# Patient Record
Sex: FEMALE | Race: WHITE | NOT HISPANIC OR LATINO | Employment: OTHER | URBAN - METROPOLITAN AREA
[De-identification: names, ages, dates, MRNs, and addresses within clinical notes are randomized per-mention and may not be internally consistent; named-entity substitution may affect disease eponyms.]

---

## 2017-04-12 ENCOUNTER — ALLSCRIPTS OFFICE VISIT (OUTPATIENT)
Dept: OTHER | Facility: OTHER | Age: 82
End: 2017-04-12

## 2017-04-12 ENCOUNTER — HOSPITAL ENCOUNTER (OUTPATIENT)
Dept: RADIOLOGY | Facility: MEDICAL CENTER | Age: 82
Discharge: HOME | End: 2017-04-12
Payer: MEDICARE

## 2017-04-12 ENCOUNTER — TRANSCRIBE ORDERS (OUTPATIENT)
Dept: ADMINISTRATIVE | Facility: HOSPITAL | Age: 82
End: 2017-04-12

## 2017-04-12 DIAGNOSIS — R91.1 SOLITARY PULMONARY NODULE: ICD-10-CM

## 2017-04-12 DIAGNOSIS — E04.1 NONTOXIC SINGLE THYROID NODULE: ICD-10-CM

## 2017-04-12 DIAGNOSIS — R05.9 COUGH: ICD-10-CM

## 2017-04-12 DIAGNOSIS — N28.9 DISORDER OF KIDNEY AND URETER: ICD-10-CM

## 2017-04-12 PROCEDURE — 71020 HB CHEST X-RAY 2VW FRONTAL&LATL: CPT

## 2017-04-13 ENCOUNTER — GENERIC CONVERSION - ENCOUNTER (OUTPATIENT)
Dept: OTHER | Facility: OTHER | Age: 82
End: 2017-04-13

## 2017-04-14 ENCOUNTER — TRANSCRIBE ORDERS (OUTPATIENT)
Dept: ADMINISTRATIVE | Facility: HOSPITAL | Age: 82
End: 2017-04-14

## 2017-04-14 DIAGNOSIS — R91.1 LUNG NODULE: Primary | ICD-10-CM

## 2017-04-17 ENCOUNTER — LAB CONVERSION - ENCOUNTER (OUTPATIENT)
Dept: OTHER | Facility: OTHER | Age: 82
End: 2017-04-17

## 2017-04-17 LAB
BUN SERPL-MCNC: 26 MG/DL (ref 7–25)
CREAT SERPL-MCNC: 1.11 MG/DL (ref 0.6–0.88)
EGFR AFRICAN AMERICAN (HISTORICAL): 52 ML/MIN/1.73M2
EGFR-AMERICAN CALC (HISTORICAL): 45 ML/MIN/1.73M2

## 2017-04-24 ENCOUNTER — ALLSCRIPTS OFFICE VISIT (OUTPATIENT)
Dept: OTHER | Facility: OTHER | Age: 82
End: 2017-04-24

## 2017-04-24 ENCOUNTER — HOSPITAL ENCOUNTER (OUTPATIENT)
Dept: RADIOLOGY | Facility: MEDICAL CENTER | Age: 82
Discharge: HOME | End: 2017-04-24
Payer: MEDICARE

## 2017-04-24 DIAGNOSIS — R91.1 SOLITARY PULMONARY NODULE: ICD-10-CM

## 2017-04-24 DIAGNOSIS — R91.1 LUNG NODULE: ICD-10-CM

## 2017-04-24 PROCEDURE — 71260 CT THORAX DX C+: CPT

## 2017-04-24 RX ADMIN — IODIXANOL 85 ML: 320 INJECTION, SOLUTION INTRAVASCULAR at 15:42

## 2017-05-12 ENCOUNTER — ALLSCRIPTS OFFICE VISIT (OUTPATIENT)
Dept: OTHER | Facility: OTHER | Age: 82
End: 2017-05-12

## 2017-05-15 ENCOUNTER — HOSPITAL ENCOUNTER (OUTPATIENT)
Dept: RADIOLOGY | Facility: MEDICAL CENTER | Age: 82
Discharge: HOME | End: 2017-05-15
Payer: MEDICARE

## 2017-05-15 DIAGNOSIS — E04.1 NONTOXIC SINGLE THYROID NODULE: ICD-10-CM

## 2017-05-15 PROCEDURE — 76536 US EXAM OF HEAD AND NECK: CPT

## 2017-06-02 ENCOUNTER — GENERIC CONVERSION - ENCOUNTER (OUTPATIENT)
Dept: OTHER | Facility: OTHER | Age: 82
End: 2017-06-02

## 2017-07-18 ENCOUNTER — ALLSCRIPTS OFFICE VISIT (OUTPATIENT)
Dept: OTHER | Facility: OTHER | Age: 82
End: 2017-07-18

## 2017-08-23 ENCOUNTER — ALLSCRIPTS OFFICE VISIT (OUTPATIENT)
Dept: OTHER | Facility: OTHER | Age: 82
End: 2017-08-23

## 2017-09-26 ENCOUNTER — ALLSCRIPTS OFFICE VISIT (OUTPATIENT)
Dept: OTHER | Facility: OTHER | Age: 82
End: 2017-09-26

## 2017-10-26 ENCOUNTER — GENERIC CONVERSION - ENCOUNTER (OUTPATIENT)
Dept: OTHER | Facility: OTHER | Age: 82
End: 2017-10-26

## 2017-12-06 ENCOUNTER — GENERIC CONVERSION - ENCOUNTER (OUTPATIENT)
Dept: OTHER | Facility: OTHER | Age: 82
End: 2017-12-06

## 2017-12-06 ENCOUNTER — ALLSCRIPTS OFFICE VISIT (OUTPATIENT)
Dept: OTHER | Facility: OTHER | Age: 82
End: 2017-12-06

## 2017-12-07 NOTE — PROGRESS NOTES
Assessment    1  Acute URI (465 9) (J06 9)    Plan  Acute URI    · Azithromycin 250 MG Oral Tablet; TAKE 2 TABLETS ON DAY 1 THEN TAKE 1TABLET A DAY FOR 4 DAYS    Discussion/Summary    Pt has uri start azithromycin  hold pravastatin for 5 days  Chief Complaint  non productive cough, sore throat, ear ache      History of Present Illness  HPI: pt presents with daughter for sore throat and cough which strted on monday  pt lives in assisted living  pt's  is ill as well  Review of Systems   Constitutional: no fever-- and-- no chills  ENT: earache,-- sore throat-- and-- nasal discharge  Respiratory: cough  Gastrointestinal: no vomiting-- and-- no diarrhea  Integumentary: no rashes  Neurological: no headache  Active Problems  1  Acute URI (465 9) (J06 9)   2  Anxiety (300 00) (F41 9)   3  Arthritis (716 90) (M19 90)   4  Depression (311) (F32 9)   5  Dry eyes, bilateral (375 15) (H04 123)   6  Dyslipidemia (272 4) (E78 5)   7  Gastroesophageal reflux disease (530 81) (K21 9)   8  Glaucoma (365 9) (H40 9)   9  Glenohumeral arthritis, right (715 91) (M19 011)   10  Hypertension (401 9) (I10)   11  Hypothyroidism (244 9) (E03 9)   12  Mild intermittent asthma without complication (923 93) (H33 09)   13  Need for pneumococcal vaccination (V03 82) (Z23)   14  Need for vaccination against Streptococcus pneumoniae (V03 82) (Z23)   15  Osteopenia (733 90) (M85 80)   16  Rheumatoid arthritis (714 0) (M06 9)   17  Urinary incontinence (788 30) (R32)   18  Vitamin D deficiency (268 9) (E55 9)    Past Medical History    1  History of Allergic rhinitis, unspecified allergic rhinitis trigger, unspecified rhinitis seasonality   2  History of Basal cell carcinoma of nose (173 31) (C44 311)   3  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)   4  Depression (311) (F32 9)   5  History of acute bronchitis (V12 69) (Z87 09)   6  History of aspiration pneumonia (V12 61) (Z87 01)   7   History of bronchiectasis (V12 69) (Z87 09)   8  History of fibromyalgia (V13 59) (Z87 39)   9  History of headache (V13 89) (Z87 898)   10  History of osteopenia (V13 59) (Z87 39)   11  History of peripheral neuropathy (V12 49) (Z86 69)   12  History of pneumonia (V12 61) (Z87 01)   13  History of polymyalgia rheumatica (V13 59) (Z87 39)   14  History of renal insufficiency syndrome (V13 09) (Z87 448)   15  History of rheumatoid arthritis (V13 4) (Z87 39)   16  History of rheumatoid arthritis (V13 4) (Z87 39)   17  History of stroke (V12 54) (Z86 73)   18  History of stroke (V12 54) (Z86 73)   19  Denied: History of substance abuse   20  History of systemic lupus erythematosus (SLE) (V12 29) (Z87 39)   21  History of thyroid nodule (V12 29) (Z86 39)   22  History of Pulmonary nodule (793 11) (R91 1)   23  History of Reflux esophagitis (530 11) (K21 0)   24  History of Right shoulder pain (719 41) (M25 511)   25  History of Sleep difficulties (780 50) (G47 9)    Family History  Mother    1  Family history of diabetes mellitus (V18 0) (Z83 3)  Father    2  Family history of cardiac disorder (V17 49) (Z82 49)  Sister    3  Family history of diabetes mellitus (V18 0) (Z83 3)   4  Family history of malignant neoplasm of uterus (V16 49) (Z80 49)  Family History    5  Denied: Family history of depression   6  Denied: Family history of substance abuse    Social History   · Denied: History of Drug use   · Non drinker / no alcohol use   · Non-smoker (V49 89) (Z78 9)  The social history was reviewed and updated today  The social history was reviewed and is unchanged  Surgical History    1  History of Eye Surgery   2  History of Hip Replacement Left   3  History of Reported Hx Of Shoulder Joint Replacement    Current Meds   1  Aspirin 325 MG Oral Tablet; Therapy: (Recorded:27Bzl8166) to Recorded   2  Calcium 600+D 600-200 MG-UNIT Oral Tablet; Therapy: (Recorded:13Xnw3459) to Recorded   3   Combivent Respimat  MCG/ACT Inhalation Aerosol Solution; Therapy: (Recorded:04Dvh6968) to Recorded   4  CVS Milk of Magnesia 400 MG/5ML Oral Suspension; Therapy: (Recorded:30Kqy7133) to Recorded   5  DULoxetine HCl - 60 MG Oral Capsule Delayed Release Particles; take 1 capsule by mouth daily; Therapy: 40BQN4964 to (Last Rx:12Apr2017)  Requested for: 92Vyx1883 Ordered   6  Fioricet -40 MG Oral Capsule; TAKE 1 CAPSULE Every 6 hours PRN; Therapy: 12Apr2017 to (Evaluate:27Apr2017) Recorded   7  ICaps Lutein & Omega-3 Oral Capsule; TAKE 1 CAPSULE DAILY; Therapy: 71Rnh8730 to Recorded   8  Levothyroxine Sodium 100 MCG Oral Tablet; take 1 tablet by mouth every day; Therapy: 74WQM4358 to (Luna Best)  Requested for: 23IUT8014; Last Rx:04Mar2017 Ordered   9  Loratadine 10 MG Oral Tablet; Therapy: (Recorded:74Ukx4606) to Recorded   10  Losartan Potassium 50 MG Oral Tablet; TAKE 1 TABLET DAILY; Therapy: 15JRS6287 to (Evaluate:37Yhh7011) Recorded   11  MethylPREDNISolone Acetate 40 MG/ML Injection Suspension; USE AS DIRECTED; To  Be Done: 24AMP1343; Status: HOLD FOR - Administration Ordered   12  Metoprolol Tartrate 50 MG Oral Tablet; TAKE 2 TABLETS DAILY; Therapy: (Recorded:67Doy2621) to Recorded   13  Mucinex 600 MG Oral Tablet Extended Release 12 Hour; Therapy: (Recorded:39Nfm3118) to Recorded   14  N-Acetyl-L-Cysteine 600 MG Oral Capsule; one cap bid on day bfore ct scan  then one  cap bid on day of ct; Therapy: 07Bgr2590 to (Last Rx:13Apr2017)  Requested for: 13Apr2017 Ordered   15  NIFEdipine ER 60 MG Oral Tablet Extended Release 24 Hour; TAKE 1 TABLET DAILY; Therapy: 26RFM2203 to (Evaluate:16Oct2016) Recorded   16  Pantoprazole Sodium 40 MG Oral Tablet Delayed Release; TAKE 1 TABLET TWICE  DAILY 30 MINUTES BEFORE BREAKFAST AND DINNER; Therapy: 11DUX8610 to (Evaluate:68Lea7190) Recorded   17  Pravastatin Sodium 40 MG Oral Tablet; Therapy: (Recorded:01Gcj2580) to Recorded   18  PreserVision AREDS Oral Capsule;   Therapy: (Recorded:64Fqg4233) to Recorded 19  Refresh Liquigel 1 % Ophthalmic Solution; 1-2 gtts ou tid; Therapy: 79Ock8330 to (Last Gin Mendes)  Requested for: 24Aug2017 Ordered   20  TraMADol HCl - 50 MG Oral Tablet; one tab q 4hrs prn pain; Therapy: 78ITT4934 to (Last Rx:24Nov2017) Ordered    The medication list was reviewed and updated today  Allergies  1  Arava   2  DHEA CAPS   3  lidocaine   4  nortriptyline   5  Simponi SOLN   6  sulfasalazine   7  Acetaminophen CAPS    Vitals   Recorded: 09AAB2936 03:21PM   Temperature 97 2 F, Tympanic   Heart Rate 77, L Brachial Artery   Pulse Quality Normal, L Brachial Artery   Respiration Quality Normal   Respiration 20   Systolic 818, LUE, Sitting   Diastolic 84, LUE, Sitting   Height 5 ft 0 5 in   Weight 145 lb 9 6 oz   BMI Calculated 27 97   BSA Calculated 1 64   O2 Saturation 99       Physical Exam   Constitutional  General appearance: No acute distress, well appearing and well nourished  Head and Face  Head and face: Normal    Palpation of the face and sinuses: No sinus tenderness  Eyes  Conjunctiva and lids: Abnormal  -- blind right eye  Ears, Nose, Mouth, and Throat  External inspection of ears and nose: Normal    Nasal mucosa, septum, and turbinates: Abnormal  -- mucosa red and scabbed right nares  Oropharynx: Normal with no erythema, edema, exudate or lesions  Pulmonary  Respiratory effort: No increased work of breathing or signs of respiratory distress  Auscultation of lungs: Clear to auscultation  Cardiovascular  Auscultation of heart: Normal rate and rhythm, normal S1 and S2, no murmurs  Examination of extremities for edema and/or varicosities: Normal    Lymphatic  Palpation of lymph nodes in neck: No lymphadenopathy  Future Appointments    Date/Time Provider Specialty Site   03/07/2018 02:10 PM GEORGES Ruffin   Orthopedic [address] [place]   Electronically signed by : Lauri Valdez [facility]; Dec  6 2017  3:36PM EST (Author)    Electronically signed by : GEORGES Dubois ; Dec  6 2017  6:18PM EST                       (Author)

## 2018-01-09 NOTE — MISCELLANEOUS
Message  left message need to adjust her thyroid   please call back today or tomorroow am      Signatures   Electronically signed by : Seema Gallegos DO; Jul 22 2016  2:47PM EST                       (Author)

## 2018-01-12 NOTE — MISCELLANEOUS
Message  ordered mucomyst and ct chest for incidental nodule      Plan  Pulmonary nodule    · CT CHEST W WO CONTRAST; Status:Hold For - Scheduling; Requested for:13Apr2017;   Renal insufficiency    · N-Acetyl-L-Cysteine 600 MG Oral Capsule; one cap bid on day bfore ct scan  then  one cap bid on day of ct    Signatures   Electronically signed by : Sharon De Guzman DO;  Apr 13 2017  3:42PM EST                       (Author)

## 2018-01-13 VITALS
HEART RATE: 65 BPM | SYSTOLIC BLOOD PRESSURE: 124 MMHG | DIASTOLIC BLOOD PRESSURE: 56 MMHG | OXYGEN SATURATION: 97 % | HEIGHT: 61 IN | RESPIRATION RATE: 16 BRPM | BODY MASS INDEX: 27.56 KG/M2 | TEMPERATURE: 98.2 F | WEIGHT: 146 LBS

## 2018-01-13 VITALS
WEIGHT: 141.13 LBS | BODY MASS INDEX: 26.67 KG/M2 | HEART RATE: 71 BPM | SYSTOLIC BLOOD PRESSURE: 155 MMHG | RESPIRATION RATE: 18 BRPM | DIASTOLIC BLOOD PRESSURE: 70 MMHG

## 2018-01-13 VITALS
SYSTOLIC BLOOD PRESSURE: 140 MMHG | TEMPERATURE: 97.9 F | DIASTOLIC BLOOD PRESSURE: 72 MMHG | HEART RATE: 69 BPM | OXYGEN SATURATION: 93 % | RESPIRATION RATE: 16 BRPM | WEIGHT: 145 LBS | BODY MASS INDEX: 28.47 KG/M2 | HEIGHT: 60 IN

## 2018-01-13 NOTE — PROGRESS NOTES
Assessment    1  Dyslipidemia (272 4) (E78 5)   2  GERD with esophagitis (530 11) (K21 0)   3  Hypothyroidism (244 9) (E03 9)   4  Anxiety (300 00) (F41 9)   5  History of rheumatoid arthritis (V13 4) (Z87 39)   6  Osteopenia (733 90) (M85 80)   7  Gait disturbance (781 2) (R26 9)   8  Vertigo (780 4) (R42)   9  Vitamin D deficiency (268 9) (E55 9)   10  Fatigue (780 79) (R53 83)   11  Well adult on routine health check (V70 0) (Z00 00)    Plan   Fatigue    · (1) COMPREHENSIVE METABOLIC PANEL; Status:Active; Requested for:76Wqf8418;    · (1) LIPID PANEL, FASTING; Status:Active; Requested for:62Wqw2446;    · (1) T4, FREE; Status:Active; Requested for:74Qkt2569;    · (1) TSH; Status:Active; Requested for:92Lqr3393;   Fatigue, Vitamin D deficiency    · (1) CBC/PLT/DIFF; Status:Active; Requested for:90Noo3200;   Need for pneumococcal vaccination    · Prevnar 13 Intramuscular Suspension  Vertigo    · Meclizine HCl - 12 5 MG Oral Tablet; Take 1 tablet 3 times daily as needed  Vitamin D deficiency    · (1) VITAMIN D 25-HYDROXY; Status:Active; Requested for:25Uzq9776;     *1 - [facility] Physical Therapy  Consult  Status: Hold For - Scheduling  Requested for: 57XAU9812  Ordered; For: Gait disturbance, Vertigo;  Ordered By: Pauline Keen  Performed:   Due: 79UNN7152  [facility] ([address] [place] [address] ) Physician Referral  Consult  Status: Hold For - Scheduling  Requested for: 12XCL5509  Ordered; For: Well adult on routine health check;  Ordered By: Pauline Keen  Performed:   Due: 09SKB2332     Discussion/Summary  health maintenance visit Currently, she eats a healthy diet  the risks and benefits of cervical cancer screening were discussed Breast cancer screening: the risks and benefits of breast cancer screening were discussed  Colorectal cancer screening: the risks and benefits of colorectal cancer screening were discussed  Osteoporosis screening: the risks and benefits of osteoporosis screening were discussed  The risks and benefits of immunizations were discussed  She was advised to be evaluated by an ophthalmologist       Chief Complaint  new patient physical      History of Present Illness  HM, Adult Female: The patient is being seen for a health maintenance evaluation  Social History: Household members include spouse and 1 daughter(s)  She is   Work status: occupation:   The patient has never smoked cigarettes  She reports rare alcohol use  She has never used illicit drugs  General Health: The patient's health since the last visit is described as fair  She has regular dental visits  She denies vision problems  She denies hearing loss  Lifestyle:  She consumes a diverse and healthy diet  She does not exercise regularly  She does not use tobacco  She denies alcohol use  She denies drug use  Reproductive health: the patient is postmenopausal    Screening: Cervical cancer screening includes uncertain timing of her last pap smear  Breast cancer screening includes uncertain timing of her last mammogram  Colorectal cancer screening includes a colonoscopy performed within the past ten years  Metabolic screening includes uncertain timing of her last lipid profile, uncertain timing of her last glucose screening, uncertain timing of her last thyroid function test and uncertain timing of her last DEXA  Cardiovascular risk factors: no hypertension, no diabetes, no tobacco use and no illicit drug use  General health risks: no previous breast cancer, no asbestos exposure and no radiation exposure  Risk findings: no guns at home  Active Problems    1  Arthritis (716 90) (M19 90)   2  Depression (311) (F32 9)   3  Fibromyalgia (729 1) (M79 7)   4  Glaucoma (365 9) (H40 9)   5   Hypertension (401 9) (I10)    Past Medical History    · History of acute bronchitis (V12 69) (Z87 09)   · History of headache (V13 89) (C23 601)   · History of osteopenia (V13 59) (Z87 39)   · History of polymyalgia rheumatica (V13 59) (Z87 39)   · History of rheumatoid arthritis (V13 4) (Z87 39)   · History of rheumatoid arthritis (V13 4) (Z87 39)   · History of systemic lupus erythematosus (SLE) (V12 29) (Z87 39)   · History of Sleep difficulties (780 50) (G47 9)    Surgical History    · History of Hip Replacement Left   · History of Reported Hx Of Shoulder Joint Replacement    Family History  Mother    · Family history of diabetes mellitus (V18 0) (Z83 3)  Father    · Family history of cardiac disorder (V17 49) (Z80 55)  Sister    · Family history of diabetes mellitus (V18 0) (Z83 3)   · Family history of malignant neoplasm of uterus (V16 49) (Z80 49)    Social History    · Non drinker / no alcohol use   · Non-smoker (V49 89) (Z78 9)    Current Meds   1  Calcium 600-200 MG-UNIT Oral Tablet; daily; Therapy: 33RFS8725 to Recorded   2  CVS Vitamin B-12 1000 MCG Oral Tablet; TAKE 1 TABLET ORALLY DAILY   (SUPPLEMENT); Therapy: 46ZHC4710 to Recorded   3  Cymbalta 30 MG Oral Capsule Delayed Release Particles; TAKE 1 CAPSULE DAILY; Therapy: 94KNH6834 to (Evaluate:66Aom6664) Recorded   4  Fiorinal -40 MG Oral Capsule; One daily as needed; Therapy: 02TDY9270 to Recorded   5  Gammagard 10 GM/100ML Injection Solution; Therapy: 82MCW1641 to Recorded   6  ICaps Oral Capsule; Therapy: 76UQW8380 to Recorded   7  LORazepam 1 MG Oral Tablet; 1/2 tab tid; Therapy: 96CGC4303 to Recorded   8  Losartan Potassium 50 MG Oral Tablet; TAKE 1 TABLET DAILY; Therapy: 26EYW8523 to (Evaluate:79Hdw3784) Recorded   9  Metoprolol Tartrate 25 MG Oral Tablet; TAKE 1 TABLET TWICE DAILY; Therapy: 80ERS5561 to (Evaluate:37Bev8495) Recorded   10  NIFEdipine ER 60 MG Oral Tablet Extended Release 24 Hour; TAKE 1 TABLET DAILY; Therapy: 60OXD3079 to (Evaluate:10Roz5664) Recorded   11  OxyCODONE HCl - 15 MG Oral Tablet; one tab q 12 hrs as needed; Therapy: 90DZE8557 to Recorded   12   Pantoprazole Sodium 40 MG Oral Tablet Delayed Release; TAKE 1 TABLET TWICE    DAILY 30 MINUTES BEFORE BREAKFAST AND DINNER; Therapy: 27SXD4963 to (Evaluate:36Ncv9964) Recorded   13  Pravastatin Sodium 40 MG Oral Tablet; TAKE 1 TABLET AT BEDTIME; Therapy: 43FRJ7077 to (Evaluate:07Lpo0769) Recorded   14  Synthroid 75 MCG Oral Tablet; Therapy: 27OPI2158 to Recorded   15  Triamcinolone Acetonide 0 1 % External Cream; Apply TID; Therapy: 94OVE9816 to Recorded   16  Vitamin D (Ergocalciferol) 62512 UNIT Oral Capsule; Therapy: 08KFY5672 to Recorded    Allergies    1  No Known Drug Allergies    Vitals   Recorded: 78OBD8823 20:10ZM   Systolic 361, LUE, Sitting   Diastolic 80, LUE, Sitting   Heart Rate 76, L Brachial Artery   Pulse Quality Normal, L Brachial Artery   Respiration Quality Normal   Respiration 16   Temperature 97 6 F, Tympanic   Height 5 ft    Weight 146 lb    BMI Calculated 28 51   BSA Calculated 1 63     Physical Exam    Constitutional   General appearance: No acute distress, well appearing and well nourished  Head and Face   Head and face: Normal     Palpation of the face and sinuses: No sinus tenderness  Eyes   Conjunctiva and lids: No swelling, erythema or discharge  Pupils and irises: Equal, round, reactive to light  Ears, Nose, Mouth, and Throat   External inspection of ears and nose: Normal     Otoscopic examination: Tympanic membranes translucent with normal light reflex  Canals patent without erythema  Hearing: Normal     Nasal mucosa, septum, and turbinates: Normal without edema or erythema  Lips, teeth, and gums: Normal, good dentition  Oropharynx: Normal with no erythema, edema, exudate or lesions  Neck   Neck: Supple, symmetric, trachea midline, no masses  Thyroid: Normal, no thyromegaly  Pulmonary   Respiratory effort: No increased work of breathing or signs of respiratory distress  Percussion of chest: Normal     Palpation of chest: Normal     Auscultation of lungs: Clear to auscultation      Cardiovascular Palpation of heart: Normal PMI, no thrills  Auscultation of heart: Normal rate and rhythm, normal S1 and S2, no murmurs  Carotid pulses: 2+ bilaterally  Abdomen   Abdomen: Non-tender, no masses  Liver and spleen: No hepatomegaly or splenomegaly  Examination for hernias: No hernia appreciated  Anus, perineum, and rectum: Normal sphincter tone, no masses, no prolapse  Genitourinary   External genitalia and vagina: Normal, no lesions appreciated  Results/Data  *VB-Urinary Incontinence Screen (Dx V81 6 Screen for UI) 42XZC1739 01:43PM Pauline Keen     Test Name Result Flag Reference   Urinary Incontinence Assessment negative       PHQ-2 Adult Depression Screening 74Pgc5967 01:41PM User, [employer]     Test Name Result Flag Reference   PHQ-2 Adult Depression Score 0     Over the last two weeks, how often have you been bothered by any of the following problems?   Little interest or pleasure in doing things: Not at all - 0  Feeling down, depressed, or hopeless: Not at all - 0   PHQ-2 Adult Depression Screening Negative       Falls Risk Assessment (Dx V80 09 Screen for Neurologic Disorder) 92BZI5937 01:41PM User, [employer]     Test Name Result Flag Reference   Falls Risk      2 or more falls past year       Signatures   Electronically signed by : Seema Gallegos DO; Jul 18 2016  5:37PM EST                       (Author)

## 2018-01-14 VITALS
HEIGHT: 60 IN | HEART RATE: 73 BPM | TEMPERATURE: 98.4 F | DIASTOLIC BLOOD PRESSURE: 62 MMHG | WEIGHT: 145.5 LBS | OXYGEN SATURATION: 95 % | RESPIRATION RATE: 16 BRPM | BODY MASS INDEX: 28.57 KG/M2 | SYSTOLIC BLOOD PRESSURE: 164 MMHG

## 2018-01-14 VITALS
OXYGEN SATURATION: 97 % | SYSTOLIC BLOOD PRESSURE: 152 MMHG | DIASTOLIC BLOOD PRESSURE: 70 MMHG | HEART RATE: 57 BPM | RESPIRATION RATE: 16 BRPM | TEMPERATURE: 98.8 F | BODY MASS INDEX: 26.1 KG/M2 | WEIGHT: 138.25 LBS | HEIGHT: 61 IN

## 2018-01-14 VITALS
BODY MASS INDEX: 23.41 KG/M2 | OXYGEN SATURATION: 97 % | HEIGHT: 61 IN | TEMPERATURE: 98.5 F | RESPIRATION RATE: 16 BRPM | WEIGHT: 124 LBS | SYSTOLIC BLOOD PRESSURE: 136 MMHG | HEART RATE: 56 BPM | DIASTOLIC BLOOD PRESSURE: 64 MMHG

## 2018-01-14 NOTE — PROCEDURES
Procedures by Anisa Golden RN  at 10/4/2016  3:27 PM      Author:  Anisa Golden RN Service:  Interventional Radiology  Author Type:  Registered Nurse    Filed:  10/4/2016  3:29 PM Date of Service:  10/4/2016  3:27 PM Status:  Attested    :  Anisa Golden RN (Registered Nurse)  Cosigner:  Katharine Cha MD at 10/5/2016  8:07 AM      Procedure Orders:       1  Insert PICC line [id number] ordered by Anisa Golden RN at 10/04/16 1527               Attestation signed by Katharine Cha MD at 10/5/2016  8:07 AM           picc line is ready for use  PICC Line Insertion  Date/Time: 10/4/2016 3:27 PM  Performed by: Abdias Maravilla by: Gena Doctor     Patient location:  Other (comment) (IR)  Consent:     Consent obtained:  Written    Risks discussed:  Bleeding, arterial puncture and incorrect placement    Alternatives discussed:  No treatment and delayed treatment  Universal protocol:     Relevant documents present and verified: yes      Immediately prior to procedure, a time out was called: yes      Patient identity confirmed:  Verbally with patient and arm band  Pre-procedure details:     Hand hygiene: Hand hygiene performed prior to insertion      Sterile barrier technique: All elements of maximal sterile technique followed      Skin preparation:  ChloraPrep    Skin preparation agent: Skin preparation agent completely dried prior to procedure    Indications:     PICC line indications: vascular access    Anesthesia (see MAR for exact dosages):      Anesthesia method:  Local infiltration    Local anesthetic:  Lidocaine 1% w/o epi  Procedure details:     Location:  Basilic    Vessel type: vein      Laterality:  Right    Approach: percutaneous technique used      Catheter size:  5 Fr    Landmarks identified: yes      Ultrasound guidance: yes      Sterile ultrasound techniques: Sterile gel and sterile probe covers were used      Number of attempts: 1    Successful placement: yes      Vessel of catheter tip end:  Svc    Total catheter length (cm):  35    Catheter out on skin (cm):  0    Max flow rate:  999ml/hr    Arm circumference:  29 5  Post-procedure details:     Post-procedure:  Dressing applied    Post-procedure complications: none      Patient tolerance of procedure: Tolerated well, no immediate complications    Observer:  Yes                       Received for:Provider  EPIC   Oct  5 2016  8:07AM [facility] Standard Time

## 2018-01-15 NOTE — MISCELLANEOUS
Message  left message that rec thyroid bx  daughter is aware and called   her mom got out of the hospital and now in rehab  will call when mom out      Signatures   Electronically signed by : Brenda Calderon DO; Jun 2 2017 11:32AM EST                       (Author)

## 2018-01-23 VITALS
TEMPERATURE: 97.2 F | DIASTOLIC BLOOD PRESSURE: 84 MMHG | BODY MASS INDEX: 27.49 KG/M2 | OXYGEN SATURATION: 99 % | HEART RATE: 77 BPM | HEIGHT: 61 IN | RESPIRATION RATE: 20 BRPM | WEIGHT: 145.6 LBS | SYSTOLIC BLOOD PRESSURE: 124 MMHG

## 2018-01-24 VITALS — HEART RATE: 71 BPM | SYSTOLIC BLOOD PRESSURE: 152 MMHG | RESPIRATION RATE: 20 BRPM | DIASTOLIC BLOOD PRESSURE: 77 MMHG

## 2018-01-24 NOTE — PROGRESS NOTES
Assessment    1  Shoulder pain (829 72) (M25 519)    Plan  Shoulder pain    · MethylPREDNISolone Acetate 40 MG/ML Injection Suspension   · * XR SHOULDER 2+ VIEW RIGHT; Status:Active - Retrospective By Protocol  Authorization; Requested for:10Aug2016;    · Follow-up visit in 3 months Evaluation and Treatment  Follow-up  Status: Hold For -  Scheduling  Requested for: 10Aug2016    Discussion/Summary    80-year-old female, right shoulder glenohumeral osteoarthritis  Rotator cuff pain  Right shoulder steroidal injection given with significant pain relief  Thereafter  Patient is now wish to perform any formal physical therapy for her right shoulder  Weightbearing as tolerated  Upper extremity  Should the patient wished to pursue surgical intervention  We'll have the patient follow up with Dr Leanne Noble    Patient advised should they develop any increasing pain, redness, numbness, tingling or swelling surrounding the injection sight, they are to contact our office or present to the emergency department  Chief Complaint  Shoulder pain      History of Present Illness  80-year-old female presents today regarding right shoulder pain  Patient, states she's had increasing right shoulder pain  The past few months time  She states the pain is worse at night, worse with motion, localized right shoulder aching in nature and sharp with motion  She denies any ability right shoulder  Denies numbness, tingling  She does have significant weakness of her right shoulder pain is localized in the lateral aspect of her right shoulder  She denies any numbness or tingling  Right upper extremity  Patient admits with the assistance of a cane      Review of Systems    Constitutional: No fever, no chills, feels well, no tiredness, no recent weight gain or loss  Eyes: No complaints of eyesight problems, no red eyes  ENT: no loss of hearing, no nosebleeds, no sore throat     Cardiovascular: No complaints of chest pain, no palpitations, no leg claudication or lower extremity edema  Respiratory: no compliants of shortness of breath, no wheezing, no cough  Gastrointestinal: no complaints of abdominal pain, no constipation, no nausea or diarrhea, no vomiting, no bloody stools  Genitourinary: no complaints of dysuria, no incontinence  Musculoskeletal: as noted in HPI  Integumentary: no complaints of skin rash or lesion, no itching or dry skin, no skin wounds  Neurological: no complaints of headache, no confusion, no numbness or tingling, no dizziness  Endocrine: No complaints of muscle weakness, no feelings of weakness, no frequent urination, no excessive thirst    Psychiatric: No suicidal thoughts, no anxiety, no feelings of depression  Active Problems    1  Anxiety (300 00) (F41 9)   2  Arthritis (716 90) (M19 90)   3  Biceps tendinitis of right shoulder (726 12) (M75 21)   4  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)   5  Depression (311) (F32 9)   6  Dyslipidemia (272 4) (E78 5)   7  Fatigue (780 79) (R53 83)   8  Fibromyalgia (729 1) (M79 7)   9  Gait disturbance (781 2) (R26 9)   10  GERD with esophagitis (530 11) (K21 0)   11  Glaucoma (365 9) (H40 9)   12  Hypertension (401 9) (I10)   13  Hypothyroidism (244 9) (E03 9)   14  Need for pneumococcal vaccination (V03 82) (Z23)   15  Osteopenia (733 90) (M85 80)   16  Rheumatoid arthritis (714 0) (M06 9)   17  Shoulder pain (719 41) (M25 519)   18  Vertigo (780 4) (R42)   19  Vitamin D deficiency (268 9) (E55 9)   20   Well adult on routine health check (V70 0) (Z00 00)    Past Medical History    · History of acute bronchitis (V12 69) (Z87 09)   · History of headache (V13 89) (Q25 790)   · History of osteopenia (V13 59) (Z87 39)   · History of polymyalgia rheumatica (V13 59) (Z87 39)   · History of rheumatoid arthritis (V13 4) (Z87 39)   · History of rheumatoid arthritis (V13 4) (Z87 39)   · History of systemic lupus erythematosus (SLE) (V12 29) (Z87 39)   · History of Sleep difficulties (780 50) (G47 9)    The active problems and past medical history were reviewed and updated today  Surgical History    · History of Hip Replacement Left   · History of Reported Hx Of Shoulder Joint Replacement    The surgical history was reviewed and updated today  Family History  Mother    · Family history of diabetes mellitus (V18 0) (Z83 3)  Father    · Family history of cardiac disorder (V17 49) (Z80 55)  Sister    · Family history of diabetes mellitus (V18 0) (Z83 3)   · Family history of malignant neoplasm of uterus (V16 49) (Z80 49)    The family history was reviewed and updated today  Social History    · Non drinker / no alcohol use   · Non-smoker (V49 89) (Z78 9)  The social history was reviewed and updated today  Current Meds   1  Calcium 600-200 MG-UNIT Oral Tablet; daily; Therapy: 95PUW7185 to Recorded   2  [employer] Vitamin B-12 1000 MCG Oral Tablet; TAKE 1 TABLET ORALLY DAILY   (SUPPLEMENT); Therapy: 89TNK5318 to Recorded   3  Cymbalta 30 MG Oral Capsule Delayed Release Particles; TAKE 1 CAPSULE DAILY; Therapy: 60AVB0128 to (Evaluate:32Tzv8884) Recorded   4  Fiorinal -40 MG Oral Capsule; One daily as needed; Therapy: 94VKC0132 to Recorded   5  Gammagard 10 GM/100ML Injection Solution; Therapy: 54FMQ1907 to Recorded   6  ICaps Lutein & Omega-3 Oral Capsule; TAKE 1 CAPSULE DAILY; Therapy: 05Ycb8797 to Recorded   7  Levothyroxine Sodium 100 MCG Oral Tablet; take 1 tablet by mouth every day; Therapy: 16PZW2106 to (Last Rx:66Hzl0277)  Requested for: 90Egu5821 Ordered   8  Lidocaine 5 % External Patch; APPLY 1 PATCH TO AFFECTED AREA, ON 12 HOURS   OFF 12 HOURS; Therapy: 34BHG1890 to (Last Rx:68Kpm3243)  Requested for: 41Gct5457 Ordered   9  LORazepam 1 MG Oral Tablet; 1/2 tab tid; Therapy: 83QZG8455 to Recorded   10  Losartan Potassium 50 MG Oral Tablet; TAKE 1 TABLET DAILY; Therapy: 07AQA6130 to (Evaluate:94Pkx3604) Recorded   11   Meclizine HCl - 12 5 MG Oral Tablet; Take 1 tablet 3 times daily as needed; Therapy: 35JSJ2908 to (Last Rx:20Zwh4575)  Requested for: 86NUO8168 Ordered   12  Metoprolol Tartrate 25 MG Oral Tablet; TAKE 1 TABLET TWICE DAILY; Therapy: 95OQU3348 to (Evaluate:93Rjn3905) Recorded   13  Nabumetone 750 MG Oral Tablet; TAKE 1 TABLET TWICE DAILY AFTER MEALS; Therapy: 35MPN2710 to (Evaluate:90Vii0283)  Requested for: 58Rkv7141; Last    Rx:33Aht0401 Ordered   14  NIFEdipine ER 60 MG Oral Tablet Extended Release 24 Hour; TAKE 1 TABLET DAILY; Therapy: 85CHM8151 to (Evaluate:16Oct2016) Recorded   15  OxyCODONE HCl - 15 MG Oral Tablet; one tab q 12 hrs as needed; Therapy: 19LFQ1235 to Recorded   16  Pantoprazole Sodium 40 MG Oral Tablet Delayed Release; TAKE 1 TABLET TWICE    DAILY 30 MINUTES BEFORE BREAKFAST AND DINNER; Therapy: 09EDI9436 to (Evaluate:54Amg5808) Recorded   17  Pravastatin Sodium 40 MG Oral Tablet; TAKE 1 TABLET AT BEDTIME; Therapy: 52QKG3325 to (Evaluate:20Oct2016); Last Rx:42Mlr1204 Ordered   18  Triamcinolone Acetonide 0 1 % External Cream; Apply TID; Therapy: 66KIQ0912 to Recorded   19  Vitamin D (Ergocalciferol) 16016 UNIT Oral Capsule; Therapy: 50WVT2718 to Recorded    The medication list was reviewed and updated today  Allergies    1  No Known Drug Allergies    Physical Exam    Right Shoulder: Appearance: Normal  Tenderness: deltoid  Motor: 4/5 forward flexion and 4/5 external rotation  Forward flexion: painful restricted AROM 90 degrees and restricted PROM 90 degrees which was painful  Abduction: painful restricted AROM 90 degrees and restricted PROM 90 degrees which was painful  Internal rotation: restricted AROM and restricted PROM  External rotation: painful normal AROM and normal PROM which was painful   Special Tests: negative Belly Press test    Constitutional - General appearance: Normal    Musculoskeletal - Gait and station: Abnormal  Digits and nails: Normal  Inspection/palpation of joints, bones, and muscles: Abnormal  Muscle strength/tone: Normal    Cardiovascular - Pulses: Normal  Examination of extremities for edema and/or varicosities: Normal    Skin - Skin and subcutaneous tissue: Normal    Neurologic - Sensation: Normal    Psychiatric - Orientation to person, place, and time: Normal  Mood and affect: Normal    Eyes   Conjunctiva and lids: Normal     Pupils and irises: Normal        Results/Data  I personally reviewed the films/images/results in the office today  My interpretation follows  X-ray Review X-ray of the right shoulder  Reviewed x-ray does reveal evidence of glenohumeral osteoarthritis, joint space narrowing, osteophyte formation  Procedure    Procedure: Injection of the right glenohumeral joint  Indication:  osteoarthritis  Risk, benefits and alternatives were discussed with the patient  Verbal consent was obtained prior to the procedure  Alcohol was used to prep the area  ethyl chloride spray was used as a topical anesthetic  Using sterile technique, the aspiration/injection needle was then directed from a posterior aspect  A 22-gauge was used to inject 1 5 mL of 1% Lidocaine, 1 5 mL 0 25% Bupivacaine and 2 mL 40mg/mL methylprednisolone  A bandage was applied  the patient tolerated the procedure well  Complications: none  Future Appointments    Date/Time Provider Specialty Site   11/09/2016 03:40 PM GEORGES Mckinley  Orthopedic [facility]      Signatures   Electronically signed by : ANNA Willis;  Aug 10 2016  4:39PM EST                       (Author)    Electronically signed by : GEORGES Julien ; Aug 10 2016  4:44PM EST                       (Author)

## 2018-02-01 ENCOUNTER — OFFICE VISIT (OUTPATIENT)
Dept: FAMILY MEDICINE CLINIC | Facility: CLINIC | Age: 83
End: 2018-02-01
Payer: MEDICARE

## 2018-02-01 VITALS
BODY MASS INDEX: 27 KG/M2 | HEART RATE: 78 BPM | DIASTOLIC BLOOD PRESSURE: 70 MMHG | HEIGHT: 61 IN | SYSTOLIC BLOOD PRESSURE: 148 MMHG | OXYGEN SATURATION: 90 % | RESPIRATION RATE: 15 BRPM | TEMPERATURE: 100.6 F | WEIGHT: 143 LBS

## 2018-02-01 DIAGNOSIS — J45.20 MILD INTERMITTENT ASTHMA, UNSPECIFIED WHETHER COMPLICATED: ICD-10-CM

## 2018-02-01 DIAGNOSIS — F32.A DEPRESSION, UNSPECIFIED DEPRESSION TYPE: ICD-10-CM

## 2018-02-01 DIAGNOSIS — J40 BRONCHITIS: Primary | ICD-10-CM

## 2018-02-01 DIAGNOSIS — J01.90 ACUTE SINUSITIS, RECURRENCE NOT SPECIFIED, UNSPECIFIED LOCATION: ICD-10-CM

## 2018-02-01 DIAGNOSIS — R04.0 NASAL BLEEDING: ICD-10-CM

## 2018-02-01 DIAGNOSIS — F41.9 ANXIETY: ICD-10-CM

## 2018-02-01 DIAGNOSIS — E78.5 HYPERLIPIDEMIA, UNSPECIFIED HYPERLIPIDEMIA TYPE: ICD-10-CM

## 2018-02-01 DIAGNOSIS — E03.9 HYPOTHYROIDISM, UNSPECIFIED TYPE: ICD-10-CM

## 2018-02-01 DIAGNOSIS — I10 HYPERTENSION, UNSPECIFIED TYPE: ICD-10-CM

## 2018-02-01 PROCEDURE — 99214 OFFICE O/P EST MOD 30 MIN: CPT | Performed by: INTERNAL MEDICINE

## 2018-02-01 RX ORDER — ACETAMINOPHEN 325 MG/1
TABLET ORAL
COMMUNITY
End: 2018-02-01

## 2018-02-01 RX ORDER — VIT A/VIT C/VIT E/ZINC/COPPER 4296-226
CAPSULE ORAL
COMMUNITY
End: 2018-04-13

## 2018-02-01 RX ORDER — ASPIRIN 325 MG
325 TABLET ORAL DAILY
COMMUNITY
End: 2019-03-21

## 2018-02-01 RX ORDER — TRAMADOL HYDROCHLORIDE 50 MG/1
1 TABLET ORAL EVERY 4 HOURS PRN
COMMUNITY
Start: 2017-11-24 | End: 2018-04-13

## 2018-02-01 RX ORDER — LORAZEPAM 0.5 MG/1
0.5 TABLET ORAL EVERY 8 HOURS PRN
Qty: 30 TABLET | Refills: 2 | Status: ON HOLD | OUTPATIENT
Start: 2018-02-01 | End: 2018-04-17

## 2018-02-01 RX ORDER — GUAIFENESIN 600 MG
600 TABLET, EXTENDED RELEASE 12 HR ORAL 2 TIMES DAILY PRN
COMMUNITY
End: 2018-05-14 | Stop reason: SDUPTHER

## 2018-02-01 RX ORDER — METOPROLOL TARTRATE 50 MG/1
50 TABLET, FILM COATED ORAL 2 TIMES DAILY
COMMUNITY
Start: 2017-12-22 | End: 2018-08-16 | Stop reason: SDUPTHER

## 2018-02-01 RX ORDER — LEVOFLOXACIN 500 MG/1
500 TABLET, FILM COATED ORAL EVERY 24 HOURS
Qty: 10 TABLET | Refills: 0 | Status: SHIPPED | OUTPATIENT
Start: 2018-02-01 | End: 2018-02-11

## 2018-02-01 RX ORDER — LORATADINE 10 MG/1
TABLET ORAL
COMMUNITY
End: 2019-01-28 | Stop reason: HOSPADM

## 2018-02-01 RX ORDER — PRAVASTATIN SODIUM 40 MG
40 TABLET ORAL DAILY
Status: ON HOLD | COMMUNITY
End: 2019-01-28

## 2018-02-01 RX ORDER — DULOXETIN HYDROCHLORIDE 60 MG/1
60 CAPSULE, DELAYED RELEASE ORAL DAILY
Status: ON HOLD | COMMUNITY
Start: 2017-12-22 | End: 2018-04-17

## 2018-02-01 RX ORDER — DULOXETIN HYDROCHLORIDE 30 MG/1
30 CAPSULE, DELAYED RELEASE ORAL DAILY
Qty: 30 CAPSULE | Refills: 5 | Status: SHIPPED | OUTPATIENT
Start: 2018-02-01 | End: 2018-04-17 | Stop reason: HOSPADM

## 2018-02-01 RX ORDER — LEVOTHYROXINE SODIUM 0.1 MG/1
100 TABLET ORAL DAILY
COMMUNITY
Start: 2017-12-22

## 2018-02-01 NOTE — PROGRESS NOTES
Assessment/Plan:         Diagnoses and all orders for this visit:    Acute sinusitis, recurrence not specified, unspecified location  Comments:  tx with antibiotic    Mild intermittent asthma, unspecified whether complicated  Comments:  controlled but add combivent with illness    Need for influenza vaccination    Bronchitis  Comments:  readd combivent    Hypertension, unspecified type    Hyperlipidemia, unspecified hyperlipidemia type  Comments:  due for lab      Hypothyroidism, unspecified type  Comments:  due for lab    Other orders  -     Cancel: POCT spirometry  -     Cancel: Flu vaccine greater than or equal to 2yo preservative free IM  -     aspirin 325 mg tablet; Take by mouth  -     magnesium hydroxide (MILK OF MAGNESIA) 400 mg/5 mL oral suspension; Take by mouth  -     DULoxetine (CYMBALTA) 60 mg delayed release capsule;   -     levothyroxine 100 mcg tablet;   -     Multiple Vitamins-Minerals (ICAPS LUTEIN & OMEGA-3 PO); Take 1 capsule by mouth daily  -     loratadine (CLARITIN) 10 mg tablet; Take by mouth  -     metoprolol tartrate (LOPRESSOR) 50 mg tablet;   -     guaiFENesin (MUCINEX) 600 mg 12 hr tablet; Take by mouth  -     pravastatin (PRAVACHOL) 40 mg tablet; Take by mouth  -     carboxymethylcellulose (REFRESH LIQUIGEL) 1 % ophthalmic solution; Apply to eye  -     Multiple Vitamins-Minerals (PRESERVISION AREDS) CAPS; Take by mouth  -     traMADol (ULTRAM) 50 mg tablet; Take 1 tablet by mouth every 4 (four) hours as needed  -     Discontinue: acetaminophen (TYLENOL) 325 mg tablet; Take by mouth  -     fluorescein sodium sterile ophthalmic strip; 1 strip once          Subjective:      Patient ID: Mary Iglesias is a 80 y o  female  +fever +sore throat +swollen glands  She thinks she has been ill x 2 weeks  She is not aware she has a fever            The following portions of the patient's history were reviewed and updated as appropriate: She  has a past medical history of Blind; Depression; Disease of thyroid gland; Fibromyalgia; Gait disturbance; GERD (gastroesophageal reflux disease); Glaucoma; Hyperlipidemia; Hypertension; Osteopenia; Psychiatric disorder; Rheumatoid arthritis ([address] ); Vertigo; and Vitamin D deficiency  She  does not have any pertinent problems on file  She  has a past surgical history that includes Joint replacement  Her family history is not on file  She  reports that she has never smoked  She has never used smokeless tobacco  She reports that she does not drink alcohol or use drugs    Current Outpatient Prescriptions   Medication Sig Dispense Refill    aspirin 325 mg tablet Take by mouth      butalbital-aspirin-caffeine (FIORINAL) -40 mg per capsule Take 1 capsule by mouth every 4 (four) hours as needed for headaches      calcium carbonate (OS-TEDDY) 600 MG tablet Take 600 mg by mouth daily      carboxymethylcellulose (REFRESH LIQUIGEL) 1 % ophthalmic solution Apply to eye      DULoxetine (CYMBALTA) 60 mg delayed release capsule       fluorescein sodium sterile ophthalmic strip 1 strip once      guaiFENesin (MUCINEX) 600 mg 12 hr tablet Take by mouth      levothyroxine 100 mcg tablet       loratadine (CLARITIN) 10 mg tablet Take by mouth      losartan (COZAAR) 50 mg tablet Take 50 mg by mouth daily      magnesium hydroxide (MILK OF MAGNESIA) 400 mg/5 mL oral suspension Take by mouth      metoprolol tartrate (LOPRESSOR) 50 mg tablet       Multiple Vitamins-Minerals (ICAPS LUTEIN & OMEGA-3 PO) Take 1 capsule by mouth daily      Multiple Vitamins-Minerals (PRESERVISION AREDS) CAPS Take by mouth      NIFEdipine ER (ADALAT CC) 60 MG 24 hr tablet Take 60 mg by mouth daily      pantoprazole (PROTONIX) 40 mg tablet Take 40 mg by mouth daily      pravastatin (PRAVACHOL) 40 mg tablet Take by mouth      traMADol (ULTRAM) 50 mg tablet Take 1 tablet by mouth every 4 (four) hours as needed      DULoxetine (CYMBALTA) 30 mg delayed release capsule Take 30 mg by mouth daily      ipratropium-albuterol (COMBIVENT)  mcg/act inhaler Inhale 2 puffs every 4 (four) hours as needed for wheezing      LORazepam (ATIVAN) 0 5 mg tablet Take 1 tablet by mouth every 6 (six) hours for 5 days 20 tablet 0    meclizine (ANTIVERT) 12 5 MG tablet Take 12 5 mg by mouth 3 (three) times a day as needed for dizziness      OMEGA 3-LUTEIN-ZEAXANTHIN PO Take 1 tablet by mouth daily       No current facility-administered medications for this visit        Current Outpatient Prescriptions on File Prior to Visit   Medication Sig    butalbital-aspirin-caffeine [facility]) -40 mg per capsule Take 1 capsule by mouth every 4 (four) hours as needed for headaches    calcium carbonate (OS-TEDDY) 600 MG tablet Take 600 mg by mouth daily    losartan (COZAAR) 50 mg tablet Take 50 mg by mouth daily    NIFEdipine ER (ADALAT CC) 60 MG 24 hr tablet Take 60 mg by mouth daily    pantoprazole (PROTONIX) 40 mg tablet Take 40 mg by mouth daily    DULoxetine (CYMBALTA) 30 mg delayed release capsule Take 30 mg by mouth daily    ipratropium-albuterol (COMBIVENT)  mcg/act inhaler Inhale 2 puffs every 4 (four) hours as needed for wheezing    LORazepam (ATIVAN) 0 5 mg tablet Take 1 tablet by mouth every 6 (six) hours for 5 days    meclizine (ANTIVERT) 12 5 MG tablet Take 12 5 mg by mouth 3 (three) times a day as needed for dizziness    OMEGA 3-LUTEIN-ZEAXANTHIN PO Take 1 tablet by mouth daily    [DISCONTINUED] aspirin 81 mg chewable tablet Chew 1 tablet daily for 30 days    [DISCONTINUED] atorvastatin (LIPITOR) 40 mg tablet Take 1 tablet by mouth daily with dinner for 30 days    [DISCONTINUED] ergocalciferol (ERGOCALCIFEROL) 03189 UNITS capsule Take 50,000 Units by mouth once a week    [DISCONTINUED] levothyroxine 75 mcg tablet Take 1 tablet by mouth daily in the early morning for 30 days    [DISCONTINUED] metoprolol tartrate (LOPRESSOR) 25 mg tablet Take 25 mg by mouth 2 (two) times a day    [DISCONTINUED] nabumetone (RELAFEN) 750 mg tablet Take 750 mg by mouth 2 (two) times a day    [DISCONTINUED] oxyCODONE (OxyCONTIN) 15 mg 12 hr tablet Take 15 mg by mouth every 12 (twelve) hours as needed for moderate pain     No current facility-administered medications on file prior to visit  She is allergic to acetaminophen; golimumab; lidocaine; nortriptyline; prasterone; leflunomide; and sulfasalazine       Review of Systems   Constitutional: Positive for fever  Negative for appetite change  HENT: Positive for congestion, postnasal drip and sore throat  Negative for ear pain  Neurological: Positive for headaches  Objective:     Physical Exam   Constitutional: She appears well-developed  HENT:   Head: Normocephalic and atraumatic  Right Ear: External ear normal    Left Ear: External ear normal    Mouth/Throat: Oropharynx is clear and moist  No oropharyngeal exudate  Neck: Normal range of motion  Neck supple  Cardiovascular: Normal rate and regular rhythm  Pulmonary/Chest: Effort normal and breath sounds normal    Abdominal: Soft   Bowel sounds are normal

## 2018-02-12 ENCOUNTER — TELEPHONE (OUTPATIENT)
Dept: FAMILY MEDICINE CLINIC | Facility: CLINIC | Age: 83
End: 2018-02-12

## 2018-02-12 DIAGNOSIS — G43.909 MIGRAINE WITHOUT STATUS MIGRAINOSUS, NOT INTRACTABLE, UNSPECIFIED MIGRAINE TYPE: Primary | ICD-10-CM

## 2018-02-12 RX ORDER — TRAMADOL HYDROCHLORIDE 50 MG/1
50 TABLET ORAL EVERY 8 HOURS PRN
Qty: 50 TABLET | Refills: 1 | Status: SHIPPED | OUTPATIENT
Start: 2018-02-12 | End: 2018-03-08 | Stop reason: SDUPTHER

## 2018-02-13 ENCOUNTER — TELEPHONE (OUTPATIENT)
Dept: FAMILY MEDICINE CLINIC | Facility: CLINIC | Age: 83
End: 2018-02-13

## 2018-03-07 NOTE — PROGRESS NOTES
Dear Jesus Waller,  My name is Audra Sheridan and I am a Registered Nurse Care Coordinator for [employer] [facility]  I am contacting you  because you were recently in the hospital   If you are interested, I am here to assist you as part of a Medicare program called [address]  I have enclosed information about this program for your review and my contact information  This is free of charge to you, as part of the program   I would call you periodically throughout the next 90 days to assist with any questions you might have, discuss any symptoms you may be having,  or get you in to see your doctor if needed  We  will discuss and review your medications and goals you may have for yourself  Please let me know if you agree to this by contacting me at [phone]      Sincerely,      Kamila Vicente RN  [address] [facility]            Electronically signed by:Becka Parsons RN  Oct 26 2016  4:04PM EST

## 2018-03-08 ENCOUNTER — DOCUMENTATION (OUTPATIENT)
Dept: OTHER | Facility: HOSPITAL | Age: 83
End: 2018-03-08

## 2018-03-08 DIAGNOSIS — G43.909 MIGRAINE WITHOUT STATUS MIGRAINOSUS, NOT INTRACTABLE, UNSPECIFIED MIGRAINE TYPE: ICD-10-CM

## 2018-03-08 RX ORDER — TRAMADOL HYDROCHLORIDE 50 MG/1
50 TABLET ORAL EVERY 8 HOURS PRN
Qty: 50 TABLET | Refills: 1 | Status: SHIPPED | OUTPATIENT
Start: 2018-03-08 | End: 2018-03-08 | Stop reason: SDUPTHER

## 2018-03-08 RX ORDER — TRAMADOL HYDROCHLORIDE 50 MG/1
50 TABLET ORAL EVERY 8 HOURS PRN
Qty: 50 TABLET | Refills: 1 | Status: SHIPPED | OUTPATIENT
Start: 2018-03-08 | End: 2018-03-09 | Stop reason: SDUPTHER

## 2018-03-08 RX ORDER — TRAMADOL HYDROCHLORIDE 50 MG/1
50 TABLET ORAL EVERY 8 HOURS PRN
Qty: 50 TABLET | Refills: 0 | Status: SHIPPED | OUTPATIENT
Start: 2018-03-08 | End: 2018-03-08 | Stop reason: SDUPTHER

## 2018-03-08 RX ORDER — IPRATROPIUM/ALBUTEROL SULFATE 20-100 MCG
MIST INHALER (GRAM) INHALATION
COMMUNITY
Start: 2018-02-02 | End: 2018-04-05

## 2018-03-09 DIAGNOSIS — G43.909 MIGRAINE WITHOUT STATUS MIGRAINOSUS, NOT INTRACTABLE, UNSPECIFIED MIGRAINE TYPE: ICD-10-CM

## 2018-03-09 RX ORDER — TRAMADOL HYDROCHLORIDE 50 MG/1
50 TABLET ORAL EVERY 8 HOURS PRN
Qty: 50 TABLET | Refills: 0 | Status: ON HOLD | OUTPATIENT
Start: 2018-03-09 | End: 2018-04-17

## 2018-03-23 ENCOUNTER — TELEPHONE (OUTPATIENT)
Dept: FAMILY MEDICINE CLINIC | Facility: CLINIC | Age: 83
End: 2018-03-23

## 2018-03-23 DIAGNOSIS — R05.8 PRODUCTIVE COUGH: Primary | ICD-10-CM

## 2018-03-23 NOTE — TELEPHONE ENCOUNTER
Patient has a cough and is not feeling well   [facility] wanted to know if you could order a chest xray to rule out pneumonia

## 2018-03-28 ENCOUNTER — TELEPHONE (OUTPATIENT)
Dept: FAMILY MEDICINE CLINIC | Facility: CLINIC | Age: 83
End: 2018-03-28

## 2018-03-28 DIAGNOSIS — R05.9 COUGH: Primary | ICD-10-CM

## 2018-03-28 RX ORDER — CEFPROZIL 500 MG/1
500 TABLET, FILM COATED ORAL 2 TIMES DAILY
Qty: 20 TABLET | Refills: 0 | Status: SHIPPED | OUTPATIENT
Start: 2018-03-28 | End: 2018-04-07

## 2018-03-28 NOTE — TELEPHONE ENCOUNTER
mucinex is not working for her cough, keyur aldrich wanted to know if she could get a prescription for cough syrup?

## 2018-04-05 ENCOUNTER — APPOINTMENT (EMERGENCY)
Dept: RADIOLOGY | Facility: HOSPITAL | Age: 83
End: 2018-04-05
Payer: MEDICARE

## 2018-04-05 ENCOUNTER — HOSPITAL ENCOUNTER (EMERGENCY)
Facility: HOSPITAL | Age: 83
Discharge: NURSING, REHAB OR LONG-TERM CARE | End: 2018-04-05
Admitting: EMERGENCY MEDICINE
Payer: MEDICARE

## 2018-04-05 VITALS
TEMPERATURE: 98.9 F | OXYGEN SATURATION: 94 % | DIASTOLIC BLOOD PRESSURE: 74 MMHG | RESPIRATION RATE: 20 BRPM | SYSTOLIC BLOOD PRESSURE: 173 MMHG | HEART RATE: 80 BPM

## 2018-04-05 DIAGNOSIS — M54.16 RIGHT LUMBAR RADICULITIS: Primary | ICD-10-CM

## 2018-04-05 PROCEDURE — 99284 EMERGENCY DEPT VISIT MOD MDM: CPT

## 2018-04-05 PROCEDURE — 71046 X-RAY EXAM CHEST 2 VIEWS: CPT

## 2018-04-05 PROCEDURE — 72100 X-RAY EXAM L-S SPINE 2/3 VWS: CPT

## 2018-04-05 PROCEDURE — 73502 X-RAY EXAM HIP UNI 2-3 VIEWS: CPT

## 2018-04-05 RX ORDER — LUTEIN 10 MG
1 TABLET ORAL DAILY
COMMUNITY

## 2018-04-05 RX ORDER — TRAMADOL HYDROCHLORIDE 50 MG/1
50 TABLET ORAL ONCE
Status: COMPLETED | OUTPATIENT
Start: 2018-04-05 | End: 2018-04-05

## 2018-04-05 RX ADMIN — TRAMADOL HYDROCHLORIDE 50 MG: 50 TABLET, FILM COATED ORAL at 15:35

## 2018-04-05 NOTE — DISCHARGE INSTRUCTIONS
Lumbar Radiculopathy   WHAT YOU NEED TO KNOW:   Lumbar radiculopathy is a painful condition that happens when a nerve in your lumbar spine (lower back) is pinched or irritated  Nerves control feeling and movement in your body  You may have numbness or pain that shoots down from your lower back towards your foot  DISCHARGE INSTRUCTIONS:   Medicines:   · Medicines:     ¨ NSAIDs , such as ibuprofen, help decrease swelling, pain, and fever  This medicine is available with or without a doctor's order  NSAIDs can cause stomach bleeding or kidney problems in certain people  If you take blood thinner medicine, always ask your healthcare provider if NSAIDs are safe for you  Always read the medicine label and follow directions  ¨ Muscle relaxers  help decrease pain and muscle spasms  ¨ Opioids: This is a strong medicine given to reduce severe pain  It is also called narcotic pain medicine  Take this medicine exactly as directed by your healthcare provider  ¨ Oral steroids: Steroids may also be given to reduce pain and swelling  ¨ Take your medicine as directed  Contact your healthcare provider if you think your medicine is not helping or if you have side effects  Tell him of her if you are allergic to any medicine  Keep a list of the medicines, vitamins, and herbs you take  Include the amounts, and when and why you take them  Bring the list or the pill bottles to follow-up visits  Carry your medicine list with you in case of an emergency  Follow up with your healthcare provider or spine specialist within 1 to 3 weeks:  After your first follow-up appointment, return to your healthcare provider or spine specialist every 2 weeks until you have healed  Ask for information about physical therapy for your condition  Write down your questions so you remember to ask them during your visits  Physical therapy:  You may need physical therapy to improve your condition   Your physical therapist may teach you certain exercises to improve posture (the way you stand and sit), flexibility, and strength in your lower back  Self care:   · Stay active: It is best to be active when you have lumbar radiculopathy  Your physical therapist or healthcare provider may tell you to take walks to ease yourself back into your daily routine  Avoid long periods of bed rest  Bed rest could worsen your symptoms  Do not move in ways that increase your pain  Ask for more information about the best ways to stay active  · Use ice or heat packs:  Use ice or heat packs as directed on the sore area of your body to decrease the pain and swelling  Put ice in a plastic bag covered with a towel on your low back  Cover heated items with a towel to avoid burns  Use ice and heat as directed  · Avoid heavy lifting: Your condition may worsen if you lift heavy things  Avoid lifting if possible  · Maintain a healthy weight:  Excess body weight may strain your back  Talk with your healthcare provider about ways to lose excess weight if you are overweight  Contact your healthcare provider or spine specialist if:   · Your pain does not improve within 1 to 3 weeks after treatment  · Your pain and weakness keep you from your normal activities at work, home, or school  · You lose more than 10 pounds in 6 months without trying  · You become depressed or sad because of the pain  · You have questions or concerns about your condition or care  Return to the emergency department if:   · You have a fever greater than 100 4°F for longer than 2 days  · You have new, severe back or leg pain, or your pain spreads to both legs  · You have any new signs of numbness or weakness, especially in your lower back, legs, arms, or genital area  · You have new trouble controlling your urine and bowel movements  · You do not feel like your bladder empties when you urinate    © 2017 [address] [employer] Information is for End User's use only and may not be sold, redistributed or otherwise used for commercial purposes  All illustrations and images included in CareNotes® are the copyrighted property of A [employer]  or Dejon Snyder  The above information is an  only  It is not intended as medical advice for individual conditions or treatments  Talk to your doctor, nurse or pharmacist before following any medical regimen to see if it is safe and effective for you

## 2018-04-05 NOTE — ED PROVIDER NOTES
History  Chief Complaint   Patient presents with    Hip Pain     c/o right hip pain with recent worsening, denies injury or fall  early onset dementia, poor historian  Per [facility] "c/o severe hip pain, prn tramadol admin at 10:45, will not weight bare at this time "     This 59-year-old white female presents emergency room by squad  For the past 2 days she has been complaining of intractable right hip pain  She denies any injury or fall  She does have history of dementia and is essentially bed-bound because she refuses to get up and ambulate  She does complain of low back pain  She denies any numbness, tingling, weakness  She is a poor historian  She denies any urinary incontinence or fecal incontinence  She has recently been ill recently seen for an upper respiratory in infection  She was evaluated for the flu as well as pneumonia  Her daughter questions whether not the cough has caused her to pain in her lower back  Past medical history is positive for right eye, fibromyalgia, depression, hypothyroidism, gait disturbance, GERD, glaucoma, hyperlipidemia, hypertension, osteoporosis, osteopenia, rheumatoid arthritis, vertigo, vitamin-D deficiency, dementia  History provided by:  Patient  History limited by:  Dementia  Back Pain   Location:  Lumbar spine  Quality:  Aching  Radiates to:  R thigh  Pain severity:  Moderate  Pain is: Worse during the day  Onset quality:  Gradual  Duration:  2 days  Context: not recent injury    Relieved by:  Nothing  Worsened by: Movement  Ineffective treatments: Tramadol    Associated symptoms: leg pain and perianal numbness    Associated symptoms: no abdominal pain, no abdominal swelling, no bladder incontinence, no bowel incontinence, no chest pain, no dysuria, no fever, no headaches, no numbness, no paresthesias, no pelvic pain, no tingling, no weakness and no weight loss    Risk factors: lack of exercise, menopause and obesity    Risk factors: no hx of cancer, no hx of osteoporosis, no recent surgery, no steroid use and no vascular disease        Prior to Admission Medications   Prescriptions Last Dose Informant Patient Reported? Taking?    DULoxetine (CYMBALTA) 30 mg delayed release capsule   No Yes   Sig: Take 1 capsule (30 mg total) by mouth daily Take 30mg in addition to the 60mg tab   DULoxetine (CYMBALTA) 60 mg delayed release capsule   Yes Yes   Dentifrices (BIOTENE DRY MOUTH CARE DT)   Yes Yes   Sig: Apply to teeth   LORazepam (ATIVAN) 0 5 mg tablet   No Yes   Sig: Take 1 tablet (0 5 mg total) by mouth every 8 (eight) hours as needed for anxiety for up to 30 days   Lutein 10 MG TABS   Yes Yes   Sig: Take by mouth   Multiple Vitamins-Minerals (ICAPS LUTEIN & OMEGA-3 PO)   Yes Yes   Sig: Take 1 capsule by mouth daily   Multiple Vitamins-Minerals (PRESERVISION AREDS) CAPS   Yes Yes   Sig: Take by mouth   NIFEdipine ER (ADALAT CC) 60 MG 24 hr tablet   Yes Yes   Sig: Take 60 mg by mouth daily   OMEGA 3-LUTEIN-ZEAXANTHIN PO   Yes Yes   Sig: Take 1 tablet by mouth daily   aspirin 325 mg tablet   Yes Yes   Sig: Take by mouth   butalbital-aspirin-caffeine (FIORINAL) -40 mg per capsule   Yes Yes   Sig: Take 1 capsule by mouth every 4 (four) hours as needed for headaches   calcium carbonate (OS-TEDDY) 600 MG tablet   Yes Yes   Sig: Take 600 mg by mouth daily   carboxymethylcellulose (REFRESH LIQUIGEL) 1 % ophthalmic solution   Yes Yes   Sig: Apply to eye   cefprosil (CEFZIL) 500 MG tablet   No Yes   Sig: Take 1 tablet (500 mg total) by mouth 2 (two) times a day for 10 days   guaiFENesin (MUCINEX) 600 mg 12 hr tablet   Yes Yes   Sig: Take by mouth   ipratropium-albuterol (COMBIVENT)  mcg/act inhaler   No Yes   Sig: Inhale 2 puffs every 4 (four) hours as needed for wheezing (bronchospasm)   levothyroxine 100 mcg tablet   Yes Yes   loratadine (CLARITIN) 10 mg tablet   Yes Yes   Sig: Take by mouth   losartan (COZAAR) 50 mg tablet   Yes Yes   Sig: Take 50 mg by mouth daily   magnesium hydroxide (MILK OF MAGNESIA) 400 mg/5 mL oral suspension   Yes Yes   Sig: Take by mouth   meclizine (ANTIVERT) 12 5 MG tablet   Yes Yes   Sig: Take 12 5 mg by mouth 3 (three) times a day as needed for dizziness   metoprolol tartrate (LOPRESSOR) 50 mg tablet   Yes Yes   mupirocin (BACTROBAN) 2 % ointment   Yes Yes   pantoprazole (PROTONIX) 40 mg tablet   Yes Yes   Sig: Take 40 mg by mouth daily   pravastatin (PRAVACHOL) 40 mg tablet   Yes Yes   Sig: Take by mouth   traMADol (ULTRAM) 50 mg tablet   Yes Yes   Sig: Take 1 tablet by mouth every 4 (four) hours as needed   traMADol (ULTRAM) 50 mg tablet   No Yes   Sig: Take 1 tablet (50 mg total) by mouth every 8 (eight) hours as needed for moderate pain for up to 90 days      Facility-Administered Medications: None       Past Medical History:   Diagnosis Date    Blind     blind right eye    Depression     Disease of thyroid gland     Fibromyalgia     Gait disturbance     GERD (gastroesophageal reflux disease)     Glaucoma     Hyperlipidemia     Hypertension     Osteopenia     Psychiatric disorder     depression    Rheumatoid arthritis ([facility] [address] )     Vertigo     Vitamin D deficiency        Past Surgical History:   Procedure Laterality Date    JOINT REPLACEMENT      left hip replacement, left shoulder replacement       History reviewed  No pertinent family history  I have reviewed and agree with the history as documented  Social History   Substance Use Topics    Smoking status: Never Smoker    Smokeless tobacco: Never Used    Alcohol use No        Review of Systems   Reason unable to perform ROS: Dementia  Constitutional: Negative for activity change, appetite change, chills, diaphoresis, fatigue, fever and weight loss  HENT: Negative for congestion, dental problem, ear discharge, ear pain, hearing loss, mouth sores, postnasal drip, rhinorrhea, sinus pain, sinus pressure and sore throat      Eyes: Negative for pain, discharge, redness and itching  Respiratory: Positive for cough  Cardiovascular: Negative for chest pain  Gastrointestinal: Negative for abdominal pain, bowel incontinence, diarrhea and vomiting  Genitourinary: Negative for bladder incontinence, difficulty urinating, dysuria, frequency, hematuria and pelvic pain  Musculoskeletal: Positive for back pain  Skin: Negative for color change, pallor, rash and wound  Neurological: Negative for tingling, weakness, numbness, headaches and paresthesias  Hematological: Negative for adenopathy  Does not bruise/bleed easily  Psychiatric/Behavioral: Negative for confusion  All other systems reviewed and are negative  Physical Exam  ED Triage Vitals   Temperature Pulse Respirations Blood Pressure SpO2   04/05/18 1317 04/05/18 1317 04/05/18 1317 04/05/18 1317 04/05/18 1317   98 9 °F (37 2 °C) 80 20 (!) 173/74 94 %      Temp Source Heart Rate Source Patient Position - Orthostatic VS BP Location FiO2 (%)   04/05/18 1317 04/05/18 1317 04/05/18 1317 04/05/18 1317 --   Oral Monitor Lying Left arm       Pain Score       04/05/18 1535       8           Orthostatic Vital Signs  Vitals:    04/05/18 1317   BP: (!) 173/74   Pulse: 80   Patient Position - Orthostatic VS: Lying       Physical Exam   Constitutional: She is oriented to person, place, and time  She appears well-developed and well-nourished  No distress  HENT:   Head: Normocephalic  Right Ear: External ear normal    Left Ear: External ear normal    Nose: Nose normal    Mouth/Throat: Oropharynx is clear and moist    Eyes: Conjunctivae are normal  Right eye exhibits no discharge  Left eye exhibits no discharge  Hazy right cornea, legally blind   Neck: Neck supple  No JVD present  No tracheal deviation present  No thyromegaly present  Cardiovascular: Normal rate, regular rhythm and normal heart sounds  Exam reveals no friction rub  No murmur heard    Pulmonary/Chest: Effort normal and breath sounds normal  No stridor  No respiratory distress  She has no wheezes  She has no rales  Abdominal: Soft  She exhibits no distension and no mass  There is no tenderness  There is no rebound and no guarding  Musculoskeletal: She exhibits no edema  Examination of the lumbar spine-it is atraumatic upon inspection  There is tenderness palpated over the mid lumbar area  There is no paravertebral spasm  Reflexes are +2 and symmetrical  There is no weakness of the extensor hallucis longus bilaterally  Examination of the right hip the legs are aligned  There is no tenderness with pin rolling or range of motion  There is good range of motion in all directions  There is no pain with range of motion  Lymphadenopathy:     She has no cervical adenopathy  Neurological: She is alert and oriented to person, place, and time  Skin: Skin is warm  Capillary refill takes less than 2 seconds  She is not diaphoretic  No erythema  Psychiatric: She has a normal mood and affect  Her behavior is normal  Judgment and thought content normal    Nursing note and vitals reviewed  ED Medications  Medications   traMADol (ULTRAM) tablet 50 mg (50 mg Oral Given 4/5/18 1535)       Diagnostic Studies  Results Reviewed     None                 XR spine lumbar 2 or 3 views injury   ED Interpretation by Kristan Beaulieu PA-C (04/05 7794)   Degenerative changes, No acute abnomality      Final Result by Ghada Tabares MD (04/05 8954)   Multilevel degenerative changes with grade 1 L4 on L5 anterolisthesis secondary to facet arthropathy  No acute findings  Workstation performed: [id number]         XR hip/pelv 2-3 vws right if performed   ED Interpretation by Kristan Beaulieu PA-C (04/05 1475)   No acute abnormality      Final Result by Ghada Tabares MD (04/05 5470)   Degenerative osteoarthritis without evidence of acute osseous abnormality        Workstation performed: [id number]         XR chest 2 views   ED Interpretation by Rex Mccann Gutzweiler, PA-C (04/05 7324)   No acute abnormality      Final Result by Isabella Verdin MD (04/05 5961)      No acute cardiopulmonary disease  Workstation performed: [id number]                    Procedures  Procedures       Phone Contacts  ED Phone Contact    ED Course  ED Course                                MDM  Number of Diagnoses or Management Options  Right lumbar radiculitis: new and requires workup     Amount and/or Complexity of Data Reviewed  Tests in the radiology section of CPT®: ordered and reviewed  Tests in the medicine section of CPT®: ordered and reviewed    Risk of Complications, Morbidity, and/or Mortality  Presenting problems: low  Diagnostic procedures: low  Management options: low  General comments: Patient presents emergency room with complaints of low back pain with radicular pain to the left hip  She was seen and evaluated  Her exam was normal   She did have some tenderness over the left paravertebral area  X-rays were taken that demonstrated degenerative arthritis but no acute compression fracture  X-rays x-rays of the left hip did not demonstrate a fracture either  The patient was medicated for pain and discharged back to the nursing home  Patient Progress  Patient progress: stable    CritCare Time    Disposition  Final diagnoses:   Right lumbar radiculitis     Time reflects when diagnosis was documented in both MDM as applicable and the Disposition within this note     Time User Action Codes Description Comment    4/5/2018  3:14 PM Olvin West Add [M54 16] Right lumbar radiculitis       ED Disposition     ED Disposition Condition Comment    Discharge  [address] discharge to home/self care      Condition at discharge: Good        Follow-up Information     Follow up With Specialties Details Why [address], DO Orthopedic Surgery   [address]  [phone]          Discharge Medication List as of 4/5/2018 3:15 PM      CONTINUE these medications which have NOT CHANGED    Details   aspirin 325 mg tablet Take by mouth, Historical Med      butalbital-aspirin-caffeine (FIORINAL) -40 mg per capsule Take 1 capsule by mouth every 4 (four) hours as needed for headaches, Until Discontinued, Historical Med      calcium carbonate (OS-TEDDY) 600 MG tablet Take 600 mg by mouth daily, Until Discontinued, Historical Med      carboxymethylcellulose (REFRESH LIQUIGEL) 1 % ophthalmic solution Apply to eye, Starting Thu 8/24/2017, Historical Med      cefprosil (CEFZIL) 500 MG tablet Take 1 tablet (500 mg total) by mouth 2 (two) times a day for 10 days, Starting Wed 3/28/2018, Until Sat 4/7/2018, Normal      Dentifrices (BIOTENE DRY MOUTH CARE DT) Apply to teeth, Historical Med      !! DULoxetine (CYMBALTA) 30 mg delayed release capsule Take 1 capsule (30 mg total) by mouth daily Take 30mg in addition to the 60mg tab, Starting Thu 2/1/2018, Print      !!  DULoxetine (CYMBALTA) 60 mg delayed release capsule Starting Fri 12/22/2017, Historical Med      guaiFENesin (MUCINEX) 600 mg 12 hr tablet Take by mouth, Historical Med      ipratropium-albuterol (COMBIVENT)  mcg/act inhaler Inhale 2 puffs every 4 (four) hours as needed for wheezing (bronchospasm), Starting Thu 2/1/2018, Print      levothyroxine 100 mcg tablet Starting Fri 12/22/2017, Historical Med      loratadine (CLARITIN) 10 mg tablet Take by mouth, Historical Med      LORazepam (ATIVAN) 0 5 mg tablet Take 1 tablet (0 5 mg total) by mouth every 8 (eight) hours as needed for anxiety for up to 30 days, Starting Thu 2/1/2018, Until Thu 4/5/2018, Print      losartan (COZAAR) 50 mg tablet Take 50 mg by mouth daily, Until Discontinued, Historical Med      Lutein 10 MG TABS Take by mouth, Historical Med      magnesium hydroxide (MILK OF MAGNESIA) 400 mg/5 mL oral suspension Take by mouth, Historical Med      meclizine (ANTIVERT) 12 5 MG tablet Take 12 5 mg by mouth 3 (three) times a day as needed for dizziness, Until Discontinued, Historical Med      metoprolol tartrate (LOPRESSOR) 50 mg tablet Starting Fri 12/22/2017, Historical Med      !! Multiple Vitamins-Minerals (ICAPS LUTEIN & OMEGA-3 PO) Take 1 capsule by mouth daily, Starting Wed 7/20/2016, Historical Med      !! Multiple Vitamins-Minerals (PRESERVISION AREDS) CAPS Take by mouth, Historical Med      mupirocin (BACTROBAN) 2 % ointment Starting Fri 2/2/2018, Historical Med      NIFEdipine ER (ADALAT CC) 60 MG 24 hr tablet Take 60 mg by mouth daily, Until Discontinued, Historical Med      OMEGA 3-LUTEIN-ZEAXANTHIN PO Take 1 tablet by mouth daily, Until Discontinued, Historical Med      pantoprazole (PROTONIX) 40 mg tablet Take 40 mg by mouth daily, Until Discontinued, Historical Med      pravastatin (PRAVACHOL) 40 mg tablet Take by mouth, Historical Med      !! traMADol (ULTRAM) 50 mg tablet Take 1 tablet by mouth every 4 (four) hours as needed, Starting Fri 11/24/2017, Historical Med      !! traMADol (ULTRAM) 50 mg tablet Take 1 tablet (50 mg total) by mouth every 8 (eight) hours as needed for moderate pain for up to 90 days, Starting Fri 3/9/2018, Until Thu 6/7/2018, Print       !! - Potential duplicate medications found  Please discuss with provider  No discharge procedures on file      ED Provider  Electronically Signed by           Jessica Rosenthal PA-C  04/05/18 5977

## 2018-04-05 NOTE — ED NOTES
[facility] [place] [facility]  Pt still working on her dinner       April Yohan Salazar RN  04/05/18 9973

## 2018-04-05 NOTE — ED NOTES
[facility] to transport patient back to [facility] at 19:30     Erlin Zabala  04/05/18 1460

## 2018-04-13 ENCOUNTER — APPOINTMENT (EMERGENCY)
Dept: RADIOLOGY | Facility: HOSPITAL | Age: 83
DRG: 871 | End: 2018-04-13
Payer: MEDICARE

## 2018-04-13 ENCOUNTER — HOSPITAL ENCOUNTER (INPATIENT)
Facility: HOSPITAL | Age: 83
LOS: 4 days | Discharge: NURSING, REHAB OR LONG-TERM CARE | DRG: 871 | End: 2018-04-17
Attending: EMERGENCY MEDICINE | Admitting: FAMILY MEDICINE
Payer: MEDICARE

## 2018-04-13 DIAGNOSIS — F41.9 ANXIETY: ICD-10-CM

## 2018-04-13 DIAGNOSIS — J18.9 PNEUMONIA OF RIGHT LOWER LOBE DUE TO INFECTIOUS ORGANISM: Primary | ICD-10-CM

## 2018-04-13 DIAGNOSIS — N39.0 UTI (URINARY TRACT INFECTION): ICD-10-CM

## 2018-04-13 DIAGNOSIS — G43.909 MIGRAINE WITHOUT STATUS MIGRAINOSUS, NOT INTRACTABLE, UNSPECIFIED MIGRAINE TYPE: ICD-10-CM

## 2018-04-13 DIAGNOSIS — A41.9 SEPSIS, DUE TO UNSPECIFIED ORGANISM: ICD-10-CM

## 2018-04-13 PROBLEM — F03.90 DEMENTIA WITHOUT BEHAVIORAL DISTURBANCE (HCC): Status: ACTIVE | Noted: 2018-04-13

## 2018-04-13 LAB
ALBUMIN SERPL BCP-MCNC: 3.1 G/DL (ref 3.5–5)
ALP SERPL-CCNC: 199 U/L (ref 46–116)
ALT SERPL W P-5'-P-CCNC: 49 U/L (ref 12–78)
ANION GAP SERPL CALCULATED.3IONS-SCNC: 13 MMOL/L (ref 4–13)
APTT PPP: 35 SECONDS (ref 23–35)
AST SERPL W P-5'-P-CCNC: 39 U/L (ref 5–45)
BACTERIA UR QL AUTO: ABNORMAL /HPF
BASOPHILS # BLD MANUAL: 0 THOUSAND/UL (ref 0–0.1)
BASOPHILS NFR MAR MANUAL: 0 % (ref 0–1)
BILIRUB SERPL-MCNC: 0.8 MG/DL (ref 0.2–1)
BILIRUB UR QL STRIP: NEGATIVE
BUN SERPL-MCNC: 15 MG/DL (ref 5–25)
CALCIUM SERPL-MCNC: 9.2 MG/DL
CHLORIDE SERPL-SCNC: 96 MMOL/L (ref 100–108)
CLARITY UR: ABNORMAL
CO2 SERPL-SCNC: 22 MMOL/L (ref 21–32)
COLOR UR: YELLOW
CREAT SERPL-MCNC: 1.02 MG/DL (ref 0.6–1.3)
EOSINOPHIL # BLD MANUAL: 0 THOUSAND/UL (ref 0–0.4)
EOSINOPHIL NFR BLD MANUAL: 0 % (ref 0–6)
ERYTHROCYTE [DISTWIDTH] IN BLOOD BY AUTOMATED COUNT: 13.6 % (ref 11.6–15.1)
GFR SERPL CREATININE-BSD FRML MDRD: 50 ML/MIN/1.73SQ M
GLUCOSE SERPL-MCNC: 157 MG/DL (ref 65–140)
GLUCOSE UR STRIP-MCNC: NEGATIVE MG/DL
HCT VFR BLD AUTO: 30.1 % (ref 34.8–46.1)
HGB BLD-MCNC: 10.3 G/DL (ref 11.5–15.4)
HGB UR QL STRIP.AUTO: ABNORMAL
INR PPP: 1.07 (ref 0.86–1.16)
KETONES UR STRIP-MCNC: NEGATIVE MG/DL
L PNEUMO1 AG UR QL IA.RAPID: NEGATIVE
LACTATE SERPL-SCNC: 1.2 MMOL/L (ref 0.5–2)
LEUKOCYTE ESTERASE UR QL STRIP: ABNORMAL
LYMPHOCYTES # BLD AUTO: 0.99 THOUSAND/UL (ref 0.6–4.47)
LYMPHOCYTES # BLD AUTO: 9 % (ref 14–44)
MCH RBC QN AUTO: 30.5 PG (ref 26.8–34.3)
MCHC RBC AUTO-ENTMCNC: 34.2 G/DL (ref 31.4–37.4)
MCV RBC AUTO: 89 FL (ref 82–98)
MONOCYTES # BLD AUTO: 0.99 THOUSAND/UL (ref 0–1.22)
MONOCYTES NFR BLD: 9 % (ref 4–12)
NEUTROPHILS # BLD MANUAL: 8.78 THOUSAND/UL (ref 1.85–7.62)
NEUTS SEG NFR BLD AUTO: 80 % (ref 43–75)
NITRITE UR QL STRIP: NEGATIVE
NON-SQ EPI CELLS URNS QL MICRO: ABNORMAL /HPF
PH UR STRIP.AUTO: 6 [PH] (ref 4.5–8)
PLATELET # BLD AUTO: 282 THOUSANDS/UL (ref 149–390)
PLATELET BLD QL SMEAR: ADEQUATE
PMV BLD AUTO: 8.9 FL (ref 8.9–12.7)
POLYCHROMASIA BLD QL SMEAR: PRESENT
POTASSIUM SERPL-SCNC: 3.8 MMOL/L (ref 3.5–5.3)
PROCALCITONIN SERPL-MCNC: 0.08 NG/ML
PROT SERPL-MCNC: 7.8 G/DL (ref 6.4–8.2)
PROT UR STRIP-MCNC: ABNORMAL MG/DL
PROTHROMBIN TIME: 14.2 SECONDS (ref 12.1–14.4)
RBC # BLD AUTO: 3.38 MILLION/UL (ref 3.81–5.12)
RBC #/AREA URNS AUTO: ABNORMAL /HPF
S PNEUM AG UR QL: NEGATIVE
SODIUM SERPL-SCNC: 131 MMOL/L (ref 136–145)
SP GR UR STRIP.AUTO: 1.02 (ref 1–1.03)
TOTAL CELLS COUNTED SPEC: 100
UROBILINOGEN UR QL STRIP.AUTO: 1 E.U./DL
VARIANT LYMPHS # BLD AUTO: 2 %
WBC # BLD AUTO: 10.97 THOUSAND/UL (ref 4.31–10.16)
WBC #/AREA URNS AUTO: ABNORMAL /HPF

## 2018-04-13 PROCEDURE — 92610 EVALUATE SWALLOWING FUNCTION: CPT

## 2018-04-13 PROCEDURE — 83605 ASSAY OF LACTIC ACID: CPT | Performed by: EMERGENCY MEDICINE

## 2018-04-13 PROCEDURE — 96374 THER/PROPH/DIAG INJ IV PUSH: CPT

## 2018-04-13 PROCEDURE — 93005 ELECTROCARDIOGRAM TRACING: CPT

## 2018-04-13 PROCEDURE — 99285 EMERGENCY DEPT VISIT HI MDM: CPT

## 2018-04-13 PROCEDURE — 87449 NOS EACH ORGANISM AG IA: CPT | Performed by: INTERNAL MEDICINE

## 2018-04-13 PROCEDURE — 87186 SC STD MICRODIL/AGAR DIL: CPT | Performed by: EMERGENCY MEDICINE

## 2018-04-13 PROCEDURE — 85610 PROTHROMBIN TIME: CPT | Performed by: EMERGENCY MEDICINE

## 2018-04-13 PROCEDURE — 85730 THROMBOPLASTIN TIME PARTIAL: CPT | Performed by: EMERGENCY MEDICINE

## 2018-04-13 PROCEDURE — 84145 PROCALCITONIN (PCT): CPT | Performed by: INTERNAL MEDICINE

## 2018-04-13 PROCEDURE — 87040 BLOOD CULTURE FOR BACTERIA: CPT | Performed by: EMERGENCY MEDICINE

## 2018-04-13 PROCEDURE — 85027 COMPLETE CBC AUTOMATED: CPT | Performed by: EMERGENCY MEDICINE

## 2018-04-13 PROCEDURE — 87631 RESP VIRUS 3-5 TARGETS: CPT | Performed by: INTERNAL MEDICINE

## 2018-04-13 PROCEDURE — 87086 URINE CULTURE/COLONY COUNT: CPT | Performed by: EMERGENCY MEDICINE

## 2018-04-13 PROCEDURE — 71046 X-RAY EXAM CHEST 2 VIEWS: CPT

## 2018-04-13 PROCEDURE — 36415 COLL VENOUS BLD VENIPUNCTURE: CPT | Performed by: EMERGENCY MEDICINE

## 2018-04-13 PROCEDURE — 81001 URINALYSIS AUTO W/SCOPE: CPT | Performed by: EMERGENCY MEDICINE

## 2018-04-13 PROCEDURE — 80053 COMPREHEN METABOLIC PANEL: CPT | Performed by: EMERGENCY MEDICINE

## 2018-04-13 PROCEDURE — 99223 1ST HOSP IP/OBS HIGH 75: CPT | Performed by: INTERNAL MEDICINE

## 2018-04-13 PROCEDURE — 85007 BL SMEAR W/DIFF WBC COUNT: CPT | Performed by: EMERGENCY MEDICINE

## 2018-04-13 PROCEDURE — 87077 CULTURE AEROBIC IDENTIFY: CPT | Performed by: EMERGENCY MEDICINE

## 2018-04-13 PROCEDURE — 96361 HYDRATE IV INFUSION ADD-ON: CPT

## 2018-04-13 RX ORDER — PANTOPRAZOLE SODIUM 40 MG/1
40 TABLET, DELAYED RELEASE ORAL 2 TIMES DAILY
Status: DISCONTINUED | OUTPATIENT
Start: 2018-04-13 | End: 2018-04-17 | Stop reason: HOSPADM

## 2018-04-13 RX ORDER — OSELTAMIVIR PHOSPHATE 30 MG/1
30 CAPSULE ORAL EVERY 12 HOURS SCHEDULED
Status: DISCONTINUED | OUTPATIENT
Start: 2018-04-13 | End: 2018-04-14

## 2018-04-13 RX ORDER — IBUPROFEN 400 MG/1
200 TABLET ORAL EVERY 6 HOURS PRN
Status: DISCONTINUED | OUTPATIENT
Start: 2018-04-13 | End: 2018-04-17 | Stop reason: HOSPADM

## 2018-04-13 RX ORDER — ASPIRIN 325 MG
325 TABLET ORAL DAILY
Status: DISCONTINUED | OUTPATIENT
Start: 2018-04-13 | End: 2018-04-17 | Stop reason: HOSPADM

## 2018-04-13 RX ORDER — LUTEIN 10 MG
1 TABLET ORAL DAILY
Status: DISCONTINUED | OUTPATIENT
Start: 2018-04-13 | End: 2018-04-13

## 2018-04-13 RX ORDER — LOSARTAN POTASSIUM 50 MG/1
50 TABLET ORAL DAILY
Status: DISCONTINUED | OUTPATIENT
Start: 2018-04-13 | End: 2018-04-17 | Stop reason: HOSPADM

## 2018-04-13 RX ORDER — METOPROLOL TARTRATE 50 MG/1
50 TABLET, FILM COATED ORAL 2 TIMES DAILY
Status: DISCONTINUED | OUTPATIENT
Start: 2018-04-13 | End: 2018-04-17 | Stop reason: HOSPADM

## 2018-04-13 RX ORDER — NIFEDIPINE 30 MG/1
60 TABLET, EXTENDED RELEASE ORAL DAILY
Status: DISCONTINUED | OUTPATIENT
Start: 2018-04-13 | End: 2018-04-17 | Stop reason: HOSPADM

## 2018-04-13 RX ORDER — DULOXETIN HYDROCHLORIDE 60 MG/1
60 CAPSULE, DELAYED RELEASE ORAL DAILY
Status: DISCONTINUED | OUTPATIENT
Start: 2018-04-13 | End: 2018-04-17 | Stop reason: HOSPADM

## 2018-04-13 RX ORDER — POLYVINYL ALCOHOL 14 MG/ML
1 SOLUTION/ DROPS OPHTHALMIC 3 TIMES DAILY
Status: DISCONTINUED | OUTPATIENT
Start: 2018-04-13 | End: 2018-04-17 | Stop reason: HOSPADM

## 2018-04-13 RX ORDER — GUAIFENESIN 600 MG
600 TABLET, EXTENDED RELEASE 12 HR ORAL 2 TIMES DAILY PRN
Status: DISCONTINUED | OUTPATIENT
Start: 2018-04-13 | End: 2018-04-15

## 2018-04-13 RX ORDER — TRAMADOL HYDROCHLORIDE 50 MG/1
50 TABLET ORAL EVERY 8 HOURS PRN
Status: DISCONTINUED | OUTPATIENT
Start: 2018-04-13 | End: 2018-04-17 | Stop reason: HOSPADM

## 2018-04-13 RX ORDER — CALCIUM CARBONATE 500(1250)
500 TABLET ORAL DAILY
Status: DISCONTINUED | OUTPATIENT
Start: 2018-04-13 | End: 2018-04-17 | Stop reason: HOSPADM

## 2018-04-13 RX ORDER — PRAVASTATIN SODIUM 40 MG
40 TABLET ORAL DAILY
Status: DISCONTINUED | OUTPATIENT
Start: 2018-04-13 | End: 2018-04-17 | Stop reason: HOSPADM

## 2018-04-13 RX ORDER — SODIUM CHLORIDE 9 MG/ML
100 INJECTION, SOLUTION INTRAVENOUS CONTINUOUS
Status: DISCONTINUED | OUTPATIENT
Start: 2018-04-13 | End: 2018-04-14

## 2018-04-13 RX ORDER — LORAZEPAM 0.5 MG/1
0.5 TABLET ORAL EVERY 8 HOURS PRN
Status: DISCONTINUED | OUTPATIENT
Start: 2018-04-13 | End: 2018-04-17 | Stop reason: HOSPADM

## 2018-04-13 RX ORDER — LEVOTHYROXINE SODIUM 0.1 MG/1
100 TABLET ORAL
Status: DISCONTINUED | OUTPATIENT
Start: 2018-04-13 | End: 2018-04-17 | Stop reason: HOSPADM

## 2018-04-13 RX ADMIN — SODIUM CHLORIDE 100 ML/HR: 0.9 INJECTION, SOLUTION INTRAVENOUS at 22:34

## 2018-04-13 RX ADMIN — METRONIDAZOLE 500 MG: 500 INJECTION, SOLUTION INTRAVENOUS at 10:43

## 2018-04-13 RX ADMIN — CEFEPIME HYDROCHLORIDE 2000 MG: 2 INJECTION, SOLUTION INTRAVENOUS at 08:32

## 2018-04-13 RX ADMIN — POLYVINYL ALCOHOL 1 DROP: 14 SOLUTION/ DROPS OPHTHALMIC at 18:15

## 2018-04-13 RX ADMIN — OSELTAMIVIR PHOSPHATE 30 MG: 30 CAPSULE ORAL at 20:02

## 2018-04-13 RX ADMIN — AZITHROMYCIN MONOHYDRATE 500 MG: 500 INJECTION, POWDER, LYOPHILIZED, FOR SOLUTION INTRAVENOUS at 12:46

## 2018-04-13 RX ADMIN — METRONIDAZOLE 500 MG: 500 INJECTION, SOLUTION INTRAVENOUS at 18:15

## 2018-04-13 RX ADMIN — OSELTAMIVIR PHOSPHATE 30 MG: 30 CAPSULE ORAL at 13:16

## 2018-04-13 RX ADMIN — IBUPROFEN 200 MG: 400 TABLET, FILM COATED ORAL at 12:32

## 2018-04-13 RX ADMIN — POLYVINYL ALCOHOL 1 DROP: 14 SOLUTION/ DROPS OPHTHALMIC at 20:03

## 2018-04-13 RX ADMIN — METOPROLOL TARTRATE 50 MG: 50 TABLET ORAL at 18:15

## 2018-04-13 RX ADMIN — SODIUM CHLORIDE 1000 ML: 0.9 INJECTION, SOLUTION INTRAVENOUS at 07:56

## 2018-04-13 RX ADMIN — PANTOPRAZOLE SODIUM 40 MG: 40 TABLET, DELAYED RELEASE ORAL at 18:15

## 2018-04-13 RX ADMIN — ENOXAPARIN SODIUM 40 MG: 40 INJECTION SUBCUTANEOUS at 10:43

## 2018-04-13 RX ADMIN — SODIUM CHLORIDE 100 ML/HR: 0.9 INJECTION, SOLUTION INTRAVENOUS at 10:44

## 2018-04-13 RX ADMIN — SODIUM CHLORIDE 1000 ML: 0.9 INJECTION, SOLUTION INTRAVENOUS at 09:26

## 2018-04-13 NOTE — Clinical Note
Case was discussed with LAYNE and the patient's admission status was agreed to be Admission Status: inpatient status to the service of

## 2018-04-13 NOTE — ED PROVIDER NOTES
History  Chief Complaint   Patient presents with    Fever - 75 years or older     Pt presents to ER via EMS from [facility] with c/o's fever, productive cough, & weakness  Generally healthy appearing confused elderly female presents in no distress  This 41-year-old female presents from a local assisted living with a fever  Patient apparently had decreased appetite yesterday, this morning awoke with a 102 fever  Patient is complaining of pain everywhere  About a week ago patient was on Augmentin for what was believed to be at upper respiratory infection  Patient was also seen here last week for some back pain that has continued  Patient does have a history of fibromyalgia and somatic pain complaints are home in  Patient is unable to answer questions or provide historical information due to her dementia  Daughter states the patient has been having a nasty cough for some time however  History provided by:  Relative  History limited by:  Dementia  Fever - 75 years or older   Max temp prior to arrival:  102  Temp source:  Oral  Severity:  Moderate  Onset quality:  Sudden  Duration:  1 day  Timing:  Unable to specify  Progression:  Unable to specify  Chronicity:  New  Relieved by:  None tried  Worsened by:  Nothing  Ineffective treatments:  None tried  Associated symptoms: cough        Prior to Admission Medications   Prescriptions Last Dose Informant Patient Reported? Taking?    DULoxetine (CYMBALTA) 30 mg delayed release capsule   No No   Sig: Take 1 capsule (30 mg total) by mouth daily Take 30mg in addition to the 60mg tab   DULoxetine (CYMBALTA) 60 mg delayed release capsule   Yes No   Dentifrices (BIOTENE DRY MOUTH CARE DT)   Yes No   Sig: Apply to teeth   LORazepam (ATIVAN) 0 5 mg tablet   No No   Sig: Take 1 tablet (0 5 mg total) by mouth every 8 (eight) hours as needed for anxiety for up to 30 days   Lutein 10 MG TABS   Yes No   Sig: Take by mouth   Multiple Vitamins-Minerals (ICAPS LUTEIN & OMEGA-3 PO)   Yes No   Sig: Take 1 capsule by mouth daily   Multiple Vitamins-Minerals (PRESERVISION AREDS) CAPS   Yes No   Sig: Take by mouth   NIFEdipine ER (ADALAT CC) 60 MG 24 hr tablet   Yes No   Sig: Take 60 mg by mouth daily   OMEGA 3-LUTEIN-ZEAXANTHIN PO   Yes No   Sig: Take 1 tablet by mouth daily   aspirin 325 mg tablet   Yes No   Sig: Take by mouth   calcium carbonate (OS-TEDDY) 600 MG tablet   Yes No   Sig: Take 600 mg by mouth daily   carboxymethylcellulose (REFRESH LIQUIGEL) 1 % ophthalmic solution   Yes No   Sig: Apply to eye   guaiFENesin (MUCINEX) 600 mg 12 hr tablet   Yes No   Sig: Take by mouth   ipratropium-albuterol (COMBIVENT)  mcg/act inhaler   No No   Sig: Inhale 2 puffs every 4 (four) hours as needed for wheezing (bronchospasm)   levothyroxine 100 mcg tablet   Yes No   loratadine (CLARITIN) 10 mg tablet   Yes No   Sig: Take by mouth   losartan (COZAAR) 50 mg tablet   Yes No   Sig: Take 50 mg by mouth daily   magnesium hydroxide (MILK OF MAGNESIA) 400 mg/5 mL oral suspension   Yes No   Sig: Take by mouth   meclizine (ANTIVERT) 12 5 MG tablet   Yes No   Sig: Take 12 5 mg by mouth 3 (three) times a day as needed for dizziness   metoprolol tartrate (LOPRESSOR) 50 mg tablet   Yes No   mupirocin (BACTROBAN) 2 % ointment   Yes No   pantoprazole (PROTONIX) 40 mg tablet   Yes No   Sig: Take 40 mg by mouth daily   pravastatin (PRAVACHOL) 40 mg tablet   Yes No   Sig: Take by mouth   traMADol (ULTRAM) 50 mg tablet   Yes No   Sig: Take 1 tablet by mouth every 4 (four) hours as needed   traMADol (ULTRAM) 50 mg tablet   No No   Sig: Take 1 tablet (50 mg total) by mouth every 8 (eight) hours as needed for moderate pain for up to 90 days      Facility-Administered Medications: None       Past Medical History:   Diagnosis Date    Blind     blind right eye    Depression     Disease of thyroid gland     Fibromyalgia     Gait disturbance     GERD (gastroesophageal reflux disease)     Glaucoma     Hyperlipidemia     Hypertension     Osteopenia     Psychiatric disorder     depression    Rheumatoid arthritis ([address] )     Vertigo     Vitamin D deficiency        Past Surgical History:   Procedure Laterality Date    JOINT REPLACEMENT      left hip replacement, left shoulder replacement       History reviewed  No pertinent family history  I have reviewed and agree with the history as documented  Social History   Substance Use Topics    Smoking status: Never Smoker    Smokeless tobacco: Never Used    Alcohol use No        Review of Systems   Unable to perform ROS: Dementia   Respiratory: Positive for cough  Physical Exam  ED Triage Vitals   Temperature Pulse Respirations Blood Pressure SpO2   04/13/18 0733 04/13/18 0733 04/13/18 0733 04/13/18 0733 04/13/18 0733   (!) 101 7 °F (38 7 °C) 100 16 161/74 93 %      Temp Source Heart Rate Source Patient Position - Orthostatic VS BP Location FiO2 (%)   04/13/18 0733 04/13/18 0733 04/13/18 0733 04/13/18 0733 --   Oral Monitor Lying Right arm       Pain Score       04/13/18 0832       No Pain           Orthostatic Vital Signs  Vitals:    04/13/18 0733 04/13/18 0832   BP: 161/74 144/96   Pulse: 100 96   Patient Position - Orthostatic VS: Lying Lying       Physical Exam   Constitutional: She appears well-developed and well-nourished  She is cooperative  No distress  HENT:   Head: Normocephalic and atraumatic  Mouth/Throat: Oropharynx is clear and moist  Mucous membranes are dry  Eyes: EOM and lids are normal  Pupils are equal, round, and reactive to light  Right eye exhibits no discharge  Left eye exhibits no discharge  Right conjunctiva is not injected  Left conjunctiva is not injected  Neck: Trachea normal, normal range of motion, full passive range of motion without pain and phonation normal  Neck supple  Cardiovascular: Normal rate, regular rhythm, normal heart sounds and normal pulses  No murmur heard    Pulses:       Dorsalis pedis pulses are 2+ on the right side, and 2+ on the left side  Pulmonary/Chest: Effort normal  No respiratory distress  She has no wheezes  She has rhonchi  Abdominal: Soft  She exhibits no distension  There is no tenderness  Musculoskeletal: Normal range of motion  She exhibits no edema  Neurological: She is alert  She has normal strength  She is disoriented  No cranial nerve deficit  GCS eye subscore is 4  GCS verbal subscore is 5  GCS motor subscore is 6  Skin: Skin is warm, dry and intact  Capillary refill takes less than 2 seconds  No rash noted  Psychiatric: She has a normal mood and affect  Her speech is normal and behavior is normal    Vitals reviewed  ED Medications  Medications   sodium chloride 0 9 % bolus 1,000 mL (1,000 mL Intravenous New Bag 4/13/18 0756)     Followed by   sodium chloride 0 9 % bolus 1,000 mL (not administered)   cefepime (MAXIPIME) 2 g/50 mL dextrose IVPB (2,000 mg Intravenous New Bag 4/13/18 0832)       Diagnostic Studies  Results Reviewed     Procedure Component Value Units Date/Time    Urine Microscopic [[id number]]  (Abnormal) Collected:  04/13/18 0823    Lab Status:  Final result Specimen:  Urine from Urine, Straight Cath Updated:  04/13/18 0841     RBC, UA 0-1 (A) /hpf      WBC, UA Innumerable (A) /hpf      Epithelial Cells Occasional /hpf      Bacteria, UA Innumerable (A) /hpf     Urine culture [[id number]] Collected:  04/13/18 0823    Lab Status:   In process Specimen:  Urine from Urine, Straight Cath Updated:  04/13/18 0841    UA w Reflex to Microscopic w Reflex to Culture [[id number]]  (Abnormal) Collected:  04/13/18 0823    Lab Status:  Final result Specimen:  Urine from Urine, Straight Cath Updated:  04/13/18 0834     Color, UA Yellow     Clarity, UA Slightly Cloudy     Specific Gravity, UA 1 025     pH, UA 6 0     Leukocytes, UA Small (A)     Nitrite, UA Negative     Protein,  (2+) (A) mg/dl      Glucose, UA Negative mg/dl      Ketones, UA Negative mg/dl Urobilinogen, UA 1 0 E U /dl      Bilirubin, UA Negative     Blood, UA Trace-Intact (A)    Lactic acid x2 [[id number]]  (Normal) Collected:  04/13/18 0750    Lab Status:  Final result Specimen:  Blood from Arm, Right Updated:  04/13/18 0819     LACTIC ACID 1 2 mmol/L     Narrative:         Result may be elevated if tourniquet was used during collection  Comprehensive metabolic panel [[id number]]  (Abnormal) Collected:  04/13/18 0750    Lab Status:  Final result Specimen:  Blood from Arm, Right Updated:  04/13/18 0815     Sodium 131 (L) mmol/L      Potassium 3 8 mmol/L      Chloride 96 (L) mmol/L      CO2 22 mmol/L      Anion Gap 13 mmol/L      BUN 15 mg/dL      Creatinine 1 02 mg/dL      Glucose 157 (H) mg/dL      Calcium 9 2 mg/dL      AST 39 U/L      ALT 49 U/L      Alkaline Phosphatase 199 (H) U/L      Total Protein 7 8 g/dL      Albumin 3 1 (L) g/dL      Total Bilirubin 0 80 mg/dL      eGFR 50 ml/min/1 73sq m     Narrative:         National Kidney Disease Education Program recommendations are as follows:  GFR calculation is accurate only with a steady state creatinine  Chronic Kidney disease less than 60 ml/min/1 73 sq  meters  Kidney failure less than 15 ml/min/1 73 sq  meters      Protime-INR [[id number]]  (Normal) Collected:  04/13/18 0750    Lab Status:  Final result Specimen:  Blood from Arm, Right Updated:  04/13/18 0809     Protime 14 2 seconds      INR 1 07    APTT [[id number]]  (Normal) Collected:  04/13/18 0750    Lab Status:  Final result Specimen:  Blood from Arm, Right Updated:  04/13/18 0809     PTT 35 seconds     CBC and differential [[id number]]  (Abnormal) Collected:  04/13/18 0749    Lab Status:  Preliminary result Specimen:  Blood from Arm, Right Updated:  04/13/18 0759     WBC 10 97 (H) Thousand/uL      RBC 3 38 (L) Million/uL      Hemoglobin 10 3 (L) g/dL      Hematocrit 30 1 (L) %      MCV 89 fL      MCH 30 5 pg      MCHC 34 2 g/dL      RDW 13 6 %      MPV 8 9 fL      Platelets 106 Thousands/uL Blood culture #1 [[id number]] Collected:  04/13/18 0749    Lab Status: In process Specimen:  Blood from Hand, Left Updated:  04/13/18 0756    Blood culture #2 [[id number]] Collected:  04/13/18 0748    Lab Status: In process Specimen:  Blood from Arm, Right Updated:  04/13/18 0756    Lactic acid x2 [[id number]] Collected:  04/13/18 0749    Lab Status:  No result Specimen:  Blood from Arm, Right                  XR chest pa & lateral   Final Result by Neetu Clayton MD (04/13 5711)      New consolidation in the superior segment of the right lower lobe  concerning for pneumonia  Note: The study was marked in EPIC for significant notification  Workstation performed: [id number]                    Procedures  ECG 12 Lead Documentation  Date/Time: 4/13/2018 7:46 AM  Performed by: Joey Drummond  Authorized by: Joey Drummond     Indications / Diagnosis:  Sepsis  ECG reviewed by me, the ED Provider: yes    Patient location:  ED  Previous ECG:     Previous ECG:  Compared to current    Comparison ECG info:  10/3/2016    Similarity:  Changes noted (Tachycardia today)  Interpretation:     Interpretation: abnormal    Rate:     ECG rate:  100    ECG rate assessment: tachycardic    Rhythm:     Rhythm: sinus tachycardia    Ectopy:     Ectopy: none    QRS:     QRS axis:  Normal    QRS intervals: Wide  Conduction:     Conduction: abnormal      Abnormal conduction: complete RBBB    ST segments:     ST segments:  Abnormal    Depression:  II, aVF, V3, V4 and V5 (Present on prior EKG)  T waves:     T waves: inverted      Inverted:  III, aVF, V3, V4 and V5 (Present on prior EKG)           Phone Contacts  ED Phone Contact    ED Course  ED Course                          Initial Sepsis Screening     Row Name 04/13/18 0748                Is the patient's history suggestive of a new or worsening infection?  (!)  Yes (Proceed)  -AR        Suspected source of infection pneumonia  -AR        Are two or more of the following signs & symptoms of infection both present and new to the patient? (!)  Yes (Proceed)  -AR        Indicate SIRS criteria Hyperthemia > 38 3C (100 9F); Tachycardia > 90 bpm  -AR        If the answer is yes to both questions, suspicion of sepsis is present  --        If severe sepsis is present AND tissue hypoperfusion perists in the hour after fluid resuscitation or lactate > 4, the patient meets criteria for SEPTIC SHOCK  --        Are any of the following organ dysfunction criteria present within 6 hours of suspected infection and SIRS criteria that are NOT considered to be chronic conditions? --        Organ dysfunction  --        Date of presentation of severe sepsis  --        Time of presentation of severe sepsis  --        Tissue hypoperfusion persists in the hour after crystalloid fluid administration, evidenced, by either:  --        Was hypotension present within one hour of the conclusion of crystalloid fluid administration?  --        Date of presentation of septic shock  --        Time of presentation of septic shock  --          User Key  (r) = Recorded By, (t) = Taken By, (c) = Cosigned By    [address] Name Provider Ginger Rincon MD Physician                  MDM  Number of Diagnoses or Management Options  Pneumonia of right lower lobe due to infectious organism [facility]):   Diagnosis management comments: Patient's chest x-ray is positive for pneumonia  Patient will be admitted  Patient's vital signs have remained stable in the ER         Amount and/or Complexity of Data Reviewed  Clinical lab tests: ordered and reviewed  Tests in the radiology section of CPT®: ordered and reviewed  Tests in the medicine section of CPT®: ordered and reviewed  Discuss the patient with other providers: yes  Independent visualization of images, tracings, or specimens: yes    Risk of Complications, Morbidity, and/or Mortality  Presenting problems: high  Diagnostic procedures: high  Management options: high    Patient Progress  Patient progress: stable    CritCare Time    Disposition  Final diagnoses:   Pneumonia of right lower lobe due to infectious organism [facility])     Time reflects when diagnosis was documented in both MDM as applicable and the Disposition within this note     Time User Action Codes Description Comment    4/13/2018  8:38 AM Linn Merida Add [J18 1] Pneumonia of right lower lobe due to infectious organism [facility])       ED Disposition     ED Disposition Condition Comment    Admit  Case was discussed with LAYNE and the patient's admission status was agreed to be Admission Status: inpatient status to the service of Dr Rosi Wick   Follow-up Information    None       Patient's Medications   Discharge Prescriptions    No medications on file     No discharge procedures on file      ED Provider  Electronically Signed by           Oliverio Tanner MD  04/13/18 8758

## 2018-04-13 NOTE — ASSESSMENT & PLAN NOTE
· Presents with fever and 2weeks history of cough  · CXR shows "New consolidation in the superior segment of the right lower lobe  concerning for pneumonia"  · High suspicion for aspiration in view of history of coughing with meals and prior swallowing dysfunction  Had VBS last year at [facility]  Results unclear but daughter says was abnormal  · IV antibiotic with Rocephin, Azithromycin   Add Flagyl for suspected aspiration  · Follow up on culture results

## 2018-04-13 NOTE — SPEECH THERAPY NOTE
Speech-Language Pathology Bedside Swallow Evaluation        Patient Name: Marck Green    JRHHA'L Date: 4/13/2018     Problem List  Patient Active Problem List   Diagnosis    Hyperlipidemia    GERD (gastroesophageal reflux disease)    Fibromyalgia    [facility] )    CVA (cerebral vascular accident) ([facility] )    Essential hypertension    Acute metabolic encephalopathy    Pneumonia due to infectious organism    Sepsis ([facility] )    Dementia without behavioral disturbance       Past Medical History  Past Medical History:   Diagnosis Date    Blind     blind right eye    Depression     Disease of thyroid gland     Fibromyalgia     Gait disturbance     GERD (gastroesophageal reflux disease)     Glaucoma     Hyperlipidemia     Hypertension     Osteopenia     Psychiatric disorder     depression    Rheumatoid arthritis ([facility] )     Vertigo     Vitamin D deficiency        Past Surgical History  Past Surgical History:   Procedure Laterality Date    JOINT REPLACEMENT      left hip replacement, left shoulder replacement         Current Medical Status  Pt is a 80 y o  female who presented to [facility] 4/13/18 with pneumonia due to infectious organism  presents with 2 weeks history of productive cough and 1 day history of fever  History is obtained from patient's daughter at the bedside as patient is unable to provide a history secondary to underlying dementia  She was recently treated with a course of Augmentin outpatient  She subsequently developed fever with generalized weakness  Per daughter, she had temperature of 102° this morning at home  At baseline, she has a mostly sedentary lifestyle however in the last week has been unable to ambulate secondary to the pain  She has complained of nausea which daughter states is chronic but no vomiting  Appetite has been poor lately as well  Daughter also reports patient has been coughing with meals    She had a video barium swallow at [facility] approximately 1 year ago which daughter states was normal     Past medical history:   Please see H&P for details      Special Studies:  CXR: 4/13 New consolidation in the superior segment of the right lower lobe  concerning for pneumonia  Social/Education/Vocational Hx:  Pt lives  [facility] [employer] custodial w/       Swallow Information   Current Risks for Dysphagia & Aspiration: known history of dysphagia and increased lethargy, poor appetite     Current Symptoms/Concerns: change in respiratory status    Current Diet: NPO      Baseline Diet: regular diet and thin liquids      Baseline Assessment   Behavior/Cognition: alert    Speech/Language Status: able to participate in basic conversation and able to follow commands    Patient Positioning: upright in bed       Swallow Mechanism Exam   Facial: symmetrical  Labial: WFL  Lingual: WFL  Velum: unable to visualize  Mandible: adequate ROM  Dentition: adequate  Vocal quality:clear/adequate   Volitional Cough: strong/productive   Respiratory: NC      Consistencies Assessed and Performance   Consistencies Administered: thin liquids, nectar thick, puree and hard solids- limited amts taken, pt kept saying she's dying  Oral Stage: pt able to bite cookie, drink from cup and straw w/ good oral control  Slow but functional mastication & manipulation  No oral residue  Pharyngeal Stage: Swallows were timely and complete  No overt s/s aspiration noted  Esophageal Concerns: none reported        Summary   Swallow Summary: Pt presents with limited assessment, but pt's swallowing skills appear at baseline w/o overt s/s aspiration  Recommendations: regular diet and thin liquids     Recommended Form of Meds: whole with liquid and whole with puree     Aspiration precautions     Results Reviewed with: patient, RN and family     Treatment Recommendations: no follow up tx needed

## 2018-04-13 NOTE — H&P
H&P- Estephanie Craig 4/27/1931, 80 y o  female MRN: [id number]    Unit/Bed#: -01 Encounter: [id number]    Primary Care Provider: Mara Rowland DO   Date and time admitted to hospital: 4/13/2018  7:29 AM    * Pneumonia due to infectious organism   Assessment & Plan    · Presents with fever and 2weeks history of cough  · CXR shows "New consolidation in the superior segment of the right lower lobe  concerning for pneumonia"  · High suspicion for aspiration in view of history of coughing with meals and prior swallowing dysfunction  Had VBS last year at [facility]  Results unclear but daughter says was abnormal  · IV antibiotic with Rocephin, Azithromycin  Add Flagyl for suspected aspiration  · Follow up on culture results      Sepsis [facility])   Assessment & Plan    · Secondary to above  Continue IV antibiotics  · Lactic Acid wnl      Essential hypertension   Assessment & Plan    · Continue home medications      Dementia without behavioral disturbance   Assessment & Plan    · At baseline  · Continue supportive care        VTE Prophylaxis: Enoxaparin (Lovenox)  / sequential compression device     Code Status: Full Code    POLST: POLST form is not discussed and not completed at this time  Anticipated Length of Stay:  Patient will be admitted on an Inpatient basis with an anticipated length of stay of  > 2 midnights  Justification for Hospital Stay: PNA    Chief Complaint:     Fever, cough    History of Present Illness:  Estephanie Craig is a 80 y o  female who presents with 2 weeks history of productive cough and 1 day history of fever  History is obtained from patient's daughter at the bedside as patient is unable to provide a history secondary to underlying dementia  She was recently treated with a course of Augmentin outpatient  She subsequently developed fever with generalized weakness  Dr  also notes patient has had more difficulty ambulating in the past week secondary to hip pain   She is scheduled to see the orthopedic surgeon next week  She was seen in the ER on 4/5/18 and imaging studies which included lumbar spine and pelvis/right hip x-ray had been negative for any acute findings  There was however degenerative osteoarthritis noted  Per daughter, she had temperature of 102° this morning at home  At baseline, she has a mostly sedentary lifestyle however in the last week has been unable to ambulate secondary to the pain  She has complained of nausea which daughter states is chronic but no vomiting  Appetite has been poor lately as well  Daughter also reports patient has been coughing with meals  She had a video barium swallow at [facility] approximately 1 year ago which daughter states was normal     Review of Systems   Unable to perform ROS: Dementia       Past Medical History:   Diagnosis Date    Blind     blind right eye    Depression     Disease of thyroid gland     Fibromyalgia     Gait disturbance     GERD (gastroesophageal reflux disease)     Glaucoma     Hyperlipidemia     Hypertension     Osteopenia     Psychiatric disorder     depression    Rheumatoid arthritis ([address] )     Vertigo     Vitamin D deficiency        Past Surgical History:   Procedure Laterality Date    JOINT REPLACEMENT      left hip replacement, left shoulder replacement       Family History:  non-contributory    Home Meds:  all medications and allergies reviewed    Allergies:    Allergies   Allergen Reactions    Acetaminophen     Golimumab      Other reaction(s): dedrick colored stool    Lidocaine Other (See Comments)    Neurontin [Gabapentin]     Nortriptyline Tremor    Prasterone      Other reaction(s): pruitis    Leflunomide Rash    Sulfasalazine Rash         Marital Status: /Civil Union     History   Alcohol Use No     History   Smoking Status    Never Smoker   Smokeless Tobacco    Never Used     History   Drug Use No           Physical Exam:   Vitals:   Blood Pressure: 140/50 (04/13/18 1002)  Pulse: (!) 108 (04/13/18 1002)  Temperature: (!) 101 8 °F (38 8 °C) (04/13/18 1002)  Temp Source: Axillary (04/13/18 1002)  Respirations: 20 (04/13/18 1002)  Weight - Scale: 66 2 kg (146 lb) (04/13/18 0733)  SpO2: 93 % (04/13/18 0926)    Physical Exam   Eyes: Right eye exhibits no discharge  Left eye exhibits no discharge  No scleral icterus  Right eye opacity/blind   Neck: Normal range of motion  Neck supple  No JVD present  Cardiovascular: Normal rate  No murmur heard  Pulmonary/Chest: Effort normal  No respiratory distress  She has no wheezes  She has rhonchi in the right middle field and the right lower field  She has no rales  She exhibits no tenderness  Abdominal: Soft  Bowel sounds are normal  She exhibits no distension and no mass  There is no tenderness  Musculoskeletal: Normal range of motion  She exhibits no edema or tenderness  Neurological: She is alert  Pleasantly confused   Skin: Skin is warm and dry  No erythema  Lab Results: I have personally reviewed pertinent reports  Results from last 7 days  Lab Units 04/13/18  0749   WBC Thousand/uL 10 97*   HEMOGLOBIN g/dL 10 3*   HEMATOCRIT % 30 1*   PLATELETS Thousands/uL 282   LYMPHO PCT % 9*   MONO PCT MAN % 9   EOSINO PCT MANUAL % 0       Results from last 7 days  Lab Units 04/13/18  0750   SODIUM mmol/L 131*   POTASSIUM mmol/L 3 8   CHLORIDE mmol/L 96*   CO2 mmol/L 22   BUN mg/dL 15   CREATININE mg/dL 1 02   CALCIUM mg/dL 9 2   TOTAL PROTEIN g/dL 7 8   BILIRUBIN TOTAL mg/dL 0 80   ALK PHOS U/L 199*   ALT U/L 49   AST U/L 39   GLUCOSE RANDOM mg/dL 157*       Results from last 7 days  Lab Units 04/13/18  0750   INR  1 07       Imaging: I have personally reviewed pertinent reports  Xr Chest Pa & Lateral    Result Date: 4/13/2018  Narrative: CHEST INDICATION:   Fever  COMPARISON:  4/5/2018 EXAM PERFORMED/VIEWS:  XR CHEST PA & LATERAL FINDINGS: Lungs are hyperinflated    Increasing consolidation in the superior segment of the right lower lobe  No left-sided consolidation  No pneumothorax or effusion  Heart appears mildly enlarged for degree of lung inflation  Finding is stable  Aortic atherosclerosis  No pulmonary vascular cephalization  Prior left reverse humeral arthroplasty  No acute osseous findings  Impression: New consolidation in the superior segment of the right lower lobe  concerning for pneumonia  Note: The study was marked in EPIC for significant notification  Workstation performed: [id number]     Xr Chest 2 Views    Result Date: 4/5/2018  Narrative: CHEST INDICATION:   Right hip pain  COMPARISON:  04/12/2017 EXAM PERFORMED/VIEWS:  XR CHEST PA & LATERAL Images: 2 FINDINGS: Cardiomediastinal silhouette appears unremarkable  The lungs are clear  No pneumothorax or pleural effusion  No evidence of heart failure  Reverse left shoulder prostheses  Impression: No acute cardiopulmonary disease  Workstation performed: [id number]     Xr Spine Lumbar 2 Or 3 Views Injury    Result Date: 4/5/2018  Narrative: LUMBAR SPINE INDICATION:   pain , no trauma  COMPARISON:  None VIEWS:  XR SPINE LUMBAR 2 OR 3 VIEWS INJURY FINDINGS: L4 on L5 grade 1 anterolisthesis related to facet arthropathy  Mild levoscoliosis  Alignment is unremarkable  No significant lumbar degenerative change noted  The pedicles appear intact  Aortic atherosclerotic disease  Impression: Multilevel degenerative changes with grade 1 L4 on L5 anterolisthesis secondary to facet arthropathy  No acute findings  Workstation performed: [id number]     Xr Hip/pelv 2-3 Vws Right If Performed    Result Date: 4/5/2018  Narrative: RIGHT HIP INDICATION:   pain, no trauma  COMPARISON:  None VIEWS:  XR HIP/PELV 2-3 VWS RIGHT W PELVIS IF PERFORMED FINDINGS: There is no acute fracture or dislocation  Moderate right hip degenerative changes noted  Lower lumbar spine degenerative changes noted  Status post left hip arthroplasty   No lytic or blastic osseous lesions  Soft tissues are unremarkable  The visualized lumbar spine is unremarkable  Impression: Degenerative osteoarthritis without evidence of acute osseous abnormality  Workstation performed: [id number]       ** Please Note: Dragon 360 Dictation voice to text software may have been used in the creation of this document   **

## 2018-04-13 NOTE — PLAN OF CARE
DISCHARGE PLANNING     Discharge to home or other facility with appropriate resources Progressing        INFECTION - ADULT     Absence or prevention of progression during hospitalization Progressing        Nutrition/Hydration-ADULT     Nutrient/Hydration intake appropriate for improving, restoring or maintaining nutritional needs Progressing        PAIN - ADULT     Verbalizes/displays adequate comfort level or baseline comfort level Progressing        Prexisting or High Potential for Compromised Skin Integrity     Skin integrity is maintained or improved Progressing        RESPIRATORY - ADULT     Achieves optimal ventilation and oxygenation Progressing        SAFETY ADULT     Patient will remain free of falls Progressing

## 2018-04-14 PROBLEM — M25.551 PAIN OF RIGHT HIP JOINT: Status: ACTIVE | Noted: 2018-04-14

## 2018-04-14 PROBLEM — D64.9 ANEMIA: Status: ACTIVE | Noted: 2018-04-14

## 2018-04-14 PROBLEM — E03.9 HYPOTHYROIDISM: Status: ACTIVE | Noted: 2018-04-14

## 2018-04-14 PROBLEM — R82.90 ABNORMAL URINALYSIS: Status: ACTIVE | Noted: 2018-04-14

## 2018-04-14 LAB
ANION GAP SERPL CALCULATED.3IONS-SCNC: 12 MMOL/L (ref 4–13)
ATRIAL RATE: 100 BPM
BASOPHILS # BLD AUTO: 0.01 THOUSANDS/ΜL (ref 0–0.1)
BASOPHILS NFR BLD AUTO: 0 % (ref 0–1)
BUN SERPL-MCNC: 9 MG/DL (ref 5–25)
CALCIUM SERPL-MCNC: 8.6 MG/DL
CHLORIDE SERPL-SCNC: 106 MMOL/L (ref 100–108)
CO2 SERPL-SCNC: 21 MMOL/L (ref 21–32)
CREAT SERPL-MCNC: 0.77 MG/DL (ref 0.6–1.3)
EOSINOPHIL # BLD AUTO: 0.1 THOUSAND/ΜL (ref 0–0.61)
EOSINOPHIL NFR BLD AUTO: 1 % (ref 0–6)
ERYTHROCYTE [DISTWIDTH] IN BLOOD BY AUTOMATED COUNT: 13.5 % (ref 11.6–15.1)
FLUAV AG SPEC QL: NORMAL
FLUBV AG SPEC QL: NORMAL
GFR SERPL CREATININE-BSD FRML MDRD: 70 ML/MIN/1.73SQ M
GLUCOSE SERPL-MCNC: 106 MG/DL (ref 65–140)
HCT VFR BLD AUTO: 28.2 % (ref 34.8–46.1)
HGB BLD-MCNC: 9.3 G/DL (ref 11.5–15.4)
LYMPHOCYTES # BLD AUTO: 1.19 THOUSANDS/ΜL (ref 0.6–4.47)
LYMPHOCYTES NFR BLD AUTO: 15 % (ref 14–44)
MCH RBC QN AUTO: 29.9 PG (ref 26.8–34.3)
MCHC RBC AUTO-ENTMCNC: 33 G/DL (ref 31.4–37.4)
MCV RBC AUTO: 91 FL (ref 82–98)
MONOCYTES # BLD AUTO: 0.87 THOUSAND/ΜL (ref 0.17–1.22)
MONOCYTES NFR BLD AUTO: 11 % (ref 4–12)
NEUTROPHILS # BLD AUTO: 5.72 THOUSANDS/ΜL (ref 1.85–7.62)
NEUTS SEG NFR BLD AUTO: 73 % (ref 43–75)
P AXIS: 45 DEGREES
PLATELET # BLD AUTO: 229 THOUSANDS/UL (ref 149–390)
PLATELET # BLD AUTO: 239 THOUSANDS/UL (ref 149–390)
PMV BLD AUTO: 9.1 FL (ref 8.9–12.7)
PMV BLD AUTO: 9.3 FL (ref 8.9–12.7)
POTASSIUM SERPL-SCNC: 3.3 MMOL/L (ref 3.5–5.3)
PR INTERVAL: 134 MS
PROCALCITONIN SERPL-MCNC: 0.17 NG/ML
QRS AXIS: 78 DEGREES
QRSD INTERVAL: 116 MS
QT INTERVAL: 354 MS
QTC INTERVAL: 456 MS
RBC # BLD AUTO: 3.11 MILLION/UL (ref 3.81–5.12)
RSV B RNA SPEC QL NAA+PROBE: NORMAL
SODIUM SERPL-SCNC: 139 MMOL/L (ref 136–145)
T WAVE AXIS: -4 DEGREES
VENTRICULAR RATE: 100 BPM
WBC # BLD AUTO: 7.89 THOUSAND/UL (ref 4.31–10.16)

## 2018-04-14 PROCEDURE — 85025 COMPLETE CBC W/AUTO DIFF WBC: CPT | Performed by: INTERNAL MEDICINE

## 2018-04-14 PROCEDURE — 99232 SBSQ HOSP IP/OBS MODERATE 35: CPT | Performed by: PHYSICIAN ASSISTANT

## 2018-04-14 PROCEDURE — 93010 ELECTROCARDIOGRAM REPORT: CPT | Performed by: INTERNAL MEDICINE

## 2018-04-14 PROCEDURE — 80048 BASIC METABOLIC PNL TOTAL CA: CPT | Performed by: INTERNAL MEDICINE

## 2018-04-14 PROCEDURE — 85049 AUTOMATED PLATELET COUNT: CPT | Performed by: INTERNAL MEDICINE

## 2018-04-14 PROCEDURE — 84145 PROCALCITONIN (PCT): CPT | Performed by: PHYSICIAN ASSISTANT

## 2018-04-14 RX ORDER — POTASSIUM CHLORIDE 20 MEQ/1
20 TABLET, EXTENDED RELEASE ORAL ONCE
Status: COMPLETED | OUTPATIENT
Start: 2018-04-14 | End: 2018-04-14

## 2018-04-14 RX ORDER — AZITHROMYCIN 250 MG/1
250 TABLET, FILM COATED ORAL EVERY 24 HOURS
Status: COMPLETED | OUTPATIENT
Start: 2018-04-14 | End: 2018-04-17

## 2018-04-14 RX ADMIN — PRAVASTATIN SODIUM 40 MG: 40 TABLET ORAL at 08:49

## 2018-04-14 RX ADMIN — AZITHROMYCIN 250 MG: 250 TABLET, FILM COATED ORAL at 13:18

## 2018-04-14 RX ADMIN — Medication 1 SPRAY: at 08:58

## 2018-04-14 RX ADMIN — Medication 1 SPRAY: at 15:43

## 2018-04-14 RX ADMIN — PANTOPRAZOLE SODIUM 40 MG: 40 TABLET, DELAYED RELEASE ORAL at 08:49

## 2018-04-14 RX ADMIN — Medication 500 MG: at 08:49

## 2018-04-14 RX ADMIN — METOPROLOL TARTRATE 50 MG: 50 TABLET ORAL at 08:49

## 2018-04-14 RX ADMIN — LOSARTAN POTASSIUM 50 MG: 50 TABLET, FILM COATED ORAL at 08:49

## 2018-04-14 RX ADMIN — PANTOPRAZOLE SODIUM 40 MG: 40 TABLET, DELAYED RELEASE ORAL at 17:16

## 2018-04-14 RX ADMIN — ASPIRIN 325 MG: 325 TABLET ORAL at 08:48

## 2018-04-14 RX ADMIN — METOPROLOL TARTRATE 50 MG: 50 TABLET ORAL at 17:16

## 2018-04-14 RX ADMIN — ENOXAPARIN SODIUM 40 MG: 40 INJECTION SUBCUTANEOUS at 08:50

## 2018-04-14 RX ADMIN — POTASSIUM CHLORIDE 20 MEQ: 1500 TABLET, EXTENDED RELEASE ORAL at 08:49

## 2018-04-14 RX ADMIN — NIFEDIPINE 60 MG: 30 TABLET, FILM COATED, EXTENDED RELEASE ORAL at 08:48

## 2018-04-14 RX ADMIN — Medication 1 SPRAY: at 19:58

## 2018-04-14 RX ADMIN — POLYVINYL ALCOHOL 1 DROP: 14 SOLUTION/ DROPS OPHTHALMIC at 15:48

## 2018-04-14 RX ADMIN — LEVOTHYROXINE SODIUM 100 MCG: 100 TABLET ORAL at 06:33

## 2018-04-14 RX ADMIN — DULOXETINE 60 MG: 60 CAPSULE, DELAYED RELEASE ORAL at 08:49

## 2018-04-14 RX ADMIN — Medication 1 SPRAY: at 00:38

## 2018-04-14 RX ADMIN — POLYVINYL ALCOHOL 1 DROP: 14 SOLUTION/ DROPS OPHTHALMIC at 08:58

## 2018-04-14 RX ADMIN — METRONIDAZOLE 500 MG: 500 INJECTION, SOLUTION INTRAVENOUS at 02:12

## 2018-04-14 RX ADMIN — IBUPROFEN 200 MG: 400 TABLET, FILM COATED ORAL at 00:33

## 2018-04-14 RX ADMIN — CEFTRIAXONE 1000 MG: 1 INJECTION, SOLUTION INTRAVENOUS at 08:58

## 2018-04-14 RX ADMIN — POLYVINYL ALCOHOL 1 DROP: 14 SOLUTION/ DROPS OPHTHALMIC at 20:00

## 2018-04-14 RX ADMIN — IBUPROFEN 200 MG: 400 TABLET, FILM COATED ORAL at 23:57

## 2018-04-14 RX ADMIN — Medication 1 SPRAY: at 17:45

## 2018-04-14 NOTE — PROGRESS NOTES
[facility]  Progress Note - Carlos Duran 4/27/1931, 80 y o  female MRN: [id number]  Unit/Bed#: -01 Encounter: [id number]  Primary Care Provider: Kaur Cantrell DO   Date and time admitted to hospital: 4/13/2018  7:29 AM    Sepsis [facility])   Assessment & Plan    · Patient met septic criteria on admission secondary to tachycardia and fever  · Follow up on blood cultures, sputum culture  · Negative for influenza  Discontinue Tamiflu  · Likely secondary to right lower lobe pneumonia  Started on flagyl and ceftriaxone/azithromycin given concern for aspiration  No evidence of aspiration on speech evaluation  We will discontinue Flagyl at this time and continue to monitor on ceftriaxone and azithromycin  · Procalcitonin 0 08- would repeat to assess for any change prior to withholding of antibiotics  · Overnight, temp 100 1  · Trend WBC and temp closely  · No evidence to suggest hip/back pain source of sepsis given pain improved, no focal evidence of infection on exam          * Pneumonia of right lower lobe due to infectious organism [facility])   Assessment & Plan    · Fever, cough, RLL PNA on imaging  · CXR: New consolidation in the superior segment of the right lower lobe  concerning for pneumonia  · No evidence of aspiration on speech evaluation  Will monitor off flagyl and continue azithro/ceftriaxone  · Follow blood/sputum cultures  · Wean oxygen to off          Essential hypertension   Assessment & Plan    · Continue home medications (nifedipine, losartan, metoprolol)  · SBP 140s        Dementia without behavioral disturbance   Assessment & Plan    · At baseline  · Continue supportive care        Hyperlipidemia   Assessment & Plan    · Continue statin        Hypothyroidism   Assessment & Plan    · Continue synthroid        Pain of right hip joint   Assessment & Plan    · No erythema, warmth  · No tenderness to palpation  · Imaging reviewed- hip: Degenerative osteoarthritis without evidence of acute osseous abnormality  · Recommended continued outpatient follow up, scheduled  with ortho  · OT and PT evals        Abnormal urinalysis   Assessment & Plan    · Denies dysuria, suspect asymptomatic bacteruia        Anemia   Assessment & Plan    · Drop from 10-9 since admission, likely from IVF bolus  · Repeat in AM          VTE Pharmacologic Prophylaxis:   Pharmacologic: Enoxaparin (Lovenox)  Mechanical VTE Prophylaxis in Place: Yes    Patient Centered Rounds: I have performed bedside rounds with nursing staff today  Pipo Manning    Discussions with Specialists or Other Care Team Provider: nursing    Education and Discussions with Family / Patient: left message for daughter    Time Spent for Care: 30 minutes  More than 50% of total time spent on counseling and coordination of care as described above  Current Length of Stay: 1 day(s)    Current Patient Status: Inpatient   Certification Statement: The patient will continue to require additional inpatient hospital stay due to IV abx, wean oxygen  PT and OT evals    Discharge Plan: PT and OT evals  Wean oxygen, IV abx  Likely discharge next 24-48 hrs  Monitor temps  Code Status: Level 1 - Full Code      Subjective:   Feeling better today  Still having a cough and sore throat  No nausea or vomiting  Patient reports back and hip pain improved  Objective:     Vitals:   Temp (24hrs), Av 6 °F (37 6 °C), Min:97 9 °F (36 6 °C), Max:101 8 °F (38 8 °C)    HR:  [] 85  Resp:  [18-20] 18  BP: (140-158)/(50-96) 144/65  SpO2:  [93 %-96 %] 96 %  Body mass index is 27 59 kg/m²  Input and Output Summary (last 24 hours): Intake/Output Summary (Last 24 hours) at 18 0827  Last data filed at 18 2234   Gross per 24 hour   Intake           33 ml   Output                1 ml   Net           33 ml       Physical Exam:     Physical Exam   Constitutional: No distress  HENT:   Head: Normocephalic and atraumatic     Eyes: Conjunctivae are normal    Right eye blindness/opacity   Neck: No JVD present  Cardiovascular: Normal rate, regular rhythm and intact distal pulses  No murmur heard  Pulmonary/Chest: No respiratory distress  She has wheezes (right base)  She has no rales  Right base rhonchi   Abdominal: Soft  Bowel sounds are normal  She exhibits no distension  There is no tenderness  There is no rebound and no guarding  Musculoskeletal: She exhibits no edema or tenderness ( right hip)  No erythema, edema, tenderness right hip or flank   Neurological: She is alert  Awake, alert, oriented to person and place however states that it is 2006  Able to identify her daughter's name  Unable to identify President  Was all 4 extremities equally  Muscle strength 4/5 throughout  Skin: Skin is warm and dry  No rash noted  She is not diaphoretic  No erythema  Psychiatric: She has a normal mood and affect  Nursing note and vitals reviewed  Additional Data:     Labs:      Results from last 7 days  Lab Units 04/14/18  0641   WBC Thousand/uL 7 89   HEMOGLOBIN g/dL 9 3*   HEMATOCRIT % 28 2*   PLATELETS Thousands/uL 229  239   NEUTROS PCT % 73   LYMPHS PCT % 15   MONOS PCT % 11   EOS PCT % 1       Results from last 7 days  Lab Units 04/14/18  0641 04/13/18  0750   SODIUM mmol/L 139 131*   POTASSIUM mmol/L 3 3* 3 8   CHLORIDE mmol/L 106 96*   CO2 mmol/L 21 22   BUN mg/dL 9 15   CREATININE mg/dL 0 77 1 02   CALCIUM mg/dL 8 6 9 2   TOTAL PROTEIN g/dL  --  7 8   BILIRUBIN TOTAL mg/dL  --  0 80   ALK PHOS U/L  --  199*   ALT U/L  --  49   AST U/L  --  39   GLUCOSE RANDOM mg/dL 106 157*       Results from last 7 days  Lab Units 04/13/18  0750   INR  1 07       * I Have Reviewed All Lab Data Listed Above  * Additional Pertinent Lab Tests Reviewed:  All Labs Within Last 24 Hours Reviewed    Imaging:    Imaging Reports Reviewed Today Include: CXR, hip xray  Imaging Personally Reviewed by Myself Includes:  none    Recent Cultures (last 7 days):       Results from last 7 days  Lab Units 04/13/18  1247 04/13/18  0823   INFLUENZA A PCR  None Detected  --    INFLUENZA B PCR  None Detected  --    RSV PCR  None Detected  --    LEGIONELLA URINARY ANTIGEN   --  Negative       Last 24 Hours Medication List:     Current Facility-Administered Medications:  aspirin 325 mg Oral Daily Marybeth Ovalle MD    cefTRIAXone 1,000 mg Intravenous Q24H Marybeth Ovalle MD    And        azithromycin 500 mg Intravenous Q24H Marybeth Ovalle MD Last Rate: 500 mg (04/13/18 1246)   calcium carbonate 500 mg Oral Daily Marybeth Ovalle MD    DULoxetine 60 mg Oral Daily Marybeth Ovalle MD    enoxaparin 40 mg Subcutaneous Daily Marybeth Ovalle MD    guaiFENesin 600 mg Oral BID PRN Marybeth Ovalle MD    ibuprofen 200 mg Oral Q6H PRN Marybeth Ovalle MD    levothyroxine 100 mcg Oral Early Morning Marybeth Ovalle MD    LORazepam 0 5 mg Oral Q8H PRN Marybeth Ovalle MD    losartan 50 mg Oral Daily Marybeth Ovalle MD    magnesium hydroxide 30 mL Oral Daily PRN Marybeth Ovalle MD    metoprolol tartrate 50 mg Oral BID Marybeth Ovalle MD    metroNIDAZOLE 500 mg Intravenous Q8H Marybeth Ovalle MD Last Rate: 500 mg (04/14/18 0212)   NIFEdipine ER 60 mg Oral Daily Marybeth Ovalle MD    pantoprazole 40 mg Oral BID Marybeth Ovalle MD    phenol 1 spray Mouth/Throat Q2H PRN Keon Calvo PA-C    polyvinyl alcohol 1 drop Ophthalmic TID Marybeth Ovalle MD    pravastatin 40 mg Oral Daily Marybeth Ovalle MD    sodium chloride 100 mL/hr Intravenous Continuous Marybeth Ovalle MD Last Rate: 100 mL/hr (04/13/18 2234)   traMADol 50 mg Oral Q8H PRN Marybeth Ovalle MD         Today, Patient Was Seen By: Sheba Arciniega PA-C    ** Please Note: Dictation voice to text software may have been used in the creation of this document   **

## 2018-04-14 NOTE — CASE MANAGEMENT
Initial Clinical Review    Admission: Date/Time/Statement: 4/13/18 @ 0844     Orders Placed This Encounter   Procedures    Inpatient Admission (expected length of stay for this patient is greater than two midnights)     Standing Status:   Standing     Number of Occurrences:   1     Order Specific Question:   Admitting Physician     Answer:   Sydney Tillman [[id number]]     Order Specific Question:   Level of Care     Answer:   Med Surg [[id number]]     Order Specific Question:   Estimated length of stay     Answer:   More than 2 Midnights     Order Specific Question:   Certification     Answer:   I certify that inpatient services are medically necessary for this patient for a duration of greater than two midnights  See H&P and MD Progress Notes for additional information about the patient's course of treatment  ED: Date/Time/Mode of Arrival:   ED Arrival Information     Expected Arrival Acuity Means of Arrival Escorted By Service Admission Type    - 4/13/2018 07:29 Urgent Ambulance [facility] General Medicine Urgent    Arrival Complaint    weakness          Chief Complaint:   Chief Complaint   Patient presents with    Fever - 75 years or older     Pt presents to ER via EMS from [facility] with c/o's fever, productive cough, & weakness  Generally healthy appearing confused elderly female presents in no distress  History of Illness: Susy Aguirre is a 80 y o  female who presents with 2 weeks history of productive cough and 1 day history of fever  History is obtained from patient's daughter at the bedside as patient is unable to provide a history secondary to underlying dementia  She was recently treated with a course of Augmentin outpatient  She subsequently developed fever with generalized weakness  Dr  also notes patient has had more difficulty ambulating in the past week secondary to hip pain  She is scheduled to see the orthopedic surgeon next week    She was seen in the ER on 4/5/18 and imaging studies which included lumbar spine and pelvis/right hip x-ray had been negative for any acute findings  There was however degenerative osteoarthritis noted  Per daughter, she had temperature of 102° this morning at home  At baseline, she has a mostly sedentary lifestyle however in the last week has been unable to ambulate secondary to the pain  She has complained of nausea which daughter states is chronic but no vomiting  Appetite has been poor lately as well  Daughter also reports patient has been coughing with meals  She had a video barium swallow at [facility] approximately 1 year ago which daughter states was normal     ED Vital Signs:   ED Triage Vitals   Temperature Pulse Respirations Blood Pressure SpO2   04/13/18 0733 04/13/18 0733 04/13/18 0733 04/13/18 0733 04/13/18 0733   (!) 101 7 °F (38 7 °C) 100 16 161/74 93 %      Temp Source Heart Rate Source Patient Position - Orthostatic VS BP Location FiO2 (%)   04/13/18 0733 04/13/18 0733 04/13/18 0733 04/13/18 0733 --   Oral Monitor Lying Right arm       Pain Score       04/13/18 0832       No Pain        Wt Readings from Last 1 Encounters:   04/13/18 66 2 kg (146 lb)       Vital Signs (abnormal):  04/13/18 0926 100 3 °F (37 9 °C) 99 20 158/72 93 % -- BF   04/13/18 0832 -- 96 20 144/96            Abnormal Labs/Diagnostic Test Results: wbc   10 97, H&H   10 3  30 1, Na  131, cl  96  G;eric 157, alk phos 199, alb  3 1  EKG- NSR RBBB     CXR -   New consolidation in the superior segment of the right lower lobe  concerning for pneumonia    ED Treatment:   Medication Administration from 04/13/2018 0729 to 04/13/2018 0945       Date/Time Order Dose Route Action Action by Comments     04/13/2018 0926 sodium chloride 0 9 % bolus 1,000 mL 0 mL Intravenous Stopped Arthur Astudillo RN      04/13/2018 0756 sodium chloride 0 9 % bolus 1,000 mL 1,000 mL Intravenous New Bag Arthur Astudillo RN      04/13/2018 0926 sodium chloride 0 9 % bolus 1,000 mL 1,000 mL Intravenous New Bag Kaden Fountain RN      04/13/2018 7127 cefepime (MAXIPIME) 2 g/50 mL dextrose IVPB 0 mg Intravenous Stopped Kaden Fountain RN      04/13/2018 0832 cefepime (MAXIPIME) 2 g/50 mL dextrose IVPB 2,000 mg Intravenous New Bag Kaden Fountain RN           Past Medical/Surgical History: Active Ambulatory Problems     Diagnosis Date Noted    Hyperlipidemia     GERD (gastroesophageal reflux disease)     Fibromyalgia     Rheumatoid arthritis (HCC)     CVA [facility]cerebral vascular accident) ([facility] [address] ) 10/03/2016    Essential hypertension 10/03/2016    Acute metabolic encephalopathy 87/84/6443     Resolved Ambulatory Problems     Diagnosis Date Noted    Fever 10/03/2016    Abdominal pain 10/03/2016    Hyponatremia 10/03/2016     Past Medical History:   Diagnosis Date    Blind     Depression     Disease of thyroid gland     Fibromyalgia     Gait disturbance     GERD (gastroesophageal reflux disease)     Glaucoma     Hyperlipidemia     Hypertension     Osteopenia     Psychiatric disorder     Rheumatoid arthritis ([facility] [address] )     Vertigo     Vitamin D deficiency        Admitting Diagnosis: UTI (urinary tract infection) [N39 0]  Weakness [R53 1]  Sepsis, due to unspecified organism ([facility] [address] ) [A41 9]  Pneumonia of right lower lobe due to infectious organism ([facility] ) [J18 1]    Age/Sex: 80 y o  female    Assessment/Plan:  * Pneumonia due to infectious organism   Assessment & Plan     · Presents with fever and 2weeks history of cough  · CXR shows "New consolidation in the superior segment of the right lower lobe  concerning for pneumonia"  · High suspicion for aspiration in view of history of coughing with meals and prior swallowing dysfunction  Had VBS last year at [facility]  Results unclear but daughter says was abnormal  · IV antibiotic with Rocephin, Azithromycin  Add Flagyl for suspected aspiration  · Follow up on culture results       Sepsis [facility])   Assessment & Plan     · Secondary to above   Continue IV antibiotics  · Lactic Acid wnl       Essential hypertension   Assessment & Plan     · Continue home medications       Dementia without behavioral disturbance   Assessment & Plan     · At baseline  · Continue supportive care          VTE Prophylaxis: Enoxaparin (Lovenox)  / sequential compression device      Code Status: Full Code     POLST: POLST form is not discussed and not completed at this time      Anticipated Length of Stay:  Patient will be admitted on an Inpatient basis with an anticipated length of stay of  > 2 midnights     Justification for Hospital Stay: PNA         Admission Orders:  Scheduled Meds:   Current Facility-Administered Medications:  aspirin 325 mg Oral Daily Zaina Resendez MD    azithromycin 250 mg Oral Q24H Shell Petersen PA-C    calcium carbonate 500 mg Oral Daily Zaina Resendez MD    cefTRIAXone 1,000 mg Intravenous Q24H Zaina Resendez MD Last Rate: 1,000 mg (04/14/18 0858)   DULoxetine 60 mg Oral Daily Zaina Resendez MD    enoxaparin 40 mg Subcutaneous Daily Zaina Resendez MD    guaiFENesin 600 mg Oral BID PRN Zaina Resendez MD    ibuprofen 200 mg Oral Q6H PRN Zaina Resendez MD    levothyroxine 100 mcg Oral Early Morning Zaina Resendez MD    LORazepam 0 5 mg Oral Q8H PRN Zaina Resendez MD    losartan 50 mg Oral Daily Zaina Resendez MD    magnesium hydroxide 30 mL Oral Daily PRN Zaina Resendez MD    metoprolol tartrate 50 mg Oral BID aZina Resendez MD    NIFEdipine ER 60 mg Oral Daily Zaina Resendez MD    pantoprazole 40 mg Oral BID Zaina Resendez, MD    phenol 1 spray Mouth/Throat Q2H PRN Keon Calvo PANehemiahC    polyvinyl alcohol 1 drop Ophthalmic TID Zaina Resendez MD    pravastatin 40 mg Oral Daily Zaina Resendez MD    traMADol 50 mg Oral Q8H PRN Zaina Resendez MD      Continuous Infusions:    PRN Meds: guaiFENesin    ibuprofen    LORazepam    magnesium hydroxide    phenol    traMADol     Reg diet   Aspiration precautions  Up w/ assistance  Inc spirom   Elevate HOB   Oral care   SCD  OT PT eval   4/15 cbc,bmp  Prolactin level   IM note  4/14  Sepsis [facility])   Assessment & Plan     · Patient met septic criteria on admission secondary to tachycardia and fever  · Follow up on blood cultures, sputum culture  · Negative for influenza  Discontinue Tamiflu  · Likely secondary to right lower lobe pneumonia  Started on flagyl and ceftriaxone/azithromycin given concern for aspiration  No evidence of aspiration on speech evaluation  We will discontinue Flagyl at this time and continue to monitor on ceftriaxone and azithromycin  · Procalcitonin 0 08- would repeat to assess for any change prior to withholding of antibiotics  · Overnight, temp 100 1  · Trend WBC and temp closely  · No evidence to suggest hip/back pain source of sepsis given pain improved, no focal evidence of infection on exam            * Pneumonia of right lower lobe due to infectious organism [facility])   Assessment & Plan     · Fever, cough, RLL PNA on imaging  · CXR: New consolidation in the superior segment of the right lower lobe  concerning for pneumonia  · No evidence of aspiration on speech evaluation  Will monitor off flagyl and continue azithro/ceftriaxone  · Follow blood/sputum cultures  · Wean oxygen to off           Essential hypertension   Assessment & Plan     · Continue home medications (nifedipine, losartan, metoprolol)  · SBP 140s          Dementia without behavioral disturbance   Assessment & Plan     · At baseline  · Continue supportive care          Hyperlipidemia   Assessment & Plan     · Continue statin          Hypothyroidism   Assessment & Plan     · Continue synthroid          Pain of right hip joint   Assessment & Plan     · No erythema, warmth  · No tenderness to palpation  · Imaging reviewed- hip: Degenerative osteoarthritis without evidence of acute osseous abnormality    · Recommended continued outpatient follow up, scheduled 4/26 with ortho  · OT and PT evals          Abnormal urinalysis   Assessment & Plan     · Denies dysuria, suspect asymptomatic bacteruia          Anemia   Assessment & Plan     · Drop from 10-9 since admission, likely from IVF bolus  · Repeat in AM             VTE Pharmacologic Prophylaxis:   Pharmacologic: Enoxaparin (Lovenox)  Mechanical VTE Prophylaxis in Place: Yes     Patient Centered Rounds: I have performed bedside rounds with nursing staff today  Jose L Rasmussen     Discussions with Specialists or Other Care Team Provider: nursing     Education and Discussions with Family / Patient: left message for daughter     Time Spent for Care: 30 minutes  More than 50% of total time spent on counseling and coordination of care as described above      Current Length of Stay: 1 day(s)     Current Patient Status: Inpatient   Certification Statement: The patient will continue to require additional inpatient hospital stay due to IV abx, wean oxygen  PT and OT evals     Discharge Plan: PT and OT evals  Wean oxygen, IV abx  Likely discharge next 24-48 hrs   Monitor gilda

## 2018-04-14 NOTE — ASSESSMENT & PLAN NOTE
· Fever, cough, RLL PNA on imaging  · CXR: New consolidation in the superior segment of the right lower lobe  concerning for pneumonia  · No evidence of aspiration on speech evaluation  Will monitor off flagyl and continue azithro/ceftriaxone  · Follow blood/sputum cultures  · Wean oxygen to off

## 2018-04-14 NOTE — ASSESSMENT & PLAN NOTE
· Patient met septic criteria on admission secondary to tachycardia and fever  · Follow up on blood cultures, sputum culture  · Negative for influenza  Discontinue Tamiflu  · Likely secondary to right lower lobe pneumonia  Started on flagyl and ceftriaxone/azithromycin given concern for aspiration  No evidence of aspiration on speech evaluation  We will discontinue Flagyl at this time and continue to monitor on ceftriaxone and azithromycin  · Procalcitonin 0 08- would repeat to assess for any change prior to withholding of antibiotics  · Overnight, temp 100 1  · Trend WBC and temp closely     · No evidence to suggest hip/back pain source of sepsis given pain improved, no focal evidence of infection on exam

## 2018-04-14 NOTE — ASSESSMENT & PLAN NOTE
· No erythema, warmth  · No tenderness to palpation  · Imaging reviewed- hip: Degenerative osteoarthritis without evidence of acute osseous abnormality    · Recommended continued outpatient follow up, scheduled 4/26 with ortho  · OT and PT evals

## 2018-04-15 LAB
ANION GAP SERPL CALCULATED.3IONS-SCNC: 12 MMOL/L (ref 4–13)
BACTERIA UR CULT: ABNORMAL
BUN SERPL-MCNC: 10 MG/DL (ref 5–25)
CALCIUM SERPL-MCNC: 9.3 MG/DL
CHLORIDE SERPL-SCNC: 99 MMOL/L (ref 100–108)
CO2 SERPL-SCNC: 22 MMOL/L (ref 21–32)
CREAT SERPL-MCNC: 0.8 MG/DL (ref 0.6–1.3)
ERYTHROCYTE [DISTWIDTH] IN BLOOD BY AUTOMATED COUNT: 13.8 % (ref 11.6–15.1)
GFR SERPL CREATININE-BSD FRML MDRD: 67 ML/MIN/1.73SQ M
GLUCOSE SERPL-MCNC: 131 MG/DL (ref 65–140)
HCT VFR BLD AUTO: 30.1 % (ref 34.8–46.1)
HGB BLD-MCNC: 9.9 G/DL (ref 11.5–15.4)
MCH RBC QN AUTO: 30.1 PG (ref 26.8–34.3)
MCHC RBC AUTO-ENTMCNC: 32.9 G/DL (ref 31.4–37.4)
MCV RBC AUTO: 92 FL (ref 82–98)
PLATELET # BLD AUTO: 297 THOUSANDS/UL (ref 149–390)
PMV BLD AUTO: 9.1 FL (ref 8.9–12.7)
POTASSIUM SERPL-SCNC: 3.6 MMOL/L (ref 3.5–5.3)
PROCALCITONIN SERPL-MCNC: 0.46 NG/ML
RBC # BLD AUTO: 3.29 MILLION/UL (ref 3.81–5.12)
SODIUM SERPL-SCNC: 133 MMOL/L (ref 136–145)
WBC # BLD AUTO: 10.47 THOUSAND/UL (ref 4.31–10.16)

## 2018-04-15 PROCEDURE — 99232 SBSQ HOSP IP/OBS MODERATE 35: CPT | Performed by: PHYSICIAN ASSISTANT

## 2018-04-15 PROCEDURE — 84145 PROCALCITONIN (PCT): CPT | Performed by: PHYSICIAN ASSISTANT

## 2018-04-15 PROCEDURE — 80048 BASIC METABOLIC PNL TOTAL CA: CPT | Performed by: PHYSICIAN ASSISTANT

## 2018-04-15 PROCEDURE — 85027 COMPLETE CBC AUTOMATED: CPT | Performed by: PHYSICIAN ASSISTANT

## 2018-04-15 RX ORDER — BENZONATATE 100 MG/1
100 CAPSULE ORAL 3 TIMES DAILY PRN
Status: DISCONTINUED | OUTPATIENT
Start: 2018-04-15 | End: 2018-04-17

## 2018-04-15 RX ORDER — GUAIFENESIN 600 MG
600 TABLET, EXTENDED RELEASE 12 HR ORAL EVERY 12 HOURS SCHEDULED
Status: DISCONTINUED | OUTPATIENT
Start: 2018-04-15 | End: 2018-04-17 | Stop reason: HOSPADM

## 2018-04-15 RX ADMIN — METOPROLOL TARTRATE 50 MG: 50 TABLET ORAL at 09:08

## 2018-04-15 RX ADMIN — IBUPROFEN 200 MG: 400 TABLET, FILM COATED ORAL at 23:17

## 2018-04-15 RX ADMIN — TRAMADOL HYDROCHLORIDE 50 MG: 50 TABLET, FILM COATED ORAL at 19:21

## 2018-04-15 RX ADMIN — LOSARTAN POTASSIUM 50 MG: 50 TABLET, FILM COATED ORAL at 09:08

## 2018-04-15 RX ADMIN — PANTOPRAZOLE SODIUM 40 MG: 40 TABLET, DELAYED RELEASE ORAL at 17:08

## 2018-04-15 RX ADMIN — CEFTRIAXONE 1000 MG: 1 INJECTION, SOLUTION INTRAVENOUS at 09:12

## 2018-04-15 RX ADMIN — NIFEDIPINE 60 MG: 30 TABLET, FILM COATED, EXTENDED RELEASE ORAL at 09:08

## 2018-04-15 RX ADMIN — METOPROLOL TARTRATE 50 MG: 50 TABLET ORAL at 17:08

## 2018-04-15 RX ADMIN — Medication 1 SPRAY: at 23:19

## 2018-04-15 RX ADMIN — POLYVINYL ALCOHOL 1 DROP: 14 SOLUTION/ DROPS OPHTHALMIC at 21:11

## 2018-04-15 RX ADMIN — BENZONATATE 100 MG: 100 CAPSULE ORAL at 19:21

## 2018-04-15 RX ADMIN — POLYVINYL ALCOHOL 1 DROP: 14 SOLUTION/ DROPS OPHTHALMIC at 17:08

## 2018-04-15 RX ADMIN — Medication 1 SPRAY: at 17:09

## 2018-04-15 RX ADMIN — Medication 500 MG: at 09:08

## 2018-04-15 RX ADMIN — PANTOPRAZOLE SODIUM 40 MG: 40 TABLET, DELAYED RELEASE ORAL at 09:08

## 2018-04-15 RX ADMIN — GUAIFENESIN 600 MG: 600 TABLET, EXTENDED RELEASE ORAL at 12:21

## 2018-04-15 RX ADMIN — Medication 1 SPRAY: at 00:25

## 2018-04-15 RX ADMIN — GUAIFENESIN 600 MG: 600 TABLET, EXTENDED RELEASE ORAL at 20:35

## 2018-04-15 RX ADMIN — AZITHROMYCIN 250 MG: 250 TABLET, FILM COATED ORAL at 12:21

## 2018-04-15 RX ADMIN — PRAVASTATIN SODIUM 40 MG: 40 TABLET ORAL at 09:08

## 2018-04-15 RX ADMIN — DULOXETINE 60 MG: 60 CAPSULE, DELAYED RELEASE ORAL at 09:08

## 2018-04-15 RX ADMIN — Medication 1 SPRAY: at 15:09

## 2018-04-15 RX ADMIN — LEVOTHYROXINE SODIUM 100 MCG: 100 TABLET ORAL at 06:31

## 2018-04-15 RX ADMIN — ENOXAPARIN SODIUM 40 MG: 40 INJECTION SUBCUTANEOUS at 09:08

## 2018-04-15 RX ADMIN — Medication 1 SPRAY: at 09:06

## 2018-04-15 RX ADMIN — ASPIRIN 325 MG: 325 TABLET ORAL at 09:08

## 2018-04-15 RX ADMIN — Medication 1 SPRAY: at 19:21

## 2018-04-15 NOTE — ASSESSMENT & PLAN NOTE
· No erythema, warmth  · No tenderness to palpation  · Imaging reviewed- hip: Degenerative osteoarthritis without evidence of acute osseous abnormality    · Recommended continued outpatient follow up, scheduled 4/26 with ortho  · OT and PT evals- await their recommendation

## 2018-04-15 NOTE — PLAN OF CARE
Problem: Nutrition/Hydration-ADULT  Goal: Nutrient/Hydration intake appropriate for improving, restoring or maintaining nutritional needs  Monitor and assess patient's nutrition/hydration status for malnutrition (ex- brittle hair, bruises, dry skin, pale skin and conjunctiva, muscle wasting, smooth red tongue, and disorientation)  Collaborate with interdisciplinary team and initiate plan and interventions as ordered  Monitor patient's weight and dietary intake as ordered or per policy  Utilize nutrition screening tool and intervene per policy  Determine patient's food preferences and provide high-protein, high-caloric foods as appropriate       INTERVENTIONS:  - Monitor oral intake, urinary output, labs, and treatment plans  - Assess nutrition and hydration status and recommend course of action  - Evaluate amount of meals eaten  - Assist patient with eating if necessary   - Allow adequate time for meals  - Recommend/ encourage appropriate diets, oral nutritional supplements, and vitamin/mineral supplements  - Order, calculate, and assess calorie counts as needed  - Recommend, monitor, and adjust tube feedings and TPN/PPN based on assessed needs  - Assess need for intravenous fluids  - Provide specific nutrition/hydration education as appropriate  - Include patient/family/caregiver in decisions related to nutrition   Outcome: Progressing      Problem: Prexisting or High Potential for Compromised Skin Integrity  Goal: Skin integrity is maintained or improved  INTERVENTIONS:  - Identify patients at risk for skin breakdown  - Assess and monitor skin integrity  - Assess and monitor nutrition and hydration status  - Monitor labs (i e  albumin)  - Assess for incontinence   - Turn and reposition patient  - Assist with mobility/ambulation  - Relieve pressure over bony prominences  - Avoid friction and shearing  - Provide appropriate hygiene as needed including keeping skin clean and dry  - Evaluate need for skin moisturizer/barrier cream  - Collaborate with interdisciplinary team (i e  Nutrition, Rehabilitation, etc )   - Patient/family teaching   Outcome: Progressing      Problem: PAIN - ADULT  Goal: Verbalizes/displays adequate comfort level or baseline comfort level  Interventions:  - Encourage patient to monitor pain and request assistance  - Assess pain using appropriate pain scale  - Administer analgesics based on type and severity of pain and evaluate response  - Implement non-pharmacological measures as appropriate and evaluate response  - Consider cultural and social influences on pain and pain management  - Notify physician/advanced practitioner if interventions unsuccessful or patient reports new pain   Outcome: Progressing      Problem: INFECTION - ADULT  Goal: Absence or prevention of progression during hospitalization  INTERVENTIONS:  - Assess and monitor for signs and symptoms of infection  - Monitor lab/diagnostic results  - Monitor all insertion sites, i e  indwelling lines, tubes, and drains  - Monitor endotracheal (as able) and nasal secretions for changes in amount and color  - [place] appropriate cooling/warming therapies per order  - Administer medications as ordered  - Instruct and encourage patient and family to use good hand hygiene technique  - Identify and instruct in appropriate isolation precautions for identified infection/condition   Outcome: Progressing      Problem: SAFETY ADULT  Goal: Patient will remain free of falls  INTERVENTIONS:  - Assess patient frequently for physical needs  -  Identify cognitive and physical deficits and behaviors that affect risk of falls    -  [place] fall precautions as indicated by assessment   - Educate patient/family on patient safety including physical limitations  - Instruct patient to call for assistance with activity based on assessment  - Modify environment to reduce risk of injury  - Consider OT/PT consult to assist with strengthening/mobility Outcome: Progressing      Problem: DISCHARGE PLANNING  Goal: Discharge to home or other facility with appropriate resources  INTERVENTIONS:  - Identify barriers to discharge w/patient and caregiver  - Arrange for needed discharge resources and transportation as appropriate  - Identify discharge learning needs (meds, wound care, etc )  - Arrange for interpretive services to assist at discharge as needed  - Refer to Case Management Department for coordinating discharge planning if the patient needs post-hospital services based on physician/advanced practitioner order or complex needs related to functional status, cognitive ability, or social support system   Outcome: Progressing      Problem: RESPIRATORY - ADULT  Goal: Achieves optimal ventilation and oxygenation  INTERVENTIONS:  - Assess for changes in respiratory status  - Assess for changes in mentation and behavior  - Position to facilitate oxygenation and minimize respiratory effort  - Oxygen administration by appropriate delivery method based on oxygen saturation (per order) or ABGs  - Initiate smoking cessation education as indicated  - Encourage broncho-pulmonary hygiene including cough, deep breathe, Incentive Spirometry  - Assess the need for suctioning and aspirate as needed  - Assess and instruct to report SOB or any respiratory difficulty  - Respiratory Therapy support as indicated   Outcome: Progressing      Problem: Potential for Falls  Goal: Patient will remain free of falls  INTERVENTIONS:  - Assess patient frequently for physical needs  -  Identify cognitive and physical deficits and behaviors that affect risk of falls    -  [place] fall precautions as indicated by assessment   - Educate patient/family on patient safety including physical limitations  - Instruct patient to call for assistance with activity based on assessment  - Modify environment to reduce risk of injury  - Consider OT/PT consult to assist with strengthening/mobility    Outcome: Progressing

## 2018-04-15 NOTE — PROGRESS NOTES
[address] [facility]  Progress Note - Som Olivo 4/27/1931, 80 y o  female MRN: [id number]  Unit/Bed#: -01 Encounter: [id number]  Primary Care Provider: Thu Sutton DO   Date and time admitted to hospital: 4/13/2018  7:29 AM    Sepsis [facility])   Assessment & Plan    · Patient met septic criteria on admission secondary to tachycardia and fever  · Follow up on blood cultures, currently negative at 24 hours  Unable to expectorate sputum  Influenza negative  · Likely secondary to right lower lobe pneumonia  Started on flagyl and ceftriaxone/azithromycin given concern for aspiration  No evidence of aspiration on speech evaluation  Continue on ceftriaxone and azithromycin  Flagyl was discontinued yesterday given no evidence of aspiration on speech evaluation  · Procalcitonin on admission 0 08 and then increased to 0 17  Continuing antibiotics at this time and repeat ordered to monitor trend  · Overnight temperature 100 7°   Trend WBC and temp closely  · No evidence to suggest hip/back pain source of sepsis given pain improved, no focal evidence of infection on exam   Patient denies any dysuria however does have positive urine culture which I suspect represents asymptomatic bacteriuria        * Pneumonia of right lower lobe due to infectious organism [facility])   Assessment & Plan    · Fever, cough, RLL PNA on imaging  · CXR: New consolidation in the superior segment of the right lower lobe  concerning for pneumonia  · No evidence of aspiration on speech evaluation   Will monitor off flagyl and continue azithro/ceftriaxone day 3   · Follow blood/sputum cultures- blood cultures currently negative at 24 hours and sputum unable to be expectorated  · Wean oxygen to off, currently at 1 5L and changed to off and will monitor response        Essential hypertension   Assessment & Plan    · Continue home medications (nifedipine, losartan, metoprolol)  · SBP 160s        Dementia without behavioral disturbance   Assessment & Plan    · At baseline  · Continue supportive care        Hyperlipidemia   Assessment & Plan    · Continue statin        Hypothyroidism   Assessment & Plan    · Continue synthroid        Pain of right hip joint   Assessment & Plan    · No erythema, warmth  · No tenderness to palpation  · Imaging reviewed- hip: Degenerative osteoarthritis without evidence of acute osseous abnormality  · Recommended continued outpatient follow up, scheduled 4/26 with ortho  · OT and PT evals- await their recommendation        Abnormal urinalysis   Assessment & Plan    · Denies dysuria, suspect asymptomatic bacteruia        Anemia   Assessment & Plan    · Drop from 10-9 since admission, likely from IVF bolus  · Repeat labs this morning currently pending          VTE Pharmacologic Prophylaxis:   Pharmacologic: Enoxaparin (Lovenox)  Mechanical VTE Prophylaxis in Place: Yes    Patient Centered Rounds: I have performed bedside rounds with nursing staff today  Trudi Kebede    Discussions with Specialists or Other Care Team Provider: nursing    Education and Discussions with Family / Patient: left message for daughter    Time Spent for Care: 30 minutes  More than 50% of total time spent on counseling and coordination of care as described above  Current Length of Stay: 2 day(s)    Current Patient Status: Inpatient   Certification Statement: The patient will continue to require additional inpatient hospital stay due to Continue IV antibiotics, monitor temperatures, await PT and OT evaluations    Discharge Plan:  Pending PT and OT evaluation will determine discharge plan  Needs to remain afebrile  Likely discharge in 24 hours    Code Status: Level 1 - Full Code      Subjective:   Feeling better today  Still with sore throat which is worse when swallowing  She also complains of right-sided neck pain  She reports I think it's swollen glands she has no nausea or vomiting    She is eating eggs this morning without difficulty  She is not coughing while eating  She denies any productive sputum  She denies any fevers or chills  She feels tired  Objective:     Vitals:   Temp (24hrs), Av 8 °F (37 1 °C), Min:97 8 °F (36 6 °C), Max:100 7 °F (38 2 °C)    HR:  [72-96] 88  Resp:  [18] 18  BP: (153-168)/(68-81) 168/77  SpO2:  [93 %-98 %] 95 %  Body mass index is 27 59 kg/m²  Input and Output Summary (last 24 hours): Intake/Output Summary (Last 24 hours) at 04/15/18 0954  Last data filed at 18 2019   Gross per 24 hour   Intake              720 ml   Output                0 ml   Net              720 ml       Physical Exam:     Physical Exam   Constitutional: No distress  Wearing 1 5 L NC   HENT:   Head: Normocephalic and atraumatic  Mouth/Throat:       Eyes: Conjunctivae are normal    Right eye opacity, blind   Neck: No JVD present  No cervical palpable lymphadenopathy   Cardiovascular: Normal rate, regular rhythm, normal heart sounds and intact distal pulses  No murmur heard  Pulmonary/Chest: No respiratory distress  She has no wheezes  She has rales (right base)  Abdominal: Soft  Bowel sounds are normal  She exhibits no distension  There is no tenderness  There is no rebound and no guarding  Musculoskeletal: She exhibits no edema  Neurological: She is alert  Skin: Skin is warm and dry  No rash noted  She is not diaphoretic  No erythema  Psychiatric: She has a normal mood and affect  Nursing note and vitals reviewed          Additional Data:     Labs:      Results from last 7 days  Lab Units 18  0641   WBC Thousand/uL 7 89   HEMOGLOBIN g/dL 9 3*   HEMATOCRIT % 28 2*   PLATELETS Thousands/uL 229  239   NEUTROS PCT % 73   LYMPHS PCT % 15   MONOS PCT % 11   EOS PCT % 1       Results from last 7 days  Lab Units 18  0641 18  0750   SODIUM mmol/L 139 131*   POTASSIUM mmol/L 3 3* 3 8   CHLORIDE mmol/L 106 96*   CO2 mmol/L 21 22   BUN mg/dL 9 15   CREATININE mg/dL 0 77 1 02 CALCIUM mg/dL 8 6 9 2   TOTAL PROTEIN g/dL  --  7 8   BILIRUBIN TOTAL mg/dL  --  0 80   ALK PHOS U/L  --  199*   ALT U/L  --  49   AST U/L  --  39   GLUCOSE RANDOM mg/dL 106 157*       Results from last 7 days  Lab Units 04/13/18  0750   INR  1 07       * I Have Reviewed All Lab Data Listed Above  * Additional Pertinent Lab Tests Reviewed: All Labs Within Last 24 Hours Reviewed    Imaging:    Imaging Reports Reviewed Today Include: CXR  Imaging Personally Reviewed by Myself Includes: none    Recent Cultures (last 7 days):       Results from last 7 days  Lab Units 04/13/18  1247 04/13/18  0823 04/13/18  0749 04/13/18  0748   BLOOD CULTURE   --   --  No Growth at 24 hrs  No Growth at 24 hrs     URINE CULTURE   --  >100,000 cfu/ml Non lactose fermenting gram negative sathya*  --   --    INFLUENZA A PCR  None Detected  --   --   --    INFLUENZA B PCR  None Detected  --   --   --    RSV PCR  None Detected  --   --   --    LEGIONELLA URINARY ANTIGEN   --  Negative  --   --        Last 24 Hours Medication List:     Current Facility-Administered Medications:  aspirin 325 mg Oral Daily Peg Dumont MD    azithromycin 250 mg Oral Q24H Shell Petersen PA-C    calcium carbonate 500 mg Oral Daily Peg Dumont MD    cefTRIAXone 1,000 mg Intravenous Q24H Peg Dumont MD Last Rate: 1,000 mg (04/15/18 0912)   DULoxetine 60 mg Oral Daily Peg Dumont MD    enoxaparin 40 mg Subcutaneous Daily Peg Dumont MD    guaiFENesin 600 mg Oral BID PRN Peg Dumont MD    ibuprofen 200 mg Oral Q6H PRN Peg Dumont MD    levothyroxine 100 mcg Oral Early Morning Peg Dumont MD    LORazepam 0 5 mg Oral Q8H PRN Peg Dumont MD    losartan 50 mg Oral Daily Peg Dumont MD    magnesium hydroxide 30 mL Oral Daily PRN Peg Dumont MD    metoprolol tartrate 50 mg Oral BID Peg Dumont MD    NIFEdipine ER 60 mg Oral Daily Peg Dumont MD    pantoprazole 40 mg Oral BID Peg Dumont MD    phenol 1 spray Mouth/Throat Q2H PRN Keon Moran PA-C polyvinyl alcohol 1 drop Ophthalmic TID Dheeraj Walsh MD    pravastatin 40 mg Oral Daily Dheeraj Walsh MD    traMADol 50 mg Oral Q8H PRN Dheeraj Walsh MD         Today, Patient Was Seen By: Timur Mederos PA-C    ** Please Note: Dictation voice to text software may have been used in the creation of this document   **

## 2018-04-15 NOTE — ASSESSMENT & PLAN NOTE
· Patient met septic criteria on admission secondary to tachycardia and fever  · Follow up on blood cultures, currently negative at 24 hours  Unable to expectorate sputum  Influenza negative  · Likely secondary to right lower lobe pneumonia  Started on flagyl and ceftriaxone/azithromycin given concern for aspiration  No evidence of aspiration on speech evaluation  Continue on ceftriaxone and azithromycin  Flagyl was discontinued yesterday given no evidence of aspiration on speech evaluation  · Procalcitonin on admission 0 08 and then increased to 0 17  Continuing antibiotics at this time and repeat ordered to monitor trend  · Overnight temperature 100 7°  Trend WBC and temp closely     · No evidence to suggest hip/back pain source of sepsis given pain improved, no focal evidence of infection on exam   Patient denies any dysuria however does have positive urine culture which I suspect represents asymptomatic bacteriuria

## 2018-04-15 NOTE — ASSESSMENT & PLAN NOTE
· Drop from 10-9 since admission, likely from IVF bolus  · Repeat labs this morning currently pending

## 2018-04-15 NOTE — ASSESSMENT & PLAN NOTE
· Fever, cough, RLL PNA on imaging  · CXR: New consolidation in the superior segment of the right lower lobe  concerning for pneumonia  · No evidence of aspiration on speech evaluation   Will monitor off flagyl and continue azithro/ceftriaxone day 3   · Follow blood/sputum cultures- blood cultures currently negative at 24 hours and sputum unable to be expectorated  · Wean oxygen to off, currently at 1 5L and changed to off and will monitor response

## 2018-04-16 LAB — PROCALCITONIN SERPL-MCNC: 0.33 NG/ML

## 2018-04-16 PROCEDURE — 84145 PROCALCITONIN (PCT): CPT | Performed by: PHYSICIAN ASSISTANT

## 2018-04-16 PROCEDURE — G8978 MOBILITY CURRENT STATUS: HCPCS

## 2018-04-16 PROCEDURE — 97116 GAIT TRAINING THERAPY: CPT

## 2018-04-16 PROCEDURE — 97163 PT EVAL HIGH COMPLEX 45 MIN: CPT

## 2018-04-16 PROCEDURE — 99232 SBSQ HOSP IP/OBS MODERATE 35: CPT | Performed by: PHYSICIAN ASSISTANT

## 2018-04-16 PROCEDURE — G8979 MOBILITY GOAL STATUS: HCPCS

## 2018-04-16 RX ORDER — METRONIDAZOLE 500 MG/1
500 TABLET ORAL EVERY 8 HOURS SCHEDULED
Status: DISCONTINUED | OUTPATIENT
Start: 2018-04-16 | End: 2018-04-17 | Stop reason: HOSPADM

## 2018-04-16 RX ADMIN — METRONIDAZOLE 500 MG: 500 TABLET ORAL at 21:58

## 2018-04-16 RX ADMIN — Medication 500 MG: at 09:58

## 2018-04-16 RX ADMIN — PANTOPRAZOLE SODIUM 40 MG: 40 TABLET, DELAYED RELEASE ORAL at 18:18

## 2018-04-16 RX ADMIN — BENZONATATE 100 MG: 100 CAPSULE ORAL at 09:58

## 2018-04-16 RX ADMIN — METOPROLOL TARTRATE 50 MG: 50 TABLET ORAL at 09:58

## 2018-04-16 RX ADMIN — GUAIFENESIN 600 MG: 600 TABLET, EXTENDED RELEASE ORAL at 21:57

## 2018-04-16 RX ADMIN — METOPROLOL TARTRATE 50 MG: 50 TABLET ORAL at 18:18

## 2018-04-16 RX ADMIN — ASPIRIN 325 MG: 325 TABLET ORAL at 09:58

## 2018-04-16 RX ADMIN — PRAVASTATIN SODIUM 40 MG: 40 TABLET ORAL at 09:58

## 2018-04-16 RX ADMIN — Medication 1 SPRAY: at 13:06

## 2018-04-16 RX ADMIN — IBUPROFEN 200 MG: 400 TABLET, FILM COATED ORAL at 09:58

## 2018-04-16 RX ADMIN — NIFEDIPINE 60 MG: 30 TABLET, FILM COATED, EXTENDED RELEASE ORAL at 09:58

## 2018-04-16 RX ADMIN — METRONIDAZOLE 500 MG: 500 TABLET ORAL at 09:58

## 2018-04-16 RX ADMIN — ENOXAPARIN SODIUM 40 MG: 40 INJECTION SUBCUTANEOUS at 10:02

## 2018-04-16 RX ADMIN — GUAIFENESIN 600 MG: 600 TABLET, EXTENDED RELEASE ORAL at 09:58

## 2018-04-16 RX ADMIN — AZITHROMYCIN 250 MG: 250 TABLET, FILM COATED ORAL at 13:04

## 2018-04-16 RX ADMIN — LOSARTAN POTASSIUM 50 MG: 50 TABLET, FILM COATED ORAL at 09:58

## 2018-04-16 RX ADMIN — Medication 1 SPRAY: at 07:57

## 2018-04-16 RX ADMIN — POLYVINYL ALCOHOL 1 DROP: 14 SOLUTION/ DROPS OPHTHALMIC at 10:02

## 2018-04-16 RX ADMIN — DULOXETINE 60 MG: 60 CAPSULE, DELAYED RELEASE ORAL at 09:58

## 2018-04-16 RX ADMIN — POLYVINYL ALCOHOL 1 DROP: 14 SOLUTION/ DROPS OPHTHALMIC at 21:58

## 2018-04-16 RX ADMIN — LEVOTHYROXINE SODIUM 100 MCG: 100 TABLET ORAL at 05:06

## 2018-04-16 RX ADMIN — CEFTRIAXONE 1000 MG: 1 INJECTION, SOLUTION INTRAVENOUS at 10:04

## 2018-04-16 RX ADMIN — METRONIDAZOLE 500 MG: 500 TABLET ORAL at 13:04

## 2018-04-16 RX ADMIN — POLYVINYL ALCOHOL 1 DROP: 14 SOLUTION/ DROPS OPHTHALMIC at 18:19

## 2018-04-16 RX ADMIN — Medication 1 SPRAY: at 05:06

## 2018-04-16 RX ADMIN — PANTOPRAZOLE SODIUM 40 MG: 40 TABLET, DELAYED RELEASE ORAL at 09:58

## 2018-04-16 RX ADMIN — Medication 1 SPRAY: at 23:21

## 2018-04-16 NOTE — SOCIAL WORK
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

## 2018-04-16 NOTE — PLAN OF CARE
Problem: PHYSICAL THERAPY ADULT  Goal: Performs mobility at highest level of function for planned discharge setting  See evaluation for individualized goals  Treatment/Interventions: Functional transfer training, LE strengthening/ROM, Therapeutic exercise, Endurance training, Cognitive reorientation, Patient/family training, Equipment eval/education, Bed mobility, Gait training          See flowsheet documentation for full assessment, interventions and recommendations  Outcome: Progressing  Prognosis: Fair  Problem List: Decreased strength, Decreased range of motion, Decreased endurance, Impaired balance, Decreased mobility, Decreased coordination, Decreased cognition, Decreased safety awareness, Impaired vision, Pain  Assessment: Therapist introduced roller walker use w/ ambulation to address mobility and physical impairments noted during evaluation  Pt was noted to have improvement in mobility status w/ use of RW w/ decreased level of assist needed to maintain safety and increased ambulation distance  Pt was also noted to have improvement in quality of gait  continued inpatient PT tx is indicated to reduce fall risk factors  Recommendation: Post acute IP rehab          See flowsheet documentation for full assessment

## 2018-04-16 NOTE — PHYSICAL THERAPY NOTE
PHYSICAL THERAPY EVALUATION NOTE    Patient Name: Alexey Waddell  PPXNJ'E Date: 4/16/2018  AGE:   80 y o  Mrn:   [id number]  ADMIT DX:  UTI (urinary tract infection) [N39 0]  Weakness [R53 1]  Sepsis, due to unspecified organism ([facility] [place] [facility] ) [A41 9]  Pneumonia of right lower lobe due to infectious organism ([facility] [place] [facility] ) [J18 1]    Past Medical History:   Diagnosis Date    Blind     blind right eye    Depression     Disease of thyroid gland     Fibromyalgia     Gait disturbance     GERD (gastroesophageal reflux disease)     Glaucoma     Hyperlipidemia     Hypertension     Osteopenia     Psychiatric disorder     depression    Rheumatoid arthritis ([facility] [place] [facility] )     Vertigo     Vitamin D deficiency      Length Of Stay: 3  PHYSICAL THERAPY EVALUATION :    04/16/18 1400   Pain Assessment   Pain Assessment FLACC  (pt c/o throat and back pain  unable to provide numeric ratin)   Pain Rating: FLACC (Rest) - Face 0   Pain Rating: FLACC (Rest) - Legs 0   Pain Rating: FLACC (Rest) - Activity 0   Pain Rating: FLACC (Rest) - Cry 1   Pain Rating: FLACC (Rest) - Consolability 1   Score: FLACC (Rest) 2   Pain Rating: FLACC (Activity) - Face 0   Pain Rating: FLACC (Activity) - Legs 0   Pain Rating: FLACC (Activity) - Activity 0   Pain Rating: FLACC (Activity) - Cry 1   Pain Rating: FLACC (Activity) - Consolability 1   Score: FLACC (Activity) 2   Home Living   Type of Home Assisted living  [facility] (pt was unable to recall))   Additional Comments ambulates w/o device  independent w/ ADLs  no falls in last 6 months  uncertain of accuracy of history due to pt's cognition  Prior Function   Comments pt seen supine in bed  agreed to PT eval  pt c/o throat and back pain  pt needs frequent cuing for task focus  Restrictions/Precautions   Other Precautions Contact/isolation; Impulsive;Cognitive; Chair Alarm; Bed Alarm; Fall Risk;Pain;Visual impairment   General Additional Pertinent History 4/15/18 at 7:00, blood pressure was 168/77  Family/Caregiver Present No   Cognition   Overall Cognitive Status Impaired   Arousal/Participation Cooperative   Orientation Level Oriented to person;Oriented to place; Disoriented to time;Disoriented to situation  (pt was identified w/ full name and birth date)   Following Commands Follows one step commands with increased time or repetition   Comments room air resting pulse ox 92% and 81 BPM, active 89% and 96 BPM     RUE Assessment   RUE Assessment X  (3/5, shoulder 2+/5)   LUE Assessment   LUE Assessment X  (3/5, shoulder 3-/5)   RLE Assessment   RLE Assessment WFL  (3/5)   LLE Assessment   LLE Assessment WFL  (3/5)   Coordination   Movements are Fluid and Coordinated 0   Coordination and Movement Description impaired coordination all extremities   Sensation X  (right eye blind)   Light Touch   RLE Light Touch Grossly intact   LLE Light Touch Grossly intact   Bed Mobility   Supine to Sit 4  Minimal assistance   Additional items Assist x 1;HOB elevated; Increased time required; Impulsive;Verbal cues;LE management   Transfers   Sit to Stand 4  Minimal assistance   Additional items Assist x 1; Increased time required;Verbal cues  (for LE positioning)   Stand to Sit 4  Minimal assistance   Additional items Assist x 1; Impulsive;Verbal cues  (for body positioning, controlled descent)   Ambulation/Elevation   Gait pattern Forward Flexion;Decreased foot clearance;Shuffling; Inconsistent iwona; Short stride; Excessively slow   Gait Assistance 3  Moderate assist   Additional items Assist x 1;Verbal cues  (for step length, breathing technique)   Assistive Device None   Distance 20 feet  (additional not possible due to fatigue)   Balance   Static Sitting Good   Dynamic Sitting Fair -   Static Standing Poor +   Dynamic Standing Poor   Ambulatory Poor   Activity Tolerance   Activity Tolerance Patient limited by fatigue   Nurse Made Aware spoke to [facility] Anh AMAYA CM   Assessment   Prognosis Fair   Problem List Decreased strength;Decreased range of motion;Decreased endurance; Impaired balance;Decreased mobility; Decreased coordination;Decreased cognition;Decreased safety awareness; Impaired vision;Pain   Assessment Pt presents with 2 weeks history of productive cough and 1 day history of fever  order placed for PT eval and tx, w/ activity order of up w/ A  pt presents w/ comorbidities of left THR, left shoulder replacement, fibromyalgia, glaucoma, hyperlipidemia, HTN, osteopenia, RA, and vertigo and personal factors of decreased cognition, visual impairments, depression and advanced age  pt presents w/ pain, weakness, decreased ROM, decreased endurance, impaired cognition, impaired balance, gait deviations, visual impairment, impaired coordination, decreased safety awareness and fall risk  these impairments are evident in findings from physical examination (weakness, decreased ROM and impaired coordination), mobility assessment (need for min to mod assist w/ all phases of mobility when usually mobilizing independently, tolerance to only 15 feet of ambulation, need for cuing for mobility and breathing technique), and Barthel Index: 30/100  pt needed input for task focus and mobility technique/safety  pt is at risk for falls due to physical and safety awareness deficits  pt's clinical presentation is unstable/unpredictable (evident in poor blood pressure control, need for assist w/ all phases of mobility when usually mobilizing independently, tolerance to only 15 feet of ambulation, need for input for task focus and mobility technique/safety w/ limited carryover of education received, declining oxygen saturation w/ low level of activity)  pt needs inpatient PT tx to improve mobility deficits  discharge recommendation is for IP rehab to reduce fall risk and maximize level of functional independence      Goals   Patient Goals pt did not state goals when asked   STG Expiration Date 04/26/18   Short Term Goal #1 pt will: Increase bilateral LE strength 1/2 grade to facilitate independent mobility, Perform all bed mobility tasks w/ supervision to decrease fall risk factors, Perform all transfers w/ supervision to improve independence, Ambulate 150 ft  with least restrictive assistive device w/ supervision w/o LOB, Increase ambulatory balance 1 grade to decrease risk for falls, Complete exercise program independently, Tolerate 3 hr OOB to faciliate upright tolerance and Improve Barthel Index score to 55 or greater to facilitate independence   Plan   Treatment/Interventions Functional transfer training;LE strengthening/ROM; Therapeutic exercise; Endurance training;Cognitive reorientation;Patient/family training;Equipment eval/education; Bed mobility;Gait training   PT Frequency 5x/wk   Recommendation   Recommendation Post acute IP rehab   Barthel Index   Feeding 10   Bathing 0   Grooming Score 0   Dressing Score 0   Bladder Score 5   Bowels Score 0   Toilet Use Score 5   Transfers (Bed/Chair) Score 10   Mobility (Level Surface) Score 0   Stairs Score 0   Barthel Index Score 30     Skilled PT recommended while in hospital and upon DC to progress pt toward treatment goals       Viky Salinas, PT

## 2018-04-16 NOTE — PLAN OF CARE
Problem: PHYSICAL THERAPY ADULT  Goal: Performs mobility at highest level of function for planned discharge setting  See evaluation for individualized goals  Treatment/Interventions: Functional transfer training, LE strengthening/ROM, Therapeutic exercise, Endurance training, Cognitive reorientation, Patient/family training, Equipment eval/education, Bed mobility, Gait training          See flowsheet documentation for full assessment, interventions and recommendations  Prognosis: Fair  Problem List: Decreased strength, Decreased range of motion, Decreased endurance, Impaired balance, Decreased mobility, Decreased coordination, Decreased cognition, Decreased safety awareness, Impaired vision, Pain  Assessment: Pt presents with 2 weeks history of productive cough and 1 day history of fever  order placed for PT eval and tx, w/ activity order of up w/ A  pt presents w/ comorbidities of left THR, left shoulder replacement, fibromyalgia, glaucoma, hyperlipidemia, HTN, osteopenia, RA, and vertigo and personal factors of decreased cognition, visual impairments, depression and advanced age  pt presents w/ pain, weakness, decreased ROM, decreased endurance, impaired cognition, impaired balance, gait deviations, visual impairment, impaired coordination, decreased safety awareness and fall risk  these impairments are evident in findings from physical examination (weakness, decreased ROM and impaired coordination), mobility assessment (need for min to mod assist w/ all phases of mobility when usually mobilizing independently, tolerance to only 15 feet of ambulation, need for cuing for mobility and breathing technique), and Barthel Index: 30/100  pt needed input for task focus and mobility technique/safety  pt is at risk for falls due to physical and safety awareness deficits   pt's clinical presentation is unstable/unpredictable (evident in poor blood pressure control, need for assist w/ all phases of mobility when usually mobilizing independently, tolerance to only 15 feet of ambulation, need for input for task focus and mobility technique/safety w/ limited carryover of education received, declining oxygen saturation w/ low level of activity)  pt needs inpatient PT tx to improve mobility deficits  discharge recommendation is for IP rehab to reduce fall risk and maximize level of functional independence  Recommendation: Post acute IP rehab          See flowsheet documentation for full assessment

## 2018-04-16 NOTE — PLAN OF CARE
Problem: DISCHARGE PLANNING  Goal: Discharge to home or other facility with appropriate resources  INTERVENTIONS:  - Identify barriers to discharge w/patient and caregiver  - Arrange for needed discharge resources and transportation as appropriate  - Identify discharge learning needs (meds, wound care, etc )  - Arrange for interpretive services to assist at discharge as needed  - Refer to Case Management Department for coordinating discharge planning if the patient needs post-hospital services based on physician/advanced practitioner order or complex needs related to functional status, cognitive ability, or social support system   Outcome: Progressing  LOS- 3 DAYS  PATIENT IS A BUNDLE: [facility]  PATIENT IS NOT A READMIT  CM tried to meet with patient at bedside patient was confused  Patient called patient's daughter Janina Be ([phone])  Per daughter patient lives at the [facility]  Patient's daughter reports she uses a walker, and can complete her ADLs by herself but lately she's been needing some assistant  Patient had VNA years ago when she lived in [place]  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

## 2018-04-16 NOTE — ASSESSMENT & PLAN NOTE
· Patient met septic criteria on admission secondary to tachycardia and fever  · Blood cultures negative at 48  Unable to expectorate sputum  Influenza negative  · Likely secondary to right lower lobe pneumonia  Started on flagyl and ceftriaxone/azithromycin given concern for aspiration  No evidence of aspiration on speech evaluation  Continue on ceftriaxone and azithromycin  Flagyl was discontinued given no evidence of aspiration on speech evaluation; given rise in procalcitonin we will resume Flagyl dosing  · Procalcitonin on admission 0 08 and then increased to 0 17, then to 0 46  Recheck again--consider ID eval  · Overnight temperature 99 6°  Trend WBC and temp closely    · No evidence to suggest hip/back pain source of sepsis given pain improved, no focal evidence of infection on exam     · Patient denies any dysuria however does have positive urine culture (E  Coli) which I suspect represents asymptomatic bacteriuria-but either way would be appropriately treated with ceftriaxone anyway  · Patient complaining of sore throat, even if this were bacterial in nature like strep throat again this should have been covered with the current antibiotics she is receiving

## 2018-04-16 NOTE — PROGRESS NOTES
Progress Note - Smitha Grjenn 4/27/1931, 80 y o  female MRN: [id number]    Unit/Bed#: -01 Encounter: [id number]    Primary Care Provider: Ya Miranda DO   Date and time admitted to hospital: 4/13/2018  7:29 AM  * Pneumonia of right lower lobe due to infectious organism [facility])   Assessment & Plan    · Fever, cough, RLL PNA on imaging  · CXR: New consolidation in the superior segment of the right lower lobe concerning for pneumonia  · No evidence of aspiration on speech evaluation  Will monitor off flagyl and continue azithro/ceftriaxone day 4  · Follow blood/sputum cultures- blood cultures currently negative at 48 hours and sputum unable to be expectorated  · Wean oxygen to off as able  · awaiting PT/OT evals, check ambulatory sat  · Patient complaining of severe sore throat  Exam appears unremarkable  Continue supportive care  · Worsening procalcitonin        Sepsis ([facility] [address] )   Assessment & Plan    · Patient met septic criteria on admission secondary to tachycardia and fever  · Blood cultures negative at 48  Unable to expectorate sputum  Influenza negative  · Likely secondary to right lower lobe pneumonia  Started on flagyl and ceftriaxone/azithromycin given concern for aspiration  No evidence of aspiration on speech evaluation  Continue on ceftriaxone and azithromycin  Flagyl was discontinued given no evidence of aspiration on speech evaluation; given rise in procalcitonin we will resume Flagyl dosing  · Procalcitonin on admission 0 08 and then increased to 0 17, then to 0 46  Recheck again--consider ID eval  · Overnight temperature 99 6°  Trend WBC and temp closely    · No evidence to suggest hip/back pain source of sepsis given pain improved, no focal evidence of infection on exam     · Patient denies any dysuria however does have positive urine culture (E  Coli) which I suspect represents asymptomatic bacteriuria-but either way would be appropriately treated with ceftriaxone anyway  · Patient complaining of sore throat, even if this were bacterial in nature like strep throat again this should have been covered with the current antibiotics she is receiving        Dementia without behavioral disturbance   Assessment & Plan    · At baseline  · Continue supportive care        Essential hypertension   Assessment & Plan    · Continue home medications (nifedipine, losartan, metoprolol)          hyponatremia-recheck in am      VTE Pharmacologic Prophylaxis:   Pharmacologic: Enoxaparin (Lovenox)  Mechanical VTE Prophylaxis in Place: Yes    Patient Centered Rounds: I have performed bedside rounds with nursing staff today  Discussions with Specialists or Other Care Team Provider: my attending    Education and Discussions with Family / Patient: 11:45 am called daughter left message; she called back    Time Spent for Care: 25 minutes  More than 50% of total time spent on counseling and coordination of care as described above  Current Length of Stay: 3 day(s)    Current Patient Status: Inpatient   Certification Statement: The patient will continue to require additional inpatient hospital stay due to worsening procalcitonin    Discharge Plan: home in 24 hours if improving and seen by PT and OT    Code Status: Level 1 - Full Code    Subjective:   Patient complaining of very severe sore throat and still some cough  She has not had any fever or chills overnight  She has been getting cough lozenges and throat spray be very frequently  She denies any shortness of breath  Objective:     Vitals:   Temp (24hrs), Av 8 °F (37 1 °C), Min:98 1 °F (36 7 °C), Max:99 6 °F (37 6 °C)    HR:  [74-94] 88  Resp:  [18-19] 19  BP: (114-140)/(58-67) 135/67  SpO2:  [90 %-97 %] 92 %  Body mass index is 27 59 kg/m²  Input and Output Summary (last 24 hours):        Intake/Output Summary (Last 24 hours) at 18 0906  Last data filed at 04/15/18 1746   Gross per 24 hour   Intake              360 ml   Output                0 ml   Net              360 ml       Physical Exam:     Physical Exam   Constitutional: No distress  HENT:   Head: Normocephalic and atraumatic  No significant oral pharyngeal erythema noted and no exudates   Eyes: Conjunctivae are normal  Right eye exhibits no discharge  Left eye exhibits no discharge  No scleral icterus  Neck: Neck supple  Cardiovascular: Normal rate, regular rhythm and normal heart sounds  No murmur heard  Pulmonary/Chest: No respiratory distress  She has no wheezes  She has no rales  Dry cough noted fairly frequently  Not noted to be in any respiratory distress  Not on any supplemental oxygen  Abdominal: Soft  Bowel sounds are normal  She exhibits no distension  There is no tenderness  Musculoskeletal: She exhibits no edema or deformity  Neurological: She is alert  Awake alert and interactive sitting up in a chair at bedside, appears clinically nontoxic   Skin: Skin is warm and dry  No rash noted  She is not diaphoretic  No erythema  No pallor  Psychiatric: She has a normal mood and affect  Her behavior is normal  Thought content normal        Additional Data:     Labs:      Results from last 7 days  Lab Units 04/15/18  1420 04/14/18  0641   WBC Thousand/uL 10 47* 7 89   HEMOGLOBIN g/dL 9 9* 9 3*   HEMATOCRIT % 30 1* 28 2*   PLATELETS Thousands/uL 297 229  239   NEUTROS PCT %  --  73   LYMPHS PCT %  --  15   MONOS PCT %  --  11   EOS PCT %  --  1       Results from last 7 days  Lab Units 04/15/18  1420  04/13/18  0750   SODIUM mmol/L 133*  < > 131*   POTASSIUM mmol/L 3 6  < > 3 8   CHLORIDE mmol/L 99*  < > 96*   CO2 mmol/L 22  < > 22   BUN mg/dL 10  < > 15   CREATININE mg/dL 0 80  < > 1 02   CALCIUM mg/dL 9 3  < > 9 2   TOTAL PROTEIN g/dL  --   --  7 8   BILIRUBIN TOTAL mg/dL  --   --  0 80   ALK PHOS U/L  --   --  199*   ALT U/L  --   --  49   AST U/L  --   --  39   GLUCOSE RANDOM mg/dL 131  < > 157*   < > = values in this interval not displayed      Results from last 7 days  Lab Units 04/13/18  0750   INR  1 07       * I Have Reviewed All Lab Data Listed Above  * Additional Pertinent Lab Tests Reviewed: [facility] Admission Reviewed    Imaging:    Imaging Reports Reviewed Today Include:   Imaging Personally Reviewed by Myself Includes:      Recent Cultures (last 7 days):       Results from last 7 days  Lab Units 04/13/18  1247 04/13/18  0823 04/13/18  0749 04/13/18  0748   BLOOD CULTURE   --   --  No Growth at 48 hrs  No Growth at 48 hrs     URINE CULTURE   --  >100,000 cfu/ml Escherichia coli*  --   --    INFLUENZA A PCR  None Detected  --   --   --    INFLUENZA B PCR  None Detected  --   --   --    RSV PCR  None Detected  --   --   --    LEGIONELLA URINARY ANTIGEN   --  Negative  --   --        Last 24 Hours Medication List:     Current Facility-Administered Medications:  aspirin 325 mg Oral Daily Chin Patel MD    azithromycin 250 mg Oral Q24H Shell Petersen PA-C    benzonatate 100 mg Oral TID PRN Shell Petersen PA-C    calcium carbonate 500 mg Oral Daily Chin Patel MD    cefTRIAXone 1,000 mg Intravenous Q24H Chin Patel MD Last Rate: 1,000 mg (04/15/18 0912)   DULoxetine 60 mg Oral Daily Chin Patel MD    enoxaparin 40 mg Subcutaneous Daily Chin Patel MD    guaiFENesin 600 mg Oral Q12H [facility] Shell Petersen PA-C    ibuprofen 200 mg Oral Q6H PRN Chin Patel MD    levothyroxine 100 mcg Oral Early Morning Chin Patel MD    LORazepam 0 5 mg Oral Q8H PRN Chin Patel MD    losartan 50 mg Oral Daily Chin Patel MD    magnesium hydroxide 30 mL Oral Daily PRN Chin Patel MD    metoprolol tartrate 50 mg Oral BID Chin Patel MD    metroNIDAZOLE 500 mg Oral Q8H [facility] Ruth Hargrove PA-C    NIFEdipine ER 60 mg Oral Daily Chin Patel MD    pantoprazole 40 mg Oral BID Chin Patel MD    phenol 1 spray Mouth/Throat Q2H PRN Keon Calvo PA-C    polyvinyl alcohol 1 drop Ophthalmic TID Chin Patel MD    pravastatin 40 mg Oral Daily Chin Patel MD traMADol 50 mg Oral Q8H PRN Hemant Diehl MD         Today, Patient Was Seen By: Adryan Cottrell PA-C    ** Please Note: Dictation voice to text software may have been used in the creation of this document   **

## 2018-04-16 NOTE — SOCIAL WORK
CM received a call back from patient's daughter  Per daughter she would like patient to go to [facility] as a first choice and [facility] would be second choice  CM placed referrals through [facility]  CM will continue to follow until d/c

## 2018-04-16 NOTE — PLAN OF CARE
Problem: DISCHARGE PLANNING  Goal: Discharge to home or other facility with appropriate resources  INTERVENTIONS:  - Identify barriers to discharge w/patient and caregiver  - Arrange for needed discharge resources and transportation as appropriate  - Identify discharge learning needs (meds, wound care, etc )  - Arrange for interpretive services to assist at discharge as needed  - Refer to Case Management Department for coordinating discharge planning if the patient needs post-hospital services based on physician/advanced practitioner order or complex needs related to functional status, cognitive ability, or social support system   Outcome: Progressing  CM called patient daughter to talk about rehab facility preferences  CM left a message and will wait for a call back

## 2018-04-16 NOTE — PLAN OF CARE
Problem: DISCHARGE PLANNING  Goal: Discharge to home or other facility with appropriate resources  INTERVENTIONS:  - Identify barriers to discharge w/patient and caregiver  - Arrange for needed discharge resources and transportation as appropriate  - Identify discharge learning needs (meds, wound care, etc )  - Arrange for interpretive services to assist at discharge as needed  - Refer to Case Management Department for coordinating discharge planning if the patient needs post-hospital services based on physician/advanced practitioner order or complex needs related to functional status, cognitive ability, or social support system   Outcome: Progressing  CM received a call back from patient's daughter  Per daughter she would like patient to go to [place] as a first choice and [facility] would be second choice  CM placed referrals through [place]  CM will continue to follow until d/c

## 2018-04-16 NOTE — SOCIAL WORK
CM called patient daughter to talk about rehab facility preferences  CM left a message and will wait for a call back

## 2018-04-16 NOTE — ASSESSMENT & PLAN NOTE
· Fever, cough, RLL PNA on imaging  · CXR: New consolidation in the superior segment of the right lower lobe concerning for pneumonia  · No evidence of aspiration on speech evaluation  Will monitor off flagyl and continue azithro/ceftriaxone day 4  · Follow blood/sputum cultures- blood cultures currently negative at 48 hours and sputum unable to be expectorated  · Wean oxygen to off as able  · awaiting PT/OT evals, check ambulatory sat  · Patient complaining of severe sore throat  Exam appears unremarkable    Continue supportive care  · Worsening procalcitonin

## 2018-04-16 NOTE — PHYSICAL THERAPY NOTE
PHYSICAL THERAPY TREATMENT NOTE    Patient Name: Berkeley Saint  IEPLC'I Date: 4/16/2018 04/16/18 6518   Pain Assessment   Pain Assessment FLACC  (pt c/o throat and back pain  unable to provide numeric ratin)   Pain Rating: FLACC (Rest) - Face 0   Pain Rating: FLACC (Rest) - Legs 0   Pain Rating: FLACC (Rest) - Activity 0   Pain Rating: FLACC (Rest) - Cry 1   Pain Rating: FLACC (Rest) - Consolability 1   Score: FLACC (Rest) 2   Pain Rating: FLACC (Activity) - Face 0   Pain Rating: FLACC (Activity) - Legs 0   Pain Rating: FLACC (Activity) - Activity 0   Pain Rating: FLACC (Activity) - Cry 1   Pain Rating: FLACC (Activity) - Consolability 1   Score: FLACC (Activity) 2   Restrictions/Precautions   Other Precautions Contact/isolation; Impulsive;Cognitive; Chair Alarm; Bed Alarm; Fall Risk;Pain;Visual impairment   General   Chart Reviewed Yes   Family/Caregiver Present No   Cognition   Overall Cognitive Status Impaired   Arousal/Participation Cooperative   Attention Attends with cues to redirect   Orientation Level Oriented to person;Oriented to place; Disoriented to time;Disoriented to situation  (pt was identified w/ full name and birth date)   Following Commands Follows one step commands with increased time or repetition   Subjective   Subjective pt agreed to participate in PT intervention  Transfers   Sit to Stand 4  Minimal assistance   Additional items Assist x 1; Increased time required;Verbal cues  (for handp lacement, LE positioning)   Stand to Sit 5  Supervision   Additional items Impulsive;Verbal cues  (for body positioning, hand placement, controlled descent)   Ambulation/Elevation   Gait pattern Decreased foot clearance;Shuffling; Short stride   Gait Assistance 4  Minimal assist   Additional items Assist x 1;Verbal cues; Tactile cues  (for walker placement, breathing technique)   Assistive Device Rolling walker   Distance 35 feet  (additional not possible due to fatigue)   Balance   Static Sitting Good   Dynamic Sitting Fair -   Static Standing Fair -   Dynamic Standing Poor +   Ambulatory Poor +  (w/ roller walker)   Activity Tolerance   Activity Tolerance Patient limited by fatigue   Nurse Made Aware spoke to [address]    Assessment   Prognosis Fair   Problem List Decreased strength;Decreased range of motion;Decreased endurance; Impaired balance;Decreased mobility; Decreased coordination;Decreased cognition;Decreased safety awareness; Impaired vision;Pain   Assessment Therapist introduced roller walker use w/ ambulation to address mobility and physical impairments noted during evaluation  Pt was noted to have improvement in mobility status w/ use of RW w/ decreased level of assist needed to maintain safety and increased ambulation distance  Pt was also noted to have improvement in quality of gait  continued inpatient PT tx is indicated to reduce fall risk factors  Goals   Patient Goals pt did not state goals when asked   STG Expiration Date 04/26/18   Treatment Day 1   Plan   Treatment/Interventions Functional transfer training;LE strengthening/ROM; Therapeutic exercise; Endurance training;Cognitive reorientation;Patient/family training;Equipment eval/education; Bed mobility;Gait training   Progress Progressing toward goals   PT Frequency 5x/wk   Recommendation   Recommendation Post acute IP rehab   Equipment Recommended Other (Comment)  (roller walker)     Skilled inpatient PT recommended while in hospital to progress pt toward treatment goals      Bryan Sprain, PT

## 2018-04-17 VITALS
RESPIRATION RATE: 19 BRPM | OXYGEN SATURATION: 93 % | TEMPERATURE: 98.9 F | BODY MASS INDEX: 27.59 KG/M2 | HEART RATE: 97 BPM | DIASTOLIC BLOOD PRESSURE: 77 MMHG | WEIGHT: 146 LBS | SYSTOLIC BLOOD PRESSURE: 165 MMHG

## 2018-04-17 PROBLEM — A41.9 SEPSIS (HCC): Status: RESOLVED | Noted: 2018-04-13 | Resolved: 2018-04-17

## 2018-04-17 LAB
BASOPHILS # BLD AUTO: 0.01 THOUSANDS/ΜL (ref 0–0.1)
BASOPHILS NFR BLD AUTO: 0 % (ref 0–1)
EOSINOPHIL # BLD AUTO: 0.31 THOUSAND/ΜL (ref 0–0.61)
EOSINOPHIL NFR BLD AUTO: 4 % (ref 0–6)
ERYTHROCYTE [DISTWIDTH] IN BLOOD BY AUTOMATED COUNT: 13.5 % (ref 11.6–15.1)
HCT VFR BLD AUTO: 30.7 % (ref 34.8–46.1)
HGB BLD-MCNC: 10.7 G/DL (ref 11.5–15.4)
LYMPHOCYTES # BLD AUTO: 1.32 THOUSANDS/ΜL (ref 0.6–4.47)
LYMPHOCYTES NFR BLD AUTO: 16 % (ref 14–44)
MCH RBC QN AUTO: 30.8 PG (ref 26.8–34.3)
MCHC RBC AUTO-ENTMCNC: 34.9 G/DL (ref 31.4–37.4)
MCV RBC AUTO: 89 FL (ref 82–98)
MONOCYTES # BLD AUTO: 0.67 THOUSAND/ΜL (ref 0.17–1.22)
MONOCYTES NFR BLD AUTO: 8 % (ref 4–12)
NEUTROPHILS # BLD AUTO: 5.97 THOUSANDS/ΜL (ref 1.85–7.62)
NEUTS SEG NFR BLD AUTO: 72 % (ref 43–75)
PLATELET # BLD AUTO: 414 THOUSANDS/UL (ref 149–390)
PMV BLD AUTO: 9.6 FL (ref 8.9–12.7)
RBC # BLD AUTO: 3.47 MILLION/UL (ref 3.81–5.12)
WBC # BLD AUTO: 8.28 THOUSAND/UL (ref 4.31–10.16)

## 2018-04-17 PROCEDURE — 85025 COMPLETE CBC W/AUTO DIFF WBC: CPT | Performed by: PHYSICIAN ASSISTANT

## 2018-04-17 PROCEDURE — 99239 HOSP IP/OBS DSCHRG MGMT >30: CPT | Performed by: PHYSICIAN ASSISTANT

## 2018-04-17 RX ORDER — CEFDINIR 300 MG/1
300 CAPSULE ORAL EVERY 12 HOURS SCHEDULED
Refills: 0
Start: 2018-04-18 | End: 2018-04-20

## 2018-04-17 RX ORDER — HYDROCODONE POLISTIREX AND CHLORPHENIRAMINE POLISTIREX 10; 8 MG/5ML; MG/5ML
5 SUSPENSION, EXTENDED RELEASE ORAL 2 TIMES DAILY
Qty: 100 ML | Refills: 0 | Status: SHIPPED | OUTPATIENT
Start: 2018-04-17 | End: 2018-04-27

## 2018-04-17 RX ORDER — DULOXETIN HYDROCHLORIDE 60 MG/1
60 CAPSULE, DELAYED RELEASE ORAL DAILY
Refills: 0
Start: 2018-04-17

## 2018-04-17 RX ORDER — METRONIDAZOLE 500 MG/1
500 TABLET ORAL EVERY 8 HOURS SCHEDULED
Refills: 0
Start: 2018-04-17 | End: 2018-04-22

## 2018-04-17 RX ORDER — BENZONATATE 100 MG/1
100 CAPSULE ORAL 3 TIMES DAILY
Qty: 20 CAPSULE | Refills: 0 | Status: CANCELLED
Start: 2018-04-17

## 2018-04-17 RX ORDER — HYDROCODONE POLISTIREX AND CHLORPHENIRAMINE POLISTIREX 10; 8 MG/5ML; MG/5ML
5 SUSPENSION, EXTENDED RELEASE ORAL 2 TIMES DAILY
Qty: 100 ML | Refills: 0 | Status: CANCELLED | OUTPATIENT
Start: 2018-04-17 | End: 2018-04-27

## 2018-04-17 RX ORDER — BENZONATATE 100 MG/1
100 CAPSULE ORAL 3 TIMES DAILY
Qty: 20 CAPSULE | Refills: 0
Start: 2018-04-17 | End: 2018-06-22

## 2018-04-17 RX ORDER — HYDROCODONE POLISTIREX AND CHLORPHENIRAMINE POLISTIREX 10; 8 MG/5ML; MG/5ML
5 SUSPENSION, EXTENDED RELEASE ORAL 2 TIMES DAILY
Status: DISCONTINUED | OUTPATIENT
Start: 2018-04-17 | End: 2018-04-17 | Stop reason: HOSPADM

## 2018-04-17 RX ORDER — BENZONATATE 100 MG/1
100 CAPSULE ORAL 3 TIMES DAILY
Status: DISCONTINUED | OUTPATIENT
Start: 2018-04-17 | End: 2018-04-17 | Stop reason: HOSPADM

## 2018-04-17 RX ORDER — AZITHROMYCIN 250 MG/1
250 TABLET, FILM COATED ORAL EVERY 24 HOURS
Qty: 1 TABLET | Refills: 0 | Status: SHIPPED | OUTPATIENT
Start: 2018-04-18 | End: 2018-04-19

## 2018-04-17 RX ORDER — LORAZEPAM 0.5 MG/1
0.5 TABLET ORAL EVERY 8 HOURS PRN
Qty: 30 TABLET | Refills: 0 | Status: SHIPPED | OUTPATIENT
Start: 2018-04-17 | End: 2018-10-03 | Stop reason: SDUPTHER

## 2018-04-17 RX ORDER — TRAMADOL HYDROCHLORIDE 50 MG/1
50 TABLET ORAL EVERY 8 HOURS PRN
Qty: 50 TABLET | Refills: 0 | Status: SHIPPED | OUTPATIENT
Start: 2018-04-17 | End: 2018-04-27

## 2018-04-17 RX ADMIN — CEFTRIAXONE 1000 MG: 1 INJECTION, SOLUTION INTRAVENOUS at 09:57

## 2018-04-17 RX ADMIN — LEVOTHYROXINE SODIUM 100 MCG: 100 TABLET ORAL at 05:36

## 2018-04-17 RX ADMIN — GUAIFENESIN 600 MG: 600 TABLET, EXTENDED RELEASE ORAL at 09:13

## 2018-04-17 RX ADMIN — AZITHROMYCIN 250 MG: 250 TABLET, FILM COATED ORAL at 13:53

## 2018-04-17 RX ADMIN — Medication 1 SPRAY: at 05:36

## 2018-04-17 RX ADMIN — ASPIRIN 325 MG: 325 TABLET ORAL at 09:12

## 2018-04-17 RX ADMIN — METRONIDAZOLE 500 MG: 500 TABLET ORAL at 05:36

## 2018-04-17 RX ADMIN — PANTOPRAZOLE SODIUM 40 MG: 40 TABLET, DELAYED RELEASE ORAL at 09:13

## 2018-04-17 RX ADMIN — NIFEDIPINE 60 MG: 30 TABLET, FILM COATED, EXTENDED RELEASE ORAL at 09:13

## 2018-04-17 RX ADMIN — METOPROLOL TARTRATE 50 MG: 50 TABLET ORAL at 09:13

## 2018-04-17 RX ADMIN — PRAVASTATIN SODIUM 40 MG: 40 TABLET ORAL at 09:12

## 2018-04-17 RX ADMIN — BENZONATATE 100 MG: 100 CAPSULE ORAL at 09:57

## 2018-04-17 RX ADMIN — METRONIDAZOLE 500 MG: 500 TABLET ORAL at 13:53

## 2018-04-17 RX ADMIN — ENOXAPARIN SODIUM 40 MG: 40 INJECTION SUBCUTANEOUS at 09:13

## 2018-04-17 RX ADMIN — TRAMADOL HYDROCHLORIDE 50 MG: 50 TABLET, FILM COATED ORAL at 05:35

## 2018-04-17 RX ADMIN — HYDROCODONE POLISTIREX AND CHLORPHENIRAMINE POLISTIREX 5 ML: 10; 8 SUSPENSION, EXTENDED RELEASE ORAL at 09:57

## 2018-04-17 RX ADMIN — LOSARTAN POTASSIUM 50 MG: 50 TABLET, FILM COATED ORAL at 09:13

## 2018-04-17 RX ADMIN — Medication 500 MG: at 09:13

## 2018-04-17 RX ADMIN — DULOXETINE 60 MG: 60 CAPSULE, DELAYED RELEASE ORAL at 09:12

## 2018-04-17 RX ADMIN — POLYVINYL ALCOHOL 1 DROP: 14 SOLUTION/ DROPS OPHTHALMIC at 09:58

## 2018-04-17 NOTE — ASSESSMENT & PLAN NOTE
· Fever, cough, RLL PNA on imaging  Clinically improved though cough and sore throat have persisted  · CXR: New consolidation in the superior segment of the right lower lobe concerning for pneumonia  · No evidence of aspiration on speech evaluation   Continue regular diet  · Flagyl x 5 more days to complete 7 day course  · Cefdinir x 2 more days to complete 7 day course; zithromax x 1 more day for 5 day course  · blood cultures negative x 72 hours  · Weaned off oxygen  · Continue tessalon, add tussionex; continue phenol, duo-neb

## 2018-04-17 NOTE — ASSESSMENT & PLAN NOTE
· Patient met septic criteria on admission secondary to tachycardia and fever  · Blood cultures negative at 72 hours  Unable to expectorate sputum  Influenza negative  · Likely secondary to right lower lobe pneumonia  Started on flagyl and ceftriaxone/azithromycin given concern for aspiration    Flagyl was discontinued given no evidence of aspiration on speech evaluation; given rise in procalcitonin we resumed Flagyl dosing  · Procalcitonin on admission 0 08 and then increased to 0 17, then to 0 46, now down to 0 33  · Temps stable  · Patient denies any dysuria however does have positive urine culture (E  Coli) which I suspect represents asymptomatic bacteriuria---but either way would be appropriately treated with ceftriaxone anyway  · Patient complaining of sore throat, even if this were bacterial in nature like strep throat again this should have been covered with the current antibiotics she is receiving

## 2018-04-17 NOTE — SOCIAL WORK
Patient is medically stable for discharge to [facility] today  [facility]  CM spoke with patient's daughter Janina Be about DCP and is agreeable to a [facility] transport  Janina Be understands this is an out of pocket cost and is okay with it  [facility] transport was set up for 12:30 PM with [employer]  IMM was reviewed with patient's daughter over the phone  CM reviewed with patient's daughter that she will be followed by an outpatient care coordinator to assist with her care coordination after she leaves the hospital   CM reviewed the availability of treatment team to discuss questions or concerns patient and/or family may have regarding  understanding medications and recognizing signs and symptoms once discharged  CM also encouraged patient to follow up with all recommended appointments after discharge  Patient advised of importance for patient and family to participate in managing patients medical well being

## 2018-04-17 NOTE — PLAN OF CARE
Problem: DISCHARGE PLANNING - CARE MANAGEMENT  Goal: Discharge to post-acute care or home with appropriate resources  INTERVENTIONS:  - Conduct assessment to determine patient/family and health care team treatment goals, and need for post-acute services based on payer coverage, community resources, and patient preferences, and barriers to discharge  - Address psychosocial, clinical, and financial barriers to discharge as identified in assessment in conjunction with the patient/family and health care team  - Arrange appropriate level of post-acute services according to patients   needs and preference and payer coverage in collaboration with the physician and health care team  - Communicate with and update the patient/family, physician, and health care team regarding progress on the discharge plan  - Arrange appropriate transportation to post-acute venues  Outcome: Completed Date Met: 04/17/18  Patient is medically stable for discharge to [facility] today  Patient has been accepted by [facility] her first choice  CM spoke with patient's daughter Bebeto Porter about DCP and is agreeable to a [facility] transport  Bebeto Porter understands this is an out of pocket cost and is okay with it  [facility] transport was set up for 12:30 PM with [employer]  IMM was reviewed with patient's daughter over the phone  CM reviewed with patient's daughter that she will be followed by an outpatient care coordinator to assist with her care coordination after she leaves the hospital   CM reviewed the availability of treatment team to discuss questions or concerns patient and/or family may have regarding  understanding medications and recognizing signs and symptoms once discharged  CM also encouraged patient to follow up with all recommended appointments after discharge  Patient advised of importance for patient and family to participate in managing patients medical well being

## 2018-04-17 NOTE — SOCIAL WORK
CM received a call from Shadia Ledezma the liaison at [facility] who reported patient is unable to come to them until 3:00 PM due to room not being available  TONY contacted [facility] and pushed back transport time until 3PM   CM notified patient's daughter Eufemia Shelton and nursing or discharge time change

## 2018-04-17 NOTE — DISCHARGE SUMMARY
Discharge- Vickey Loja 4/27/1931, 80 y o  female MRN: [id number]    Unit/Bed#: -01 Encounter: [id number]    Primary Care Provider: Lianna Florian,    Date and time admitted to hospital: 4/13/2018  7:29 AM  * Pneumonia of right lower lobe due to infectious organism [facility])   Assessment & Plan    · Fever, cough, RLL PNA on imaging  Clinically improved though cough and sore throat have persisted  · CXR: New consolidation in the superior segment of the right lower lobe concerning for pneumonia  · No evidence of aspiration on speech evaluation  Continue regular diet  · Flagyl x 5 more days to complete 7 day course  · Cefdinir x 2 more days to complete 7 day course; zithromax x 1 more day for 5 day course  · blood cultures negative x 72 hours  · Weaned off oxygen  · Continue tessalon, add tussionex; continue phenol, duo-neb          Sepsis ([facility] [address] )   Assessment & Plan    · Patient met septic criteria on admission secondary to tachycardia and fever  · Blood cultures negative at 72 hours  Unable to expectorate sputum  Influenza negative  · Likely secondary to right lower lobe pneumonia  Started on flagyl and ceftriaxone/azithromycin given concern for aspiration    Flagyl was discontinued given no evidence of aspiration on speech evaluation; given rise in procalcitonin we resumed Flagyl dosing  · Procalcitonin on admission 0 08 and then increased to 0 17, then to 0 46, now down to 0 33  · Temps stable  · Patient denies any dysuria however does have positive urine culture (E  Coli) which I suspect represents asymptomatic bacteriuria---but either way would be appropriately treated with ceftriaxone anyway  · Patient complaining of sore throat, even if this were bacterial in nature like strep throat again this should have been covered with the current antibiotics she is receiving        Dementia without behavioral disturbance   Assessment & Plan    · At baseline mental status  · Continue supportive care Hyponatremia   Assessment & Plan    · Mild, can repeat as outpatient        Essential hypertension   Assessment & Plan    · Continue home medications (nifedipine, losartan, metoprolol)            Discharging Physician / Practitioner: Adryan Cottrell PA-C  PCP: Velasquez Jacobo DO  Admission Date:   Admission Orders     Ordered        04/13/18 0844  Inpatient Admission (expected length of stay for this patient is greater than two midnights)  Once             Discharge Date: 04/17/18    Disposition:      Short Term Rehab or SNF at [address]    Reason for Admission: cough and fever    Discharge Diagnoses:     Please see assessment and plan section above for further details regarding discharge diagnoses  Resolved Problems  Date Reviewed: 4/17/2018    None          Consultations During Hospital Stay:  · none    Procedures Performed:     · See below    Medication Adjustments and Discharge Medications:  · Summary of Medication Adjustments made as a result of this hospitalization:  Antibiotic regimen including Flagyl, cefdinir and Zithromax  · Medication Dosing Tapers - Please refer to Discharge Medication List for details on any medication dosing tapers (if applicable to patient)  · Medications being temporarily held (include recommended restart time): none  · Discharge Medication List: See after visit summary for reconciled discharge medications  Wound Care Recommendations:  When applicable, please see wound care section of After Visit Summary  Diet Recommendations at Discharge:  Diet -        Diet Orders            Start     Ordered    04/13/18 1520  Diet Regular; Regular House  Diet effective now     Comments:  Per SLP   Question Answer Comment   Diet Type Regular    Regular Regular House    RD to adjust diet per protocol?  Yes        04/13/18 1522    04/13/18 1010  Room Service  Once     Question:  Type of Service  Answer:  Room Service - Appropriate with Assistance    04/13/18 1009          Instructions for any Catheters / Lines Present at Discharge (including removal date, if applicable): none    Significant Findings / Test Results:     XR chest pa & lateral   Final Result by Neetu Clayton MD (04/13 2305)      New consolidation in the superior segment of the right lower lobe  concerning for pneumonia  Note: The study was marked in EPIC for significant notification  Workstation performed: [id number]             Incidental Findings:   · E  Coli in urine culture    Test Results Pending at Discharge (will require follow up):   · none     Outpatient Tests Requested:  · Routine bmp/cbc 1-2 weeks    Complications:  Rise in procalcitonin    Hospital Course:     Smitha Perkins is a 80 y o  female patient who originally presented to the hospital on 4/13/2018 due to cough and fever  She tested negative for influenza but was found on chest x-ray to have right lower lobe pneumonia  In the emergency room she was given cefepime but was then changed to Rocephin and Zithromax  Flagyl was given for concern of possible aspiration but was discontinued when she passed her speech and swallowing evaluation  Her temperature had improved but her procalcitonin level continued to rise so the Flagyl was reinstituted  She had further improvement in her procalcitonin overnight  In addition she had a positive urine culture although she was asymptomatic as far as true UTI  Nonetheless the Rocephin was adequate to cover this as well  She continued to complain of significant sore throat and did not have a throat culture obtained but again we would anticipate that if she had a bacterial throat infection it would of been covered with the antibiotics provided to her  Her blood cultures were negative  She had some persistent cough but was successfully weaned off of oxygen and not reporting any shortness of breath    She was seen by physical and occupational therapy and was recommended to have short-term rehab and is being discharged today to [facility]  Condition at Discharge: stable     Discharge Day Visit / Exam:     Subjective:  Spoke patient still with cough and sore throat but offers no other complaints or concerns from overnight  No shortness of breath  Vitals: Blood Pressure: 165/77 (04/17/18 0712)  Pulse: 97 (04/17/18 0712)  Temperature: 98 9 °F (37 2 °C) (04/17/18 0712)  Temp Source: Oral (04/17/18 9637)  Respirations: 19 (04/17/18 0712)  Weight - Scale: 66 2 kg (146 lb) (04/13/18 0733)  SpO2: 93 % (04/17/18 0712)  Exam:   Physical Exam   Constitutional: She appears well-developed and well-nourished  No distress  HENT:   Mouth/Throat: No oropharyngeal exudate  Oropharynx clear with no exudate and no significant erythema   Eyes: Right eye exhibits discharge  Left eye exhibits no discharge  Right eye glaucoma   Neck: Neck supple  Cardiovascular: Normal rate and regular rhythm  No murmur heard  Pulmonary/Chest: No respiratory distress  She has no wheezes  She has no rales  Dry cough noted  No dyspnea during conversation  No distress  No supplemental oxygen in place  Abdominal: Soft  She exhibits no distension  Musculoskeletal: She exhibits no edema  Neurological: She is alert  "Where am I today?"  Patient is very pleasant and cooperative awake alert and interactive  Only mildly confused  Able to easily be reoriented  Skin: Skin is warm and dry  No rash noted  She is not diaphoretic  No erythema  No pallor  Psychiatric: She has a normal mood and affect  Nursing note and vitals reviewed  Discussion with Family: left message for daughter    Goals of Care Discussions:  · Code Status at Discharge: Level 1 - Full Code  · Were there any Goals of Care Discussions during Hospitalization?: No  · Results of any General Goals of Care Discussions: none   · POLST Completed: No   · If POLST Completed, Summary of POLST Agreement Provided Here:    · OK to Rehospitalize if Needed?  Yes    Discharge instructions/Information to patient and family:   See after visit summary section titled Discharge Instructions for information provided to patient and family  Planned Readmission: none      Discharge Statement:  I spent 45 minutes discharging the patient  This time was spent on the day of discharge  I had direct contact with the patient on the day of discharge  Greater than 50% of the total time was spent examining patient, answering all patient questions, arranging and discussing plan of care with patient as well as directly providing post-discharge instructions  Additional time then spent on discharge activities      ** Please Note: This note has been constructed using a voice recognition system **

## 2018-04-18 LAB
BACTERIA BLD CULT: NORMAL
BACTERIA BLD CULT: NORMAL

## 2018-05-05 ENCOUNTER — HOSPITAL ENCOUNTER (INPATIENT)
Facility: HOSPITAL | Age: 83
LOS: 2 days | Discharge: HOME | DRG: 682 | End: 2018-05-07
Attending: INTERNAL MEDICINE | Admitting: INTERNAL MEDICINE
Payer: MEDICARE

## 2018-05-05 ENCOUNTER — APPOINTMENT (EMERGENCY)
Dept: CT IMAGING | Facility: HOSPITAL | Age: 83
DRG: 682 | End: 2018-05-05
Payer: MEDICARE

## 2018-05-05 ENCOUNTER — APPOINTMENT (EMERGENCY)
Dept: RADIOLOGY | Facility: HOSPITAL | Age: 83
DRG: 682 | End: 2018-05-05
Payer: MEDICARE

## 2018-05-05 DIAGNOSIS — E86.0 DEHYDRATION: ICD-10-CM

## 2018-05-05 DIAGNOSIS — R65.10 SIRS (SYSTEMIC INFLAMMATORY RESPONSE SYNDROME) (HCC): Primary | ICD-10-CM

## 2018-05-05 DIAGNOSIS — N39.0 URINARY TRACT INFECTION: ICD-10-CM

## 2018-05-05 PROBLEM — N17.9 AKI (ACUTE KIDNEY INJURY) (HCC): Status: ACTIVE | Noted: 2018-05-05

## 2018-05-05 PROBLEM — R10.31 RIGHT LOWER QUADRANT ABDOMINAL PAIN: Status: ACTIVE | Noted: 2018-05-05

## 2018-05-05 PROBLEM — N30.00 ACUTE CYSTITIS: Status: ACTIVE | Noted: 2018-05-05

## 2018-05-05 LAB
ALBUMIN SERPL BCP-MCNC: 3.2 G/DL (ref 3.5–5)
ALP SERPL-CCNC: 171 U/L (ref 46–116)
ALT SERPL W P-5'-P-CCNC: 28 U/L (ref 12–78)
ANION GAP SERPL CALCULATED.3IONS-SCNC: 10 MMOL/L (ref 4–13)
APTT PPP: 26 SECONDS (ref 23–35)
AST SERPL W P-5'-P-CCNC: 37 U/L (ref 5–45)
ATRIAL RATE: 75 BPM
BACTERIA UR QL AUTO: ABNORMAL /HPF
BASOPHILS # BLD AUTO: 0.01 THOUSANDS/ΜL (ref 0–0.1)
BASOPHILS NFR BLD AUTO: 0 % (ref 0–1)
BILIRUB SERPL-MCNC: 0.4 MG/DL (ref 0.2–1)
BILIRUB UR QL STRIP: NEGATIVE
BUN SERPL-MCNC: 22 MG/DL (ref 5–25)
CALCIUM SERPL-MCNC: 9.5 MG/DL (ref 8.3–10.1)
CHLORIDE SERPL-SCNC: 96 MMOL/L (ref 100–108)
CLARITY UR: ABNORMAL
CO2 SERPL-SCNC: 26 MMOL/L (ref 21–32)
COLOR UR: ABNORMAL
CREAT SERPL-MCNC: 1.5 MG/DL (ref 0.6–1.3)
EOSINOPHIL # BLD AUTO: 0.46 THOUSAND/ΜL (ref 0–0.61)
EOSINOPHIL NFR BLD AUTO: 6 % (ref 0–6)
ERYTHROCYTE [DISTWIDTH] IN BLOOD BY AUTOMATED COUNT: 14.9 % (ref 11.6–15.1)
GFR SERPL CREATININE-BSD FRML MDRD: 31 ML/MIN/1.73SQ M
GLUCOSE SERPL-MCNC: 108 MG/DL (ref 65–140)
GLUCOSE UR STRIP-MCNC: NEGATIVE MG/DL
HCT VFR BLD AUTO: 33.1 % (ref 34.8–46.1)
HGB BLD-MCNC: 11.1 G/DL (ref 11.5–15.4)
HGB UR QL STRIP.AUTO: ABNORMAL
INR PPP: 0.96 (ref 0.86–1.16)
KETONES UR STRIP-MCNC: NEGATIVE MG/DL
LACTATE SERPL-SCNC: 1.9 MMOL/L (ref 0.5–2)
LACTATE SERPL-SCNC: 2 MMOL/L (ref 0.5–2)
LEUKOCYTE ESTERASE UR QL STRIP: ABNORMAL
LIPASE SERPL-CCNC: 168 U/L (ref 73–393)
LYMPHOCYTES # BLD AUTO: 1.12 THOUSANDS/ΜL (ref 0.6–4.47)
LYMPHOCYTES NFR BLD AUTO: 15 % (ref 14–44)
MCH RBC QN AUTO: 31.1 PG (ref 26.8–34.3)
MCHC RBC AUTO-ENTMCNC: 33.5 G/DL (ref 31.4–37.4)
MCV RBC AUTO: 93 FL (ref 82–98)
MONOCYTES # BLD AUTO: 0.73 THOUSAND/ΜL (ref 0.17–1.22)
MONOCYTES NFR BLD AUTO: 10 % (ref 4–12)
NEUTROPHILS # BLD AUTO: 5.31 THOUSANDS/ΜL (ref 1.85–7.62)
NEUTS SEG NFR BLD AUTO: 69 % (ref 43–75)
NITRITE UR QL STRIP: POSITIVE
NON-SQ EPI CELLS URNS QL MICRO: ABNORMAL /HPF
P AXIS: 77 DEGREES
PH UR STRIP.AUTO: 5.5 [PH] (ref 4.5–8)
PLATELET # BLD AUTO: 272 THOUSANDS/UL (ref 149–390)
PMV BLD AUTO: 9.1 FL (ref 8.9–12.7)
POTASSIUM SERPL-SCNC: 4.4 MMOL/L (ref 3.5–5.3)
PR INTERVAL: 144 MS
PROT SERPL-MCNC: 8.1 G/DL (ref 6.4–8.2)
PROT UR STRIP-MCNC: ABNORMAL MG/DL
PROTHROMBIN TIME: 13.1 SECONDS (ref 12.1–14.4)
QRS AXIS: 63 DEGREES
QRSD INTERVAL: 118 MS
QT INTERVAL: 398 MS
QTC INTERVAL: 436 MS
RBC # BLD AUTO: 3.57 MILLION/UL (ref 3.81–5.12)
RBC #/AREA URNS AUTO: ABNORMAL /HPF
SODIUM SERPL-SCNC: 132 MMOL/L (ref 136–145)
SP GR UR STRIP.AUTO: 1.02 (ref 1–1.03)
T WAVE AXIS: 41 DEGREES
TSH SERPL DL<=0.05 MIU/L-ACNC: 1.09 UIU/ML (ref 0.36–3.74)
UROBILINOGEN UR QL STRIP.AUTO: 1 E.U./DL
VENTRICULAR RATE: 72 BPM
WBC # BLD AUTO: 7.63 THOUSAND/UL (ref 4.31–10.16)
WBC #/AREA URNS AUTO: ABNORMAL /HPF

## 2018-05-05 PROCEDURE — 93005 ELECTROCARDIOGRAM TRACING: CPT

## 2018-05-05 PROCEDURE — 84443 ASSAY THYROID STIM HORMONE: CPT | Performed by: PHYSICIAN ASSISTANT

## 2018-05-05 PROCEDURE — 87077 CULTURE AEROBIC IDENTIFY: CPT

## 2018-05-05 PROCEDURE — 83690 ASSAY OF LIPASE: CPT | Performed by: PHYSICIAN ASSISTANT

## 2018-05-05 PROCEDURE — 80053 COMPREHEN METABOLIC PANEL: CPT | Performed by: PHYSICIAN ASSISTANT

## 2018-05-05 PROCEDURE — 87086 URINE CULTURE/COLONY COUNT: CPT

## 2018-05-05 PROCEDURE — 83605 ASSAY OF LACTIC ACID: CPT | Performed by: PHYSICIAN ASSISTANT

## 2018-05-05 PROCEDURE — 96360 HYDRATION IV INFUSION INIT: CPT

## 2018-05-05 PROCEDURE — 99285 EMERGENCY DEPT VISIT HI MDM: CPT

## 2018-05-05 PROCEDURE — 99223 1ST HOSP IP/OBS HIGH 75: CPT | Performed by: INTERNAL MEDICINE

## 2018-05-05 PROCEDURE — 74176 CT ABD & PELVIS W/O CONTRAST: CPT

## 2018-05-05 PROCEDURE — 93010 ELECTROCARDIOGRAM REPORT: CPT | Performed by: INTERNAL MEDICINE

## 2018-05-05 PROCEDURE — 36415 COLL VENOUS BLD VENIPUNCTURE: CPT | Performed by: PHYSICIAN ASSISTANT

## 2018-05-05 PROCEDURE — 87186 SC STD MICRODIL/AGAR DIL: CPT

## 2018-05-05 PROCEDURE — 71046 X-RAY EXAM CHEST 2 VIEWS: CPT

## 2018-05-05 PROCEDURE — 85025 COMPLETE CBC W/AUTO DIFF WBC: CPT | Performed by: PHYSICIAN ASSISTANT

## 2018-05-05 PROCEDURE — 85610 PROTHROMBIN TIME: CPT | Performed by: PHYSICIAN ASSISTANT

## 2018-05-05 PROCEDURE — 85730 THROMBOPLASTIN TIME PARTIAL: CPT | Performed by: PHYSICIAN ASSISTANT

## 2018-05-05 PROCEDURE — 87040 BLOOD CULTURE FOR BACTERIA: CPT | Performed by: PHYSICIAN ASSISTANT

## 2018-05-05 PROCEDURE — 81001 URINALYSIS AUTO W/SCOPE: CPT

## 2018-05-05 RX ORDER — HEPARIN SODIUM 5000 [USP'U]/ML
5000 INJECTION, SOLUTION INTRAVENOUS; SUBCUTANEOUS EVERY 8 HOURS SCHEDULED
Status: DISCONTINUED | OUTPATIENT
Start: 2018-05-05 | End: 2018-05-07 | Stop reason: HOSPADM

## 2018-05-05 RX ORDER — MORPHINE SULFATE 4 MG/ML
4 INJECTION, SOLUTION INTRAMUSCULAR; INTRAVENOUS ONCE
Status: DISCONTINUED | OUTPATIENT
Start: 2018-05-05 | End: 2018-05-07 | Stop reason: HOSPADM

## 2018-05-05 RX ORDER — SENNOSIDES 8.6 MG
1 TABLET ORAL DAILY
Status: DISCONTINUED | OUTPATIENT
Start: 2018-05-06 | End: 2018-05-07 | Stop reason: HOSPADM

## 2018-05-05 RX ORDER — LEVOTHYROXINE SODIUM 0.1 MG/1
100 TABLET ORAL
Status: DISCONTINUED | OUTPATIENT
Start: 2018-05-06 | End: 2018-05-07 | Stop reason: HOSPADM

## 2018-05-05 RX ORDER — PANTOPRAZOLE SODIUM 40 MG/1
40 TABLET, DELAYED RELEASE ORAL 2 TIMES DAILY
Status: DISCONTINUED | OUTPATIENT
Start: 2018-05-05 | End: 2018-05-07 | Stop reason: HOSPADM

## 2018-05-05 RX ORDER — POLYETHYLENE GLYCOL 3350 17 G/17G
17 POWDER, FOR SOLUTION ORAL DAILY PRN
Status: DISCONTINUED | OUTPATIENT
Start: 2018-05-05 | End: 2018-05-07 | Stop reason: HOSPADM

## 2018-05-05 RX ORDER — DULOXETIN HYDROCHLORIDE 60 MG/1
60 CAPSULE, DELAYED RELEASE ORAL DAILY
Status: DISCONTINUED | OUTPATIENT
Start: 2018-05-06 | End: 2018-05-07 | Stop reason: HOSPADM

## 2018-05-05 RX ORDER — NIFEDIPINE 30 MG/1
60 TABLET, EXTENDED RELEASE ORAL DAILY
Status: DISCONTINUED | OUTPATIENT
Start: 2018-05-06 | End: 2018-05-07 | Stop reason: HOSPADM

## 2018-05-05 RX ORDER — DOCUSATE SODIUM 100 MG/1
100 CAPSULE, LIQUID FILLED ORAL 2 TIMES DAILY
Status: DISCONTINUED | OUTPATIENT
Start: 2018-05-05 | End: 2018-05-07 | Stop reason: HOSPADM

## 2018-05-05 RX ORDER — ONDANSETRON 2 MG/ML
4 INJECTION INTRAMUSCULAR; INTRAVENOUS ONCE
Status: DISCONTINUED | OUTPATIENT
Start: 2018-05-05 | End: 2018-05-07 | Stop reason: HOSPADM

## 2018-05-05 RX ORDER — METOPROLOL TARTRATE 50 MG/1
50 TABLET, FILM COATED ORAL 2 TIMES DAILY
Status: DISCONTINUED | OUTPATIENT
Start: 2018-05-05 | End: 2018-05-07 | Stop reason: HOSPADM

## 2018-05-05 RX ORDER — SODIUM CHLORIDE 9 MG/ML
75 INJECTION, SOLUTION INTRAVENOUS CONTINUOUS
Status: DISCONTINUED | OUTPATIENT
Start: 2018-05-05 | End: 2018-05-06

## 2018-05-05 RX ORDER — LORATADINE 10 MG/1
10 TABLET ORAL DAILY
Status: DISCONTINUED | OUTPATIENT
Start: 2018-05-06 | End: 2018-05-07 | Stop reason: HOSPADM

## 2018-05-05 RX ORDER — ASPIRIN 325 MG
325 TABLET ORAL DAILY
Status: DISCONTINUED | OUTPATIENT
Start: 2018-05-06 | End: 2018-05-07 | Stop reason: HOSPADM

## 2018-05-05 RX ORDER — LORAZEPAM 0.5 MG/1
0.5 TABLET ORAL EVERY 8 HOURS PRN
Status: DISCONTINUED | OUTPATIENT
Start: 2018-05-05 | End: 2018-05-07 | Stop reason: HOSPADM

## 2018-05-05 RX ORDER — PRAVASTATIN SODIUM 40 MG
40 TABLET ORAL
Status: DISCONTINUED | OUTPATIENT
Start: 2018-05-06 | End: 2018-05-07 | Stop reason: HOSPADM

## 2018-05-05 RX ADMIN — PANTOPRAZOLE SODIUM 40 MG: 40 TABLET, DELAYED RELEASE ORAL at 21:27

## 2018-05-05 RX ADMIN — CEFTRIAXONE 1000 MG: 1 INJECTION, SOLUTION INTRAVENOUS at 19:06

## 2018-05-05 RX ADMIN — CEFAZOLIN SODIUM 1000 MG: 1 INJECTION, POWDER, FOR SOLUTION INTRAMUSCULAR; INTRAVENOUS at 20:30

## 2018-05-05 RX ADMIN — SODIUM CHLORIDE 1000 ML: 0.9 INJECTION, SOLUTION INTRAVENOUS at 19:06

## 2018-05-05 RX ADMIN — DOCUSATE SODIUM 100 MG: 100 CAPSULE, LIQUID FILLED ORAL at 21:27

## 2018-05-05 RX ADMIN — SODIUM CHLORIDE 1000 ML: 0.9 INJECTION, SOLUTION INTRAVENOUS at 16:46

## 2018-05-05 RX ADMIN — SODIUM CHLORIDE 75 ML/HR: 0.9 INJECTION, SOLUTION INTRAVENOUS at 20:30

## 2018-05-05 RX ADMIN — HEPARIN SODIUM 5000 UNITS: 5000 INJECTION, SOLUTION INTRAVENOUS; SUBCUTANEOUS at 21:27

## 2018-05-05 RX ADMIN — METOPROLOL TARTRATE 50 MG: 50 TABLET ORAL at 21:27

## 2018-05-05 NOTE — SEPSIS NOTE
Sepsis Note   Thena Lundborg 80 y o  female MRN: [id number]  Unit/Bed#: ED 07 Encounter: [id number]            Initial Sepsis Screening     Row Name 05/05/18 5425                Is the patient's history suggestive of a new or worsening infection? (!)  Yes (Proceed)  -JG        Suspected source of infection acute abdominal infection  -JG        Are two or more of the following signs & symptoms of infection both present and new to the patient? No  -JG        Indicate SIRS criteria          If the answer is yes to both questions, suspicion of sepsis is present          If severe sepsis is present AND tissue hypoperfusion perists in the hour after fluid resuscitation or lactate > 4, the patient meets criteria for SEPTIC SHOCK          Are any of the following organ dysfunction criteria present within 6 hours of suspected infection and SIRS criteria that are NOT considered to be chronic conditions? No  -JG        Organ dysfunction          Date of presentation of severe sepsis          Time of presentation of severe sepsis          Tissue hypoperfusion persists in the hour after crystalloid fluid administration, evidenced, by either:          Was hypotension present within one hour of the conclusion of crystalloid fluid administration?           Date of presentation of septic shock          Time of presentation of septic shock            User Key  (r) = Recorded By, (t) = Taken By, (c) = Cosigned By    [address] Name Provider Type    [address] Duncan PA-C Physician Assistant

## 2018-05-05 NOTE — SEPSIS NOTE
Sepsis Note   Christina Chatman 80 y o  female MRN: [id number]  Unit/Bed#: ED 07 Encounter: [id number]            Initial Sepsis Screening     Row Name 05/05/18 Sonia William 05/05/18 9390             Is the patient's history suggestive of a new or worsening infection? (!)  Yes (Proceed)  -JG (!)  Yes (Proceed)  -JG       Suspected source of infection acute abdominal infection;urinary tract infection  -JG acute abdominal infection  -JG       Are two or more of the following signs & symptoms of infection both present and new to the patient? No  -JG No  -JG       Indicate SIRS criteria           If the answer is yes to both questions, suspicion of sepsis is present  [de-identified]         If severe sepsis is present AND tissue hypoperfusion perists in the hour after fluid resuscitation or lactate > 4, the patient meets criteria for SEPTIC SHOCK           Are any of the following organ dysfunction criteria present within 6 hours of suspected infection and SIRS criteria that are NOT considered to be chronic conditions? (!)  Yes  -JG No  -JG       Organ dysfunction Lactate > 2 0 mmol/L  -JG         Date of presentation of severe sepsis           Time of presentation of severe sepsis           Tissue hypoperfusion persists in the hour after crystalloid fluid administration, evidenced, by either:           Was hypotension present within one hour of the conclusion of crystalloid fluid administration?   Lan Hodgson       Date of presentation of septic shock           Time of presentation of septic shock             User Key  (r) = Recorded By, (t) = Taken By, (c) = Cosigned By    [address] Name Provider Type    [address] Duncan PA-C Physician Assistant

## 2018-05-05 NOTE — ED PROVIDER NOTES
History  Chief Complaint   Patient presents with    Abdominal Pain     Pt  arrives EMS from [facility] assisted living, complaints of RLQ abdominal pain/suprapubic pain for one week  Also complains of nausea, denies V/D  Patient presents to the emergency room with a 4-5 day history of abdominal tenderness  She complains of abdominal pain and cramping with a sharp component on the right lower quadrant  She was recently released from rehab where she went after she was diagnosed with pneumonia and was treated  She complains of chronic constipation  She does not recall when she had her last bowel movement and does have a history of dementia  She denies passing flatus or belching  She complains of nausea but no active vomiting  She has had no appetite for the past 3-4 days  She denies any urinary frequency, burning, hematuria  She denies any fever chills but states she does feel weak  States she has a past surgical history of a previous total abdominal hysterectomy  Past medical history is positive for being legally blind, depression, hypothyroidism, fibromyalgia, gait disturbance, GERD, glaucoma, hyperlipidemia, hypertension, osteopenia, dementia, rheumatoid arthritis, vertigo, vitamin-D deficiency  History provided by:  Patient  Abdominal Pain   Pain location:  RLQ  Pain quality: cramping and sharp    Pain radiates to:  Does not radiate  Pain severity:  Moderate  Onset quality:  Gradual  Duration:  4 days  Timing:  Constant  Progression:  Waxing and waning  Chronicity:  New  Context: awakening from sleep    Context: not alcohol use, not diet changes, not eating, not laxative use, not medication withdrawal, not previous surgeries, not recent illness, not recent sexual activity, not recent travel, not retching, not sick contacts, not suspicious food intake and not trauma    Relieved by:  None tried  Worsened by:   Movement and palpation  Ineffective treatments:  None tried  Associated symptoms: anorexia, constipation and fatigue    Associated symptoms: no belching, no chest pain, no chills, no cough, no diarrhea, no dysuria, no fever, no flatus, no hematemesis, no hematochezia, no hematuria, no melena, no nausea, no shortness of breath, no sore throat and no vomiting    Risk factors: being elderly    Risk factors: no alcohol abuse, no aspirin use, has not had multiple surgeries, no NSAID use, not obese, not pregnant and no recent hospitalization        Prior to Admission Medications   Prescriptions Last Dose Informant Patient Reported? Taking?    DULoxetine (CYMBALTA) 60 mg delayed release capsule   No Yes   Sig: Take 1 capsule (60 mg total) by mouth daily   Dentifrices ([address] [employer] [employer] [place] [facility])  Outside Facility (Specify) Yes Yes   Sig: Apply to teeth   LORazepam (ATIVAN) 0 5 mg tablet   No Yes   Sig: Take 1 tablet (0 5 mg total) by mouth every 8 (eight) hours as needed for anxiety for up to 10 days   Lutein 10 MG TABS  Outside Facility (Specify) Yes Yes   Sig: Take 1 tablet by mouth daily     NIFEdipine ER (ADALAT CC) 60 MG 24 hr tablet  Outside Facility (Specify) Yes Yes   Sig: Take 60 mg by mouth daily   aspirin 325 mg tablet  Outside Facility (Specify) Yes Yes   Sig: Take 325 mg by mouth daily     benzonatate (TESSALON PERLES) 100 mg capsule   No Yes   Sig: Take 1 capsule (100 mg total) by mouth 3 (three) times a day   calcium carbonate (OS-TEDDY) 600 MG tablet  Outside Facility (Specify) Yes Yes   Sig: Take 600 mg by mouth daily   carboxymethylcellulose (REFRESH LIQUIGEL) 1 % ophthalmic solution  Outside Facility (Specify) Yes Yes   Sig: Apply 1 drop to eye 3 (three) times a day     guaiFENesin (MUCINEX) 600 mg 12 hr tablet  Outside Facility (Specify) Yes Yes   Sig: Take 600 mg by mouth 2 (two) times a day as needed for congestion     ipratropium-albuterol (COMBIVENT)  mcg/act inhaler  Outside Facility (Specify) No Yes   Sig: Inhale 2 puffs every 4 (four) hours as needed for wheezing (bronchospasm)   levothyroxine 100 mcg tablet  Outside Facility (Specify) Yes Yes   loratadine (CLARITIN) 10 mg tablet  Outside Facility ([address]) Yes Yes   Sig: Take by mouth   losartan (COZAAR) 50 mg tablet  Outside Facility ([address] [place] [address]) Yes Yes   Sig: Take 50 mg by mouth daily   magnesium hydroxide (MILK OF MAGNESIA) 400 mg/5 mL oral suspension  Outside Facility (Specify) Yes Yes   Sig: Take 30 mL by mouth daily as needed for constipation     metoprolol tartrate (LOPRESSOR) 50 mg tablet  Outside Facility (Specify) Yes Yes   Si mg 2 (two) times a day     pantoprazole (PROTONIX) 40 mg tablet  Outside Facility (Specify) Yes Yes   Sig: Take 40 mg by mouth 2 (two) times a day     phenol (CHLORASEPTIC) 1 4 % mucosal liquid   No Yes   Sig: Apply 1 spray to the mouth or throat every 2 (two) hours as needed (sore throat)   pravastatin (PRAVACHOL) 40 mg tablet  Outside Facility (Specify) Yes Yes   Sig: Take 40 mg by mouth daily        Facility-Administered Medications: None       Past Medical History:   Diagnosis Date    Blind     blind right eye    Depression     Disease of thyroid gland     Fibromyalgia     Gait disturbance     GERD (gastroesophageal reflux disease)     Glaucoma     Hyperlipidemia     Hypertension     Osteopenia     Psychiatric disorder     depression    Rheumatoid arthritis ([facility] [address] )     Vertigo     Vitamin D deficiency        Past Surgical History:   Procedure Laterality Date    JOINT REPLACEMENT      left hip replacement, left shoulder replacement       History reviewed  No pertinent family history  I have reviewed and agree with the history as documented  Social History   Substance Use Topics    Smoking status: Never Smoker    Smokeless tobacco: Never Used    Alcohol use No        Review of Systems   Constitutional: Positive for activity change, appetite change and fatigue  Negative for chills, diaphoresis and fever     HENT: Negative for congestion, dental problem, ear discharge, ear pain, mouth sores, postnasal drip, rhinorrhea, sore throat and trouble swallowing  Eyes: Negative for pain, discharge, redness and itching  Respiratory: Negative for cough, chest tightness, shortness of breath and wheezing  Cardiovascular: Negative for chest pain and palpitations  Gastrointestinal: Positive for abdominal pain, anorexia and constipation  Negative for diarrhea, flatus, hematemesis, hematochezia, melena, nausea and vomiting  Endocrine: Negative for cold intolerance, heat intolerance, polydipsia, polyphagia and polyuria  Genitourinary: Negative for difficulty urinating, dysuria, flank pain, frequency, hematuria and urgency  Musculoskeletal: Negative for neck pain and neck stiffness  Skin: Negative for color change, pallor, rash and wound  Neurological: Positive for weakness  Hematological: Negative for adenopathy  Does not bruise/bleed easily  Psychiatric/Behavioral: Negative for confusion  All other systems reviewed and are negative  Physical Exam  ED Triage Vitals [05/05/18 1522]   Temperature Pulse Respirations Blood Pressure SpO2   98 4 °F (36 9 °C) 69 16 126/61 94 %      Temp Source Heart Rate Source Patient Position - Orthostatic VS BP Location FiO2 (%)   Oral Monitor Sitting Right arm --      Pain Score       2           Orthostatic Vital Signs  Vitals:    05/05/18 1522 05/05/18 1745 05/05/18 1830   BP: 126/61 118/58 120/56   Pulse: 69 74 74   Patient Position - Orthostatic VS: Sitting Lying Lying       Physical Exam   Constitutional: She appears well-developed and well-nourished  No distress  HENT:   Head: Normocephalic  Right Ear: External ear normal    Left Ear: External ear normal    Nose: Nose normal    Dry oral mucosa  Eyes: Conjunctivae are normal    Ptosis of the right eye with a hazy cornea secondary to a failed glaucoma surgery per patient  Neck: Neck supple  No JVD present  No tracheal deviation present  No thyromegaly present  Cardiovascular: Normal rate, regular rhythm, normal heart sounds and intact distal pulses  Exam reveals no gallop and no friction rub  No murmur heard  Pulmonary/Chest: Effort normal and breath sounds normal  No stridor  No respiratory distress  She has no wheezes  She has no rales  Abdominal: She exhibits distension  She exhibits no mass  There is tenderness  There is guarding  There is no rebound  Tenderness and guarding in the right lower quadrant  Musculoskeletal: Normal range of motion  She exhibits no edema or tenderness  Lymphadenopathy:     She has no cervical adenopathy  Neurological: She is alert  Oriented to person and place   Skin: Skin is warm  Capillary refill takes less than 2 seconds  She is not diaphoretic  Psychiatric: She has a normal mood and affect  Her behavior is normal  Judgment and thought content normal    Nursing note and vitals reviewed  ED Medications  Medications   morphine (PF) 4 mg/mL injection 4 mg (4 mg Intravenous Not Given 5/5/18 1647)   ondansetron (ZOFRAN) injection 4 mg (4 mg Intravenous Not Given 5/5/18 1647)   cefTRIAXone (ROCEPHIN) IVPB (premix) 1,000 mg (1,000 mg Intravenous New Bag 5/5/18 1906)   sodium chloride 0 9 % bolus 1,000 mL (1,000 mL Intravenous New Bag 5/5/18 1906)   sodium chloride 0 9 % bolus 1,000 mL (0 mL Intravenous Stopped 5/5/18 1810)       Diagnostic Studies  Results Reviewed     Procedure Component Value Units Date/Time    Lactic acid, plasma [[id number]] Collected:  05/05/18 1905    Lab Status: In process Specimen:  Blood from Hand, Left Updated:  05/05/18 1907    Urine Microscopic [[id number]]  (Abnormal) Collected:  05/05/18 1817    Lab Status:  Final result Specimen:  Urine from Urine, Other Updated:  05/05/18 1844     RBC, UA None Seen /hpf      WBC, UA 30-50 (A) /hpf      Epithelial Cells Occasional /hpf      Bacteria, UA Moderate (A) /hpf     Urine culture [[id number]] Collected:  05/05/18 1817    Lab Status:   In process Specimen:  Urine from Urine, Other Updated:  05/05/18 1844    ED Urine Macroscopic [[id number]]  (Abnormal) Collected:  05/05/18 1817    Lab Status:  Final result Specimen:  Urine Updated:  05/05/18 1815     Color, UA Sherrie     Clarity, UA Slightly Cloudy     pH, UA 5 5     Leukocytes, UA Trace (A)     Nitrite, UA Positive (A)     Protein, UA 30 (1+) (A) mg/dl      Glucose, UA Negative mg/dl      Ketones, UA Negative mg/dl      Urobilinogen, UA 1 0 E U /dl      Bilirubin, UA Negative     Blood, UA Trace (A)     Specific [place], UA 1 025    Narrative:       CLINITEK RESULT    TSH [[id number]]  (Normal) Collected:  05/05/18 1645    Lab Status:  Final result Specimen:  Blood from Arm, Right Updated:  05/05/18 1716     TSH 3RD GENERATON 1 094 uIU/mL     Narrative:         Patients undergoing fluorescein dye angiography may retain small amounts of fluorescein in the body for 48-72 hours post procedure  Samples containing fluorescein can produce falsely depressed TSH values  If the patient had this procedure,a specimen should be resubmitted post fluorescein clearance  The recommended reference ranges for TSH during pregnancy are as follows:  First trimester 0 1 to 2 5 uIU/mL  Second trimester  0 2 to 3 0 uIU/mL  Third trimester 0 3 to 3 0 uIU/m      Lactic acid, plasma [[id number]]  (Normal) Collected:  05/05/18 1625    Lab Status:  Final result Specimen:  Blood from Arm, Right Updated:  05/05/18 1655     LACTIC ACID 2 0 mmol/L     Narrative:         Result may be elevated if tourniquet was used during collection      Comprehensive metabolic panel [[id number]]  (Abnormal) Collected:  05/05/18 1625    Lab Status:  Final result Specimen:  Blood from Arm, Right Updated:  05/05/18 1652     Sodium 132 (L) mmol/L      Potassium 4 4 mmol/L      Chloride 96 (L) mmol/L      CO2 26 mmol/L      Anion Gap 10 mmol/L      BUN 22 mg/dL      Creatinine 1 50 (H) mg/dL      Glucose 108 mg/dL      Calcium 9 5 mg/dL      AST 37 U/L      ALT 28 U/L      Alkaline Phosphatase 171 (H) U/L      Total Protein 8 1 g/dL      Albumin 3 2 (L) g/dL      Total Bilirubin 0 40 mg/dL      eGFR 31 ml/min/1 73sq m     Narrative:         National Kidney Disease Education Program recommendations are as follows:  GFR calculation is accurate only with a steady state creatinine  Chronic Kidney disease less than 60 ml/min/1 73 sq  meters  Kidney failure less than 15 ml/min/1 73 sq  meters  Lipase [[id number]]  (Normal) Collected:  05/05/18 1625    Lab Status:  Final result Specimen:  Blood from Arm, Right Updated:  05/05/18 1652     Lipase 168 u/L     Protime-INR [[id number]]  (Normal) Collected:  05/05/18 1625    Lab Status:  Final result Specimen:  Blood from Arm, Right Updated:  05/05/18 1645     Protime 13 1 seconds      INR 0 96    APTT [[id number]]  (Normal) Collected:  05/05/18 1625    Lab Status:  Final result Specimen:  Blood from Arm, Right Updated:  05/05/18 1645     PTT 26 seconds     CBC and differential [[id number]]  (Abnormal) Collected:  05/05/18 1625    Lab Status:  Final result Specimen:  Blood from Arm, Right Updated:  05/05/18 1633     WBC 7 63 Thousand/uL      RBC 3 57 (L) Million/uL      Hemoglobin 11 1 (L) g/dL      Hematocrit 33 1 (L) %      MCV 93 fL      MCH 31 1 pg      MCHC 33 5 g/dL      RDW 14 9 %      MPV 9 1 fL      Platelets 477 Thousands/uL      Neutrophils Relative 69 %      Lymphocytes Relative 15 %      Monocytes Relative 10 %      Eosinophils Relative 6 %      Basophils Relative 0 %      Neutrophils Absolute 5 31 Thousands/µL      Lymphocytes Absolute 1 12 Thousands/µL      Monocytes Absolute 0 73 Thousand/µL      Eosinophils Absolute 0 46 Thousand/µL      Basophils Absolute 0 01 Thousands/µL     Blood culture #2 [[id number]] Collected:  05/05/18 1625    Lab Status: In process Specimen:  Blood from Arm, Right Updated:  05/05/18 1629    Blood culture #1 [[id number]] Collected:  05/05/18 1614    Lab Status:   In process Specimen:  Blood from Hand, Left Updated:  05/05/18 1617                 CT abdomen pelvis wo contrast   ED Interpretation by Kirsten Mendez PA-C (05/05 3959)      No acute findings  Somewhat limited evaluation of the pelvic structures secondary to beam hardening artifact from total left hip arthroplasty hardware  Workstation performed: [id number]         Final Result by Vinh Ford MD (05/05 1726)      No acute findings  Somewhat limited evaluation of the pelvic structures secondary to beam hardening artifact from total left hip arthroplasty hardware  Workstation performed: [id number]         XR chest 2 views   ED Interpretation by Kirsten Mendez PA-C (05/05 2516)   No acute disease                 Procedures  ECG 12 Lead Documentation  Date/Time: 5/5/2018 4:50 PM  Performed by: Gabo Bradley  Authorized by: Gabo Bradley     Indications / Diagnosis:  Abdominal pain  ECG reviewed by me, the ED Provider: yes    Patient location:  ED  Previous ECG:     Previous ECG:  Compared to current    Comparison ECG info:  72    Similarity:  No change    Comparison to cardiac monitor: Yes    Rate:     ECG rate:  72    ECG rate assessment: normal    Rhythm:     Rhythm: sinus rhythm    Ectopy:     Ectopy: none    QRS:     QRS axis:  Normal  Conduction:     Conduction: abnormal      Abnormal conduction: complete RBBB    ST segments:     ST segments:  Abnormal    Elevation:  V1, V2 and V3  T waves:     T waves: inverted      Inverted:  V1, V2 and V3           Phone Contacts  ED Phone Contact    ED Course                         Initial Sepsis Screening     Row Name 05/05/18 1838 05/05/18 4650             Is the patient's history suggestive of a new or worsening infection?  (!)  Yes (Proceed)  -JG (!)  Yes (Proceed)  -JG       Suspected source of infection acute abdominal infection;urinary tract infection  -JG acute abdominal infection  -JG       Are two or more of the following signs & symptoms of infection both present and new to the patient? No  -JG No  -JG       Indicate SIRS criteria           If the answer is yes to both questions, suspicion of sepsis is present  [de-identified]         If severe sepsis is present AND tissue hypoperfusion perists in the hour after fluid resuscitation or lactate > 4, the patient meets criteria for SEPTIC SHOCK           Are any of the following organ dysfunction criteria present within 6 hours of suspected infection and SIRS criteria that are NOT considered to be chronic conditions? (!)  Yes  -JG No  -JG       Organ dysfunction Lactate > 2 0 mmol/L  -JG         Date of presentation of severe sepsis           Time of presentation of severe sepsis           Tissue hypoperfusion persists in the hour after crystalloid fluid administration, evidenced, by either:           Was hypotension present within one hour of the conclusion of crystalloid fluid administration?   Tami Conde       Date of presentation of septic shock           Time of presentation of septic shock             User Key  (r) = Recorded By, (t) = Taken By, (c) = Cosigned By    [address] Name Provider Type    [address] PA-C Physician Assistant                  MDM  Number of Diagnoses or Management Options  Dehydration: new and requires workup  SIRS (systemic inflammatory response syndrome) ([facility] [address] ): new and requires workup  Urinary tract infection: new and requires workup     Amount and/or Complexity of Data Reviewed  Clinical lab tests: ordered and reviewed  Tests in the radiology section of CPT®: ordered and reviewed  Tests in the medicine section of CPT®: ordered and reviewed    Risk of Complications, Morbidity, and/or Mortality  Presenting problems: high  Diagnostic procedures: high  Management options: high    Patient Progress  Patient progress: stable    CritCare Time    Disposition  Final diagnoses:   SIRS (systemic inflammatory response syndrome) ([facility] [address] )   Urinary tract infection   Dehydration Time reflects when diagnosis was documented in both MDM as applicable and the Disposition within this note     Time User Action Codes Description Comment    5/5/2018  6:51 PM Evon Murders Add [R65 10] SIRS (systemic inflammatory response syndrome) ([facility] [address] )     5/5/2018  6:51 PM Evon Murders Add [N39 0] Urinary tract infection     5/5/2018  7:08 PM Evon Murders Add [E86 0] Dehydration       ED Disposition     ED Disposition Condition Comment    Admit  Case was discussed with Francis and the patient's admission status was agreed to be Admission Status: inpatient status to the service of Dr Juan Ace   Follow-up Information    None       Patient's Medications   Discharge Prescriptions    No medications on file     No discharge procedures on file      ED Provider  Electronically Signed by           Taun Bonner PA-C  05/05/18 1389

## 2018-05-05 NOTE — ASSESSMENT & PLAN NOTE
· Patient with lower abdominal pain R>L, denies urinary sx but she is poor historian   · IV cefazolin - last culture with e coli   · Follow up culture

## 2018-05-05 NOTE — H&P
H&P- Johnathan Simon 4/27/1931, 80 y o  female MRN: [id number]  Unit/Bed#: GARY Encounter: [id number] DOS: 5/5/18  Primary Care Provider: Barbie Gusman DO   Date and time admitted to hospital: 5/5/2018  3:21 PM    * Acute cystitis   Assessment & Plan    · Patient with lower abdominal pain R>L, denies urinary sx but she is poor historian   · IV cefazolin - last culture with e coli   · Follow up culture        Right lower quadrant abdominal pain   Assessment & Plan    · Unknown etiology, possibly 2/2 acute cystitis vs constipation ? · No etiology on CT a/p        Acute metabolic encephalopathy   Assessment & Plan    · Patient has underlying dementia, possible UTI and DEVONTE   · IVF hydration and IV abx         DEVONTE (acute kidney injury) ([facility] [address] )   Assessment & Plan    · IVF hydration - received 2L bolus in ER   · Repeat BMP in AM   · No hydro or obstruction on ct scan         Essential hypertension   Assessment & Plan    · Continue lopressor, nifedipine, hold cozaar given DEVONTE         Dementia without behavioral disturbance   Assessment & Plan    · Supportive care           VTE Prophylaxis: Heparin  / sequential compression device   Code Status: full code   POLST: POLST form is not discussed and not completed at this time  Discussion with family: called daughter Janina Be     Anticipated Length of Stay:  Patient will be admitted on an Inpatient basis with an anticipated length of stay of  Greater than 2 midnights  Justification for Hospital Stay: UTI     Total Time for Visit, including Counseling / Coordination of Care: 30 minutes  Greater than 50% of this total time spent on direct patient counseling and coordination of care      Chief Complaint:   Abdominal pain x 1 week    History of Present Illness:    Johnathan Simon is a 80 y o  female with past medical history significant for dementia, hypertension, hyperlipidemia, hypothyroidism, history of stroke who presents with right lower quadrant abdominal pain for the last week  Patient presents from the [facility] assisted living facility where she resides with her   She was recently admitted for sepsis secondary to pneumonia and was subsequently discharged to [facility] rehab facility  Patient states that she felt better following rehabilitation  She now admits to weakness, abdominal pain  She is a poor historian  She goes between topics during exam and forgets what she was going to say mid sentence  Patient is only oriented to herself  She denies any nausea or vomiting  She does not know when her last bowel movement was  She states the pain feels like gas pains and is sharp and intermittent  She denies dysuria, urgency, frequency  Last admission she was found to have E coli bacteriuria but was not treated given that she was asymptomatic  She denies fevers or chills  Review of Systems:    Review of Systems   Constitutional: Positive for fatigue  Negative for chills and fever  HENT: Negative for congestion  Respiratory: Negative for shortness of breath and wheezing  Cardiovascular: Negative for chest pain and leg swelling  Gastrointestinal: Positive for abdominal pain and constipation  Negative for diarrhea, nausea and vomiting  Genitourinary: Negative for dysuria  Musculoskeletal: Negative for back pain  Neurological: Positive for weakness  Psychiatric/Behavioral: Positive for confusion       Past Medical and Surgical History:     Past Medical History:   Diagnosis Date    Blind     blind right eye    Depression     Disease of thyroid gland     Fibromyalgia     Gait disturbance     GERD (gastroesophageal reflux disease)     Glaucoma     Hyperlipidemia     Hypertension     Osteopenia     Psychiatric disorder     depression    Rheumatoid arthritis (HCC)     Vertigo     Vitamin D deficiency        Past Surgical History:   Procedure Laterality Date    JOINT REPLACEMENT      left hip replacement, left shoulder replacement Meds/Allergies:    Prior to Admission medications    Medication Sig Start Date End Date Taking?  Authorizing Provider   aspirin 325 mg tablet Take 325 mg by mouth daily     Yes Historical Provider, MD   benzonatate (TESSALON PERLES) 100 mg capsule Take 1 capsule (100 mg total) by mouth 3 (three) times a day 4/17/18  Yes Ruth Hargrove PA-C   calcium carbonate (OS-TEDDY) 600 MG tablet Take 600 mg by mouth daily   Yes Historical Provider, MD   carboxymethylcellulose (REFRESH LIQUIGEL) 1 % ophthalmic solution Apply 1 drop to eye 3 (three) times a day   8/24/17  Yes Historical Provider, MD   Dentifrices ([facility]) Apply to teeth   Yes Historical Provider, MD   DULoxetine (CYMBALTA) 60 mg delayed release capsule Take 1 capsule (60 mg total) by mouth daily 4/17/18  Yes Ruth Hargrove PA-C   guaiFENesin (MUCINEX) 600 mg 12 hr tablet Take 600 mg by mouth 2 (two) times a day as needed for congestion     Yes Historical Provider, MD   ipratropium-albuterol (COMBIVENT)  mcg/act inhaler Inhale 2 puffs every 4 (four) hours as needed for wheezing (bronchospasm) 2/1/18  Yes Cinderella Fass, DO   levothyroxine 100 mcg tablet  12/22/17  Yes Historical Provider, MD   loratadine (CLARITIN) 10 mg tablet Take by mouth   Yes Historical Provider, MD   LORazepam (ATIVAN) 0 5 mg tablet Take 1 tablet (0 5 mg total) by mouth every 8 (eight) hours as needed for anxiety for up to 10 days 4/17/18 5/5/18 Yes Ruth Hargrove PA-C   losartan (COZAAR) 50 mg tablet Take 50 mg by mouth daily   Yes Historical Provider, MD   Lutein 10 MG TABS Take 1 tablet by mouth daily     Yes Historical Provider, MD   magnesium hydroxide (MILK OF MAGNESIA) 400 mg/5 mL oral suspension Take 30 mL by mouth daily as needed for constipation     Yes Historical Provider, MD   metoprolol tartrate (LOPRESSOR) 50 mg tablet 50 mg 2 (two) times a day   12/22/17  Yes Historical Provider, MD   NIFEdipine ER (ADALAT CC) 60 MG 24 hr tablet Take 60 mg by mouth daily   Yes Historical Provider, MD   pantoprazole (PROTONIX) 40 mg tablet Take 40 mg by mouth 2 (two) times a day     Yes Historical Provider, MD   phenol (CHLORASEPTIC) 1 4 % mucosal liquid Apply 1 spray to the mouth or throat every 2 (two) hours as needed (sore throat) 4/17/18  Yes Ruth Hargrove PA-C   pravastatin (PRAVACHOL) 40 mg tablet Take 40 mg by mouth daily     Yes Historical Provider, MD     I have reveiwed home medications using records provided by [facility]  Allergies: Allergies   Allergen Reactions    Acetaminophen     Golimumab      Other reaction(s): dedrick colored stool    Lidocaine Other (See Comments)    Neurontin [Gabapentin]     Nortriptyline Tremor    Prasterone      Other reaction(s): pruitis    Leflunomide Rash    Sulfasalazine Rash     Social History:     Marital Status: /Civil Union   Occupation: unknown  Patient Pre-hospital Living Situation: [facility]   Patient Pre-hospital Level of Mobility: walker   Patient Pre-hospital Diet Restrictions: none   Substance Use History:   History   Alcohol Use No     History   Smoking Status    Never Smoker   Smokeless Tobacco    Never Used     History   Drug Use No       Family History:    History reviewed  No pertinent family history  Physical Exam:     Vitals:   Blood Pressure: 123/58 (05/05/18 1915)  Pulse: 78 (05/05/18 1915)  Temperature: 98 4 °F (36 9 °C) (05/05/18 1522)  Temp Source: Oral (05/05/18 1522)  Respirations: 16 (05/05/18 1915)  Weight - Scale: 63 7 kg (140 lb 6 9 oz) (05/05/18 1521)  SpO2: 95 % (05/05/18 1915)    Physical Exam   Constitutional: She appears well-developed and well-nourished  HENT:   Head: Normocephalic and atraumatic  R eye closed, glaucoma, blind   Eyes: EOM are normal  No scleral icterus  R eye closed, glaucoma, blind   Neck: Normal range of motion  Neck supple  Pulmonary/Chest: Effort normal and breath sounds normal    Abdominal: Soft   Bowel sounds are normal    Musculoskeletal: Normal range of motion  She exhibits no edema  Neurological: She is alert  Oriented to self    Skin: Skin is warm and dry  Additional Data:     Lab Results: I have personally reviewed pertinent reports  Results from last 7 days  Lab Units 05/05/18  1625   WBC Thousand/uL 7 63   HEMOGLOBIN g/dL 11 1*   HEMATOCRIT % 33 1*   PLATELETS Thousands/uL 272   NEUTROS PCT % 69   LYMPHS PCT % 15   MONOS PCT % 10   EOS PCT % 6       Results from last 7 days  Lab Units 05/05/18  1625   SODIUM mmol/L 132*   POTASSIUM mmol/L 4 4   CHLORIDE mmol/L 96*   CO2 mmol/L 26   BUN mg/dL 22   CREATININE mg/dL 1 50*   CALCIUM mg/dL 9 5   TOTAL PROTEIN g/dL 8 1   BILIRUBIN TOTAL mg/dL 0 40   ALK PHOS U/L 171*   ALT U/L 28   AST U/L 37   GLUCOSE RANDOM mg/dL 108       Results from last 7 days  Lab Units 05/05/18  1625   INR  0 96               Imaging: I have personally reviewed pertinent reports  CT abdomen pelvis wo contrast   ED Interpretation by Thao Hernandez PA-C (05/05 1833)      No acute findings  Somewhat limited evaluation of the pelvic structures secondary to beam hardening artifact from total left hip arthroplasty hardware  Workstation performed: [id number]         Final Result by Ashu Cruz MD (05/05 1726)      No acute findings  Somewhat limited evaluation of the pelvic structures secondary to beam hardening artifact from total left hip arthroplasty hardware  Workstation performed: [id number]         XR chest 2 views   ED Interpretation by Thao Hernandez PA-C (05/05 4514)   No acute disease          EKG, Pathology, and Other Studies Reviewed on Admission:   · EKG:     Allscripts / Epic Records Reviewed: Yes     ** Please Note: This note has been constructed using a voice recognition system   **

## 2018-05-05 NOTE — ASSESSMENT & PLAN NOTE
· IVF hydration - received 2L bolus in ER   · Repeat BMP in AM   · No hydro or obstruction on ct scan

## 2018-05-06 LAB
ANION GAP SERPL CALCULATED.3IONS-SCNC: 11 MMOL/L (ref 4–13)
BASOPHILS # BLD AUTO: 0.01 THOUSANDS/ΜL (ref 0–0.1)
BASOPHILS NFR BLD AUTO: 0 % (ref 0–1)
BUN SERPL-MCNC: 17 MG/DL (ref 5–25)
CALCIUM SERPL-MCNC: 8.5 MG/DL (ref 8.3–10.1)
CHLORIDE SERPL-SCNC: 100 MMOL/L (ref 100–108)
CO2 SERPL-SCNC: 22 MMOL/L (ref 21–32)
CREAT SERPL-MCNC: 1.04 MG/DL (ref 0.6–1.3)
EOSINOPHIL # BLD AUTO: 0.08 THOUSAND/ΜL (ref 0–0.61)
EOSINOPHIL NFR BLD AUTO: 1 % (ref 0–6)
ERYTHROCYTE [DISTWIDTH] IN BLOOD BY AUTOMATED COUNT: 14.7 % (ref 11.6–15.1)
GFR SERPL CREATININE-BSD FRML MDRD: 48 ML/MIN/1.73SQ M
GLUCOSE SERPL-MCNC: 124 MG/DL (ref 65–140)
HCT VFR BLD AUTO: 29.3 % (ref 34.8–46.1)
HGB BLD-MCNC: 9.6 G/DL (ref 11.5–15.4)
LYMPHOCYTES # BLD AUTO: 1.06 THOUSANDS/ΜL (ref 0.6–4.47)
LYMPHOCYTES NFR BLD AUTO: 14 % (ref 14–44)
MCH RBC QN AUTO: 30.5 PG (ref 26.8–34.3)
MCHC RBC AUTO-ENTMCNC: 32.8 G/DL (ref 31.4–37.4)
MCV RBC AUTO: 93 FL (ref 82–98)
MONOCYTES # BLD AUTO: 0.56 THOUSAND/ΜL (ref 0.17–1.22)
MONOCYTES NFR BLD AUTO: 7 % (ref 4–12)
NEUTROPHILS # BLD AUTO: 5.86 THOUSANDS/ΜL (ref 1.85–7.62)
NEUTS SEG NFR BLD AUTO: 78 % (ref 43–75)
PLATELET # BLD AUTO: 244 THOUSANDS/UL (ref 149–390)
PMV BLD AUTO: 9.4 FL (ref 8.9–12.7)
POTASSIUM SERPL-SCNC: 3.9 MMOL/L (ref 3.5–5.3)
RBC # BLD AUTO: 3.15 MILLION/UL (ref 3.81–5.12)
SODIUM SERPL-SCNC: 133 MMOL/L (ref 136–145)
WBC # BLD AUTO: 7.57 THOUSAND/UL (ref 4.31–10.16)

## 2018-05-06 PROCEDURE — 85025 COMPLETE CBC W/AUTO DIFF WBC: CPT | Performed by: PHYSICIAN ASSISTANT

## 2018-05-06 PROCEDURE — 80048 BASIC METABOLIC PNL TOTAL CA: CPT | Performed by: PHYSICIAN ASSISTANT

## 2018-05-06 PROCEDURE — 99233 SBSQ HOSP IP/OBS HIGH 50: CPT | Performed by: PHYSICIAN ASSISTANT

## 2018-05-06 RX ORDER — LOSARTAN POTASSIUM 50 MG/1
50 TABLET ORAL DAILY
Status: DISCONTINUED | OUTPATIENT
Start: 2018-05-07 | End: 2018-05-07 | Stop reason: HOSPADM

## 2018-05-06 RX ADMIN — LORATADINE 10 MG: 10 TABLET ORAL at 09:45

## 2018-05-06 RX ADMIN — LEVOTHYROXINE SODIUM 100 MCG: 100 TABLET ORAL at 07:26

## 2018-05-06 RX ADMIN — CEFAZOLIN SODIUM 1000 MG: 1 INJECTION, POWDER, FOR SOLUTION INTRAMUSCULAR; INTRAVENOUS at 09:30

## 2018-05-06 RX ADMIN — PRAVASTATIN SODIUM 40 MG: 40 TABLET ORAL at 17:57

## 2018-05-06 RX ADMIN — HEPARIN SODIUM 5000 UNITS: 5000 INJECTION, SOLUTION INTRAVENOUS; SUBCUTANEOUS at 07:26

## 2018-05-06 RX ADMIN — SENNOSIDES 8.6 MG: 8.6 TABLET, FILM COATED ORAL at 09:45

## 2018-05-06 RX ADMIN — HEPARIN SODIUM 5000 UNITS: 5000 INJECTION, SOLUTION INTRAVENOUS; SUBCUTANEOUS at 22:49

## 2018-05-06 RX ADMIN — CEFAZOLIN SODIUM 1000 MG: 1 INJECTION, POWDER, FOR SOLUTION INTRAMUSCULAR; INTRAVENOUS at 22:49

## 2018-05-06 RX ADMIN — METOPROLOL TARTRATE 50 MG: 50 TABLET ORAL at 09:45

## 2018-05-06 RX ADMIN — PANTOPRAZOLE SODIUM 40 MG: 40 TABLET, DELAYED RELEASE ORAL at 17:57

## 2018-05-06 RX ADMIN — DOCUSATE SODIUM 100 MG: 100 CAPSULE, LIQUID FILLED ORAL at 17:57

## 2018-05-06 RX ADMIN — ASPIRIN 325 MG: 325 TABLET ORAL at 09:44

## 2018-05-06 RX ADMIN — METOPROLOL TARTRATE 50 MG: 50 TABLET ORAL at 17:57

## 2018-05-06 RX ADMIN — DOCUSATE SODIUM 100 MG: 100 CAPSULE, LIQUID FILLED ORAL at 09:44

## 2018-05-06 RX ADMIN — HEPARIN SODIUM 5000 UNITS: 5000 INJECTION, SOLUTION INTRAVENOUS; SUBCUTANEOUS at 14:27

## 2018-05-06 RX ADMIN — PANTOPRAZOLE SODIUM 40 MG: 40 TABLET, DELAYED RELEASE ORAL at 09:45

## 2018-05-06 RX ADMIN — DULOXETINE 60 MG: 60 CAPSULE, DELAYED RELEASE ORAL at 09:45

## 2018-05-06 NOTE — PLAN OF CARE
Problem: Potential for Falls  Goal: Patient will remain free of falls  INTERVENTIONS:  - Assess patient frequently for physical needs  -  Identify cognitive and physical deficits and behaviors that affect risk of falls    -  [place] fall precautions as indicated by assessment   - Educate patient/family on patient safety including physical limitations  - Instruct patient to call for assistance with activity based on assessment  - Modify environment to reduce risk of injury  - Consider OT/PT consult to assist with strengthening/mobility   Outcome: Progressing

## 2018-05-06 NOTE — PLAN OF CARE
Problem: Potential for Falls  Goal: Patient will remain free of falls  INTERVENTIONS:  - Assess patient frequently for physical needs  -  Identify cognitive and physical deficits and behaviors that affect risk of falls    -  [place] fall precautions as indicated by assessment   - Educate patient/family on patient safety including physical limitations  - Instruct patient to call for assistance with activity based on assessment  - Modify environment to reduce risk of injury  - Consider OT/PT consult to assist with strengthening/mobility   Outcome: Progressing      Problem: Prexisting or High Potential for Compromised Skin Integrity  Goal: Skin integrity is maintained or improved  INTERVENTIONS:  - Identify patients at risk for skin breakdown  - Assess and monitor skin integrity  - Assess and monitor nutrition and hydration status  - Monitor labs (i e  albumin)  - Assess for incontinence   - Turn and reposition patient  - Assist with mobility/ambulation  - Relieve pressure over bony prominences  - Avoid friction and shearing  - Provide appropriate hygiene as needed including keeping skin clean and dry  - Evaluate need for skin moisturizer/barrier cream  - Collaborate with interdisciplinary team (i e  Nutrition, Rehabilitation, etc )   - Patient/family teaching   Outcome: Progressing      Problem: PAIN - ADULT  Goal: Verbalizes/displays adequate comfort level or baseline comfort level  Interventions:  - Encourage patient to monitor pain and request assistance  - Assess pain using appropriate pain scale  - Administer analgesics based on type and severity of pain and evaluate response  - Implement non-pharmacological measures as appropriate and evaluate response  - Consider cultural and social influences on pain and pain management  - Notify physician/advanced practitioner if interventions unsuccessful or patient reports new pain  Outcome: Progressing      Problem: INFECTION - ADULT  Goal: Absence or prevention of progression during hospitalization  INTERVENTIONS:  - Assess and monitor for signs and symptoms of infection  - Monitor lab/diagnostic results  - Monitor all insertion sites, i e  indwelling lines, tubes, and drains  - Monitor endotracheal (as able) and nasal secretions for changes in amount and color  - Laura appropriate cooling/warming therapies per order  - Administer medications as ordered  - Instruct and encourage patient and family to use good hand hygiene technique  - Identify and instruct in appropriate isolation precautions for identified infection/condition  Outcome: Progressing    Goal: Absence of fever/infection during neutropenic period  INTERVENTIONS:  - Monitor WBC  - Implement neutropenic guidelines  Outcome: Progressing

## 2018-05-06 NOTE — ASSESSMENT & PLAN NOTE
· Patient with lower abdominal pain R>L, denies urinary sx but she is poor historian   · Continue IV cefazolin - last culture with e coli  · Follow up C&S and possible transition to PO abx tomorrow

## 2018-05-06 NOTE — ASSESSMENT & PLAN NOTE
· IVF hydration - received 2L bolus in ER   · creatinine 1 today from 1 5 on admission s/p IVF  · No hydro or obstruction on ct scan

## 2018-05-06 NOTE — PROGRESS NOTES
Progress Note - Sanchez Espinoza 4/27/1931, 80 y o  female MRN: [id number]  Unit/Bed#: -01 Encounter: [id number] DOS: 5/6/18  Primary Care Provider: Nicole Locke DO   Date and time admitted to hospital: 5/5/2018  3:21 PM    * Acute cystitis   Assessment & Plan    · Patient with lower abdominal pain R>L, denies urinary sx but she is poor historian   · Continue IV cefazolin - last culture with e coli  · Follow up C&S and possible transition to PO abx tomorrow         Right lower quadrant abdominal pain   Assessment & Plan    · Unknown etiology, possibly 2/2 acute cystitis vs constipation ? · No etiology on CT a/p  · Bowel regimen  · Improving         Acute metabolic encephalopathy   Assessment & Plan    · Patient has underlying dementia, possible UTI and dehydration   · s/p IVF hydration and IV abx   · Improved today, suspect her MS is at baseline        DEVONTE (acute kidney injury) ([facility] [address] )   Assessment & Plan    · IVF hydration - received 2L bolus in ER   · creatinine 1 today from 1 5 on admission s/p IVF  · No hydro or obstruction on ct scan         Essential hypertension   Assessment & Plan    · Continue lopressor, nifedipine  · Resume cozaar tomorrow         Dementia without behavioral disturbance   Assessment & Plan    · Supportive care         Hyponatremia   Assessment & Plan    · 133 this am, appears chronically low         Hypothyroidism   Assessment & Plan    · Continue synthroid         Anemia   Assessment & Plan    · hgb 9 6 (11 1)   · Suspect dilutional component   · Trend           VTE Pharmacologic Prophylaxis:   Pharmacologic: Heparin  Mechanical VTE Prophylaxis in Place: Yes    Patient Centered Rounds: I have performed bedside rounds with nursing staff today  Discussions with Specialists or Other Care Team Provider: nursing     Education and Discussions with Family / Patient: patient, called daughter     Time Spent for Care: 30 minutes    More than 50% of total time spent on counseling and coordination of care as described above  Current Length of Stay: 1 day(s)    Current Patient Status: Inpatient   Certification Statement: The patient will continue to require additional inpatient hospital stay due to IV antibiotics, pending cultures, safe d/c planning, pt/ot consults    Discharge Plan / Estimated Discharge Date: pending, possible d/c tomorrow     Code Status: Level 1 - Full Code    Subjective:   Pt seen and examined at bedside  No new complaints  Oriented to self only  States abdominal pain improving and she is eating lunch  Objective:     Vitals:   Temp (24hrs), Av 8 °F (37 1 °C), Min:98 4 °F (36 9 °C), Max:99 4 °F (37 4 °C)    HR:  [64-87] 87  Resp:  [16-18] 18  BP: (110-126)/(54-61) 112/56  SpO2:  [90 %-97 %] 90 %  Body mass index is 26 24 kg/m²  Input and Output Summary (last 24 hours): Intake/Output Summary (Last 24 hours) at 18 1410  Last data filed at 18 0900   Gross per 24 hour   Intake          1908 75 ml   Output                0 ml   Net          1908 75 ml       Physical Exam:     Physical Exam   Constitutional: She appears well-developed and well-nourished  HENT:   Head: Normocephalic and atraumatic  Mouth/Throat: Oropharynx is clear and moist    Eyes:   Blind R eye    Neck: Normal range of motion  Neck supple  Cardiovascular: Normal rate, regular rhythm and normal heart sounds  No murmur heard  Pulmonary/Chest: Effort normal and breath sounds normal    Abdominal: Soft  Bowel sounds are normal  There is tenderness (mild RLQ tenderness )  Musculoskeletal: Normal range of motion  She exhibits no edema  Neurological: She is alert  Awake and alert, oriented to self  Skin: Skin is warm and dry  Psychiatric: She has a normal mood and affect     Forgetful at times      Additional Data:     Labs:      Results from last 7 days  Lab Units 18  0459   WBC Thousand/uL 7 57   HEMOGLOBIN g/dL 9 6*   HEMATOCRIT % 29 3*   PLATELETS Thousands/uL 244   NEUTROS PCT % 78*   LYMPHS PCT % 14   MONOS PCT % 7   EOS PCT % 1       Results from last 7 days  Lab Units 05/06/18  0459 05/05/18  1625   SODIUM mmol/L 133* 132*   POTASSIUM mmol/L 3 9 4 4   CHLORIDE mmol/L 100 96*   CO2 mmol/L 22 26   BUN mg/dL 17 22   CREATININE mg/dL 1 04 1 50*   CALCIUM mg/dL 8 5 9 5   TOTAL PROTEIN g/dL  --  8 1   BILIRUBIN TOTAL mg/dL  --  0 40   ALK PHOS U/L  --  171*   ALT U/L  --  28   AST U/L  --  37   GLUCOSE RANDOM mg/dL 124 108       Results from last 7 days  Lab Units 05/05/18  1625   INR  0 96       * I Have Reviewed All Lab Data Listed Above  * Additional Pertinent Lab Tests Reviewed:  [address] Admission Reviewed    Imaging:    Imaging Reports Reviewed Today Include: none  Imaging Personally Reviewed by Myself Includes:  none    Recent Cultures (last 7 days):           Last 24 Hours Medication List:     Current Facility-Administered Medications:  aspirin 325 mg Oral Daily Dustin Barros PA-C    cefazolin 1,000 mg Intravenous Q12H Dustin Barros PA-C Last Rate: 1,000 mg (05/06/18 0930)   docusate sodium 100 mg Oral BID Dustin Barros PA-C    DULoxetine 60 mg Oral Daily Dustin Barros PA-C    heparin (porcine) 5,000 Units Subcutaneous Q8H [address] Dustin Barros PA-C    levothyroxine 100 mcg Oral Early Morning Dustin Barros PA-C    loratadine 10 mg Oral Daily Dustin Barros PA-C    LORazepam 0 5 mg Oral Q8H PRN Vevelyn Grandchild ZULEIMA Barros    [START ON 5/7/2018] losartan 50 mg Oral Daily Dustin Barros PA-C    metoprolol tartrate 50 mg Oral BID Dustin Barros PA-C    morphine injection 4 mg Intravenous Once Carlo FrameZULEIMA    NIFEdipine ER 60 mg Oral Daily Dustin Barros PA-C    ondansetron 4 mg Intravenous Once [place] Frame, ZULEIMA    pantoprazole 40 mg Oral BID Dustin Barros PA-C    polyethylene glycol 17 g Oral Daily PRN Dustin Barros PA-C    pravastatin 40 mg Oral Daily With [facility] [employer] Papo PA-C    senna 1 tablet Oral Daily Dustin CHEN ZULEIMA Barros         Today, Patient Was Seen By: Octavio Carlisle PA-C    ** Please Note: This note has been constructed using a voice recognition system   **

## 2018-05-06 NOTE — PLAN OF CARE
Problem: Potential for Falls  Goal: Patient will remain free of falls  INTERVENTIONS:  - Assess patient frequently for physical needs  -  Identify cognitive and physical deficits and behaviors that affect risk of falls    -  [place] fall precautions as indicated by assessment   - Educate patient/family on patient safety including physical limitations  - Instruct patient to call for assistance with activity based on assessment  - Modify environment to reduce risk of injury  - Consider OT/PT consult to assist with strengthening/mobility   Outcome: Progressing

## 2018-05-06 NOTE — ASSESSMENT & PLAN NOTE
· Patient has underlying dementia, possible UTI and dehydration   · s/p IVF hydration and IV abx   · Improved today, suspect her MS is at baseline

## 2018-05-06 NOTE — ASSESSMENT & PLAN NOTE
· Unknown etiology, possibly 2/2 acute cystitis vs constipation ?   · No etiology on CT a/p  · Bowel regimen  · Improving

## 2018-05-07 VITALS
OXYGEN SATURATION: 92 % | HEIGHT: 61 IN | RESPIRATION RATE: 18 BRPM | BODY MASS INDEX: 26.22 KG/M2 | DIASTOLIC BLOOD PRESSURE: 65 MMHG | TEMPERATURE: 98.5 F | HEART RATE: 86 BPM | SYSTOLIC BLOOD PRESSURE: 130 MMHG | WEIGHT: 138.89 LBS

## 2018-05-07 LAB
ANION GAP SERPL CALCULATED.3IONS-SCNC: 12 MMOL/L (ref 4–13)
BUN SERPL-MCNC: 11 MG/DL (ref 5–25)
CALCIUM SERPL-MCNC: 8.7 MG/DL (ref 8.3–10.1)
CHLORIDE SERPL-SCNC: 100 MMOL/L (ref 100–108)
CO2 SERPL-SCNC: 22 MMOL/L (ref 21–32)
CREAT SERPL-MCNC: 0.77 MG/DL (ref 0.6–1.3)
ERYTHROCYTE [DISTWIDTH] IN BLOOD BY AUTOMATED COUNT: 14.5 % (ref 11.6–15.1)
GFR SERPL CREATININE-BSD FRML MDRD: 70 ML/MIN/1.73SQ M
GLUCOSE SERPL-MCNC: 94 MG/DL (ref 65–140)
HCT VFR BLD AUTO: 27.7 % (ref 34.8–46.1)
HGB BLD-MCNC: 9.2 G/DL (ref 11.5–15.4)
MCH RBC QN AUTO: 30.6 PG (ref 26.8–34.3)
MCHC RBC AUTO-ENTMCNC: 33.2 G/DL (ref 31.4–37.4)
MCV RBC AUTO: 92 FL (ref 82–98)
PLATELET # BLD AUTO: 212 THOUSANDS/UL (ref 149–390)
PMV BLD AUTO: 9.3 FL (ref 8.9–12.7)
POTASSIUM SERPL-SCNC: 3.6 MMOL/L (ref 3.5–5.3)
RBC # BLD AUTO: 3.01 MILLION/UL (ref 3.81–5.12)
SODIUM SERPL-SCNC: 134 MMOL/L (ref 136–145)
WBC # BLD AUTO: 5.07 THOUSAND/UL (ref 4.31–10.16)

## 2018-05-07 PROCEDURE — 80048 BASIC METABOLIC PNL TOTAL CA: CPT | Performed by: PHYSICIAN ASSISTANT

## 2018-05-07 PROCEDURE — 85027 COMPLETE CBC AUTOMATED: CPT | Performed by: PHYSICIAN ASSISTANT

## 2018-05-07 PROCEDURE — 99239 HOSP IP/OBS DSCHRG MGMT >30: CPT | Performed by: INTERNAL MEDICINE

## 2018-05-07 RX ORDER — CEPHALEXIN 250 MG/1
250 CAPSULE ORAL EVERY 12 HOURS SCHEDULED
Qty: 10 CAPSULE | Refills: 0 | Status: SHIPPED | OUTPATIENT
Start: 2018-05-07 | End: 2018-05-12

## 2018-05-07 RX ADMIN — LORATADINE 10 MG: 10 TABLET ORAL at 08:22

## 2018-05-07 RX ADMIN — NIFEDIPINE 60 MG: 30 TABLET, FILM COATED, EXTENDED RELEASE ORAL at 08:22

## 2018-05-07 RX ADMIN — Medication 1 SPRAY: at 05:49

## 2018-05-07 RX ADMIN — PANTOPRAZOLE SODIUM 40 MG: 40 TABLET, DELAYED RELEASE ORAL at 08:22

## 2018-05-07 RX ADMIN — HEPARIN SODIUM 5000 UNITS: 5000 INJECTION, SOLUTION INTRAVENOUS; SUBCUTANEOUS at 13:38

## 2018-05-07 RX ADMIN — ASPIRIN 325 MG: 325 TABLET ORAL at 08:22

## 2018-05-07 RX ADMIN — SENNOSIDES 8.6 MG: 8.6 TABLET, FILM COATED ORAL at 08:22

## 2018-05-07 RX ADMIN — DULOXETINE 60 MG: 60 CAPSULE, DELAYED RELEASE ORAL at 08:22

## 2018-05-07 RX ADMIN — LEVOTHYROXINE SODIUM 100 MCG: 100 TABLET ORAL at 05:49

## 2018-05-07 RX ADMIN — HEPARIN SODIUM 5000 UNITS: 5000 INJECTION, SOLUTION INTRAVENOUS; SUBCUTANEOUS at 05:50

## 2018-05-07 RX ADMIN — CEFAZOLIN SODIUM 1000 MG: 1 INJECTION, POWDER, FOR SOLUTION INTRAMUSCULAR; INTRAVENOUS at 08:22

## 2018-05-07 RX ADMIN — DOCUSATE SODIUM 100 MG: 100 CAPSULE, LIQUID FILLED ORAL at 08:22

## 2018-05-07 RX ADMIN — METOPROLOL TARTRATE 50 MG: 50 TABLET ORAL at 08:22

## 2018-05-07 RX ADMIN — POLYETHYLENE GLYCOL 3350 17 G: 17 POWDER, FOR SOLUTION ORAL at 05:56

## 2018-05-07 RX ADMIN — LOSARTAN POTASSIUM 50 MG: 50 TABLET, FILM COATED ORAL at 08:22

## 2018-05-07 RX ADMIN — LORAZEPAM 0.5 MG: 0.5 TABLET ORAL at 05:55

## 2018-05-07 RX ADMIN — HYDROMORPHONE HYDROCHLORIDE 0.1 MG: 1 INJECTION, SOLUTION INTRAMUSCULAR; INTRAVENOUS; SUBCUTANEOUS at 02:15

## 2018-05-07 RX ADMIN — Medication 1 SPRAY: at 02:50

## 2018-05-07 NOTE — PLAN OF CARE
Problem: DISCHARGE PLANNING - CARE MANAGEMENT  Goal: Discharge to post-acute care or home with appropriate resources  INTERVENTIONS:  - Conduct assessment to determine patient/family and health care team treatment goals, and need for post-acute services based on payer coverage, community resources, and patient preferences, and barriers to discharge  - Address psychosocial, clinical, and financial barriers to discharge as identified in assessment in conjunction with the patient/family and health care team  - Arrange appropriate level of post-acute services according to patients   needs and preference and payer coverage in collaboration with the physician and health care team  - Communicate with and update the patient/family, physician, and health care team regarding progress on the discharge plan  - Arrange appropriate transportation to post-acute venues  Outcome: Completed Date Met: 05/07/18  Patient ready for discharge back to [facility] today  Spoke with patient's daughter, Yaritza Moreno, via telephone to make her aware, she is agreeable to transferring patient back to [facility] at 3pm today  Also discussed bundle program with patients daughter  Spoke with Boris Del Rosario at [facility] to make her aware of patients return  Nurse Meaghan morales   reviewed the bundle program with patient and the following: "As part of your Medicare benefit, you are automatically enrolled in the bundle payment program  (Here is a notice from Medicare that you may keep and read)   The program is designed to assist in coordinating your care after you leave the hospital  A nurse from [facility] will be following you for 90 days  Please anticipate a phone call from the nurse within 48hrs of your discharge    As your Care Manager, I will be providing a handoff to your next level of care to ensure a safe transition "

## 2018-05-07 NOTE — SOCIAL WORK
Patient ready for discharge back to [facility] today  Spoke with patient's daughter, Isela December, via telephone to make her aware, she is agreeable to transferring patient back to [facility] at 3pm today  Also discussed bundle program with patients daughter  Spoke with Zari Aguirre at [facility] to make her aware of patients return  Nurse Patricia morales  CM reviewed the bundle program with patient and the following: "As part of your Medicare benefit, you are automatically enrolled in the bundle payment program  (Here is a notice from Medicare that you may keep and read)   The program is designed to assist in coordinating your care after you leave the hospital  A nurse from [facility] will be following you for 90 days  Please anticipate a phone call from the nurse within 48hrs of your discharge    As your Care Manager, I will be providing a handoff to your next level of care to ensure a safe transition "

## 2018-05-07 NOTE — CASE MANAGEMENT
Initial Clinical Review    Admission: Date/Time/Statement: 5/5/18 @ 1853   Admit 4/13/2018- 4/17/2018  Pneumonia of right lower lobe due to infectious organism     Orders Placed This Encounter   Procedures    Inpatient Admission (expected length of stay for this patient is greater than two midnights)     Standing Status:   Standing     Number of Occurrences:   1     Order Specific Question:   Admitting Physician     Answer:   Elyssa Garcia [[id number]]     Order Specific Question:   Level of Care     Answer:   Med Surg [[id number]]     Order Specific Question:   Estimated length of stay     Answer:   More than 2 Midnights     Order Specific Question:   Certification     Answer:   I certify that inpatient services are medically necessary for this patient for a duration of greater than two midnights  See H&P and MD Progress Notes for additional information about the patient's course of treatment  ED: Date/Time/Mode of Arrival:   ED Arrival Information     Expected Arrival Acuity Means of Arrival Escorted By Service Admission Type    - 5/5/2018 15:21 Urgent Ambulance [facility] Urgent    Arrival Complaint    abdominal pain          Chief Complaint:   Chief Complaint   Patient presents with    Abdominal Pain     Pt  arrives EMS from [facility] assisted living, complaints of RLQ abdominal pain/suprapubic pain for one week  Also complains of nausea, denies V/D  History of Illness: 80 y o  female with past medical history significant for dementia, hypertension, hyperlipidemia, hypothyroidism, history of stroke who presents with right lower quadrant abdominal pain for the last week  Patient presents from the [facility] assisted living facility where she resides with her   She was recently admitted for sepsis secondary to pneumonia and was subsequently discharged to [facility] rehab facility  Patient states that she felt better following rehabilitation    She now admits to weakness, abdominal pain  She is a poor historian  She goes between topics during exam and forgets what she was going to say mid sentence  Patient is only oriented to herself  She denies any nausea or vomiting  She does not know when her last bowel movement was  She states the pain feels like gas pains and is sharp and intermittent  She denies dysuria, urgency, frequency  Last admission she was found to have E coli bacteriuria but was not treated given that she was asymptomatic  ED Vital Signs:   ED Triage Vitals [05/05/18 1522]   Temperature Pulse Respirations Blood Pressure SpO2   98 4 °F (36 9 °C) 69 16 126/61 94 %      Temp Source Heart Rate Source Patient Position - Orthostatic VS BP Location FiO2 (%)   Oral Monitor Sitting Right arm --      Pain Score       2        Wt Readings from Last 1 Encounters:   05/05/18 63 kg (138 lb 14 2 oz)       Vital Signs (abnormal): none    Abnormal Labs/Diagnostic Test Results:   Na 132  Cl 96  Bun 22  Creatinine 1 50  Alkaline phosphatase 171  Albumin 3  2    hgb 11 1, hct 33 1     Ct abdomen - No acute findings  Somewhat limited evaluation of the pelvic structures secondary to beam hardening artifact from total left hip arthroplasty hardware  CxR- Significant interval improvement in the previously noted infiltrate within the right midlung zone      ED Treatment: blood cultures     Medication Administration from 05/05/2018 1521 to 05/05/2018 1957       Date/Time Order Dose Route Action Action by Comments     05/05/2018 1810 sodium chloride 0 9 % bolus 1,000 mL 0 mL Intravenous Stopped Kriss Gill RN      05/05/2018 1646 sodium chloride 0 9 % bolus 1,000 mL 1,000 mL Intravenous New Bag Kriss Gill RN      05/05/2018 1647 morphine (PF) 4 mg/mL injection 4 mg 4 mg Intravenous Not Given Sameer Cevallos RN      05/05/2018 1647 ondansetron (ZOFRAN) injection 4 mg 4 mg Intravenous Not Given Sameer Cevallos RN      05/05/2018 1906 cefTRIAXone (ROCEPHIN) IVPB (premix) 1,000 mg 1,000 mg Intravenous New Bag Brayden Zazueta RN      05/05/2018 1906 sodium chloride 0 9 % bolus 1,000 mL 1,000 mL Intravenous New Bag Brayden Zazueta RN           Past Medical/Surgical History:    Active Ambulatory Problems     Diagnosis Date Noted    Hyperlipidemia     GERD (gastroesophageal reflux disease)     Fibromyalgia     Rheumatoid arthritis ([place] [address] )     Hyponatremia 10/03/2016    CVA (cerebral vascular accident) ([place] [address] ) 10/03/2016    Essential hypertension 10/03/2016    Acute metabolic encephalopathy 64/17/9780    Dementia without behavioral disturbance 04/13/2018    Hypothyroidism 04/14/2018    Anemia 04/14/2018     Resolved Ambulatory Problems     Diagnosis Date Noted    Fever 10/03/2016    Abdominal pain 10/03/2016    Sepsis ([place] [address] ) 04/13/2018     Past Medical History:   Diagnosis Date    Blind     Depression     Disease of thyroid gland     Fibromyalgia     Gait disturbance     GERD (gastroesophageal reflux disease)     Glaucoma     Hyperlipidemia     Hypertension     Osteopenia     Psychiatric disorder     Rheumatoid arthritis ([place] [address] )     Vertigo     Vitamin D deficiency        Admitting Diagnosis: Dehydration [E86 0]  Urinary tract infection [N39 0]  Abdominal pain [R10 9]  SIRS (systemic inflammatory response syndrome) (HCC) [R65 10]    Age/Sex: 80 y o  female    Assessment/Plan: Acute encephalopathy due to DEVONTE and UTI  Unspecified abdominal pain     Continue antibiotics-IV cefazolin(previous urine cultures growing E coli)  Continue IV hydration  Follow labs    Admission Orders: 5/5/2018  7213 INPATIENT   Scheduled Meds:   Current Facility-Administered Medications:  aspirin 325 mg Oral Daily Dustin Barros PA-C    cefazolin 1,000 mg Intravenous Q12H Dustin Barros PA-C Last Rate: Stopped (05/07/18 5421)   docusate sodium 100 mg Oral BID Dustin Barros PA-C    DULoxetine 60 mg Oral Daily Dustin Barros PA-C    heparin (porcine) 5,000 Units Subcutaneous Q8H [facility] Dustin Barros, ZULEIMA    levothyroxine 100 mcg Oral Early Morning Dustin Barros, ZULEIMA    loratadine 10 mg Oral Daily Dustin Barros, ZULEIMA    LORazepam 0 5 mg Oral Q8H PRN Dustin Barros, ZULEIMA    losartan 50 mg Oral Daily Dustin Barros, ZULEIMA    metoprolol tartrate 50 mg Oral BID Dustin Barros, ZULEIMA    morphine injection 4 mg Intravenous Once Pamelia Bland, ZULEIMA    NIFEdipine ER 60 mg Oral Daily Dustin Barros, ZULEIMA    ondansetron 4 mg Intravenous Once Pamelia Bland, ZULEIMA    pantoprazole 40 mg Oral BID Dustin Barros, ZULEIMA    phenol 1 spray Mouth/Throat Q2H PRN Shadia Ashton PA-C    polyethylene glycol 17 g Oral Daily PRN Dustin Barros PA-C    pravastatin 40 mg Oral Daily With Carline Barros, ZULEIMA    senna 1 tablet Oral Daily Dustin Barros PA-C      Continuous Infusions:    PRN Meds: LORazepam - used x 1    Phenol -  Used x 2      polyethylene glycol - used x 1      scds

## 2018-05-07 NOTE — DISCHARGE INSTRUCTIONS
Urinary Traction Infection in Older Adults   WHAT YOU NEED TO KNOW:   A urinary tract infection (UTI) is caused by bacteria that get inside your urinary tract  Your urinary tract includes your kidneys, ureters, bladder, and urethra  Urine is made in your kidneys, and it flows from the ureters to the bladder  Urine leaves the bladder through the urethra  A UTI is more common in your lower urinary tract, which includes your bladder and urethra  DISCHARGE INSTRUCTIONS:   Seek care immediately if:   · You are urinating very little or not at all  · You are vomiting  · You have a high fever with shaking chills  · You have side or back pain that gets worse  Contact your healthcare provider if:   · You have a fever  · You are a woman and you have increased white or yellow discharge from your vagina  · You do not feel better after 2 days of taking antibiotics  · You have questions or concerns about your condition or care  Medicines:   · Medicines  help treat the bacterial infection or decrease pain and burning when you urinate  You may also need medicines to decrease the urge to urinate often  Your healthcare provider may recommend cranberry juice or cranberry supplements to help decrease your symptoms  · Take your medicine as directed  Contact your healthcare provider if you think your medicine is not helping or if you have side effects  Tell him or her if you are allergic to any medicine  Keep a list of the medicines, vitamins, and herbs you take  Include the amounts, and when and why you take them  Bring the list or the pill bottles to follow-up visits  Carry your medicine list with you in case of an emergency  Self-care:   · Urinate when you feel the urge  Do not hold your urine because bacteria can grow in the bladder if urine stays in the bladder too long  It may be helpful to urinate at least every 3 to 4 hours  · Drink liquids as directed    Liquids can help flush bacteria from your urinary tract  Ask how much liquid to drink each day and which liquids are best for you  You may need to drink more liquids than usual to help flush out the bacteria  Do not drink alcohol, caffeine, and citrus juices  These can irritate your bladder and increase your symptoms  · Apply heat  on your abdomen for 20 to 30 minutes every 2 hours for as many days as directed  Heat helps decrease discomfort and pressure in your bladder  Prevent a UTI:   · Women should wipe front to back  after urinating or having a bowel movement  This may prevent germs from getting into the urinary tract  · Urinate after you have sex  to flush away bacteria that can enter your urinary tract during sex  · Wear cotton underwear and clothes that fit loose  Tight pants and nylon underwear can trap moisture and cause bacteria to grow  Follow up with your healthcare provider as directed:  Write down your questions so you remember to ask them during your visits  © 2017 [address] [employer] Information is for End User's use only and may not be sold, redistributed or otherwise used for commercial purposes  All illustrations and images included in CareNotes® are the copyrighted property of [employer]  or [address]  The above information is an  only  It is not intended as medical advice for individual conditions or treatments  Talk to your doctor, nurse or pharmacist before following any medical regimen to see if it is safe and effective for you

## 2018-05-07 NOTE — ASSESSMENT & PLAN NOTE
· received 2L bolus in ER   · creatinine 0 77 today from 1 5 on admission s/p IVF  · No hydro or obstruction on ct scan

## 2018-05-07 NOTE — DISCHARGE SUMMARY
Discharge- Mercedes April 4/27/1931, 80 y o  female MRN: [id number]    Unit/Bed#: -01 Encounter: [id number]    Primary Care Provider: Jessika Conn DO   Date and time admitted to hospital: 5/5/2018  3:21 PM     * Acute cystitis   Assessment & Plan    · Patient with lower abdominal pain R>L, denies urinary sx but she is poor historian 2/2 dementia  · Urine cx at this time showing e coli, previous urine culture 3 weeks ago shows similar  · Does not have sensitivity report back but tolerating ancef well and last cx susceptible to cephalosporins thus will discharge with 5 more days of PO keflex to complete 7 day course        Right lower quadrant abdominal pain   Assessment & Plan    · Unknown etiology, possibly 2/2 acute cystitis vs constipation ? · No etiology on CT a/p  · Bowel regimen  · Improving         Acute metabolic encephalopathy   Assessment & Plan    · Patient has underlying dementia, possible UTI and dehydration   · s/p IVF hydration and IV abx   · Improved today, suspect her MS is at baseline        DEVONTE (acute kidney injury) ([address] )   Assessment & Plan    · received 2L bolus in ER   · creatinine 0 77 today from 1 5 on admission s/p IVF  · No hydro or obstruction on ct scan         Anemia   Assessment & Plan    · hgb 9 2 (11 1)   · Suspect dilutional component  · No evidence of losses        Hypothyroidism   Assessment & Plan    · Continue synthroid         Dementia without behavioral disturbance   Assessment & Plan    · Supportive care   · Today, pt alert to self and year  · She did make a comment that she wanted to die earlier today to RN, however when asked about it patient tells me she does not feel this way and in fact only says things like this occasionally to get attention   She states she is very happy and wants to spend time with her         Essential hypertension   Assessment & Plan    · Continue lopressor, nifedipine and cozaar        Hyponatremia   Assessment & Plan    · 134 this am, appears chronically low         Hyperlipidemia   Assessment & Plan    · Continue statin             Discharging Physician / Practitioner: Ivory Garcia PA-C  PCP: Melissa Kate DO  Admission Date:   Admission Orders     Ordered        05/05/18 1853  Inpatient Admission (expected length of stay for this patient is greater than two midnights)  Once             Discharge Date: 05/07/18    Disposition:      Back to [facility]    Reason for Admission: abdominal pain x 1 week    Discharge Diagnoses:     Please see assessment and plan section above for further details regarding discharge diagnoses  Resolved Problems  Date Reviewed: 5/7/2018    None          Consultations During Hospital Stay:  · None    Procedures Performed:     · Blood cx: neg x 24 hours  · Urine cx: >100,000 e coli; sensitivities pending  · CT A/P: no acute findings  Somewhat limited evaluation of the pelvic structures 2/2 to beam hardening artifact from the total L hip arthroplasty hardware  · CXR: Significant interval improvement in the previously noted infiltrate within the right midlung zone  Continued follow-up is recommended for complete resolution  No new infiltrate  Medication Adjustments and Discharge Medications:  · Summary of Medication Adjustments made as a result of this hospitalization:   · DC with 5 more days of keflex to complete course  · Medication Dosing Tapers - Please refer to Discharge Medication List for details on any medication dosing tapers (if applicable to patient)  · Medications being temporarily held (include recommended restart time): NA  · Discharge Medication List: See after visit summary for reconciled discharge medications  Wound Care Recommendations:  When applicable, please see wound care section of After Visit Summary      Diet Recommendations at Discharge:  Diet -        Diet Orders            Start     Ordered    05/05/18 2018  Diet Regular; Regular House  Diet effective now     Question Answer Comment   Diet Type Regular    Regular Regular House    RD to adjust diet per protocol? Yes        05/05/18 2017 05/05/18 2006  Room Service  Once     Question:  Type of Service  Answer:  Room Service - Appropriate with Assistance    05/05/18 2006          Instructions for any Catheters / Lines Present at Discharge (including removal date, if applicable): NA    Significant Findings / Test Results:     XR chest 2 views   ED Interpretation by Jessica Rosenthal PA-C (05/05 6216)   No acute disease      Final Result by Lilian Esparza MD (05/06 0730)      Significant interval improvement in the previously noted infiltrate within the right midlung zone  Continued follow-up is recommended for complete resolution  No new infiltrate  Workstation performed: [id number]         CT abdomen pelvis wo contrast   ED Interpretation by Jessica Rosenthal PA-C (05/05 4367)      No acute findings  Somewhat limited evaluation of the pelvic structures secondary to beam hardening artifact from total left hip arthroplasty hardware  Workstation performed: [id number]         Final Result by Tatiana Agarwal MD (05/05 2156)      No acute findings  Somewhat limited evaluation of the pelvic structures secondary to beam hardening artifact from total left hip arthroplasty hardware  Workstation performed: [id number]             Incidental Findings:   · See above     Test Results Pending at Discharge (will require follow up): · Official urine sensitivity report     Outpatient Tests Requested:  · f/u with PCP    Complications:  none    Hospital Course:     Daily Naik is a 80 y o  female patient with PMHx of dementia, essential hypertension, anemia, hypothyroidism, and mild hyponatremia who originally presented to the hospital on 5/5/2018 due to right lower quadrant abdominal pain x1 week    Patient presented from the [facility] where she resides with her   Patient was recently admitted and discharged with sepsis secondary to pneumonia  Patient was discharged to rehab at that time and following rehab she did feel better but over the past week had developed the abdominal pain  She unfortunately is a poor historian secondary to her dementia  Patient denied any dysuria, was unsure when her last bowel movement was  Last admission she was found to have E coli bacteriuria but was not treated given that she was asymptomatic  Patient denied any fevers or chills  She was admitted and CT scan was done which did not show any obvious source  Urine cultures were checked again grew out E coli  Based on the sensitivities from last admission she will be discharged to complete course of Keflex as she had done well on IV ancef while admitted  She had no fevers and was hemodynamically stable throughout admission  DC Back to [facility]  Condition at Discharge: stable     Discharge Day Visit / Exam:     Subjective:  Pt reports feeling okay  She had been telling   Vitals: Blood Pressure: 130/65 (05/07/18 0702)  Pulse: 86 (05/07/18 0702)  Temperature: 98 5 °F (36 9 °C) (05/07/18 0702)  Temp Source: Oral (05/07/18 1333)  Respirations: 18 (05/07/18 0702)  Height: 5' 1" (154 9 cm) (05/05/18 1958)  Weight - Scale: 63 kg (138 lb 14 2 oz) (05/05/18 1958)  SpO2: 92 % (05/07/18 0702)  Exam:   Physical Exam   Constitutional: No distress  Cardiovascular: Normal rate and regular rhythm  Pulmonary/Chest: Effort normal and breath sounds normal  No respiratory distress  She has no wheezes  Abdominal: Soft  Bowel sounds are normal  She exhibits no distension  There is no tenderness  Musculoskeletal: She exhibits no edema  Neurological: She is alert  Oriented to self and place   Skin: Skin is warm and dry  Nursing note and vitals reviewed        Discussion with Family: daughter updated by CM    Goals of Care Discussions:  · Code Status at Discharge: Level 1 - Full Code  · Were there any Goals of Care Discussions during Hospitalization?: No  · Results of any General Goals of Care Discussions: NA   · POLST Completed: No   · If POLST Completed, Summary of POLST Agreement Provided Here: NA   · OK to Rehospitalize if Needed? Yes    Discharge instructions/Information to patient and family:   See after visit summary section titled Discharge Instructions for information provided to patient and family  Planned Readmission: no      Discharge Statement:  I spent 45 minutes discharging the patient  This time was spent on the day of discharge  I had direct contact with the patient on the day of discharge  Greater than 50% of the total time was spent examining patient, answering all patient questions, arranging and discussing plan of care with patient as well as directly providing post-discharge instructions  Additional time then spent on discharge activities      ** Please Note: This note has been constructed using a voice recognition system **

## 2018-05-07 NOTE — ASSESSMENT & PLAN NOTE
· Supportive care   · Today, pt alert to self and year  · She did make a comment that she wanted to die earlier today to RN, however when asked about it patient tells me she does not feel this way and in fact only says things like this occasionally to get attention   She states she is very happy and wants to spend time with her

## 2018-05-07 NOTE — ASSESSMENT & PLAN NOTE
· Patient with lower abdominal pain R>L, denies urinary sx but she is poor historian 2/2 dementia  · Urine cx at this time showing e coli, previous urine culture 3 weeks ago shows similar  · Does not have sensitivity report back but tolerating ancef well and last cx susceptible to cephalosporins thus will discharge with 5 more days of PO keflex to complete 7 day course

## 2018-05-07 NOTE — PLAN OF CARE
Problem: Potential for Falls  Goal: Patient will remain free of falls  INTERVENTIONS:  - Assess patient frequently for physical needs  -  Identify cognitive and physical deficits and behaviors that affect risk of falls    -  Hector fall precautions as indicated by assessment   - Educate patient/family on patient safety including physical limitations  - Instruct patient to call for assistance with activity based on assessment  - Modify environment to reduce risk of injury  - Consider OT/PT consult to assist with strengthening/mobility   Outcome: Progressing      Problem: Prexisting or High Potential for Compromised Skin Integrity  Goal: Skin integrity is maintained or improved  INTERVENTIONS:  - Identify patients at risk for skin breakdown  - Assess and monitor skin integrity  - Assess and monitor nutrition and hydration status  - Monitor labs (i e  albumin)  - Assess for incontinence   - Turn and reposition patient  - Assist with mobility/ambulation  - Relieve pressure over bony prominences  - Avoid friction and shearing  - Provide appropriate hygiene as needed including keeping skin clean and dry  - Evaluate need for skin moisturizer/barrier cream  - Collaborate with interdisciplinary team (i e  Nutrition, Rehabilitation, etc )   - Patient/family teaching   Outcome: Progressing      Problem: PAIN - ADULT  Goal: Verbalizes/displays adequate comfort level or baseline comfort level  Interventions:  - Encourage patient to monitor pain and request assistance  - Assess pain using appropriate pain scale  - Administer analgesics based on type and severity of pain and evaluate response  - Implement non-pharmacological measures as appropriate and evaluate response  - Consider cultural and social influences on pain and pain management  - Notify physician/advanced practitioner if interventions unsuccessful or patient reports new pain   Outcome: Progressing      Problem: INFECTION - ADULT  Goal: Absence or prevention of progression during hospitalization  INTERVENTIONS:  - Assess and monitor for signs and symptoms of infection  - Monitor lab/diagnostic results  - Monitor all insertion sites, i e  indwelling lines, tubes, and drains  - Monitor endotracheal (as able) and nasal secretions for changes in amount and color  - Freedom appropriate cooling/warming therapies per order  - Administer medications as ordered  - Instruct and encourage patient and family to use good hand hygiene technique  - Identify and instruct in appropriate isolation precautions for identified infection/condition   Outcome: Progressing    Goal: Absence of fever/infection during neutropenic period  INTERVENTIONS:  - Monitor WBC  - Implement neutropenic guidelines   Outcome: Progressing      Problem: SAFETY ADULT  Goal: Maintain or return to baseline ADL function  INTERVENTIONS:  -  Assess patient's ability to carry out ADLs; assess patient's baseline for ADL function and identify physical deficits which impact ability to perform ADLs (bathing, care of mouth/teeth, toileting, grooming, dressing, etc )  - Assess/evaluate cause of self-care deficits   - Assess range of motion  - Assess patient's mobility; develop plan if impaired  - Assess patient's need for assistive devices and provide as appropriate  - Encourage maximum independence but intervene and supervise when necessary  ¯ Involve family in performance of ADLs  ¯ Assess for home care needs following discharge   ¯ Request OT consult to assist with ADL evaluation and planning for discharge  ¯ Provide patient education as appropriate   Outcome: Progressing    Goal: Maintain or return mobility status to optimal level  INTERVENTIONS:  - Assess patient's baseline mobility status (ambulation, transfers, stairs, etc )    - Identify cognitive and physical deficits and behaviors that affect mobility  - Identify mobility aids required to assist with transfers and/or ambulation (gait belt, sit-to-stand, lift, walker, cane, etc )  - Hernando fall precautions as indicated by assessment  - Record patient progress and toleration of activity level on Mobility SBAR; progress patient to next Phase/Stage  - Instruct patient to call for assistance with activity based on assessment  - Request Rehabilitation consult to assist with strengthening/weightbearing, etc    Outcome: Progressing

## 2018-05-07 NOTE — PHYSICIAN ADVISOR
Current patient class: Inpatient  The patient is currently on Hospital Day: 3      The patient was admitted to the hospital  on 5/5/18 at [address] [place] [address] for the following diagnosis:  Dehydration [E86 0]  Urinary tract infection [N39 0]  Abdominal pain [R10 9]  SIRS (systemic inflammatory response syndrome) ([facility] [address] ) [R65 10]       There is documentation in the medical record of an expected length of stay of at least 2 midnights  The patient is therefore expected to satisfy the 2 midnight benchmark and given the 2 midnight presumption is appropriate for INPATIENT ADMISSION  Given this expectation of a satisfying stay, CMS instructs us that the patient is most often appropriate for inpatient admission under part A provided medical necessity is documented in the chart  After review of the relevant documentation, labs, vital signs and test results, the patient is appropriate for INPATIENT ADMISSION  Admission to the hospital as an inpatient is a complex decision making process which requires the practitioner to consider the patients presenting complaint, history and physical examination and all relevant testing  With this in mind, in this case, the patient was deemed appropriate for INPATIENT ADMISSION  After review of the documentation and testing available at the time of the admission I concur with this clinical determination of medical necessity  The patient does have an inpatient admission within the previous 30 days  The patient was admitted on 4/13/18 and discharged on 4/17/18 as an inpatient  The patient therefore required readmission review  Rationale is as follows: The patient is a 80 yrs   Female who presented to the ED at 5/5/2018  3:21 PM with a chief complaint of Abdominal Pain (Pt  arrives EMS from [place] [facility], complaints of RLQ abdominal pain/suprapubic pain for one week  Also complains of nausea, denies V/D )     Patient admitted with a complaint of lower abdominal pain    She was recently hospitalized for a pneumonia  Abnormal labs on this admission include a urine culture positive for >100,000 E  Coli and IV antibiotics were started  Of note, a urine culture from the previous admission also demonstrated >100,000 E  Coli present  On that basis, it would appropriate to consider this admission as RELATED to the previous admission      The patients vitals on arrival were ED Triage Vitals [05/05/18 1522]   Temperature Pulse Respirations Blood Pressure SpO2   98 4 °F (36 9 °C) 69 16 126/61 94 %      Temp Source Heart Rate Source Patient Position - Orthostatic VS BP Location FiO2 (%)   Oral Monitor Sitting Right arm --      Pain Score       2           Past Medical History:   Diagnosis Date    Blind     blind right eye    Depression     Disease of thyroid gland     Fibromyalgia     Gait disturbance     GERD (gastroesophageal reflux disease)     Glaucoma     Hyperlipidemia     Hypertension     Osteopenia     Psychiatric disorder     depression    Rheumatoid arthritis (HCC)     Vertigo     Vitamin D deficiency      Past Surgical History:   Procedure Laterality Date    JOINT REPLACEMENT      left hip replacement, left shoulder replacement           Consults have been placed to:   None    Vitals:    05/06/18 1500 05/06/18 1755 05/06/18 2222 05/07/18 0702   BP: 109/56 134/66 132/62 130/65   BP Location: Left arm  Left arm Right arm   Pulse: 80 86 80 86   Resp: 18 18 18   Temp: 98 9 °F (37 2 °C)  98 8 °F (37 1 °C) 98 5 °F (36 9 °C)   TempSrc: Oral  Oral Oral   SpO2: 92%  93% 92%   Weight:       Height:           Most recent labs:    Recent Labs      05/05/18   1625   05/07/18   0546   WBC  7 63   < >  5 07   HGB  11 1*   < >  9 2*   HCT  33 1*   < >  27 7*   PLT  272   < >  212   K  4 4   < >  3 6   NA  132*   < >  134*   CALCIUM  9 5   < >  8 7   BUN  22   < >  11   CREATININE  1 50*   < >  0 77   LIPASE  168   --    --    INR  0 96   --    --    AST  37   --    --    ALT  28   -- --    ALKPHOS  171*   --    --    BILITOT  0 40   --    --     < > = values in this interval not displayed  Scheduled Meds:  Current Facility-Administered Medications:  aspirin 325 mg Oral Daily Dustin Barros PA-C    cefazolin 1,000 mg Intravenous Q12H Dustin Barros PA-C Last Rate: Stopped (05/07/18 9839)   docusate sodium 100 mg Oral BID Dustin Barros PA-C    DULoxetine 60 mg Oral Daily Dustin Barros PA-C    heparin (porcine) 5,000 Units Subcutaneous Q8H [address] Dustin Barros PA-C    levothyroxine 100 mcg Oral Early Morning Dustin Barros PA-C    loratadine 10 mg Oral Daily Dustin Barros PA-C    LORazepam 0 5 mg Oral Q8H PRN Dustin Barros PA-C    losartan 50 mg Oral Daily Dustin Barros PA-C    metoprolol tartrate 50 mg Oral BID Dustin Barros PA-C    morphine injection 4 mg Intravenous Once Tuan Bonner PA-C    NIFEdipine ER 60 mg Oral Daily Dustin Barros PA-C    ondansetron 4 mg Intravenous Once Tuan Bonner PA-C    pantoprazole 40 mg Oral BID Dustin Barros PA-C    phenol 1 spray Mouth/Throat Q2H PRN Shadia Ashton PA-C    polyethylene glycol 17 g Oral Daily PRN Dustin Barros PA-C    pravastatin 40 mg Oral Daily With Carline Barros PA-C    senna 1 tablet Oral Daily Dustin Barros PA-C      Continuous Infusions:   PRN Meds:  LORazepam    phenol    polyethylene glycol    Surgical procedures (if appropriate):

## 2018-05-08 LAB
BACTERIA UR CULT: ABNORMAL
BACTERIA UR CULT: ABNORMAL

## 2018-05-09 ENCOUNTER — TRANSITIONAL CARE MANAGEMENT (OUTPATIENT)
Dept: FAMILY MEDICINE CLINIC | Facility: CLINIC | Age: 83
End: 2018-05-09

## 2018-05-11 ENCOUNTER — PATIENT OUTREACH (OUTPATIENT)
Dept: FAMILY MEDICINE CLINIC | Facility: CLINIC | Age: 83
End: 2018-05-11

## 2018-05-11 LAB
BACTERIA BLD CULT: NORMAL
BACTERIA BLD CULT: NORMAL

## 2018-05-11 NOTE — PROGRESS NOTES
Spoke with Davey Ash, staff at [facility]  Patient currently receiving PT/OT/speech services  Continues with low grade temperatures and occasional wheezing  Currently taking Keflex as ordered  States needs refills of Combivent and Mucinex  Will notify PCP

## 2018-05-14 DIAGNOSIS — J40 BRONCHITIS: ICD-10-CM

## 2018-05-14 RX ORDER — GUAIFENESIN 600 MG
600 TABLET, EXTENDED RELEASE 12 HR ORAL 2 TIMES DAILY PRN
Qty: 60 TABLET | Refills: 5 | Status: SHIPPED | OUTPATIENT
Start: 2018-05-14 | End: 2018-05-14 | Stop reason: SDUPTHER

## 2018-05-14 RX ORDER — GUAIFENESIN 600 MG
600 TABLET, EXTENDED RELEASE 12 HR ORAL 2 TIMES DAILY PRN
Qty: 60 TABLET | Refills: 0 | Status: SHIPPED | OUTPATIENT
Start: 2018-05-14 | End: 2018-05-18 | Stop reason: SDUPTHER

## 2018-05-17 RX ORDER — CLINDAMYCIN HYDROCHLORIDE 300 MG/1
CAPSULE ORAL
COMMUNITY
End: 2018-05-18

## 2018-05-17 RX ORDER — TRAMADOL HYDROCHLORIDE 50 MG/1
TABLET ORAL
COMMUNITY
End: 2018-06-14 | Stop reason: SDUPTHER

## 2018-05-17 RX ORDER — TRIAMCINOLONE ACETONIDE 1 MG/G
CREAM TOPICAL
COMMUNITY
End: 2019-09-16

## 2018-05-17 RX ORDER — BUTALBITAL, ACETAMINOPHEN AND CAFFEINE 300; 40; 50 MG/1; MG/1; MG/1
CAPSULE ORAL
COMMUNITY
End: 2018-05-18

## 2018-05-17 RX ORDER — METHYLPREDNISOLONE 4 MG/1
TABLET ORAL
COMMUNITY
End: 2018-05-18

## 2018-05-18 ENCOUNTER — OFFICE VISIT (OUTPATIENT)
Dept: FAMILY MEDICINE CLINIC | Facility: CLINIC | Age: 83
End: 2018-05-18
Payer: MEDICARE

## 2018-05-18 ENCOUNTER — TELEPHONE (OUTPATIENT)
Dept: FAMILY MEDICINE CLINIC | Facility: CLINIC | Age: 83
End: 2018-05-18

## 2018-05-18 VITALS
BODY MASS INDEX: 25.37 KG/M2 | WEIGHT: 134.4 LBS | HEIGHT: 61 IN | TEMPERATURE: 97.3 F | DIASTOLIC BLOOD PRESSURE: 72 MMHG | SYSTOLIC BLOOD PRESSURE: 130 MMHG | OXYGEN SATURATION: 95 % | HEART RATE: 75 BPM

## 2018-05-18 DIAGNOSIS — F03.90 DEMENTIA WITHOUT BEHAVIORAL DISTURBANCE, UNSPECIFIED DEMENTIA TYPE (HCC): ICD-10-CM

## 2018-05-18 DIAGNOSIS — J18.9 PNEUMONIA OF RIGHT LOWER LOBE DUE TO INFECTIOUS ORGANISM: ICD-10-CM

## 2018-05-18 DIAGNOSIS — J40 BRONCHITIS: ICD-10-CM

## 2018-05-18 DIAGNOSIS — IMO0001 TRANSITION OF CARE PERFORMED WITH SHARING OF CLINICAL SUMMARY: Primary | ICD-10-CM

## 2018-05-18 PROCEDURE — 99496 TRANSJ CARE MGMT HIGH F2F 7D: CPT | Performed by: INTERNAL MEDICINE

## 2018-05-18 RX ORDER — GUAIFENESIN 600 MG
600 TABLET, EXTENDED RELEASE 12 HR ORAL 2 TIMES DAILY PRN
Qty: 60 TABLET | Refills: 1 | Status: SHIPPED | OUTPATIENT
Start: 2018-05-18 | End: 2019-09-16

## 2018-05-18 RX ORDER — DONEPEZIL HYDROCHLORIDE 5 MG/1
5 TABLET, FILM COATED ORAL
Qty: 37 TABLET | Refills: 0 | Status: SHIPPED | OUTPATIENT
Start: 2018-05-18 | End: 2018-06-22

## 2018-05-18 NOTE — PROGRESS NOTES
Assessment/Plan:  Date and time hospital follow up call was made:  5/9/2018  1:51 PM  Patient was hopsitalized at:  [facility]  Date of admission:  5/5/18  Date of discharge:  5/7/18  Diagnosis:  acute cystitis  Current symptoms:  Fatigue  Post hospital issues:  None  Should patient be enrolled in anticoag monitoring?:  No  Scheduled for follow up?:  Yes  Did you obtain your prescribed medications:  Yes  Do you need help managing your perscriptions or medications:  Yes  Is transportation to your appointments needed:  Yes  Are you recieving outpatient services:  No  Are you recieving home care services: Yes  Are you using any community resources:  No  Current waiver service:  No  Have you fallen in the last 12 months:  No  Interperter language line required?:  No  Counseling:  Family            Diagnoses and all orders for this visit:    Transition of care performed with sharing of clinical summary    Bronchitis  Comments:  readd combivent  Orders:  -     guaiFENesin (MUCINEX) 600 mg 12 hr tablet; Take 1 tablet (600 mg total) by mouth 2 (two) times a day as needed for congestion  -     ipratropium-albuterol (COMBIVENT RESPIMAT) inhaler; Inhale 2 puffs every 4 (four) hours as needed (dyspnea)    Pneumonia of right lower lobe due to infectious organism [facility])  Comments:  repeat cxr in 1 month  Orders:  -     phenol (CHLORASEPTIC) 1 4 % mucosal liquid;  Apply 1 spray to the mouth or throat every 2 (two) hours as needed (sore throat)    Dementia without behavioral disturbance, unspecified dementia type    Other orders  -     Discontinue: Butalbital-APAP-Caffeine -40 MG CAPS; butalbital-acetaminophen-caffeine 50 mg-300 mg-40 mg capsule  -     Discontinue: clindamycin (CLEOCIN) 300 MG capsule; clindamycin HCl 300 mg capsule  -     Discontinue: methylprednisolone (MEDROL) 4 mg tablet; methylprednisolone 4 mg tablets in a dose pack  -     traMADol (ULTRAM) 50 mg tablet; tramadol 50 mg tablet  - triamcinolone (KENALOG) 0 1 % cream; triamcinolone acetonide 0 1 % topical cream  -     donepezil (ARICEPT) 5 mg tablet; Take 1 tablet (5 mg total) by mouth daily at bedtime          Subjective:      Patient ID: Natalia Courser is a 80 y o  female  Her daughter states her dementia is worsening  She scorred 14 on minimental   Will start aricept and rto in 5 weeks  Plan to push to 10mg  Reviewed her hosp  She had pneumonia and e  Coli in urine  The following portions of the patient's history were reviewed and updated as appropriate:   She  has a past medical history of Blind; Depression; Disease of thyroid gland; Fibromyalgia; Gait disturbance; GERD (gastroesophageal reflux disease); Glaucoma; Hyperlipidemia; Hypertension; Osteopenia; Psychiatric disorder; Rheumatoid arthritis ([facility] [address] ); Vertigo; and Vitamin D deficiency  She   Patient Active Problem List    Diagnosis Date Noted    Acute cystitis 05/05/2018    Right lower quadrant abdominal pain 05/05/2018    DEVONTE (acute kidney injury) ([facility] [address] ) 05/05/2018    Hypothyroidism 04/14/2018    Anemia 04/14/2018    Dementia without behavioral disturbance 04/13/2018    Hyponatremia 10/03/2016    CVA (cerebral vascular accident) ([facility] [address] ) 10/03/2016    Essential hypertension 10/03/2016    Acute metabolic encephalopathy 88/21/0433    Hyperlipidemia     GERD (gastroesophageal reflux disease)     Fibromyalgia     [facility]      She  has a past surgical history that includes Joint replacement  Her family history includes Coronary artery disease in her father; Dementia in her father; Heart attack in her mother  She  reports that she has never smoked  She has never used smokeless tobacco  She reports that she does not drink alcohol or use drugs    Current Outpatient Prescriptions   Medication Sig Dispense Refill    aspirin 325 mg tablet Take 325 mg by mouth daily        benzonatate (TESSALON PERLES) 100 mg capsule Take 1 capsule (100 mg total) by mouth 3 (three) times a day 20 capsule 0    calcium carbonate (OS-TEDDY) 600 MG tablet Take 600 mg by mouth daily      carboxymethylcellulose (REFRESH LIQUIGEL) 1 % ophthalmic solution Apply 1 drop to eye 3 (three) times a day        Dentifrices (BIOTENE DRY MOUTH CARE DT) Apply to teeth      DULoxetine (CYMBALTA) 60 mg delayed release capsule Take 1 capsule (60 mg total) by mouth daily  0    guaiFENesin (MUCINEX) 600 mg 12 hr tablet Take 1 tablet (600 mg total) by mouth 2 (two) times a day as needed for congestion 60 tablet 0    ipratropium-albuterol (COMBIVENT RESPIMAT) inhaler Inhale 2 puffs every 4 (four) hours as needed (dyspnea) 1 Inhaler 2    levothyroxine 100 mcg tablet       loratadine (CLARITIN) 10 mg tablet Take by mouth      LORazepam (ATIVAN) 0 5 mg tablet Take 1 tablet (0 5 mg total) by mouth every 8 (eight) hours as needed for anxiety for up to 10 days 30 tablet 0    losartan (COZAAR) 50 mg tablet Take 50 mg by mouth daily      Lutein 10 MG TABS Take 1 tablet by mouth daily        magnesium hydroxide (MILK OF MAGNESIA) 400 mg/5 mL oral suspension Take 30 mL by mouth daily as needed for constipation        metoprolol tartrate (LOPRESSOR) 50 mg tablet 50 mg 2 (two) times a day        NIFEdipine ER (ADALAT CC) 60 MG 24 hr tablet Take 60 mg by mouth daily      pantoprazole (PROTONIX) 40 mg tablet Take 40 mg by mouth 2 (two) times a day        phenol (CHLORASEPTIC) 1 4 % mucosal liquid Apply 1 spray to the mouth or throat every 2 (two) hours as needed (sore throat)  0    pravastatin (PRAVACHOL) 40 mg tablet Take 40 mg by mouth daily        traMADol (ULTRAM) 50 mg tablet tramadol 50 mg tablet      triamcinolone (KENALOG) 0 1 % cream triamcinolone acetonide 0 1 % topical cream       No current facility-administered medications for this visit        Current Outpatient Prescriptions on File Prior to Visit   Medication Sig    aspirin 325 mg tablet Take 325 mg by mouth daily      benzonatate (TESSALON PERLES) 100 mg capsule Take 1 capsule (100 mg total) by mouth 3 (three) times a day    calcium carbonate (OS-TEDDY) 600 MG tablet Take 600 mg by mouth daily    carboxymethylcellulose (REFRESH LIQUIGEL) 1 % ophthalmic solution Apply 1 drop to eye 3 (three) times a day      Dentifrices (BIOTENE DRY MOUTH CARE DT) Apply to teeth    DULoxetine (CYMBALTA) 60 mg delayed release capsule Take 1 capsule (60 mg total) by mouth daily    guaiFENesin (MUCINEX) 600 mg 12 hr tablet Take 1 tablet (600 mg total) by mouth 2 (two) times a day as needed for congestion    ipratropium-albuterol (COMBIVENT RESPIMAT) inhaler Inhale 2 puffs every 4 (four) hours as needed (dyspnea)    levothyroxine 100 mcg tablet     loratadine (CLARITIN) 10 mg tablet Take by mouth    LORazepam (ATIVAN) 0 5 mg tablet Take 1 tablet (0 5 mg total) by mouth every 8 (eight) hours as needed for anxiety for up to 10 days    losartan (COZAAR) 50 mg tablet Take 50 mg by mouth daily    Lutein 10 MG TABS Take 1 tablet by mouth daily      magnesium hydroxide (MILK OF MAGNESIA) 400 mg/5 mL oral suspension Take 30 mL by mouth daily as needed for constipation      metoprolol tartrate (LOPRESSOR) 50 mg tablet 50 mg 2 (two) times a day      NIFEdipine ER (ADALAT CC) 60 MG 24 hr tablet Take 60 mg by mouth daily    pantoprazole (PROTONIX) 40 mg tablet Take 40 mg by mouth 2 (two) times a day      phenol (CHLORASEPTIC) 1 4 % mucosal liquid Apply 1 spray to the mouth or throat every 2 (two) hours as needed (sore throat)    pravastatin (PRAVACHOL) 40 mg tablet Take 40 mg by mouth daily       No current facility-administered medications on file prior to visit  She is allergic to acetaminophen; golimumab; lidocaine; neurontin [gabapentin]; nortriptyline; prasterone; leflunomide; and sulfasalazine       Review of Systems   Constitutional: Negative  HENT: Negative  Respiratory: Negative  Cardiovascular: Negative      Gastrointestinal: Negative  Genitourinary: Negative  Objective:      /72 (BP Location: Left arm, Patient Position: Sitting, Cuff Size: Standard)   Pulse 75   Temp (!) 97 3 °F (36 3 °C)   Ht 5' 1" (1 549 m)   Wt 61 kg (134 lb 6 4 oz)   SpO2 95%   BMI 25 39 kg/m²          Physical Exam   Constitutional: She appears well-developed and well-nourished  HENT:   Head: Normocephalic and atraumatic     Right Ear: External ear normal    Left Ear: External ear normal    Nose: Nose normal    Mouth/Throat: Oropharynx is clear and moist

## 2018-06-13 DIAGNOSIS — R52 PAIN: Primary | ICD-10-CM

## 2018-06-14 DIAGNOSIS — R52 PAIN: Primary | ICD-10-CM

## 2018-06-14 DIAGNOSIS — R52 PAIN: ICD-10-CM

## 2018-06-14 RX ORDER — TRAMADOL HYDROCHLORIDE 50 MG/1
50 TABLET ORAL EVERY 8 HOURS PRN
Qty: 30 TABLET | Refills: 0 | Status: SHIPPED | OUTPATIENT
Start: 2018-06-14 | End: 2018-06-14 | Stop reason: SDUPTHER

## 2018-06-14 RX ORDER — TRAMADOL HYDROCHLORIDE 50 MG/1
50 TABLET ORAL EVERY 6 HOURS PRN
Qty: 30 TABLET | Refills: 0 | Status: SHIPPED | OUTPATIENT
Start: 2018-06-14 | End: 2018-07-06 | Stop reason: SDUPTHER

## 2018-06-14 RX ORDER — TRAMADOL HYDROCHLORIDE 50 MG/1
50 TABLET ORAL EVERY 8 HOURS PRN
Qty: 30 TABLET | Refills: 0 | Status: SHIPPED | OUTPATIENT
Start: 2018-06-14 | End: 2018-10-03 | Stop reason: SDUPTHER

## 2018-06-20 RX ORDER — NIFEDIPINE 60 MG/1
TABLET, EXTENDED RELEASE ORAL
COMMUNITY
Start: 2018-04-13 | End: 2018-06-22

## 2018-06-20 RX ORDER — DULOXETIN HYDROCHLORIDE 30 MG/1
CAPSULE, DELAYED RELEASE ORAL
COMMUNITY
Start: 2018-04-28 | End: 2018-06-22

## 2018-06-22 ENCOUNTER — OFFICE VISIT (OUTPATIENT)
Dept: FAMILY MEDICINE CLINIC | Facility: CLINIC | Age: 83
End: 2018-06-22
Payer: MEDICARE

## 2018-06-22 VITALS
SYSTOLIC BLOOD PRESSURE: 142 MMHG | HEART RATE: 90 BPM | OXYGEN SATURATION: 97 % | TEMPERATURE: 97.3 F | RESPIRATION RATE: 16 BRPM | WEIGHT: 136 LBS | BODY MASS INDEX: 25.68 KG/M2 | HEIGHT: 61 IN | DIASTOLIC BLOOD PRESSURE: 60 MMHG

## 2018-06-22 DIAGNOSIS — D64.9 ANEMIA, UNSPECIFIED TYPE: ICD-10-CM

## 2018-06-22 DIAGNOSIS — R68.2 DRY MOUTH: ICD-10-CM

## 2018-06-22 DIAGNOSIS — J18.9 PNEUMONIA DUE TO INFECTIOUS ORGANISM, UNSPECIFIED LATERALITY, UNSPECIFIED PART OF LUNG: Primary | ICD-10-CM

## 2018-06-22 DIAGNOSIS — F03.90 DEMENTIA WITHOUT BEHAVIORAL DISTURBANCE, UNSPECIFIED DEMENTIA TYPE (HCC): ICD-10-CM

## 2018-06-22 DIAGNOSIS — K21.9 GASTROESOPHAGEAL REFLUX DISEASE WITHOUT ESOPHAGITIS: ICD-10-CM

## 2018-06-22 DIAGNOSIS — F41.9 ANXIETY: ICD-10-CM

## 2018-06-22 PROCEDURE — 99214 OFFICE O/P EST MOD 30 MIN: CPT | Performed by: INTERNAL MEDICINE

## 2018-06-22 RX ORDER — DONEPEZIL HYDROCHLORIDE 10 MG/1
10 TABLET, FILM COATED ORAL
Qty: 30 TABLET | Refills: 5 | Status: SHIPPED | OUTPATIENT
Start: 2018-06-22 | End: 2018-08-16 | Stop reason: SDUPTHER

## 2018-06-22 RX ORDER — FLUORIDE TOOTHPASTE
TOOTHPASTE DENTAL
Qty: 1 BOTTLE | Refills: 5 | Status: SHIPPED | OUTPATIENT
Start: 2018-06-22 | End: 2018-12-27 | Stop reason: SDUPTHER

## 2018-06-22 NOTE — PROGRESS NOTES
Assessment/Plan:         Diagnoses and all orders for this visit:    Pneumonia due to infectious organism, unspecified laterality, unspecified part of lung  Comments:  not completely resolved  to pulm    Gastroesophageal reflux disease without esophagitis    Anemia, unspecified type  Comments:  improving ? chr dx  Other orders  -     Discontinue: DULoxetine (CYMBALTA) 30 mg delayed release capsule;   -     Discontinue: NIFEdipine (PROCARDIA XL) 60 mg 24 hr tablet;           Subjective:      Patient ID: Smitha Perkins is a 80 y o  female  Pt doing well discussed better with aricept  Will increase to 10mg  Her cxr better but not resolved   hgb increased by 2 points  ? anemia with chronic dx  Discussed with daughter  Recheck 1 month with further eval and ? Chr dx  Discussed ? Egd/colo  Will ponder  The following portions of the patient's history were reviewed and updated as appropriate:   She  has a past medical history of Blind; Depression; Disease of thyroid gland; Fibromyalgia; Gait disturbance; GERD (gastroesophageal reflux disease); Glaucoma; Hyperlipidemia; Hypertension; Osteopenia; Psychiatric disorder; Rheumatoid arthritis ([facility] [place] [address] ); Vertigo; and Vitamin D deficiency  She   Patient Active Problem List    Diagnosis Date Noted    Acute cystitis 05/05/2018    Right lower quadrant abdominal pain 05/05/2018    DEVONTE (acute kidney injury) ([facility] [place] [address] ) 05/05/2018    Hypothyroidism 04/14/2018    Anemia 04/14/2018    Dementia without behavioral disturbance 04/13/2018    Hyponatremia 10/03/2016    CVA (cerebral vascular accident) ([facility] [place] [address] ) 10/03/2016    Essential hypertension 10/03/2016    Acute metabolic encephalopathy 37/15/4573    Hyperlipidemia     GERD (gastroesophageal reflux disease)     Fibromyalgia     Rheumatoid arthritis ([facility] [place] [address] )      She  has a past surgical history that includes Joint replacement    Her family history includes Coronary artery disease in her father; Dementia in her father; Heart attack in her mother  She  reports that she has never smoked  She has never used smokeless tobacco  She reports that she does not drink alcohol or use drugs    Current Outpatient Prescriptions   Medication Sig Dispense Refill    aspirin 325 mg tablet Take 325 mg by mouth daily        calcium carbonate (OS-TEDDY) 600 MG tablet Take 600 mg by mouth daily      carboxymethylcellulose (REFRESH LIQUIGEL) 1 % ophthalmic solution Apply 1 drop to eye 3 (three) times a day        Dentifrices (BIOTENE DRY MOUTH CARE DT) Apply to teeth      DULoxetine (CYMBALTA) 60 mg delayed release capsule Take 1 capsule (60 mg total) by mouth daily  0    guaiFENesin (MUCINEX) 600 mg 12 hr tablet Take 1 tablet (600 mg total) by mouth 2 (two) times a day as needed for congestion 60 tablet 1    ipratropium-albuterol (COMBIVENT RESPIMAT) inhaler Inhale 2 puffs every 4 (four) hours as needed (dyspnea) 1 Inhaler 5    levothyroxine 100 mcg tablet       loratadine (CLARITIN) 10 mg tablet Take by mouth      LORazepam (ATIVAN) 0 5 mg tablet Take 1 tablet (0 5 mg total) by mouth every 8 (eight) hours as needed for anxiety for up to 10 days 30 tablet 0    losartan (COZAAR) 50 mg tablet Take 50 mg by mouth daily      Lutein 10 MG TABS Take 1 tablet by mouth daily        magnesium hydroxide (MILK OF MAGNESIA) 400 mg/5 mL oral suspension Take 30 mL by mouth daily as needed for constipation        metoprolol tartrate (LOPRESSOR) 50 mg tablet 50 mg 2 (two) times a day        NIFEdipine ER (ADALAT CC) 60 MG 24 hr tablet Take 60 mg by mouth daily      pantoprazole (PROTONIX) 40 mg tablet Take 40 mg by mouth 2 (two) times a day        phenol (CHLORASEPTIC) 1 4 % mucosal liquid Apply 1 spray to the mouth or throat every 2 (two) hours as needed (sore throat) 1 Bottle 0    pravastatin (PRAVACHOL) 40 mg tablet Take 40 mg by mouth daily        traMADol (ULTRAM) 50 mg tablet Take 1 tablet (50 mg total) by mouth every 8 (eight) hours as needed for moderate pain 30 tablet 0    traMADol (ULTRAM) 50 mg tablet Take 1 tablet (50 mg total) by mouth every 6 (six) hours as needed for moderate pain 30 tablet 0    triamcinolone (KENALOG) 0 1 % cream triamcinolone acetonide 0 1 % topical cream       No current facility-administered medications for this visit        Current Outpatient Prescriptions on File Prior to Visit   Medication Sig    aspirin 325 mg tablet Take 325 mg by mouth daily      calcium carbonate (OS-TEDDY) 600 MG tablet Take 600 mg by mouth daily    carboxymethylcellulose (REFRESH LIQUIGEL) 1 % ophthalmic solution Apply 1 drop to eye 3 (three) times a day      Dentifrices (BIOTENE DRY MOUTH CARE DT) Apply to teeth    DULoxetine (CYMBALTA) 60 mg delayed release capsule Take 1 capsule (60 mg total) by mouth daily    guaiFENesin (MUCINEX) 600 mg 12 hr tablet Take 1 tablet (600 mg total) by mouth 2 (two) times a day as needed for congestion    ipratropium-albuterol (COMBIVENT RESPIMAT) inhaler Inhale 2 puffs every 4 (four) hours as needed (dyspnea)    levothyroxine 100 mcg tablet     loratadine (CLARITIN) 10 mg tablet Take by mouth    LORazepam (ATIVAN) 0 5 mg tablet Take 1 tablet (0 5 mg total) by mouth every 8 (eight) hours as needed for anxiety for up to 10 days    losartan (COZAAR) 50 mg tablet Take 50 mg by mouth daily    Lutein 10 MG TABS Take 1 tablet by mouth daily      magnesium hydroxide (MILK OF MAGNESIA) 400 mg/5 mL oral suspension Take 30 mL by mouth daily as needed for constipation      metoprolol tartrate (LOPRESSOR) 50 mg tablet 50 mg 2 (two) times a day      NIFEdipine ER (ADALAT CC) 60 MG 24 hr tablet Take 60 mg by mouth daily    pantoprazole (PROTONIX) 40 mg tablet Take 40 mg by mouth 2 (two) times a day      phenol (CHLORASEPTIC) 1 4 % mucosal liquid Apply 1 spray to the mouth or throat every 2 (two) hours as needed (sore throat)    pravastatin (PRAVACHOL) 40 mg tablet Take 40 mg by mouth daily      traMADol (ULTRAM) 50 mg tablet Take 1 tablet (50 mg total) by mouth every 8 (eight) hours as needed for moderate pain    [DISCONTINUED] benzonatate (TESSALON PERLES) 100 mg capsule Take 1 capsule (100 mg total) by mouth 3 (three) times a day    [DISCONTINUED] donepezil (ARICEPT) 5 mg tablet Take 1 tablet (5 mg total) by mouth daily at bedtime    traMADol (ULTRAM) 50 mg tablet Take 1 tablet (50 mg total) by mouth every 6 (six) hours as needed for moderate pain    triamcinolone (KENALOG) 0 1 % cream triamcinolone acetonide 0 1 % topical cream     No current facility-administered medications on file prior to visit  She is allergic to acetaminophen; golimumab; lidocaine; neurontin [gabapentin]; nortriptyline; prasterone; leflunomide; and sulfasalazine       Review of Systems   Constitutional: Negative  HENT: Negative  Respiratory: Negative  Cardiovascular: Negative  Gastrointestinal: Negative  Musculoskeletal: Positive for myalgias           Objective:      /60 (BP Location: Left arm, Patient Position: Sitting, Cuff Size: Standard)   Pulse 90   Temp (!) 97 3 °F (36 3 °C) (Tympanic)   Resp 16   Ht 5' 1" (1 549 m)   Wt 61 7 kg (136 lb)   SpO2 97%   BMI 25 70 kg/m²          Physical Exam

## 2018-06-27 ENCOUNTER — HOSPITAL ENCOUNTER (EMERGENCY)
Facility: HOSPITAL | Age: 83
Discharge: HOME | End: 2018-06-27
Attending: EMERGENCY MEDICINE | Admitting: EMERGENCY MEDICINE
Payer: MEDICARE

## 2018-06-27 ENCOUNTER — APPOINTMENT (EMERGENCY)
Dept: RADIOLOGY | Facility: HOSPITAL | Age: 83
End: 2018-06-27
Payer: MEDICARE

## 2018-06-27 VITALS
DIASTOLIC BLOOD PRESSURE: 76 MMHG | TEMPERATURE: 97.4 F | HEART RATE: 62 BPM | RESPIRATION RATE: 16 BRPM | BODY MASS INDEX: 25.58 KG/M2 | WEIGHT: 135.36 LBS | OXYGEN SATURATION: 94 % | SYSTOLIC BLOOD PRESSURE: 158 MMHG

## 2018-06-27 DIAGNOSIS — IMO0002 MASS: Primary | ICD-10-CM

## 2018-06-27 PROCEDURE — 99284 EMERGENCY DEPT VISIT MOD MDM: CPT

## 2018-06-27 PROCEDURE — 96375 TX/PRO/DX INJ NEW DRUG ADDON: CPT

## 2018-06-27 PROCEDURE — 73030 X-RAY EXAM OF SHOULDER: CPT

## 2018-06-27 PROCEDURE — 96374 THER/PROPH/DIAG INJ IV PUSH: CPT

## 2018-06-27 RX ORDER — ONDANSETRON 2 MG/ML
4 INJECTION INTRAMUSCULAR; INTRAVENOUS ONCE
Status: COMPLETED | OUTPATIENT
Start: 2018-06-27 | End: 2018-06-27

## 2018-06-27 RX ORDER — FENTANYL CITRATE 50 UG/ML
25 INJECTION, SOLUTION INTRAMUSCULAR; INTRAVENOUS ONCE
Status: COMPLETED | OUTPATIENT
Start: 2018-06-27 | End: 2018-06-27

## 2018-06-27 RX ADMIN — FENTANYL CITRATE 25 MCG: 50 INJECTION INTRAMUSCULAR; INTRAVENOUS at 12:32

## 2018-06-27 RX ADMIN — ONDANSETRON 4 MG: 2 INJECTION INTRAMUSCULAR; INTRAVENOUS at 12:33

## 2018-06-27 NOTE — DISCHARGE INSTRUCTIONS
Shoulder Pain   WHAT YOU NEED TO KNOW:   Shoulder pain is a common problem and can affect your daily activities  Pain can be caused by a problem within your shoulder  Shoulder pain may also be caused by pain that spreads to your shoulder from another part of your body  DISCHARGE INSTRUCTIONS:   Return to the emergency department if:   · You have severe pain  · You cannot move your arm or shoulder  · You have numbness or tingling in your shoulder or arm  Contact your healthcare provider if:   · Your pain gets worse or does not go away with treatment  · You have trouble moving your arm or shoulder  · You have questions or concerns about your condition or care  Medicines: You may need any of the following:  · Acetaminophen  decreases pain and fever  It is available without a doctor's order  Ask how much to take and how often to take it  Follow directions  Acetaminophen can cause liver damage if not taken correctly  · NSAIDs , such as ibuprofen, help decrease swelling, pain, and fever  This medicine is available with or without a doctor's order  NSAIDs can cause stomach bleeding or kidney problems in certain people  If you take blood thinner medicine, always ask your healthcare provider if NSAIDs are safe for you  Always read the medicine label and follow directions  · Take your medicine as directed  Contact your healthcare provider if you think your medicine is not helping or if you have side effects  Tell him of her if you are allergic to any medicine  Keep a list of the medicines, vitamins, and herbs you take  Include the amounts, and when and why you take them  Bring the list or the pill bottles to follow-up visits  Carry your medicine list with you in case of an emergency  Follow up with your healthcare provider or orthopedist as directed:  Write down your questions so you remember to ask them during your visits     Manage your symptoms:   · Apply ice  on your shoulder for 20 to 30 minutes every 2 hours or as directed  Use an ice pack, or put crushed ice in a plastic bag  Cover it with a towel  Ice helps prevent tissue damage and decreases swelling and pain  · Apply heat if ice does not help your symptoms  Apply heat on your shoulder for 20 to 30 minutes every 2 hours for as many days as directed  Heat helps decrease pain and muscle spasms  · Go to physical or occupational therapy as directed  A physical therapist teaches you exercises to help improve movement and strength, and to decrease pain  An occupational therapist teaches you skills to help with your daily activities  Prevent shoulder pain:   · Stretch and strengthen your shoulder  Use proper technique during exercises and sports  · Limit activities as directed  Try to avoid repeated overhead movements  © 2017 [facility] Information is for End User's use only and may not be sold, redistributed or otherwise used for commercial purposes  All illustrations and images included in CareNotes® are the copyrighted property of A D A M [employer] Inc  or Dejon Snyder  The above information is an  only  It is not intended as medical advice for individual conditions or treatments  Talk to your doctor, nurse or pharmacist before following any medical regimen to see if it is safe and effective for you

## 2018-06-27 NOTE — ED PROVIDER NOTES
History  Chief Complaint   Patient presents with    Shoulder Pain     Pt presents via EMS d/t left shoulder pain and "possible dislocation" per EMS, pt denies injury or fall        Patient presents emergency room with the complaints of pain over the posterior aspect of her left shoulder  She is having difficulty moving or arm secondary to pain and guarding  She has a history of dementia and lives in an assisted living situation with her   There was no documented trauma  She presented with the paramedics who did medicate her for pain prior to arrival   She has a history of having a shoulder replacement performed  History provided by:  Patient  Shoulder Pain   Location:  Shoulder  Shoulder location:  L shoulder  Injury: no    Pain details:     Quality:  Aching    Radiates to:  Does not radiate    Severity:  Moderate    Onset quality:  Sudden    Timing:  Constant  Dislocation: no    Foreign body present:  No foreign bodies  Prior injury to area:  No  Relieved by: pain medications given by medics  Worsened by: Movement  Associated symptoms: decreased range of motion    Associated symptoms: no back pain, no fatigue, no fever, no muscle weakness, no neck pain, no numbness, no stiffness and no tingling    Risk factors: no concern for non-accidental trauma, no known bone disorder, no frequent fractures and no recent illness        Prior to Admission Medications   Prescriptions Last Dose Informant Patient Reported? Taking?    DULoxetine (CYMBALTA) 60 mg delayed release capsule  Self No No   Sig: Take 1 capsule (60 mg total) by mouth daily   Dentifrices ([facility] DT)  Self Yes No   Sig: Apply to teeth   LORazepam (ATIVAN) 0 5 mg tablet  Self No No   Sig: Take 1 tablet (0 5 mg total) by mouth every 8 (eight) hours as needed for anxiety for up to 10 days   Lutein 10 MG TABS  Self Yes No   Sig: Take 1 tablet by mouth daily     Mouthwashes (BIOTENE DRY MOUTH) LIQD   No No   Sig: Rinse and spit bid   NIFEdipine ER (ADALAT CC) 60 MG 24 hr tablet  Self Yes No   Sig: Take 60 mg by mouth daily   aspirin 325 mg tablet  Self Yes No   Sig: Take 325 mg by mouth daily     calcium carbonate (OS-TEDDY) 600 MG tablet  Self Yes No   Sig: Take 600 mg by mouth daily   carboxymethylcellulose (REFRESH LIQUIGEL) 1 % ophthalmic solution  Self Yes No   Sig: Apply 1 drop to eye 3 (three) times a day     donepezil (ARICEPT) 10 mg tablet   No No   Sig: Take 1 tablet (10 mg total) by mouth daily at bedtime   guaiFENesin (MUCINEX) 600 mg 12 hr tablet  Self No No   Sig: Take 1 tablet (600 mg total) by mouth 2 (two) times a day as needed for congestion   ipratropium-albuterol (COMBIVENT RESPIMAT) inhaler  Self No No   Sig: Inhale 2 puffs every 4 (four) hours as needed (dyspnea)   levothyroxine 100 mcg tablet  Self Yes No   loratadine (CLARITIN) 10 mg tablet  Self Yes No   Sig: Take by mouth   losartan (COZAAR) 50 mg tablet  Self Yes No   Sig: Take 50 mg by mouth daily   magnesium hydroxide (MILK OF MAGNESIA) 400 mg/5 mL oral suspension  Self Yes No   Sig: Take 30 mL by mouth daily as needed for constipation     metoprolol tartrate (LOPRESSOR) 50 mg tablet  Self Yes No   Si mg 2 (two) times a day     pantoprazole (PROTONIX) 40 mg tablet  Self Yes No   Sig: Take 40 mg by mouth 2 (two) times a day     phenol (CHLORASEPTIC) 1 4 % mucosal liquid  Self No No   Sig: Apply 1 spray to the mouth or throat every 2 (two) hours as needed (sore throat)   pravastatin (PRAVACHOL) 40 mg tablet  Self Yes No   Sig: Take 40 mg by mouth daily     traMADol (ULTRAM) 50 mg tablet  Self No No   Sig: Take 1 tablet (50 mg total) by mouth every 6 (six) hours as needed for moderate pain   traMADol (ULTRAM) 50 mg tablet  Self No No   Sig: Take 1 tablet (50 mg total) by mouth every 8 (eight) hours as needed for moderate pain   triamcinolone (KENALOG) 0 1 % cream  Self Yes No   Sig: triamcinolone acetonide 0 1 % topical cream      Facility-Administered Medications: None       Past Medical History:   Diagnosis Date    Blind     blind right eye    Dementia     Depression     Disease of thyroid gland     Fibromyalgia     Gait disturbance     GERD (gastroesophageal reflux disease)     Glaucoma     Hyperlipidemia     Hypertension     Osteopenia     Psychiatric disorder     depression    Rheumatoid arthritis (HCC)     Vertigo     Vitamin D deficiency        Past Surgical History:   Procedure Laterality Date    JOINT REPLACEMENT      left hip replacement, left shoulder replacement       Family History   Problem Relation Age of Onset    Heart attack Mother     Dementia Father     Coronary artery disease Father      I have reviewed and agree with the history as documented  Social History   Substance Use Topics    Smoking status: Never Smoker    Smokeless tobacco: Never Used    Alcohol use No        Review of Systems   Constitutional: Positive for activity change  Negative for appetite change, chills, diaphoresis, fatigue and fever  HENT: Negative for congestion, dental problem, ear discharge, mouth sores, postnasal drip, rhinorrhea, sore throat and trouble swallowing  Eyes: Negative for pain, discharge, redness and itching  Respiratory: Negative for cough, chest tightness, shortness of breath and wheezing  Cardiovascular: Negative for chest pain and palpitations  Musculoskeletal: Negative for back pain, neck pain, neck stiffness and stiffness  Skin: Negative for color change, pallor, rash and wound  Psychiatric/Behavioral: Positive for confusion  All other systems reviewed and are negative        Physical Exam  Physical Exam    Vital Signs  ED Triage Vitals   Temperature Pulse Respirations Blood Pressure SpO2   06/27/18 1154 06/27/18 1154 06/27/18 1154 06/27/18 1154 06/27/18 1154   (!) 97 4 °F (36 3 °C) 66 16 (!) 175/77 96 %      Temp Source Heart Rate Source Patient Position - Orthostatic VS BP Location FiO2 (%)   06/27/18 1154 06/27/18 1154 06/27/18 1330 06/27/18 1330 --   Oral Monitor Lying Right arm       Pain Score       06/27/18 1154       Worst Possible Pain           Vitals:    06/27/18 1154 06/27/18 1330 06/27/18 1400   BP: (!) 175/77 (!) 176/81 158/76   Pulse: 66 63 62   Patient Position - Orthostatic VS:  Lying        Visual Acuity      ED Medications  Medications    EMS REPLENISHMENT MED ( Does not apply Given to EMS 6/27/18 1201)   fentanyl citrate (PF) 100 MCG/2ML 25 mcg (25 mcg Intravenous Given 6/27/18 1232)   ondansetron (ZOFRAN) injection 4 mg (4 mg Intravenous Given 6/27/18 1233)       Diagnostic Studies  Results Reviewed     None                 XR shoulder 2+ views LEFT   ED Interpretation by Peace Phillips PA-C (06/27 1357)      No acute osseous abnormality  Workstation performed: [id number]         Final Result by Eric Ramirez MD (06/27 1325)      No acute osseous abnormality  Workstation performed: [id number]                    Procedures  Procedures       Phone Contacts  ED Phone Contact    ED Course                               MDM  Number of Diagnoses or Management Options  Mass: new and requires workup     Amount and/or Complexity of Data Reviewed  Tests in the radiology section of CPT®: ordered and reviewed  Tests in the medicine section of CPT®: ordered and reviewed    Risk of Complications, Morbidity, and/or Mortality  Presenting problems: moderate  Diagnostic procedures: moderate  Management options: moderate  General comments:   Patient  Presents emergency room from the [facility] with a history of dementia  She is complaining of pain in her left shoulder  She has had a previous so shoulder replacement  She complains of a soft tissue mass over the posterior aspect of her shoulder that is hurting her  She was medicated with 4 pain prior to arrival   She was seen and examined  X-rays were obtained which were within normal limits    They did demonstrate the shoulder replacement to be in good position  The patient was given a referral to follow up with a shoulder specialist as the orthopedist who did her shoulder replacement is in [place]  She was discharged back to the nursing home  She was placed in a sling for comfort  Patient Progress  Patient progress: stable    CritCare Time    Disposition  Final diagnoses: Mass - soft tissue mass left shoulder     Time reflects when diagnosis was documented in both MDM as applicable and the Disposition within this note     Time User Action Codes Description Comment    6/27/2018  1:57 PM Brian Flor [R22 9] Mass     6/27/2018  1:57 PM Elisabeth Prajapati Modify [R22 9] Mass soft tissue mass left shoulder      ED Disposition     ED Disposition Condition Comment    Discharge  [address] discharge to home/self care  Condition at discharge: Good        Follow-up Information     Follow up With Specialties Details Why [facility] In 1 week  [address]  [phone]            Patient's Medications   Discharge Prescriptions    No medications on file     No discharge procedures on file      ED Provider  Electronically Signed by           Mary Rich PA-C  06/27/18 9109

## 2018-06-28 ENCOUNTER — OFFICE VISIT (OUTPATIENT)
Dept: OBGYN CLINIC | Facility: OTHER | Age: 83
End: 2018-06-28
Payer: MEDICARE

## 2018-06-28 VITALS
DIASTOLIC BLOOD PRESSURE: 73 MMHG | SYSTOLIC BLOOD PRESSURE: 135 MMHG | BODY MASS INDEX: 26 KG/M2 | HEART RATE: 92 BPM | WEIGHT: 137.6 LBS

## 2018-06-28 DIAGNOSIS — Z96.619 PAIN DUE TO SHOULDER JOINT PROSTHESIS, INITIAL ENCOUNTER (HCC): Primary | ICD-10-CM

## 2018-06-28 DIAGNOSIS — T84.84XA PAIN DUE TO SHOULDER JOINT PROSTHESIS, INITIAL ENCOUNTER (HCC): Primary | ICD-10-CM

## 2018-06-28 PROCEDURE — 99213 OFFICE O/P EST LOW 20 MIN: CPT | Performed by: ORTHOPAEDIC SURGERY

## 2018-06-28 RX ORDER — HYDROCODONE POLISTIREX AND CHLORPHENIRAMINE POLISTIREX 10; 8 MG/5ML; MG/5ML
SUSPENSION, EXTENDED RELEASE ORAL
COMMUNITY
Start: 2018-05-03 | End: 2018-08-16

## 2018-06-28 RX ORDER — AMOXICILLIN 500 MG/1
CAPSULE ORAL
COMMUNITY
Start: 2018-06-13 | End: 2018-08-16

## 2018-06-28 RX ORDER — NIFEDIPINE 60 MG/1
60 TABLET, EXTENDED RELEASE ORAL DAILY
COMMUNITY
Start: 2018-06-25

## 2018-06-28 NOTE — PROGRESS NOTES
Assessment  Diagnoses and all orders for this visit:    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, initial encounter [facility])    Discussion and Plan:    Discussed physical exam with patient and her accompanying daughter  It is suspected that patient experienced an instability event of the prosthesis that spontaneously reduced itself  She is expected to make for recovery with rest of the affected extremity  - Continue with use of shoulder sling for protection  - After 2-3 days she can take the arm out of the sling to prevent developing shoulder stiffness  - Return in about 3 weeks (around 7/19/2018) for Re-evaluation  Subjective:   Patient ID: Carlos Duran is a 80 y o  female      Patient presents for initial evaluation of atraumatic acute onset left shoulder pain x1 day  Patient is accompanied by her daughter, who is assisting with providing the history of present issue  There is no known specific incident of injury  S[place]ia was seen yesterday night in the ED for this issue  X-rays were taken and read as negative  She reports that her pain is posterior, sensitive to light touch, and significantly restricts her range of motion  Her daughter reports that there was firm, localized swelling in the posterior aspect of the left shoulder that she described as being "baseball sized," but has decreased in size since last night  Jayme Fernandez denies any numbness or tingling  She has a previous history of left reverse total shoulder arthroplasty        The following portions of the patient's history were reviewed and updated as appropriate: allergies, current medications, past family history, past medical history, past social history, past surgical history and problem list     Review of Systems    Objective:    Vitals:    06/28/18 1319   BP: 135/73   BP Location: Left arm   Patient Position: Sitting   Cuff Size: Adult   Pulse: 92   Weight: 62 4 kg (137 lb 9 6 oz)       Left Shoulder Exam     Range of Motion   Active Abduction:                       40  Passive Abduction:                    80  Forward Flexion:                        80  External Rotation:                      30    Muscle Strength   Abduction:            3/5  External Rotation: 3/5  Supraspinatus:     N/t  Subscapularis:     3/5    Tests   Cross Arm:      Positive  Drop Arm:        Positive    Comments:  Deltoid atrophy noted  Palpable swelling of the posterior aspect of the shoulder      + resisted ext rot  + belly press          Physical Exam   Constitutional: She is oriented to person, place, and time  She appears well-developed and well-nourished  HENT:   Right Ear: External ear normal    Left Ear: External ear normal    Nose: Nose normal    Eyes: Conjunctivae and EOM are normal  Pupils are equal, round, and reactive to light  Neck: Normal range of motion  Cardiovascular: Intact distal pulses  Pulmonary/Chest: Effort normal    Musculoskeletal: Normal range of motion  Neurological: She is alert and oriented to person, place, and time  Skin: Skin is warm and dry  Psychiatric: She has a normal mood and affect  Her behavior is normal  Judgment and thought content normal      I have personally reviewed pertinent films in PACS and interpretation is as follows  Radiographic series taken 6/27/2018 shows normal alignment of prosthesis  Scribe Attestation    I,:   Charo Caballero am acting as a scribe while in the presence of the attending physician :        I,:   Juancarlos Diaz MD personally performed the services described in this documentation    as scribed in my presence :            Patient appears to have sustained an instability event with her left reverse total shoulder arthroplasty which is already improving symptom wise, somewhat difficult to ascertain history from the patient as she has dementia but her daughter is present providing most of the history    There is no acute complication at this time requiring treatment so I recommend sling protection for the next 2 to 3 weeks with repeat evaluation, if she continues to have symptoms would have to consider a full workup including a CT scan with metal artifact reduction software as well as an aspiration to evaluate for possible periprosthetic joint infection

## 2018-07-03 ENCOUNTER — TELEPHONE (OUTPATIENT)
Dept: FAMILY MEDICINE CLINIC | Facility: CLINIC | Age: 83
End: 2018-07-03

## 2018-07-03 NOTE — TELEPHONE ENCOUNTER
Wants to know if a script for her tramadol can be faxed to the [facility] where she is  Told them she just had it filled on 6/14    Fax to [phone]

## 2018-07-06 DIAGNOSIS — R52 PAIN: ICD-10-CM

## 2018-07-06 RX ORDER — TRAMADOL HYDROCHLORIDE 50 MG/1
50 TABLET ORAL EVERY 8 HOURS PRN
Qty: 60 TABLET | Refills: 0 | Status: SHIPPED | OUTPATIENT
Start: 2018-07-06 | End: 2018-08-15 | Stop reason: SDUPTHER

## 2018-07-16 DIAGNOSIS — K12.1 ULCER (TRAUMATIC) OF ORAL MUCOSA: Primary | ICD-10-CM

## 2018-07-28 LAB
BASOPHILS # BLD AUTO: 31 CELLS/UL (ref 0–200)
BASOPHILS NFR BLD AUTO: 0.5 %
EOSINOPHIL # BLD AUTO: 214 CELLS/UL (ref 15–500)
EOSINOPHIL NFR BLD AUTO: 3.5 %
ERYTHROCYTE [DISTWIDTH] IN BLOOD BY AUTOMATED COUNT: 13.2 % (ref 11–15)
FERRITIN SERPL-MCNC: 92 NG/ML (ref 20–288)
FOLATE SERPL-MCNC: 10.6 NG/ML
HCT VFR BLD AUTO: 31.9 % (ref 35–45)
HGB BLD-MCNC: 10.6 G/DL (ref 11.7–15.5)
IRON SERPL-MCNC: 64 MCG/DL (ref 45–160)
LYMPHOCYTES # BLD AUTO: 1366 CELLS/UL (ref 850–3900)
LYMPHOCYTES NFR BLD AUTO: 22.4 %
MCH RBC QN AUTO: 30.8 PG (ref 27–33)
MCHC RBC AUTO-ENTMCNC: 33.2 G/DL (ref 32–36)
MCV RBC AUTO: 92.7 FL (ref 80–100)
MONOCYTES # BLD AUTO: 763 CELLS/UL (ref 200–950)
MONOCYTES NFR BLD AUTO: 12.5 %
NEUTROPHILS # BLD AUTO: 3727 CELLS/UL (ref 1500–7800)
NEUTROPHILS NFR BLD AUTO: 61.1 %
PLATELET # BLD AUTO: 220 THOUSAND/UL (ref 140–400)
PMV BLD REES-ECKER: 9.4 FL (ref 7.5–12.5)
RBC # BLD AUTO: 3.44 MILLION/UL (ref 3.8–5.1)
RETICS #: NORMAL CELLS/UL (ref 20000–80000)
RETICS/RBC NFR AUTO: 1.3 %
TRANSFERRIN SERPL-MCNC: 276 MG/DL (ref 188–341)
VIT B12 SERPL-MCNC: 288 PG/ML (ref 200–1100)
WBC # BLD AUTO: 6.1 THOUSAND/UL (ref 3.8–10.8)

## 2018-08-15 DIAGNOSIS — R52 PAIN: ICD-10-CM

## 2018-08-15 RX ORDER — TRAMADOL HYDROCHLORIDE 50 MG/1
50 TABLET ORAL EVERY 8 HOURS PRN
Qty: 60 TABLET | Refills: 0 | Status: SHIPPED | OUTPATIENT
Start: 2018-08-15 | End: 2018-08-22 | Stop reason: SDUPTHER

## 2018-08-16 ENCOUNTER — OFFICE VISIT (OUTPATIENT)
Dept: FAMILY MEDICINE CLINIC | Facility: CLINIC | Age: 83
End: 2018-08-16
Payer: MEDICARE

## 2018-08-16 ENCOUNTER — APPOINTMENT (OUTPATIENT)
Dept: RADIOLOGY | Facility: MEDICAL CENTER | Age: 83
End: 2018-08-16
Payer: MEDICARE

## 2018-08-16 VITALS
BODY MASS INDEX: 25.49 KG/M2 | WEIGHT: 135 LBS | HEIGHT: 61 IN | RESPIRATION RATE: 16 BRPM | SYSTOLIC BLOOD PRESSURE: 124 MMHG | HEART RATE: 54 BPM | TEMPERATURE: 97.3 F | DIASTOLIC BLOOD PRESSURE: 60 MMHG | OXYGEN SATURATION: 96 %

## 2018-08-16 DIAGNOSIS — R00.1 BRADYCARDIA: ICD-10-CM

## 2018-08-16 DIAGNOSIS — M25.471 EDEMA OF RIGHT ANKLE: ICD-10-CM

## 2018-08-16 DIAGNOSIS — D63.8 ANEMIA OF CHRONIC DISEASE: ICD-10-CM

## 2018-08-16 DIAGNOSIS — N39.0 URINARY TRACT INFECTION WITHOUT HEMATURIA, SITE UNSPECIFIED: ICD-10-CM

## 2018-08-16 DIAGNOSIS — Z00.00 MEDICARE ANNUAL WELLNESS VISIT, SUBSEQUENT: Primary | ICD-10-CM

## 2018-08-16 DIAGNOSIS — F03.91 DEMENTIA WITH BEHAVIORAL DISTURBANCE, UNSPECIFIED DEMENTIA TYPE (HCC): ICD-10-CM

## 2018-08-16 DIAGNOSIS — I10 ESSENTIAL HYPERTENSION: ICD-10-CM

## 2018-08-16 PROCEDURE — 71046 X-RAY EXAM CHEST 2 VIEWS: CPT

## 2018-08-16 PROCEDURE — 99214 OFFICE O/P EST MOD 30 MIN: CPT | Performed by: INTERNAL MEDICINE

## 2018-08-16 PROCEDURE — G0439 PPPS, SUBSEQ VISIT: HCPCS | Performed by: INTERNAL MEDICINE

## 2018-08-16 RX ORDER — LEVOFLOXACIN 500 MG/1
500 TABLET, FILM COATED ORAL EVERY 24 HOURS
Qty: 7 TABLET | Refills: 0 | Status: SHIPPED | OUTPATIENT
Start: 2018-08-16 | End: 2018-08-23

## 2018-08-16 RX ORDER — FUROSEMIDE 20 MG/1
TABLET ORAL
Qty: 30 TABLET | Refills: 0 | Status: SHIPPED | OUTPATIENT
Start: 2018-08-16 | End: 2018-11-12 | Stop reason: HOSPADM

## 2018-08-16 RX ORDER — DONEPEZIL HYDROCHLORIDE 10 MG/1
5 TABLET, FILM COATED ORAL
Qty: 45 TABLET | Refills: 1 | Status: SHIPPED | OUTPATIENT
Start: 2018-08-16

## 2018-08-16 RX ORDER — POTASSIUM CHLORIDE 750 MG/1
TABLET, EXTENDED RELEASE ORAL
Qty: 30 TABLET | Refills: 0 | Status: SHIPPED | OUTPATIENT
Start: 2018-08-16 | End: 2019-03-12

## 2018-08-16 RX ORDER — LOSARTAN POTASSIUM 100 MG/1
100 TABLET ORAL DAILY
Qty: 90 TABLET | Refills: 1 | Status: SHIPPED | OUTPATIENT
Start: 2018-08-16

## 2018-08-16 RX ORDER — METOPROLOL TARTRATE 50 MG/1
25 TABLET, FILM COATED ORAL 2 TIMES DAILY
Qty: 45 TABLET | Refills: 1 | Status: SHIPPED | OUTPATIENT
Start: 2018-08-16

## 2018-08-16 NOTE — PROGRESS NOTES
Assessment/Plan:         Diagnoses and all orders for this visit:    Edema of right ankle  Comments:  doubt chf   will repeat her cxr and tx low dose lasix  discussed to check echo  daughter now agrees to see how she does with tx  Dementia with behavioral disturbance, unspecified dementia type  Comments:  pt has become worse on 10mg aricept  she is saying i wish i were dead  will decreas dose to 5mg since it helped her    Anemia of chronic disease    Urinary tract infection without hematuria, site unspecified  Comments:  unable to give urine  will empirically tx urine and her "sore throat" complaint          Subjective:      Patient ID: Kb Ricardo is a 80 y o  female  Pt has become agitated /confused with higher dose of aricept  Also she is having incontinence  Now she has more ankl edema x <1 week  She grabs her chest now and then  She has pollack  She complains of arms both hurting        The following portions of the patient's history were reviewed and updated as appropriate:   She  has a past medical history of Aspiration pneumonia ([facility] ); Basal cell carcinoma of nose; Blind; Dementia; Depression; Disease of thyroid gland; Fibromyalgia; Gait disturbance; GERD (gastroesophageal reflux disease); Glaucoma; Hyperlipidemia; Hypertension; Osteopenia; Peripheral neuropathy; Polymyalgia rheumatica ([facility] ); Psychiatric disorder; Renal insufficiency syndrome; Rheumatoid arthritis ([facility] ); Stroke [facility]); Systemic lupus erythematosus ([facility] ); Vertigo; and Vitamin D deficiency    She   Patient Active Problem List    Diagnosis Date Noted    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 ([facility] ) 06/28/2018    Acute cystitis 05/05/2018    Right lower quadrant abdominal pain 05/05/2018    DEVONTE (acute kidney injury) ([facility] ) 05/05/2018    Hypothyroidism 04/14/2018    Anemia 04/14/2018    Dementia without behavioral disturbance 04/13/2018    Hyponatremia 10/03/2016    CVA (cerebral vascular accident) ([facility] ) 10/03/2016    Essential hypertension 10/03/2016    Acute metabolic encephalopathy 16/85/8956    Hyperlipidemia     GERD (gastroesophageal reflux disease)     Fibromyalgia     Rheumatoid arthritis ([facility] )      She  has a past surgical history that includes Joint replacement; Shoulder surgery; and Eye surgery  Her family history includes Coronary artery disease in her father; Dementia in her father; Diabetes in her mother and sister; Heart attack in her mother; Uterine cancer in her sister  She  reports that she has never smoked  She has never used smokeless tobacco  She reports that she does not drink alcohol or use drugs    Current Outpatient Prescriptions   Medication Sig Dispense Refill    aspirin 325 mg tablet Take 325 mg by mouth daily        benzocaine (ORAJEL) 10 % mucosal gel Apply 1 application to the mouth or throat 3 (three) times a day as needed for mucositis 5 3 g 0    calcium carbonate (OS-TEDDY) 600 MG tablet Take 600 mg by mouth daily      carboxymethylcellulose (REFRESH LIQUIGEL) 1 % ophthalmic solution Apply 1 drop to eye 3 (three) times a day        Dentifrices (BIOTENE DRY MOUTH CARE DT) Apply to teeth      donepezil (ARICEPT) 10 mg tablet Take 1 tablet (10 mg total) by mouth daily at bedtime 30 tablet 5    DULoxetine (CYMBALTA) 60 mg delayed release capsule Take 1 capsule (60 mg total) by mouth daily  0    guaiFENesin (MUCINEX) 600 mg 12 hr tablet Take 1 tablet (600 mg total) by mouth 2 (two) times a day as needed for congestion 60 tablet 1    ipratropium-albuterol (COMBIVENT RESPIMAT) inhaler Inhale 2 puffs every 4 (four) hours as needed (dyspnea) 1 Inhaler 5    levothyroxine 100 mcg tablet       loratadine (CLARITIN) 10 mg tablet Take by mouth      LORazepam (ATIVAN) 0 5 mg tablet Take 1 tablet (0 5 mg total) by mouth every 8 (eight) hours as needed for anxiety for up to 10 days 30 tablet 0    losartan (COZAAR) 50 mg tablet Take 50 mg by mouth daily      Lutein 10 MG TABS Take 1 tablet by mouth daily        magnesium hydroxide (MILK OF MAGNESIA) 400 mg/5 mL oral suspension Take 30 mL by mouth daily as needed for constipation        metoprolol tartrate (LOPRESSOR) 50 mg tablet 50 mg 2 (two) times a day        Mouthwashes (BIOTENE DRY MOUTH) LIQD Rinse and spit bid 1 Bottle 5    NIFEdipine ER (ADALAT CC) 60 MG 24 hr tablet Take 60 mg by mouth daily      pantoprazole (PROTONIX) 40 mg tablet Take 40 mg by mouth 2 (two) times a day        pravastatin (PRAVACHOL) 40 mg tablet Take 40 mg by mouth daily        traMADol (ULTRAM) 50 mg tablet Take 1 tablet (50 mg total) by mouth every 8 (eight) hours as needed for moderate pain 60 tablet 0    triamcinolone (KENALOG) 0 1 % cream triamcinolone acetonide 0 1 % topical cream      NIFEdipine (PROCARDIA XL) 60 mg 24 hr tablet       traMADol (ULTRAM) 50 mg tablet Take 1 tablet (50 mg total) by mouth every 8 (eight) hours as needed for moderate pain (Patient not taking: Reported on 8/16/2018 ) 30 tablet 0     No current facility-administered medications for this visit        Current Outpatient Prescriptions on File Prior to Visit   Medication Sig    aspirin 325 mg tablet Take 325 mg by mouth daily      benzocaine (ORAJEL) 10 % mucosal gel Apply 1 application to the mouth or throat 3 (three) times a day as needed for mucositis    calcium carbonate (OS-TEDDY) 600 MG tablet Take 600 mg by mouth daily    carboxymethylcellulose (REFRESH LIQUIGEL) 1 % ophthalmic solution Apply 1 drop to eye 3 (three) times a day      Dentifrices (BIOTENE DRY MOUTH CARE DT) Apply to teeth    donepezil (ARICEPT) 10 mg tablet Take 1 tablet (10 mg total) by mouth daily at bedtime    DULoxetine (CYMBALTA) 60 mg delayed release capsule Take 1 capsule (60 mg total) by mouth daily    guaiFENesin (MUCINEX) 600 mg 12 hr tablet Take 1 tablet (600 mg total) by mouth 2 (two) times a day as needed for congestion    ipratropium-albuterol (COMBIVENT RESPIMAT) inhaler Inhale 2 puffs every 4 (four) hours as needed (dyspnea)    levothyroxine 100 mcg tablet     loratadine (CLARITIN) 10 mg tablet Take by mouth    LORazepam (ATIVAN) 0 5 mg tablet Take 1 tablet (0 5 mg total) by mouth every 8 (eight) hours as needed for anxiety for up to 10 days    losartan (COZAAR) 50 mg tablet Take 50 mg by mouth daily    Lutein 10 MG TABS Take 1 tablet by mouth daily      magnesium hydroxide (MILK OF MAGNESIA) 400 mg/5 mL oral suspension Take 30 mL by mouth daily as needed for constipation      metoprolol tartrate (LOPRESSOR) 50 mg tablet 50 mg 2 (two) times a day      Mouthwashes (BIOTENE DRY MOUTH) LIQD Rinse and spit bid    NIFEdipine ER (ADALAT CC) 60 MG 24 hr tablet Take 60 mg by mouth daily    pantoprazole (PROTONIX) 40 mg tablet Take 40 mg by mouth 2 (two) times a day      pravastatin (PRAVACHOL) 40 mg tablet Take 40 mg by mouth daily      traMADol (ULTRAM) 50 mg tablet Take 1 tablet (50 mg total) by mouth every 8 (eight) hours as needed for moderate pain    triamcinolone (KENALOG) 0 1 % cream triamcinolone acetonide 0 1 % topical cream    [DISCONTINUED] hydrocodone-chlorpheniramine polistirex (TUSSIONEX) 10-8 mg/5 mL ER suspension     [DISCONTINUED] phenol (CHLORASEPTIC) 1 4 % mucosal liquid Apply 1 spray to the mouth or throat every 2 (two) hours as needed (sore throat)    NIFEdipine (PROCARDIA XL) 60 mg 24 hr tablet     traMADol (ULTRAM) 50 mg tablet Take 1 tablet (50 mg total) by mouth every 8 (eight) hours as needed for moderate pain (Patient not taking: Reported on 8/16/2018 )    [DISCONTINUED] amoxicillin (AMOXIL) 500 mg capsule      No current facility-administered medications on file prior to visit  She is allergic to acetaminophen; golimumab; lidocaine; neurontin [gabapentin]; nortriptyline; prasterone; tricyclic antidepressants; leflunomide; and sulfasalazine       Review of Systems   Constitutional: Negative  HENT: Positive for sore throat      Eyes: Positive for visual disturbance  Respiratory: Positive for chest tightness and shortness of breath  Cardiovascular: Positive for chest pain and leg swelling  Objective:      /60 (BP Location: Left arm, Patient Position: Sitting, Cuff Size: Standard)   Pulse (!) 54   Temp (!) 97 3 °F (36 3 °C) (Tympanic)   Resp 16   Ht 5' 1" (1 549 m)   Wt 61 2 kg (135 lb)   SpO2 96%   BMI 25 51 kg/m²          Physical Exam   Constitutional: She appears well-developed and well-nourished  HENT:   Right Ear: External ear normal    Left Ear: External ear normal    Mouth/Throat: Oropharynx is clear and moist    Cardiovascular: Regular rhythm and normal heart sounds  Bradycardia     Pulmonary/Chest: Effort normal and breath sounds normal    Musculoskeletal: She exhibits edema

## 2018-08-16 NOTE — PATIENT INSTRUCTIONS
Asked the daughter to call for cxr results tomorrow  If her pneumonia cleared told her okay to d/c pulm visit  Also rto 2 weeks ? Needs echo  tx chest/urine with antibiotic empirically  Decrease aricept from her yelling out about killing herself  decreas metoprolol with hr 54 and increase cozaar    Check lab 1 week after change

## 2018-08-16 NOTE — PROGRESS NOTES
Assessment and Plan:    Problem List Items Addressed This Visit     None        Health Maintenance Due   Topic Date Due    Medicare Annual Wellness Visit (AWV)  04/27/1931    DTaP,Tdap,and Td Vaccines (1 - Tdap) 04/27/1952    Pneumococcal PPSV23/PCV13 65+ Years / High and Highest Risk (1 of 2 - PCV13) 04/27/1996    GLAUCOMA SCREENING 72 + YR  04/27/1998         HPI:  Daily Naik is a 80 y o  female here for her Subsequent Wellness Visit      Patient Active Problem List   Diagnosis    Hyperlipidemia    GERD (gastroesophageal reflux disease)    Fibromyalgia    Rheumat[facility] arthritis ([facility] )    Hyponatremia    CVA (cerebral vascular accident) ([facility] )    Essential hypertension    Acute metabolic encephalopathy    Dementia without behavioral disturbance    Hypothyroidism    Anemia    Acute cystitis    Right lower quadrant abdominal pain    DEVONTE (acute kidney injury) ([facility] )    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 ([facility] )     Past Medical History:   Diagnosis Date    Aspiration pneumonia ([facility] )     Last Assessed:  7/18/17    Basal cell carcinoma of nose     Last Assessed:  4/12/17    Blind     blind right eye, 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, Last Assessed:  7/18/17    Dementia     Depression     Disease of thyroid gland     Fibromyalgia     Last Assessed:  7/18/17    Gait disturbance     GERD (gastroesophageal reflux disease)     Glaucoma     Hyperlipidemia     Hypertension     Osteopenia     Peripheral neuropathy     Last Assessed:  4/12/17    Polymyalgia rheumatica ([facility] )     Psychiatric disorder     depression    Renal insufficiency syndrome     Last Assessed:  4/13/17    Rheumatoid arthritis ([facility] )     Stroke ([facility] )     x2, Last Assessed:  7/18/17    Systemic lupus erythematosus ([facility] )     Vertigo     Vitamin D deficiency      Past Surgical History:   Procedure Laterality Date    EYE SURGERY      JOINT REPLACEMENT      left hip replacement, left shoulder replacement  SHOULDER SURGERY      Replacement     Family History   Problem Relation Age of Onset    Heart attack Mother     Diabetes Mother     Dementia Father     Coronary artery disease Father         cardiac disorder    Diabetes Sister     Uterine cancer Sister      History   Smoking Status    Never Smoker   Smokeless Tobacco    Never Used     History   Alcohol Use No      History   Drug Use No       Current Outpatient Prescriptions   Medication Sig Dispense Refill    aspirin 325 mg tablet Take 325 mg by mouth daily        benzocaine (ORAJEL) 10 % mucosal gel Apply 1 application to the mouth or throat 3 (three) times a day as needed for mucositis 5 3 g 0    calcium carbonate (OS-TEDDY) 600 MG tablet Take 600 mg by mouth daily      carboxymethylcellulose (REFRESH LIQUIGEL) 1 % ophthalmic solution Apply 1 drop to eye 3 (three) times a day        Dentifrices (BIOTENE DRY MOUTH CARE DT) Apply to teeth      donepezil (ARICEPT) 10 mg tablet Take 1 tablet (10 mg total) by mouth daily at bedtime 30 tablet 5    DULoxetine (CYMBALTA) 60 mg delayed release capsule Take 1 capsule (60 mg total) by mouth daily  0    guaiFENesin (MUCINEX) 600 mg 12 hr tablet Take 1 tablet (600 mg total) by mouth 2 (two) times a day as needed for congestion 60 tablet 1    hydrocodone-chlorpheniramine polistirex (TUSSIONEX) 10-8 mg/5 mL ER suspension       ipratropium-albuterol (COMBIVENT RESPIMAT) inhaler Inhale 2 puffs every 4 (four) hours as needed (dyspnea) 1 Inhaler 5    levothyroxine 100 mcg tablet       loratadine (CLARITIN) 10 mg tablet Take by mouth      LORazepam (ATIVAN) 0 5 mg tablet Take 1 tablet (0 5 mg total) by mouth every 8 (eight) hours as needed for anxiety for up to 10 days 30 tablet 0    losartan (COZAAR) 50 mg tablet Take 50 mg by mouth daily      Lutein 10 MG TABS Take 1 tablet by mouth daily        magnesium hydroxide (MILK OF MAGNESIA) 400 mg/5 mL oral suspension Take 30 mL by mouth daily as needed for constipation        metoprolol tartrate (LOPRESSOR) 50 mg tablet 50 mg 2 (two) times a day        Mouthwashes (BIOTENE DRY MOUTH) LIQD Rinse and spit bid 1 Bottle 5    NIFEdipine ER (ADALAT CC) 60 MG 24 hr tablet Take 60 mg by mouth daily      pantoprazole (PROTONIX) 40 mg tablet Take 40 mg by mouth 2 (two) times a day        phenol (CHLORASEPTIC) 1 4 % mucosal liquid Apply 1 spray to the mouth or throat every 2 (two) hours as needed (sore throat) 1 Bottle 0    pravastatin (PRAVACHOL) 40 mg tablet Take 40 mg by mouth daily        traMADol (ULTRAM) 50 mg tablet Take 1 tablet (50 mg total) by mouth every 8 (eight) hours as needed for moderate pain 60 tablet 0    triamcinolone (KENALOG) 0 1 % cream triamcinolone acetonide 0 1 % topical cream      amoxicillin (AMOXIL) 500 mg capsule       NIFEdipine (PROCARDIA XL) 60 mg 24 hr tablet       traMADol (ULTRAM) 50 mg tablet Take 1 tablet (50 mg total) by mouth every 8 (eight) hours as needed for moderate pain (Patient not taking: Reported on 8/16/2018 ) 30 tablet 0     No current facility-administered medications for this visit  Allergies   Allergen Reactions    Acetaminophen     Golimumab      Other reaction(s): dedrick colored stool    Lidocaine Other (See Comments)    Neurontin [Gabapentin]     Nortriptyline Tremor    Prasterone      Other reaction(s): pruitis    Tricyclic Antidepressants     Leflunomide Rash    Sulfasalazine Rash     Immunization History   Administered Date(s) Administered    Pneumococcal Conjugate 13-Valent 07/18/2016    Pneumococcal Polysaccharide PPV23 09/26/2017       Patient Care Team:  Ya Miranda DO as PCP - General  Hung Carpenter MD    Medicare Screening Tests and Risk Assessments:  Rosy Jeffrey is here for her Subsequent Wellness visit  Health Risk Assessment:  Patient rates overall health as fair  Patient feels that their physical health rating is Slightly worse  Eyesight was rated as Same   Hearing was rated as Same  Patient feels that their emotional and mental health rating is Much worse  Pain experienced by patient in the last 7 days has been None  Patient states that she has experienced no weight loss or gain in last 6 months  Emotional/Mental Health:  Patient has been feeling nervous/anxious  Broken Bones/Falls: Fall Risk Assessment:    In the past year, patient has experienced: No history of falling in past year          Bladder/Bowel:  Patient has leaked urine accidently in the last six months  Patient reports loss of bowel control  Immunizations:  Patient has had a flu vaccination within the last year  Home Safety:  Patient has trouble with stairs inside or outside of their home  Patient currently reports that there are no safety hazards present in home, working smoke alarms,     Preventative Screenings:   No breast cancer screening performed, no colon cancer screen completed, glaucoma eye exam completed, Lifestyle Choices:  Patient reports no tobacco use  Patient has not smoked or used tobacco in the past   Patient reports no alcohol use  Patient does not drive a vehicle  Patient wears seat belt  Activities of Daily Living:  able to dress self, unable to make own meals, unable to do own shopping, unable to bathe self,     Previous Hospitalizations:  Hospitalization or ED visit in past 12 months        Advanced Directives:  Patient has decided on a power of   Patient has completed advanced directive          Preventative Screening/Counseling:      Cardiovascular:      General: Risks and Benefits Discussed and Screening Current          Diabetes:      General: Risks and Benefits Discussed and Screening Current          Colorectal Cancer:      General: Risks and Benefits Discussed          Breast Cancer:      General: Risks and Benefits Discussed and Patient Declines          Cervical Cancer:      General: Risks and Benefits Discussed and Patient Declines Osteoporosis:      General: Risks and Benefits Discussed and Patient Declines          Glaucoma:      General: Risks and Benefits Discussed and Patient Declines          Hepatitis C:      General: Risks and Benefits Discussed and Patient Declines        Advanced Directives:   Patient has living will for healthcare, does not have durable POA for healthcare, patient does not have an advanced directive  Immunizations:      Influenza: Risks & Benefits Discussed      Pneumococcal: Risks & Benefits Discussed and Lifetime Vaccine Completed      Other Preventative Counseling (Non-Medicare):   Fall Prevention      Referrals:  Referral(s) to: Cardiologist

## 2018-08-21 ENCOUNTER — TELEPHONE (OUTPATIENT)
Dept: FAMILY MEDICINE CLINIC | Facility: CLINIC | Age: 83
End: 2018-08-21

## 2018-08-21 NOTE — TELEPHONE ENCOUNTER
Jason Mariscal from [facility] said they never recived prescirption for tramadol for patient  I spoke with the medicine shoppe and called the script in  Spoke with Haydee Mariscal is aware

## 2018-08-22 ENCOUNTER — OFFICE VISIT (OUTPATIENT)
Dept: PULMONOLOGY | Facility: CLINIC | Age: 83
End: 2018-08-22
Payer: MEDICARE

## 2018-08-22 VITALS
TEMPERATURE: 97.4 F | BODY MASS INDEX: 24.29 KG/M2 | WEIGHT: 132 LBS | DIASTOLIC BLOOD PRESSURE: 72 MMHG | SYSTOLIC BLOOD PRESSURE: 138 MMHG | HEART RATE: 87 BPM | RESPIRATION RATE: 91 BRPM | HEIGHT: 62 IN

## 2018-08-22 DIAGNOSIS — K21.9 GASTROESOPHAGEAL REFLUX DISEASE WITHOUT ESOPHAGITIS: ICD-10-CM

## 2018-08-22 DIAGNOSIS — J02.9 SORE THROAT: ICD-10-CM

## 2018-08-22 DIAGNOSIS — J30.9 ALLERGIC RHINITIS, UNSPECIFIED SEASONALITY, UNSPECIFIED TRIGGER: Primary | ICD-10-CM

## 2018-08-22 DIAGNOSIS — J18.9 PNEUMONIA OF RIGHT UPPER LOBE DUE TO INFECTIOUS ORGANISM: ICD-10-CM

## 2018-08-22 PROCEDURE — 99214 OFFICE O/P EST MOD 30 MIN: CPT | Performed by: PHYSICIAN ASSISTANT

## 2018-08-22 RX ORDER — CETIRIZINE HYDROCHLORIDE 10 MG/1
10 TABLET, CHEWABLE ORAL DAILY
Qty: 30 TABLET | Refills: 0 | Status: SHIPPED | OUTPATIENT
Start: 2018-08-22 | End: 2019-09-16 | Stop reason: HOSPADM

## 2018-08-22 RX ORDER — FLUTICASONE PROPIONATE 50 MCG
1 SPRAY, SUSPENSION (ML) NASAL DAILY
Qty: 16 G | Refills: 0 | Status: SHIPPED | OUTPATIENT
Start: 2018-08-22

## 2018-08-22 NOTE — PROGRESS NOTES
Pulmonary Follow Up Note   Padmini Munson 80 y o  female MRN: [id number]  8/22/2018      Assessment:  Given patients improvement and resolution of PNA on Imaging no further testing or work up is needed  She also does not need any further ABX  In regards to her cough I encouraged supportive care including Zyrtec and Flonase to see if this helps  Along with good GERD Control and compliance with her medications  Physical exam is benign and no exudate noted in the posterior oropharynx  She should continue to monitor and should her symptoms worsen or change she will need follow up  The daughter knows to call with questions and follow up PRN  All of their questions were answered today  They understood the above plan     GERD (gastroesophageal reflux disease)  1  Continue Omeprazole     Pneumonia of right upper lobe due to infectious organism ([facility] [address] )  2  Repeat CXR shows NAD and resolved Pneumonia     Sore throat  3  No exudate, edema, or erythema on physical Exam  PND noted  Will start Flonase   4  Start Zyrtec  5  Continue supportive care       Plan:    Diagnoses and all orders for this visit:    Allergic rhinitis, unspecified seasonality, unspecified trigger  -     fluticasone (FLONASE) 50 mcg/act nasal spray; 1 spray into each nostril daily  -     cetirizine (ZyrTEC) 10 MG chewable tablet; Chew 1 tablet (10 mg total) daily    Gastroesophageal reflux disease without esophagitis    Pneumonia of right upper lobe due to infectious organism (HCC)    Sore throat        No Follow-up on file  History of Present Illness   HPI:  Padmini Munson is a 80 y o  female who presents to the office today for follow up after recent pneumonia and hospital stay  Patient has dementia and is a poor historian  Her Daughter is with her for the appointment today  She was in the hospital in April and found to have a Pneumonia  On repeat Imaging there was still pneumonia present   She had a CXR on 8/16/18 and is here today for follow up  Repeat Imaging shows resolution of pneumonia in RUL  She is not having any cough, sputum production, fevers, chills, CP, wheezing, SOB, or diaphoresis  She is complaining of a sore throat today and no other symptoms  She previously followed with Dr Donna Bullock for mild intermittent asthma for which she was maintained on Combivent BID PRN  Review of Systems   Constitutional: Negative for activity change, appetite change, chills, diaphoresis, fatigue, fever and unexpected weight change  HENT: Positive for postnasal drip and sore throat  Negative for rhinorrhea, sinus pain, sinus pressure, sneezing, trouble swallowing and voice change  Respiratory: Negative for apnea, cough, choking, chest tightness, shortness of breath, wheezing and stridor  Cardiovascular: Negative for chest pain, palpitations and leg swelling  Gastrointestinal: Negative for abdominal distention, abdominal pain, constipation, diarrhea and vomiting  Musculoskeletal: Negative for arthralgias  Skin: Negative for rash  Neurological: Negative for dizziness  Hematological: Negative for adenopathy  Psychiatric/Behavioral: Negative for agitation  All other systems reviewed and are negative        Historical Information   Past Medical History:   Diagnosis Date    [facility] )     Last Assessed:  7/18/17    Basal cell carcinoma of nose     Last Assessed:  4/12/17    Blind     blind right eye, 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, Last Assessed:  7/18/17    Dementia     Depression     Disease of thyroid gland     Fibromyalgia     Last Assessed:  7/18/17    Gait disturbance     GERD (gastroesophageal reflux disease)     Glaucoma     Hyperlipidemia     Hypertension     Osteopenia     Peripheral neuropathy     Last Assessed:  4/12/17    Polymyalgia rheumatica ([facility] [address] )     Psychiatric disorder     depression    Renal insufficiency syndrome     Last Assessed:  4/13/17    Rheumatoid arthritis ([facility] )     Stroke [facility])     x2, Last Assessed:  7/18/17    Systemic lupus erythematosus ([facility] [address] [facility]     Vertigo     Vitamin D deficiency      Past Surgical History:   Procedure Laterality Date    EYE SURGERY      JOINT REPLACEMENT      left hip replacement, left shoulder replacement    SHOULDER SURGERY      Replacement     Family History   Problem Relation Age of Onset    Heart attack Mother     Diabetes Mother     Dementia Father     Coronary artery disease Father         cardiac disorder    Diabetes Sister     Uterine cancer Sister          Meds/Allergies     Current Outpatient Prescriptions:     aspirin 325 mg tablet, Take 325 mg by mouth daily  , Disp: , Rfl:     benzocaine (ORAJEL) 10 % mucosal gel, Apply 1 application to the mouth or throat 3 (three) times a day as needed for mucositis, Disp: 5 3 g, Rfl: 0    calcium carbonate (OS-TEDDY) 600 MG tablet, Take 600 mg by mouth daily, Disp: , Rfl:     carboxymethylcellulose (REFRESH LIQUIGEL) 1 % ophthalmic solution, Apply 1 drop to eye 3 (three) times a day  , Disp: , Rfl:     Dentifrices ([employer]ENE DRY MOUTH CARE DT), Apply to teeth, Disp: , Rfl:     donepezil (ARICEPT) 10 mg tablet, Take 0 5 tablets (5 mg total) by mouth daily at bedtime Increased confusion on 10mg, Disp: 45 tablet, Rfl: 1    DULoxetine (CYMBALTA) 60 mg delayed release capsule, Take 1 capsule (60 mg total) by mouth daily, Disp: , Rfl: 0    furosemide (LASIX) 20 mg tablet, One tab daily x 1 week, Disp: 30 tablet, Rfl: 0    guaiFENesin (MUCINEX) 600 mg 12 hr tablet, Take 1 tablet (600 mg total) by mouth 2 (two) times a day as needed for congestion, Disp: 60 tablet, Rfl: 1    ipratropium-albuterol (COMBIVENT RESPIMAT) inhaler, Inhale 2 puffs every 4 (four) hours as needed (dyspnea), Disp: 1 Inhaler, Rfl: 5    levofloxacin (LEVAQUIN) 500 mg tablet, Take 1 tablet (500 mg total) by mouth every 24 hours for 7 days, Disp: 7 tablet, Rfl: 0    levothyroxine 100 mcg tablet, , Disp: , Rfl:    loratadine (CLARITIN) 10 mg tablet, Take by mouth, Disp: , Rfl:     losartan (COZAAR) 100 MG tablet, Take 1 tablet (100 mg total) by mouth daily, Disp: 90 tablet, Rfl: 1    Lutein 10 MG TABS, Take 1 tablet by mouth daily  , Disp: , Rfl:     magnesium hydroxide (MILK OF MAGNESIA) 400 mg/5 mL oral suspension, Take 30 mL by mouth daily as needed for constipation  , Disp: , Rfl:     metoprolol tartrate (LOPRESSOR) 50 mg tablet, Take 0 5 tablets (25 mg total) by mouth 2 (two) times a day, Disp: 45 tablet, Rfl: 1    Mouthwashes (BIOTENE DRY MOUTH) LIQD, Rinse and spit bid, Disp: 1 Bottle, Rfl: 5    NIFEdipine (PROCARDIA XL) 60 mg 24 hr tablet, , Disp: , Rfl:     NIFEdipine ER (ADALAT CC) 60 MG 24 hr tablet, Take 60 mg by mouth daily, Disp: , Rfl:     pantoprazole (PROTONIX) 40 mg tablet, Take 40 mg by mouth 2 (two) times a day  , Disp: , Rfl:     potassium chloride (K-DUR,KLOR-CON) 10 mEq tablet, One tab daily on the days you take lasix, Disp: 30 tablet, Rfl: 0    pravastatin (PRAVACHOL) 40 mg tablet, Take 40 mg by mouth daily  , Disp: , Rfl:     traMADol (ULTRAM) 50 mg tablet, Take 1 tablet (50 mg total) by mouth every 8 (eight) hours as needed for moderate pain, Disp: 30 tablet, Rfl: 0    triamcinolone (KENALOG) 0 1 % cream, triamcinolone acetonide 0 1 % topical cream, Disp: , Rfl:     cetirizine (ZyrTEC) 10 MG chewable tablet, Chew 1 tablet (10 mg total) daily, Disp: 30 tablet, Rfl: 0    fluticasone (FLONASE) 50 mcg/act nasal spray, 1 spray into each nostril daily, Disp: 16 g, Rfl: 0    LORazepam (ATIVAN) 0 5 mg tablet, Take 1 tablet (0 5 mg total) by mouth every 8 (eight) hours as needed for anxiety for up to 10 days, Disp: 30 tablet, Rfl: 0  Allergies   Allergen Reactions    Acetaminophen     Golimumab      Other reaction(s): dedrick colored stool    Lidocaine Other (See Comments)    Neurontin [Gabapentin]     Nortriptyline Tremor    Prasterone      Other reaction(s): pruitis    Tricyclic Antidepressants     Leflunomide Rash    Sulfasalazine Rash       Vitals: Blood pressure 138/72, pulse 87, temperature (!) 97 4 °F (36 3 °C), temperature source Tympanic, resp  rate (!) 91, height 5' 2" (1 575 m), weight 59 9 kg (132 lb)  Body mass index is 24 14 kg/m²  Physical Exam  Physical Exam   Constitutional: She appears well-developed and well-nourished  No distress  HENT:   Head: Normocephalic and atraumatic  Mouth/Throat: Oropharynx is clear and moist  No oropharyngeal exudate  Eyes: No scleral icterus  Neck: Normal range of motion  Neck supple  No JVD present  Cardiovascular: Normal rate, regular rhythm, normal heart sounds and intact distal pulses  Exam reveals no gallop and no friction rub  No murmur heard  Pulmonary/Chest: Effort normal and breath sounds normal  No stridor  No respiratory distress  She has no wheezes  She has no rales  She exhibits no tenderness  Abdominal: Soft  Bowel sounds are normal  She exhibits no distension  There is no tenderness  Musculoskeletal: She exhibits no edema or tenderness  Lymphadenopathy:     She has no cervical adenopathy  Neurological: She is alert  Skin: No rash noted  She is not diaphoretic  Psychiatric: She has a normal mood and affect  Nursing note and vitals reviewed  Labs: I have personally reviewed pertinent lab results    Lab Results   Component Value Date    WBC 6 1 07/25/2018    HGB 10 6 (L) 07/25/2018    HCT 31 9 (L) 07/25/2018    MCV 92 7 07/25/2018     07/25/2018     Lab Results   Component Value Date    GLUCOSE 94 05/07/2018    CALCIUM 8 7 05/07/2018     (L) 05/07/2018    K 3 6 05/07/2018    CO2 22 05/07/2018     05/07/2018    BUN 11 05/07/2018    CREATININE 0 77 05/07/2018     No results found for: IGE  Lab Results   Component Value Date    ALT 28 05/05/2018    AST 37 05/05/2018    ALKPHOS 171 (H) 05/05/2018    BILITOT 0 40 05/05/2018     Imaging and other studies: I have personally reviewed pertinent reports  CXR 8/16/18:  FINDINGS:     Cardiomediastinal silhouette appears unremarkable      The lungs are clear  No pneumothorax or pleural effusion      Osseous structures appear within normal limits for patient age  There is a right shoulder arthroplasty      IMPRESSION:     No acute cardiopulmonary disease  EKG, Pathology, and Other Studies: I have personally reviewed pertinent reports          Elizabeth Enamorado PA-C

## 2018-08-22 NOTE — ASSESSMENT & PLAN NOTE
1  No exudate, edema, or erythema on physical Exam  PND noted  Will start Flonase   2  Start Zyrtec     3  Continue supportive care

## 2018-08-22 NOTE — PATIENT INSTRUCTIONS
GERD (gastroesophageal reflux disease)  1  Continue Omeprazole     Pneumonia of right upper lobe due to infectious organism ([facility] [address] )  2  Repeat CXR shows NAD and resolved Pneumonia     Sore throat  3  No exudate, edema, or erythema on physical Exam  PND noted  Will start Flonase   4  Start Zyrtec     5  Continue supportive care

## 2018-09-06 ENCOUNTER — OFFICE VISIT (OUTPATIENT)
Dept: FAMILY MEDICINE CLINIC | Facility: CLINIC | Age: 83
End: 2018-09-06
Payer: MEDICARE

## 2018-09-06 VITALS
RESPIRATION RATE: 16 BRPM | BODY MASS INDEX: 25.03 KG/M2 | HEIGHT: 62 IN | OXYGEN SATURATION: 93 % | HEART RATE: 75 BPM | DIASTOLIC BLOOD PRESSURE: 54 MMHG | TEMPERATURE: 98.8 F | SYSTOLIC BLOOD PRESSURE: 114 MMHG | WEIGHT: 136 LBS

## 2018-09-06 DIAGNOSIS — T14.8XXA ABRASION: ICD-10-CM

## 2018-09-06 DIAGNOSIS — I10 ESSENTIAL HYPERTENSION: ICD-10-CM

## 2018-09-06 DIAGNOSIS — Z23 NEED FOR TDAP VACCINATION: ICD-10-CM

## 2018-09-06 DIAGNOSIS — R60.0 LOCALIZED EDEMA: Primary | ICD-10-CM

## 2018-09-06 DIAGNOSIS — F01.50 VASCULAR DEMENTIA WITHOUT BEHAVIORAL DISTURBANCE (HCC): ICD-10-CM

## 2018-09-06 PROCEDURE — 90471 IMMUNIZATION ADMIN: CPT

## 2018-09-06 PROCEDURE — 90715 TDAP VACCINE 7 YRS/> IM: CPT

## 2018-09-06 PROCEDURE — 99214 OFFICE O/P EST MOD 30 MIN: CPT | Performed by: INTERNAL MEDICINE

## 2018-09-06 RX ORDER — POTASSIUM CHLORIDE 750 MG/1
TABLET, EXTENDED RELEASE ORAL
Qty: 36 TABLET | Refills: 1 | Status: SHIPPED | OUTPATIENT
Start: 2018-09-06 | End: 2018-11-12 | Stop reason: HOSPADM

## 2018-09-06 RX ORDER — FUROSEMIDE 20 MG/1
TABLET ORAL
Qty: 36 TABLET | Refills: 1 | Status: SHIPPED | OUTPATIENT
Start: 2018-09-06 | End: 2019-09-02

## 2018-09-06 NOTE — PROGRESS NOTES
Assessment/Plan:         Diagnoses and all orders for this visit:    Localized edema  -     furosemide (LASIX) 20 mg tablet; Take one tab on Monday- Wednesday and friday  -     potassium chloride (K-DUR,KLOR-CON) 10 mEq tablet; Take one tab daily while on lasix    Essential hypertension    Vascular dementia without behavioral disturbance    Abrasion          Subjective:      Patient ID: Johnathan Simon is a 80 y o  female  Pt still saying I think I am dying  Will d/c aricept  Discussed with daughter  Also her edema is returning  Will restart lasix low dose q m-w-f   +complains of left shoulder pain        The following portions of the patient's history were reviewed and updated as appropriate:   She  has a past medical history of Aspiration pneumonia ([facility] ); Basal cell carcinoma of nose; Blind; Dementia; Depression; Disease of thyroid gland; Fibromyalgia; Gait disturbance; GERD (gastroesophageal reflux disease); Glaucoma; Hyperlipidemia; Hypertension; Osteopenia; Peripheral neuropathy; [facility] ); Psychiatric disorder; Renal insufficiency syndrome; Rheumatoid arthritis ([facility] ); Stroke [facility]); Systemic lupus erythematosus ([facility] ); Vertigo; and Vitamin D deficiency    She   Patient Active Problem List    Diagnosis Date Noted    Pneumonia of right upper lobe due to infectious organism ([facility] [address] ) 08/22/2018    Sore throat 08/22/2018    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 ([facility] [address] ) 06/28/2018    Acute cystitis 05/05/2018    Right lower quadrant abdominal pain 05/05/2018    DEVONTE (acute kidney injury) ([facility] ) 05/05/2018    Hypothyroidism 04/14/2018    Anemia 04/14/2018    Dementia without behavioral disturbance 04/13/2018    Hyponatremia 10/03/2016    CVA (cerebral vascular accident) ([facility] [address] ) 10/03/2016    Essential hypertension 10/03/2016    Acute metabolic encephalopathy 56/98/6455    Hyperlipidemia     GERD (gastroesophageal reflux disease)     Fibromyalgia     Rheumatoid arthritis ([facility] [address] ) She  has a past surgical history that includes Joint replacement; Shoulder surgery; and Eye surgery  Her family history includes Coronary artery disease in her father; Dementia in her father; Diabetes in her mother and sister; Heart attack in her mother; Uterine cancer in her sister  She  reports that she has never smoked  She has never used smokeless tobacco  She reports that she does not drink alcohol or use drugs    Current Outpatient Prescriptions   Medication Sig Dispense Refill    aspirin 325 mg tablet Take 325 mg by mouth daily        benzocaine (ORAJEL) 10 % mucosal gel Apply 1 application to the mouth or throat 3 (three) times a day as needed for mucositis 5 3 g 0    calcium carbonate (OS-TEDDY) 600 MG tablet Take 600 mg by mouth daily      carboxymethylcellulose (REFRESH LIQUIGEL) 1 % ophthalmic solution Apply 1 drop to eye 3 (three) times a day        cetirizine (ZyrTEC) 10 MG chewable tablet Chew 1 tablet (10 mg total) daily 30 tablet 0    Dentifrices (BIOTENE DRY MOUTH CARE DT) Apply to teeth      donepezil (ARICEPT) 10 mg tablet Take 0 5 tablets (5 mg total) by mouth daily at bedtime Increased confusion on 10mg 45 tablet 1    DULoxetine (CYMBALTA) 60 mg delayed release capsule Take 1 capsule (60 mg total) by mouth daily  0    fluticasone (FLONASE) 50 mcg/act nasal spray 1 spray into each nostril daily 16 g 0    guaiFENesin (MUCINEX) 600 mg 12 hr tablet Take 1 tablet (600 mg total) by mouth 2 (two) times a day as needed for congestion 60 tablet 1    ipratropium-albuterol (COMBIVENT RESPIMAT) inhaler Inhale 2 puffs every 4 (four) hours as needed (dyspnea) 1 Inhaler 5    levothyroxine 100 mcg tablet       loratadine (CLARITIN) 10 mg tablet Take by mouth      LORazepam (ATIVAN) 0 5 mg tablet Take 1 tablet (0 5 mg total) by mouth every 8 (eight) hours as needed for anxiety for up to 10 days 30 tablet 0    losartan (COZAAR) 100 MG tablet Take 1 tablet (100 mg total) by mouth daily 90 tablet 1    Lutein 10 MG TABS Take 1 tablet by mouth daily        magnesium hydroxide (MILK OF MAGNESIA) 400 mg/5 mL oral suspension Take 30 mL by mouth daily as needed for constipation        metoprolol tartrate (LOPRESSOR) 50 mg tablet Take 0 5 tablets (25 mg total) by mouth 2 (two) times a day 45 tablet 1    Mouthwashes (BIOTENE DRY MOUTH) LIQD Rinse and spit bid 1 Bottle 5    NIFEdipine ER (ADALAT CC) 60 MG 24 hr tablet Take 60 mg by mouth daily      pantoprazole (PROTONIX) 40 mg tablet Take 40 mg by mouth 2 (two) times a day        pravastatin (PRAVACHOL) 40 mg tablet Take 40 mg by mouth daily        traMADol (ULTRAM) 50 mg tablet Take 1 tablet (50 mg total) by mouth every 8 (eight) hours as needed for moderate pain 30 tablet 0    furosemide (LASIX) 20 mg tablet One tab daily x 1 week (Patient not taking: Reported on 9/6/2018 ) 30 tablet 0    furosemide (LASIX) 20 mg tablet Take one tab on Monday- Wednesday and friday 36 tablet 1    NIFEdipine (PROCARDIA XL) 60 mg 24 hr tablet       potassium chloride (K-DUR,KLOR-CON) 10 mEq tablet One tab daily on the days you take lasix (Patient not taking: Reported on 9/6/2018 ) 30 tablet 0    potassium chloride (K-DUR,KLOR-CON) 10 mEq tablet Take one tab daily while on lasix 36 tablet 1    triamcinolone (KENALOG) 0 1 % cream triamcinolone acetonide 0 1 % topical cream       No current facility-administered medications for this visit        Current Outpatient Prescriptions on File Prior to Visit   Medication Sig    aspirin 325 mg tablet Take 325 mg by mouth daily      benzocaine (ORAJEL) 10 % mucosal gel Apply 1 application to the mouth or throat 3 (three) times a day as needed for mucositis    calcium carbonate (OS-TEDDY) 600 MG tablet Take 600 mg by mouth daily    carboxymethylcellulose (REFRESH LIQUIGEL) 1 % ophthalmic solution Apply 1 drop to eye 3 (three) times a day      cetirizine (ZyrTEC) 10 MG chewable tablet Chew 1 tablet (10 mg total) daily    Dentifrices (BIOTENE DRY MOUTH CARE DT) Apply to teeth    donepezil (ARICEPT) 10 mg tablet Take 0 5 tablets (5 mg total) by mouth daily at bedtime Increased confusion on 10mg    DULoxetine (CYMBALTA) 60 mg delayed release capsule Take 1 capsule (60 mg total) by mouth daily    fluticasone (FLONASE) 50 mcg/act nasal spray 1 spray into each nostril daily    guaiFENesin (MUCINEX) 600 mg 12 hr tablet Take 1 tablet (600 mg total) by mouth 2 (two) times a day as needed for congestion    ipratropium-albuterol (COMBIVENT RESPIMAT) inhaler Inhale 2 puffs every 4 (four) hours as needed (dyspnea)    levothyroxine 100 mcg tablet     loratadine (CLARITIN) 10 mg tablet Take by mouth    LORazepam (ATIVAN) 0 5 mg tablet Take 1 tablet (0 5 mg total) by mouth every 8 (eight) hours as needed for anxiety for up to 10 days    losartan (COZAAR) 100 MG tablet Take 1 tablet (100 mg total) by mouth daily    Lutein 10 MG TABS Take 1 tablet by mouth daily      magnesium hydroxide (MILK OF MAGNESIA) 400 mg/5 mL oral suspension Take 30 mL by mouth daily as needed for constipation      metoprolol tartrate (LOPRESSOR) 50 mg tablet Take 0 5 tablets (25 mg total) by mouth 2 (two) times a day    Mouthwashes (BIOTENE DRY MOUTH) LIQD Rinse and spit bid    NIFEdipine ER (ADALAT CC) 60 MG 24 hr tablet Take 60 mg by mouth daily    pantoprazole (PROTONIX) 40 mg tablet Take 40 mg by mouth 2 (two) times a day      pravastatin (PRAVACHOL) 40 mg tablet Take 40 mg by mouth daily      traMADol (ULTRAM) 50 mg tablet Take 1 tablet (50 mg total) by mouth every 8 (eight) hours as needed for moderate pain    furosemide (LASIX) 20 mg tablet One tab daily x 1 week (Patient not taking: Reported on 9/6/2018 )    NIFEdipine (PROCARDIA XL) 60 mg 24 hr tablet     potassium chloride (K-DUR,KLOR-CON) 10 mEq tablet One tab daily on the days you take lasix (Patient not taking: Reported on 9/6/2018 )    triamcinolone (KENALOG) 0 1 % cream triamcinolone acetonide 0 1 % topical cream     No current facility-administered medications on file prior to visit  She is allergic to acetaminophen; golimumab; lidocaine; neurontin [gabapentin]; nortriptyline; prasterone; tricyclic antidepressants; leflunomide; and sulfasalazine       Review of Systems   Constitutional: Negative  HENT: Negative  Respiratory: Negative  Cardiovascular: Negative  Objective:      /54 (BP Location: Left arm, Patient Position: Sitting, Cuff Size: Standard)   Pulse 75   Temp 98 8 °F (37 1 °C) (Tympanic)   Resp 16   Ht 5' 2" (1 575 m)   Wt 61 7 kg (136 lb)   SpO2 93%   BMI 24 87 kg/m²          Physical Exam   Constitutional: She appears well-developed  HENT:   Head: Normocephalic and atraumatic  Right Ear: External ear normal    Nose: Nose normal    Mouth/Throat: Oropharynx is clear and moist    Neck: Normal range of motion  Neck supple  Cardiovascular: Normal rate, regular rhythm and normal heart sounds      Pulmonary/Chest: Effort normal and breath sounds normal

## 2018-09-18 ENCOUNTER — OFFICE VISIT (OUTPATIENT)
Dept: OBGYN CLINIC | Facility: OTHER | Age: 83
End: 2018-09-18
Payer: MEDICARE

## 2018-09-18 VITALS
HEART RATE: 69 BPM | SYSTOLIC BLOOD PRESSURE: 119 MMHG | BODY MASS INDEX: 24.69 KG/M2 | DIASTOLIC BLOOD PRESSURE: 70 MMHG | WEIGHT: 135 LBS

## 2018-09-18 DIAGNOSIS — M12.811 ROTATOR CUFF TEAR ARTHROPATHY OF RIGHT SHOULDER: Primary | ICD-10-CM

## 2018-09-18 DIAGNOSIS — M75.101 ROTATOR CUFF TEAR ARTHROPATHY OF RIGHT SHOULDER: Primary | ICD-10-CM

## 2018-09-18 PROCEDURE — 99214 OFFICE O/P EST MOD 30 MIN: CPT | Performed by: ORTHOPAEDIC SURGERY

## 2018-09-18 PROCEDURE — 20610 DRAIN/INJ JOINT/BURSA W/O US: CPT | Performed by: ORTHOPAEDIC SURGERY

## 2018-09-18 RX ORDER — BETAMETHASONE SODIUM PHOSPHATE AND BETAMETHASONE ACETATE 3; 3 MG/ML; MG/ML
6 INJECTION, SUSPENSION INTRA-ARTICULAR; INTRALESIONAL; INTRAMUSCULAR; SOFT TISSUE
Status: COMPLETED | OUTPATIENT
Start: 2018-09-18 | End: 2018-09-18

## 2018-09-18 RX ORDER — BUPIVACAINE HYDROCHLORIDE 2.5 MG/ML
2 INJECTION, SOLUTION INFILTRATION; PERINEURAL
Status: COMPLETED | OUTPATIENT
Start: 2018-09-18 | End: 2018-09-18

## 2018-09-18 RX ADMIN — BETAMETHASONE SODIUM PHOSPHATE AND BETAMETHASONE ACETATE 6 MG: 3; 3 INJECTION, SUSPENSION INTRA-ARTICULAR; INTRALESIONAL; INTRAMUSCULAR; SOFT TISSUE at 15:15

## 2018-09-18 RX ADMIN — BUPIVACAINE HYDROCHLORIDE 2 ML: 2.5 INJECTION, SOLUTION INFILTRATION; PERINEURAL at 15:15

## 2018-09-18 NOTE — PROGRESS NOTES
Assessment  Diagnoses and all orders for this visit:    Rotator cuff tear arthropathy of right shoulder          Discussion and Plan:    The patient has rotator cuff tear arthropathy of her right shoulder and was provided with an injection for symptomatic relief as detailed in the note  We can always repeat that injection in 4 to 6 months if necessary  Her left shoulder still has pain but short of a major revision surgery for her reverse total shoulder of the left no easy intervention is available to her and they are happy with how she is doing with the left shoulder at this time  If they wish to consider revision surgery of her left shoulder I would be happy to discuss this further but I feel given her medical comorbidities this would be contraindicated  Subjective:   Patient ID: Radha Magaña is a 80 y o  female      Patient presents with her daughter with a chief complaint of right shoulder pain  She was seen by myself for an unstable left reverse total shoulder back in June but that has stabilized, all of this give her pain in the right shoulder is worse  She states the pain is sharp is localized the right proximal humerus, worse with sleeping on that side improved by not sleeping on that side  No numbness or tingling or fevers and chills associated with this complaint            The following portions of the patient's history were reviewed and updated as appropriate: allergies, current medications, past family history, past medical history, past social history, past surgical history and problem list     Review of Systems   Constitutional: Negative for chills and fever  HENT: Negative for hearing loss  Eyes: Negative for visual disturbance  Respiratory: Negative for shortness of breath  Cardiovascular: Negative for chest pain  Gastrointestinal: Negative for abdominal pain  Musculoskeletal:        As reviewed in the HPI   Skin: Negative for rash     Neurological:        As reviewed in the HPI   Psychiatric/Behavioral: Negative for agitation  Objective:  Right Shoulder Exam     Tenderness   None    Range of Motion   Passive Abduction:                    80  Forward Flexion:                        90  External Rotation:                      30    Muscle Strength   Abduction:            4/5  Internal Rotation:  4/5  External Rotation: 4/5          Physical Exam   Constitutional: She is oriented to person, place, and time  She appears well-developed and well-nourished  HENT:   Head: Normocephalic and atraumatic  Neck: Normal range of motion  Neck supple  Cardiovascular: Normal rate and regular rhythm  Pulmonary/Chest: Effort normal  No respiratory distress  Abdominal: Soft  She exhibits no distension  Neurological: She is alert and oriented to person, place, and time  Skin: Skin is warm and dry  Psychiatric: She has a normal mood and affect  Her behavior is normal    Nursing note and vitals reviewed  I have personally reviewed pertinent films in PACS and my interpretation is as follows      Three views right shoulder from 2016 show rotator cuff tear arthropathy    Large joint arthrocentesis  Date/Time: 9/18/2018 3:15 PM  Consent given by: patient  Timeout: Immediately prior to procedure a time out was called to verify the correct patient, procedure, equipment, support staff and site/side marked as required   Supporting Documentation  Indications: pain   Procedure Details  Location: shoulder - R glenohumeral  Preparation: Patient was prepped and draped in the usual sterile fashion  Needle size: 22 G  Ultrasound guidance: no  Approach: posterior  Medications administered: 2 mL bupivacaine 0 25 %; 6 mg betamethasone acetate-betamethasone sodium phosphate 6 (3-3) mg/mL    Patient tolerance: patient tolerated the procedure well with no immediate complications  Dressing:  Sterile dressing applied

## 2018-09-18 NOTE — PATIENT INSTRUCTIONS

## 2018-10-03 DIAGNOSIS — F41.9 ANXIETY: ICD-10-CM

## 2018-10-03 DIAGNOSIS — R52 PAIN: ICD-10-CM

## 2018-10-04 RX ORDER — TRAMADOL HYDROCHLORIDE 50 MG/1
50 TABLET ORAL EVERY 8 HOURS PRN
Qty: 60 TABLET | Refills: 0 | Status: SHIPPED | OUTPATIENT
Start: 2018-10-04 | End: 2018-10-05 | Stop reason: SDUPTHER

## 2018-10-04 RX ORDER — LORAZEPAM 0.5 MG/1
0.5 TABLET ORAL EVERY 8 HOURS PRN
Qty: 30 TABLET | Refills: 0 | Status: SHIPPED | OUTPATIENT
Start: 2018-10-04 | End: 2018-10-05 | Stop reason: SDUPTHER

## 2018-10-05 DIAGNOSIS — R52 PAIN: ICD-10-CM

## 2018-10-05 DIAGNOSIS — F41.9 ANXIETY: ICD-10-CM

## 2018-10-05 RX ORDER — TRAMADOL HYDROCHLORIDE 50 MG/1
50 TABLET ORAL EVERY 8 HOURS PRN
Qty: 60 TABLET | Refills: 0 | Status: ON HOLD | OUTPATIENT
Start: 2018-10-05 | End: 2018-11-12

## 2018-10-05 RX ORDER — LORAZEPAM 0.5 MG/1
0.5 TABLET ORAL EVERY 8 HOURS PRN
Qty: 30 TABLET | Refills: 0 | Status: ON HOLD | OUTPATIENT
Start: 2018-10-05 | End: 2018-11-12

## 2018-11-07 ENCOUNTER — TELEPHONE (OUTPATIENT)
Dept: FAMILY MEDICINE CLINIC | Facility: CLINIC | Age: 83
End: 2018-11-07

## 2018-11-07 NOTE — TELEPHONE ENCOUNTER
Lungs are clear, but has a cough and some congestion  Daughter mentioned that you can put her on a med so doesn't turn to pneumonia  Call [facility]  to let them know

## 2018-11-08 ENCOUNTER — TELEPHONE (OUTPATIENT)
Dept: FAMILY MEDICINE CLINIC | Facility: CLINIC | Age: 83
End: 2018-11-08

## 2018-11-09 ENCOUNTER — TELEPHONE (OUTPATIENT)
Dept: FAMILY MEDICINE CLINIC | Facility: CLINIC | Age: 83
End: 2018-11-09

## 2018-11-09 ENCOUNTER — HOSPITAL ENCOUNTER (INPATIENT)
Facility: HOSPITAL | Age: 83
LOS: 3 days | Discharge: HOME | DRG: 871 | End: 2018-11-12
Attending: EMERGENCY MEDICINE | Admitting: HOSPITALIST
Payer: MEDICARE

## 2018-11-09 ENCOUNTER — APPOINTMENT (EMERGENCY)
Dept: RADIOLOGY | Facility: HOSPITAL | Age: 83
DRG: 871 | End: 2018-11-09
Payer: MEDICARE

## 2018-11-09 DIAGNOSIS — F41.9 ANXIETY: ICD-10-CM

## 2018-11-09 DIAGNOSIS — A41.9 SEPSIS (HCC): Primary | ICD-10-CM

## 2018-11-09 DIAGNOSIS — N39.0 UTI (URINARY TRACT INFECTION): ICD-10-CM

## 2018-11-09 DIAGNOSIS — R05.9 COUGH: ICD-10-CM

## 2018-11-09 DIAGNOSIS — R94.31 ABNORMAL EKG: ICD-10-CM

## 2018-11-09 DIAGNOSIS — R52 PAIN: ICD-10-CM

## 2018-11-09 PROBLEM — R77.8 ELEVATED TROPONIN: Status: ACTIVE | Noted: 2018-11-09

## 2018-11-09 LAB
ALBUMIN SERPL BCP-MCNC: 3.1 G/DL (ref 3.5–5)
ALP SERPL-CCNC: 135 U/L (ref 46–116)
ALT SERPL W P-5'-P-CCNC: 34 U/L (ref 12–78)
ANION GAP SERPL CALCULATED.3IONS-SCNC: 9 MMOL/L (ref 4–13)
APTT PPP: 29 SECONDS (ref 24–36)
AST SERPL W P-5'-P-CCNC: 34 U/L (ref 5–45)
ATRIAL RATE: 87 BPM
BACTERIA UR QL AUTO: ABNORMAL /HPF
BASOPHILS # BLD AUTO: 0.01 THOUSANDS/ΜL (ref 0–0.1)
BASOPHILS NFR BLD AUTO: 0 % (ref 0–1)
BILIRUB DIRECT SERPL-MCNC: 0.13 MG/DL (ref 0–0.2)
BILIRUB SERPL-MCNC: 0.6 MG/DL (ref 0.2–1)
BILIRUB UR QL STRIP: NEGATIVE
BUN SERPL-MCNC: 15 MG/DL (ref 5–25)
CALCIUM SERPL-MCNC: 9.4 MG/DL (ref 8.3–10.1)
CHLORIDE SERPL-SCNC: 98 MMOL/L (ref 100–108)
CLARITY UR: ABNORMAL
CO2 SERPL-SCNC: 24 MMOL/L (ref 21–32)
COLOR UR: ABNORMAL
CREAT SERPL-MCNC: 1.06 MG/DL (ref 0.6–1.3)
EOSINOPHIL # BLD AUTO: 0.09 THOUSAND/ΜL (ref 0–0.61)
EOSINOPHIL NFR BLD AUTO: 1 % (ref 0–6)
ERYTHROCYTE [DISTWIDTH] IN BLOOD BY AUTOMATED COUNT: 13.6 % (ref 11.6–15.1)
GFR SERPL CREATININE-BSD FRML MDRD: 47 ML/MIN/1.73SQ M
GLUCOSE SERPL-MCNC: 116 MG/DL (ref 65–140)
GLUCOSE UR STRIP-MCNC: NEGATIVE MG/DL
HCT VFR BLD AUTO: 24.1 % (ref 34.8–46.1)
HGB BLD-MCNC: 8 G/DL (ref 11.5–15.4)
HGB UR QL STRIP.AUTO: ABNORMAL
IMM GRANULOCYTES # BLD AUTO: 0.07 THOUSAND/UL (ref 0–0.2)
IMM GRANULOCYTES NFR BLD AUTO: 1 % (ref 0–2)
INR PPP: 1.06 (ref 0.86–1.17)
KETONES UR STRIP-MCNC: NEGATIVE MG/DL
LACTATE SERPL-SCNC: 1.7 MMOL/L (ref 0.5–2)
LEUKOCYTE ESTERASE UR QL STRIP: ABNORMAL
LYMPHOCYTES # BLD AUTO: 2.12 THOUSANDS/ΜL (ref 0.6–4.47)
LYMPHOCYTES NFR BLD AUTO: 16 % (ref 14–44)
MCH RBC QN AUTO: 30.2 PG (ref 26.8–34.3)
MCHC RBC AUTO-ENTMCNC: 33.2 G/DL (ref 31.4–37.4)
MCV RBC AUTO: 91 FL (ref 82–98)
MONOCYTES # BLD AUTO: 1.19 THOUSAND/ΜL (ref 0.17–1.22)
MONOCYTES NFR BLD AUTO: 9 % (ref 4–12)
NEUTROPHILS # BLD AUTO: 9.51 THOUSANDS/ΜL (ref 1.85–7.62)
NEUTS SEG NFR BLD AUTO: 73 % (ref 43–75)
NITRITE UR QL STRIP: POSITIVE
NON-SQ EPI CELLS URNS QL MICRO: ABNORMAL /HPF
NRBC BLD AUTO-RTO: 0 /100 WBCS
NT-PROBNP SERPL-MCNC: 4352 PG/ML
P AXIS: 74 DEGREES
PH UR STRIP.AUTO: 7 [PH] (ref 4.5–8)
PLATELET # BLD AUTO: 188 THOUSANDS/UL (ref 149–390)
PLATELET # BLD AUTO: 247 THOUSANDS/UL (ref 149–390)
PMV BLD AUTO: 9.1 FL (ref 8.9–12.7)
PMV BLD AUTO: 9.4 FL (ref 8.9–12.7)
POTASSIUM SERPL-SCNC: 4.3 MMOL/L (ref 3.5–5.3)
PR INTERVAL: 142 MS
PROCALCITONIN SERPL-MCNC: 0.07 NG/ML
PROT SERPL-MCNC: 7.9 G/DL (ref 6.4–8.2)
PROT UR STRIP-MCNC: NEGATIVE MG/DL
PROTHROMBIN TIME: 13.5 SECONDS (ref 11.8–14.2)
QRS AXIS: 74 DEGREES
QRSD INTERVAL: 108 MS
QT INTERVAL: 338 MS
QTC INTERVAL: 406 MS
RBC # BLD AUTO: 2.65 MILLION/UL (ref 3.81–5.12)
RBC #/AREA URNS AUTO: ABNORMAL /HPF
SODIUM SERPL-SCNC: 131 MMOL/L (ref 136–145)
SP GR UR STRIP.AUTO: 1.01 (ref 1–1.03)
T WAVE AXIS: -63 DEGREES
TROPONIN I SERPL-MCNC: 0.04 NG/ML
TROPONIN I SERPL-MCNC: 0.06 NG/ML
TROPONIN I SERPL-MCNC: 0.09 NG/ML
UROBILINOGEN UR QL STRIP.AUTO: 0.2 E.U./DL
VENTRICULAR RATE: 87 BPM
WBC # BLD AUTO: 12.99 THOUSAND/UL (ref 4.31–10.16)
WBC #/AREA URNS AUTO: ABNORMAL /HPF

## 2018-11-09 PROCEDURE — 99223 1ST HOSP IP/OBS HIGH 75: CPT | Performed by: HOSPITALIST

## 2018-11-09 PROCEDURE — 94664 DEMO&/EVAL PT USE INHALER: CPT

## 2018-11-09 PROCEDURE — 96360 HYDRATION IV INFUSION INIT: CPT

## 2018-11-09 PROCEDURE — 83880 ASSAY OF NATRIURETIC PEPTIDE: CPT | Performed by: EMERGENCY MEDICINE

## 2018-11-09 PROCEDURE — 84145 PROCALCITONIN (PCT): CPT | Performed by: HOSPITALIST

## 2018-11-09 PROCEDURE — 85730 THROMBOPLASTIN TIME PARTIAL: CPT | Performed by: EMERGENCY MEDICINE

## 2018-11-09 PROCEDURE — 87077 CULTURE AEROBIC IDENTIFY: CPT | Performed by: EMERGENCY MEDICINE

## 2018-11-09 PROCEDURE — 87186 SC STD MICRODIL/AGAR DIL: CPT | Performed by: EMERGENCY MEDICINE

## 2018-11-09 PROCEDURE — 93005 ELECTROCARDIOGRAM TRACING: CPT

## 2018-11-09 PROCEDURE — 80048 BASIC METABOLIC PNL TOTAL CA: CPT | Performed by: EMERGENCY MEDICINE

## 2018-11-09 PROCEDURE — 93010 ELECTROCARDIOGRAM REPORT: CPT | Performed by: INTERNAL MEDICINE

## 2018-11-09 PROCEDURE — 36415 COLL VENOUS BLD VENIPUNCTURE: CPT | Performed by: EMERGENCY MEDICINE

## 2018-11-09 PROCEDURE — 87040 BLOOD CULTURE FOR BACTERIA: CPT | Performed by: EMERGENCY MEDICINE

## 2018-11-09 PROCEDURE — 71046 X-RAY EXAM CHEST 2 VIEWS: CPT

## 2018-11-09 PROCEDURE — 83605 ASSAY OF LACTIC ACID: CPT | Performed by: EMERGENCY MEDICINE

## 2018-11-09 PROCEDURE — 85025 COMPLETE CBC W/AUTO DIFF WBC: CPT | Performed by: EMERGENCY MEDICINE

## 2018-11-09 PROCEDURE — 87631 RESP VIRUS 3-5 TARGETS: CPT | Performed by: HOSPITALIST

## 2018-11-09 PROCEDURE — 85049 AUTOMATED PLATELET COUNT: CPT | Performed by: HOSPITALIST

## 2018-11-09 PROCEDURE — 81001 URINALYSIS AUTO W/SCOPE: CPT | Performed by: EMERGENCY MEDICINE

## 2018-11-09 PROCEDURE — 99285 EMERGENCY DEPT VISIT HI MDM: CPT

## 2018-11-09 PROCEDURE — 85610 PROTHROMBIN TIME: CPT | Performed by: EMERGENCY MEDICINE

## 2018-11-09 PROCEDURE — 84484 ASSAY OF TROPONIN QUANT: CPT | Performed by: EMERGENCY MEDICINE

## 2018-11-09 PROCEDURE — 84484 ASSAY OF TROPONIN QUANT: CPT | Performed by: HOSPITALIST

## 2018-11-09 PROCEDURE — 87086 URINE CULTURE/COLONY COUNT: CPT | Performed by: EMERGENCY MEDICINE

## 2018-11-09 PROCEDURE — 80076 HEPATIC FUNCTION PANEL: CPT | Performed by: EMERGENCY MEDICINE

## 2018-11-09 RX ORDER — PRAVASTATIN SODIUM 40 MG
40 TABLET ORAL
Status: DISCONTINUED | OUTPATIENT
Start: 2018-11-09 | End: 2018-11-12 | Stop reason: HOSPADM

## 2018-11-09 RX ORDER — GUAIFENESIN 600 MG
600 TABLET, EXTENDED RELEASE 12 HR ORAL 2 TIMES DAILY PRN
Status: DISCONTINUED | OUTPATIENT
Start: 2018-11-09 | End: 2018-11-12 | Stop reason: HOSPADM

## 2018-11-09 RX ORDER — LORAZEPAM 0.5 MG/1
0.5 TABLET ORAL EVERY 8 HOURS PRN
Status: DISCONTINUED | OUTPATIENT
Start: 2018-11-09 | End: 2018-11-12 | Stop reason: HOSPADM

## 2018-11-09 RX ORDER — BENZONATATE 100 MG/1
100 CAPSULE ORAL 3 TIMES DAILY PRN
Status: DISCONTINUED | OUTPATIENT
Start: 2018-11-09 | End: 2018-11-12 | Stop reason: HOSPADM

## 2018-11-09 RX ORDER — ONDANSETRON 2 MG/ML
4 INJECTION INTRAMUSCULAR; INTRAVENOUS EVERY 6 HOURS PRN
Status: DISCONTINUED | OUTPATIENT
Start: 2018-11-09 | End: 2018-11-12 | Stop reason: HOSPADM

## 2018-11-09 RX ORDER — LOSARTAN POTASSIUM 50 MG/1
100 TABLET ORAL DAILY
Status: DISCONTINUED | OUTPATIENT
Start: 2018-11-09 | End: 2018-11-12 | Stop reason: HOSPADM

## 2018-11-09 RX ORDER — FUROSEMIDE 20 MG/1
20 TABLET ORAL
Status: DISCONTINUED | OUTPATIENT
Start: 2018-11-12 | End: 2018-11-12 | Stop reason: HOSPADM

## 2018-11-09 RX ORDER — ALBUTEROL SULFATE 2.5 MG/3ML
2.5 SOLUTION RESPIRATORY (INHALATION) EVERY 6 HOURS PRN
Status: DISCONTINUED | OUTPATIENT
Start: 2018-11-09 | End: 2018-11-12 | Stop reason: HOSPADM

## 2018-11-09 RX ORDER — FLUTICASONE PROPIONATE 50 MCG
1 SPRAY, SUSPENSION (ML) NASAL DAILY
Status: DISCONTINUED | OUTPATIENT
Start: 2018-11-09 | End: 2018-11-12 | Stop reason: HOSPADM

## 2018-11-09 RX ORDER — LORATADINE 10 MG/1
10 TABLET ORAL DAILY
Status: DISCONTINUED | OUTPATIENT
Start: 2018-11-09 | End: 2018-11-12 | Stop reason: HOSPADM

## 2018-11-09 RX ORDER — DULOXETIN HYDROCHLORIDE 60 MG/1
60 CAPSULE, DELAYED RELEASE ORAL DAILY
Status: DISCONTINUED | OUTPATIENT
Start: 2018-11-09 | End: 2018-11-12 | Stop reason: HOSPADM

## 2018-11-09 RX ORDER — TRAMADOL HYDROCHLORIDE 50 MG/1
50 TABLET ORAL EVERY 8 HOURS PRN
Status: DISCONTINUED | OUTPATIENT
Start: 2018-11-09 | End: 2018-11-12 | Stop reason: HOSPADM

## 2018-11-09 RX ORDER — PANTOPRAZOLE SODIUM 40 MG/1
40 TABLET, DELAYED RELEASE ORAL 2 TIMES DAILY
Status: DISCONTINUED | OUTPATIENT
Start: 2018-11-09 | End: 2018-11-12 | Stop reason: HOSPADM

## 2018-11-09 RX ORDER — NIFEDIPINE 30 MG/1
60 TABLET, EXTENDED RELEASE ORAL DAILY
Status: DISCONTINUED | OUTPATIENT
Start: 2018-11-10 | End: 2018-11-12 | Stop reason: HOSPADM

## 2018-11-09 RX ORDER — ASPIRIN 325 MG
325 TABLET ORAL DAILY
Status: DISCONTINUED | OUTPATIENT
Start: 2018-11-09 | End: 2018-11-12 | Stop reason: HOSPADM

## 2018-11-09 RX ORDER — AZITHROMYCIN 250 MG/1
250 TABLET, FILM COATED ORAL EVERY 24 HOURS
Status: DISCONTINUED | OUTPATIENT
Start: 2018-11-09 | End: 2018-11-10

## 2018-11-09 RX ORDER — SODIUM CHLORIDE 9 MG/ML
125 INJECTION, SOLUTION INTRAVENOUS CONTINUOUS
Status: DISCONTINUED | OUTPATIENT
Start: 2018-11-09 | End: 2018-11-12 | Stop reason: HOSPADM

## 2018-11-09 RX ORDER — LEVOTHYROXINE SODIUM 0.1 MG/1
100 TABLET ORAL
Status: DISCONTINUED | OUTPATIENT
Start: 2018-11-10 | End: 2018-11-12 | Stop reason: HOSPADM

## 2018-11-09 RX ORDER — IBUPROFEN 400 MG/1
400 TABLET ORAL ONCE
Status: COMPLETED | OUTPATIENT
Start: 2018-11-09 | End: 2018-11-09

## 2018-11-09 RX ORDER — DONEPEZIL HYDROCHLORIDE 5 MG/1
5 TABLET, FILM COATED ORAL
Status: DISCONTINUED | OUTPATIENT
Start: 2018-11-09 | End: 2018-11-12 | Stop reason: HOSPADM

## 2018-11-09 RX ORDER — ALBUTEROL SULFATE 2.5 MG/3ML
2.5 SOLUTION RESPIRATORY (INHALATION) ONCE
Status: DISCONTINUED | OUTPATIENT
Start: 2018-11-09 | End: 2018-11-09

## 2018-11-09 RX ADMIN — PANTOPRAZOLE SODIUM 40 MG: 40 TABLET, DELAYED RELEASE ORAL at 18:21

## 2018-11-09 RX ADMIN — AZITHROMYCIN 250 MG: 250 TABLET, FILM COATED ORAL at 18:18

## 2018-11-09 RX ADMIN — IBUPROFEN 400 MG: 400 TABLET, FILM COATED ORAL at 22:36

## 2018-11-09 RX ADMIN — FLUTICASONE PROPIONATE 1 SPRAY: 50 SPRAY, METERED NASAL at 18:20

## 2018-11-09 RX ADMIN — LORATADINE 10 MG: 10 TABLET ORAL at 18:19

## 2018-11-09 RX ADMIN — ENOXAPARIN SODIUM 40 MG: 40 INJECTION SUBCUTANEOUS at 18:19

## 2018-11-09 RX ADMIN — DULOXETINE HYDROCHLORIDE 60 MG: 60 CAPSULE, DELAYED RELEASE ORAL at 18:18

## 2018-11-09 RX ADMIN — ASPIRIN 325 MG: 325 TABLET ORAL at 18:19

## 2018-11-09 RX ADMIN — CEFTRIAXONE SODIUM 1000 MG: 10 INJECTION, POWDER, FOR SOLUTION INTRAVENOUS at 14:25

## 2018-11-09 RX ADMIN — SODIUM CHLORIDE 75 ML/HR: 0.9 INJECTION, SOLUTION INTRAVENOUS at 18:19

## 2018-11-09 RX ADMIN — LOSARTAN POTASSIUM 100 MG: 50 TABLET ORAL at 18:18

## 2018-11-09 RX ADMIN — SODIUM CHLORIDE 1000 ML: 0.9 INJECTION, SOLUTION INTRAVENOUS at 12:45

## 2018-11-09 RX ADMIN — PRAVASTATIN SODIUM 40 MG: 40 TABLET ORAL at 18:19

## 2018-11-09 RX ADMIN — DONEPEZIL HYDROCHLORIDE 5 MG: 5 TABLET, FILM COATED ORAL at 21:26

## 2018-11-09 RX ADMIN — METOPROLOL TARTRATE 25 MG: 25 TABLET ORAL at 18:21

## 2018-11-09 NOTE — ASSESSMENT & PLAN NOTE
Trop mildly elevated; EKG with nonspecific ST-TWI in lateral leads  No chest pain; suspect type II MI 2/2 primary problem   Will maintain on telemetry x24 hours  Trend trops; EKG with second set and prn

## 2018-11-09 NOTE — ASSESSMENT & PLAN NOTE
With tachycardia, leukocytosis, fever at nursing home of 101; source unclear  UA: nitrites, leuks, bacteria; pt is denying any urinary symptoms  CXR: no clear infiltrate; pt complains of cough and sinus congestion   Bcx drawn x2   Lactic wnl  IVF

## 2018-11-09 NOTE — RESPIRATORY THERAPY NOTE
RT Protocol Note  Honey Franz 80 y o  female MRN: [id number]  Unit/Bed#: -01 Encounter: [id number]    Assessment    Principal Problem:    [facility]  Active Problems:    Hyponatremia    Anemia    Cough    Elevated troponin      Home Pulmonary Medications:  none       Past Medical History:   Diagnosis Date    Aspiration pneumonia ([facility] )     Last Assessed:  7/18/17    Basal cell carcinoma of nose     Last Assessed:  4/12/17    Blind     blind right eye, 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, Last Assessed:  7/18/17    Dementia     Depression     Disease of thyroid gland     Fibromyalgia     Last Assessed:  7/18/17    Gait disturbance     GERD (gastroesophageal reflux disease)     Glaucoma     Hyperlipidemia     Hypertension     Osteopenia     Peripheral neuropathy     Last Assessed:  4/12/17    Polymyalgia rheumatica ([place] [address] )     Psychiatric disorder     depression    Renal insufficiency syndrome     Last Assessed:  4/13/17    Rheumatoid arthritis ([place] [address] )     Stroke ([place] [address] )     x2, Last Assessed:  7/18/17    Systemic lupus erythematosus ([place] [address] )     Vertigo     Vitamin D deficiency      Social History     Social History    Marital status: /Civil Union     Spouse name: N/A    Number of children: N/A    Years of education: N/A     Social History Main Topics    Smoking status: Never Smoker    Smokeless tobacco: Never Used    Alcohol use No    Drug use: No    Sexual activity: Not Asked     Other Topics Concern    None     Social History Narrative    None       Subjective         Objective    Physical Exam:   Assessment Type: Assess only  General Appearance: Alert, Awake  Respiratory Pattern: Normal  Chest Assessment: Chest expansion symmetrical  Bilateral Breath Sounds: Coarse    Vitals:  Blood pressure (!) 172/70, pulse 90, temperature 99 8 °F (37 7 °C), temperature source Oral, resp  rate 18, SpO2 96 %            Imaging and other studies: reviewed Plan    Respiratory Plan: PT is admitted with a UTI  She has no pulmonary Hx   Her BS are currently clear and were documented as clear in the ED, however, one practitioner documented wheezes, so I ordered albuterol nebs q4 prn

## 2018-11-09 NOTE — ED PROVIDER NOTES
History  Chief Complaint   Patient presents with    Fever - 75 years or older     Per EMS, pt has been having low grade fever at nursing facility  Pt was prescribed abx yesterday but none were administered, to treat suspected pna  Per daughter, pt has been complaining of sore throat and coughing for a few days  Pt is afebrile during triage  History provided by:  Patient  Fever - 75 years or older   Max temp prior to arrival:  101 this am at [facility] home  Temp source:  Oral  Severity:  Moderate  Onset quality:  Sudden  Duration:  1 day  Timing:  Constant  Progression:  Unchanged  Chronicity:  New  Relieved by:  Acetaminophen  Worsened by:  Nothing  Ineffective treatments:  None tried  Associated symptoms: chest pain, cough and nausea    Associated symptoms: no chills, no confusion, no congestion, no diarrhea, no dysuria, no headaches, no rash, no rhinorrhea, no sore throat and no vomiting        Prior to Admission Medications   Prescriptions Last Dose Informant Patient Reported? Taking?    DULoxetine (CYMBALTA) 60 mg delayed release capsule  Outside Facility (Specify) No No   Sig: Take 1 capsule (60 mg total) by mouth daily   Dentifrices ([address])  Outside Facility (Specify) Yes No   Sig: Apply to teeth   LORazepam (ATIVAN) 0 5 mg tablet   No No   Sig: Take 1 tablet (0 5 mg total) by mouth every 8 (eight) hours as needed for anxiety for up to 10 days   Lutein 10 MG TABS  Outside Facility (Specify) Yes No   Sig: Take 1 tablet by mouth daily     Mouthwashes (BIOTENE DRY MOUTH) LIQD  Outside Facility (Specify) No No   Sig: Rinse and spit bid   NIFEdipine (PROCARDIA XL) 60 mg 24 hr tablet  Outside Facility (Specify) Yes No   NIFEdipine ER (ADALAT CC) 60 MG 24 hr tablet  Outside Facility (Specify) Yes No   Sig: Take 60 mg by mouth daily   aspirin 325 mg tablet  Outside Facility (Specify) Yes No   Sig: Take 325 mg by mouth daily     benzocaine (ORAJEL) 10 % mucosal gel  Outside Facility (Specify) No No   Sig: Apply 1 application to the mouth or throat 3 (three) times a day as needed for mucositis   calcium carbonate (OS-TEDDY) 600 MG tablet  Outside Facility (Specify) Yes No   Sig: Take 600 mg by mouth daily   carboxymethylcellulose (REFRESH LIQUIGEL) 1 % ophthalmic solution  Outside Facility (Specify) Yes No   Sig: Apply 1 drop to eye 3 (three) times a day     cetirizine (ZyrTEC) 10 MG chewable tablet  Outside Facility (Specify) No No   Sig: Chew 1 tablet (10 mg total) daily   donepezil (ARICEPT) 10 mg tablet  Outside Facility (Specify) No No   Sig: Take 0 5 tablets (5 mg total) by mouth daily at bedtime Increased confusion on 10mg   fluticasone (FLONASE) 50 mcg/act nasal spray  Outside Facility (Specify) No No   Si spray into each nostril daily   furosemide (LASIX) 20 mg tablet  Outside Facility (Specify) No No   Sig: One tab daily x 1 week   furosemide (LASIX) 20 mg tablet   No No   Sig: Take one tab on Monday- Wednesday and friday   guaiFENesin (MUCINEX) 600 mg 12 hr tablet  Outside Facility (Specify) No No   Sig: Take 1 tablet (600 mg total) by mouth 2 (two) times a day as needed for congestion   ipratropium-albuterol (COMBIVENT RESPIMAT) inhaler  Outside Facility (Specify) No No   Sig: Inhale 2 puffs every 4 (four) hours as needed (dyspnea)   levothyroxine 100 mcg tablet  Outside Facility (Specify) Yes No   loratadine (CLARITIN) 10 mg tablet  Outside Facility (Specify) Yes No   Sig: Take by mouth   losartan (COZAAR) 100 MG tablet  Outside Facility (Specify) No No   Sig: Take 1 tablet (100 mg total) by mouth daily   magnesium hydroxide (MILK OF MAGNESIA) 400 mg/5 mL oral suspension  Outside Facility (Specify) Yes No   Sig: Take 30 mL by mouth daily as needed for constipation     metoprolol tartrate (LOPRESSOR) 50 mg tablet  Outside Facility (Specify) No No   Sig: Take 0 5 tablets (25 mg total) by mouth 2 (two) times a day   pantoprazole (PROTONIX) 40 mg tablet  Outside Facility (Specify) Yes No   Sig: Take 40 mg by mouth 2 (two) times a day     potassium chloride (K-DUR,KLOR-CON) 10 mEq tablet  Outside Facility (Specify) No No   Sig: One tab daily on the days you take lasix   potassium chloride (K-DUR,KLOR-CON) 10 mEq tablet   No No   Sig: Take one tab daily while on lasix   pravastatin (PRAVACHOL) 40 mg tablet  Outside Facility (Specify) Yes No   Sig: Take 40 mg by mouth daily     traMADol (ULTRAM) 50 mg tablet   No No   Sig: Take 1 tablet (50 mg total) by mouth every 8 (eight) hours as needed for moderate pain   triamcinolone (KENALOG) 0 1 % cream  Outside Facility (Specify) Yes No   Sig: triamcinolone acetonide 0 1 % topical cream      Facility-Administered Medications: None       Past Medical History:   Diagnosis Date    Aspiration pneumonia ([facility])     Last Assessed:  7/18/17    Basal cell carcinoma of nose     Last Assessed:  4/12/17    Blind     blind right eye, 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, Last Assessed:  7/18/17    Dementia     Depression     Disease of thyroid gland     Fibromyalgia     Last Assessed:  7/18/17    Gait disturbance     GERD (gastroesophageal reflux disease)     Glaucoma     Hyperlipidemia     Hypertension     Osteopenia     Peripheral neuropathy     Last Assessed:  4/12/17    Polymyalgia rheumatica ([address] )     Psychiatric disorder     depression    Renal insufficiency syndrome     Last Assessed:  4/13/17    Rheumatoid arthritis ([address] )     Stroke ([address] )     x2, Last Assessed:  7/18/17    Systemic lupus erythematosus ([address] )     Vertigo     Vitamin D deficiency        Past Surgical History:   Procedure Laterality Date    EYE SURGERY      JOINT REPLACEMENT      left hip replacement, left shoulder replacement    SHOULDER SURGERY      Replacement       Family History   Problem Relation Age of Onset    Heart attack Mother     Diabetes Mother     Dementia Father     Coronary artery disease Father         cardiac disorder    Diabetes Sister     Uterine cancer Sister      I have reviewed and agree with the history as documented  Social History   Substance Use Topics    Smoking status: Never Smoker    Smokeless tobacco: Never Used    Alcohol use No        Review of Systems   Constitutional: Negative for activity change, chills, diaphoresis and fever  HENT: Negative for congestion, rhinorrhea, sinus pressure and sore throat  Eyes: Negative for pain and visual disturbance  Respiratory: Positive for cough  Negative for chest tightness, shortness of breath, wheezing and stridor  Cardiovascular: Positive for chest pain  Negative for palpitations  Gastrointestinal: Positive for nausea  Negative for abdominal distention, abdominal pain, constipation, diarrhea and vomiting  Genitourinary: Negative for dysuria and frequency  Musculoskeletal: Negative for neck pain and neck stiffness  Skin: Negative for rash  Neurological: Negative for dizziness, speech difficulty, light-headedness, numbness and headaches  Psychiatric/Behavioral: Negative for confusion  Physical Exam  Physical Exam   Constitutional: She appears well-developed  No distress  HENT:   Head: Normocephalic and atraumatic  Eyes: Pupils are equal, round, and reactive to light  Neck: Normal range of motion  Neck supple  No tracheal deviation present  Cardiovascular: Normal rate, regular rhythm, normal heart sounds and intact distal pulses  No murmur heard  Pulmonary/Chest: Effort normal and breath sounds normal  No stridor  No respiratory distress  Intermittent cough throughout the examination   Abdominal: Soft  She exhibits no distension  There is no tenderness  There is no rebound and no guarding  Musculoskeletal: Normal range of motion  Neurological: She is alert  Skin: Skin is warm and dry  She is not diaphoretic  No erythema  No pallor  Psychiatric: She has a normal mood and affect  Vitals reviewed        Vital Signs  ED Triage Vitals [11/09/18 1159] Temperature Pulse Respirations Blood Pressure SpO2   99 6 °F (37 6 °C) 89 18 138/71 97 %      Temp Source Heart Rate Source Patient Position - Orthostatic VS BP Location FiO2 (%)   Oral Monitor Sitting Right arm --      Pain Score       No Pain           Vitals:    11/09/18 1159 11/09/18 1401   BP: 138/71 162/70   Pulse: 89 92   Patient Position - Orthostatic VS: Sitting Lying       Visual Acuity      ED Medications  Medications   ceftriaxone (ROCEPHIN) 1 g/50 mL in dextrose IVPB (not administered)   sodium chloride 0 9 % bolus 1,000 mL (0 mL Intravenous Stopped 11/9/18 1350)       Diagnostic Studies  Results Reviewed     Procedure Component Value Units Date/Time    UA w Reflex to Microscopic w Reflex to Culture [[id number]]  (Abnormal) Collected:  11/09/18 1350    Lab Status:  Final result Specimen:  Urine from Urine, Straight Cath Updated:  11/09/18 1407     Color, UA Light Yellow     Clarity, UA Slightly Cloudy     Specific [place], UA 1 010     pH, UA 7 0     Leukocytes, UA Moderate (A)     Nitrite, UA Positive (A)     Protein, UA Negative mg/dl      Glucose, UA Negative mg/dl      Ketones, UA Negative mg/dl      Urobilinogen, UA 0 2 E U /dl      Bilirubin, UA Negative     Blood, UA Trace-Intact (A)    Urine Microscopic [[id number]] Collected:  11/09/18 1350    Lab Status:   In process Specimen:  Urine from Urine, Straight Cath Updated:  11/09/18 1407    Hepatic function panel [[id number]]  (Abnormal) Collected:  11/09/18 1222    Lab Status:  Final result Specimen:  Blood from Arm, Right Updated:  11/09/18 1304     Total Bilirubin 0 60 mg/dL      Bilirubin, Direct 0 13 mg/dL      Alkaline Phosphatase 135 (H) U/L      AST 34 U/L      ALT 34 U/L      Total Protein 7 9 g/dL      Albumin 3 1 (L) g/dL     B-type natriuretic peptide [[id number]]  (Abnormal) Collected:  11/09/18 1222    Lab Status:  Final result Specimen:  Blood from Arm, Right Updated:  11/09/18 1302     NT-proBNP 4,352 (H) pg/mL     Basic metabolic panel [[id number]]  (Abnormal) Collected:  11/09/18 1222    Lab Status:  Final result Specimen:  Blood from Arm, Right Updated:  11/09/18 1259     Sodium 131 (L) mmol/L      Potassium 4 3 mmol/L      Chloride 98 (L) mmol/L      CO2 24 mmol/L      ANION GAP 9 mmol/L      BUN 15 mg/dL      Creatinine 1 06 mg/dL      Glucose 116 mg/dL      Calcium 9 4 mg/dL      eGFR 47 ml/min/1 73sq m     Narrative:         National Kidney Disease Education Program recommendations are as follows:  GFR calculation is accurate only with a steady state creatinine  Chronic Kidney disease less than 60 ml/min/1 73 sq  meters  Kidney failure less than 15 ml/min/1 73 sq  meters  Lactic acid, plasma [[id number]]  (Normal) Collected:  11/09/18 1222    Lab Status:  Final result Specimen:  Blood from Arm, Right Updated:  11/09/18 1251     LACTIC ACID 1 7 mmol/L     Narrative:         Result may be elevated if tourniquet was used during collection  Troponin I [[id number]]  (Normal) Collected:  11/09/18 1222    Lab Status:  Final result Specimen:  Blood from Arm, Right Updated:  11/09/18 1251     Troponin I 0 04 ng/mL     Blood culture #1 [[id number]] Collected:  11/09/18 1243    Lab Status:   In process Specimen:  Blood from Arm, Right Updated:  11/09/18 1250    Protime-INR [[id number]]  (Normal) Collected:  11/09/18 1222    Lab Status:  Final result Specimen:  Blood from Arm, Right Updated:  11/09/18 1242     Protime 13 5 seconds      INR 1 06    APTT [[id number]]  (Normal) Collected:  11/09/18 1222    Lab Status:  Final result Specimen:  Blood from Arm, Right Updated:  11/09/18 1242     PTT 29 seconds     CBC and differential [[id number]]  (Abnormal) Collected:  11/09/18 1222    Lab Status:  Final result Specimen:  Blood from Arm, Right Updated:  11/09/18 1232     WBC 12 99 (H) Thousand/uL      RBC 2 65 (L) Million/uL      Hemoglobin 8 0 (L) g/dL      Hematocrit 24 1 (L) %      MCV 91 fL      MCH 30 2 pg      MCHC 33 2 g/dL      RDW 13 6 % MPV 9 4 fL      Platelets 982 Thousands/uL      nRBC 0 /100 WBCs      Neutrophils Relative 73 %      Immat GRANS % 1 %      Lymphocytes Relative 16 %      Monocytes Relative 9 %      Eosinophils Relative 1 %      Basophils Relative 0 %      Neutrophils Absolute 9 51 (H) Thousands/µL      Immature Grans Absolute 0 07 Thousand/uL      Lymphocytes Absolute 2 12 Thousands/µL      Monocytes Absolute 1 19 Thousand/µL      Eosinophils Absolute 0 09 Thousand/µL      Basophils Absolute 0 01 Thousands/µL     Blood culture #2 [[id number]] Collected:  11/09/18 1222    Lab Status: In process Specimen:  Blood from Arm, Right Updated:  11/09/18 1228                 XR chest 2 views   ED Interpretation by Sydney Jerry DO (11/09 1323)   No acute pathology                 Procedures  ECG 12 Lead Documentation  Date/Time: 11/9/2018 1:24 PM  Performed by: Alvino Rodgers by: Frankford Organ     ECG reviewed by me, the ED Provider: yes    Patient location:  ED  Previous ECG:     Previous ECG:  Compared to current    Comparison ECG info:  5 5 18    Similarity:  Changes noted  Interpretation:     Interpretation: abnormal    Rate:     ECG rate:  87  Rhythm:     Rhythm: sinus rhythm    Ectopy:     Ectopy: none    QRS:     QRS axis:  Normal    QRS intervals:  Normal  Conduction:     Conduction: normal    ST segments:     ST segments:  Abnormal  T waves:     T waves: inverted    Comments:      ST depression with T-wave inversion in leads II III AVF V3 through V 5           Phone Contacts  ED Phone Contact    ED Course                         Initial Sepsis Screening     [address] Name 11/09/18 1416                Is the patient's history suggestive of a new or worsening infection? (!)  Yes (Proceed)  -DA        Suspected source of infection urinary tract infection  -DA        Are two or more of the following signs & symptoms of infection both present and new to the patient?  (!)  Yes (Proceed)  -DA        Indicate SIRS criteria Hyperthemia > 38 3C (100 9F); Tachycardia > 90 bpm  -DA        If the answer is yes to both questions, suspicion of sepsis is present          If severe sepsis is present AND tissue hypoperfusion perists in the hour after fluid resuscitation or lactate > 4, the patient meets criteria for SEPTIC SHOCK          Are any of the following organ dysfunction criteria present within 6 hours of suspected infection and SIRS criteria that are NOT considered to be chronic conditions? No  -DA        Organ dysfunction          Date of presentation of severe sepsis          Time of presentation of severe sepsis          Tissue hypoperfusion persists in the hour after crystalloid fluid administration, evidenced, by either:          Was hypotension present within one hour of the conclusion of crystalloid fluid administration?         Date of presentation of septic shock          Time of presentation of septic shock            User Key  (r) = Recorded By, (t) = Taken By, (c) = Cosigned By    [address] Name Provider Type    DOREEN Osman DO Physician                  MDM  Number of Diagnoses or Management Options  Abnormal EKG: new and requires workup  Sepsis [facility]): new and requires workup  UTI (urinary tract infection): new and requires workup  Diagnosis management comments:       Initial ED assessment:  80year old female presents with fever  , intermittent cough      Initial DDx includes but is not limited to:   Pneumonia, sepsis less likely urinary tract infection less likely intra-abdominal pathology due to non tender abdomen    Initial ED plan:   Blood work, chest x-ray, disposition pending ED workup likely admission,      Final ED summary/disposition:   After evaluation and workup in the emergency department, found have urosepsis abnormal EKG will place on tele, inpatient admission        Amount and/or Complexity of Data Reviewed  Clinical lab tests: reviewed and ordered  Tests in the radiology section of CPT®: ordered and reviewed  Decide to obtain previous medical records or to obtain history from someone other than the patient: yes  Obtain history from someone other than the patient: yes  Review and summarize past medical records: yes  Discuss the patient with other providers: yes  Independent visualization of images, tracings, or specimens: yes      CritCare Time    Disposition  Final diagnoses:   [facility]   UTI (urinary tract infection)   Abnormal EKG     Time reflects when diagnosis was documented in both MDM as applicable and the Disposition within this note     Time User Action Codes Description Comment    11/9/2018  2:16 PM Debbie HAMMER Add [A41 9] Sepsis ([facility] )     11/9/2018  2:16  [facility], [address] [place] [address] [N39 0] UTI (urinary tract infection)     11/9/2018  2:20 PM Yue Ng Add [R94 31] Abnormal EKG       ED Disposition     ED Disposition Condition Comment    Admit  Case was discussed with Dr Jacob Novak and the patient's admission status was agreed to be Admission Status: inpatient status to the service of Dr Jacob Noavk   Follow-up Information    None         Patient's Medications   Discharge Prescriptions    No medications on file     No discharge procedures on file      ED Provider  Electronically Signed by           Edgar Cantrell DO  11/09/18 0234

## 2018-11-09 NOTE — ASSESSMENT & PLAN NOTE
With dry hacking cough, fever and general malaise; also with sinus pressure   Check PCT  Check influenza  Add azithromycin in the meantime  Albuterol prn  Cough suppressants, mucines  Decongestant

## 2018-11-09 NOTE — PLAN OF CARE
CARDIOVASCULAR - ADULT     Maintains optimal cardiac output and hemodynamic stability Progressing     Absence of cardiac dysrhythmias or at baseline rhythm Progressing        DISCHARGE PLANNING     Discharge to home or other facility with appropriate resources Progressing        GASTROINTESTINAL - ADULT     Minimal or absence of nausea and/or vomiting Progressing        INFECTION - ADULT     Absence or prevention of progression during hospitalization Progressing     Absence of fever/infection during neutropenic period Progressing        METABOLIC, FLUID AND ELECTROLYTES - ADULT     Fluid balance maintained Progressing        NEUROSENSORY - ADULT     Achieves maximal functionality and self care Progressing        Potential for Falls     Patient will remain free of falls Progressing        Prexisting or High Potential for Compromised Skin Integrity     Skin integrity is maintained or improved Progressing        RESPIRATORY - ADULT     Achieves optimal ventilation and oxygenation Progressing        SAFETY ADULT     Maintain or return to baseline ADL function Progressing        SKIN/TISSUE INTEGRITY - ADULT     Skin integrity remains intact Progressing

## 2018-11-09 NOTE — H&P
H&P- Toby Colon 4/27/1931, 80 y o  female MRN: [id number]    Unit/Bed#: -01 Encounter: [id number]    Primary Care Provider: Alivia Eaton DO   Date and time admitted to hospital: 11/9/2018 11:54 AM    * Sepsis ([address] )   Assessment & Plan    With tachycardia, leukocytosis, fever at nursing home of 101; source unclear  UA: nitrites, leuks, bacteria; pt is denying any urinary symptoms  CXR: no clear infiltrate; pt complains of cough and sinus congestion   Bcx drawn x2   Lactic wnl  IVF     Anemia   Assessment & Plan    hgb 8 0 on admission; bsHgb: 9-10   CBC daily  Check FOBT      Hyponatremia   Assessment & Plan    Check Denise  IVF   BMP in the am      Cough   Assessment & Plan    With dry hacking cough, fever and general malaise; also with sinus pressure   Check PCT  Check influenza  Add azithromycin in the meantime  Albuterol prn  Cough suppressants, mucines  Decongestant      Elevated troponin   Assessment & Plan    Trop mildly elevated; EKG with nonspecific ST-TWI in lateral leads  No chest pain; suspect type II MI 2/2 primary problem   Will maintain on telemetry x24 hours  Trend trops; EKG with second set and prn        VTE Prophylaxis: Enoxaparin (Lovenox)  / sequential compression device   Code Status: DNR/DNI spoke with pt's dtr   POLST: POLST form is not discussed and not completed at this time  Anticipated Length of Stay:  Patient will be admitted on an Inpatient basis with an anticipated length of stay of  > 2 midnights  Justification for Hospital Stay: sepsis     Total Time for Visit, including Counseling / Coordination of Care: 1 hour  Greater than 50% of this total time spent on direct patient counseling and coordination of care  Chief Complaint:   Cough and malaise     History of Present Illness:    Toby Colon is a 80 y o  female history of fibromyalgia, htn, dementia with behavioral disturbances who presents with fevers and cough at her nursing home   Poor historian due to dementia; spoke to dtr via phone  Dry hacking cough and general malaise  Reportedly had fevers at her nursing home (101)  Was supposed to be started on abx for presumed pna, but abx were not available until tomorrow and pt continued to have fevers  Pt c/o sore throat and sinus congestion  No nausea or vomiting  No abd pain  Denies dysuria or hematuria  No increased frequency  +muscle aches but pt relates this to her "fibro "   Pt is denying chest pain currently  Per dtr, pt has 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 in [place]  Review of Systems:    Review of Systems   Constitutional: Negative for chills and fever  HENT: Positive for congestion, sinus pressure and sore throat  Respiratory: Positive for cough  Negative for choking, chest tightness, shortness of breath and wheezing  Cardiovascular: Negative for chest pain, palpitations and leg swelling  Genitourinary: Negative for dysuria, flank pain, frequency and urgency  Musculoskeletal: Positive for myalgias  Neurological: Negative for dizziness and light-headedness  All other systems reviewed and are negative        Past Medical and Surgical History:     Past Medical History:   Diagnosis Date    Aspiration pneumonia ([address] )     Last Assessed:  7/18/17    Basal cell carcinoma of nose     Last Assessed:  4/12/17    Blind     blind right eye, 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, Last Assessed:  7/18/17    Dementia     Depression     Disease of thyroid gland     Fibromyalgia     Last Assessed:  7/18/17    Gait disturbance     GERD (gastroesophageal reflux disease)     Glaucoma     Hyperlipidemia     Hypertension     Osteopenia     Peripheral neuropathy     Last Assessed:  4/12/17    Polymyalgia rheumatica ([address] )     Psychiatric disorder     depression    Renal insufficiency syndrome     Last Assessed:  4/13/17    Rheumatoid arthritis ([address] )     Stroke ([address] )     x2, Last Assessed:  7/18/17    [facility]     Vertigo     Vitamin D deficiency        Past Surgical History:   Procedure Laterality Date    EYE SURGERY      JOINT REPLACEMENT      left hip replacement, left shoulder replacement    SHOULDER SURGERY      Replacement       Meds/Allergies:    Prior to Admission medications    Medication Sig Start Date End Date Taking?  Authorizing Provider   aspirin 325 mg tablet Take 325 mg by mouth daily      Historical Provider, MD   benzocaine (ORAJEL) 10 % mucosal gel Apply 1 application to the mouth or throat 3 (three) times a day as needed for mucositis 7/16/18   Traci Sousa DO   calcium carbonate (OS-TEDDY) 600 MG tablet Take 600 mg by mouth daily    Historical Provider, MD   carboxymethylcellulose (REFRESH LIQUIGEL) 1 % ophthalmic solution Apply 1 drop to eye 3 (three) times a day   8/24/17   Historical Provider, MD   cetirizine (ZyrTEC) 10 MG chewable tablet Chew 1 tablet (10 mg total) daily 8/22/18   Roque Kevin PA-C   Dentifrices (BIOTENE DRY MOUTH CARE DT) Apply to teeth    Historical Provider, MD   donepezil (ARICEPT) 10 mg tablet Take 0 5 tablets (5 mg total) by mouth daily at bedtime Increased confusion on 10mg 8/16/18   Traci Sousa DO   DULoxetine (CYMBALTA) 60 mg delayed release capsule Take 1 capsule (60 mg total) by mouth daily 4/17/18   Ruth Hargrove PA-C   fluticasone (FLONASE) 50 mcg/act nasal spray 1 spray into each nostril daily 8/22/18   Roque Kevin PA-C   furosemide (LASIX) 20 mg tablet One tab daily x 1 week 8/16/18   Traci Sousa DO   furosemide (LASIX) 20 mg tablet Take one tab on Monday- Wednesday and friday 9/6/18   Traci Sousa DO   guaiFENesin (MUCINEX) 600 mg 12 hr tablet Take 1 tablet (600 mg total) by mouth 2 (two) times a day as needed for congestion 5/18/18   Traci Sousa DO   ipratropium-albuterol (COMBIVENT RESPIMAT) inhaler Inhale 2 puffs every 4 (four) hours as needed (dyspnea) 5/18/18   Traci Sousa DO   levothyroxine 100 mcg tablet  12/22/17 Historical Provider, MD   loratadine (CLARITIN) 10 mg tablet Take by mouth    Historical Provider, MD   LORazepam (ATIVAN) 0 5 mg tablet Take 1 tablet (0 5 mg total) by mouth every 8 (eight) hours as needed for anxiety for up to 10 days 10/5/18 10/15/18  Olivia Elliott, DO   losartan (COZAAR) 100 MG tablet Take 1 tablet (100 mg total) by mouth daily 8/16/18   Olivia Elliott, DO   Lutein 10 MG TABS Take 1 tablet by mouth daily      Historical Provider, MD   magnesium hydroxide (MILK OF MAGNESIA) 400 mg/5 mL oral suspension Take 30 mL by mouth daily as needed for constipation      Historical Provider, MD   metoprolol tartrate (LOPRESSOR) 50 mg tablet Take 0 5 tablets (25 mg total) by mouth 2 (two) times a day 8/16/18   Olivia Elliott, DO   Mouthwashes (BIOTENE DRY MOUTH) LIQD Rinse and spit bid 6/22/18   Olivia Elliott, DO   NIFEdipine (PROCARDIA XL) 60 mg 24 hr tablet  6/25/18   Historical Provider, MD   NIFEdipine ER (ADALAT CC) 60 MG 24 hr tablet Take 60 mg by mouth daily    Historical Provider, MD   pantoprazole (PROTONIX) 40 mg tablet Take 40 mg by mouth 2 (two) times a day      Historical Provider, MD   potassium chloride (K-DUR,KLOR-CON) 10 mEq tablet One tab daily on the days you take lasix 8/16/18   Olivia Elliott, DO   potassium chloride (K-DUR,KLOR-CON) 10 mEq tablet Take one tab daily while on lasix 9/6/18   Olivia Elliott, DO   pravastatin (PRAVACHOL) 40 mg tablet Take 40 mg by mouth daily      Historical Provider, MD   traMADol (ULTRAM) 50 mg tablet Take 1 tablet (50 mg total) by mouth every 8 (eight) hours as needed for moderate pain 10/5/18   Olivia Elliott, DO   triamcinolone (KENALOG) 0 1 % cream triamcinolone acetonide 0 1 % topical cream    Historical Provider, MD     I have reviewed home medications using allscripts  Allergies:    Allergies   Allergen Reactions    Acetaminophen     Golimumab      Other reaction(s): dedrick colored stool    Lidocaine Other (See Comments)    Neurontin [Gabapentin]     Nortriptyline Tremor    Prasterone      Other reaction(s): pruitis    Tricyclic Antidepressants     Leflunomide Rash    Sulfasalazine Rash     Social History:  Marital Status: /Civil Union   Occupation:   Patient Pre-hospital [facility] Situation: [facility]  Patient Pre-hospital Level of Mobility: unknown   Patient Pre-hospital Diet Restrictions: none   Substance Use History:   History   Alcohol Use No     History   Smoking Status    Never Smoker   Smokeless Tobacco    Never Used     History   Drug Use No       Family History:    Family History   Problem Relation Age of Onset    Heart attack Mother     Diabetes Mother     Dementia Father     Coronary artery disease Father         cardiac disorder    Diabetes Sister     Uterine cancer Sister        Physical Exam:     Vitals:   Blood Pressure: (!) 172/70 (11/09/18 1500)  Pulse: 98 (11/09/18 1500)  Temperature: 99 8 °F (37 7 °C) (11/09/18 1500)  Temp Source: Oral (11/09/18 1500)  Respirations: 16 (11/09/18 1500)  SpO2: 96 % (11/09/18 1500)    Physical Exam   Constitutional: No distress  HENT:   Head: Normocephalic and atraumatic  Eyes: Right eye exhibits no discharge  Left eye exhibits no discharge  R eye ptosis  Cardiovascular: Normal rate and regular rhythm  Pulmonary/Chest: Effort normal and breath sounds normal  No respiratory distress  She has no wheezes  She has no rales  Abdominal: Soft  Bowel sounds are normal  She exhibits no distension  There is no tenderness  There is no rebound  Genitourinary:   Genitourinary Comments: No suprapubic tenderness    Musculoskeletal: Normal range of motion  She exhibits no edema  Neurological: She is alert  No cranial nerve deficit  Coordination normal    Skin: Skin is warm and dry  No rash noted  She is not diaphoretic  No erythema  Psychiatric: She has a normal mood and affect  Her behavior is normal    Nursing note and vitals reviewed        Additional Data:     Lab Results: I have personally reviewed pertinent reports  Results from last 7 days  Lab Units 11/09/18  1222   WBC Thousand/uL 12 99*   HEMOGLOBIN g/dL 8 0*   HEMATOCRIT % 24 1*   PLATELETS Thousands/uL 247   NEUTROS PCT % 73   LYMPHS PCT % 16   MONOS PCT % 9   EOS PCT % 1       Results from last 7 days  Lab Units 11/09/18  1222   POTASSIUM mmol/L 4 3   CHLORIDE mmol/L 98*   CO2 mmol/L 24   BUN mg/dL 15   CREATININE mg/dL 1 06   CALCIUM mg/dL 9 4   ALK PHOS U/L 135*   ALT U/L 34   AST U/L 34       Results from last 7 days  Lab Units 11/09/18  1222   INR  1 06       Imaging: I have personally reviewed pertinent reports  Xr Chest 2 Views    Result Date: 11/9/2018  Narrative: CHEST INDICATION:   Fever, cough  COMPARISON:  8/16/2018, report CT chest 4/24/2017 EXAM PERFORMED/VIEWS:  XR CHEST AP & LATERAL FINDINGS:  The patient is rotated to the right  Cardiomediastinal silhouette appears stable  Coarse mitral valve annulus ossification noted  The lungs are clear  No pneumothorax or pleural effusion  Left shoulder prosthesis  Degenerative changes of the spine  Impression: No acute cardiopulmonary disease  Workstation performed: [id number]       EKG, Pathology, and Other Studies Reviewed on Admission:   · EKG: NSR 87  STD in TWI in inferior and lateral leads  Allscripts / Epic Records Reviewed: Yes     ** Please Note: This note has been constructed using a voice recognition system   **

## 2018-11-09 NOTE — TELEPHONE ENCOUNTER
H[place] with the [facility] called regarding patient  The antibiotics that were ordered yesterday will not come in till tomorrow  Patient has a fever they cannot break 101 2  Patient is allergic to tylenol  Nurses are pushing fluids as much as patient will tolerate and using cold compresses  Is there anything else they can do for the patient  Please advise

## 2018-11-10 LAB
ANION GAP SERPL CALCULATED.3IONS-SCNC: 11 MMOL/L (ref 4–13)
ATRIAL RATE: 100 BPM
BASOPHILS # BLD AUTO: 0.01 THOUSANDS/ΜL (ref 0–0.1)
BASOPHILS NFR BLD AUTO: 0 % (ref 0–1)
BUN SERPL-MCNC: 12 MG/DL (ref 5–25)
CALCIUM SERPL-MCNC: 9 MG/DL (ref 8.3–10.1)
CHLORIDE SERPL-SCNC: 103 MMOL/L (ref 100–108)
CO2 SERPL-SCNC: 21 MMOL/L (ref 21–32)
CREAT SERPL-MCNC: 0.89 MG/DL (ref 0.6–1.3)
EOSINOPHIL # BLD AUTO: 0.08 THOUSAND/ΜL (ref 0–0.61)
EOSINOPHIL NFR BLD AUTO: 1 % (ref 0–6)
ERYTHROCYTE [DISTWIDTH] IN BLOOD BY AUTOMATED COUNT: 13.8 % (ref 11.6–15.1)
FLUAV AG SPEC QL: NORMAL
FLUBV AG SPEC QL: NORMAL
GFR SERPL CREATININE-BSD FRML MDRD: 58 ML/MIN/1.73SQ M
GLUCOSE SERPL-MCNC: 104 MG/DL (ref 65–140)
HCT VFR BLD AUTO: 28.8 % (ref 34.8–46.1)
HGB BLD-MCNC: 9.3 G/DL (ref 11.5–15.4)
IMM GRANULOCYTES # BLD AUTO: 0.03 THOUSAND/UL (ref 0–0.2)
IMM GRANULOCYTES NFR BLD AUTO: 0 % (ref 0–2)
LYMPHOCYTES # BLD AUTO: 0.84 THOUSANDS/ΜL (ref 0.6–4.47)
LYMPHOCYTES NFR BLD AUTO: 12 % (ref 14–44)
MCH RBC QN AUTO: 29.9 PG (ref 26.8–34.3)
MCHC RBC AUTO-ENTMCNC: 32.3 G/DL (ref 31.4–37.4)
MCV RBC AUTO: 93 FL (ref 82–98)
MONOCYTES # BLD AUTO: 0.58 THOUSAND/ΜL (ref 0.17–1.22)
MONOCYTES NFR BLD AUTO: 8 % (ref 4–12)
NEUTROPHILS # BLD AUTO: 5.66 THOUSANDS/ΜL (ref 1.85–7.62)
NEUTS SEG NFR BLD AUTO: 79 % (ref 43–75)
NRBC BLD AUTO-RTO: 0 /100 WBCS
P AXIS: 73 DEGREES
PLATELET # BLD AUTO: 162 THOUSANDS/UL (ref 149–390)
PMV BLD AUTO: 9.3 FL (ref 8.9–12.7)
POTASSIUM SERPL-SCNC: 4.1 MMOL/L (ref 3.5–5.3)
PR INTERVAL: 140 MS
QRS AXIS: 88 DEGREES
QRSD INTERVAL: 106 MS
QT INTERVAL: 368 MS
QTC INTERVAL: 474 MS
RBC # BLD AUTO: 3.11 MILLION/UL (ref 3.81–5.12)
RSV B RNA SPEC QL NAA+PROBE: NORMAL
SODIUM SERPL-SCNC: 135 MMOL/L (ref 136–145)
T WAVE AXIS: -43 DEGREES
VENTRICULAR RATE: 100 BPM
WBC # BLD AUTO: 7.2 THOUSAND/UL (ref 4.31–10.16)

## 2018-11-10 PROCEDURE — 87633 RESP VIRUS 12-25 TARGETS: CPT | Performed by: HOSPITALIST

## 2018-11-10 PROCEDURE — G8979 MOBILITY GOAL STATUS: HCPCS | Performed by: PHYSICAL THERAPIST

## 2018-11-10 PROCEDURE — 97163 PT EVAL HIGH COMPLEX 45 MIN: CPT | Performed by: PHYSICAL THERAPIST

## 2018-11-10 PROCEDURE — 93010 ELECTROCARDIOGRAM REPORT: CPT | Performed by: INTERNAL MEDICINE

## 2018-11-10 PROCEDURE — G8978 MOBILITY CURRENT STATUS: HCPCS | Performed by: PHYSICAL THERAPIST

## 2018-11-10 PROCEDURE — 85025 COMPLETE CBC W/AUTO DIFF WBC: CPT | Performed by: HOSPITALIST

## 2018-11-10 PROCEDURE — 99232 SBSQ HOSP IP/OBS MODERATE 35: CPT | Performed by: HOSPITALIST

## 2018-11-10 PROCEDURE — 80048 BASIC METABOLIC PNL TOTAL CA: CPT | Performed by: HOSPITALIST

## 2018-11-10 RX ORDER — ACETAMINOPHEN 325 MG/1
325 TABLET ORAL EVERY 6 HOURS PRN
Status: DISCONTINUED | OUTPATIENT
Start: 2018-11-10 | End: 2018-11-12 | Stop reason: HOSPADM

## 2018-11-10 RX ADMIN — DONEPEZIL HYDROCHLORIDE 5 MG: 5 TABLET, FILM COATED ORAL at 23:31

## 2018-11-10 RX ADMIN — FLUTICASONE PROPIONATE 1 SPRAY: 50 SPRAY, METERED NASAL at 08:32

## 2018-11-10 RX ADMIN — PANTOPRAZOLE SODIUM 40 MG: 40 TABLET, DELAYED RELEASE ORAL at 08:31

## 2018-11-10 RX ADMIN — TRAMADOL HYDROCHLORIDE 50 MG: 50 TABLET, COATED ORAL at 23:44

## 2018-11-10 RX ADMIN — GUAIFENESIN 600 MG: 600 TABLET, EXTENDED RELEASE ORAL at 23:44

## 2018-11-10 RX ADMIN — PRAVASTATIN SODIUM 40 MG: 40 TABLET ORAL at 16:00

## 2018-11-10 RX ADMIN — PANTOPRAZOLE SODIUM 40 MG: 40 TABLET, DELAYED RELEASE ORAL at 18:06

## 2018-11-10 RX ADMIN — METOPROLOL TARTRATE 25 MG: 25 TABLET ORAL at 18:06

## 2018-11-10 RX ADMIN — ACETAMINOPHEN 325 MG: 325 TABLET, FILM COATED ORAL at 16:00

## 2018-11-10 RX ADMIN — ENOXAPARIN SODIUM 40 MG: 40 INJECTION SUBCUTANEOUS at 08:31

## 2018-11-10 RX ADMIN — NIFEDIPINE 60 MG: 30 TABLET, FILM COATED, EXTENDED RELEASE ORAL at 08:31

## 2018-11-10 RX ADMIN — LEVOTHYROXINE SODIUM 100 MCG: 100 TABLET ORAL at 06:34

## 2018-11-10 RX ADMIN — LOSARTAN POTASSIUM 100 MG: 50 TABLET ORAL at 08:31

## 2018-11-10 RX ADMIN — ACETAMINOPHEN 325 MG: 325 TABLET, FILM COATED ORAL at 23:31

## 2018-11-10 RX ADMIN — BENZONATATE 100 MG: 100 CAPSULE ORAL at 23:44

## 2018-11-10 RX ADMIN — LORATADINE 10 MG: 10 TABLET ORAL at 08:31

## 2018-11-10 RX ADMIN — ASPIRIN 325 MG: 325 TABLET ORAL at 08:32

## 2018-11-10 RX ADMIN — METOPROLOL TARTRATE 25 MG: 25 TABLET ORAL at 08:32

## 2018-11-10 RX ADMIN — DULOXETINE HYDROCHLORIDE 60 MG: 60 CAPSULE, DELAYED RELEASE ORAL at 08:31

## 2018-11-10 RX ADMIN — SODIUM CHLORIDE 75 ML/HR: 0.9 INJECTION, SOLUTION INTRAVENOUS at 20:00

## 2018-11-10 NOTE — PLAN OF CARE
Problem: PHYSICAL THERAPY ADULT  Goal: Performs mobility at highest level of function for planned discharge setting  See evaluation for individualized goals  Treatment/Interventions: Functional transfer training, LE strengthening/ROM, Therapeutic exercise, Endurance training, Bed mobility, Gait training, Spoke to nursing, Spoke to case management  Equipment Recommended: Morena Wolf, Wheelchair       See flowsheet documentation for full assessment, interventions and recommendations  Prognosis: Fair  Problem List: Decreased strength, Decreased range of motion, Decreased endurance, Impaired balance, Decreased mobility, Decreased coordination, Decreased cognition, Impaired judgement, Decreased safety awareness, Pain  Assessment: Butch Sherwood is a 80 y o  female who was admitted to [facility] on 11/9/18, 2/2 fever and cough  PMH includes but is not limited to dementia, blind in right eye, fibromyalgia, CVA  Prior to admission, [facility] lived at [facility], [facility]  Virgil Solis reported to me that the patient was generally independent with her transfers but staff would be around when she did  At times she would amb without her RW, get up from toilet along and even fall asleep on toilet which had led to falls (exact # not provided)  She did also report that the patient would walk 200 feet with a RW and assist  Upon evaluation, the patient seems to be functioning below baseline, they demonstrate impairments in LE strength, ROM, balance, endurance as well as cognition  She was unable to amb further then 20 feet due to SOB  She also reported fatigue and increased pain in her shoulders, back and hips due to fibromyalgia  They can continue to benefit from IP PT at this time in order to address the impairments discussed  Treatment to focus on improving the patients overall safety awareness, safety with transfers and walking  [facility] with increased supervision at this timeClinical complexity is unpredictable 2/2 PMH, current medical status, poor balance and cognition with increased fall risk and injury potential   Barriers to Discharge: None     Recommendation: Other (Comment) (return to [facility] home)     PT - OK to Discharge: Yes    See flowsheet documentation for full assessment

## 2018-11-10 NOTE — PHYSICAL THERAPY NOTE
PHYSICAL THERAPY EVALUATION NOTE   11/10/18 1145   Note Type   Note type Eval only   Pain Assessment   Pain Assessment FLACC   Pain Rating: FLACC (Rest) - Face 0   Pain Rating: FLACC (Rest) - Legs 0   Pain Rating: FLACC (Rest) - Activity 0   Pain Rating: FLACC (Rest) - Cry 0   Pain Rating: FLACC (Rest) - Consolability 0   Score: FLACC (Rest) 0   Pain Rating: FLACC (Activity) - Face 1   Pain Rating: FLACC (Activity) - Legs 0   Pain Rating: FLACC (Activity) - Activity 0   Pain Rating: FLACC (Activity) - Cry 1   Pain Rating: FLACC (Activity) - Consolability 0   Score: FLACC (Activity) 2   Home Living   [facility] of [facility] SNF  ([facility]   Home Layout One level   Additional Comments Patient a residnet at [facility]   Prior Function   Level of [place] Needs assistance with IADLs; Needs assistance with ADLs and functional mobility   Lives With Facility staff   Receives Help From Personal care attendant   ADL Assistance Needs assistance   IADLs Needs assistance   Falls in the last 6 months 1 to 4  (per Tanner Rivera, patient with miltiple falls at facility)   Restrictions/Precautions   Other Precautions Contact/isolation;Cognitive; Chair Alarm; Bed Alarm;Multiple lines; Fall Risk;Pain  (IV)   General   Additional Pertinent History per H&P: Mary Iglesias is a 80 y o  female history of fibromyalgia, htn, dementia with behavioral disturbances who presents with fevers and cough at her nursing home  Poor historian due to dementia; spoke to dtr via phone  Dry hacking cough and general malaise  Reportedly had fevers at her nursing home (101)  Was supposed to be started on abx for presumed pna, but abx were not available until tomorrow and pt continued to have fevers  Pt c/o sore throat and sinus congestion  No nausea or vomiting  No abd pain  Denies dysuria or hematuria  No increased frequency    +muscle aches but pt relates this to her "fibro "   Pt is denying chest pain currently  Per dtr, pt has 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 in [place]  Family/Caregiver Present No   Cognition   Overall Cognitive Status Impaired   Arousal/Participation Cooperative   Orientation Level Oriented to person;Disoriented to place; Disoriented to time;Disoriented to situation   Memory Decreased long term memory;Decreased short term memory;Decreased recall of recent events   Following Commands Follows one step commands inconsistently   RUE Assessment   RUE Assessment X  (AROM 50% limited, strength 2+/5)   LUE Assessment   LUE Assessment X  (AROM 50% limited, strength 2+/5)   RLE Assessment   RLE Assessment X  (LE strength 3/5)   LLE Assessment   LLE Assessment X  (LE strength 3/5)   Coordination   Sensation WFL   Light Touch   RLE Light Touch Grossly intact   LLE Light Touch Grossly intact   Bed Mobility   Rolling L 4  Minimal assistance   Additional items Assist x 1;HOB elevated; Bedrails; Increased time required;Verbal cues   Supine to Sit 4  Minimal assistance   Additional items Assist x 1;HOB elevated; Bedrails; Increased time required;Verbal cues   Transfers   Sit to Stand 4  Minimal assistance   Additional items Assist x 1;Bedrails; Impulsive   Stand to Sit 4  Minimal assistance   Additional items Armrests; Verbal cues; Impulsive   Ambulation/Elevation   Gait pattern Narrow DONNY; Excessively slow;Decreased foot clearance;Shuffling   Gait Assistance 4  Minimal assist   Additional items Assist x 1;Other (Comment)  (cues both verbally and tactile for RW - poor awareness)   Assistive Device Rolling walker   Distance 20'   Stair Management Assistance Not tested   Balance   Static Sitting Fair +   Static Standing Poor +   Ambulatory Poor +  (with AD)   Endurance Deficit   Endurance Deficit Yes   Endurance Deficit Description limited activity tolerance   Activity Tolerance   Activity Tolerance Patient limited by fatigue;Patient limited by pain   Nurse Made Aware spoke with vannessa AMAYA   Assessment   Prognosis Fair   Problem List Decreased strength;Decreased range of motion;Decreased endurance; Impaired balance;Decreased mobility; Decreased coordination;Decreased cognition; Impaired judgement;Decreased safety awareness;Pain   Assessment Rolly Sunshine is a 80 y o  female who was admitted to [facility] on 11/9/18, 2/2 fever and cough  PMH includes but is not limited to dementia, blind in right eye, fibromyalgia, CVA  [facility] history obtained from [address], [address] at [facility]  Phan Galicia reported to me that the patient was generally independent with her transfers but staff would be around when she did  At times she would amb without her RW, get up from toilet along and even fall asleep on toilet which had led to falls (exact # not provided)  She did also report that the patient would walk 200 feet with a RW and assist  Upon evaluation, the patient seems to be functioning below baseline, they demonstrate impairments in LE strength, ROM, balance, endurance as well as cognition  She was unable to amb further then 20 feet due to SOB  She also reported fatigue and increased pain in her shoulders, back and hips due to fibromyalgia  They can continue to benefit from IP PT at this time in order to address the impairments discussed  Treatment to focus on improving the patients overall safety awareness, safety with transfers and walking  Recommend return to [facility] home with increased supervision at this timeClinical complexity is unpredictable 2/2 PMH, current medical status, poor balance and cognition with increased fall risk and injury potential    Barriers to Discharge None   Goals   Patient Goals none stated   STG Expiration Date 11/17/18   Short Term Goal #1 Patient will perform all bed mobility Mod I, Patient will perform all transfers Mod I without use of bed behind knees for balance  Patient will amb 150' with RW and Min A/CGA   Patient will remain free of falls for time in hospital   Treatment Day 0   Plan   Treatment/Interventions Functional transfer training;LE strengthening/ROM; Therapeutic exercise; Endurance training;Bed mobility;Gait training;Spoke to nursing;Spoke to case management   PT Frequency 2-3x/wk   Recommendation   Recommendation Other (Comment)  (return to [facility] home)   Equipment Recommended Walker; Wheelchair   PT - OK to Discharge Yes   Barthel Index   Feeding 5   Bathing 0   Grooming Score 0   Dressing Score 0   Bladder Score 0   Bowels Score 0   Toilet Use Score 5   Mobility (Level Surface) Score 5   Stairs Score 0     Angelina Talbert, PT            Patient Name: Jabier Amador  IZHXD'N Date: 11/10/2018

## 2018-11-10 NOTE — ASSESSMENT & PLAN NOTE
Trop mildly elevated; EKG with nonspecific ST-TWI in lateral leads  No chest pain; suspect type II MI 2/2 primary problem given h/o nonocclusive CAD   trops mildly elevated 0 06-->0  09  No chest pain   Dc telemetry; no further work-up indicated at this time

## 2018-11-10 NOTE — UTILIZATION REVIEW
Initial Clinical Review    Admission: Date/Time/Statement: 11/9/18 @ 1419     Orders Placed This Encounter   Procedures    Inpatient Admission (expected length of stay for this patient is greater than two midnights)     Standing Status:   Standing     Number of Occurrences:   1     Order Specific Question:   Admitting Physician     Answer:   Darshana Anderson [[id number]]     Order Specific Question:   Level of Care     Answer:   Med Surg [16]     Order Specific Question:   Estimated length of stay     Answer:   More than 2 Midnights     Order Specific Question:   Certification     Answer:   I certify that inpatient services are medically necessary for this patient for a duration of greater than two midnights  See H&P and MD Progress Notes for additional information about the patient's course of treatment  ED: Date/Time/Mode of Arrival:   ED Arrival Information     Expected Arrival Acuity Means of Arrival Escorted By Service Admission Type    - 11/9/2018 11:54 Urgent Ambulance [facility] Emergency [facility] General Medicine Urgent    Arrival Complaint    -          Chief Complaint:   Chief Complaint   Patient presents with    Fever - 76 years or older     Per EMS, pt has been having low grade fever at nursing facility  Pt was prescribed abx yesterday but none were administered, to treat suspected pna  Per daughter, pt has been complaining of sore throat and coughing for a few days  Pt is afebrile during triage  History of Illness:  80 y o  female history of fibromyalgia, htn, dementia with behavioral disturbances who presents with fevers and cough at her nursing home  Poor historian due to dementia; spoke to dtr via phone  Dry hacking cough and general malaise  Reportedly had fevers at her nursing home (101)  Was supposed to be started on abx for presumed pna, but abx were not available until tomorrow and pt continued to have fevers  Pt c/o sore throat and sinus congestion     +muscle aches but pt relates this to her "fibro "   Per dtr, pt has 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 in [place]         ED Vital Signs:   ED Triage Vitals [11/09/18 1159]   Temperature Pulse Respirations Blood Pressure SpO2   99 6 °F (37 6 °C) 89 18 138/71 97 %      Temp Source Heart Rate Source Patient Position - Orthostatic VS BP Location FiO2 (%)   Oral Monitor Sitting Right arm --      Pain Score       No Pain        Wt Readings from Last 1 Encounters:   09/18/18 61 2 kg (135 lb)       Vital Signs (abnormal): maximum temperature 100 3, B P 182/70    Abnormal Labs/Diagnostic Test Results:   UA moderate leukocytes  + nitrite  Trace blood  Alkaline phosphatase 135  Albumin 3 1  NT-proBNP 4352  Na 131  Cl 98  Wbc 12 99, hgb 8, hct 24 1  CxR- no acute cardiopulmonary disease  11/10/2018-  Wbc 7 20, hgb 9 , hct 28 8  Na 135     Urine culture [[id number]] (Abnormal) Collected: 11/09/18 1350   Lab Status: Preliminary result Specimen: Urine from Urine, Straight Cath Updated: 11/10/18 1126    Urine Culture >100,000 cfu/ml Gram Negative Olegario (A)     ED Treatment: blood cultures  Medication Administration from 11/09/2018 1154 to 11/09/2018 1507       Date/Time Order Dose Route Action Comments     11/09/2018 1350 sodium chloride 0 9 % bolus 1,000 mL 0 mL Intravenous Stopped      11/09/2018 1245 sodium chloride 0 9 % bolus 1,000 mL 1,000 mL Intravenous New Bag      11/09/2018 1425 ceftriaxone (ROCEPHIN) 1 g/50 mL in dextrose IVPB 1,000 mg Intravenous New Bag           Past Medical/Surgical History:   Past Medical History:   Diagnosis Date    Aspiration pneumonia ([address] )     Basal cell carcinoma of nose     Blind     Dementia     Depression     Disease of thyroid gland     Fibromyalgia     Gait disturbance     GERD (gastroesophageal reflux disease)     Glaucoma     Hyperlipidemia     Hypertension     Osteopenia     Peripheral neuropathy     Polymyalgia rheumatica (HCC)     Psychiatric disorder     Renal insufficiency syndrome     Rheumatoid arthritis ([place] [address] )     Stroke ([place] [address] )     Systemic lupus erythematosus ([place] [address] )     Vertigo     Vitamin D deficiency        Admitting Diagnosis: UTI (urinary tract infection) [N39 0]  Abnormal EKG [R94 31]  Fever [R50 9]  Sepsis ([place] [address] ) [A41 9]    Age/Sex: 80 y o  female    Assessment/Plan:   Sepsis ([place] [address] )   Assessment & Plan     With tachycardia, leukocytosis, fever at nursing home of 101; source unclear  UA: nitrites, leuks, bacteria; pt is denying any urinary symptoms  CXR: no clear infiltrate; pt complains of cough and sinus congestion   Bcx drawn x2   Lactic wnl  IVF      Anemia   Assessment & Plan     hgb 8 0 on admission; bsHgb: 9-10   CBC daily  Check FOBT       Hyponatremia   Assessment & Plan     Check Denise  IVF   BMP in the am       Cough   Assessment & Plan     With dry hacking cough, fever and general malaise; also with sinus pressure   Check PCT  Check influenza  Add azithromycin in the meantime  Albuterol prn  Cough suppressants, mucines  Decongestant       Elevated troponin   Assessment & Plan     Trop mildly elevated; EKG with nonspecific ST-TWI in lateral leads  No chest pain; suspect type II MI 2/2 primary problem   Will maintain on telemetry x24 hours  Trend trops; EKG with second set and prn          Admission Orders:  11/9/2018  1419 INPATIENT   Scheduled Meds:   Current Facility-Administered Medications:  albuterol 2 5 mg Nebulization Q6H PRN    aspirin 325 mg Oral Daily    benzonatate 100 mg Oral TID PRN    dextran 70-hypromellose 1 drop Both Eyes Q3H PRN    donepezil 5 mg Oral HS    DULoxetine 60 mg Oral Daily    enoxaparin 40 mg Subcutaneous Daily    fluticasone 1 spray Nasal Daily    [START ON 11/12/2018] furosemide 20 mg Oral Once per day on Mon Wed Fri    guaiFENesin 600 mg Oral BID PRN    levothyroxine 100 mcg Oral Early Morning    loratadine 10 mg Oral Daily    LORazepam 0 5 mg Oral Q8H PRN    losartan 100 mg Oral Daily    metoprolol tartrate 25 mg Oral BID    NIFEdipine 60 mg Oral Daily    ondansetron 4 mg Intravenous Q6H PRN    pantoprazole 40 mg Oral BID    pravastatin 40 mg Oral Daily With Dinner    sodium chloride 75 mL/hr Intravenous Continuous Last Rate: 75 mL/hr (11/09/18 1819)   traMADol 50 mg Oral Q8H PRN      Continuous Infusions:   sodium chloride 75 mL/hr Last Rate: 75 mL/hr (11/09/18 1819)     PRN Meds: not used  Telemetry  scds  Respiratory protocol - on room air     Pt/ot

## 2018-11-10 NOTE — ASSESSMENT & PLAN NOTE
With tachycardia, leukocytosis, fever at nursing home of [facility]; source unclear  UA: nitrites, leuks, bacteria; pt is denying any urinary symptoms  CXR: no clear infiltrate; pt complains of cough and sinus congestion   PCT is 0 07   Bcx: pending    Lactic wnl  IVF

## 2018-11-10 NOTE — PROGRESS NOTES
Progress Note - Jabier Amador 4/27/1931, 80 y o  female MRN: [id number]    Unit/Bed#: -01 Encounter: [id number]    Primary Care Provider: Rosmery Delgado DO   Date and time admitted to hospital: 11/9/2018 11:54 AM    * Sepsis ([address] )   Assessment & Plan    With tachycardia, leukocytosis, fever at nursing home of [facility]; source unclear  UA: nitrites, leuks, bacteria; pt is denying any urinary symptoms  CXR: no clear infiltrate; pt complains of cough and sinus congestion   PCT is 0 07   Bcx: pending    Lactic wnl  IVF     Anemia   Assessment & Plan    hgb 8 0 on admission, but improved to bs (bsHgb: 9-10)  CBC daily       Hyponatremia   Assessment & Plan    Improved with IVF   Trend BMP      Cough   Assessment & Plan    With dry hacking cough, fever and general malaise; also with sinus pressure; may be more c/w viral URI than pneumonia as PCT: < 0 25  Check influenza:negative   Can dc azithromycin   Albuterol prn  Cough suppressants, mucines  Decongestant      Elevated troponin   Assessment & Plan    Trop mildly elevated; EKG with nonspecific ST-TWI in lateral leads  No chest pain; suspect type II MI 2/2 primary problem given h/o nonocclusive CAD   trops mildly elevated 0 06-->0  09  No chest pain   Dc telemetry; no further work-up indicated at this time  VTE Pharmacologic Prophylaxis:   Pharmacologic: Enoxaparin (Lovenox)  Mechanical VTE Prophylaxis in Place: Yes    Patient Centered Rounds: I have performed bedside rounds with nursing staff today  Discussions with Specialists or Other Care Team Provider: none    Education and Discussions with Family / Patient: patient and dtr     Time Spent for Care: 30 minutes  More than 50% of total time spent on counseling and coordination of care as described above      Current Length of Stay: 1 day(s)    Current Patient Status: Inpatient   Certification Statement: The patient will continue to require additional inpatient hospital stay due to fevers and possibility of concomitant viral URI     Discharge Plan / Estimated Discharge Date: TBD based on clinical course; anticipated 24-48h stay     Code Status: Level 3 - DNAR and DNI      Subjective:   Pt is still not feeling like herself  Feels weak with general malaise  Cough is unchanged  No suprapubic pain  No dysuria  Objective:     Vitals:   Temp (24hrs), Av 2 °F (37 3 °C), Min:97 1 °F (36 2 °C), Max:100 3 °F (37 9 °C)    Temp:  [97 1 °F (36 2 °C)-100 3 °F (37 9 °C)] 97 1 °F (36 2 °C)  HR:  [81-98] 90  Resp:  [16-18] 18  BP: (121-172)/(59-71) 150/70  SpO2:  [93 %-97 %] 93 %  There is no height or weight on file to calculate BMI  Input and Output Summary (last 24 hours): Intake/Output Summary (Last 24 hours) at 11/10/18 1124  Last data filed at 11/10/18 0700   Gross per 24 hour   Intake             1000 ml   Output             1552 ml   Net             -552 ml       Physical Exam:     Physical Exam   Constitutional: No distress  HENT:   Head: Normocephalic and atraumatic  Eyes: Conjunctivae are normal  No scleral icterus  Cardiovascular: Normal rate and regular rhythm  Exam reveals no friction rub  No murmur heard  Pulmonary/Chest: Effort normal  No respiratory distress  She has wheezes (mild expiratory wheeze )  She has no rales  Abdominal: Soft  Bowel sounds are normal  She exhibits no distension  There is no tenderness  There is no rebound  Musculoskeletal: She exhibits no edema or tenderness  Neurological: She is alert  Skin: Skin is warm and dry  No rash noted  She is not diaphoretic  No erythema  Psychiatric: She has a normal mood and affect  Her behavior is normal    Nursing note and vitals reviewed          Additional Data:     Labs:      Results from last 7 days  Lab Units 11/10/18  0724   WBC Thousand/uL 7 20   HEMOGLOBIN g/dL 9 3*   HEMATOCRIT % 28 8*   PLATELETS Thousands/uL 162   NEUTROS PCT % 79*   LYMPHS PCT % 12*   MONOS PCT % 8   EOS PCT % 1       Results from last 7 days  Lab Units 11/10/18  0724 11/09/18  1222   POTASSIUM mmol/L 4 1 4 3   CHLORIDE mmol/L 103 98*   CO2 mmol/L 21 24   BUN mg/dL 12 15   CREATININE mg/dL 0 89 1 06   CALCIUM mg/dL 9 0 9 4   ALK PHOS U/L  --  135*   ALT U/L  --  34   AST U/L  --  34       Results from last 7 days  Lab Units 11/09/18  1222   INR  1 06       * I Have Reviewed All Lab Data Listed Above  * Additional Pertinent Lab Tests Reviewed:  All Labs Within Last 24 Hours Reviewed    Imaging:    Imaging Reports Reviewed Today Include:   Imaging Personally Reviewed by Myself Includes:      Recent Cultures (last 7 days):       Results from last 7 days  Lab Units 11/09/18  1649   INFLUENZA B PCR  None Detected   RSV PCR  None Detected       Last 24 Hours Medication List:     Current Facility-Administered Medications:  albuterol 2 5 mg Nebulization Q6H PRN Cherelle Garcia MD    aspirin 325 mg Oral Daily Cherelle Garcia MD    benzonatate 100 mg Oral TID PRN Cherelle Garcia MD    dextran 70-hypromellose 1 drop Both Eyes Q3H PRN Cherelle Garcia MD    donepezil 5 mg Oral HS Cherelle Garcia MD    DULoxetine 60 mg Oral Daily Cherelle Garcia MD    enoxaparin 40 mg Subcutaneous Daily Cherelle Garcia MD    fluticasone 1 spray Nasal Daily Cherelle Garcia MD    [START ON 11/12/2018] furosemide 20 mg Oral Once per day on Mon Wed Fri Cherelle Garcia MD    guaiFENesin 600 mg Oral BID PRN Cherelle Garcia MD    levothyroxine 100 mcg Oral Early Morning Cherelle Garcia MD    loratadine 10 mg Oral Daily Cherelle Garcia MD    LORazepam 0 5 mg Oral Q8H PRN Cherelle Garcia MD    losartan 100 mg Oral Daily Cherelle Garcia MD    metoprolol tartrate 25 mg Oral BID Cherelle Garcia MD    NIFEdipine 60 mg Oral Daily Cherelle Garcia MD    ondansetron 4 mg Intravenous Q6H PRN Cherelle Garcia MD    pantoprazole 40 mg Oral BID Cherelle Garcia MD    pravastatin 40 mg Oral Daily With Georgi Cantu MD    sodium chloride 75 mL/hr Intravenous Continuous Cherelle Garcia MD Last Rate: 75 mL/hr (11/09/18 0573) traMADol 50 mg Oral Q8H PRN Santo Bamberger, MD         Today, Patient Was Seen By: Santo Bamberger, MD    ** Please Note: This note has been constructed using a voice recognition system   **

## 2018-11-10 NOTE — ASSESSMENT & PLAN NOTE
With dry hacking cough, fever and general malaise; also with sinus pressure; may be more c/w viral URI than pneumonia as PCT: < 0 25  Check influenza:negative   Can dc azithromycin   Albuterol prn  Cough suppressants, mucines  Decongestant

## 2018-11-10 NOTE — PHYSICIAN ADVISOR
Current patient class: Inpatient  The patient is currently on Hospital Day: 2 at [facility]      The patient was admitted to the hospital at [id number] on 11/9/18 for the following diagnosis:  UTI (urinary tract infection) [N39 0]  Abnormal EKG [R94 31]  Fever [R50 9]  Sepsis ([address] ) [A41 9]       There is documentation in the medical record of an expected length of stay of at least 2 midnights  The patient is therefore expected to satisfy the 2 midnight benchmark and given the 2 midnight presumption is appropriate for INPATIENT ADMISSION  Given this expectation of a satisfying stay, CMS instructs us that the patient is most often appropriate for inpatient admission under part A provided medical necessity is documented in the chart  After review of the relevant documentation, labs, vital signs and test results, the patient is appropriate for INPATIENT ADMISSION  Admission to the hospital as an inpatient is a complex decision making process which requires the practitioner to consider the patients presenting complaint, history and physical examination and all relevant testing  With this in mind, in this case, the patient was deemed appropriate for INPATIENT ADMISSION  After review of the documentation and testing available at the time of the admission I concur with this clinical determination of medical necessity  Rationale is as follows: The patient is a 80 yrs old Female who presented to the ED at 11/9/2018 11:54 AM with a chief complaint of Fever - 75 years or older (Per EMS, pt has been having low grade fever at nursing facility  Pt was prescribed abx yesterday but none were administered, to treat suspected pna  Per daughter, pt has been complaining of sore throat and coughing for a few days   Pt is afebrile during triage  )     Given the need for further hospitalization, and along with the documentation of medical necessity present in the chart, the patient is appropriate for inpatient admission  The patient is expected to satisfy the 2 midnight benchmark, and will require further acute medical care  The patient does have comorbid conditions which increases the risk for significant adverse outcome  Given this the patient is appropriate for inpatient admission        The patients vitals on arrival were ED Triage Vitals [11/09/18 1159]   Temperature Pulse Respirations Blood Pressure SpO2   99 6 °F (37 6 °C) 89 18 138/71 97 %      Temp Source Heart Rate Source Patient Position - Orthostatic VS BP Location FiO2 (%)   Oral Monitor Sitting Right arm --      Pain Score       No Pain           Past Medical History:   Diagnosis Date    Aspiration pneumonia (HCC)     Last Assessed:  7/18/17    Basal cell carcinoma of nose     Last Assessed:  4/12/17    Blind     blind right eye, 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, Last Assessed:  7/18/17    Dementia     Depression     Disease of thyroid gland     Fibromyalgia     Last Assessed:  7/18/17    Gait disturbance     GERD (gastroesophageal reflux disease)     Glaucoma     Hyperlipidemia     Hypertension     Osteopenia     Peripheral neuropathy     Last Assessed:  4/12/17    Polymyalgia rheumatica ([address] )     Psychiatric disorder     depression    Renal insufficiency syndrome     Last Assessed:  4/13/17    Rheumatoid arthritis ([address] )     Stroke ([address] )     x2, Last Assessed:  7/18/17    Systemic lupus erythematosus ([address] )     Vertigo     Vitamin D deficiency      Past Surgical History:   Procedure Laterality Date    EYE SURGERY      JOINT REPLACEMENT      left hip replacement, left shoulder replacement    SHOULDER SURGERY      Replacement           Consults have been placed to:   None    Vitals:    11/09/18 2214 11/09/18 2333 11/10/18 0700 11/10/18 1534   BP: 121/59  150/70 128/54   BP Location: Left arm  Right arm Left arm   Pulse: 81  90 101   Resp: 18 18 22   Temp: 100 3 °F (37 9 °C) 99 °F (37 2 °C) (!) 97 1 °F (36 2 °C) (!) 101 5 °F (38 6 °C)   TempSrc: Oral Oral Oral Oral   SpO2: 93%   94%       Most recent labs:    Recent Labs      11/09/18   1222   11/09/18   2013  11/10/18   0724   WBC  12 99*   --    --   7 20   HGB  8 0*   --    --   9 3*   HCT  24 1*   --    --   28 8*   PLT  247   < >   --   162   K  4 3   --    --   4 1   CALCIUM  9 4   --    --   9 0   BUN  15   --    --   12   CREATININE  1 06   --    --   0 89   INR  1 06   --    --    --    TROPONINI  0 04   < >  0 09*   --    AST  34   --    --    --    ALT  34   --    --    --    ALKPHOS  135*   --    --    --     < > = values in this interval not displayed         Scheduled Meds:  Current Facility-Administered Medications:  acetaminophen 325 mg Oral Q6H PRN Iman Sheth MD    albuterol 2 5 mg Nebulization Q6H PRN Iman Sheth MD    aspirin 325 mg Oral Daily Iman Sheth MD    benzonatate 100 mg Oral TID PRN Iman Sheth MD    dextran 70-hypromellose 1 drop Both Eyes Q3H PRN Iman Sheth MD    donepezil 5 mg Oral HS Iman Sheth MD    DULoxetine 60 mg Oral Daily Iman Sheth MD    enoxaparin 40 mg Subcutaneous Daily Iman Sheth MD    fluticasone 1 spray Nasal Daily Iman Sheth MD    [START ON 11/12/2018] furosemide 20 mg Oral Once per day on Mon Wed Fri Iman Sheth MD    guaiFENesin 600 mg Oral BID PRN Iman Sheth MD    levothyroxine 100 mcg Oral Early Morning Iman Sheth MD    loratadine 10 mg Oral Daily Iman Sheth MD    LORazepam 0 5 mg Oral Q8H PRN Iman Sheth MD    losartan 100 mg Oral Daily Iman Sheth MD    metoprolol tartrate 25 mg Oral BID Iman Sheth MD    NIFEdipine 60 mg Oral Daily Iman Sheth MD    ondansetron 4 mg Intravenous Q6H PRN Iman Sheth MD    pantoprazole 40 mg Oral BID Iman Sheth MD    pravastatin 40 mg Oral Daily With Dagmar Godfrey MD    sodium chloride 75 mL/hr Intravenous Continuous Iman Sheth MD Last Rate: 75 mL/hr (11/09/18 1819)   traMADol 50 mg Oral Q8H PRN Iman Sheth MD      Continuous Infusions:  sodium chloride 75 mL/hr Last Rate: 75 mL/hr (11/09/18 1819)     PRN Meds:   acetaminophen    albuterol    benzonatate    dextran 70-hypromellose    guaiFENesin    LORazepam    ondansetron    traMADol    Surgical procedures (if appropriate):

## 2018-11-11 LAB
ADENOVIRUS: NOT DETECTED
ANION GAP SERPL CALCULATED.3IONS-SCNC: 11 MMOL/L (ref 4–13)
ANION GAP SERPL CALCULATED.3IONS-SCNC: 15 MMOL/L (ref 4–13)
BACTERIA UR CULT: ABNORMAL
BASOPHILS # BLD AUTO: 0.01 THOUSANDS/ΜL (ref 0–0.1)
BASOPHILS NFR BLD AUTO: 0 % (ref 0–1)
BUN SERPL-MCNC: 11 MG/DL (ref 5–25)
BUN SERPL-MCNC: 9 MG/DL (ref 5–25)
C PNEUM DNA SPEC QL NAA+PROBE: NOT DETECTED
CALCIUM SERPL-MCNC: 8.5 MG/DL (ref 8.3–10.1)
CALCIUM SERPL-MCNC: 9 MG/DL (ref 8.3–10.1)
CHLORIDE SERPL-SCNC: 103 MMOL/L (ref 100–108)
CHLORIDE SERPL-SCNC: 99 MMOL/L (ref 100–108)
CO2 SERPL-SCNC: 19 MMOL/L (ref 21–32)
CO2 SERPL-SCNC: 21 MMOL/L (ref 21–32)
CREAT SERPL-MCNC: 0.92 MG/DL (ref 0.6–1.3)
CREAT SERPL-MCNC: 0.95 MG/DL (ref 0.6–1.3)
EOSINOPHIL # BLD AUTO: 0.11 THOUSAND/ΜL (ref 0–0.61)
EOSINOPHIL NFR BLD AUTO: 2 % (ref 0–6)
ERYTHROCYTE [DISTWIDTH] IN BLOOD BY AUTOMATED COUNT: 13.7 % (ref 11.6–15.1)
FLUAV H1 RNA SPEC QL NAA+PROBE: NOT DETECTED
FLUAV H3 RNA SPEC QL NAA+PROBE: NOT DETECTED
FLUAV RNA SPEC QL NAA+PROBE: NOT DETECTED
FLUBV RNA SPEC QL NAA+PROBE: NOT DETECTED
GFR SERPL CREATININE-BSD FRML MDRD: 54 ML/MIN/1.73SQ M
GFR SERPL CREATININE-BSD FRML MDRD: 56 ML/MIN/1.73SQ M
GLUCOSE SERPL-MCNC: 100 MG/DL (ref 65–140)
GLUCOSE SERPL-MCNC: 120 MG/DL (ref 65–140)
HBOV DNA SPEC QL NAA+PROBE: NOT DETECTED
HCOV 229E RNA SPEC QL NAA+PROBE: NOT DETECTED
HCOV HKU1 RNA SPEC QL NAA+PROBE: NOT DETECTED
HCOV NL63 RNA SPEC QL NAA+PROBE: NOT DETECTED
HCOV OC43 RNA SPEC QL NAA+PROBE: NOT DETECTED
HCT VFR BLD AUTO: 28.1 % (ref 34.8–46.1)
HGB BLD-MCNC: 9.3 G/DL (ref 11.5–15.4)
HPIV1 RNA SPEC QL NAA+PROBE: NOT DETECTED
HPIV2 RNA SPEC QL NAA+PROBE: NOT DETECTED
HPIV3 RNA SPEC QL NAA+PROBE: NOT DETECTED
HPIV4 RNA SPEC QL NAA+PROBE: NOT DETECTED
IMM GRANULOCYTES # BLD AUTO: 0.02 THOUSAND/UL (ref 0–0.2)
IMM GRANULOCYTES NFR BLD AUTO: 0 % (ref 0–2)
LYMPHOCYTES # BLD AUTO: 1.12 THOUSANDS/ΜL (ref 0.6–4.47)
LYMPHOCYTES NFR BLD AUTO: 15 % (ref 14–44)
M PNEUMO DNA SPEC QL NAA+PROBE: NOT DETECTED
MCH RBC QN AUTO: 29.8 PG (ref 26.8–34.3)
MCHC RBC AUTO-ENTMCNC: 33.1 G/DL (ref 31.4–37.4)
MCV RBC AUTO: 90 FL (ref 82–98)
METAPNEUMOVIRUS: NOT DETECTED
MONOCYTES # BLD AUTO: 0.65 THOUSAND/ΜL (ref 0.17–1.22)
MONOCYTES NFR BLD AUTO: 9 % (ref 4–12)
NEUTROPHILS # BLD AUTO: 5.58 THOUSANDS/ΜL (ref 1.85–7.62)
NEUTS SEG NFR BLD AUTO: 74 % (ref 43–75)
NRBC BLD AUTO-RTO: 0 /100 WBCS
PLATELET # BLD AUTO: 176 THOUSANDS/UL (ref 149–390)
PMV BLD AUTO: 9.1 FL (ref 8.9–12.7)
POTASSIUM SERPL-SCNC: 3.6 MMOL/L (ref 3.5–5.3)
POTASSIUM SERPL-SCNC: 3.7 MMOL/L (ref 3.5–5.3)
RBC # BLD AUTO: 3.12 MILLION/UL (ref 3.81–5.12)
RHINOVIRUS RNA SPEC QL NAA+PROBE: NOT DETECTED
RSV A RNA SPEC QL NAA+PROBE: NOT DETECTED
RSV B RNA SPEC QL NAA+PROBE: NOT DETECTED
SODIUM SERPL-SCNC: 133 MMOL/L (ref 136–145)
SODIUM SERPL-SCNC: 135 MMOL/L (ref 136–145)
TROPONIN I SERPL-MCNC: 0.02 NG/ML
WBC # BLD AUTO: 7.49 THOUSAND/UL (ref 4.31–10.16)

## 2018-11-11 PROCEDURE — 85025 COMPLETE CBC W/AUTO DIFF WBC: CPT | Performed by: HOSPITALIST

## 2018-11-11 PROCEDURE — 80048 BASIC METABOLIC PNL TOTAL CA: CPT | Performed by: HOSPITALIST

## 2018-11-11 PROCEDURE — 84484 ASSAY OF TROPONIN QUANT: CPT | Performed by: HOSPITALIST

## 2018-11-11 PROCEDURE — 99232 SBSQ HOSP IP/OBS MODERATE 35: CPT | Performed by: HOSPITALIST

## 2018-11-11 PROCEDURE — 94640 AIRWAY INHALATION TREATMENT: CPT

## 2018-11-11 PROCEDURE — G0008 ADMIN INFLUENZA VIRUS VAC: HCPCS | Performed by: HOSPITALIST

## 2018-11-11 PROCEDURE — 90662 IIV NO PRSV INCREASED AG IM: CPT | Performed by: HOSPITALIST

## 2018-11-11 PROCEDURE — 94760 N-INVAS EAR/PLS OXIMETRY 1: CPT

## 2018-11-11 RX ADMIN — ENOXAPARIN SODIUM 40 MG: 40 INJECTION SUBCUTANEOUS at 07:55

## 2018-11-11 RX ADMIN — PANTOPRAZOLE SODIUM 40 MG: 40 TABLET, DELAYED RELEASE ORAL at 07:57

## 2018-11-11 RX ADMIN — LORAZEPAM 0.5 MG: 0.5 TABLET ORAL at 21:00

## 2018-11-11 RX ADMIN — LOSARTAN POTASSIUM 100 MG: 50 TABLET ORAL at 07:56

## 2018-11-11 RX ADMIN — LEVOTHYROXINE SODIUM 100 MCG: 100 TABLET ORAL at 06:24

## 2018-11-11 RX ADMIN — METOPROLOL TARTRATE 25 MG: 25 TABLET ORAL at 17:22

## 2018-11-11 RX ADMIN — BENZONATATE 100 MG: 100 CAPSULE ORAL at 23:36

## 2018-11-11 RX ADMIN — GUAIFENESIN 600 MG: 600 TABLET, EXTENDED RELEASE ORAL at 06:24

## 2018-11-11 RX ADMIN — PRAVASTATIN SODIUM 40 MG: 40 TABLET ORAL at 17:21

## 2018-11-11 RX ADMIN — ASPIRIN 325 MG: 325 TABLET ORAL at 07:57

## 2018-11-11 RX ADMIN — ACETAMINOPHEN 325 MG: 325 TABLET, FILM COATED ORAL at 06:23

## 2018-11-11 RX ADMIN — SODIUM CHLORIDE 75 ML/HR: 0.9 INJECTION, SOLUTION INTRAVENOUS at 08:03

## 2018-11-11 RX ADMIN — NIFEDIPINE 60 MG: 30 TABLET, FILM COATED, EXTENDED RELEASE ORAL at 07:56

## 2018-11-11 RX ADMIN — DULOXETINE HYDROCHLORIDE 60 MG: 60 CAPSULE, DELAYED RELEASE ORAL at 07:57

## 2018-11-11 RX ADMIN — PANTOPRAZOLE SODIUM 40 MG: 40 TABLET, DELAYED RELEASE ORAL at 17:22

## 2018-11-11 RX ADMIN — FLUTICASONE PROPIONATE 1 SPRAY: 50 SPRAY, METERED NASAL at 07:59

## 2018-11-11 RX ADMIN — ALBUTEROL SULFATE 2.5 MG: 2.5 SOLUTION RESPIRATORY (INHALATION) at 00:22

## 2018-11-11 RX ADMIN — TRAMADOL HYDROCHLORIDE 50 MG: 50 TABLET, COATED ORAL at 23:36

## 2018-11-11 RX ADMIN — BENZONATATE 100 MG: 100 CAPSULE ORAL at 06:24

## 2018-11-11 RX ADMIN — METOPROLOL TARTRATE 25 MG: 25 TABLET ORAL at 07:57

## 2018-11-11 RX ADMIN — DONEPEZIL HYDROCHLORIDE 5 MG: 5 TABLET, FILM COATED ORAL at 21:00

## 2018-11-11 RX ADMIN — LORATADINE 10 MG: 10 TABLET ORAL at 07:57

## 2018-11-11 RX ADMIN — INFLUENZA A VIRUS A/MICHIGAN/45/2015 X-275 (H1N1) ANTIGEN (FORMALDEHYDE INACTIVATED), INFLUENZA A VIRUS A/SINGAPORE/INFIMH-16-0019/2016 IVR-186 (H3N2) ANTIGEN (FORMALDEHYDE INACTIVATED), AND INFLUENZA B VIRUS B/MARYLAND/15/2016 BX-69A (A B/COLORADO/6/2017-LIKE VIRUS) ANTIGEN (FORMALDEHYDE INACTIVATED) 0.5 ML: 60; 60; 60 INJECTION, SUSPENSION INTRAMUSCULAR at 21:00

## 2018-11-11 RX ADMIN — CEFTRIAXONE 1000 MG: 1 INJECTION, POWDER, FOR SOLUTION INTRAMUSCULAR; INTRAVENOUS at 12:17

## 2018-11-11 RX ADMIN — SODIUM CHLORIDE 125 ML/HR: 0.9 INJECTION, SOLUTION INTRAVENOUS at 17:22

## 2018-11-11 NOTE — PROGRESS NOTES
Progress Note - Cleve [place] 4/27/1931, 80 y o  female MRN: [id number]    Unit/Bed#: -01 Encounter: [id number]    Primary Care Provider: Darlene Wayne DO   Date and time admitted to hospital: 11/9/2018 11:54 AM    * Sepsis ([address] )   Assessment & Plan    With tachycardia, leukocytosis, fever at nursing home of 101; source unclear  UA: nitrites, leuks, bacteria; pt is denying any urinary symptoms  CXR: no clear infiltrate; pt complains of cough and sinus congestion   PCT is 0 07   Bcx: pending    Lactic wnl  IVF     Anemia   Assessment & Plan    hgb 8 0 on admission, but improved to bs (bsHgb: 9-10)  CBC daily       Hyponatremia   Assessment & Plan    Improved with IVF (stable 131-135)  Trend BMP      Elevated troponin   Assessment & Plan    Trop mildly elevated; EKG with nonspecific ST-TWI in lateral leads  No chest pain; suspect type II MI 2/2 primary problem given h/o nonocclusive CAD   trops mildly elevated 0 06-->0 09-->0 02   No chest pain   Dc telemetry; no further work-up indicated at this time  VTE Pharmacologic Prophylaxis:   Pharmacologic: Enoxaparin (Lovenox)  Mechanical VTE Prophylaxis in Place: Yes    Patient Centered Rounds: I have performed bedside rounds with nursing staff today  Discussions with Specialists or Other Care Team Provider: none    Education and Discussions with Family / Patient: patient and dtr     Time Spent for Care: 30 minutes  More than 50% of total time spent on counseling and coordination of care as described above  Current Length of Stay: 2 day(s)    Current Patient Status: Inpatient   Certification Statement: The patient will continue to require additional inpatient hospital stay due to persistent fevers, low bicarbonate requiring IVF and antibiotics     Discharge Plan / Estimated Discharge Date: TBD based on clinical course    Code Status: Level 3 - DNAR and DNI    Subjective:   Pt still has a cough, but non productive  No chest pain  Denies f/c  Objective:     Vitals:   Temp (24hrs), Av °F (37 8 °C), Min:98 8 °F (37 1 °C), Max:101 5 °F (38 6 °C)    Temp:  [98 8 °F (37 1 °C)-101 5 °F (38 6 °C)] 99 1 °F (37 3 °C)  HR:  [] 83  Resp:  [20-22] 20  BP: (128-170)/(54-78) 132/64  SpO2:  [92 %-96 %] 96 %  There is no height or weight on file to calculate BMI  Input and Output Summary (last 24 hours): Intake/Output Summary (Last 24 hours) at 18 1201  Last data filed at 18 0803   Gross per 24 hour   Intake             2948 ml   Output              700 ml   Net             2248 ml       Physical Exam:     Physical Exam   Constitutional: No distress  HENT:   Head: Normocephalic and atraumatic  Mouth/Throat: Oropharynx is clear and moist  No oropharyngeal exudate  Cardiovascular: Normal rate, regular rhythm and normal heart sounds  No murmur heard  Pulmonary/Chest: Effort normal  She has no wheezes  She has no rales  Abdominal: Soft  Bowel sounds are normal  She exhibits no distension  There is no tenderness  There is no rebound  Genitourinary:   Genitourinary Comments: No suprapubic tenderness    Neurological: She is alert  Skin: Skin is warm and dry  She is not diaphoretic  Nursing note and vitals reviewed  Additional Data:     Labs:      Results from last 7 days  Lab Units 18  0829   WBC Thousand/uL 7 49   HEMOGLOBIN g/dL 9 3*   HEMATOCRIT % 28 1*   PLATELETS Thousands/uL 176   NEUTROS PCT % 74   LYMPHS PCT % 15   MONOS PCT % 9   EOS PCT % 2       Results from last 7 days  Lab Units 18  0632  18  1222   POTASSIUM mmol/L 3 6  < > 4 3   CHLORIDE mmol/L 99*  < > 98*   CO2 mmol/L 19*  < > 24   BUN mg/dL 9  < > 15   CREATININE mg/dL 0 92  < > 1 06   CALCIUM mg/dL 9 0  < > 9 4   ALK PHOS U/L  --   --  135*   ALT U/L  --   --  34   AST U/L  --   --  34   < > = values in this interval not displayed      Results from last 7 days  Lab Units 18  1222   INR  1 06       * I Have Reviewed All Lab Data Listed Above  * Additional Pertinent Lab Tests Reviewed: All Labs Within Last 24 Hours Reviewed    Imaging:    Imaging Reports Reviewed Today Include:   Imaging Personally Reviewed by Myself Includes:      Recent Cultures (last 7 days):       Results from last 7 days  Lab Units 11/09/18  1649 11/09/18  1350 11/09/18  1243 11/09/18  1222   BLOOD CULTURE   --   --  No Growth at 24 hrs  No Growth at 24 hrs     URINE CULTURE   --  >100,000 cfu/ml Escherichia coli*  --   --    INFLUENZA B PCR  None Detected  --   --   --    RSV PCR  None Detected  --   --   --        Last 24 Hours Medication List:     Current Facility-Administered Medications:  acetaminophen 325 mg Oral Q6H PRN Mary Grant MD    albuterol 2 5 mg Nebulization Q6H PRN Mary Grant MD    aspirin 325 mg Oral Daily Mary Grant MD    benzonatate 100 mg Oral TID PRN Mary Grant MD    cefTRIAXone 1,000 mg Intravenous Q24H Mary Grant MD    dextran 70-hypromellose 1 drop Both Eyes Q3H PRN Mary Gratn MD    donepezil 5 mg Oral HS Mary Grant MD    DULoxetine 60 mg Oral Daily Mary Grant MD    enoxaparin 40 mg Subcutaneous Daily Mary Grant MD    fluticasone 1 spray Nasal Daily Mary Grant MD    [START ON 11/12/2018] furosemide 20 mg Oral Once per day on Mon Wed Fri Mary Grant MD    guaiFENesin 600 mg Oral BID PRN Mary Grant MD    levothyroxine 100 mcg Oral Early Morning Mary Grant MD    loratadine 10 mg Oral Daily Mary Grant MD    LORazepam 0 5 mg Oral Q8H PRN Mary Grant MD    losartan 100 mg Oral Daily Mary Grant MD    metoprolol tartrate 25 mg Oral BID Mary Grant MD    NIFEdipine 60 mg Oral Daily Mary Grant MD    ondansetron 4 mg Intravenous Q6H PRN Mary Grant MD    pantoprazole 40 mg Oral BID Mary Grant MD    pravastatin 40 mg Oral Daily With Bertha Yap MD    sodium chloride 125 mL/hr Intravenous Continuous Mary Grant MD Last Rate: 125 mL/hr (11/11/18 1134)   traMADol 50 mg Oral Q8H PRN Mary Grant MD         Today, Patient Was Seen By: Davidson Delaney MD    ** Please Note: This note has been constructed using a voice recognition system   **

## 2018-11-11 NOTE — ASSESSMENT & PLAN NOTE
Trop mildly elevated; EKG with nonspecific ST-TWI in lateral leads  No chest pain; suspect type II MI 2/2 primary problem given h/o nonocclusive CAD   trops mildly elevated 0 06-->0 09-->0 02   No chest pain   Dc telemetry; no further work-up indicated at this time

## 2018-11-12 VITALS
TEMPERATURE: 98.2 F | OXYGEN SATURATION: 96 % | RESPIRATION RATE: 16 BRPM | HEART RATE: 73 BPM | DIASTOLIC BLOOD PRESSURE: 60 MMHG | SYSTOLIC BLOOD PRESSURE: 134 MMHG

## 2018-11-12 PROBLEM — A41.9 SEPSIS (HCC): Status: RESOLVED | Noted: 2018-04-13 | Resolved: 2018-11-12

## 2018-11-12 PROBLEM — R77.8 ELEVATED TROPONIN: Status: RESOLVED | Noted: 2018-11-09 | Resolved: 2018-11-12

## 2018-11-12 LAB
ANION GAP SERPL CALCULATED.3IONS-SCNC: 11 MMOL/L (ref 4–13)
BASOPHILS # BLD AUTO: 0.01 THOUSANDS/ΜL (ref 0–0.1)
BASOPHILS NFR BLD AUTO: 0 % (ref 0–1)
BUN SERPL-MCNC: 8 MG/DL (ref 5–25)
CALCIUM SERPL-MCNC: 8.4 MG/DL (ref 8.3–10.1)
CHLORIDE SERPL-SCNC: 102 MMOL/L (ref 100–108)
CO2 SERPL-SCNC: 22 MMOL/L (ref 21–32)
CREAT SERPL-MCNC: 0.73 MG/DL (ref 0.6–1.3)
EOSINOPHIL # BLD AUTO: 0.3 THOUSAND/ΜL (ref 0–0.61)
EOSINOPHIL NFR BLD AUTO: 5 % (ref 0–6)
ERYTHROCYTE [DISTWIDTH] IN BLOOD BY AUTOMATED COUNT: 13.8 % (ref 11.6–15.1)
GFR SERPL CREATININE-BSD FRML MDRD: 74 ML/MIN/1.73SQ M
GLUCOSE SERPL-MCNC: 83 MG/DL (ref 65–140)
HCT VFR BLD AUTO: 26.7 % (ref 34.8–46.1)
HGB BLD-MCNC: 8.9 G/DL (ref 11.5–15.4)
IMM GRANULOCYTES # BLD AUTO: 0.02 THOUSAND/UL (ref 0–0.2)
IMM GRANULOCYTES NFR BLD AUTO: 0 % (ref 0–2)
LYMPHOCYTES # BLD AUTO: 1.42 THOUSANDS/ΜL (ref 0.6–4.47)
LYMPHOCYTES NFR BLD AUTO: 22 % (ref 14–44)
MCH RBC QN AUTO: 30 PG (ref 26.8–34.3)
MCHC RBC AUTO-ENTMCNC: 33.3 G/DL (ref 31.4–37.4)
MCV RBC AUTO: 90 FL (ref 82–98)
MONOCYTES # BLD AUTO: 0.62 THOUSAND/ΜL (ref 0.17–1.22)
MONOCYTES NFR BLD AUTO: 10 % (ref 4–12)
NEUTROPHILS # BLD AUTO: 4 THOUSANDS/ΜL (ref 1.85–7.62)
NEUTS SEG NFR BLD AUTO: 63 % (ref 43–75)
NRBC BLD AUTO-RTO: 0 /100 WBCS
PLATELET # BLD AUTO: 202 THOUSANDS/UL (ref 149–390)
PMV BLD AUTO: 10.1 FL (ref 8.9–12.7)
POTASSIUM SERPL-SCNC: 3.7 MMOL/L (ref 3.5–5.3)
RBC # BLD AUTO: 2.97 MILLION/UL (ref 3.81–5.12)
SODIUM SERPL-SCNC: 135 MMOL/L (ref 136–145)
WBC # BLD AUTO: 6.37 THOUSAND/UL (ref 4.31–10.16)

## 2018-11-12 PROCEDURE — 85025 COMPLETE CBC W/AUTO DIFF WBC: CPT | Performed by: HOSPITALIST

## 2018-11-12 PROCEDURE — 80048 BASIC METABOLIC PNL TOTAL CA: CPT | Performed by: HOSPITALIST

## 2018-11-12 PROCEDURE — 87147 CULTURE TYPE IMMUNOLOGIC: CPT | Performed by: HOSPITALIST

## 2018-11-12 PROCEDURE — 99239 HOSP IP/OBS DSCHRG MGMT >30: CPT | Performed by: HOSPITALIST

## 2018-11-12 PROCEDURE — 87081 CULTURE SCREEN ONLY: CPT | Performed by: HOSPITALIST

## 2018-11-12 RX ORDER — LORAZEPAM 0.5 MG/1
0.5 TABLET ORAL EVERY 8 HOURS PRN
Qty: 30 TABLET | Refills: 0 | Status: SHIPPED | OUTPATIENT
Start: 2018-11-12 | End: 2018-11-21 | Stop reason: SDUPTHER

## 2018-11-12 RX ORDER — TRAMADOL HYDROCHLORIDE 50 MG/1
50 TABLET ORAL EVERY 8 HOURS PRN
Qty: 60 TABLET | Refills: 0 | Status: SHIPPED | OUTPATIENT
Start: 2018-11-12 | End: 2018-11-22

## 2018-11-12 RX ORDER — CEPHALEXIN 500 MG/1
500 CAPSULE ORAL EVERY 12 HOURS SCHEDULED
Qty: 8 CAPSULE | Refills: 0 | Status: SHIPPED | OUTPATIENT
Start: 2018-11-12 | End: 2018-11-16

## 2018-11-12 RX ORDER — BENZONATATE 100 MG/1
100 CAPSULE ORAL 3 TIMES DAILY PRN
Qty: 20 CAPSULE | Refills: 0 | Status: SHIPPED | OUTPATIENT
Start: 2018-11-12 | End: 2019-09-16

## 2018-11-12 RX ADMIN — ACETAMINOPHEN 325 MG: 325 TABLET, FILM COATED ORAL at 05:34

## 2018-11-12 RX ADMIN — LORATADINE 10 MG: 10 TABLET ORAL at 09:03

## 2018-11-12 RX ADMIN — ENOXAPARIN SODIUM 40 MG: 40 INJECTION SUBCUTANEOUS at 09:03

## 2018-11-12 RX ADMIN — LOSARTAN POTASSIUM 100 MG: 50 TABLET ORAL at 09:02

## 2018-11-12 RX ADMIN — CEFTRIAXONE 1000 MG: 1 INJECTION, POWDER, FOR SOLUTION INTRAMUSCULAR; INTRAVENOUS at 11:16

## 2018-11-12 RX ADMIN — ACETAMINOPHEN 325 MG: 325 TABLET, FILM COATED ORAL at 12:18

## 2018-11-12 RX ADMIN — PANTOPRAZOLE SODIUM 40 MG: 40 TABLET, DELAYED RELEASE ORAL at 09:03

## 2018-11-12 RX ADMIN — ASPIRIN 325 MG: 325 TABLET ORAL at 09:13

## 2018-11-12 RX ADMIN — SODIUM CHLORIDE 125 ML/HR: 0.9 INJECTION, SOLUTION INTRAVENOUS at 02:30

## 2018-11-12 RX ADMIN — FUROSEMIDE 20 MG: 20 TABLET ORAL at 09:02

## 2018-11-12 RX ADMIN — FLUTICASONE PROPIONATE 1 SPRAY: 50 SPRAY, METERED NASAL at 09:11

## 2018-11-12 RX ADMIN — NIFEDIPINE 60 MG: 30 TABLET, FILM COATED, EXTENDED RELEASE ORAL at 09:02

## 2018-11-12 RX ADMIN — METOPROLOL TARTRATE 25 MG: 25 TABLET ORAL at 09:03

## 2018-11-12 RX ADMIN — LEVOTHYROXINE SODIUM 100 MCG: 100 TABLET ORAL at 05:29

## 2018-11-12 RX ADMIN — DULOXETINE HYDROCHLORIDE 60 MG: 60 CAPSULE, DELAYED RELEASE ORAL at 09:02

## 2018-11-12 RX ADMIN — BENZONATATE 100 MG: 100 CAPSULE ORAL at 13:37

## 2018-11-12 NOTE — SOCIAL WORK
LOS 3   NOT A BUNDLE;  NOT A READMISSION  Cm contacted [facility] where pt lives with her  in an apartment  Cm reviewed clinical information with DON at the facility including PT notes  DON feels as though pt is at baseline and is able to return  Cm will fax AVS to facility once DC order is written  Cm then contacted pt's daughter to discuss DCP  Daughter stated she is [place] [facility] and is not able to transport her mother  She stated she usually has the hospital set up transport via [address] [facility]  Daughter is aware of the cost   IMM reviewed with the daughter  [facility] has been contacted in order to arrange transport  Cm will follow up with time and ambulance co that will be taking pt home

## 2018-11-12 NOTE — SOCIAL WORK
Transportation has been arranged with [address] to go [address] [facility]  They will  pt at 1700 via [facility]  Nurse, SLIM and facility aware of DC arrangements

## 2018-11-12 NOTE — SOCIAL WORK
Patient identified as high risk for readmission (HRR)  PCP follow-up appointment information provided and transportation arrangements verified with patient and/or caregiver  AVS reviewed for appointment documentation prior to discharge  OP Care Coordination Team notified to support transitions of care  [facility] prefers to make PCP appointment for pt

## 2018-11-12 NOTE — OCCUPATIONAL THERAPY NOTE
Occupational Therapy Screen        Patient Name: David Hills  [id number] Date: 11/12/2018    OT orders received and chart review completed  Spoke w/ [place] JIMENEZ who reports that pt is completing ADL at baseline level of I  Pt resides at [facility] in apt w/ her  and needs assistance w/ ADL at baseline  Pt primarily uses RW for functional mobility   From an OT perspective, pt can return to [facility] w/ staff assist  D/C OT    Esha Crisostomo OTR/L

## 2018-11-12 NOTE — DISCHARGE SUMMARY
Discharge Summary - [facility]    Patient Information: María Salinas 80 y o  female MRN: [id number]  Unit/Bed#: -01 Encounter: [id number]    Discharging Physician / Practitioner: Calixto Yañez MD  PCP: Robert Acosta DO  Admission Date: 11/9/2018  Discharge Date: 11/12/18    Disposition:     Other: [facility]     Reason for Admission: sepsis     Discharge Diagnoses:     Principal Problem (Resolved):    Sepsis ([address] )  Active Problems:    Hyponatremia    Anemia    Cough  Resolved Problems:    Elevated troponin      Consultations During Hospital Stay:  · none    Procedures Performed:     · None     Significant Findings / Test Results:     CXR  IMPRESSION:  No acute cardiopulmonary disease  Ucx:   Urine Culture >100,000 cfu/ml Escherichia coli           Susceptibility      Escherichia coli     ANGELIKA     Ampicillin ($$) >16 00 ug/ml Resistant     Ampicillin + Sulbactam ($) >16/8 ug/ml Resistant     Aztreonam ($$$)  <4 ug/ml"><4 ug/ml Susceptible     Cefazolin ($) 8 00 ug/ml Susceptible     Ciprofloxacin ($)  >2 00 ug/ml Resistant     Ertapenem ($$$) <0 5 ug/ml"><0 5 ug/ml Susceptible     Gentamicin ($$) <1 ug/ml"><1 ug/ml Susceptible     Levofloxacin ($) >4 00 ug/ml Resistant     Nitrofurantoin <32 ug/ml"><32 ug/ml Susceptible     Tetracycline >8 ug/ml Resistant     Tobramycin ($) <1 ug/ml"><1 ug/ml Susceptible     Trimethoprim + Sulfamethoxazole ($$$) >2/38 ug/ml Resistant                Incidental Findings:   · none     Test Results Pending at Discharge (will require follow up): · None      Outpatient Tests Requested:  · None     Complications:  None     Hospital Course:     María Salinas is a 80 y o  female patient who originally presented to the hospital on 11/9/2018 due to sepsis thought to be due to UTI  Patient presented with tachycardia leukocytosis and a fever at her nursing home  She did have fevers while observed in the hospital as well    Urine culture was drawn and patient was started on empiric antibiotics with ceftriaxone  She denies specific urinary symptoms such as dysuria or hematuria, however she has had UTIs in the past   Her main complaint was a cough and wheeze  Procalcitonin was checked and was negative arguing against a upper respiratory infection  She also had influenza and RSV testing which was negative  A respiratory viral panel was sent and that was negative as well  She received supportive care for her cough and wheezing and this was improved by time of discharge  Once urine culture returned with sensitivities to E coli patient was converted to an oral cephalosporin  Plan to complete a total of 7 days of therapy (4 additional days)  Of note, pt did have mildly elevated troponin in the absence of chest pain  Per dtr, pt has > 50% occlusion of unspecified coronary vessel  Suspect pt had type II MI due to sepsis  Condition at Discharge: fair     Discharge Day Visit / Exam:     Subjective:  Pt feels well  Cough is improving  No nausea or abdominal pain  Vitals: Blood Pressure: 134/60 (11/12/18 1531)  Pulse: 73 (11/12/18 1531)  Temperature: 98 2 °F (36 8 °C) (11/12/18 1531)  Temp Source: Oral (11/12/18 1531)  Respirations: 16 (11/12/18 1531)  SpO2: 96 % (11/12/18 1531)  Exam:   Physical Exam   Constitutional: No distress  HENT:   Head: Normocephalic and atraumatic  Cardiovascular: Normal rate and regular rhythm  No murmur heard  Pulmonary/Chest: Effort normal and breath sounds normal  No respiratory distress  She has no wheezes  Abdominal: Soft  Bowel sounds are normal  She exhibits no distension  There is no tenderness  There is no rebound  Musculoskeletal: She exhibits no edema  Neurological: She is alert  Skin: Skin is warm and dry  No rash noted  She is not diaphoretic  No erythema  Psychiatric: She has a normal mood and affect  Her behavior is normal    Nursing note and vitals reviewed        Discussion with Family: updated Dtr, Petros Rosenthal by phone     Discharge instructions/Information to patient and family:   See after visit summary for information provided to patient and family  Provisions for Follow-Up Care:  See after visit summary for information related to follow-up care and any pertinent home health orders  Planned Readmission: none     Discharge Statement:  I spent 40 minutes discharging the patient  This time was spent on the day of discharge  I had direct contact with the patient on the day of discharge  Greater than 50% of the total time was spent examining patient, answering all patient questions, arranging and discussing plan of care with patient as well as directly providing post-discharge instructions  Additional time then spent on discharge activities  Discharge Medications:  See after visit summary for reconciled discharge medications provided to patient and family        ** Please Note: This note has been constructed using a voice recognition system **

## 2018-11-12 NOTE — PLAN OF CARE
Problem: DISCHARGE PLANNING - CARE MANAGEMENT  Goal: Discharge to post-acute care or home with appropriate resources  INTERVENTIONS:  - Conduct assessment to determine patient/family and health care team treatment goals, and need for post-acute services based on payer coverage, community resources, and patient preferences, and barriers to discharge  - Address psychosocial, clinical, and financial barriers to discharge as identified in assessment in conjunction with the patient/family and health care team  - Arrange appropriate level of post-acute services according to patients   needs and preference and payer coverage in collaboration with the physician and health care team  - Communicate with and update the patient/family, physician, and health care team regarding progress on the discharge plan  - Arrange appropriate transportation to post-acute venues   Outcome: Completed Date Met: 11/12/18  Transportation has been arranged with [facility] to go [address] [facility]  They will  pt at 1700 via [facility]  Nurse, SLIM and facility aware of DC arrangements

## 2018-11-12 NOTE — PLAN OF CARE
Problem: DISCHARGE PLANNING - CARE MANAGEMENT  Goal: Discharge to post-acute care or home with appropriate resources  INTERVENTIONS:  - Conduct assessment to determine patient/family and health care team treatment goals, and need for post-acute services based on payer coverage, community resources, and patient preferences, and barriers to discharge  - Address psychosocial, clinical, and financial barriers to discharge as identified in assessment in conjunction with the patient/family and health care team  - Arrange appropriate level of post-acute services according to patients   needs and preference and payer coverage in collaboration with the physician and health care team  - Communicate with and update the patient/family, physician, and health care team regarding progress on the discharge plan  - Arrange appropriate transportation to post-acute venues  Outcome: Progressing  LOS 3  NOT A BUNDLE;  NOT A READMISSION  Cm contacted [facility] where pt lives with her  in an apartment  Cm reviewed clinical information with DON at the facility including PT notes  DON feels as though pt is at baseline and is able to return  Cm will fax AVS to facility once DC order is written  Cm then contacted pt's daughter to discuss DCP  Daughter stated she is in [place] and is not able to transport her mother  She stated she usually has the hospital set up transport via [address]  Daughter is aware of the cost       [facility] has been contacted in order to arrange transport  Cm will follow up with time and ambulance co that will be taking pt home

## 2018-11-13 ENCOUNTER — TRANSITIONAL CARE MANAGEMENT (OUTPATIENT)
Dept: FAMILY MEDICINE CLINIC | Facility: CLINIC | Age: 83
End: 2018-11-13

## 2018-11-13 LAB
MRSA NOSE QL CULT: ABNORMAL
MRSA NOSE QL CULT: ABNORMAL

## 2018-11-14 LAB
BACTERIA BLD CULT: NORMAL
BACTERIA BLD CULT: NORMAL

## 2018-11-15 ENCOUNTER — TELEPHONE (OUTPATIENT)
Dept: FAMILY MEDICINE CLINIC | Facility: CLINIC | Age: 83
End: 2018-11-15

## 2018-11-15 NOTE — TELEPHONE ENCOUNTER
[address] [facility] FOR A SCRIPT FOR MARLA TO BE ON A REGULAR DIET WITH FUNCTIONAL LIMITS  [phone]

## 2018-11-16 DIAGNOSIS — F03.90 DEMENTIA WITHOUT BEHAVIORAL DISTURBANCE, UNSPECIFIED DEMENTIA TYPE (HCC): Primary | ICD-10-CM

## 2018-11-16 DIAGNOSIS — G30.9 ALZHEIMER'S DEMENTIA WITHOUT BEHAVIORAL DISTURBANCE, UNSPECIFIED TIMING OF DEMENTIA ONSET (HCC): Primary | ICD-10-CM

## 2018-11-16 DIAGNOSIS — F02.80 ALZHEIMER'S DEMENTIA WITHOUT BEHAVIORAL DISTURBANCE, UNSPECIFIED TIMING OF DEMENTIA ONSET (HCC): Primary | ICD-10-CM

## 2018-11-20 ENCOUNTER — OFFICE VISIT (OUTPATIENT)
Dept: FAMILY MEDICINE CLINIC | Facility: CLINIC | Age: 83
End: 2018-11-20
Payer: MEDICARE

## 2018-11-20 VITALS
BODY MASS INDEX: 25.03 KG/M2 | HEART RATE: 70 BPM | TEMPERATURE: 97.1 F | OXYGEN SATURATION: 97 % | SYSTOLIC BLOOD PRESSURE: 106 MMHG | WEIGHT: 136 LBS | DIASTOLIC BLOOD PRESSURE: 60 MMHG | HEIGHT: 62 IN | RESPIRATION RATE: 16 BRPM

## 2018-11-20 DIAGNOSIS — A41.51 SEPSIS DUE TO ESCHERICHIA COLI (HCC): ICD-10-CM

## 2018-11-20 DIAGNOSIS — F41.9 ANXIETY: ICD-10-CM

## 2018-11-20 DIAGNOSIS — IMO0001 TRANSITION OF CARE PERFORMED WITH SHARING OF CLINICAL SUMMARY: Primary | ICD-10-CM

## 2018-11-20 DIAGNOSIS — F01.50 VASCULAR DEMENTIA WITHOUT BEHAVIORAL DISTURBANCE (HCC): ICD-10-CM

## 2018-11-20 PROCEDURE — 99496 TRANSJ CARE MGMT HIGH F2F 7D: CPT | Performed by: INTERNAL MEDICINE

## 2018-11-20 NOTE — PROGRESS NOTES
Assessment/Plan:         Diagnoses and all orders for this visit:    Transition of care performed with sharing of clinical summary    Sepsis due to Escherichia coli [facility])  Comments:  tx in hosp   will recheck u/a and cult    Vascular dementia without behavioral disturbance    Anxiety  -     LORazepam (ATIVAN) 0 5 mg tablet; Take 1 tablet (0 5 mg total) by mouth every 8 (eight) hours as needed for anxiety for up to 10 days          Subjective:      Patient ID: Jacy Richardson is a 80 y o  female  Reviewed hosp - admitted with e coli urosepsis        The following portions of the patient's history were reviewed and updated as appropriate:   She  has a past medical history of Aspiration pneumonia ([facility] [address] ); Basal cell carcinoma of nose; Blind; Dementia; Depression; Disease of thyroid gland; Fibromyalgia; Gait disturbance; GERD (gastroesophageal reflux disease); Glaucoma; Hyperlipidemia; Hypertension; Osteopenia; Peripheral neuropathy; Polymyalgia rheumatica ([facility] [address] ); Psychiatric disorder; Renal insufficiency syndrome; Rheumatoid arthritis ([facility] ); Stroke [facility]); Systemic lupus erythematosus ([facility] ); Vertigo; and Vitamin D deficiency    She   Patient Active Problem List    Diagnosis Date Noted    Cough 11/09/2018    Rotator cuff tear arthropathy of right shoulder 09/18/2018    Pneumonia of right upper lobe due to infectious organism ([facility] [address] ) 08/22/2018    Sore throat 08/22/2018    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 ([facility] [address] ) 06/28/2018    Acute cystitis 05/05/2018    Right lower quadrant abdominal pain 05/05/2018    DEVONTE (acute kidney injury) ([facility] [address] ) 05/05/2018    Hypothyroidism 04/14/2018    Anemia 04/14/2018    Dementia without behavioral disturbance 04/13/2018    Hyponatremia 10/03/2016    CVA (cerebral vascular accident) ([facility] [address] ) 10/03/2016    Essential hypertension 10/03/2016    Acute metabolic encephalopathy 91/01/2670    Hyperlipidemia     GERD (gastroesophageal reflux disease)     Fibromyalgia     Rheumatoid arthritis [facility])      She  has a past surgical history that includes Joint replacement; Shoulder surgery; and Eye surgery  Her family history includes Coronary artery disease in her father; Dementia in her father; Diabetes in her mother and sister; Heart attack in her mother; Uterine cancer in her sister  She  reports that she has never smoked  She has never used smokeless tobacco  She reports that she does not drink alcohol or use drugs    Current Outpatient Prescriptions   Medication Sig Dispense Refill    aspirin 325 mg tablet Take 325 mg by mouth daily        benzocaine (ORAJEL) 10 % mucosal gel Apply 1 application to the mouth or throat 3 (three) times a day as needed for mucositis 5 3 g 0    benzonatate (TESSALON PERLES) 100 mg capsule Take 1 capsule (100 mg total) by mouth 3 (three) times a day as needed for cough 20 capsule 0    calcium carbonate (OS-TEDDY) 600 MG tablet Take 600 mg by mouth daily      carboxymethylcellulose (REFRESH LIQUIGEL) 1 % ophthalmic solution Apply 1 drop to eye 3 (three) times a day        cetirizine (ZyrTEC) 10 MG chewable tablet Chew 1 tablet (10 mg total) daily 30 tablet 0    Dentifrices (BIOTENE DRY MOUTH CARE DT) Apply to teeth      DULoxetine (CYMBALTA) 60 mg delayed release capsule Take 1 capsule (60 mg total) by mouth daily  0    fluticasone (FLONASE) 50 mcg/act nasal spray 1 spray into each nostril daily 16 g 0    furosemide (LASIX) 20 mg tablet Take one tab on Monday- Wednesday and friday 36 tablet 1    guaiFENesin (MUCINEX) 600 mg 12 hr tablet Take 1 tablet (600 mg total) by mouth 2 (two) times a day as needed for congestion 60 tablet 1    ipratropium-albuterol (COMBIVENT RESPIMAT) inhaler Inhale 2 puffs every 4 (four) hours as needed (dyspnea) 1 Inhaler 5    levothyroxine 100 mcg tablet       loratadine (CLARITIN) 10 mg tablet Take by mouth      LORazepam (ATIVAN) 0 5 mg tablet Take 1 tablet (0 5 mg total) by mouth every 8 (eight) hours as needed for anxiety for up to 10 days 30 tablet 2    losartan (COZAAR) 100 MG tablet Take 1 tablet (100 mg total) by mouth daily 90 tablet 1    Lutein 10 MG TABS Take 1 tablet by mouth daily        magnesium hydroxide (MILK OF MAGNESIA) 400 mg/5 mL oral suspension Take 30 mL by mouth daily as needed for constipation        metoprolol tartrate (LOPRESSOR) 50 mg tablet Take 0 5 tablets (25 mg total) by mouth 2 (two) times a day 45 tablet 1    Mouthwashes (BIOTENE DRY MOUTH) LIQD Rinse and spit bid 1 Bottle 5    NIFEdipine (PROCARDIA XL) 60 mg 24 hr tablet       pantoprazole (PROTONIX) 40 mg tablet Take 40 mg by mouth 2 (two) times a day        potassium chloride (K-DUR,KLOR-CON) 10 mEq tablet One tab daily on the days you take lasix 30 tablet 0    pravastatin (PRAVACHOL) 40 mg tablet Take 40 mg by mouth daily        traMADol (ULTRAM) 50 mg tablet Take 1 tablet (50 mg total) by mouth every 8 (eight) hours as needed for moderate pain for up to 10 days 60 tablet 0    donepezil (ARICEPT) 10 mg tablet Take 0 5 tablets (5 mg total) by mouth daily at bedtime Increased confusion on 10mg 45 tablet 1    triamcinolone (KENALOG) 0 1 % cream triamcinolone acetonide 0 1 % topical cream       No current facility-administered medications for this visit        Current Outpatient Prescriptions on File Prior to Visit   Medication Sig    aspirin 325 mg tablet Take 325 mg by mouth daily      benzocaine (ORAJEL) 10 % mucosal gel Apply 1 application to the mouth or throat 3 (three) times a day as needed for mucositis    benzonatate (TESSALON PERLES) 100 mg capsule Take 1 capsule (100 mg total) by mouth 3 (three) times a day as needed for cough    calcium carbonate (OS-TEDDY) 600 MG tablet Take 600 mg by mouth daily    carboxymethylcellulose (REFRESH LIQUIGEL) 1 % ophthalmic solution Apply 1 drop to eye 3 (three) times a day      cetirizine (ZyrTEC) 10 MG chewable tablet Chew 1 tablet (10 mg total) daily    Dentifrices ([facility] DT) Apply to teeth    DULoxetine (CYMBALTA) 60 mg delayed release capsule Take 1 capsule (60 mg total) by mouth daily    fluticasone (FLONASE) 50 mcg/act nasal spray 1 spray into each nostril daily    furosemide (LASIX) 20 mg tablet Take one tab on Monday- Wednesday and friday    guaiFENesin (MUCINEX) 600 mg 12 hr tablet Take 1 tablet (600 mg total) by mouth 2 (two) times a day as needed for congestion    ipratropium-albuterol (COMBIVENT RESPIMAT) inhaler Inhale 2 puffs every 4 (four) hours as needed (dyspnea)    levothyroxine 100 mcg tablet     loratadine (CLARITIN) 10 mg tablet Take by mouth    losartan (COZAAR) 100 MG tablet Take 1 tablet (100 mg total) by mouth daily    Lutein 10 MG TABS Take 1 tablet by mouth daily      magnesium hydroxide (MILK OF MAGNESIA) 400 mg/5 mL oral suspension Take 30 mL by mouth daily as needed for constipation      metoprolol tartrate (LOPRESSOR) 50 mg tablet Take 0 5 tablets (25 mg total) by mouth 2 (two) times a day    Mouthwashes (BIOTENE DRY MOUTH) LIQD Rinse and spit bid    NIFEdipine (PROCARDIA XL) 60 mg 24 hr tablet     pantoprazole (PROTONIX) 40 mg tablet Take 40 mg by mouth 2 (two) times a day      potassium chloride (K-DUR,KLOR-CON) 10 mEq tablet One tab daily on the days you take lasix    pravastatin (PRAVACHOL) 40 mg tablet Take 40 mg by mouth daily      traMADol (ULTRAM) 50 mg tablet Take 1 tablet (50 mg total) by mouth every 8 (eight) hours as needed for moderate pain for up to 10 days    [DISCONTINUED] LORazepam (ATIVAN) 0 5 mg tablet Take 1 tablet (0 5 mg total) by mouth every 8 (eight) hours as needed for anxiety for up to 10 days    donepezil (ARICEPT) 10 mg tablet Take 0 5 tablets (5 mg total) by mouth daily at bedtime Increased confusion on 10mg    triamcinolone (KENALOG) 0 1 % cream triamcinolone acetonide 0 1 % topical cream     No current facility-administered medications on file prior to visit        She is allergic to acetaminophen; golimumab; lidocaine; neurontin [gabapentin]; nortriptyline; prasterone; tricyclic antidepressants; leflunomide; and sulfasalazine       Review of Systems   Constitutional: Negative  HENT: Negative  Respiratory: Negative  Cardiovascular: Negative  Genitourinary: Negative  Objective:      /60 (BP Location: Right arm, Patient Position: Sitting, Cuff Size: Large)   Pulse 70   Temp (!) 97 1 °F (36 2 °C) (Tympanic)   Resp 16   Ht 5' 2" (1 575 m)   Wt 61 7 kg (136 lb)   SpO2 97%   BMI 24 87 kg/m²          Physical Exam   Constitutional: She appears well-developed and well-nourished  HENT:   Head: Normocephalic and atraumatic  Right Ear: External ear normal    Left Ear: External ear normal    Nose: Nose normal    Mouth/Throat: Oropharynx is clear and moist    Neck: Normal range of motion  Neck supple  Cardiovascular: Normal rate, regular rhythm and normal heart sounds  Pulmonary/Chest: Effort normal and breath sounds normal    Abdominal: Soft  Bowel sounds are normal    Genitourinary: Vagina normal    Musculoskeletal: Normal range of motion     Skin:   +ecchymosis on lower abdomin

## 2018-11-21 RX ORDER — LORAZEPAM 0.5 MG/1
0.5 TABLET ORAL EVERY 8 HOURS PRN
Qty: 30 TABLET | Refills: 2 | Status: ON HOLD | OUTPATIENT
Start: 2018-11-21 | End: 2019-01-28

## 2018-11-26 DIAGNOSIS — F41.9 ANXIETY: Primary | ICD-10-CM

## 2018-11-26 RX ORDER — LORAZEPAM 0.5 MG/1
TABLET ORAL
Qty: 30 TABLET | Refills: 2 | Status: SHIPPED | OUTPATIENT
Start: 2018-11-26 | End: 2018-11-28 | Stop reason: SDUPTHER

## 2018-11-27 ENCOUNTER — TELEPHONE (OUTPATIENT)
Dept: FAMILY MEDICINE CLINIC | Facility: CLINIC | Age: 83
End: 2018-11-27

## 2018-11-27 NOTE — TELEPHONE ENCOUNTER
[facility] residence needs the Lorazepam script to be PRINTED to say:    Quantity:  #7     "One tablet every evening for 7 days, re-evaluate at that time"    No refills    Fax to [facility]

## 2018-11-28 ENCOUNTER — TELEPHONE (OUTPATIENT)
Dept: FAMILY MEDICINE CLINIC | Facility: CLINIC | Age: 83
End: 2018-11-28

## 2018-11-28 DIAGNOSIS — F41.9 ANXIETY: ICD-10-CM

## 2018-11-28 RX ORDER — LORAZEPAM 0.5 MG/1
TABLET ORAL
Qty: 30 TABLET | Refills: 0 | Status: SHIPPED | OUTPATIENT
Start: 2018-11-28 | End: 2018-12-27 | Stop reason: SDUPTHER

## 2018-12-06 DIAGNOSIS — N39.0 URINARY TRACT INFECTION WITHOUT HEMATURIA, SITE UNSPECIFIED: Primary | ICD-10-CM

## 2018-12-27 ENCOUNTER — APPOINTMENT (EMERGENCY)
Dept: RADIOLOGY | Facility: HOSPITAL | Age: 83
End: 2018-12-27
Payer: MEDICARE

## 2018-12-27 ENCOUNTER — APPOINTMENT (EMERGENCY)
Dept: CT IMAGING | Facility: HOSPITAL | Age: 83
End: 2018-12-27
Payer: MEDICARE

## 2018-12-27 ENCOUNTER — TELEPHONE (OUTPATIENT)
Dept: FAMILY MEDICINE CLINIC | Facility: CLINIC | Age: 83
End: 2018-12-27

## 2018-12-27 ENCOUNTER — HOSPITAL ENCOUNTER (EMERGENCY)
Facility: HOSPITAL | Age: 83
Discharge: HOME | End: 2018-12-27
Attending: EMERGENCY MEDICINE | Admitting: EMERGENCY MEDICINE
Payer: MEDICARE

## 2018-12-27 VITALS
WEIGHT: 141.98 LBS | HEART RATE: 68 BPM | DIASTOLIC BLOOD PRESSURE: 68 MMHG | RESPIRATION RATE: 20 BRPM | TEMPERATURE: 97.7 F | BODY MASS INDEX: 25.97 KG/M2 | SYSTOLIC BLOOD PRESSURE: 162 MMHG | OXYGEN SATURATION: 96 %

## 2018-12-27 DIAGNOSIS — S40.011A CONTUSION OF MULTIPLE SITES OF RIGHT SHOULDER, INITIAL ENCOUNTER: ICD-10-CM

## 2018-12-27 DIAGNOSIS — S09.90XA CLOSED HEAD INJURY, INITIAL ENCOUNTER: Primary | ICD-10-CM
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
## 2018-12-27 DIAGNOSIS — S16.1XXA CERVICAL STRAIN, ACUTE, INITIAL ENCOUNTER: ICD-10-CM

## 2018-12-27 PROCEDURE — 72125 CT NECK SPINE W/O DYE: CPT

## 2018-12-27 PROCEDURE — 70450 CT HEAD/BRAIN W/O DYE: CPT

## 2018-12-27 PROCEDURE — 99285 EMERGENCY DEPT VISIT HI MDM: CPT

## 2018-12-27 PROCEDURE — 73030 X-RAY EXAM OF SHOULDER: CPT

## 2018-12-27 RX ORDER — ECHINACEA PURPUREA EXTRACT 125 MG
2 TABLET ORAL 2 TIMES DAILY
COMMUNITY

## 2018-12-27 RX ORDER — TRAMADOL HYDROCHLORIDE 50 MG/1
50 TABLET ORAL EVERY 6 HOURS PRN
Status: ON HOLD | COMMUNITY
End: 2019-01-28

## 2018-12-27 NOTE — ED PROVIDER NOTES
History  Chief Complaint   Patient presents with    Fall     Pt fell from a sitting position  Striking right shoulder and right face on the floor  Denies LOC  History provided by:  Patient and relative (Patient's daughter)  Fall   Mechanism of injury: fall    Injury location:  Shoulder/arm and head/neck  Head/neck injury location:  Head, R neck and L neck  Shoulder/arm injury location:  R shoulder  Incident location:  Home  Arrived directly from scene: yes    Fall:     Fall occurred:  Walking and tripped    Impact surface:  Hard floor    Point of impact: Right shoulder  Protective equipment: none    Suspicion of alcohol use: no    Suspicion of drug use: no    Tetanus status:  Up to date  Prior to arrival data:     Bystander interventions:  None    Blood loss:  None    Responsiveness at scene:  Alert    Orientation at scene:  Place, person, time and situation    Loss of consciousness: no      Amnesic to event: no      Airway interventions:  None    Immobilization:  C-collar  Current pain details:     Pain quality:  Dull    Pain Severity:  Moderate    Pain timing:  Constant  Associated symptoms: headaches and neck pain    Associated symptoms: no abdominal pain, no chest pain, no nausea and no vomiting        Prior to Admission Medications   Prescriptions Last Dose Informant Patient Reported? Taking?    DULoxetine (CYMBALTA) 60 mg delayed release capsule  Outside Facility (Specify) No Yes   Sig: Take 1 capsule (60 mg total) by mouth daily   Dentifrices ([address] [place] [facility])  Outside Facility (Specify) Yes Yes   Sig: Apply to teeth   LORazepam (ATIVAN) 0 5 mg tablet   No Yes   Sig: Take 1 tablet (0 5 mg total) by mouth every 8 (eight) hours as needed for anxiety for up to 10 days   Lutein 10 MG TABS  Outside Facility (Specify) Yes Yes   Sig: Take 1 tablet by mouth daily     NIFEdipine (PROCARDIA XL) 60 mg 24 hr tablet  Outside Facility (Specify) Yes Yes   aspirin 325 mg tablet  Outside Facility (Specify) Yes Yes   Sig: Take 325 mg by mouth daily     benzocaine (ORAJEL) 10 % mucosal gel  Outside Facility (Specify) No Yes   Sig: Apply 1 application to the mouth or throat 3 (three) times a day as needed for mucositis   benzonatate (TESSALON PERLES) 100 mg capsule  Outside Facility (Specify) No Yes   Sig: Take 1 capsule (100 mg total) by mouth 3 (three) times a day as needed for cough   calcium carbonate (OS-TEDDY) 600 MG tablet  Outside Facility (Specify) Yes Yes   Sig: Take 600 mg by mouth daily   carboxymethylcellulose (REFRESH LIQUIGEL) 1 % ophthalmic solution  Outside Facility (Specify) Yes Yes   Sig: Apply 1 drop to eye 3 (three) times a day     cetirizine (ZyrTEC) 10 MG chewable tablet Not Taking at Unknown time Outside Facility (Specify) No No   Sig: Chew 1 tablet (10 mg total) daily   Patient not taking: Reported on 2018    donepezil (ARICEPT) 10 mg tablet  Outside Facility (Specify) No Yes   Sig: Take 0 5 tablets (5 mg total) by mouth daily at bedtime Increased confusion on 10mg   fluticasone (FLONASE) 50 mcg/act nasal spray Not Taking at Unknown time Outside Facility (Specify) No No   Si spray into each nostril daily   Patient not taking: Reported on 2018    furosemide (LASIX) 20 mg tablet  Outside Facility (Specify) No Yes   Sig: Take one tab on Monday- Wednesday and friday   guaiFENesin (MUCINEX) 600 mg 12 hr tablet  Outside Facility (Specify) No Yes   Sig: Take 1 tablet (600 mg total) by mouth 2 (two) times a day as needed for congestion   ipratropium-albuterol (COMBIVENT RESPIMAT) inhaler  Outside Facility (Specify) No Yes   Sig: Inhale 2 puffs every 4 (four) hours as needed (dyspnea)   levothyroxine 100 mcg tablet  Outside Facility (Specify) Yes Yes   loratadine (CLARITIN) 10 mg tablet Not Taking at Unknown time Outside Facility (Specify) Yes No   Sig: Take by mouth   losartan (COZAAR) 100 MG tablet  Outside Facility (Specify) No Yes   Sig: Take 1 tablet (100 mg total) by mouth daily magnesium hydroxide (MILK OF MAGNESIA) 400 mg/5 mL oral suspension  Outside Facility (Specify) Yes Yes   Sig: Take 30 mL by mouth daily as needed for constipation     metoprolol tartrate (LOPRESSOR) 50 mg tablet  Outside Facility (Specify) No Yes   Sig: Take 0 5 tablets (25 mg total) by mouth 2 (two) times a day   pantoprazole (PROTONIX) 40 mg tablet  Outside Facility (Specify) Yes Yes   Sig: Take 40 mg by mouth 2 (two) times a day     potassium chloride (K-DUR,KLOR-CON) 10 mEq tablet  Outside Facility (Specify) No Yes   Sig: One tab daily on the days you take lasix   pravastatin (PRAVACHOL) 40 mg tablet  Outside Facility (Specify) Yes Yes   Sig: Take 40 mg by mouth daily     sodium chloride (OCEAN) 0 65 % nasal spray   Yes Yes   Si sprays into each nostril 2 (two) times a day   traMADol (ULTRAM) 50 mg tablet   Yes Yes   Sig: Take 50 mg by mouth every 6 (six) hours as needed for moderate pain   triamcinolone (KENALOG) 0 1 % cream Not Taking at Unknown time Outside Facility (Specify) Yes No   Sig: triamcinolone acetonide 0 1 % topical cream      Facility-Administered Medications: None       Past Medical History:   Diagnosis Date    Aspiration pneumonia (HCC)     Last Assessed:  17    Basal cell carcinoma of nose     Last Assessed:  17    Blind     blind right eye, 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, Last Assessed:  17    Dementia     Depression     Disease of thyroid gland     Fibromyalgia     Last Assessed:  17    Gait disturbance     GERD (gastroesophageal reflux disease)     Glaucoma     Hyperlipidemia     Hypertension     Osteopenia     Peripheral neuropathy     Last Assessed:  17    [facility]ymyalgia rhe[facility]a ([facility] [address] )     Psychiatric disorder     depression    Renal insufficiency syndrome     Last Assessed:  17    Rheumatoid arthritis ([address] )     Stroke ([facility] [address] )     x2, Last Assessed:  17    Systemic lupus erythematosus (HCC)     Vertigo     Vitamin D deficiency        Past Surgical History:   Procedure Laterality Date    EYE SURGERY      JOINT REPLACEMENT      left hip replacement, left shoulder replacement    SHOULDER SURGERY      Replacement       Family History   Problem Relation Age of Onset    Heart attack Mother     Diabetes Mother     Dementia Father     Coronary artery disease Father         cardiac disorder    Diabetes Sister     Uterine cancer Sister      I have reviewed and agree with the history as documented  Social History   Substance Use Topics    Smoking status: Never Smoker    Smokeless tobacco: Never Used    Alcohol use No        Review of Systems   Constitutional: Negative for activity change, chills, diaphoresis and fever  HENT: Negative for congestion, sinus pressure and sore throat  Eyes: Negative for pain and visual disturbance  Respiratory: Negative for cough, chest tightness, shortness of breath, wheezing and stridor  Cardiovascular: Negative for chest pain and palpitations  Gastrointestinal: Negative for abdominal distention, abdominal pain, constipation, diarrhea, nausea and vomiting  Genitourinary: Negative for dysuria and frequency  Musculoskeletal: Positive for neck pain  Negative for neck stiffness  Skin: Negative for rash  Neurological: Positive for headaches  Negative for dizziness, speech difficulty, light-headedness and numbness  Physical Exam  Physical Exam   Constitutional: She is oriented to person, place, and time  She appears well-developed  No distress  HENT:   Head: Normocephalic and atraumatic  Eyes: Pupils are equal, round, and reactive to light  Neck: Normal range of motion  Neck supple  No tracheal deviation present  Tender palpation over cervical spinous processes, C3 through C6   Cardiovascular: Normal rate, regular rhythm, normal heart sounds and intact distal pulses  No murmur heard  Pulmonary/Chest: Effort normal and breath sounds normal  No stridor   No respiratory distress  Abdominal: Soft  She exhibits no distension  There is no tenderness  There is no rebound and no guarding  Musculoskeletal: Normal range of motion  She exhibits tenderness (Tender palpation over right humeral head)  Neurological: She is alert and oriented to person, place, and time  Skin: Skin is warm and dry  She is not diaphoretic  No erythema  No pallor  Psychiatric: She has a normal mood and affect  Vitals reviewed  Vital Signs  ED Triage Vitals [12/27/18 1748]   Temperature Pulse Respirations Blood Pressure SpO2   97 7 °F (36 5 °C) 68 20 162/68 96 %      Temp src Heart Rate Source Patient Position - Orthostatic VS BP Location FiO2 (%)   -- Monitor Lying Right arm --      Pain Score       8           Vitals:    12/27/18 1748   BP: 162/68   Pulse: 68   Patient Position - Orthostatic VS: Lying       Visual Acuity      ED Medications  Medications - No data to display    Diagnostic Studies  Results Reviewed     None                 XR shoulder 2+ views RIGHT   ED Interpretation by Edwin Silva DO (12/27 1819)   No acute fracture      Final Result by Edu Kennedy MD (12/28 0025)      No acute right shoulder fracture or dislocation  Workstation performed: [id number]         CT cervical spine without contrast   Final Result by Alycia Lawton DO (12/27 1900)      No fracture or traumatic malalignment      Severe multilevel cervical spondylosis  Stable 2 cm right thyroid nodule from May 2017  Workstation performed: [id number]         CT head without contrast   Final Result by Alycia Lawton DO (12/27 1849)      No acute intracranial abnormality  Age-related atrophy, old infarcts, and chronic microvascular ischemic changes as above                    Workstation performed: [id number]                    Procedures  Procedures       Phone Contacts  ED Phone Contact    ED Course                               MDM  Number of Diagnoses or Management Options  Cervical strain, acute, initial encounter: new and requires workup  Closed head injury, initial encounter: new and requires workup  Contusion of multiple sites of right shoulder, initial encounter: new and requires workup  Fall from standing, initial encounter: new and requires workup  Diagnosis management comments:       Initial ED assessment:  59-year-old female presents after mechanical fall from standing landing onto her right shoulder and hitting the right side of her head and complaining of headache and neck pain and right shoulder pain    Initial DDx includes but is not limited to: Intracranial hemorrhage verses closed head injury, cervical strain versus fracture, shoulder contusion versus fracture    Initial ED plan:   X-ray, CT imaging, offered pain controlling medications for which he respectfully declined, disposition pending ED workup        Final ED summary/disposition:   After evaluation and workup in the emergency department, no fracture identified no intracranial hemorrhage identified    Patient discharged        Amount and/or Complexity of Data Reviewed  Tests in the radiology section of CPT®: ordered and reviewed  Decide to obtain previous medical records or to obtain history from someone other than the patient: yes  Obtain history from someone other than the patient: yes  Review and summarize past medical records: yes  Independent visualization of images, tracings, or specimens: yes      CritCare Time    Disposition  Final diagnoses:   Closed head injury, initial encounter   Cervical strain, acute, initial encounter   Contusion of multiple sites of right shoulder, initial encounter   Fall from standing, initial encounter     Time reflects when diagnosis was documented in both MDM as applicable and the Disposition within this note     Time User Action Codes Description Comment    12/27/2018  6:41 PM Alejandra Jackson Add [S09 90XA] Closed head injury, initial encounter     12/27/2018  6:41 PM Edelmirachayo Maicollandy Add Scirocco Pean  1XXA] Cervical strain, acute, initial encounter     12/27/2018  6:41 PM Dannielle Abdullahion Add [S40 011A] Contusion of multiple sites of right shoulder, initial encounter     12/27/2018  6:41 PM Dannielle Parikh Add [M54  XXXA] Fall from standing, initial encounter       ED Disposition     ED Disposition Condition Comment    Discharge  [address] discharge to home/self care      Condition at discharge: Good        Follow-up Information     Follow up With Specialties Details Why Contact Info Additional [address] [facility] Emergency Department Emergency Medicine Go to If symptoms worsen [address] [facility]  AN ED, [address], [place], [place], [address]          Discharge Medication List as of 12/27/2018  6:48 PM      CONTINUE these medications which have NOT CHANGED    Details   aspirin 325 mg tablet Take 325 mg by mouth daily  , Historical Med      benzocaine (ORAJEL) 10 % mucosal gel Apply 1 application to the mouth or throat 3 (three) times a day as needed for mucositis, Starting Mon 7/16/2018, Print      benzonatate (TESSALON PERLES) 100 mg capsule Take 1 capsule (100 mg total) by mouth 3 (three) times a day as needed for cough, Starting Mon 11/12/2018, Print      calcium carbonate (OS-TEDDY) 600 MG tablet Take 600 mg by mouth daily, Historical Med      carboxymethylcellulose (REFRESH LIQUIGEL) 1 % ophthalmic solution Apply 1 drop to eye 3 (three) times a day  , Starting Thu 8/24/2017, Historical Med      cetirizine (ZyrTEC) 10 MG chewable tablet Chew 1 tablet (10 mg total) daily, Starting Wed 8/22/2018, Print      Dentifrices ([facility]) Apply to teeth, Historical Med      donepezil (ARICEPT) 10 mg tablet Take 0 5 tablets (5 mg total) by mouth daily at bedtime Increased confusion on 10mg, Starting Thu 8/16/2018, Print      DULoxetine (CYMBALTA) 60 mg delayed release capsule Take 1 capsule (60 mg total) by mouth daily, Starting Tue 4/17/2018, No Print      fluticasone (FLONASE) 50 mcg/act nasal spray 1 spray into each nostril daily, Starting Wed 8/22/2018, Print      furosemide (LASIX) 20 mg tablet Take one tab on Monday- Wednesday and friday, Print      guaiFENesin (MUCINEX) 600 mg 12 hr tablet Take 1 tablet (600 mg total) by mouth 2 (two) times a day as needed for congestion, Starting Fri 5/18/2018, Print      ipratropium-albuterol (COMBIVENT RESPIMAT) inhaler Inhale 2 puffs every 4 (four) hours as needed (dyspnea), Starting Fri 5/18/2018, Print      levothyroxine 100 mcg tablet Starting Fri 12/22/2017, Historical Med      loratadine (CLARITIN) 10 mg tablet Take by mouth, Historical Med      LORazepam (ATIVAN) 0 5 mg tablet Take 1 tablet (0 5 mg total) by mouth every 8 (eight) hours as needed for anxiety for up to 10 days, Starting Wed 11/21/2018, Until Thu 12/27/2018, Print      losartan (COZAAR) 100 MG tablet Take 1 tablet (100 mg total) by mouth daily, Starting Thu 8/16/2018, Print      Lutein 10 MG TABS Take 1 tablet by mouth daily  , Historical Med      magnesium hydroxide (MILK OF MAGNESIA) 400 mg/5 mL oral suspension Take 30 mL by mouth daily as needed for constipation  , Historical Med      metoprolol tartrate (LOPRESSOR) 50 mg tablet Take 0 5 tablets (25 mg total) by mouth 2 (two) times a day, Starting Thu 8/16/2018, Print      NIFEdipine (PROCARDIA XL) 60 mg 24 hr tablet Starting Mon 6/25/2018, Historical Med      pantoprazole (PROTONIX) 40 mg tablet Take 40 mg by mouth 2 (two) times a day  , Historical Med      potassium chloride (K-DUR,KLOR-CON) 10 mEq tablet One tab daily on the days you take lasix, Print      pravastatin (PRAVACHOL) 40 mg tablet Take 40 mg by mouth daily  , Historical Med      sodium chloride (OCEAN) 0 65 % nasal spray 2 sprays into each nostril 2 (two) times a day, Historical Med      traMADol (ULTRAM) 50 mg tablet Take 50 mg by mouth every 6 (six) hours as needed for moderate pain, Historical Med      triamcinolone (KENALOG) 0 1 % cream triamcinolone acetonide 0 1 % topical cream, Historical Med           No discharge procedures on file      ED Provider  Electronically Signed by           Guillermina Lewis DO  12/29/18 7958

## 2018-12-27 NOTE — DISCHARGE INSTRUCTIONS
Cervical Strain   WHAT YOU NEED TO KNOW:   A cervical strain is a stretched or torn muscle or tendon in your neck  Tendons are strong tissues that connect muscles to bones  Common causes of cervical strains include a car accident, a fall, or a sports injury  DISCHARGE INSTRUCTIONS:   Return to the emergency department if:   · You have pain or numbness from your shoulder down to your hand  · You have problems with your vision, hearing, or balance  · You feel confused or cannot concentrate  · You have problems with movement and strength  Contact your healthcare provider if:   · You have increased swelling or pain in your neck  · You have questions or concerns about your condition or care  Medicines: You may need any of the following:  · Acetaminophen  decreases pain and fever  It is available without a doctor's order  Ask how much to take and how often to take it  Follow directions  Read the labels of all other medicines you are using to see if they also contain acetaminophen, or ask your doctor or pharmacist  Acetaminophen can cause liver damage if not taken correctly  Do not use more than 4 grams (4,000 milligrams) total of acetaminophen in one day  · NSAIDs , such as ibuprofen, help decrease swelling, pain, and fever  This medicine is available with or without a doctor's order  NSAIDs can cause stomach bleeding or kidney problems in certain people  If you take blood thinner medicine, always ask your healthcare provider if NSAIDs are safe for you  Always read the medicine label and follow directions  · Muscle relaxers  help decrease pain and muscle spasms  · Prescription pain medicine  may be given  Ask your healthcare provider how to take this medicine safely  Some prescription pain medicines contain acetaminophen  Do not take other medicines that contain acetaminophen without talking to your healthcare provider  Too much acetaminophen may cause liver damage   Prescription pain medicine may cause constipation  Ask your healthcare provider how to prevent or treat constipation  · Take your medicine as directed  Contact your healthcare provider if you think your medicine is not helping or if you have side effects  Tell him or her if you are allergic to any medicine  Keep a list of the medicines, vitamins, and herbs you take  Include the amounts, and when and why you take them  Bring the list or the pill bottles to follow-up visits  Carry your medicine list with you in case of an emergency  Manage your symptoms:   · Apply heat  on your neck for 15 to 20 minutes, 4 to 6 times a day or as directed  Heat helps decrease pain, stiffness, and muscle spasms  · Begin gentle neck exercises  as soon as you can move your neck without pain  Exercises will help decrease stiffness and improve the strength and movement of your neck  Ask your healthcare provider what kind of exercises you should do  · Gradually return to your usual activities as directed  Stop if you have pain  Avoid activities that can cause more damage to your neck, such as heavy lifting or strenuous exercise  · Sleep without a pillow  to help decrease pain  Instead, roll a small towel tightly and place it under your neck  · Go to physical therapy as directed  A physical therapist teaches you exercises to help improve movement and strength, and to decrease pain  Prevent neck injury:   · Drive safely  Make sure everyone in your car wears a seatbelt  A seatbelt can save your life if you are in an accident  Do not use your cell phone when you are driving  This could distract you and cause an accident  Pull over if you need to make a call or send a text message  · Wear helmets, lifejackets, and protective gear  Always wear a helmet when you ride a bike or motorcycle, go skiing, or play sports that could cause a head injury  Wear protective equipment when you play sports   Wear a lifejacket when you are on a boat or doing water sports  Follow up with your healthcare provider as directed: You may be referred to an orthopedist or physical therapies  Write down your questions so you remember to ask them during your visits  © 2017 [employer]  Information is for End User's use only and may not be sold, redistributed or otherwise used for commercial purposes  All illustrations and images included in CareNotes® are the copyrighted property of [employer]  or [employer]  The above information is an  only  It is not intended as medical advice for individual conditions or treatments  Talk to your doctor, nurse or pharmacist before following any medical regimen to see if it is safe and effective for you  Head Injury   AMBULATORY CARE:   A head injury  is most often caused by a blow to the head  This may occur from a fall, bicycle injury, sports injury, being struck in the head, or a motor vehicle accident  Signs and symptoms: You may have an open wound, swelling, or bruising on your head  Right after the injury, you may be confused  Symptoms may last anywhere from a few hours to a few weeks  You may have any of the following:  · Mild to moderate headache    · Dizziness or loss of balance    · Nausea or vomiting    · Change in mood, such as feeling restless or irritable    · Trouble thinking, remembering, or concentrating    · Ringing in the ears or neck pain    · Drowsiness or decreased amount of energy    · Trouble sleeping  Call 911 or have someone else call for any of the following:   · You cannot be woken  · You have a seizure  · You stop responding to others or you faint  · You have blurry or double vision  · Your speech becomes slurred or confused  · You have arm or leg weakness, loss of feeling, or new problems with coordination  · Your pupils are larger than usual or one pupil is a different size than the other       · You have blood or clear fluid coming out of your ears or nose  Seek care immediately if:   · You have repeated or forceful vomiting  · You feel confused  · Your headache gets worse or becomes severe  · You or someone caring for you notices that you are harder to wake than usual   Contact your healthcare provider if:   · Your symptoms last longer than 6 weeks after the injury  · You have questions or concerns about your condition or care  Medicines:   · Acetaminophen  decreases pain  Acetaminophen is available without a doctor's order  Ask how much to take and how often to take it  Follow directions  Acetaminophen can cause liver damage if not taken correctly  · Take your medicine as directed  Contact your healthcare provider if you think your medicine is not helping or if you have side effects  Tell him or her if you are allergic to any medicine  Keep a list of the medicines, vitamins, and herbs you take  Include the amounts, and when and why you take them  Bring the list or the pill bottles to follow-up visits  Carry your medicine list with you in case of an emergency  Self-care:   · Rest  or do quiet activities for 24 to 48 hours  Limit your time watching TV, using the computer, or doing tasks that require a lot of thinking  Slowly return to your normal activities as directed  Do not play sports or do activities that may cause you to get hit in the head  Ask your healthcare provider when you can return to sports  · Apply ice  on your head for 15 to 20 minutes every hour or as directed  Use an ice pack, or put crushed ice in a plastic bag  Cover it with a towel before you apply it to your skin  Ice helps prevent tissue damage and decreases swelling and pain  · Have someone stay with you for 24 hours  or as directed  This person can monitor you for complications and call [phone]  When you are awake the person should ask you a few questions to see if you are thinking clearly  An example would be to ask your name or your address    Prevent another head injury:   · Wear a helmet that fits properly  Do this when you play sports, or ride a bike, scooter, or skateboard  Helmets help decrease your risk of a serious head injury  Talk to your healthcare provider about other ways you can protect yourself if you play sports  · Wear your seat belt every time you are in a car  This helps to decrease your risk for a head injury if you are in a car accident  Follow up with your healthcare provider as directed:  Write down your questions so you remember to ask them during your visits  © 2017 [employer] Information is for End User's use only and may not be sold, redistributed or otherwise used for commercial purposes  All illustrations and images included in CareNotes® are the copyrighted property of [employer]  or [employer]jon [employer]  The above information is an  only  It is not intended as medical advice for individual conditions or treatments  Talk to your doctor, nurse or pharmacist before following any medical regimen to see if it is safe and effective for you

## 2018-12-27 NOTE — TELEPHONE ENCOUNTER
Right eye is red, painful, itching, with milky white discharge  Started 12/25/18  Fax Rx for antibiotic drop to [phone]  They will fill at the home for her

## 2018-12-28 ENCOUNTER — TELEPHONE (OUTPATIENT)
Dept: FAMILY MEDICINE CLINIC | Facility: CLINIC | Age: 83
End: 2018-12-28

## 2018-12-28 DIAGNOSIS — G47.00 INSOMNIA, UNSPECIFIED TYPE: Primary | ICD-10-CM

## 2018-12-28 DIAGNOSIS — H10.9 CONJUNCTIVITIS OF RIGHT EYE, UNSPECIFIED CONJUNCTIVITIS TYPE: Primary | ICD-10-CM

## 2018-12-28 DIAGNOSIS — G47.00 INSOMNIA, UNSPECIFIED TYPE: ICD-10-CM

## 2018-12-28 RX ORDER — LORAZEPAM 0.5 MG/1
0.5 TABLET ORAL
Qty: 30 TABLET | Refills: 2 | Status: SHIPPED | OUTPATIENT
Start: 2018-12-28 | End: 2018-12-28 | Stop reason: SDUPTHER

## 2018-12-28 RX ORDER — OFLOXACIN 3 MG/ML
1 SOLUTION/ DROPS OPHTHALMIC 4 TIMES DAILY
Qty: 5 ML | Refills: 0 | Status: SHIPPED | OUTPATIENT
Start: 2018-12-28 | End: 2019-03-27 | Stop reason: ALTCHOICE

## 2018-12-28 RX ORDER — LORAZEPAM 0.5 MG/1
0.5 TABLET ORAL
Qty: 30 TABLET | Refills: 0 | Status: SHIPPED | OUTPATIENT
Start: 2018-12-28 | End: 2019-01-28 | Stop reason: HOSPADM

## 2018-12-28 NOTE — TELEPHONE ENCOUNTER
PLEASE WRITE STANDING ORDERS FOR LORAZAPAM 0 5 1 TAB PO @ BEDTIME FAX [phone] [facility]

## 2018-12-28 NOTE — ED NOTES
[address]  82 s/w [facility] RN  Provided report and ETA of 2045 April Antonio Cortés RN  12/27/18 1928

## 2018-12-28 NOTE — ED NOTES
PT awake and alert, no distress noted  No other questions upon d/c       April Suzi Pederson RN  12/27/18 1942

## 2018-12-28 NOTE — ED NOTES
Called [employer], s/w Myrna SCHULER  scheduled for 2045 April Malachi Begum RN  12/27/18 1926

## 2018-12-28 NOTE — ED NOTES
Provided PT with boxed lunch since daughter advised PT missed her dinner time  Darlene Zaidi RN  12/27/18 1941    Daughter at bedside  PT in no distress  No other complaints         Darlene Zaidi RN  12/27/18 1941

## 2019-01-07 ENCOUNTER — OFFICE VISIT (OUTPATIENT)
Dept: UROLOGY | Facility: CLINIC | Age: 84
End: 2019-01-07
Payer: MEDICARE

## 2019-01-07 VITALS
SYSTOLIC BLOOD PRESSURE: 118 MMHG | HEIGHT: 62 IN | DIASTOLIC BLOOD PRESSURE: 70 MMHG | HEART RATE: 62 BPM | WEIGHT: 139 LBS | BODY MASS INDEX: 25.58 KG/M2

## 2019-01-07 DIAGNOSIS — N39.0 URINARY TRACT INFECTION WITHOUT HEMATURIA, SITE UNSPECIFIED: ICD-10-CM

## 2019-01-07 DIAGNOSIS — N39.0 RECURRENT UTI: Primary | ICD-10-CM

## 2019-01-07 PROCEDURE — 99204 OFFICE O/P NEW MOD 45 MIN: CPT | Performed by: UROLOGY

## 2019-01-07 RX ORDER — ESTRADIOL 0.1 MG/G
1 CREAM VAGINAL DAILY
Qty: 42.5 G | Refills: 3 | Status: SHIPPED | OUTPATIENT
Start: 2019-01-07

## 2019-01-07 NOTE — PROGRESS NOTES
Referring Physician: Sabina Medina DO  A copy of this note was sent to the referring physician  Diagnoses and all orders for this visit:    Recurrent UTI  -     estradiol (ESTRACE) 0 1 mg/g vaginal cream; Insert 1 g into the vagina daily Do no insert - please apply a pea sized amount to the urethra/vagina Monday, Wednesday and Friday before bedtime    Urinary tract infection without hematuria, site unspecified  -     Ambulatory referral to Urology            Assessment and plan:       1  Recurrent E coli UTIs      Today we discussed the nature of recurrent urinary tract infections in postmenopausal women  We discussed that these are subdivided into cystitis, which include only lower urinary tract symptoms , versus pyelonephritis which includes flank pain and fever  We discussed that pyelonephritis poses a significant risk of renal insufficiency if it is recurrent  Cystitis symptoms, while certainly bothersome, do not carry this risk  I discussed my typical algorithm for management and prevention of recurrent UTIs  This includes beginning with topical estrogen cream to stimulate regrowth of the urethral tissue  We also discussed the role of imaging We also discussed the indications and contraindications to using a suppressive antibiotic  Patient has had a CT scan back in May that was negative for any underlying internal anatomic etiologies for recurrent UTIs  I have therefore recommended topical estrogen replacement cream as first-line therapy  Dosing and adverse effects were reviewed as well as logistics of apple lying this in her nursing facility  Patient's daughter is amenable to this plan  She will follow up in 3 months time    If she has breakthrough UTIs, or any trouble with the application of her estrogen cream that we would switch to methenamine hippurate as a suppressive daily option      Shailesh Santoyo MD      Chief Complaint     Recurrent UTIs      History of Present Illness Marizol Escoto is a 80 y o  female referred for evaluation of recurrent urinary tract infections  Patient has had multiple culture positive E coli urinary tract infections over the past year  He has also had recurrent hospitalizations as well as recurrent sepsis potentially from though not definitively from a urinary tract source  Patient's medical comorbidities include octogenarian status, medical frailty, and recurrent falls  She is a resident of a nursing facility  She is accompanied by her daughter today  She had no trouble with urinary tract infections earlier in life  He has had no hematuria    Detailed Urologic History     - please refer to HPI    Review of Systems     Review of Systems   Constitutional: Negative for activity change and fatigue  HENT: Negative for congestion  Eyes: Negative for visual disturbance  Respiratory: Negative for shortness of breath and wheezing  Cardiovascular: Negative for chest pain and leg swelling  Gastrointestinal: Negative for abdominal pain  Endocrine: Positive for polyuria  Genitourinary: Positive for dysuria  Negative for flank pain, hematuria and urgency  Musculoskeletal: Negative for back pain  Allergic/Immunologic: Negative for immunocompromised state  Neurological: Negative for dizziness and numbness  Psychiatric/Behavioral: Negative for dysphoric mood  All other systems reviewed and are negative  Allergies     Allergies   Allergen Reactions    Acetaminophen     Golimumab      Other reaction(s): dedrick colored stool    Lidocaine Other (See Comments)    Neurontin [Gabapentin]     Nortriptyline Tremor    Prasterone      Other reaction(s): pruitis    Tricyclic Antidepressants     Leflunomide Rash    Sulfasalazine Rash       Physical Exam     Physical Exam   Constitutional: She is oriented to person, place, and time  She appears well-developed     Frail, ambulates with great difficulty   HENT:   Head: Normocephalic and atraumatic  Eyes: Pupils are equal, round, and reactive to light  Neck: Normal range of motion  Cardiovascular:   Negative lower extremity edema   Pulmonary/Chest: Effort normal and breath sounds normal    Abdominal: Soft  Genitourinary:   Genitourinary Comments: Negative CVA tenderness, suprapubic tenderness   Musculoskeletal: Normal range of motion  Neurological: She is alert and oriented to person, place, and time  Skin: Skin is warm  Psychiatric: She has a normal mood and affect             Vital Signs  Vitals:    01/07/19 1354   BP: 118/70   BP Location: Right arm   Patient Position: Sitting   Cuff Size: Standard   Pulse: 62   Weight: 63 kg (139 lb)   Height: 5' 2" (1 575 m)         Current Medications       Current Outpatient Prescriptions:     aspirin 325 mg tablet, Take 325 mg by mouth daily  , Disp: , Rfl:     benzocaine (ORAJEL) 10 % mucosal gel, Apply 1 application to the mouth or throat 3 (three) times a day as needed for mucositis, Disp: 5 3 g, Rfl: 0    benzonatate (TESSALON PERLES) 100 mg capsule, Take 1 capsule (100 mg total) by mouth 3 (three) times a day as needed for cough, Disp: 20 capsule, Rfl: 0    calcium carbonate (OS-TEDDY) 600 MG tablet, Take 600 mg by mouth daily, Disp: , Rfl:     carboxymethylcellulose (REFRESH LIQUIGEL) 1 % ophthalmic solution, Apply 1 drop to eye 3 (three) times a day  , Disp: , Rfl:     cetirizine (ZyrTEC) 10 MG chewable tablet, Chew 1 tablet (10 mg total) daily, Disp: 30 tablet, Rfl: 0    Dentifrices (BIOTENE DRY MOUTH CARE DT), Apply to teeth, Disp: , Rfl:     donepezil (ARICEPT) 10 mg tablet, Take 0 5 tablets (5 mg total) by mouth daily at bedtime Increased confusion on 10mg, Disp: 45 tablet, Rfl: 1    DULoxetine (CYMBALTA) 60 mg delayed release capsule, Take 1 capsule (60 mg total) by mouth daily, Disp: , Rfl: 0    fluticasone (FLONASE) 50 mcg/act nasal spray, 1 spray into each nostril daily, Disp: 16 g, Rfl: 0    furosemide (LASIX) 20 mg tablet, Take one tab on Monday- Wednesday and friday, Disp: 36 tablet, Rfl: 1    guaiFENesin (MUCINEX) 600 mg 12 hr tablet, Take 1 tablet (600 mg total) by mouth 2 (two) times a day as needed for congestion, Disp: 60 tablet, Rfl: 1    ipratropium-albuterol (COMBIVENT RESPIMAT) inhaler, Inhale 2 puffs every 4 (four) hours as needed (dyspnea), Disp: 1 Inhaler, Rfl: 5    levothyroxine 100 mcg tablet, , Disp: , Rfl:     loratadine (CLARITIN) 10 mg tablet, Take by mouth, Disp: , Rfl:     LORazepam (ATIVAN) 0 5 mg tablet, Take 1 tablet (0 5 mg total) by mouth daily at bedtime, Disp: 30 tablet, Rfl: 0    losartan (COZAAR) 100 MG tablet, Take 1 tablet (100 mg total) by mouth daily, Disp: 90 tablet, Rfl: 1    Lutein 10 MG TABS, Take 1 tablet by mouth daily  , Disp: , Rfl:     magnesium hydroxide (MILK OF MAGNESIA) 400 mg/5 mL oral suspension, Take 30 mL by mouth daily as needed for constipation  , Disp: , Rfl:     metoprolol tartrate (LOPRESSOR) 50 mg tablet, Take 0 5 tablets (25 mg total) by mouth 2 (two) times a day, Disp: 45 tablet, Rfl: 1    NIFEdipine (PROCARDIA XL) 60 mg 24 hr tablet, , Disp: , Rfl:     ofloxacin (OCUFLOX) 0 3 % ophthalmic solution, Administer 1 drop to the right eye 4 (four) times a day If not improving in 48 hrs need to schedule in with eye dr , Disp: 5 mL, Rfl: 0    pantoprazole (PROTONIX) 40 mg tablet, Take 40 mg by mouth 2 (two) times a day  , Disp: , Rfl:     potassium chloride (K-DUR,KLOR-CON) 10 mEq tablet, One tab daily on the days you take lasix, Disp: 30 tablet, Rfl: 0    pravastatin (PRAVACHOL) 40 mg tablet, Take 40 mg by mouth daily  , Disp: , Rfl:     sodium chloride (OCEAN) 0 65 % nasal spray, 2 sprays into each nostril 2 (two) times a day, Disp: , Rfl:     traMADol (ULTRAM) 50 mg tablet, Take 50 mg by mouth every 6 (six) hours as needed for moderate pain, Disp: , Rfl:     triamcinolone (KENALOG) 0 1 % cream, triamcinolone acetonide 0 1 % topical cream, Disp: , Rfl:     estradiol (ESTRACE) 0 1 mg/g vaginal cream, Insert 1 g into the vagina daily Do no insert - please apply a pea sized amount to the urethra/vagina Monday, Wednesday and Friday before bedtime, Disp: 42 5 g, Rfl: 3    LORazepam (ATIVAN) 0 5 mg tablet, Take 1 tablet (0 5 mg total) by mouth every 8 (eight) hours as needed for anxiety for up to 10 days, Disp: 30 tablet, Rfl: 2      Active Problems     Patient Active Problem List   Diagnosis    Hyperlipidemia    GERD (gastroesophageal reflux disease)    Fibromyalgia    [facility]    Hyponatremia    CVA (cerebral vascular accident) ([facility]    Essential hypertension    Acute metabolic encephalopathy    Dementia without behavioral disturbance    Hypothyroidism    Anemia    Acute cystitis    Right lower quadrant abdominal pain    DEVONTE (acute kidney injury) ([facility]    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 ([facility]    Pneumonia of right upper lobe due to infectious organism ([facility]    Sore throat    Rotator cuff tear arthropathy of right shoulder    Cough    Recurrent UTI         Past Medical History     Past Medical History:   Diagnosis Date    Aspiration pneumonia ([facility]     Last Assessed:  7/18/17    Basal cell carcinoma of nose     Last Assessed:  4/12/17    Blind     blind right eye, 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, Last Assessed:  7/18/17    Dementia     Depression     Disease of thyroid gland     Fibromyalgia     Last Assessed:  7/18/17    Gait disturbance     GERD (gastroesophageal reflux disease)     Glaucoma     Hyperlipidemia     Hypertension     Osteopenia     Peripheral neuropathy     Last Assessed:  4/12/17    Polymyalgia rheumatica ([facility]     Psychiatric disorder     depression    Renal insufficiency syndrome     Last Assessed:  4/13/17    Rheumatoid arthritis ([facility]     Stroke ([facility]     x2, Last Assessed:  7/18/17    Systemic lupus erythematosus (HCC)     Vertigo     Vitamin D deficiency          Surgical History     Past Surgical History:   Procedure Laterality Date    EYE SURGERY      JOINT REPLACEMENT      left hip replacement, left shoulder replacement    SHOULDER SURGERY      Replacement         Family History     Family History   Problem Relation Age of Onset    Heart attack Mother     Diabetes Mother     Dementia Father     Coronary artery disease Father         cardiac disorder    Diabetes Sister     Uterine cancer Sister          Social History     Social History     History   Smoking Status    Never Smoker   Smokeless Tobacco    Never Used         Pertinent Lab Values     Lab Results   Component Value Date    CREATININE 0 73 11/12/2018       No results found for: PSA    @RESULTRCNT(1H])@      Pertinent Imaging     FINDINGS:     ABDOMEN     LOWER CHEST:  No clinically significant abnormality identified in the visualized lower chest      LIVER/BILIARY TREE:  Unremarkable      GALLBLADDER:  No calcified gallstones  No pericholecystic inflammatory change      SPLEEN:  Unremarkable      PANCREAS:  Unremarkable      ADRENAL GLANDS:  Unremarkable      KIDNEYS/URETERS:  Unremarkable  No hydronephrosis      STOMACH AND BOWEL:  Distal colonic diverticulosis  No acute diverticulitis as visualized  Portions of the sigmoid colon are obscured by beam hardening artifact from left hip arthroplasty components      APPENDIX:  Noninflamed      ABDOMINOPELVIC CAVITY:  No ascites or free intraperitoneal air  No lymphadenopathy  Partially calcified darvin hepatic lymph node is a benign finding      VESSELS:  Atherosclerotic changes are present  No evidence of aneurysm      PELVIS     REPRODUCTIVE ORGANS:  Partially obscured      URINARY BLADDER:  Grossly unremarkable    Partially obscured      ABDOMINAL WALL/INGUINAL REGIONS:  Unremarkable      OSSEOUS STRUCTURES:  Total left hip arthroplasty components without complication along the portions     IMPRESSION:     No acute findings      Somewhat limited evaluation of the pelvic structures secondary to beam hardening artifact from total left hip arthroplasty hardware  Portions of the record may have been created with voice recognition software   Occasional wrong word or "sound a like" substitutions may have occurred due to the inherent limitations of voice recognition software   Read the chart carefully and recognize, using context, where substitutions have occurred

## 2019-01-07 NOTE — LETTER
January 7, 2019     Robert Acosat DO  [address] [place] [address] Close    Patient: María Salinas   YOB: 1931   Date of Visit: 1/7/2019       Dear Dr Blanca Acosta:    Thank you for referring María Salinas to me for evaluation  Below are my notes for this consultation  If you have questions, please do not hesitate to call me  I look forward to following your patient along with you  Sincerely,        Pavan Walls MD        CC: No Recipients  Pavan Walls MD  1/7/2019  2:32 PM  Sign at close encounter  Referring Physician: Robert Acosta DO  A copy of this note was sent to the referring physician  Diagnoses and all orders for this visit:    Recurrent UTI  -     estradiol (ESTRACE) 0 1 mg/g vaginal cream; Insert 1 g into the vagina daily Do no insert - please apply a pea sized amount to the urethra/vagina Monday, Wednesday and Friday before bedtime    Urinary tract infection without hematuria, site unspecified  -     Ambulatory referral to Urology            Assessment and plan:       1  Recurrent E coli UTIs      Today we discussed the nature of recurrent urinary tract infections in postmenopausal women  We discussed that these are subdivided into cystitis, which include only lower urinary tract symptoms , versus pyelonephritis which includes flank pain and fever  We discussed that pyelonephritis poses a significant risk of renal insufficiency if it is recurrent  Cystitis symptoms, while certainly bothersome, do not carry this risk  I discussed my typical algorithm for management and prevention of recurrent UTIs  This includes beginning with topical estrogen cream to stimulate regrowth of the urethral tissue  We also discussed the role of imaging We also discussed the indications and contraindications to using a suppressive antibiotic          Patient has had a CT scan back in May that was negative for any underlying internal anatomic etiologies for recurrent UTIs  I have therefore recommended topical estrogen replacement cream as first-line therapy  Dosing and adverse effects were reviewed as well as logistics of apple lying this in her nursing facility  Patient's daughter is amenable to this plan  She will follow up in 3 months time  If she has breakthrough UTIs, or any trouble with the application of her estrogen cream that we would switch to methenamine hippurate as a suppressive daily option      Aliya Tabor MD      Chief Complaint     Recurrent UTIs      History of Present Illness     Janeth Ortiz is a 80 y o  female referred for evaluation of recurrent urinary tract infections  Patient has had multiple culture positive E coli urinary tract infections over the past year  He has also had recurrent hospitalizations as well as recurrent sepsis potentially from though not definitively from a urinary tract source  Patient's medical comorbidities include octogenarian status, medical frailty, and recurrent falls  She is a resident of a nursing facility  She is accompanied by her daughter today  She had no trouble with urinary tract infections earlier in life  He has had no hematuria    Detailed Urologic History     - please refer to HPI    Review of Systems     Review of Systems   Constitutional: Negative for activity change and fatigue  HENT: Negative for congestion  Eyes: Negative for visual disturbance  Respiratory: Negative for shortness of breath and wheezing  Cardiovascular: Negative for chest pain and leg swelling  Gastrointestinal: Negative for abdominal pain  Endocrine: Positive for polyuria  Genitourinary: Positive for dysuria  Negative for flank pain, hematuria and urgency  Musculoskeletal: Negative for back pain  Allergic/Immunologic: Negative for immunocompromised state  Neurological: Negative for dizziness and numbness  Psychiatric/Behavioral: Negative for dysphoric mood  All other systems reviewed and are negative  Allergies     Allergies   Allergen Reactions    Acetaminophen     Golimumab      Other reaction(s): dedrick colored stool    Lidocaine Other (See Comments)    Neurontin [Gabapentin]     Nortriptyline Tremor    Prasterone      Other reaction(s): pruitis    Tricyclic Antidepressants     Leflunomide Rash    Sulfasalazine Rash       Physical Exam     Physical Exam   Constitutional: She is oriented to person, place, and time  She appears well-developed  Frail, ambulates with great difficulty   HENT:   Head: Normocephalic and atraumatic  Eyes: Pupils are equal, round, and reactive to light  Neck: Normal range of motion  Cardiovascular:   Negative lower extremity edema   Pulmonary/Chest: Effort normal and breath sounds normal    Abdominal: Soft  Genitourinary:   Genitourinary Comments: Negative CVA tenderness, suprapubic tenderness   Musculoskeletal: Normal range of motion  Neurological: She is alert and oriented to person, place, and time  Skin: Skin is warm  Psychiatric: She has a normal mood and affect             Vital Signs  Vitals:    01/07/19 1354   BP: 118/70   BP Location: Right arm   Patient Position: Sitting   Cuff Size: Standard   Pulse: 62   Weight: 63 kg (139 lb)   Height: 5' 2" (1 575 m)         Current Medications       Current Outpatient Prescriptions:     aspirin 325 mg tablet, Take 325 mg by mouth daily  , Disp: , Rfl:     benzocaine (ORAJEL) 10 % mucosal gel, Apply 1 application to the mouth or throat 3 (three) times a day as needed for mucositis, Disp: 5 3 g, Rfl: 0    benzonatate (TESSALON PERLES) 100 mg capsule, Take 1 capsule (100 mg total) by mouth 3 (three) times a day as needed for cough, Disp: 20 capsule, Rfl: 0    calcium carbonate (OS-TEDDY) 600 MG tablet, Take 600 mg by mouth daily, Disp: , Rfl:     carboxymethylcellulose (REFRESH LIQUIGEL) 1 % ophthalmic solution, Apply 1 drop to eye 3 (three) times a day  , Disp: , Rfl:     cetirizine (ZyrTEC) 10 MG chewable tablet, Chew 1 tablet (10 mg total) daily, Disp: 30 tablet, Rfl: 0    Dentifrices (BIOTENE DRY MOUTH CARE DT), Apply to teeth, Disp: , Rfl:     donepezil (ARICEPT) 10 mg tablet, Take 0 5 tablets (5 mg total) by mouth daily at bedtime Increased confusion on 10mg, Disp: 45 tablet, Rfl: 1    DULoxetine (CYMBALTA) 60 mg delayed release capsule, Take 1 capsule (60 mg total) by mouth daily, Disp: , Rfl: 0    fluticasone (FLONASE) 50 mcg/act nasal spray, 1 spray into each nostril daily, Disp: 16 g, Rfl: 0    furosemide (LASIX) 20 mg tablet, Take one tab on Monday- Wednesday and friday, Disp: 36 tablet, Rfl: 1    guaiFENesin (MUCINEX) 600 mg 12 hr tablet, Take 1 tablet (600 mg total) by mouth 2 (two) times a day as needed for congestion, Disp: 60 tablet, Rfl: 1    ipratropium-albuterol (COMBIVENT RESPIMAT) inhaler, Inhale 2 puffs every 4 (four) hours as needed (dyspnea), Disp: 1 Inhaler, Rfl: 5    levothyroxine 100 mcg tablet, , Disp: , Rfl:     loratadine (CLARITIN) 10 mg tablet, Take by mouth, Disp: , Rfl:     LORazepam (ATIVAN) 0 5 mg tablet, Take 1 tablet (0 5 mg total) by mouth daily at bedtime, Disp: 30 tablet, Rfl: 0    losartan (COZAAR) 100 MG tablet, Take 1 tablet (100 mg total) by mouth daily, Disp: 90 tablet, Rfl: 1    Lutein 10 MG TABS, Take 1 tablet by mouth daily  , Disp: , Rfl:     magnesium hydroxide (MILK OF MAGNESIA) 400 mg/5 mL oral suspension, Take 30 mL by mouth daily as needed for constipation  , Disp: , Rfl:     metoprolol tartrate (LOPRESSOR) 50 mg tablet, Take 0 5 tablets (25 mg total) by mouth 2 (two) times a day, Disp: 45 tablet, Rfl: 1    NIFEdipine (PROCARDIA XL) 60 mg 24 hr tablet, , Disp: , Rfl:     ofloxacin (OCUFLOX) 0 3 % ophthalmic solution, Administer 1 drop to the right eye 4 (four) times a day If not improving in 48 hrs need to schedule in with eye dr , Disp: 5 mL, Rfl: 0    pantoprazole (PROTONIX) 40 mg tablet, Take 40 mg by mouth 2 (two) times a day  , Disp: , Rfl:     potassium chloride (K-DUR,KLOR-CON) 10 mEq tablet, One tab daily on the days you take lasix, Disp: 30 tablet, Rfl: 0    pravastatin (PRAVACHOL) 40 mg tablet, Take 40 mg by mouth daily  , Disp: , Rfl:     sodium chloride (OCEAN) 0 65 % nasal spray, 2 sprays into each nostril 2 (two) times a day, Disp: , Rfl:     traMADol (ULTRAM) 50 mg tablet, Take 50 mg by mouth every 6 (six) hours as needed for moderate pain, Disp: , Rfl:     triamcinolone (KENALOG) 0 1 % cream, triamcinolone acetonide 0 1 % topical cream, Disp: , Rfl:     estradiol (ESTRACE) 0 1 mg/g vaginal cream, Insert 1 g into the vagina daily Do no insert - please apply a pea sized amount to the urethra/vagina Monday, Wednesday and Friday before bedtime, Disp: 42 5 g, Rfl: 3    LORazepam (ATIVAN) 0 5 mg tablet, Take 1 tablet (0 5 mg total) by mouth every 8 (eight) hours as needed for anxiety for up to 10 days, Disp: 30 tablet, Rfl: 2      Active Problems     Patient Active Problem List   Diagnosis    Hyperlipidemia    GERD (gastroesophageal reflux disease)    Fibromyalgia    Rheumatoid arthritis ([address]    Hyponatremia    CVA (cerebral vascular accident) ([address]    Essential hypertension    Acute metabolic encephalopathy    Dementia without behavioral disturbance    Hypothyroidism    Anemia    Acute cystitis    Right lower quadrant abdominal pain    DEVONTE (acute kidney injury) ([address] )    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 (HCC)    Pneumonia of right upper lobe due to infectious organism ([address] )    Sore throat    Rotator cuff tear arthropathy of right shoulder    Cough    Recurrent UTI         Past Medical History     Past Medical History:   Diagnosis Date    Aspiration pneumonia ([address] )     Last Assessed:  7/18/17    Basal cell carcinoma of nose     Last Assessed:  4/12/17    Blind     blind right eye, 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     Psychiatric disorder     depression    Renal insufficiency syndrome     Last Assessed:  4/13/17    Rheumatoid arthritis ([facility]     Stroke ([facility] )     x2, Last Assessed:  7/18/17    Systemic lupus erythematosus ([facility] )     Vertigo     Vitamin D deficiency          Surgical History     Past Surgical History:   Procedure Laterality Date    EYE SURGERY      JOINT REPLACEMENT      left hip replacement, left shoulder replacement    SHOULDER SURGERY      Replacement         Family History     Family History   Problem Relation Age of Onset    Heart attack Mother     Diabetes Mother     Dementia Father     Coronary artery disease Father         cardiac disorder    Diabetes Sister     Uterine cancer Sister          Social History     Social History     History   Smoking Status    Never Smoker   Smokeless Tobacco    Never Used         Pertinent Lab Values     Lab Results   Component Value Date    CREATININE 0 73 11/12/2018       No results found for: PSA    @RESULTRCNT(1H])@      Pertinent Imaging     FINDINGS:     ABDOMEN     LOWER CHEST:  No clinically significant abnormality identified in the visualized lower chest      LIVER/BILIARY TREE:  Unremarkable      GALLBLADDER:  No calcified gallstones  No pericholecystic inflammatory change      SPLEEN:  Unremarkable      PANCREAS:  Unremarkable      ADRENAL GLANDS:  Unremarkable      KIDNEYS/URETERS:  Unremarkable  No hydronephrosis      STOMACH AND BOWEL:  Distal colonic diverticulosis  No acute diverticulitis as visualized    Portions of the sigmoid colon are obscured by beam hardening artifact from left hip arthroplasty components      APPENDIX: Noninflamed      ABDOMINOPELVIC CAVITY:  No ascites or free intraperitoneal air  No lymphadenopathy  Partially calcified darvin hepatic lymph node is a benign finding      VESSELS:  Atherosclerotic changes are present  No evidence of aneurysm      PELVIS     REPRODUCTIVE ORGANS:  Partially obscured      URINARY BLADDER:  Grossly unremarkable  Partially obscured      ABDOMINAL WALL/INGUINAL REGIONS:  Unremarkable      OSSEOUS STRUCTURES:  Total left hip arthroplasty components without complication along the portions     IMPRESSION:     No acute findings      Somewhat limited evaluation of the pelvic structures secondary to beam hardening artifact from total left hip arthroplasty hardware  Portions of the record may have been created with voice recognition software   Occasional wrong word or "sound a like" substitutions may have occurred due to the inherent limitations of voice recognition software   Read the chart carefully and recognize, using context, where substitutions have occurred

## 2019-01-09 ENCOUNTER — TELEPHONE (OUTPATIENT)
Dept: FAMILY MEDICINE CLINIC | Facility: CLINIC | Age: 84
End: 2019-01-09

## 2019-01-09 NOTE — TELEPHONE ENCOUNTER
Daughter called - it seems that the ocuflox helped but daughter states still has ?  Kayla  Asked her to see opthal   She will take her mom to dr Bruna Camarillo

## 2019-01-09 NOTE — TELEPHONE ENCOUNTER
Still having issues with her eye and went to see eye doctor   They said she needs to opth or md  Please call daughter to discuss next step

## 2019-01-10 ENCOUNTER — OFFICE VISIT (OUTPATIENT)
Dept: OBGYN CLINIC | Facility: OTHER | Age: 84
End: 2019-01-10
Payer: MEDICARE

## 2019-01-10 VITALS
DIASTOLIC BLOOD PRESSURE: 77 MMHG | HEIGHT: 60 IN | WEIGHT: 136 LBS | HEART RATE: 66 BPM | SYSTOLIC BLOOD PRESSURE: 130 MMHG | BODY MASS INDEX: 26.7 KG/M2

## 2019-01-10 DIAGNOSIS — G89.29 CHRONIC RIGHT SHOULDER PAIN: ICD-10-CM

## 2019-01-10 DIAGNOSIS — M25.511 CHRONIC RIGHT SHOULDER PAIN: ICD-10-CM

## 2019-01-10 DIAGNOSIS — M75.101 ROTATOR CUFF TEAR ARTHROPATHY OF RIGHT SHOULDER: Primary | ICD-10-CM

## 2019-01-10 DIAGNOSIS — M12.811 ROTATOR CUFF TEAR ARTHROPATHY OF RIGHT SHOULDER: Primary | ICD-10-CM

## 2019-01-10 PROCEDURE — 99213 OFFICE O/P EST LOW 20 MIN: CPT | Performed by: ORTHOPAEDIC SURGERY

## 2019-01-10 PROCEDURE — 20610 DRAIN/INJ JOINT/BURSA W/O US: CPT | Performed by: ORTHOPAEDIC SURGERY

## 2019-01-10 RX ORDER — BUPIVACAINE HYDROCHLORIDE 2.5 MG/ML
2 INJECTION, SOLUTION INFILTRATION; PERINEURAL
Status: COMPLETED | OUTPATIENT
Start: 2019-01-10 | End: 2019-01-10

## 2019-01-10 RX ORDER — BETAMETHASONE SODIUM PHOSPHATE AND BETAMETHASONE ACETATE 3; 3 MG/ML; MG/ML
6 INJECTION, SUSPENSION INTRA-ARTICULAR; INTRALESIONAL; INTRAMUSCULAR; SOFT TISSUE
Status: COMPLETED | OUTPATIENT
Start: 2019-01-10 | End: 2019-01-10

## 2019-01-10 RX ADMIN — BUPIVACAINE HYDROCHLORIDE 2 ML: 2.5 INJECTION, SOLUTION INFILTRATION; PERINEURAL at 10:52

## 2019-01-10 RX ADMIN — BETAMETHASONE SODIUM PHOSPHATE AND BETAMETHASONE ACETATE 6 MG: 3; 3 INJECTION, SUSPENSION INTRA-ARTICULAR; INTRALESIONAL; INTRAMUSCULAR; SOFT TISSUE at 10:52

## 2019-01-10 NOTE — PROGRESS NOTES
Large joint arthrocentesis  Date/Time: 1/10/2019 10:52 AM  Consent given by: patient  Site marked: site marked  Timeout: Immediately prior to procedure a time out was called to verify the correct patient, procedure, equipment, support staff and site/side marked as required   Supporting Documentation  Indications: pain   Procedure Details  Location: shoulder - R glenohumeral  Preparation: Patient was prepped and draped in the usual sterile fashion  Needle size: 22 G  Ultrasound guidance: no  Approach: posterior  Medications administered: 2 mL bupivacaine 0 25 %; 6 mg betamethasone acetate-betamethasone sodium phosphate 6 (3-3) mg/mL    Patient tolerance: patient tolerated the procedure well with no immediate complications  Dressing:  Sterile dressing applied      Assessment  Right shoulder pain s/p fall onto shoulder 12/27/18 with pre-existing rotator cuff tear arthropathy right shoulder    The patient was treated with a glenohumeral cortisone injection today, ice over the next few days  Script given for physical therapy at [facility] home for RTC arthropathy, ice as needed and she can use tylenol for pain if advisable  Can repeat injection 4-6 months if needed or consider proceeding towards reverse total shoulder arthroplasty if symptoms persist      Discussion and Plan:    Patient with pre existing rotator cuff arthropathy aggravated by fall onto right shoulder 12/27/18  XR showed no fracture or dislocation  Cortisone injection administered today for relief  Can repeat in 4-6 months  Activities to tolerance    Subjective:    Honey Franz is a 80 y o  female in for follow up of her right shoulder  She has known RTC arthropathy of right shoulder  She gets cortisone injections in shoulder for pain, last one 9/18/18  Shed did fall 12/27/18 directly onto right shoulder and was seen in emergency department and XR showed no fractures  She is having diffuse pain around shoulder   She is protecting by keeping at her side  She does have rolling walker to ambulate  Denies fever,chills  She is staying at [facility] currently  Daughter is in room with patient  HPI        The following portions of the patient's history were reviewed and updated as appropriate: allergies, current medications, past family history, past medical history, past social history, past surgical history and problem list     Review of Systems   Constitutional: Negative for chills and fever  HENT: Negative for drooling and sneezing  Eyes: Negative for redness  Respiratory: Negative for cough and wheezing  Gastrointestinal: Negative for nausea and vomiting  Psychiatric/Behavioral: The patient is not nervous/anxious  Objective:  Ortho Exam    Physical Exam   Constitutional: She is oriented to person, place, and time  She appears well-developed and well-nourished  Eyes: Pupils are equal, round, and reactive to light  Pulmonary/Chest: Effort normal and breath sounds normal    Neurological: She is alert and oriented to person, place, and time  She has normal reflexes  Skin: Skin is warm and dry  Psychiatric: She has a normal mood and affect  Her behavior is normal  Judgment and thought content normal          I have personally reviewed pertinent films in PACS and my interpretation is as follows  XR of right shoulder from 12/27/18 which demonstrate no fracture of dislocation     Proximal migration and early degenerative changes seen consistent with rotator cuff tear arthropathy    Scribe Attestation    I,:   Mario Deleon am acting as a scribe while in the presence of the attending physician :        I,:   Rashel Davison MD personally performed the services described in this documentation    as scribed in my presence :

## 2019-01-22 ENCOUNTER — APPOINTMENT (EMERGENCY)
Dept: RADIOLOGY | Facility: HOSPITAL | Age: 84
DRG: 689 | End: 2019-01-22
Payer: MEDICARE

## 2019-01-22 ENCOUNTER — HOSPITAL ENCOUNTER (INPATIENT)
Facility: HOSPITAL | Age: 84
LOS: 6 days | Discharge: NURSING, REHAB OR LONG-TERM CARE | DRG: 689 | End: 2019-01-28
Attending: EMERGENCY MEDICINE | Admitting: INTERNAL MEDICINE
Payer: MEDICARE

## 2019-01-22 DIAGNOSIS — N39.0 RECURRENT UTI: ICD-10-CM

## 2019-01-22 DIAGNOSIS — N39.0 URINARY TRACT INFECTION: Primary | ICD-10-CM

## 2019-01-22 DIAGNOSIS — H10.9 CONJUNCTIVITIS OF RIGHT EYE: ICD-10-CM

## 2019-01-22 DIAGNOSIS — M79.89 SWELLING OF RIGHT HAND: ICD-10-CM

## 2019-01-22 DIAGNOSIS — F41.9 ANXIETY: ICD-10-CM

## 2019-01-22 DIAGNOSIS — M11.232 PSEUDOGOUT OF LEFT WRIST: ICD-10-CM

## 2019-01-22 LAB
ALBUMIN SERPL BCP-MCNC: 2.9 G/DL (ref 3.5–5)
ALP SERPL-CCNC: 127 U/L (ref 46–116)
ALT SERPL W P-5'-P-CCNC: 33 U/L (ref 12–78)
AMORPH URATE CRY URNS QL MICRO: ABNORMAL /HPF
ANION GAP SERPL CALCULATED.3IONS-SCNC: 13 MMOL/L (ref 4–13)
AST SERPL W P-5'-P-CCNC: 37 U/L (ref 5–45)
ATRIAL RATE: 64 BPM
BACTERIA UR QL AUTO: ABNORMAL /HPF
BASOPHILS # BLD AUTO: 0.02 THOUSANDS/ΜL (ref 0–0.1)
BASOPHILS NFR BLD AUTO: 0 % (ref 0–1)
BILIRUB SERPL-MCNC: 0.5 MG/DL (ref 0.2–1)
BILIRUB UR QL STRIP: NEGATIVE
BUN SERPL-MCNC: 20 MG/DL (ref 5–25)
CALCIUM SERPL-MCNC: 8.8 MG/DL (ref 8.3–10.1)
CHLORIDE SERPL-SCNC: 98 MMOL/L (ref 100–108)
CLARITY UR: ABNORMAL
CO2 SERPL-SCNC: 24 MMOL/L (ref 21–32)
COLOR UR: YELLOW
CREAT SERPL-MCNC: 1.24 MG/DL (ref 0.6–1.3)
EOSINOPHIL # BLD AUTO: 0.1 THOUSAND/ΜL (ref 0–0.61)
EOSINOPHIL NFR BLD AUTO: 1 % (ref 0–6)
ERYTHROCYTE [DISTWIDTH] IN BLOOD BY AUTOMATED COUNT: 13.3 % (ref 11.6–15.1)
FLUAV AG SPEC QL IA: NEGATIVE
FLUBV AG SPEC QL IA: NEGATIVE
GFR SERPL CREATININE-BSD FRML MDRD: 39 ML/MIN/1.73SQ M
GLUCOSE SERPL-MCNC: 112 MG/DL (ref 65–140)
GLUCOSE UR STRIP-MCNC: NEGATIVE MG/DL
HCT VFR BLD AUTO: 30.9 % (ref 34.8–46.1)
HGB BLD-MCNC: 9.9 G/DL (ref 11.5–15.4)
HGB UR QL STRIP.AUTO: NEGATIVE
IMM GRANULOCYTES # BLD AUTO: 0.04 THOUSAND/UL (ref 0–0.2)
IMM GRANULOCYTES NFR BLD AUTO: 0 % (ref 0–2)
KETONES UR STRIP-MCNC: NEGATIVE MG/DL
LACTATE SERPL-SCNC: 1.2 MMOL/L (ref 0.5–2)
LEUKOCYTE ESTERASE UR QL STRIP: ABNORMAL
LYMPHOCYTES # BLD AUTO: 1.69 THOUSANDS/ΜL (ref 0.6–4.47)
LYMPHOCYTES NFR BLD AUTO: 19 % (ref 14–44)
MCH RBC QN AUTO: 29.8 PG (ref 26.8–34.3)
MCHC RBC AUTO-ENTMCNC: 32 G/DL (ref 31.4–37.4)
MCV RBC AUTO: 93 FL (ref 82–98)
MONOCYTES # BLD AUTO: 1.14 THOUSAND/ΜL (ref 0.17–1.22)
MONOCYTES NFR BLD AUTO: 13 % (ref 4–12)
NEUTROPHILS # BLD AUTO: 6.14 THOUSANDS/ΜL (ref 1.85–7.62)
NEUTS SEG NFR BLD AUTO: 67 % (ref 43–75)
NITRITE UR QL STRIP: POSITIVE
NON-SQ EPI CELLS URNS QL MICRO: ABNORMAL /HPF
NRBC BLD AUTO-RTO: 0 /100 WBCS
P AXIS: 53 DEGREES
PH UR STRIP.AUTO: 6 [PH] (ref 4.5–8)
PLATELET # BLD AUTO: 187 THOUSANDS/UL (ref 149–390)
PMV BLD AUTO: 9.7 FL (ref 8.9–12.7)
POTASSIUM SERPL-SCNC: 4.3 MMOL/L (ref 3.5–5.3)
PR INTERVAL: 134 MS
PROT SERPL-MCNC: 7.2 G/DL (ref 6.4–8.2)
PROT UR STRIP-MCNC: NEGATIVE MG/DL
QRS AXIS: 67 DEGREES
QRSD INTERVAL: 110 MS
QT INTERVAL: 396 MS
QTC INTERVAL: 408 MS
RBC # BLD AUTO: 3.32 MILLION/UL (ref 3.81–5.12)
RBC #/AREA URNS AUTO: ABNORMAL /HPF
SODIUM SERPL-SCNC: 135 MMOL/L (ref 136–145)
SP GR UR STRIP.AUTO: 1.01 (ref 1–1.03)
T WAVE AXIS: -57 DEGREES
UROBILINOGEN UR QL STRIP.AUTO: 1 E.U./DL
VENTRICULAR RATE: 64 BPM
WBC # BLD AUTO: 9.13 THOUSAND/UL (ref 4.31–10.16)
WBC #/AREA URNS AUTO: ABNORMAL /HPF

## 2019-01-22 PROCEDURE — 93005 ELECTROCARDIOGRAM TRACING: CPT

## 2019-01-22 PROCEDURE — 99285 EMERGENCY DEPT VISIT HI MDM: CPT

## 2019-01-22 PROCEDURE — 87040 BLOOD CULTURE FOR BACTERIA: CPT | Performed by: EMERGENCY MEDICINE

## 2019-01-22 PROCEDURE — 87086 URINE CULTURE/COLONY COUNT: CPT | Performed by: EMERGENCY MEDICINE

## 2019-01-22 PROCEDURE — 87186 SC STD MICRODIL/AGAR DIL: CPT | Performed by: EMERGENCY MEDICINE

## 2019-01-22 PROCEDURE — 85025 COMPLETE CBC W/AUTO DIFF WBC: CPT | Performed by: EMERGENCY MEDICINE

## 2019-01-22 PROCEDURE — 87077 CULTURE AEROBIC IDENTIFY: CPT | Performed by: EMERGENCY MEDICINE

## 2019-01-22 PROCEDURE — 36415 COLL VENOUS BLD VENIPUNCTURE: CPT | Performed by: EMERGENCY MEDICINE

## 2019-01-22 PROCEDURE — 99223 1ST HOSP IP/OBS HIGH 75: CPT | Performed by: INTERNAL MEDICINE

## 2019-01-22 PROCEDURE — 80053 COMPREHEN METABOLIC PANEL: CPT | Performed by: EMERGENCY MEDICINE

## 2019-01-22 PROCEDURE — 96374 THER/PROPH/DIAG INJ IV PUSH: CPT

## 2019-01-22 PROCEDURE — 93010 ELECTROCARDIOGRAM REPORT: CPT | Performed by: INTERNAL MEDICINE

## 2019-01-22 PROCEDURE — 81001 URINALYSIS AUTO W/SCOPE: CPT | Performed by: EMERGENCY MEDICINE

## 2019-01-22 PROCEDURE — 71046 X-RAY EXAM CHEST 2 VIEWS: CPT

## 2019-01-22 PROCEDURE — 87631 RESP VIRUS 3-5 TARGETS: CPT | Performed by: EMERGENCY MEDICINE

## 2019-01-22 PROCEDURE — 83605 ASSAY OF LACTIC ACID: CPT | Performed by: EMERGENCY MEDICINE

## 2019-01-22 PROCEDURE — 96361 HYDRATE IV INFUSION ADD-ON: CPT

## 2019-01-22 RX ORDER — CALCIUM CARBONATE 500(1250)
1 TABLET ORAL
Status: DISCONTINUED | OUTPATIENT
Start: 2019-01-23 | End: 2019-01-28 | Stop reason: HOSPADM

## 2019-01-22 RX ORDER — PRAVASTATIN SODIUM 40 MG
40 TABLET ORAL DAILY
Status: DISCONTINUED | OUTPATIENT
Start: 2019-01-23 | End: 2019-01-27

## 2019-01-22 RX ORDER — SODIUM CHLORIDE 9 MG/ML
50 INJECTION, SOLUTION INTRAVENOUS CONTINUOUS
Status: DISCONTINUED | OUTPATIENT
Start: 2019-01-22 | End: 2019-01-27

## 2019-01-22 RX ORDER — DULOXETIN HYDROCHLORIDE 60 MG/1
60 CAPSULE, DELAYED RELEASE ORAL DAILY
Status: DISCONTINUED | OUTPATIENT
Start: 2019-01-23 | End: 2019-01-28 | Stop reason: HOSPADM

## 2019-01-22 RX ORDER — LOSARTAN POTASSIUM 50 MG/1
100 TABLET ORAL DAILY
Status: DISCONTINUED | OUTPATIENT
Start: 2019-01-23 | End: 2019-01-28 | Stop reason: HOSPADM

## 2019-01-22 RX ORDER — LUTEIN 10 MG
1 TABLET ORAL DAILY
Status: DISCONTINUED | OUTPATIENT
Start: 2019-01-23 | End: 2019-01-22 | Stop reason: RX

## 2019-01-22 RX ORDER — SENNOSIDES 8.6 MG
1 TABLET ORAL
Status: DISCONTINUED | OUTPATIENT
Start: 2019-01-22 | End: 2019-01-28 | Stop reason: HOSPADM

## 2019-01-22 RX ORDER — ECHINACEA PURPUREA EXTRACT 125 MG
2 TABLET ORAL 2 TIMES DAILY
Status: DISCONTINUED | OUTPATIENT
Start: 2019-01-22 | End: 2019-01-28 | Stop reason: HOSPADM

## 2019-01-22 RX ORDER — MINERAL OIL AND PETROLATUM 150; 830 MG/G; MG/G
OINTMENT OPHTHALMIC 3 TIMES DAILY
Status: DISCONTINUED | OUTPATIENT
Start: 2019-01-22 | End: 2019-01-28 | Stop reason: HOSPADM

## 2019-01-22 RX ORDER — TRAMADOL HYDROCHLORIDE 50 MG/1
50 TABLET ORAL EVERY 6 HOURS PRN
Status: DISCONTINUED | OUTPATIENT
Start: 2019-01-22 | End: 2019-01-28 | Stop reason: HOSPADM

## 2019-01-22 RX ORDER — ESTRADIOL 0.1 MG/G
1 CREAM VAGINAL DAILY
Status: DISCONTINUED | OUTPATIENT
Start: 2019-01-23 | End: 2019-01-28 | Stop reason: HOSPADM

## 2019-01-22 RX ORDER — LORATADINE 10 MG/1
10 TABLET ORAL DAILY
Status: DISCONTINUED | OUTPATIENT
Start: 2019-01-23 | End: 2019-01-28 | Stop reason: HOSPADM

## 2019-01-22 RX ORDER — FUROSEMIDE 20 MG/1
20 TABLET ORAL 3 TIMES WEEKLY
Status: DISCONTINUED | OUTPATIENT
Start: 2019-01-23 | End: 2019-01-28 | Stop reason: HOSPADM

## 2019-01-22 RX ORDER — LEVOTHYROXINE SODIUM 0.1 MG/1
100 TABLET ORAL
Status: DISCONTINUED | OUTPATIENT
Start: 2019-01-23 | End: 2019-01-27

## 2019-01-22 RX ORDER — TRIAMCINOLONE ACETONIDE 1 MG/G
CREAM TOPICAL 2 TIMES DAILY
Status: DISCONTINUED | OUTPATIENT
Start: 2019-01-22 | End: 2019-01-28 | Stop reason: HOSPADM

## 2019-01-22 RX ORDER — NIFEDIPINE 30 MG/1
60 TABLET, EXTENDED RELEASE ORAL DAILY
Status: DISCONTINUED | OUTPATIENT
Start: 2019-01-23 | End: 2019-01-28 | Stop reason: HOSPADM

## 2019-01-22 RX ORDER — OFLOXACIN 3 MG/ML
1 SOLUTION/ DROPS OPHTHALMIC 4 TIMES DAILY
Status: DISCONTINUED | OUTPATIENT
Start: 2019-01-22 | End: 2019-01-28 | Stop reason: HOSPADM

## 2019-01-22 RX ORDER — IPRATROPIUM BROMIDE AND ALBUTEROL SULFATE 2.5; .5 MG/3ML; MG/3ML
3 SOLUTION RESPIRATORY (INHALATION) EVERY 4 HOURS PRN
Status: DISCONTINUED | OUTPATIENT
Start: 2019-01-22 | End: 2019-01-26

## 2019-01-22 RX ORDER — FLUTICASONE PROPIONATE 50 MCG
1 SPRAY, SUSPENSION (ML) NASAL DAILY
Status: DISCONTINUED | OUTPATIENT
Start: 2019-01-23 | End: 2019-01-28 | Stop reason: HOSPADM

## 2019-01-22 RX ORDER — BENZONATATE 100 MG/1
100 CAPSULE ORAL 3 TIMES DAILY PRN
Status: DISCONTINUED | OUTPATIENT
Start: 2019-01-22 | End: 2019-01-28 | Stop reason: HOSPADM

## 2019-01-22 RX ORDER — POTASSIUM CHLORIDE 750 MG/1
10 TABLET, EXTENDED RELEASE ORAL 3 TIMES WEEKLY
Status: DISCONTINUED | OUTPATIENT
Start: 2019-01-23 | End: 2019-01-28 | Stop reason: HOSPADM

## 2019-01-22 RX ORDER — LORAZEPAM 0.5 MG/1
0.5 TABLET ORAL
Status: DISCONTINUED | OUTPATIENT
Start: 2019-01-22 | End: 2019-01-28 | Stop reason: HOSPADM

## 2019-01-22 RX ORDER — PANTOPRAZOLE SODIUM 40 MG/1
40 TABLET, DELAYED RELEASE ORAL 2 TIMES DAILY
Status: DISCONTINUED | OUTPATIENT
Start: 2019-01-22 | End: 2019-01-28 | Stop reason: HOSPADM

## 2019-01-22 RX ORDER — ASPIRIN 325 MG
325 TABLET ORAL DAILY
Status: DISCONTINUED | OUTPATIENT
Start: 2019-01-23 | End: 2019-01-28 | Stop reason: HOSPADM

## 2019-01-22 RX ORDER — DONEPEZIL HYDROCHLORIDE 5 MG/1
5 TABLET, FILM COATED ORAL
Status: DISCONTINUED | OUTPATIENT
Start: 2019-01-22 | End: 2019-01-28 | Stop reason: HOSPADM

## 2019-01-22 RX ORDER — LORAZEPAM 0.5 MG/1
0.5 TABLET ORAL EVERY 8 HOURS PRN
Status: DISCONTINUED | OUTPATIENT
Start: 2019-01-22 | End: 2019-01-28 | Stop reason: HOSPADM

## 2019-01-22 RX ORDER — ONDANSETRON 2 MG/ML
4 INJECTION INTRAMUSCULAR; INTRAVENOUS EVERY 6 HOURS PRN
Status: DISCONTINUED | OUTPATIENT
Start: 2019-01-22 | End: 2019-01-28 | Stop reason: HOSPADM

## 2019-01-22 RX ORDER — GUAIFENESIN 600 MG
600 TABLET, EXTENDED RELEASE 12 HR ORAL 2 TIMES DAILY PRN
Status: DISCONTINUED | OUTPATIENT
Start: 2019-01-22 | End: 2019-01-28 | Stop reason: HOSPADM

## 2019-01-22 RX ADMIN — DONEPEZIL HYDROCHLORIDE 5 MG: 5 TABLET, FILM COATED ORAL at 22:44

## 2019-01-22 RX ADMIN — SODIUM CHLORIDE 75 ML/HR: 0.9 INJECTION, SOLUTION INTRAVENOUS at 19:51

## 2019-01-22 RX ADMIN — CEFTRIAXONE SODIUM 1000 MG: 10 INJECTION, POWDER, FOR SOLUTION INTRAVENOUS at 16:59

## 2019-01-22 RX ADMIN — PANTOPRAZOLE SODIUM 40 MG: 40 TABLET, DELAYED RELEASE ORAL at 19:25

## 2019-01-22 RX ADMIN — SALINE NASAL SPRAY 2 SPRAY: 1.5 SOLUTION NASAL at 19:52

## 2019-01-22 RX ADMIN — METOPROLOL TARTRATE 25 MG: 25 TABLET, FILM COATED ORAL at 19:25

## 2019-01-22 RX ADMIN — SODIUM CHLORIDE 1000 ML: 0.9 INJECTION, SOLUTION INTRAVENOUS at 15:02

## 2019-01-22 RX ADMIN — WHITE PETROLATUM 57.7 %-MINERAL OIL 31.9 % EYE OINTMENT: at 22:47

## 2019-01-22 RX ADMIN — OFLOXACIN 1 DROP: 3 SOLUTION OPHTHALMIC at 22:47

## 2019-01-22 RX ADMIN — LORAZEPAM 0.5 MG: 0.5 TABLET ORAL at 22:44

## 2019-01-22 NOTE — ASSESSMENT & PLAN NOTE
· Patient has history of recurrent UTI  · IV Rocephin was started in the emergency room and will continue with this  · Follow-up results of the urine culture  · With complaints of having fever and low blood pressures in the skilled nursing facility, blood cultures as well as influenza RSV and PCR were also sent  Thus follow up the results  · Here, so far, no fever and vital signs were stable  Did not qualify for criteria for sepsis

## 2019-01-22 NOTE — ASSESSMENT & PLAN NOTE
· Recurrent  · According to the patient's daughter, this is because patient has a habit of touching her nose and sometimes her fingers inside her nose and then touching her eye  · According to the patient's daughter, patient recently had right eye redness with discharge, which improved with eyedrops with antibiotic  Thus will represcribe that eyedrop with antibiotic  · According to the patient's daughter, they followed up with the optometrist, however they were told that she needs to see an ophthalmologist   They were about to, however, patient ended up here  · As per daughter, 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  · Ophthalmology consult

## 2019-01-22 NOTE — ED PROVIDER NOTES
History  Chief Complaint   Patient presents with    Fever - 9 weeks to 74 years     Pt reports to ED vis EMS for a fever from [facility]  The fever reported was 102 5 and a BP of 78/50  Pt does not report any pain  Patient referred to the emergency department from a local nursing home with prior to arrival history of fever and hypotension  Patient was not given antipyretics and arrives with normal pressure and normal temperature  Patient denies any specific complaints  She does have a history of dementia but seems to be able to follow simple questions and simple commands and answers appropriately  For example she knows that she is at the hospital             Prior to Admission Medications   Prescriptions Last Dose Informant Patient Reported? Taking?    DULoxetine (CYMBALTA) 60 mg delayed release capsule  Self No Yes   Sig: Take 1 capsule (60 mg total) by mouth daily   Dentifrices ([address])  Self Yes Yes   Sig: Apply to teeth   LORazepam (ATIVAN) 0 5 mg tablet   No Yes   Sig: Take 1 tablet (0 5 mg total) by mouth every 8 (eight) hours as needed for anxiety for up to 10 days   LORazepam (ATIVAN) 0 5 mg tablet  Self No Yes   Sig: Take 1 tablet (0 5 mg total) by mouth daily at bedtime   Lutein 10 MG TABS  Self Yes Yes   Sig: Take 1 tablet by mouth daily     NIFEdipine (PROCARDIA XL) 60 mg 24 hr tablet  Self Yes Yes   aspirin 325 mg tablet  Self Yes Yes   Sig: Take 325 mg by mouth daily     benzocaine (ORAJEL) 10 % mucosal gel  Self No Yes   Sig: Apply 1 application to the mouth or throat 3 (three) times a day as needed for mucositis   benzonatate (TESSALON PERLES) 100 mg capsule  Self No Yes   Sig: Take 1 capsule (100 mg total) by mouth 3 (three) times a day as needed for cough   calcium carbonate (OS-TEDDY) 600 MG tablet  Self Yes Yes   Sig: Take 600 mg by mouth daily   carboxymethylcellulose (REFRESH LIQUIGEL) 1 % ophthalmic solution  Self Yes Yes   Sig: Apply 1 drop to eye 3 (three) times a day     cetirizine (ZyrTEC) 10 MG chewable tablet  Self No Yes   Sig: Chew 1 tablet (10 mg total) daily   donepezil (ARICEPT) 10 mg tablet  Self No Yes   Sig: Take 0 5 tablets (5 mg total) by mouth daily at bedtime Increased confusion on 10mg   estradiol (ESTRACE) 0 1 mg/g vaginal cream   No Yes   Sig: Insert 1 g into the vagina daily Do no insert - please apply a pea sized amount to the urethra/vagina Monday, Wednesday and Friday before bedtime   fluticasone (FLONASE) 50 mcg/act nasal spray  Self No Yes   Si spray into each nostril daily   furosemide (LASIX) 20 mg tablet  Self No Yes   Sig: Take one tab on Monday- Wednesday and friday   guaiFENesin (MUCINEX) 600 mg 12 hr tablet  Self No Yes   Sig: Take 1 tablet (600 mg total) by mouth 2 (two) times a day as needed for congestion   ipratropium-albuterol (COMBIVENT RESPIMAT) inhaler  Self No Yes   Sig: Inhale 2 puffs every 4 (four) hours as needed (dyspnea)   levothyroxine 100 mcg tablet  Self Yes Yes   loratadine (CLARITIN) 10 mg tablet  Self Yes Yes   Sig: Take by mouth   losartan (COZAAR) 100 MG tablet  Self No Yes   Sig: Take 1 tablet (100 mg total) by mouth daily   magnesium hydroxide (MILK OF MAGNESIA) 400 mg/5 mL oral suspension  Self Yes Yes   Sig: Take 30 mL by mouth daily as needed for constipation     metoprolol tartrate (LOPRESSOR) 50 mg tablet  Self No Yes   Sig: Take 0 5 tablets (25 mg total) by mouth 2 (two) times a day   ofloxacin (OCUFLOX) 0 3 % ophthalmic solution  Self No Yes   Sig: Administer 1 drop to the right eye 4 (four) times a day If not improving in 48 hrs need to schedule in with eye dr    pantoprazole (PROTONIX) 40 mg tablet  Self Yes Yes   Sig: Take 40 mg by mouth 2 (two) times a day     potassium chloride (K-DUR,KLOR-CON) 10 mEq tablet  Self No Yes   Sig: One tab daily on the days you take lasix   pravastatin (PRAVACHOL) 40 mg tablet  Self Yes Yes   Sig: Take 40 mg by mouth daily     sodium chloride (OCEAN) 0 65 % nasal spray  Self Yes Yes   Si sprays into each nostril 2 (two) times a day   traMADol (ULTRAM) 50 mg tablet  Self Yes Yes   Sig: Take 50 mg by mouth every 6 (six) hours as needed for moderate pain   triamcinolone (KENALOG) 0 1 % cream  Self Yes Yes   Sig: triamcinolone acetonide 0 1 % topical cream      Facility-Administered Medications: None       Past Medical History:   Diagnosis Date    Aspiration pneumonia (HCC)     Last Assessed:  17    Basal cell carcinoma of nose     Last Assessed:  17    Blind     blind right eye, 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, Last Assessed:  17    Dementia     Depression     Disease of thyroid gland     Fibromyalgia     Last Assessed:  17    Gait disturbance     GERD (gastroesophageal reflux disease)     Glaucoma     Hyperlipidemia     Hypertension     Osteopenia     Peripheral neuropathy     Last Assessed:  17    Polymyalgia rheumatica ([address]     Psychiatric disorder     depression    Renal insufficiency syndrome     Last Assessed:  17    Rheumatoid arthritis ([address] )     Stroke ([address] )     x2, Last Assessed:  17    [facility] lupus erythematosus [facility]     Vertigo     Vitamin D deficiency        Past Surgical History:   Procedure Laterality Date    EYE SURGERY      JOINT REPLACEMENT      left hip replacement, left shoulder replacement    SHOULDER SURGERY      Replacement       Family History   Problem Relation Age of Onset    Heart attack Mother     Diabetes Mother     Dementia Father     Coronary artery disease Father         cardiac disorder    Diabetes Sister     Uterine cancer Sister      I have reviewed and agree with the history as documented  Social History   Substance Use Topics    Smoking status: Never Smoker    Smokeless tobacco: Never Used    Alcohol use No        Review of Systems   Constitutional: Positive for fever  Negative for activity change, appetite change, chills, diaphoresis and fatigue  HENT: Negative  Negative for congestion, drooling, ear discharge, ear pain, facial swelling, nosebleeds, rhinorrhea, sinus pain, sinus pressure, sore throat, trouble swallowing and voice change  Eyes: Negative  Negative for photophobia and visual disturbance  Respiratory: Negative  Negative for chest tightness, shortness of breath, wheezing and stridor  Cardiovascular: Negative  Negative for chest pain, palpitations and leg swelling  Gastrointestinal: Negative  Negative for abdominal pain, blood in stool, constipation, nausea, rectal pain and vomiting  Endocrine: Negative  Genitourinary: Negative  Negative for difficulty urinating, dysuria, flank pain, frequency, hematuria, urgency, vaginal bleeding, vaginal discharge and vaginal pain  Musculoskeletal: Negative  Negative for back pain, neck pain and neck stiffness  Skin: Negative  Negative for rash and wound  Allergic/Immunologic: Negative  Neurological: Negative  Negative for dizziness, tremors, seizures, syncope, facial asymmetry, speech difficulty, weakness, light-headedness, numbness and headaches  Hematological: Negative  Does not bruise/bleed easily  Psychiatric/Behavioral: Negative  Negative for confusion  Physical Exam  Physical Exam   Constitutional: She is oriented to person, place, and time  She appears well-developed and well-nourished  HENT:   Head: Normocephalic and atraumatic  Right Ear: External ear normal    Left Ear: External ear normal    Mouth/Throat: Oropharynx is clear and moist    Eyes: Pupils are equal, round, and reactive to light  Conjunctivae and EOM are normal  Right eye exhibits no discharge  Left eye exhibits no discharge  Neck: Normal range of motion  Neck supple  Cardiovascular: Normal rate, regular rhythm, normal heart sounds and intact distal pulses  Pulmonary/Chest: Effort normal and breath sounds normal  No respiratory distress  She has no wheezes  She has no rales   She exhibits no tenderness  Abdominal: Soft  Bowel sounds are normal  She exhibits no distension and no mass  There is no tenderness  There is no rebound and no guarding  No hernia  Musculoskeletal: Normal range of motion  She exhibits no edema, tenderness or deformity  Neurological: She is alert and oriented to person, place, and time  She has normal reflexes  She displays normal reflexes  No cranial nerve deficit or sensory deficit  She exhibits normal muscle tone  Coordination normal    Skin: Skin is warm and dry  No rash noted  No erythema  No pallor  Psychiatric: She has a normal mood and affect  Her behavior is normal  Judgment and thought content normal    Nursing note and vitals reviewed  Vital Signs  ED Triage Vitals [01/22/19 1419]   Temperature Pulse Respirations Blood Pressure SpO2   98 3 °F (36 8 °C) 65 18 130/61 99 %      Temp Source Heart Rate Source Patient Position - Orthostatic VS BP Location FiO2 (%)   Oral Monitor Lying Right arm --      Pain Score       No Pain           Vitals:    01/22/19 1419   BP: 130/61   Pulse: 65   Patient Position - Orthostatic VS: Lying       Visual Acuity      ED Medications  Medications   ceftriaxone (ROCEPHIN) 1 g/50 mL in dextrose IVPB (1,000 mg Intravenous New Bag 1/22/19 1659)   sodium chloride 0 9 % bolus 1,000 mL (0 mL Intravenous Stopped 1/22/19 1602)       Diagnostic Studies  Results Reviewed     Procedure Component Value Units Date/Time    Urine Microscopic [[id number]]  (Abnormal) Collected:  01/22/19 1615    Lab Status:  Final result Specimen:  Urine from Urine, Straight Cath Updated:  01/22/19 1636     RBC, UA 0-5 /hpf      WBC, UA 10-20 (A) /hpf      Epithelial Cells Occasional /hpf      Bacteria, UA Moderate (A) /hpf      AMORPH URATES Occasional /hpf     Urine culture [[id number]] Collected:  01/22/19 1615    Lab Status:   In process Specimen:  Urine from Urine, Straight Cath Updated:  01/22/19 1636    UA w Reflex to Microscopic w Reflex to Culture [[id number]]  (Abnormal) Collected:  01/22/19 1615    Lab Status:  Final result Specimen:  Urine from Urine, Straight Cath Updated:  01/22/19 1626     Color, UA Yellow     Clarity, UA Slightly Cloudy     Specific Gravity, UA 1 015     pH, UA 6 0     Leukocytes, UA Moderate (A)     Nitrite, UA Positive (A)     Protein, UA Negative mg/dl      Glucose, UA Negative mg/dl      Ketones, UA Negative mg/dl      Urobilinogen, UA 1 0 E U /dl      Bilirubin, UA Negative     Blood, UA Negative    Rapid Influenza Screen with Reflex PCR [[id number]]  (Normal) Collected:  01/22/19 1505    Lab Status:  Final result Specimen:  Nasopharyngeal from Nasopharyngeal Swab Updated:  01/22/19 1537     Rapid Influenza A Ag Negative     Rapid Influenza B Ag Negative    INFLUENZA A/B AND RSV, PCR [[id number]] Collected:  01/22/19 1505    Lab Status: In process Specimen:  Nasopharyngeal from Nasopharyngeal Swab Updated:  01/22/19 1537    Blood culture #1 [[id number]] Collected:  01/22/19 1520    Lab Status: In process Specimen:  Blood from Arm, Left Updated:  01/22/19 1524    Lactic acid, plasma [[id number]]  (Normal) Collected:  01/22/19 1447    Lab Status:  Final result Specimen:  Blood from Arm, Right Updated:  01/22/19 1520     LACTIC ACID 1 2 mmol/L     Narrative:         Result may be elevated if tourniquet was used during collection      Comprehensive metabolic panel [[id number]]  (Abnormal) Collected:  01/22/19 1447    Lab Status:  Final result Specimen:  Blood from Arm, Right Updated:  01/22/19 1518     Sodium 135 (L) mmol/L      Potassium 4 3 mmol/L      Chloride 98 (L) mmol/L      CO2 24 mmol/L      ANION GAP 13 mmol/L      BUN 20 mg/dL      Creatinine 1 24 mg/dL      Glucose 112 mg/dL      Calcium 8 8 mg/dL      AST 37 U/L      ALT 33 U/L      Alkaline Phosphatase 127 (H) U/L      Total Protein 7 2 g/dL      Albumin 2 9 (L) g/dL      Total Bilirubin 0 50 mg/dL      eGFR 39 ml/min/1 73sq m     Narrative:         National Kidney Disease Education Program recommendations are as follows:  GFR calculation is accurate only with a steady state creatinine  Chronic Kidney disease less than 60 ml/min/1 73 sq  meters  Kidney failure less than 15 ml/min/1 73 sq  meters  Blood culture #2 [[id number]] Collected:  01/22/19 1447    Lab Status: In process Specimen:  Blood from Arm, Right Updated:  01/22/19 1514    CBC and differential [[id number]]  (Abnormal) Collected:  01/22/19 1447    Lab Status:  Final result Specimen:  Blood from Arm, Right Updated:  01/22/19 1500     WBC 9 13 Thousand/uL      RBC 3 32 (L) Million/uL      Hemoglobin 9 9 (L) g/dL      Hematocrit 30 9 (L) %      MCV 93 fL      MCH 29 8 pg      MCHC 32 0 g/dL      RDW 13 3 %      MPV 9 7 fL      Platelets 238 Thousands/uL      nRBC 0 /100 WBCs      Neutrophils Relative 67 %      Immat GRANS % 0 %      Lymphocytes Relative 19 %      Monocytes Relative 13 (H) %      Eosinophils Relative 1 %      Basophils Relative 0 %      Neutrophils Absolute 6 14 Thousands/µL      Immature Grans Absolute 0 04 Thousand/uL      Lymphocytes Absolute 1 69 Thousands/µL      Monocytes Absolute 1 14 Thousand/µL      Eosinophils Absolute 0 10 Thousand/µL      Basophils Absolute 0 02 Thousands/µL                  XR chest 2 views   Final Result by Atif Palomino MD (01/22 2705)      No acute cardiopulmonary disease  Pulmonary hyperinflation in keeping with COPD              Workstation performed: [id number]                    Procedures  ECG 12 Lead Documentation  Date/Time: 1/22/2019 3:16 PM  Performed by: Hai Bourne  Authorized by: Hai Bourne     ECG reviewed by me, the ED Provider: yes    Patient location:  ED  Previous ECG:     Previous ECG:  Compared to current    Similarity:  Changes noted  Interpretation:     Interpretation: abnormal    Rate:     ECG rate:  64    ECG rate assessment: normal    Rhythm:     Rhythm: sinus rhythm    Ectopy:     Ectopy: none    QRS:     QRS axis:  Normal QRS intervals:  Normal  Conduction:     Conduction: abnormal      Abnormal conduction: incomplete RBBB    ST segments:     ST segments:  Non-specific  T waves:     T waves: non-specific             Phone Contacts  ED Phone Contact    ED Course  ED Course as of Jan 22 1700   Tue Jan 22, 2019   1658 This caseWas discussed with Dr Robins who has accepted this patient to the hospitalist service  IV antibiotics ordered  Blood in urine cultures pending  MDM  CritCare Time    Disposition  Final diagnoses:   Urinary tract infection     Time reflects when diagnosis was documented in both MDM as applicable and the Disposition within this note     Time User Action Codes Description Comment    1/22/2019  4:50 PM Jean Claude Guy Catching Add [N39 0] Urinary tract infection       ED Disposition     ED Disposition Condition Comment    Admit  Case was discussed with Dr Ericka Fisher and the patient's admission status was agreed to be Admission Status: inpatient status to the service of Dr Azul Estrada    None         Patient's Medications   Discharge Prescriptions    No medications on file     No discharge procedures on file      ED Provider  Electronically Signed by           Tahir Johnson MD  01/22/19 8363

## 2019-01-22 NOTE — H&P
H&P- Nura Erickson 4/27/1931, 80 y o  female MRN: [id number]    Unit/Bed#: -01 Encounter: [id number]    Primary Care Provider: Willy Lyle DO   Date and time admitted to hospital: 1/22/2019  2:13 PM        Recurrent UTI   Assessment & Plan    · Patient has history of recurrent UTI  · IV Rocephin was started in the emergency room and will continue with this  · Follow-up results of the urine culture  · With complaints of having fever and low blood pressures in the skilled nursing facility, blood cultures as well as influenza RSV and PCR were also sent  Thus follow up the results  · Here, so far, no fever and vital signs were stable  Did not qualify for criteria for sepsis  Conjunctivitis of right eye   Assessment & Plan    · Recurrent  · According to the patient's daughter, this is because patient has a habit of touching her nose and sometimes her fingers inside her nose and then touching her eye  · According to the patient's daughter, patient recently had right eye redness with discharge, which improved with eyedrops with antibiotic  Thus will represcribe that eyedrop with antibiotic  · According to the patient's daughter, they followed up with the optometrist, however they were told that she needs to see an ophthalmologist   They were about to, however, patient ended up here  · As per daughter, 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  · Ophthalmology consult  Anemia   Assessment & Plan    · Stable, likely chronic based on my review of patient's previous CBCs  In fact, improving as compared to previous CBC  · For further workup and management if this worsens significantly  Dementia   Assessment & Plan    · Continue Aricept  · Supportive care  · According to the patient's daughter at bedside, at baseline, patient will usually say I am dying             VTE Prophylaxis: Enoxaparin (Lovenox)  / sequential compression device   Code Status:  Full code    POLST: POLST form is not discussed and not completed at this time  Discussion with family:  I spoke to the patient's daughter at bedside  I did discuss our findings and plans  I answered questions and concerns    Anticipated Length of Stay:  Patient will be admitted on an Inpatient basis with an anticipated length of stay of  greater than 2 midnights  Justification for Hospital Stay:  Due to the above findings and plans  Total Time for Visit, including Counseling / Coordination of Care: 1 hour  Greater than 50% of this total time spent on direct patient counseling and coordination of care  Chief Complaint:   Was sent here due to an episode of fever as well as hypotension at the [facility] nursing facility  History of Present Illness:    Neha Hernadez is a 80 y o  female who presents to the emergency room [employer] as patient had an episode of fever and hypotension in the skilled nursing facility  According to the emergency room physician's notes, patient had an episode of fever at 102 5° F and a blood pressure of 78/50 at the [facility] nursing facility, and thus patient was sent here for further evaluation and management  This was also verified from the patient's daughter at bedside and she verified the temperature but unaware of the exact blood pressure  However, when patient came up here in the emergency room, her vital signs were normal   There were no fever and blood pressures were normal   Patient has significant history of dementia and thus, history is unreliable  According to the patient's daughter, patient has urinary continence  However, no noted dysuria or any pain on urination  Patient has history of recurrent UTI  In the emergency room, urinalysis was sent and it was pointing to a UTI, thus IV Rocephin was started  According to the patient's daughter, patient also has history of recurrent pneumonia as well as recurrent bacterial conjunctivitis    In fact, patient just had a course of eyedrops with antibiotic and her conjunctivitis had resolved  However, patient again had been having right eye redness and with discharge  According to the patient's daughter, this may be due to the fact that patient has a habit of touching her nose and sometimes putting her fingers inside her nose and then touching her eye  According to her, they went to an optometrist, however, they were told that what she needs is an ophthalmologist   They were about to see ophthalmologist, however, patient ended up here  According the patient's daughter, 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  Presently, patient does not have any cough or colds  When asked, about any symptoms, patient just keeps repeating herself, that she is cold and that I am dying    According to the patient's daughter, she usually would verbalize I am dying every now and then  No other noted symptoms or complaints other the ones mentioned above      Review of Systems:    Review of Systems     Ten point review systems done and they were negative except for the ones I mentioned in my history of present illness  No chest pains, shortness of breath, abdominal pains or any nausea or vomiting  No headaches or dizziness        Past Medical and Surgical History:     Past Medical History:   Diagnosis Date    [facility]     Last Assessed:  7/18/17    Basal cell carcinoma of nose     Last Assessed:  4/12/17    Blind     blind right eye, 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, Last Assessed:  7/18/17    Dementia     Depression     Disease of thyroid gland     Fibromyalgia     Last Assessed:  7/18/17    Gait disturbance     GERD (gastroesophageal reflux disease)     Glaucoma     Hyperlipidemia     Hypertension     Osteopenia     Peripheral neuropathy     Last Assessed:  4/12/17    [facility]     Psychiatric disorder     depression    Renal insufficiency syndrome     Last Assessed:  4/13/17    Rheumatoid arthritis ([facility] )     Stroke [facility])     x2, Last Assessed:  7/18/17    Systemic lupus erythematosus ([facility] )     Vertigo     Vitamin D deficiency        Past Surgical History:   Procedure Laterality Date    EYE SURGERY      JOINT REPLACEMENT      left hip replacement, left shoulder replacement    SHOULDER SURGERY      Replacement       Meds/Allergies:    Prior to Admission medications    Medication Sig Start Date End Date Taking?  Authorizing Provider   aspirin 325 mg tablet Take 325 mg by mouth daily     Yes Historical Provider, MD   benzocaine (ORAJEL) 10 % mucosal gel Apply 1 application to the mouth or throat 3 (three) times a day as needed for mucositis 7/16/18  Yes Sabina Gum, DO   benzonatate (TESSALON PERLES) 100 mg capsule Take 1 capsule (100 mg total) by mouth 3 (three) times a day as needed for cough 11/12/18  Yes Juan Herrera MD   calcium carbonate (OS-TEDDY) 600 MG tablet Take 600 mg by mouth daily   Yes Historical Provider, MD   carboxymethylcellulose (REFRESH LIQUIGEL) 1 % ophthalmic solution Apply 1 drop to eye 3 (three) times a day   8/24/17  Yes Historical Provider, MD   cetirizine (ZyrTEC) 10 MG chewable tablet Chew 1 tablet (10 mg total) daily 8/22/18  Yes Kitty Main PA-C   Dentifrices ([facility]) Apply to teeth   Yes Historical Provider, MD   donepezil (ARICEPT) 10 mg tablet Take 0 5 tablets (5 mg total) by mouth daily at bedtime Increased confusion on 10mg 8/16/18  Yes Sabina Gum, DO   DULoxetine (CYMBALTA) 60 mg delayed release capsule Take 1 capsule (60 mg total) by mouth daily 4/17/18  Yes Ruth Hargrove PA-C   estradiol (ESTRACE) 0 1 mg/g vaginal cream Insert 1 g into the vagina daily Do no insert - please apply a pea sized amount to the urethra/vagina Monday, Wednesday and Friday before bedtime 1/7/19  Yes Shailesh Santoyo MD   fluticasone [facility]) 50 mcg/act nasal spray 1 spray into each nostril daily 8/22/18  Yes Kitty Main PA-C   furosemide (LASIX) 20 mg tablet Take one tab on Monday- Wednesday and friday 9/6/18  Yes Tash Mehta, DO   guaiFENesin (MUCINEX) 600 mg 12 hr tablet Take 1 tablet (600 mg total) by mouth 2 (two) times a day as needed for congestion 5/18/18  Yes Tash Mehta, DO   ipratropium-albuterol (COMBIVENT RESPIMAT) inhaler Inhale 2 puffs every 4 (four) hours as needed (dyspnea) 5/18/18  Yes Tash Mehta, DO   levothyroxine 100 mcg tablet  12/22/17  Yes Historical Provider, MD   loratadine (CLARITIN) 10 mg tablet Take by mouth   Yes Historical Provider, MD   LORazepam (ATIVAN) 0 5 mg tablet Take 1 tablet (0 5 mg total) by mouth every 8 (eight) hours as needed for anxiety for up to 10 days 11/21/18 1/22/19 Yes Tash Mehta, DO   LORazepam (ATIVAN) 0 5 mg tablet Take 1 tablet (0 5 mg total) by mouth daily at bedtime 12/28/18  Yes Tash Mehta, DO   losartan (COZAAR) 100 MG tablet Take 1 tablet (100 mg total) by mouth daily 8/16/18  Yes Tash Mehta, DO   Lutein 10 MG TABS Take 1 tablet by mouth daily     Yes Historical Provider, MD   magnesium hydroxide (MILK OF MAGNESIA) 400 mg/5 mL oral suspension Take 30 mL by mouth daily as needed for constipation     Yes Historical Provider, MD   metoprolol tartrate (LOPRESSOR) 50 mg tablet Take 0 5 tablets (25 mg total) by mouth 2 (two) times a day 8/16/18  Yes Tash Mehta, DO   NIFEdipine (PROCARDIA XL) 60 mg 24 hr tablet  6/25/18  Yes Historical Provider, MD   ofloxacin (OCUFLOX) 0 3 % ophthalmic solution Administer 1 drop to the right eye 4 (four) times a day If not improving in 48 hrs need to schedule in with eye dr  12/28/18  Yes Tash Mehta, DO   pantoprazole (PROTONIX) 40 mg tablet Take 40 mg by mouth 2 (two) times a day     Yes Historical Provider, MD   potassium chloride (K-DUR,KLOR-CON) 10 mEq tablet One tab daily on the days you take lasix 8/16/18  Yes Tash Mehta, DO   pravastatin (PRAVACHOL) 40 mg tablet Take 40 mg by mouth daily     Yes Historical Provider, MD   sodium chloride (OCEAN) 0 65 % nasal spray 2 sprays into each nostril 2 (two) times a day   Yes Historical Provider, MD   traMADol (ULTRAM) 50 mg tablet Take 50 mg by mouth every 6 (six) hours as needed for moderate pain   Yes Historical Provider, MD   triamcinolone (KENALOG) 0 1 % cream triamcinolone acetonide 0 1 % topical cream   Yes Historical Provider, MD     Medication list was reviewed    Allergies: Allergies   Allergen Reactions    Acetaminophen     Golimumab      Other reaction(s): dedrick colored stool    Lidocaine Other (See Comments)    Neurontin [Gabapentin]     Nortriptyline Tremor    Prasterone      Other reaction(s): pruitis    Tricyclic Antidepressants     Leflunomide Rash    Sulfasalazine Rash       Social History:     Marital Status: /Civil Union     History   Alcohol Use No     History   Smoking Status    Never Smoker   Smokeless Tobacco    Never Used     History   Drug Use No       Family History:    Family History   Problem Relation Age of Onset    Heart attack Mother     Diabetes Mother     Dementia Father     Coronary artery disease Father         cardiac disorder    Diabetes Sister     Uterine cancer Sister        Physical Exam:     Vitals:   Blood Pressure: 122/67 (01/22/19 1758)  Pulse: 79 (01/22/19 1758)  Temperature: 97 9 °F (36 6 °C) (01/22/19 1758)  Temp Source: Oral (01/22/19 1758)  Respirations: 18 (01/22/19 1758)  Weight - Scale: 64 2 kg (141 lb 8 6 oz) (01/22/19 1419)  SpO2: 94 % (01/22/19 1758)    Physical Exam   Constitutional: No distress  HENT:   Head: Normocephalic and atraumatic  Mouth/Throat: No oropharyngeal exudate  Dry oral mucosa, dry tongue  Positive for a dry scab on patient's tip of the nose  No surrounding erythema  Eyes: Right eye exhibits no discharge  Left eye exhibits no discharge  No scleral icterus  Positive for hyperemic palpebral conjunctivae on patient's right eye slight redness on patient's right eyelids  Neck: No JVD present   No tracheal deviation present  Cardiovascular: Normal rate, regular rhythm and normal heart sounds  Exam reveals no gallop and no friction rub  No murmur heard  Pulmonary/Chest: Effort normal and breath sounds normal  No stridor  No respiratory distress  She has no wheezes  She has no rales  Abdominal: Soft  Bowel sounds are normal  She exhibits no distension  There is no tenderness  There is no rebound and no guarding  Neurological:   Patient is asleep, but awakens easily to name calling  Patient at times will mention "I am dying "  According to the patient's daughter, this is baseline for her (history of dementia)  Patient does not answer most of my questions and would always tell me that she is cold and wants the blanket to be placed the hold off her body up to her neck  Patient moves all extremities spontaneously  No facial droop  Skin: Skin is warm  No rash noted  She is not diaphoretic  No erythema  No pallor  Vitals reviewed  Additional Data:     Lab Results: I have personally reviewed pertinent reports  Results from last 7 days  Lab Units 01/22/19  1447   WBC Thousand/uL 9 13   HEMOGLOBIN g/dL 9 9*   HEMATOCRIT % 30 9*   PLATELETS Thousands/uL 187   NEUTROS PCT % 67   LYMPHS PCT % 19   MONOS PCT % 13*   EOS PCT % 1       Results from last 7 days  Lab Units 01/22/19  1447   SODIUM mmol/L 135*   POTASSIUM mmol/L 4 3   CHLORIDE mmol/L 98*   CO2 mmol/L 24   BUN mg/dL 20   CREATININE mg/dL 1 24   ANION GAP mmol/L 13   CALCIUM mg/dL 8 8   ALBUMIN g/dL 2 9*   TOTAL BILIRUBIN mg/dL 0 50   ALK PHOS U/L 127*   ALT U/L 33   AST U/L 37   GLUCOSE RANDOM mg/dL 112                   Results from last 7 days  Lab Units 01/22/19  1447   LACTIC ACID mmol/L 1 2       Imaging: I have personally reviewed pertinent reports  XR chest 2 views   Final Result by Genevieve Shirley MD (01/22 1553)      No acute cardiopulmonary disease  Pulmonary hyperinflation in keeping with COPD  Workstation performed: [id number]             EKG, Pathology, and Other Studies Reviewed on Admission:   · EKG:  Normal sinus rhythm at 64 per minute with incomplete right bundle branch block and nonspecific ST and T-wave abnormality  This was read by our cardiologist     Allscripts / [facility] Records Reviewed: Yes     ** Please Note: This note has been constructed using a voice recognition system   **

## 2019-01-22 NOTE — ASSESSMENT & PLAN NOTE
· Stable, likely chronic based on my review of patient's previous CBCs  In fact, improving as compared to previous CBC  · For further workup and management if this worsens significantly

## 2019-01-22 NOTE — ASSESSMENT & PLAN NOTE
· Continue Aricept  · Supportive care  · According to the patient's daughter at bedside, at baseline, patient will usually say I am dying  

## 2019-01-23 LAB
ALBUMIN SERPL BCP-MCNC: 2.6 G/DL (ref 3.5–5)
ALP SERPL-CCNC: 107 U/L (ref 46–116)
ALT SERPL W P-5'-P-CCNC: 26 U/L (ref 12–78)
ANION GAP SERPL CALCULATED.3IONS-SCNC: 8 MMOL/L (ref 4–13)
AST SERPL W P-5'-P-CCNC: 25 U/L (ref 5–45)
BILIRUB SERPL-MCNC: 0.6 MG/DL (ref 0.2–1)
BUN SERPL-MCNC: 14 MG/DL (ref 5–25)
CALCIUM SERPL-MCNC: 8.5 MG/DL (ref 8.3–10.1)
CHLORIDE SERPL-SCNC: 104 MMOL/L (ref 100–108)
CO2 SERPL-SCNC: 22 MMOL/L (ref 21–32)
CREAT SERPL-MCNC: 0.99 MG/DL (ref 0.6–1.3)
ERYTHROCYTE [DISTWIDTH] IN BLOOD BY AUTOMATED COUNT: 13.5 % (ref 11.6–15.1)
FLUAV AG SPEC QL: NORMAL
FLUBV AG SPEC QL: NORMAL
GFR SERPL CREATININE-BSD FRML MDRD: 51 ML/MIN/1.73SQ M
GLUCOSE SERPL-MCNC: 91 MG/DL (ref 65–140)
HCT VFR BLD AUTO: 27.8 % (ref 34.8–46.1)
HGB BLD-MCNC: 9.2 G/DL (ref 11.5–15.4)
MCH RBC QN AUTO: 30 PG (ref 26.8–34.3)
MCHC RBC AUTO-ENTMCNC: 33.1 G/DL (ref 31.4–37.4)
MCV RBC AUTO: 91 FL (ref 82–98)
PLATELET # BLD AUTO: 168 THOUSANDS/UL (ref 149–390)
PMV BLD AUTO: 9.8 FL (ref 8.9–12.7)
POTASSIUM SERPL-SCNC: 4.2 MMOL/L (ref 3.5–5.3)
PROT SERPL-MCNC: 6.5 G/DL (ref 6.4–8.2)
RBC # BLD AUTO: 3.07 MILLION/UL (ref 3.81–5.12)
RSV B RNA SPEC QL NAA+PROBE: NORMAL
SODIUM SERPL-SCNC: 134 MMOL/L (ref 136–145)
TSH SERPL DL<=0.05 MIU/L-ACNC: 0.1 UIU/ML (ref 0.36–3.74)
WBC # BLD AUTO: 8.45 THOUSAND/UL (ref 4.31–10.16)

## 2019-01-23 PROCEDURE — 99232 SBSQ HOSP IP/OBS MODERATE 35: CPT | Performed by: INTERNAL MEDICINE

## 2019-01-23 PROCEDURE — 85027 COMPLETE CBC AUTOMATED: CPT | Performed by: INTERNAL MEDICINE

## 2019-01-23 PROCEDURE — 84443 ASSAY THYROID STIM HORMONE: CPT | Performed by: INTERNAL MEDICINE

## 2019-01-23 PROCEDURE — 80053 COMPREHEN METABOLIC PANEL: CPT | Performed by: INTERNAL MEDICINE

## 2019-01-23 RX ADMIN — LEVOTHYROXINE SODIUM 100 MCG: 100 TABLET ORAL at 06:15

## 2019-01-23 RX ADMIN — POTASSIUM CHLORIDE 10 MEQ: 750 TABLET, EXTENDED RELEASE ORAL at 08:45

## 2019-01-23 RX ADMIN — SALINE NASAL SPRAY 2 SPRAY: 1.5 SOLUTION NASAL at 17:16

## 2019-01-23 RX ADMIN — FUROSEMIDE 20 MG: 20 TABLET ORAL at 08:46

## 2019-01-23 RX ADMIN — ASPIRIN 325 MG: 325 TABLET ORAL at 08:45

## 2019-01-23 RX ADMIN — OFLOXACIN 1 DROP: 3 SOLUTION OPHTHALMIC at 08:54

## 2019-01-23 RX ADMIN — DULOXETINE HYDROCHLORIDE 60 MG: 60 CAPSULE, DELAYED RELEASE ORAL at 08:45

## 2019-01-23 RX ADMIN — ENOXAPARIN SODIUM 30 MG: 30 INJECTION SUBCUTANEOUS at 08:47

## 2019-01-23 RX ADMIN — WHITE PETROLATUM 57.7 %-MINERAL OIL 31.9 % EYE OINTMENT 1 APPLICATION: at 08:54

## 2019-01-23 RX ADMIN — OFLOXACIN 1 DROP: 3 SOLUTION OPHTHALMIC at 11:56

## 2019-01-23 RX ADMIN — LORATADINE 10 MG: 10 TABLET ORAL at 08:46

## 2019-01-23 RX ADMIN — ESTRADIOL 1 G: 0.1 CREAM VAGINAL at 08:57

## 2019-01-23 RX ADMIN — OFLOXACIN 1 DROP: 3 SOLUTION OPHTHALMIC at 21:18

## 2019-01-23 RX ADMIN — SALINE NASAL SPRAY 2 SPRAY: 1.5 SOLUTION NASAL at 08:55

## 2019-01-23 RX ADMIN — WHITE PETROLATUM 57.7 %-MINERAL OIL 31.9 % EYE OINTMENT: at 17:16

## 2019-01-23 RX ADMIN — NIFEDIPINE 60 MG: 30 TABLET, FILM COATED, EXTENDED RELEASE ORAL at 08:45

## 2019-01-23 RX ADMIN — METOPROLOL TARTRATE 25 MG: 25 TABLET, FILM COATED ORAL at 17:10

## 2019-01-23 RX ADMIN — FLUTICASONE PROPIONATE 1 SPRAY: 50 SPRAY, METERED NASAL at 08:54

## 2019-01-23 RX ADMIN — PANTOPRAZOLE SODIUM 40 MG: 40 TABLET, DELAYED RELEASE ORAL at 17:10

## 2019-01-23 RX ADMIN — LORAZEPAM 0.5 MG: 0.5 TABLET ORAL at 21:17

## 2019-01-23 RX ADMIN — WHITE PETROLATUM 57.7 %-MINERAL OIL 31.9 % EYE OINTMENT: at 21:18

## 2019-01-23 RX ADMIN — SODIUM CHLORIDE 75 ML/HR: 0.9 INJECTION, SOLUTION INTRAVENOUS at 08:59

## 2019-01-23 RX ADMIN — PRAVASTATIN SODIUM 40 MG: 40 TABLET ORAL at 08:45

## 2019-01-23 RX ADMIN — LOSARTAN POTASSIUM 100 MG: 50 TABLET, FILM COATED ORAL at 08:46

## 2019-01-23 RX ADMIN — SODIUM CHLORIDE 75 ML/HR: 0.9 INJECTION, SOLUTION INTRAVENOUS at 20:53

## 2019-01-23 RX ADMIN — DONEPEZIL HYDROCHLORIDE 5 MG: 5 TABLET, FILM COATED ORAL at 21:17

## 2019-01-23 RX ADMIN — PANTOPRAZOLE SODIUM 40 MG: 40 TABLET, DELAYED RELEASE ORAL at 08:46

## 2019-01-23 RX ADMIN — METOPROLOL TARTRATE 25 MG: 25 TABLET, FILM COATED ORAL at 08:45

## 2019-01-23 RX ADMIN — OFLOXACIN 1 DROP: 3 SOLUTION OPHTHALMIC at 17:16

## 2019-01-23 RX ADMIN — Medication 1000 MG: at 17:17

## 2019-01-23 RX ADMIN — CALCIUM 1 TABLET: 500 TABLET ORAL at 08:46

## 2019-01-23 NOTE — PLAN OF CARE
INFECTION - ADULT     Absence or prevention of progression during hospitalization Progressing        Prexisting or High Potential for Compromised Skin Integrity     Skin integrity is maintained or improved Progressing        SAFETY ADULT     Patient will remain free of falls Progressing     Maintain or return to baseline ADL function Progressing     Maintain or return mobility status to optimal level Progressing

## 2019-01-23 NOTE — UTILIZATION REVIEW
Initial Clinical Review    Admission: Date/Time/Statement: 1/22/19 @ 1700     Orders Placed This Encounter   Procedures    Inpatient Admission (expected length of stay for this patient is greater than two midnights)     Standing Status:   Standing     Number of Occurrences:   1     Order Specific Question:   Admitting Physician     Answer:   Max Ash [[id number]]     Order Specific Question:   Level of Care     Answer:   Med Surg [[id number]]     Order Specific Question:   Estimated length of stay     Answer:   More than 2 Midnights     Order Specific Question:   Certification     Answer:   I certify that inpatient services are medically necessary for this patient for a duration of greater than two midnights  See H&P and MD Progress Notes for additional information about the patient's course of treatment  ED: Date/Time/Mode of Arrival:   ED Arrival Information     Expected Arrival Acuity Means of Arrival Escorted By Service Admission Type    - 1/22/2019 14:13 [facility]gent [facility] Hospitalist Urgent    Arrival Complaint    -          Chief Complaint:   Chief Complaint   Patient presents with    Fever - 9 weeks to 74 years     Pt reports to ED vis EMS for a fever from [facility]  The fever reported was 102 5 and a BP of 78/50  Pt does not report any pain  History of Illness: 80 y o  female who presents to the emergency room [address] as patient had an episode of fever and hypotension in the skilled nursing facility  According to the emergency room physician's notes, patient had an episode of fever at 102 5° F and a blood pressure of 78/50 at the skilled nursing facility, and thus patient was sent here for further evaluation and management  However, when patient came up here in the emergency room, her vital signs were normal     Patient has significant history of dementia and thus, history is unreliable    According to the patient's daughter, patient has urinary continence  However, no noted dysuria or any pain on urination  Patient has history of recurrent UTI  In the emergency room, urinalysis was sent and it was pointing to a UTI, thus IV Rocephin was started  According to the patient's daughter, patient also has history of recurrent pneumonia as well as recurrent bacterial conjunctivitis  In fact, patient just had a course of eyedrops with antibiotic and her conjunctivitis had resolved  However, patient again had been having right eye redness and with discharge  According to the patient's daughter, this may be due to the fact that patient has a habit of touching her nose and sometimes putting her fingers inside her nose and then touching her eye  According the patient's daughter, 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  ED Vital Signs:   ED Triage Vitals [01/22/19 1419]   Temperature Pulse Respirations Blood Pressure SpO2   98 3 °F (36 8 °C) 65 18 130/61 99 %      Temp Source Heart Rate Source Patient Position - Orthostatic VS BP Location FiO2 (%)   Oral Monitor Lying Right arm --      Pain Score       No Pain        Wt Readings from Last 1 Encounters:   01/22/19 64 2 kg (141 lb 8 6 oz)       Vital Signs (abnormal):   01/23/19 0741   102 7 °F (39 3 °C)  101  20  144/66  92 %       Abnormal Labs/Diagnostic Test Results:   UA moderate leukocytes  + nitrite  Na 135  Cl 98  Alkaline phosphatase 127  Albumin 2 9  Wbc 9 13, hgb 9 9, hct 30 9    CxR- No acute cardiopulmonary disease   Pulmonary hyperinflation in keeping with COPD    1/23/2019-  TSH 3rd generation 0 104    Albumin 2 6  Wbc 8 45   hgb 9 2, hct 27 8       ED Treatment:   Medication Administration from 01/22/2019 1412 to 01/22/2019 1751       Date/Time Order Dose Route Action Comments     01/22/2019 1602 sodium chloride 0 9 % bolus 1,000 mL 0 mL Intravenous Stopped      01/22/2019 1502 sodium chloride 0 9 % bolus 1,000 mL 1,000 mL Intravenous New Bag      01/22/2019 1729 ceftriaxone (ROCEPHIN) 1 g/50 mL in dextrose IVPB 0 mg Intravenous Stopped      01/22/2019 1579 ceftriaxone (ROCEPHIN) 1 g/50 mL in dextrose IVPB 1,000 mg Intravenous New Bag           Past Medical/Surgical History:   Past Medical History:   Diagnosis Date    Aspiration pneumonia ([address] )     Basal cell carcinoma of nose     Blind     Dementia     Depression     Disease of thyroid gland     Fibromyalgia     Gait disturbance     GERD (gastroesophageal reflux disease)     Glaucoma     Hyperlipidemia     Hypertension     Osteopenia     Peripheral neuropathy     Polymyalgia rheumatica (HCC)     Psychiatric disorder     Renal insufficiency syndrome     Rheumatoid arthritis (HCC)     Stroke (HCC)     Systemic lupus erythematosus (HCC)     Vertigo     Vitamin D deficiency        Admitting Diagnosis: Weakness [R53 1]  Urinary tract infection [N39 0]  Conjunctivitis of right eye [H10 9]    Age/Sex: 80 y o  female    Assessment/Plan:   Recurrent UTI   Assessment & Plan     · Patient has history of recurrent UTI  · IV Rocephin was started in the emergency room and will continue with this  · Follow-up results of the urine culture  · With complaints of having fever and low blood pressures in the skilled nursing facility, blood cultures as well as influenza RSV and PCR were also sent  Thus follow up the results  · Here, so far, no fever and vital signs were stable  Did not qualify for criteria for sepsis       Conjunctivitis of right eye   Assessment & Plan     · Recurrent  · According to the patient's daughter, this is because patient has a habit of touching her nose and sometimes her fingers inside her nose and then touching her eye  · According to the patient's daughter, patient recently had right eye redness with discharge, which improved with eyedrops with antibiotic  Thus will represcribe that eyedrop with antibiotic    · According to the patient's daughter, they followed up with the optometrist, however they were told that she needs to see an ophthalmologist   They were about to, however, patient ended up here  · As per daughter, 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  · Ophthalmology consult       Anemia   Assessment & Plan     · Stable, likely chronic based on my review of patient's previous CBCs  In fact, improving as compared to previous CBC  · For further workup and management if this worsens significantly       Dementia   Assessment & Plan     · Continue Aricept  · Supportive care    · According to the patient's daughter at bedside, at baseline, patient will usually say I am dying         Admission Orders:  1/22/2019  1701 INPATIENT   Scheduled Meds:   Current Facility-Administered Medications:  artificial tear  Both Eyes TID    aspirin 325 mg Oral Daily    benzocaine 1 spray Mucosal TID PRN    benzonatate 100 mg Oral TID PRN    calcium carbonate 1 tablet Oral Daily With Breakfast    cefTRIAXone 1,000 mg Intravenous Q24H    donepezil 5 mg Oral HS    DULoxetine 60 mg Oral Daily    enoxaparin 30 mg Subcutaneous Daily    estradiol 1 g Vaginal Daily    fluticasone 1 spray Nasal Daily    furosemide 20 mg Oral Once per day on Mon Wed Fri    guaiFENesin 600 mg Oral BID PRN    ipratropium-albuterol 3 mL Nebulization Q4H PRN    levothyroxine 100 mcg Oral Early Morning    loratadine 10 mg Oral Daily    LORazepam 0 5 mg Oral Q8H PRN    LORazepam 0 5 mg Oral HS    losartan 100 mg Oral Daily    magnesium hydroxide 30 mL Oral Daily PRN    metoprolol tartrate 25 mg Oral BID    NIFEdipine 60 mg Oral Daily    ofloxacin 1 drop Right Eye 4x Daily    ondansetron 4 mg Intravenous Q6H PRN    pantoprazole 40 mg Oral BID    potassium chloride 10 mEq Oral Once per day on Mon Wed Fri    pravastatin 40 mg Oral Daily    senna 1 tablet Oral HS PRN    sodium chloride 2 spray Each Nare BID    sodium chloride 75 mL/hr Intravenous Continuous Last Rate: 75 mL/hr (01/23/19 0807)   traMADol 50 mg Oral Q6H PRN    triamcinolone  Topical BID Continuous Infusions:   sodium chloride 75 mL/hr Last Rate: 75 mL/hr (01/23/19 0859)     PRN Meds: not used  OTHER ORDERS:  scds  Respiratory protocol  Urine culture     Consult ophthalmology

## 2019-01-23 NOTE — ASSESSMENT & PLAN NOTE
· Patient has history of recurrent UTI  Her previous urine cultures that showed E coli sensitive to cefazolin  · IV Rocephin was started in the emergency room   Was switched to IV cefazolin Follow-up results of the urine culture  · With complaints of having fever and low blood pressures in the skilled nursing facility, blood cultures pending   influenza RSV and PCR were negative  Here, so far, no fever and vital signs were stable  Did not qualify for criteria for sepsis

## 2019-01-23 NOTE — PHYSICIAN ADVISOR
Current patient class: Inpatient  The patient is currently on Hospital Day: 2 at [facility]      The patient was admitted to the hospital at 1700 on 1/22/19 for the following diagnosis:  Weakness [R53 1]  Urinary tract infection [N39 0]  Conjunctivitis of right eye [H10 9]       There is documentation in the medical record of an expected length of stay of at least 2 midnights  The patient is therefore expected to satisfy the 2 midnight benchmark and given the 2 midnight presumption is appropriate for INPATIENT ADMISSION  Given this expectation of a satisfying stay, CMS instructs us that the patient is most often appropriate for inpatient admission under part A provided medical necessity is documented in the chart  After review of the relevant documentation, labs, vital signs and test results, the patient is appropriate for INPATIENT ADMISSION  Admission to the hospital as an inpatient is a complex decision making process which requires the practitioner to consider the patients presenting complaint, history and physical examination and all relevant testing  With this in mind, in this case, the patient was deemed appropriate for INPATIENT ADMISSION  After review of the documentation and testing available at the time of the admission I concur with this clinical determination of medical necessity  51-year-old woman admitted to the hospital recurrent urinary tract infection  She is on IV antibiotics  The urine culture is growing greater than 100,000 E coli  She also has right eye conjunctivitis for which Ophthalmology has been consulted  Since the patient will require more than 2 midnight stay for the evaluation of their condition, they are appropriate for INPATIENT status  Rationale is as follows:     The patient is a 80 yrs old Female who presented to the ED at 1/22/2019  2:13 PM with a chief complaint of Fever - 9 weeks to 74 years (Pt reports to ED vis EMS for a fever from [facility]  The fever reported was 102 5 and a BP of 78/50   Pt does not report any pain  )    The patients vitals on arrival were ED Triage Vitals [01/22/19 1419]   Temperature Pulse Respirations Blood Pressure SpO2   98 3 °F (36 8 °C) 65 18 130/61 99 %      Temp Source Heart Rate Source Patient Position - Orthostatic VS BP Location FiO2 (%)   Oral Monitor Lying Right arm --      Pain Score       No Pain           Past Medical History:   Diagnosis Date    Aspiration pneumonia ([address] )     Last Assessed:  7/18/17    Basal cell carcinoma of nose     Last Assessed:  4/12/17    Blind     blind right eye, 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, Last Assessed:  7/18/17    Dementia     Depression     Disease of thyroid gland     Fibromyalgia     Last Assessed:  7/18/17    Gait disturbance     GERD (gastroesophageal reflux disease)     Glaucoma     Hyperlipidemia     Hypertension     Osteopenia     Peripheral neuropathy     Last Assessed:  4/12/17    Polymyalgia rheumatica ([address] )     Psychiatric disorder     depression    Renal insufficiency syndrome     Last Assessed:  4/13/17    Rheumatoid arthritis ([address] )     Stroke ([facility] [address] )     x2, Last Assessed:  7/18/17    Systemic lupus erythematosus ([address] )     Vertigo     Vitamin D deficiency      Past Surgical History:   Procedure Laterality Date    EYE SURGERY      JOINT REPLACEMENT      left hip replacement, left shoulder replacement    SHOULDER SURGERY      Replacement           Consults have been placed to:   IP CONSULT TO OPHTHALMOLOGY    Vitals:    01/23/19 0741 01/23/19 0814 01/23/19 0955 01/23/19 1500   BP: 144/66   145/65   BP Location: Left arm   Left arm   Pulse: 101   80   Resp: 20   18   Temp: (!) 102 7 °F (39 3 °C) (!) 101 2 °F (38 4 °C) 98 8 °F (37 1 °C) 98 7 °F (37 1 °C)   TempSrc: Oral Oral Oral Oral   SpO2: 92%   93%   Weight:           Most recent labs:    Recent Labs      01/23/19   0452   WBC  8 45   HGB  9 2*   HCT  27 8*   PLT 168   K  4 2   CALCIUM  8 5   BUN  14   CREATININE  0 99   AST  25   ALT  26   ALKPHOS  107       Scheduled Meds:  Current Facility-Administered Medications:  artificial tear  Both Eyes TID Everton Robins MD    aspirin 325 mg Oral Daily Everton Robins MD    benzocaine 1 spray Mucosal TID PRN Everton Robins MD    benzonatate 100 mg Oral TID PRN Everton Robins MD    calcium carbonate 1 tablet Oral Daily With Breakfast Everton Robins MD    cefazolin 1,000 mg Intravenous Q12H Caroline MD Adebayo    donepezil 5 mg Oral HS Everton Robins MD    DULoxetine 60 mg Oral Daily Everton Robins MD    enoxaparin 30 mg Subcutaneous Daily Everton Robins MD    estradiol 1 g Vaginal Daily Everton Robins MD    fluticasone 1 spray Nasal Daily Everton Robins MD    furosemide 20 mg Oral Once per day on Mon Wed Fri Everton Robins MD    guaiFENesin 600 mg Oral BID PRN Everton Robins MD    ipratropium-albuterol 3 mL Nebulization Q4H PRN Everton Robins MD    levothyroxine 100 mcg Oral Early Morning Everton Robins MD    loratadine 10 mg Oral Daily Everton Robins MD    LORazepam 0 5 mg Oral Q8H PRN Everton Robins MD    LORazepam 0 5 mg Oral HS Everton Robins MD    losartan 100 mg Oral Daily Everton Robins MD    magnesium hydroxide 30 mL Oral Daily PRN Everton Robins MD    metoprolol tartrate 25 mg Oral BID Everton Robins MD    NIFEdipine 60 mg Oral Daily Everton Robins MD    ofloxacin 1 drop Right Eye 4x Daily Everton Robins MD    ondansetron 4 mg Intravenous Q6H PRN Everton Robins MD    pantoprazole 40 mg Oral BID Everton Robins MD    potassium chloride 10 mEq Oral Once per day on Mon Wed Fri Everton Robins MD    pravastatin 40 mg Oral Daily Everton Robins MD    senna 1 tablet Oral HS PRN Everton Robins MD    sodium chloride 2 spray Each Nare BID Everton Robins MD    sodium chloride 75 mL/hr Intravenous Continuous Everton Robins MD Last Rate: 75 mL/hr (01/23/19 0859)   traMADol 50 mg Oral Q6H PRN Everton Robins MD    triamcinolone  Topical BID Everton Robins MD      Continuous Infusions:  sodium chloride 75 mL/hr Last Rate: 75 mL/hr (01/23/19 0859)     PRN Meds: benzocaine    benzonatate    guaiFENesin    ipratropium-albuterol    LORazepam    magnesium hydroxide    ondansetron    senna    traMADol

## 2019-01-23 NOTE — PROGRESS NOTES
Patients temp 102 7, patient has history of tylenol allergy and due to dementia patient unable to state reaction  Mendel NEFF, aware and stated to pack patient with ice

## 2019-01-23 NOTE — PROGRESS NOTES
Progress Note - [place] 4/27/1931, 80 y o  female MRN: [id number]    Unit/Bed#: -01 Encounter: [id number]    Primary Care Provider: Darlene Wayne DO   Date and time admitted to hospital: 1/22/2019  2:13 PM        * Recurrent UTI   Assessment & Plan    · Patient has history of recurrent UTI  Her previous urine cultures that showed E coli sensitive to cefazolin  · IV Rocephin was started in the emergency room   Was switched to IV cefazolin Follow-up results of the urine culture  · With complaints of having fever and low blood pressures in the skilled nursing facility, blood cultures pending   influenza RSV and PCR were negative  Here, so far, no fever and vital signs were stable  Did not qualify for criteria for sepsis  Conjunctivitis of right eye   Assessment & Plan    · Recurrent  · According to the patient's daughter, this is because patient has a habit of touching her nose and sometimes her fingers inside her nose and then touching her eye  · According to the patient's daughter, patient recently had right eye redness with discharge, which improved with eyedrops with antibiotic  Thus will represcribe that eyedrop with antibiotic  · According to the patient's daughter, they followed up with the optometrist, however they were told that she needs to see an ophthalmologist   They were about to, however, patient ended up here  · As per daughter, 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  · Ophthalmology consult  Anemia   Assessment & Plan    · Stable, likely chronic based on my review of patient's previous CBCs  In fact, improving as compared to previous CBC  · For further workup and management if this worsens significantly  Dementia   Assessment & Plan    · Continue Aricept  · Supportive care  · According to the patient's daughter at bedside, at baseline, patient will usually say I am dying         VTE Pharmacologic Prophylaxis:   Pharmacologic: Enoxaparin (Lovenox)  Mechanical VTE Prophylaxis in Place: No    Patient Centered Rounds: I have performed bedside rounds with nursing staff today  Discussions with Specialists or Other Care Team Provider:  Discussed with nursing  Education and Discussions with Family / Patient:  Discussed with daughter  Time Spent for Care: 30 minutes  More than 50% of total time spent on counseling and coordination of care as described above  Current Length of Stay: 1 day(s)    Current Patient Status: Inpatient   Certification Statement: The patient will continue to require additional inpatient hospital stay due to Ongoing IV antibiotics and follow up on blood and urine culture results  Discharge Plan:  Currently not medically stable for discharge  Code Status: Level 1 - Full Code      Subjective:   T-max 102 7°  Patient denies any complaint    Objective:     Vitals:   Temp (24hrs), Av 7 °F (37 6 °C), Min:97 9 °F (36 6 °C), Max:102 7 °F (39 3 °C)    Temp:  [97 9 °F (36 6 °C)-102 7 °F (39 3 °C)] 98 8 °F (37 1 °C)  HR:  [] 101  Resp:  [18-20] 20  BP: (122-148)/(61-73) 144/66  SpO2:  [92 %-99 %] 92 %  Body mass index is 27 64 kg/m²  Input and Output Summary (last 24 hours): Intake/Output Summary (Last 24 hours) at 19 1410  Last data filed at 19 1215   Gross per 24 hour   Intake          2353 75 ml   Output              988 ml   Net          1365 75 ml       Physical Exam:     Physical Exam   Constitutional: No distress  HENT:   Head: Normocephalic and atraumatic  Eyes:   Right conjunctiva erythema with corneal opacification   Neck: Neck supple  Cardiovascular: Normal rate and regular rhythm  Pulmonary/Chest: Effort normal and breath sounds normal    Abdominal: Soft  Bowel sounds are normal    Musculoskeletal: She exhibits no edema  Neurological:   Demented at baseline   Skin: Skin is warm  She is not diaphoretic  Psychiatric: She has a normal mood and affect       Additional Data:     Labs:      Results from last 7 days  Lab Units 01/23/19  0452 01/22/19  1447   WBC Thousand/uL 8 45 9 13   HEMOGLOBIN g/dL 9 2* 9 9*   HEMATOCRIT % 27 8* 30 9*   PLATELETS Thousands/uL 168 187   NEUTROS PCT %  --  67   LYMPHS PCT %  --  19   MONOS PCT %  --  13*   EOS PCT %  --  1       Results from last 7 days  Lab Units 01/23/19  0452   SODIUM mmol/L 134*   POTASSIUM mmol/L 4 2   CHLORIDE mmol/L 104   CO2 mmol/L 22   BUN mg/dL 14   CREATININE mg/dL 0 99   ANION GAP mmol/L 8   CALCIUM mg/dL 8 5   ALBUMIN g/dL 2 6*   TOTAL BILIRUBIN mg/dL 0 60   ALK PHOS U/L 107   ALT U/L 26   AST U/L 25   GLUCOSE RANDOM mg/dL 91                   Results from last 7 days  Lab Units 01/22/19  1447   LACTIC ACID mmol/L 1 2           * I Have Reviewed All Lab Data Listed Above  * Additional Pertinent Lab Tests Reviewed:  Mallory [address] Admission Reviewed    Imaging:    Imaging Reports Reviewed Today Include: NA    Recent Cultures (last 7 days):       Results from last 7 days  Lab Units 01/22/19  1615 01/22/19  1505   URINE CULTURE  >100,000 cfu/ml Gram Negative Olegario*  --    INFLUENZA B PCR   --  None Detected   RSV PCR   --  None Detected       Last 24 Hours Medication List:     Current Facility-Administered Medications:  artificial tear  Both Eyes TID Everton Robins MD    aspirin 325 mg Oral Daily Everton Robins MD    benzocaine 1 spray Mucosal TID PRN Everton Robins MD    benzonatate 100 mg Oral TID PRN Everton Robins MD    calcium carbonate 1 tablet Oral Daily With Breakfast Everton Robins MD    cefTRIAXone 1,000 mg Intravenous Q24H Everton Robins MD    donepezil 5 mg Oral HS Everton Robins MD    DULoxetine 60 mg Oral Daily Everton Robins MD    enoxaparin 30 mg Subcutaneous Daily Everton Robins MD    estradiol 1 g Vaginal Daily Everton Robins MD    fluticasone 1 spray Nasal Daily Everton Robins MD    furosemide 20 mg Oral Once per day on Mon Wed Fri MD Sammi Tenorio 600 mg Oral BID PRN Everton Robins MD    ipratropium-albuterol 3 mL Nebulization Q4H PRN Everton Robins MD    levothyroxine 100 mcg Oral Early Morning Everton Robins MD    loratadine 10 mg Oral Daily Everton Robins MD    LORazepam 0 5 mg Oral Q8H PRN Everton Robins MD    LORazepam 0 5 mg Oral HS Everton Robins MD    losartan 100 mg Oral Daily Everton Robins MD    magnesium hydroxide 30 mL Oral Daily PRN Everton Robins MD    metoprolol tartrate 25 mg Oral BID Everton Robins MD    NIFEdipine 60 mg Oral Daily Everton Robins MD    ofloxacin 1 drop Right Eye 4x Daily Everton Robins MD    ondansetron 4 mg Intravenous Q6H PRN Everton Robins MD    pantoprazole 40 mg Oral BID Everton Robins MD    potassium chloride 10 mEq Oral Once per day on Mon Wed Fri Everton Robins MD    pravastatin 40 mg Oral Daily Everton Robins MD    senna 1 tablet Oral HS PRN Everton Robins MD    sodium chloride 2 spray Each Nare BID Everton Robins MD    sodium chloride 75 mL/hr Intravenous Continuous Everton Robins MD Last Rate: 75 mL/hr (01/23/19 0859)   traMADol 50 mg Oral Q6H PRN Everton Robins MD    triamcinolone  Topical BID Orquidea Robins MD         Today, Patient Was Seen By: Wei Mike MD    ** Please Note: Dictation voice to text software may have been used in the creation of this document   **

## 2019-01-24 LAB
BACTERIA UR CULT: ABNORMAL
BASOPHILS # BLD AUTO: 0.01 THOUSANDS/ΜL (ref 0–0.1)
BASOPHILS NFR BLD AUTO: 0 % (ref 0–1)
EOSINOPHIL # BLD AUTO: 0.1 THOUSAND/ΜL (ref 0–0.61)
EOSINOPHIL NFR BLD AUTO: 1 % (ref 0–6)
ERYTHROCYTE [DISTWIDTH] IN BLOOD BY AUTOMATED COUNT: 13.5 % (ref 11.6–15.1)
HCT VFR BLD AUTO: 30.2 % (ref 34.8–46.1)
HGB BLD-MCNC: 9.7 G/DL (ref 11.5–15.4)
IMM GRANULOCYTES # BLD AUTO: 0.02 THOUSAND/UL (ref 0–0.2)
IMM GRANULOCYTES NFR BLD AUTO: 0 % (ref 0–2)
LYMPHOCYTES # BLD AUTO: 1.34 THOUSANDS/ΜL (ref 0.6–4.47)
LYMPHOCYTES NFR BLD AUTO: 17 % (ref 14–44)
MCH RBC QN AUTO: 29.3 PG (ref 26.8–34.3)
MCHC RBC AUTO-ENTMCNC: 32.1 G/DL (ref 31.4–37.4)
MCV RBC AUTO: 91 FL (ref 82–98)
MONOCYTES # BLD AUTO: 0.72 THOUSAND/ΜL (ref 0.17–1.22)
MONOCYTES NFR BLD AUTO: 9 % (ref 4–12)
NEUTROPHILS # BLD AUTO: 5.62 THOUSANDS/ΜL (ref 1.85–7.62)
NEUTS SEG NFR BLD AUTO: 73 % (ref 43–75)
NRBC BLD AUTO-RTO: 0 /100 WBCS
PLATELET # BLD AUTO: 172 THOUSANDS/UL (ref 149–390)
PMV BLD AUTO: 9.6 FL (ref 8.9–12.7)
RBC # BLD AUTO: 3.31 MILLION/UL (ref 3.81–5.12)
WBC # BLD AUTO: 7.81 THOUSAND/UL (ref 4.31–10.16)

## 2019-01-24 PROCEDURE — 85025 COMPLETE CBC W/AUTO DIFF WBC: CPT | Performed by: INTERNAL MEDICINE

## 2019-01-24 PROCEDURE — 99232 SBSQ HOSP IP/OBS MODERATE 35: CPT | Performed by: INTERNAL MEDICINE

## 2019-01-24 RX ADMIN — DONEPEZIL HYDROCHLORIDE 5 MG: 5 TABLET, FILM COATED ORAL at 22:05

## 2019-01-24 RX ADMIN — WHITE PETROLATUM 57.7 %-MINERAL OIL 31.9 % EYE OINTMENT: at 09:57

## 2019-01-24 RX ADMIN — WHITE PETROLATUM 57.7 %-MINERAL OIL 31.9 % EYE OINTMENT: at 16:57

## 2019-01-24 RX ADMIN — WHITE PETROLATUM 57.7 %-MINERAL OIL 31.9 % EYE OINTMENT: at 21:50

## 2019-01-24 RX ADMIN — OFLOXACIN 1 DROP: 3 SOLUTION OPHTHALMIC at 11:59

## 2019-01-24 RX ADMIN — Medication 1000 MG: at 16:56

## 2019-01-24 RX ADMIN — SODIUM CHLORIDE 75 ML/HR: 0.9 INJECTION, SOLUTION INTRAVENOUS at 11:05

## 2019-01-24 RX ADMIN — ASPIRIN 325 MG: 325 TABLET ORAL at 09:55

## 2019-01-24 RX ADMIN — LORATADINE 10 MG: 10 TABLET ORAL at 09:55

## 2019-01-24 RX ADMIN — OFLOXACIN 1 DROP: 3 SOLUTION OPHTHALMIC at 17:00

## 2019-01-24 RX ADMIN — LORAZEPAM 0.5 MG: 0.5 TABLET ORAL at 21:50

## 2019-01-24 RX ADMIN — ENOXAPARIN SODIUM 30 MG: 30 INJECTION SUBCUTANEOUS at 09:56

## 2019-01-24 RX ADMIN — CALCIUM 1 TABLET: 500 TABLET ORAL at 09:55

## 2019-01-24 RX ADMIN — SALINE NASAL SPRAY 2 SPRAY: 1.5 SOLUTION NASAL at 17:01

## 2019-01-24 RX ADMIN — DULOXETINE HYDROCHLORIDE 60 MG: 60 CAPSULE, DELAYED RELEASE ORAL at 09:55

## 2019-01-24 RX ADMIN — NIFEDIPINE 60 MG: 30 TABLET, FILM COATED, EXTENDED RELEASE ORAL at 09:55

## 2019-01-24 RX ADMIN — LEVOTHYROXINE SODIUM 100 MCG: 100 TABLET ORAL at 05:35

## 2019-01-24 RX ADMIN — PRAVASTATIN SODIUM 40 MG: 40 TABLET ORAL at 09:55

## 2019-01-24 RX ADMIN — OFLOXACIN 1 DROP: 3 SOLUTION OPHTHALMIC at 09:56

## 2019-01-24 RX ADMIN — OFLOXACIN 1 DROP: 3 SOLUTION OPHTHALMIC at 21:50

## 2019-01-24 RX ADMIN — ESTRADIOL 1 G: 0.1 CREAM VAGINAL at 09:59

## 2019-01-24 RX ADMIN — Medication 1000 MG: at 05:38

## 2019-01-24 RX ADMIN — PANTOPRAZOLE SODIUM 40 MG: 40 TABLET, DELAYED RELEASE ORAL at 17:00

## 2019-01-24 RX ADMIN — PANTOPRAZOLE SODIUM 40 MG: 40 TABLET, DELAYED RELEASE ORAL at 09:56

## 2019-01-24 RX ADMIN — LOSARTAN POTASSIUM 100 MG: 50 TABLET, FILM COATED ORAL at 09:56

## 2019-01-24 RX ADMIN — FLUTICASONE PROPIONATE 1 SPRAY: 50 SPRAY, METERED NASAL at 09:57

## 2019-01-24 RX ADMIN — METOPROLOL TARTRATE 25 MG: 25 TABLET, FILM COATED ORAL at 09:55

## 2019-01-24 NOTE — ASSESSMENT & PLAN NOTE
· Patient has history of recurrent UTI  Her urine cultures that showed E coli sensitive to cefazolin  · IV Rocephin was started in the emergency room   Was switched to IV cefazolin Follow-up results of the urine culture  · With complaints of having fever and low blood pressures in the skilled nursing facility, blood cultures pending   influenza RSV and PCR were negative    · Still continues to have low-grade temperature  Continue to follow blood culture results  They are negative to date

## 2019-01-24 NOTE — SOCIAL WORK
LOS 2 DAYS  PATIENT IS A UTI BUNDLE  PATIENT IS NOT A READMISSION  CM called patients daughter and was informed that patient is a resident at [facility]  Use walker for ambulation at [facility]  No other DME  Daughter informed patient is possible discharge within 24-48hr pending improvement  Patient daughter informed CM that patient is receiving C PT and OT at the facility currently  CM called and spoke with Rukhsana Carver from [facility] and determined that patient was using DME for mobility about 200'  Able to complete upper body ADL's and require assistance with lower body ADL's  jacquelyn informed of possible discharge within 48hr pending being fver free for 24 hrs and medical clearance from [facility]  Patient POA is her daughter, patient does not drive, lives at [facility], and will require [facility] transport back to the agency  CM department will need to follow up with family  Once discharge time is confirmed and to discuss transport need further  CM reviewed the BP Advanced- Beneficiary Notification Letter and placed at bedside  CM reviewed d/c planning process including the following: identifying help at home, patient preference for d/c planning needs, Discharge Lounge, [facility] Meds to Bed program, availability of treatment team to discuss questions or concerns patient and/or family may have regarding understanding medications and recognizing signs and symptoms once discharged  CM also encouraged patient to follow up with all recommended appointments after discharge  Patient advised of importance for patient and family to participate in managing patients medical well being  CM will follow through discharge

## 2019-01-24 NOTE — PROGRESS NOTES
Progress Note - Hosea Torres 4/27/1931, 80 y o  female MRN: [id number]    Unit/Bed#: -01 Encounter: [id number]    Primary Care Provider: Traci Sousa DO   Date and time admitted to hospital: 1/22/2019  2:13 PM        * Recurrent UTI   Assessment & Plan    · Patient has history of recurrent UTI  Her urine cultures that showed E coli sensitive to cefazolin  · IV Rocephin was started in the emergency room   Was switched to IV cefazolin Follow-up results of the urine culture  · With complaints of having fever and low blood pressures in the skilled nursing facility, blood cultures pending   influenza RSV and PCR were negative    · Still continues to have low-grade temperature  Continue to follow blood culture results  They are negative to date  Conjunctivitis of right eye   Assessment & Plan    · Recurrent  · According to the patient's daughter, this is because patient has a habit of touching her nose and sometimes her fingers inside her nose and then touching her eye  · According to the patient's daughter, patient recently had right eye redness with discharge, which improved with eyedrops with antibiotic  Thus will represcribe that eyedrop with antibiotic  · According to the patient's daughter, they followed up with the optometrist, however they were told that she needs to see an ophthalmologist   They were about to, however, patient ended up here  · As per daughter, 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  · Ophthalmology consult  Anemia   Assessment & Plan    · Stable, likely chronic based on my review of patient's previous CBCs  In fact, improving as compared to previous CBC  · For further workup and management if this worsens significantly  Dementia   Assessment & Plan    · Continue Aricept  · Supportive care  · According to the patient's daughter at bedside, at baseline, patient will usually say I am dying           VTE Pharmacologic Prophylaxis:   Pharmacologic: Enoxaparin (Lovenox)  Mechanical VTE Prophylaxis in Place: No    Patient Centered Rounds: I have performed bedside rounds with nursing staff today  Discussions with Specialists or Other Care Team Provider: MAGO    Education and Discussions with Family / Patient:  Discussed with patient's daughter  Time Spent for Care: 30 minutes  More than 50% of total time spent on counseling and coordination of care as described above  Current Length of Stay: 2 day(s)    Current Patient Status: Inpatient   Certification Statement: The patient will continue to require additional inpatient hospital stay due to Ongoing IV antibiotics    Discharge Plan:  Likely to nursing over the next 24 hours Code Status: Level 1 - Full Code      Subjective:   Continues to spike fever with T-max 100 6°  Denies any complaints  Objective:     Vitals:   Temp (24hrs), Av 1 °F (37 3 °C), Min:98 3 °F (36 8 °C), Max:100 6 °F (38 1 °C)    Temp:  [98 3 °F (36 8 °C)-100 6 °F (38 1 °C)] 99 °F (37 2 °C)  HR:  [] 100  Resp:  [18-20] 20  BP: (125-145)/(54-67) 142/67  SpO2:  [92 %-93 %] 93 %  Body mass index is 27 64 kg/m²  Input and Output Summary (last 24 hours): Intake/Output Summary (Last 24 hours) at 19 1347  Last data filed at 19 1320   Gross per 24 hour   Intake           2367 5 ml   Output             1400 ml   Net            967 5 ml       Physical Exam:     Physical Exam   Constitutional: No distress  HENT:   Head: Normocephalic and atraumatic  Mouth/Throat: Oropharynx is clear and moist    Eyes: Pupils are equal, round, and reactive to light  Conjunctivae are normal    Neck: Normal range of motion  Neck supple  Cardiovascular: Normal rate and regular rhythm  Pulmonary/Chest: Effort normal and breath sounds normal    Abdominal: Soft  Bowel sounds are normal    Neurological:   Oriented to self   Skin: Skin is warm  She is not diaphoretic       Additional Data:     Labs:      Results from last 7 days  Lab Units 01/24/19  0529   WBC Thousand/uL 7 81   HEMOGLOBIN g/dL 9 7*   HEMATOCRIT % 30 2*   PLATELETS Thousands/uL 172   NEUTROS PCT % 73   LYMPHS PCT % 17   MONOS PCT % 9   EOS PCT % 1       Results from last 7 days  Lab Units 01/23/19  0452   SODIUM mmol/L 134*   POTASSIUM mmol/L 4 2   CHLORIDE mmol/L 104   CO2 mmol/L 22   BUN mg/dL 14   CREATININE mg/dL 0 99   ANION GAP mmol/L 8   CALCIUM mg/dL 8 5   ALBUMIN g/dL 2 6*   TOTAL BILIRUBIN mg/dL 0 60   ALK PHOS U/L 107   ALT U/L 26   AST U/L 25   GLUCOSE RANDOM mg/dL 91                   Results from last 7 days  Lab Units 01/22/19  1447   LACTIC ACID mmol/L 1 2           * I Have Reviewed All Lab Data Listed Above  * Additional Pertinent Lab Tests Reviewed: Mallory [address] Admission Reviewed    Imaging:    Imaging Reports Reviewed Today Include: NA      Recent Cultures (last 7 days):       Results from last 7 days  Lab Units 01/22/19  1615 01/22/19  1520 01/22/19  1505 01/22/19  1447   BLOOD CULTURE   --  No Growth at 24 hrs   --  No Growth at 24 hrs     URINE CULTURE  >100,000 cfu/ml Escherichia coli*  --   --   --    INFLUENZA B PCR   --   --  None Detected  --    RSV PCR   --   --  None Detected  --        Last 24 Hours Medication List:     Current Facility-Administered Medications:  artificial tear  Both Eyes TID Everton Robins MD    aspirin 325 mg Oral Daily Everton Robins MD    benzocaine 1 spray Mucosal TID PRN Everton Robins MD    benzonatate 100 mg Oral TID PRN Everton Robins MD    calcium carbonate 1 tablet Oral Daily With Breakfast Everton Robins MD    cefazolin 1,000 mg Intravenous Q12H Alem Freedman MD Last Rate: 1,000 mg (01/24/19 0538)   donepezil 5 mg Oral HS Everton Robins MD    DULoxetine 60 mg Oral Daily Everton Robins MD    enoxaparin 30 mg Subcutaneous Daily Everton Robins MD    estradiol 1 g Vaginal Daily Everton Robins MD    fluticasone 1 spray Nasal Daily Brigitte Arroyoom MD Julienne furosemide 20 mg Oral Once per day on Mon Wed Fri Everton Robins MD    guaiFENesin 600 mg Oral BID PRN Everton Robins MD    ipratropium-albuterol 3 mL Nebulization Q4H PRN Everton Robins MD    levothyroxine 100 mcg Oral Early Morning Everton Robins MD    loratadine 10 mg Oral Daily Everton Robins MD    LORazepam 0 5 mg Oral Q8H PRN Everton Robins MD    LORazepam 0 5 mg Oral HS Everton Robins MD    losartan 100 mg Oral Daily Everton Robins MD    magnesium hydroxide 30 mL Oral Daily PRN Everton Robins MD    metoprolol tartrate 25 mg Oral BID Everton Robins MD    NIFEdipine 60 mg Oral Daily Everton Robins MD    ofloxacin 1 drop Right Eye 4x Daily Everton Robins MD    ondansetron 4 mg Intravenous Q6H PRN Everton Robins MD    pantoprazole 40 mg Oral BID Everton Robins MD    potassium chloride 10 mEq Oral Once per day on Mon Wed Fri Everton Robins MD    pravastatin 40 mg Oral Daily Everton Robins MD    senna 1 tablet Oral HS PRN Everton Robins MD    sodium chloride 2 spray Each Nare BID Everton Robins MD    sodium chloride 75 mL/hr Intravenous Continuous Everton Robins MD Last Rate: 75 mL/hr (01/24/19 1105)   traMADol 50 mg Oral Q6H PRN Everton Robins MD    triamcinolone  Topical BID Elba Robins MD         Today, Patient Was Seen By: Anisa Jacome MD    ** Please Note: Dictation voice to text software may have been used in the creation of this document   **

## 2019-01-24 NOTE — PLAN OF CARE
Problem: DISCHARGE PLANNING - CARE MANAGEMENT  Goal: Discharge to post-acute care or home with appropriate resources  INTERVENTIONS:  - Conduct assessment to determine patient/family and health care team treatment goals, and need for post-acute services based on payer coverage, community resources, and patient preferences, and barriers to discharge  - Address psychosocial, clinical, and financial barriers to discharge as identified in assessment in conjunction with the patient/family and health care team  - Arrange appropriate level of post-acute services according to patients   needs and preference and payer coverage in collaboration with the physician and health care team  - Communicate with and update the patient/family, physician, and health care team regarding progress on the discharge plan  - Arrange appropriate transportation to post-acute venues  Outcome: Progressing  LOS 2 DAYS  PATIENT IS A UTI BUNDLE  PATIENT IS NOT A READMISSION  CM called patients daughter and was informed that patient is a resident at [facility]  Use walker for ambulation at [facility]  No other DME  Daughter informed patient is possible discharge within 24-48hr pending improvement  Patient daughter informed CM that patient is receiving C PT and OT at the facility currently  CM called and spoke with Gato Danie from [facility] and determined that patient was using DME for mobility about 200'  Able to complete upper body ADL's and require assistance with lower body ADL's  jacquelyn informed of possible discharge within 48hr pending being fver free for 24 hrs and medical clearance from [facility]  Patient POA is her daughter, patient does not drive, lives at [facility], and will require [facility] transport back to the agency  CM department will need to follow up with family  Once discharge time is confirmed and to discuss transport need further  CM reviewed the [facility]- Beneficiary Notification Letter and placed at bedside       CM reviewed d/c planning process including the following: identifying help at home, patient preference for d/c planning needs, Discharge Lounge, [facility] Meds to Bed program, availability of treatment team to discuss questions or concerns patient and/or family may have regarding understanding medications and recognizing signs and symptoms once discharged  CM also encouraged patient to follow up with all recommended appointments after discharge  Patient advised of importance for patient and family to participate in managing patients medical well being  CM will follow through discharge

## 2019-01-24 NOTE — CONSULTS
Consultation - Ophthalmology   Nura Erickson 80 y o  female MRN: [id number]  Unit/Bed#: -01 Encounter: [id number]      Assessment/Plan      Assessment:  Conjunctivitis OD- ok to continue ocuflox QID for one week  Blind OD- this is long standing but of uncertain etiology  Ocular hypertension OD- doubt IOP measurement is accurate, eye is soft to palpation  Suspect that opaque cornea is not allowing for accurate measurement  Patient is not complaining of pain in that eye which is reassuring  Plan:  Continue ocuflox QID as ordered  Patient should follow up with primary ophthalmologist after discharge    History of Present Illness   Physician Requesting Consult: Giselle Craig MD  Reason for Consult / Principal Problem: conjunctivitis OD  HPI: Nura Erickson is a 80y o  year old female admitted for recurrent UTI and diagnosed with conjunctivitis of the right eye and started on ocuflox drops  Patient states that eyes burn and tear but denies pain or discharge  Patient states that her right eye is blind and that she had many surgeries on that eye  She can not give any more detail than that  She cannot tell me what type of surgery she had or if she is on any glaucoma drops at home      Inpatient consult to Ophthalmology  Consult performed by: Paulino Galloway ordered by: Calista Alvarez          Review of Systems    Historical Information   Past Medical History:   Diagnosis Date    Aspiration pneumonia ([address] )     Last Assessed:  7/18/17    Basal cell carcinoma of nose     Last Assessed:  4/12/17    Blind     blind right eye, 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, Last Assessed:  7/18/17    Dementia     Depression     Disease of thyroid gland     Fibromyalgia     Last Assessed:  7/18/17    Gait disturbance     GERD (gastroesophageal reflux disease)     Glaucoma     Hyperlipidemia     Hypertension     Osteopenia     Peripheral neuropathy     Last Assessed:  4/12/17    Polymyalgia rheumatica (HCC)     Psychiatric disorder     depression    Renal insufficiency syndrome     Last Assessed:  4/13/17    Rheumat[facility] arthritis [facility]     Stroke ([facility] )     x2, Last Assessed:  7/18/17    Systemic lupus erythematosus ([facility] )     Vertigo     Vitamin D deficiency      Past Ocular History: unclear, blind OD  Past Surgical History:   Procedure Laterality Date    EYE SURGERY      JOINT REPLACEMENT      left hip replacement, left shoulder replacement    SHOULDER SURGERY      Replacement     Social History   History   Alcohol Use No     History   Drug Use No     History   Smoking Status    Never Smoker   Smokeless Tobacco    Never Used         Meds/Allergies   all current active meds have been reviewed    Allergies   Allergen Reactions    Acetaminophen     Golimumab      Other reaction(s): dedrick colored stool    Lidocaine Other (See Comments)    Neurontin [Gabapentin]     Nortriptyline Tremor    Prasterone      Other reaction(s): pruitis    Tricyclic Antidepressants     Leflunomide Rash    Sulfasalazine Rash       Objective   Vitals: Blood pressure 92/50, pulse 72, temperature 98 °F (36 7 °C), temperature source Oral, resp  rate 18, weight 64 2 kg (141 lb 8 6 oz), SpO2 94 %  Physical Exam    Va- LP OD, 20/25 OS with near card and glasses  P- no view OD, 3mm OS, reactive  Tp- 96 OD, 11mmHg OS   Soft to palpation OU @ 1700  EOM: full OU    Ext: ptosis RUL, normal BRENDA    Anterior segment:  OD: conjunctiva is white and quiet, cornea is opaque, no view of iris detail or other intraocular structures  OS: conjunctiva is white and quiet, clear cornea, flat iris, PCIOL     Dilated exam (dilated with neosynephrine at 1700)  OS: normal fundus but limited view due to small dilation

## 2019-01-25 PROCEDURE — 99232 SBSQ HOSP IP/OBS MODERATE 35: CPT | Performed by: PHYSICIAN ASSISTANT

## 2019-01-25 RX ADMIN — ENOXAPARIN SODIUM 30 MG: 30 INJECTION SUBCUTANEOUS at 08:17

## 2019-01-25 RX ADMIN — METOPROLOL TARTRATE 25 MG: 25 TABLET, FILM COATED ORAL at 17:16

## 2019-01-25 RX ADMIN — PRAVASTATIN SODIUM 40 MG: 40 TABLET ORAL at 08:17

## 2019-01-25 RX ADMIN — LORAZEPAM 0.5 MG: 0.5 TABLET ORAL at 21:01

## 2019-01-25 RX ADMIN — LORATADINE 10 MG: 10 TABLET ORAL at 08:17

## 2019-01-25 RX ADMIN — TRAMADOL HYDROCHLORIDE 50 MG: 50 TABLET, COATED ORAL at 05:46

## 2019-01-25 RX ADMIN — ASPIRIN 325 MG: 325 TABLET ORAL at 08:17

## 2019-01-25 RX ADMIN — OFLOXACIN 1 DROP: 3 SOLUTION OPHTHALMIC at 17:16

## 2019-01-25 RX ADMIN — WHITE PETROLATUM 57.7 %-MINERAL OIL 31.9 % EYE OINTMENT: at 08:24

## 2019-01-25 RX ADMIN — DONEPEZIL HYDROCHLORIDE 5 MG: 5 TABLET, FILM COATED ORAL at 21:01

## 2019-01-25 RX ADMIN — TRAMADOL HYDROCHLORIDE 50 MG: 50 TABLET, COATED ORAL at 20:12

## 2019-01-25 RX ADMIN — OFLOXACIN 1 DROP: 3 SOLUTION OPHTHALMIC at 08:24

## 2019-01-25 RX ADMIN — OFLOXACIN 1 DROP: 3 SOLUTION OPHTHALMIC at 21:02

## 2019-01-25 RX ADMIN — Medication 1000 MG: at 05:42

## 2019-01-25 RX ADMIN — SODIUM CHLORIDE 75 ML/HR: 0.9 INJECTION, SOLUTION INTRAVENOUS at 00:24

## 2019-01-25 RX ADMIN — WHITE PETROLATUM 57.7 %-MINERAL OIL 31.9 % EYE OINTMENT: at 17:17

## 2019-01-25 RX ADMIN — LEVOTHYROXINE SODIUM 100 MCG: 100 TABLET ORAL at 05:42

## 2019-01-25 RX ADMIN — Medication 1000 MG: at 17:16

## 2019-01-25 RX ADMIN — POTASSIUM CHLORIDE 10 MEQ: 750 TABLET, EXTENDED RELEASE ORAL at 08:17

## 2019-01-25 RX ADMIN — WHITE PETROLATUM 57.7 %-MINERAL OIL 31.9 % EYE OINTMENT: at 20:13

## 2019-01-25 RX ADMIN — LOSARTAN POTASSIUM 100 MG: 50 TABLET, FILM COATED ORAL at 08:17

## 2019-01-25 RX ADMIN — CALCIUM 1 TABLET: 500 TABLET ORAL at 08:17

## 2019-01-25 RX ADMIN — PANTOPRAZOLE SODIUM 40 MG: 40 TABLET, DELAYED RELEASE ORAL at 17:16

## 2019-01-25 RX ADMIN — DULOXETINE HYDROCHLORIDE 60 MG: 60 CAPSULE, DELAYED RELEASE ORAL at 08:17

## 2019-01-25 RX ADMIN — OFLOXACIN 1 DROP: 3 SOLUTION OPHTHALMIC at 11:32

## 2019-01-25 RX ADMIN — NIFEDIPINE 60 MG: 30 TABLET, FILM COATED, EXTENDED RELEASE ORAL at 08:17

## 2019-01-25 RX ADMIN — TRAMADOL HYDROCHLORIDE 50 MG: 50 TABLET, COATED ORAL at 13:48

## 2019-01-25 RX ADMIN — FUROSEMIDE 20 MG: 20 TABLET ORAL at 08:17

## 2019-01-25 RX ADMIN — SODIUM CHLORIDE 75 ML/HR: 0.9 INJECTION, SOLUTION INTRAVENOUS at 13:48

## 2019-01-25 RX ADMIN — FLUTICASONE PROPIONATE 1 SPRAY: 50 SPRAY, METERED NASAL at 08:24

## 2019-01-25 RX ADMIN — SALINE NASAL SPRAY 2 SPRAY: 1.5 SOLUTION NASAL at 08:24

## 2019-01-25 RX ADMIN — ESTRADIOL 1 G: 0.1 CREAM VAGINAL at 08:24

## 2019-01-25 RX ADMIN — PANTOPRAZOLE SODIUM 40 MG: 40 TABLET, DELAYED RELEASE ORAL at 08:17

## 2019-01-25 RX ADMIN — SALINE NASAL SPRAY 2 SPRAY: 1.5 SOLUTION NASAL at 17:17

## 2019-01-25 RX ADMIN — METOPROLOL TARTRATE 25 MG: 25 TABLET, FILM COATED ORAL at 08:17

## 2019-01-25 NOTE — ASSESSMENT & PLAN NOTE
· Recurrent  · According to the patient's daughter, this is because patient has a habit of touching her nose and sometimes her fingers inside her nose and then touching her eye  · According to the patient's daughter, patient recently had right eye redness with discharge, which improved with eyedrops with antibiotic    Ophtho consulted-- agree with Ocuflox QID x1 week and OP ophtho f/u

## 2019-01-25 NOTE — PROGRESS NOTES
Progress Note - Toby Colon 4/27/1931, 80 y o  female MRN: [id number]    Unit/Bed#: -01 Encounter: [id number]    Primary Care Provider: Alivia Eaton DO   Date and time admitted to hospital: 1/22/2019  2:13 PM    Conjunctivitis of right eye   Assessment & Plan    · Recurrent  · According to the patient's daughter, this is because patient has a habit of touching her nose and sometimes her fingers inside her nose and then touching her eye  · According to the patient's daughter, patient recently had right eye redness with discharge, which improved with eyedrops with antibiotic  Ophtho consulted-- agree with Ocuflox QID x1 week and OP ophtho f/u        Anemia   Assessment & Plan    · Stable, likely chronic based on my review of patient's previous CBCs  In fact, improving as compared to previous CBC  · For further workup and management if this worsens significantly  Dementia   Assessment & Plan    · Continue Aricept  · Supportive care  · According to the patient's daughter at bedside, at baseline, patient will usually say I am dying       * Recurrent UTI   Assessment & Plan    · Patient has history of recurrent UTI  Her urine cultures that showed E coli sensitive to cefazolin  · IV Rocephin was started in the emergency room   Was switched to IV cefazolin Follow-up results of the urine culture  · With complaints of having fever and low blood pressures in the skilled nursing facility, blood cultures pending   influenza RSV and PCR were negative    · Temperatures over the last 24 hours have improved-- continue to monitor for additional 24 hours  · Daughter concerned that persistent low-grade temperatures could be sign of lyme disease-- she recounts finding a tick on her mother's ankle over the summer-- will give OP Rx for lyme titers        VTE Pharmacologic Prophylaxis:   Pharmacologic: Enoxaparin (Lovenox)  Mechanical VTE Prophylaxis in Place: Yes    Patient Centered Rounds: I have performed bedside rounds with nursing staff today  Discussions with Specialists or Other Care Team Provider:  Case management    Education and Discussions with Family / Patient:  Patient, updated daughter via phone    Time Spent for Care: 30 minutes  More than 50% of total time spent on counseling and coordination of care as described above  Current Length of Stay: 3 day(s)    Current Patient Status: Inpatient   Certification Statement: The patient will continue to require additional inpatient hospital stay due to Ongoing monitoring of temperatures    Discharge Plan:  Most likely discharge tomorrow as long as patient continues to remain afebrile    Code Status: Level 1 - Full Code      Subjective:   Patient feels well today  Offers no complaints  Per nursing, no new issues overnight and temperatures have been better controlled    Objective:     Vitals:   Temp (24hrs), Av 2 °F (37 3 °C), Min:98 °F (36 7 °C), Max:100 3 °F (37 9 °C)    Temp:  [98 °F (36 7 °C)-100 3 °F (37 9 °C)] 99 3 °F (37 4 °C)  HR:  [] 105  Resp:  [18] 18  BP: ()/(50-69) 155/67  SpO2:  [94 %-96 %] 94 %  Body mass index is 27 64 kg/m²  Input and Output Summary (last 24 hours): Intake/Output Summary (Last 24 hours) at 19 1311  Last data filed at 19 1101   Gross per 24 hour   Intake             1230 ml   Output              400 ml   Net              830 ml       Physical Exam:     Physical Exam   Constitutional: No distress  HENT:   Head: Normocephalic and atraumatic  Mouth/Throat: No oropharyngeal exudate  Eyes: Right eye exhibits discharge  Neck: No JVD present  Cardiovascular: Normal rate and regular rhythm  Exam reveals no gallop and no friction rub  No murmur heard  Pulmonary/Chest: Effort normal and breath sounds normal  No respiratory distress  She has no wheezes  She has no rales  Abdominal: Soft  Bowel sounds are normal  She exhibits no distension  There is no tenderness   There is no rebound  Musculoskeletal: She exhibits no edema or tenderness  Neurological: She is alert  She displays normal reflexes  No cranial nerve deficit  She exhibits normal muscle tone  Skin: Skin is warm and dry  No rash noted  She is not diaphoretic  No erythema  Psychiatric: Her behavior is normal        Additional Data:     Labs:      Results from last 7 days  Lab Units 01/24/19  0529   WBC Thousand/uL 7 81   HEMOGLOBIN g/dL 9 7*   HEMATOCRIT % 30 2*   PLATELETS Thousands/uL 172   NEUTROS PCT % 73   LYMPHS PCT % 17   MONOS PCT % 9   EOS PCT % 1       Results from last 7 days  Lab Units 01/23/19  0452   SODIUM mmol/L 134*   POTASSIUM mmol/L 4 2   CHLORIDE mmol/L 104   CO2 mmol/L 22   BUN mg/dL 14   CREATININE mg/dL 0 99   ANION GAP mmol/L 8   CALCIUM mg/dL 8 5   ALBUMIN g/dL 2 6*   TOTAL BILIRUBIN mg/dL 0 60   ALK PHOS U/L 107   ALT U/L 26   AST U/L 25   GLUCOSE RANDOM mg/dL 91                   Results from last 7 days  Lab Units 01/22/19  1447   LACTIC ACID mmol/L 1 2           * I Have Reviewed All Lab Data Listed Above  * Additional Pertinent Lab Tests Reviewed: [facility] Admission Reviewed    Imaging:    Imaging Reports Reviewed Today Include:  Chest x-ray  Imaging Personally Reviewed by Myself Includes:  Chest x-ray    Recent Cultures (last 7 days):       Results from last 7 days  Lab Units 01/22/19  1615 01/22/19  1520 01/22/19  1505 01/22/19  1447   BLOOD CULTURE   --  No Growth at 48 hrs  --  No Growth at 48 hrs     URINE CULTURE  >100,000 cfu/ml Escherichia coli*  --   --   --    INFLUENZA B PCR   --   --  None Detected  --    RSV PCR   --   --  None Detected  --        Last 24 Hours Medication List:     Current Facility-Administered Medications:  artificial tear  Both Eyes TID Everton Robins MD    aspirin 325 mg Oral Daily Everton Robins MD    benzocaine 1 spray Mucosal TID PRN Everton Robins MD    benzonatate 100 mg Oral TID PRN Mortimer Mighty, MD calcium carbonate 1 tablet Oral Daily With Breakfast Everton Robins MD    cefazolin 1,000 mg Intravenous Q12H Gauri Ponce MD Last Rate: 1,000 mg (01/25/19 0542)   donepezil 5 mg Oral HS Everton Robins MD    DULoxetine 60 mg Oral Daily Everton Robins MD    enoxaparin 30 mg Subcutaneous Daily Everton Robins MD    estradiol 1 g Vaginal Daily Everton Robins MD    fluticasone 1 spray Nasal Daily Everton Robins MD    furosemide 20 mg Oral Once per day on Mon Wed Fri Everton Robins MD    guaiFENesin 600 mg Oral BID PRN Everton Robins MD    ipratropium-albuterol 3 mL Nebulization Q4H PRN Everton Robins MD    levothyroxine 100 mcg Oral Early Morning Everton Robins MD    loratadine 10 mg Oral Daily Everton Robins MD    LORazepam 0 5 mg Oral Q8H PRN Everton Robins MD    LORazepam 0 5 mg Oral HS vEerton Robins MD    losartan 100 mg Oral Daily Everton Robins MD    magnesium hydroxide 30 mL Oral Daily PRN Everton Robins MD    metoprolol tartrate 25 mg Oral BID Everton Robins MD    NIFEdipine 60 mg Oral Daily Everton Robins MD    ofloxacin 1 drop Right Eye 4x Daily Everton Robins MD    ondansetron 4 mg Intravenous Q6H PRN Everton Robins MD    pantoprazole 40 mg Oral BID Everton Robins MD    potassium chloride 10 mEq Oral Once per day on Mon Wed Fri Everton Robins MD    pravastatin 40 mg Oral Daily Everton Robins MD    senna 1 tablet Oral HS PRN Everton Robins MD    sodium chloride 2 spray Each Nare BID Everton Robins MD    sodium chloride 75 mL/hr Intravenous Continuous Everton Robins MD Last Rate: 75 mL/hr (01/25/19 0024)   traMADol 50 mg Oral Q6H PRN Everton Robins MD    triamcinolone  Topical BID Everton Robins MD         Today, Patient Was Seen By: River Bernard PA-C    ** Please Note: Dictation voice to text software may have been used in the creation of this document  **

## 2019-01-25 NOTE — PLAN OF CARE
DISCHARGE PLANNING - CARE MANAGEMENT     Discharge to post-acute care or home with appropriate resources Progressing        INFECTION - ADULT     Absence or prevention of progression during hospitalization Progressing        Prexisting or High Potential for Compromised Skin Integrity     Skin integrity is maintained or improved Progressing        SAFETY ADULT     Patient will remain free of falls Progressing     Maintain or return to baseline ADL function Progressing     Maintain or return mobility status to optimal level Progressing

## 2019-01-25 NOTE — ASSESSMENT & PLAN NOTE
· Patient has history of recurrent UTI  Her urine cultures that showed E coli sensitive to cefazolin  · IV Rocephin was started in the emergency room   Was switched to IV cefazolin Follow-up results of the urine culture  · With complaints of having fever and low blood pressures in the skilled nursing facility, blood cultures pending   influenza RSV and PCR were negative    · Temperatures over the last 24 hours have improved-- continue to monitor for additional 24 hours  · Daughter concerned that persistent low-grade temperatures could be sign of lyme disease-- she recounts finding a tick on her mother's ankle over the summer-- will give OP Rx for lyme titers

## 2019-01-26 ENCOUNTER — APPOINTMENT (INPATIENT)
Dept: RADIOLOGY | Facility: HOSPITAL | Age: 84
DRG: 689 | End: 2019-01-26
Payer: MEDICARE

## 2019-01-26 ENCOUNTER — APPOINTMENT (INPATIENT)
Dept: ULTRASOUND IMAGING | Facility: HOSPITAL | Age: 84
DRG: 689 | End: 2019-01-26
Payer: MEDICARE

## 2019-01-26 LAB
ANION GAP SERPL CALCULATED.3IONS-SCNC: 10 MMOL/L (ref 4–13)
BUN SERPL-MCNC: 10 MG/DL (ref 5–25)
CALCIUM SERPL-MCNC: 8.5 MG/DL (ref 8.3–10.1)
CHLORIDE SERPL-SCNC: 101 MMOL/L (ref 100–108)
CO2 SERPL-SCNC: 22 MMOL/L (ref 21–32)
CREAT SERPL-MCNC: 0.87 MG/DL (ref 0.6–1.3)
CRP SERPL QL: 74 MG/L
ERYTHROCYTE [DISTWIDTH] IN BLOOD BY AUTOMATED COUNT: 13.3 % (ref 11.6–15.1)
ERYTHROCYTE [SEDIMENTATION RATE] IN BLOOD: 109 MM/HOUR (ref 0–20)
GFR SERPL CREATININE-BSD FRML MDRD: 60 ML/MIN/1.73SQ M
GLUCOSE SERPL-MCNC: 89 MG/DL (ref 65–140)
HCT VFR BLD AUTO: 27 % (ref 34.8–46.1)
HGB BLD-MCNC: 8.9 G/DL (ref 11.5–15.4)
MCH RBC QN AUTO: 29.7 PG (ref 26.8–34.3)
MCHC RBC AUTO-ENTMCNC: 33 G/DL (ref 31.4–37.4)
MCV RBC AUTO: 90 FL (ref 82–98)
PLATELET # BLD AUTO: 201 THOUSANDS/UL (ref 149–390)
PMV BLD AUTO: 9.4 FL (ref 8.9–12.7)
POTASSIUM SERPL-SCNC: 3.5 MMOL/L (ref 3.5–5.3)
PROCALCITONIN SERPL-MCNC: 0.26 NG/ML
RBC # BLD AUTO: 3 MILLION/UL (ref 3.81–5.12)
SODIUM SERPL-SCNC: 133 MMOL/L (ref 136–145)
T3FREE SERPL-MCNC: 1.09 PG/ML (ref 2.3–4.2)
T4 FREE SERPL-MCNC: 1.76 NG/DL (ref 0.76–1.46)
URATE SERPL-MCNC: 3.9 MG/DL (ref 2–6.8)
WBC # BLD AUTO: 7.87 THOUSAND/UL (ref 4.31–10.16)

## 2019-01-26 PROCEDURE — 84550 ASSAY OF BLOOD/URIC ACID: CPT | Performed by: PHYSICIAN ASSISTANT

## 2019-01-26 PROCEDURE — 99233 SBSQ HOSP IP/OBS HIGH 50: CPT | Performed by: PHYSICIAN ASSISTANT

## 2019-01-26 PROCEDURE — 85652 RBC SED RATE AUTOMATED: CPT | Performed by: PHYSICIAN ASSISTANT

## 2019-01-26 PROCEDURE — 99223 1ST HOSP IP/OBS HIGH 75: CPT | Performed by: INTERNAL MEDICINE

## 2019-01-26 PROCEDURE — 73110 X-RAY EXAM OF WRIST: CPT

## 2019-01-26 PROCEDURE — 76770 US EXAM ABDO BACK WALL COMP: CPT

## 2019-01-26 PROCEDURE — 84481 FREE ASSAY (FT-3): CPT | Performed by: PHYSICIAN ASSISTANT

## 2019-01-26 PROCEDURE — 86140 C-REACTIVE PROTEIN: CPT | Performed by: PHYSICIAN ASSISTANT

## 2019-01-26 PROCEDURE — 84145 PROCALCITONIN (PCT): CPT | Performed by: PHYSICIAN ASSISTANT

## 2019-01-26 PROCEDURE — 84439 ASSAY OF FREE THYROXINE: CPT | Performed by: PHYSICIAN ASSISTANT

## 2019-01-26 PROCEDURE — 93005 ELECTROCARDIOGRAM TRACING: CPT

## 2019-01-26 PROCEDURE — 85027 COMPLETE CBC AUTOMATED: CPT | Performed by: PHYSICIAN ASSISTANT

## 2019-01-26 PROCEDURE — 80048 BASIC METABOLIC PNL TOTAL CA: CPT | Performed by: PHYSICIAN ASSISTANT

## 2019-01-26 PROCEDURE — 86618 LYME DISEASE ANTIBODY: CPT | Performed by: PHYSICIAN ASSISTANT

## 2019-01-26 RX ORDER — IBUPROFEN 400 MG/1
400 TABLET ORAL ONCE
Status: COMPLETED | OUTPATIENT
Start: 2019-01-26 | End: 2019-01-26

## 2019-01-26 RX ORDER — MUSCLE RUB CREAM 100; 150 MG/G; MG/G
CREAM TOPICAL 4 TIMES DAILY PRN
Status: DISCONTINUED | OUTPATIENT
Start: 2019-01-26 | End: 2019-01-28 | Stop reason: HOSPADM

## 2019-01-26 RX ADMIN — METOPROLOL TARTRATE 25 MG: 25 TABLET, FILM COATED ORAL at 08:40

## 2019-01-26 RX ADMIN — WHITE PETROLATUM 57.7 %-MINERAL OIL 31.9 % EYE OINTMENT: at 17:00

## 2019-01-26 RX ADMIN — OFLOXACIN 1 DROP: 3 SOLUTION OPHTHALMIC at 22:45

## 2019-01-26 RX ADMIN — TRAMADOL HYDROCHLORIDE 50 MG: 50 TABLET, COATED ORAL at 02:53

## 2019-01-26 RX ADMIN — SALINE NASAL SPRAY 2 SPRAY: 1.5 SOLUTION NASAL at 18:00

## 2019-01-26 RX ADMIN — CALCIUM 1 TABLET: 500 TABLET ORAL at 08:30

## 2019-01-26 RX ADMIN — LORATADINE 10 MG: 10 TABLET ORAL at 09:01

## 2019-01-26 RX ADMIN — DONEPEZIL HYDROCHLORIDE 5 MG: 5 TABLET, FILM COATED ORAL at 22:45

## 2019-01-26 RX ADMIN — SALINE NASAL SPRAY 2 SPRAY: 1.5 SOLUTION NASAL at 08:43

## 2019-01-26 RX ADMIN — OFLOXACIN 1 DROP: 3 SOLUTION OPHTHALMIC at 18:00

## 2019-01-26 RX ADMIN — WHITE PETROLATUM 57.7 %-MINERAL OIL 31.9 % EYE OINTMENT: at 20:11

## 2019-01-26 RX ADMIN — SODIUM CHLORIDE 75 ML/HR: 0.9 INJECTION, SOLUTION INTRAVENOUS at 02:52

## 2019-01-26 RX ADMIN — TRAMADOL HYDROCHLORIDE 50 MG: 50 TABLET, COATED ORAL at 09:00

## 2019-01-26 RX ADMIN — PRAVASTATIN SODIUM 40 MG: 40 TABLET ORAL at 08:40

## 2019-01-26 RX ADMIN — Medication 1000 MG: at 05:10

## 2019-01-26 RX ADMIN — LOSARTAN POTASSIUM 100 MG: 50 TABLET, FILM COATED ORAL at 08:39

## 2019-01-26 RX ADMIN — NIFEDIPINE 60 MG: 30 TABLET, FILM COATED, EXTENDED RELEASE ORAL at 08:40

## 2019-01-26 RX ADMIN — ENOXAPARIN SODIUM 30 MG: 30 INJECTION SUBCUTANEOUS at 08:38

## 2019-01-26 RX ADMIN — OFLOXACIN 1 DROP: 3 SOLUTION OPHTHALMIC at 08:42

## 2019-01-26 RX ADMIN — WHITE PETROLATUM 57.7 %-MINERAL OIL 31.9 % EYE OINTMENT: at 08:00

## 2019-01-26 RX ADMIN — CEFAZOLIN SODIUM 1000 MG: 10 INJECTION, POWDER, FOR SOLUTION INTRAVENOUS at 20:06

## 2019-01-26 RX ADMIN — MENTHOL, METHYL SALICYLATE: 10; 15 CREAM TOPICAL at 13:38

## 2019-01-26 RX ADMIN — ESTRADIOL 1 G: 0.1 CREAM VAGINAL at 08:42

## 2019-01-26 RX ADMIN — IBUPROFEN 400 MG: 400 TABLET ORAL at 20:04

## 2019-01-26 RX ADMIN — FLUTICASONE PROPIONATE 1 SPRAY: 50 SPRAY, METERED NASAL at 08:42

## 2019-01-26 RX ADMIN — IBUPROFEN 400 MG: 400 TABLET ORAL at 05:59

## 2019-01-26 RX ADMIN — METOPROLOL TARTRATE 25 MG: 25 TABLET, FILM COATED ORAL at 17:58

## 2019-01-26 RX ADMIN — DULOXETINE HYDROCHLORIDE 60 MG: 60 CAPSULE, DELAYED RELEASE ORAL at 08:39

## 2019-01-26 RX ADMIN — PANTOPRAZOLE SODIUM 40 MG: 40 TABLET, DELAYED RELEASE ORAL at 17:58

## 2019-01-26 RX ADMIN — LEVOTHYROXINE SODIUM 100 MCG: 100 TABLET ORAL at 05:09

## 2019-01-26 RX ADMIN — OFLOXACIN 1 DROP: 3 SOLUTION OPHTHALMIC at 13:00

## 2019-01-26 RX ADMIN — CEFAZOLIN SODIUM 1000 MG: 10 INJECTION, POWDER, FOR SOLUTION INTRAVENOUS at 13:34

## 2019-01-26 RX ADMIN — LORAZEPAM 0.5 MG: 0.5 TABLET ORAL at 22:45

## 2019-01-26 RX ADMIN — PANTOPRAZOLE SODIUM 40 MG: 40 TABLET, DELAYED RELEASE ORAL at 08:41

## 2019-01-26 RX ADMIN — ASPIRIN 325 MG: 325 TABLET ORAL at 08:41

## 2019-01-26 NOTE — ASSESSMENT & PLAN NOTE
· Also with chronic pain in shoulder, and neck from significant degenerative disease  · Try heating pad and menthol rub  May need to try NSAIDs at low-dose if able  She is apparently intolerant to Tylenol  · P r n   Tramadol

## 2019-01-26 NOTE — PROGRESS NOTES
Progress Note - David Hills 4/27/1931, 80 y o  female MRN: [id number]    Unit/Bed#: -01 Encounter: [id number]    Primary Care Provider: Aniceto Habermann, DO   Date and time admitted to hospital: 1/22/2019  2:13 PM  * Recurrent UTI   Assessment & Plan    · Patient has history of recurrent UTI  Her urine cultures show E coli sensitive to IV cefazolin  She had originally been receiving 1 g b i d  However after discussing the case with pharmacy today I have increased the dosing to 1 mg p o  t i d  She is currently on antibiotic day #5  · I have also requested Infectious Disease input, see note above     Fever   Assessment & Plan    · Patient with persistent fever noted to be 100 8 this am  No leukocytosis  · Asked for Infectious Disease assistance, my differential is as follows:   --inadequate dosing of antibiotics/renal abscess    --acute gout--she is having severe right wrist pain   --lyme disease (patient had prior tick bite)   --atelectasis based on decreased mobility   --other non infectious etiology such as thyroid abnormality (TSH 0 1)   · Will check wrist x-ray, ESR, CRP, procalcitonin level, uric acid level  · Also increased IV Ancef dosage as listed above  · Continue to monitor temperature chin and clinical picture     Dementia   Assessment & Plan    · Continue Aricept  No evidence of behavioral disturbances at this point  · Patient requires heavy amount of assistance for ambulation at this time but according to her daughter was previously able to ambulate with her walker (though she fatigues quite easily) or ambulate short distances like to the bathroom without a walker    · Given her severe left wrist pain I am not sure she could bear weight to use a roller walker at this point and therefore have asked physical therapy to assess the patient as she may require higher level of care at discharge     Rheumatoid arthritis [facility])   Assessment & Plan    · Also with chronic pain in shoulder, and neck from significant degenerative disease  · Try heating pad and menthol rub  May need to try NSAIDs at low-dose if able  She is apparently intolerant to Tylenol  · P r n  Tramadol       VTE Pharmacologic Prophylaxis:   Pharmacologic: Enoxaparin (Lovenox)  Mechanical VTE Prophylaxis in Place: Yes    Patient Centered Rounds: I have performed bedside rounds with nursing staff today  Discussions with Specialists or Other Care Team Provider:  Infectious Disease, my attending    Education and Discussions with Family / Patient:  Patient's daughter    Time Spent for Care: 35 minutes  More than 50% of total time spent on counseling and coordination of care as described above  Current Length of Stay: 4 day(s)    Current Patient Status: Inpatient   Certification Statement: The patient will continue to require additional inpatient hospital stay due to Persistent fever    Discharge Plan:  Need to determine if she can return back to assisted living verses need skilled nursing facility level care    Code Status: Level 1 - Full Code    Subjective:   Patient currently with several complaints  She reports neck pain (this is chronic for her), abdominal/chest pain due to being gassy and feeling as if she needs to have a bowel movement right now, and severe left wrist pain  Again she feels a bit constipated and is certainly not having any bouts of diarrhea  She is not having any cough  She is not reporting flank pain  Objective:     Vitals:   Temp (24hrs), Av 8 °F (37 7 °C), Min:98 1 °F (36 7 °C), Max:100 8 °F (38 2 °C)    Temp:  [98 1 °F (36 7 °C)-100 8 °F (38 2 °C)] 98 1 °F (36 7 °C)  HR:  [88-93] 93  Resp:  [18-20] 20  BP: (147-150)/(63-65) 150/65  SpO2:  [93 %-94 %] 94 %  Body mass index is 27 64 kg/m²  Input and Output Summary (last 24 hours):        Intake/Output Summary (Last 24 hours) at 19 1404  Last data filed at 19 0930   Gross per 24 hour   Intake             1380 ml   Output              694 ml   Net              686 ml       Physical Exam:     Physical Exam   Constitutional: No distress  Thin elderly female laying in bed   HENT:   Head: Normocephalic and atraumatic  Eyes: Right eye exhibits no discharge  Left eye exhibits no discharge  Maintains right eye closed  Left eye unremarkable   Neck:   Neck muscle tenderness   Cardiovascular: Normal rate, regular rhythm and normal heart sounds  No murmur heard  Pulmonary/Chest: Effort normal and breath sounds normal  No respiratory distress  She has no wheezes  She has no rales  She exhibits no tenderness  No chest wall tenderness   Abdominal: Soft  She exhibits no distension  There is no tenderness  There is no guarding  Musculoskeletal: She exhibits tenderness (Significant tenderness even to light touch of the left hand) and deformity (Arthritic nodules in her hands)  She exhibits no edema  Neurological: She is alert  Skin: Skin is warm and dry  No rash noted  She is not diaphoretic  No erythema  No pallor  No warmth or erythema noted   Psychiatric: She has a normal mood and affect  Pleasant and cooperative   Vitals reviewed        Additional Data:     Labs:      Results from last 7 days  Lab Units 01/26/19  0607 01/24/19  0529   WBC Thousand/uL 7 87 7 81   HEMOGLOBIN g/dL 8 9* 9 7*   HEMATOCRIT % 27 0* 30 2*   PLATELETS Thousands/uL 201 172   NEUTROS PCT %  --  73   LYMPHS PCT %  --  17   MONOS PCT %  --  9   EOS PCT %  --  1       Results from last 7 days  Lab Units 01/26/19  0607 01/23/19  0452   SODIUM mmol/L 133* 134*   POTASSIUM mmol/L 3 5 4 2   CHLORIDE mmol/L 101 104   CO2 mmol/L 22 22   BUN mg/dL 10 14   CREATININE mg/dL 0 87 0 99   ANION GAP mmol/L 10 8   CALCIUM mg/dL 8 5 8 5   ALBUMIN g/dL  --  2 6*   TOTAL BILIRUBIN mg/dL  --  0 60   ALK PHOS U/L  --  107   ALT U/L  --  26   AST U/L  --  25   GLUCOSE RANDOM mg/dL 89 91                   Results from last 7 days  Lab Units 01/22/19  1447   LACTIC ACID mmol/L 1 2     * I Have Reviewed All Lab Data Listed Above  * Additional Pertinent Lab Tests Reviewed: [facility] Admission Reviewed    Imaging:    Imaging Reports Reviewed Today Include:   Imaging Personally Reviewed by Myself Includes:      Recent Cultures (last 7 days):       Results from last 7 days  Lab Units 01/22/19  1615 01/22/19  1520 01/22/19  1505 01/22/19  1447   BLOOD CULTURE   --  No Growth at 72 hrs   --  No Growth at 72 hrs     URINE CULTURE  >100,000 cfu/ml Escherichia coli*  --   --   --    INFLUENZA B PCR   --   --  None Detected  --    RSV PCR   --   --  None Detected  --        Last 24 Hours Medication List:     Current Facility-Administered Medications:  artificial tear  Both Eyes TID Everton Robins MD    aspirin 325 mg Oral Daily Everton Robins MD    benzocaine 1 spray Mucosal TID PRN Everton Robins MD    benzonatate 100 mg Oral TID PRN Everton Robins MD    calcium carbonate 1 tablet Oral Daily With Breakfast Everton Robins MD    cefazolin 1,000 mg Intravenous Q8H Ruth Hargrove PA-C Last Rate: 1,000 mg (01/26/19 1334)   donepezil 5 mg Oral HS Everton Robins MD    DULoxetine 60 mg Oral Daily Everton Robins MD    enoxaparin 30 mg Subcutaneous Daily Everton Robins MD    estradiol 1 g Vaginal Daily Everton Robins MD    fluticasone 1 spray Nasal Daily Everton Robins MD    furosemide 20 mg Oral Once per day on Mon Wed Fri Everton Robins MD    guaiFENesin 600 mg Oral BID PRN Everton Robins MD    levothyroxine 100 mcg Oral Early Morning Everton Robins MD    loratadine 10 mg Oral Daily Everton Robins MD    LORazepam 0 5 mg Oral Q8H PRN Everton Robins MD    LORazepam 0 5 mg Oral HS Everton Robins MD    losartan 100 mg Oral Daily Everton Robins MD    magnesium hydroxide 30 mL Oral Daily PRN Everton Robins MD    menthol-methyl salicylate  Apply externally 4x Daily PRN Ruth Hargrove PA-C    metoprolol tartrate 25 mg Oral BID Everton Robins MD    NIFEdipine 60 mg Oral Daily Everton Robins MD    ofloxacin 1 drop Right Eye 4x Daily Everton Robins MD    ondansetron 4 mg Intravenous Q6H PRN Everton Robins MD    pantoprazole 40 mg Oral BID Everton Robins MD    potassium chloride 10 mEq Oral Once per day on Mon Wed Fri Everton Robins MD    pravastatin 40 mg Oral Daily Everton Robins MD    senna 1 tablet Oral HS PRN Everton Robins MD    sodium chloride 2 spray Each Nare BID Everton Robins MD    sodium chloride 50 mL/hr Intravenous Continuous Ruth Hargrove PA-C Last Rate: 50 mL/hr (01/26/19 1100)   traMADol 50 mg Oral Q6H PRN Everton Robins MD    triamcinolone  Topical BID Naya Robins MD         Today, Patient Was Seen By: Trent Reese PA-C    ** Please Note: Dictation voice to text software may have been used in the creation of this document   **

## 2019-01-26 NOTE — CONSULTS
Consultation - Infectious Disease   Rosa Murguia 80 y o  female MRN: [id number]  Unit/Bed#: -01 Encounter: [id number]      IMPRESSION & RECOMMENDATIONS:   1  Sepsis-present soon after admission  Fever and tachycardia  The patient's temperature curve has come down, and the heart rate is improved  She has remained hemodynamically stable  Possibly all secondary to UTI  Possibly another source  Of note Lyme disease will not cause ongoing fever months after exposure  Patient's clinical status has improved despite the ongoing low-grade fever  She seems to be tolerating the antibiotics without difficulty  -continue cefazolin for now at current dose  -follow-up blood cultures  -check procalcitonin now in tomorrow a m   -monitor CBC with diff and creatinine  -supportive care    2  Presumptive UTI-based upon the intermittent fever, abnormal urinalysis, and positive urine culture  The patient is relatively asymptomatic at this time  No resistant organism has been isolated  Consideration for the possibility of complication of UTI the ongoing low-grade fever   -antibiotics as above  -check renal ultrasound  -monitor symptomatology  -no additional ID workup for now    3  Left wrist pain and swelling-consideration for the possibility of a fracture  Consideration for the possibility of inflammatory conditions such as pseudogout   -check X ray of the left wrist  -serial exams  -additional workup as needed     4  Dementia-apparently the patient is currently under cognitive baseline     5  Right eye conjunctivitis-continue Ocuflox as per Ophthalmology     HISTORY OF PRESENT ILLNESS:  Reason for Consult:  Recurrent UTI  HPI: Rosa Murguia is a 80y o  year old female admitted to [facility] on the 20 second of January with fever and hypotension who I am asked to assist with management    Patient by report has had history of recurrent UTI and recurrent pneumonia in the past   At baseline she has significant dementia and resides in a skilled nursing facility  She was noted to have fever and hypotension at the skilled nursing facility and therefore was sent to the ER for further evaluation  In the emergency department the patient was initially afebrile and the blood pressure improved  However soon after admission, the patient once again became febrile  She had blood cultures and urine cultures obtained and was given a dose of ceftriaxone admitted for further management  She was diagnosed with a possible UTI and her antibiotics were changed to cefazolin  Despite the IV antibiotic treatment the patient has had intermittent low-grade fever  She has also noted some left wrist pain  She currently denies any headache or stiff neck, denies any sore throat or rhinorrhea or nasal congestion, denies any cough or shortness of breath, denies any chest pain or abdominal pain, denies any dysuria or hematuria, denies any new rash or skin lesions, denies any other joint or muscle pains  Brief hospital stay she has remained hemodynamically stable and she has been without leukocytosis  Of note the patient had developed some right eye injection has been diagnosed with conjunctivitis and has been receiving eyedrops as per Ophthalmology    REVIEW OF SYSTEMS:  A complete 12 point system-based review of systems is negative other than that noted in the HPI      PAST MEDICAL HISTORY:  Past Medical History:   Diagnosis Date    Aspiration pneumonia ([address] )     Last Assessed:  7/18/17    Basal cell carcinoma of nose     Last Assessed:  4/12/17    Blind     blind right eye, 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, Last Assessed:  7/18/17    Dementia     Depression     Disease of thyroid gland     Fibromyalgia     Last Assessed:  7/18/17    Gait disturbance     GERD (gastroesophageal reflux disease)     Glaucoma     Hyperlipidemia     Hypertension     Osteopenia     Peripheral neuropathy Last Assessed:  17    Polymyalgia rheumatica ([facility] )     Psychiatric disorder     depression    Renal insufficiency syndrome     Last Assessed:  17    Rheumatoid arthritis ([facility] )     Stroke ([facility] )     x2, Last Assessed:  17    Systemic lupus erythematosus ([facility] )     Vertigo     Vitamin D deficiency      Past Surgical History:   Procedure Laterality Date    EYE SURGERY      JOINT REPLACEMENT      left hip replacement, left shoulder replacement    SHOULDER SURGERY      Replacement       FAMILY HISTORY:  Non-contributory    SOCIAL HISTORY:  Social History   History   Alcohol Use No     History   Drug Use No     History   Smoking Status    Never Smoker   Smokeless Tobacco    Never Used       ALLERGIES:  Allergies   Allergen Reactions    Acetaminophen     Golimumab      Other reaction(s): dedrick colored stool    Lidocaine Other (See Comments)    Neurontin [Gabapentin]     Nortriptyline Tremor    Prasterone      Other reaction(s): pruitis    Tricyclic Antidepressants     Leflunomide Rash    Sulfasalazine Rash       MEDICATIONS:  All current active medications have been reviewed    Antibiotics:  Cefazolin 4, total antibiotics 5    PHYSICAL EXAM:  Temp:  [98 1 °F (36 7 °C)-100 8 °F (38 2 °C)] 98 1 °F (36 7 °C)  HR:  [88-93] 93  Resp:  [18-20] 20  BP: (147-150)/(63-65) 150/65  SpO2:  [93 %-94 %] 94 %  Temp (24hrs), Av 8 °F (37 7 °C), Min:98 1 °F (36 7 °C), Max:100 8 °F (38 2 °C)  Current: Temperature: 98 1 °F (36 7 °C)    Intake/Output Summary (Last 24 hours) at 19 1116  Last data filed at 19 5853   Gross per 24 hour   Intake             2145 ml   Output              694 ml   Net             1451 ml       General Appearance:  Appearing well, nontoxic, and in no distress   Head:  Normocephalic, without obvious abnormality, atraumatic   Eyes:  Conjunctiva injected on the right and sclera anicteric, both eyes   Nose: Nares normal, mucosa normal, no drainage   Throat: Oropharynx moist without lesions   Neck: Supple, symmetrical, no adenopathy, no tenderness/mass/nodules   Back:   Symmetric, no curvature, ROM normal, no CVA tenderness   Lungs:   Clear to auscultation bilaterally, respirations unlabored   Chest Wall:  No tenderness or deformity   Heart:  RRR; no murmur, rub or gallop   Abdomen:   Soft, non-tender, non-distended, positive bowel sounds    Extremities: No cyanosis, clubbing  Left wrist tenderness with mild swelling  Ecchymosis noted on the dorsum of the left hand  Decreased range of motion left wrist   Skin: No rashes or lesions  No draining wounds noted  Lymph nodes: Cervical, supraclavicular nodes normal   Neurologic: Alert, answer simple questions appropriately able to move all 4 extremities       LABS, IMAGING, & OTHER STUDIES:  Lab Results:  I have personally reviewed pertinent labs  Results from last 7 days  Lab Units 01/26/19  0607 01/24/19  0529 01/23/19  0452   WBC Thousand/uL 7 87 7 81 8 45   HEMOGLOBIN g/dL 8 9* 9 7* 9 2*   PLATELETS Thousands/uL 201 172 168       Results from last 7 days  Lab Units 01/26/19  0607 01/23/19  0452 01/22/19  1447   SODIUM mmol/L 133* 134* 135*   POTASSIUM mmol/L 3 5 4 2 4 3   CHLORIDE mmol/L 101 104 98*   CO2 mmol/L 22 22 24   BUN mg/dL 10 14 20   CREATININE mg/dL 0 87 0 99 1 24   EGFR ml/min/1 73sq m 60 51 39   CALCIUM mg/dL 8 5 8 5 8 8   AST U/L  --  25 37   ALT U/L  --  26 33   ALK PHOS U/L  --  107 127*       Results from last 7 days  Lab Units 01/22/19  1615 01/22/19  1520 01/22/19  1505 01/22/19  1447   BLOOD CULTURE   --  No Growth at 72 hrs   --  No Growth at 72 hrs  URINE CULTURE  >100,000 cfu/ml Escherichia coli*  --   --   --    INFLUENZA B PCR   --   --  None Detected  --    RSV PCR   --   --  None Detected  --        Imaging Studies:   I have personally reviewed pertinent imaging study reports and images in PACS  Chest x-ray-No acute cardiopulmonary disease   Pulmonary hyperinflation in keeping with COPD

## 2019-01-26 NOTE — ASSESSMENT & PLAN NOTE
· Continue Aricept  No evidence of behavioral disturbances at this point  · Patient requires heavy amount of assistance for ambulation at this time but according to her daughter was previously able to ambulate with her walker (though she fatigues quite easily) or ambulate short distances like to the bathroom without a walker    · Given her severe left wrist pain I am not sure she could bear weight to use a roller walker at this point and therefore have asked physical therapy to assess the patient as she may require higher level of care at discharge

## 2019-01-26 NOTE — ASSESSMENT & PLAN NOTE
· Patient has history of recurrent UTI  Her urine cultures show E coli sensitive to IV cefazolin  She had originally been receiving 1 g b i d  However after discussing the case with pharmacy today I have increased the dosing to 1 mg p o  t i d    She is currently on antibiotic day #5  · I have also requested Infectious Disease input, see note above

## 2019-01-26 NOTE — PLAN OF CARE
DISCHARGE PLANNING - CARE MANAGEMENT     Discharge to post-acute care or home with appropriate resources Progressing        INFECTION - ADULT     Absence or prevention of progression during hospitalization Progressing        Potential for Falls     Patient will remain free of falls Progressing        Prexisting or High Potential for Compromised Skin Integrity     Skin integrity is maintained or improved Progressing        SAFETY ADULT     Patient will remain free of falls Progressing     Maintain or return to baseline ADL function Progressing     Maintain or return mobility status to optimal level Progressing

## 2019-01-26 NOTE — ASSESSMENT & PLAN NOTE
· Patient with persistent fever noted to be 100 8 this am  No leukocytosis  · Asked for Infectious Disease assistance, my differential is as follows:   --inadequate dosing of antibiotics/renal abscess    --acute gout--she is having severe right wrist pain   --lyme disease (patient had prior tick bite)   --atelectasis based on decreased mobility   --other non infectious etiology such as thyroid abnormality (TSH 0 1)   · Will check wrist x-ray, ESR, CRP, procalcitonin level, uric acid level    · Also increased IV Ancef dosage as listed above  · Continue to monitor temperature chin and clinical picture

## 2019-01-27 ENCOUNTER — APPOINTMENT (INPATIENT)
Dept: CT IMAGING | Facility: HOSPITAL | Age: 84
DRG: 689 | End: 2019-01-27
Payer: MEDICARE

## 2019-01-27 PROBLEM — M79.89 SWELLING OF RIGHT HAND: Status: ACTIVE | Noted: 2019-01-27

## 2019-01-27 LAB
ATRIAL RATE: 80 BPM
ATRIAL RATE: 81 BPM
BACTERIA BLD CULT: NORMAL
BACTERIA BLD CULT: NORMAL
BASOPHILS # BLD AUTO: 0.01 THOUSANDS/ΜL (ref 0–0.1)
BASOPHILS NFR BLD AUTO: 0 % (ref 0–1)
EOSINOPHIL # BLD AUTO: 0.25 THOUSAND/ΜL (ref 0–0.61)
EOSINOPHIL NFR BLD AUTO: 4 % (ref 0–6)
ERYTHROCYTE [DISTWIDTH] IN BLOOD BY AUTOMATED COUNT: 13.3 % (ref 11.6–15.1)
HCT VFR BLD AUTO: 26.7 % (ref 34.8–46.1)
HGB BLD-MCNC: 8.7 G/DL (ref 11.5–15.4)
IMM GRANULOCYTES # BLD AUTO: 0.03 THOUSAND/UL (ref 0–0.2)
IMM GRANULOCYTES NFR BLD AUTO: 0 % (ref 0–2)
LYMPHOCYTES # BLD AUTO: 1.06 THOUSANDS/ΜL (ref 0.6–4.47)
LYMPHOCYTES NFR BLD AUTO: 15 % (ref 14–44)
MCH RBC QN AUTO: 29.5 PG (ref 26.8–34.3)
MCHC RBC AUTO-ENTMCNC: 32.6 G/DL (ref 31.4–37.4)
MCV RBC AUTO: 91 FL (ref 82–98)
MONOCYTES # BLD AUTO: 0.64 THOUSAND/ΜL (ref 0.17–1.22)
MONOCYTES NFR BLD AUTO: 9 % (ref 4–12)
NEUTROPHILS # BLD AUTO: 5.18 THOUSANDS/ΜL (ref 1.85–7.62)
NEUTS SEG NFR BLD AUTO: 72 % (ref 43–75)
NRBC BLD AUTO-RTO: 0 /100 WBCS
P AXIS: 54 DEGREES
P AXIS: 71 DEGREES
PLATELET # BLD AUTO: 201 THOUSANDS/UL (ref 149–390)
PMV BLD AUTO: 9.3 FL (ref 8.9–12.7)
PR INTERVAL: 124 MS
PR INTERVAL: 140 MS
PROCALCITONIN SERPL-MCNC: 0.22 NG/ML
QRS AXIS: 69 DEGREES
QRS AXIS: 71 DEGREES
QRSD INTERVAL: 118 MS
QRSD INTERVAL: 120 MS
QT INTERVAL: 400 MS
QT INTERVAL: 402 MS
QTC INTERVAL: 463 MS
QTC INTERVAL: 464 MS
RBC # BLD AUTO: 2.95 MILLION/UL (ref 3.81–5.12)
T WAVE AXIS: -16 DEGREES
T WAVE AXIS: -35 DEGREES
VENTRICULAR RATE: 80 BPM
VENTRICULAR RATE: 81 BPM
WBC # BLD AUTO: 7.17 THOUSAND/UL (ref 4.31–10.16)

## 2019-01-27 PROCEDURE — 93010 ELECTROCARDIOGRAM REPORT: CPT | Performed by: INTERNAL MEDICINE

## 2019-01-27 PROCEDURE — 99222 1ST HOSP IP/OBS MODERATE 55: CPT | Performed by: ORTHOPAEDIC SURGERY

## 2019-01-27 PROCEDURE — 99233 SBSQ HOSP IP/OBS HIGH 50: CPT | Performed by: PHYSICIAN ASSISTANT

## 2019-01-27 PROCEDURE — 84145 PROCALCITONIN (PCT): CPT | Performed by: INTERNAL MEDICINE

## 2019-01-27 PROCEDURE — 73201 CT UPPER EXTREMITY W/DYE: CPT

## 2019-01-27 PROCEDURE — 87040 BLOOD CULTURE FOR BACTERIA: CPT | Performed by: INTERNAL MEDICINE

## 2019-01-27 PROCEDURE — 85025 COMPLETE CBC W/AUTO DIFF WBC: CPT | Performed by: PHYSICIAN ASSISTANT

## 2019-01-27 PROCEDURE — 99233 SBSQ HOSP IP/OBS HIGH 50: CPT | Performed by: INTERNAL MEDICINE

## 2019-01-27 RX ORDER — KETOROLAC TROMETHAMINE 30 MG/ML
15 INJECTION, SOLUTION INTRAMUSCULAR; INTRAVENOUS EVERY 6 HOURS SCHEDULED
Status: COMPLETED | OUTPATIENT
Start: 2019-01-27 | End: 2019-01-27

## 2019-01-27 RX ORDER — COLCHICINE 0.6 MG/1
0.6 TABLET ORAL 2 TIMES DAILY
Status: DISCONTINUED | OUTPATIENT
Start: 2019-01-27 | End: 2019-01-28 | Stop reason: HOSPADM

## 2019-01-27 RX ORDER — LEVOTHYROXINE SODIUM 88 UG/1
88 TABLET ORAL
Status: DISCONTINUED | OUTPATIENT
Start: 2019-01-28 | End: 2019-01-28 | Stop reason: HOSPADM

## 2019-01-27 RX ORDER — COLCHICINE 0.6 MG/1
1.2 TABLET ORAL ONCE
Status: COMPLETED | OUTPATIENT
Start: 2019-01-27 | End: 2019-01-27

## 2019-01-27 RX ADMIN — OFLOXACIN 1 DROP: 3 SOLUTION OPHTHALMIC at 08:06

## 2019-01-27 RX ADMIN — TRAMADOL HYDROCHLORIDE 50 MG: 50 TABLET, COATED ORAL at 05:20

## 2019-01-27 RX ADMIN — OFLOXACIN 1 DROP: 3 SOLUTION OPHTHALMIC at 11:37

## 2019-01-27 RX ADMIN — ENOXAPARIN SODIUM 30 MG: 30 INJECTION SUBCUTANEOUS at 08:05

## 2019-01-27 RX ADMIN — WHITE PETROLATUM 57.7 %-MINERAL OIL 31.9 % EYE OINTMENT: at 08:07

## 2019-01-27 RX ADMIN — MENTHOL, METHYL SALICYLATE: 10; 15 CREAM TOPICAL at 08:06

## 2019-01-27 RX ADMIN — ESTRADIOL 1 G: 0.1 CREAM VAGINAL at 08:07

## 2019-01-27 RX ADMIN — PRAVASTATIN SODIUM 40 MG: 40 TABLET ORAL at 08:05

## 2019-01-27 RX ADMIN — WHITE PETROLATUM 57.7 %-MINERAL OIL 31.9 % EYE OINTMENT: at 21:19

## 2019-01-27 RX ADMIN — KETOROLAC TROMETHAMINE 15 MG: 30 INJECTION, SOLUTION INTRAMUSCULAR at 10:12

## 2019-01-27 RX ADMIN — METOPROLOL TARTRATE 25 MG: 25 TABLET, FILM COATED ORAL at 08:06

## 2019-01-27 RX ADMIN — CEFAZOLIN SODIUM 1000 MG: 10 INJECTION, POWDER, FOR SOLUTION INTRAVENOUS at 04:45

## 2019-01-27 RX ADMIN — KETOROLAC TROMETHAMINE 15 MG: 30 INJECTION, SOLUTION INTRAMUSCULAR at 17:51

## 2019-01-27 RX ADMIN — OFLOXACIN 1 DROP: 3 SOLUTION OPHTHALMIC at 21:18

## 2019-01-27 RX ADMIN — PANTOPRAZOLE SODIUM 40 MG: 40 TABLET, DELAYED RELEASE ORAL at 17:51

## 2019-01-27 RX ADMIN — LORAZEPAM 0.5 MG: 0.5 TABLET ORAL at 21:18

## 2019-01-27 RX ADMIN — DONEPEZIL HYDROCHLORIDE 5 MG: 5 TABLET, FILM COATED ORAL at 21:18

## 2019-01-27 RX ADMIN — COLCHICINE 0.6 MG: 0.6 TABLET, FILM COATED ORAL at 11:20

## 2019-01-27 RX ADMIN — OFLOXACIN 1 DROP: 3 SOLUTION OPHTHALMIC at 17:52

## 2019-01-27 RX ADMIN — CALCIUM 1 TABLET: 500 TABLET ORAL at 08:06

## 2019-01-27 RX ADMIN — WHITE PETROLATUM 57.7 %-MINERAL OIL 31.9 % EYE OINTMENT: at 16:00

## 2019-01-27 RX ADMIN — SALINE NASAL SPRAY 2 SPRAY: 1.5 SOLUTION NASAL at 17:52

## 2019-01-27 RX ADMIN — COLCHICINE 0.6 MG: 0.6 TABLET, FILM COATED ORAL at 17:51

## 2019-01-27 RX ADMIN — CEFAZOLIN SODIUM 1000 MG: 10 INJECTION, POWDER, FOR SOLUTION INTRAVENOUS at 21:18

## 2019-01-27 RX ADMIN — LORATADINE 10 MG: 10 TABLET ORAL at 08:05

## 2019-01-27 RX ADMIN — KETOROLAC TROMETHAMINE 15 MG: 30 INJECTION, SOLUTION INTRAMUSCULAR at 23:24

## 2019-01-27 RX ADMIN — LEVOTHYROXINE SODIUM 100 MCG: 100 TABLET ORAL at 05:20

## 2019-01-27 RX ADMIN — SALINE NASAL SPRAY 2 SPRAY: 1.5 SOLUTION NASAL at 08:07

## 2019-01-27 RX ADMIN — IOHEXOL 85 ML: 350 INJECTION, SOLUTION INTRAVENOUS at 13:00

## 2019-01-27 RX ADMIN — SODIUM CHLORIDE 50 ML/HR: 0.9 INJECTION, SOLUTION INTRAVENOUS at 01:52

## 2019-01-27 RX ADMIN — ASPIRIN 325 MG: 325 TABLET ORAL at 08:06

## 2019-01-27 RX ADMIN — CEFAZOLIN SODIUM 1000 MG: 10 INJECTION, POWDER, FOR SOLUTION INTRAVENOUS at 13:32

## 2019-01-27 RX ADMIN — NIFEDIPINE 60 MG: 30 TABLET, FILM COATED, EXTENDED RELEASE ORAL at 08:05

## 2019-01-27 RX ADMIN — DULOXETINE HYDROCHLORIDE 60 MG: 60 CAPSULE, DELAYED RELEASE ORAL at 08:06

## 2019-01-27 RX ADMIN — FLUTICASONE PROPIONATE 1 SPRAY: 50 SPRAY, METERED NASAL at 08:06

## 2019-01-27 RX ADMIN — PANTOPRAZOLE SODIUM 40 MG: 40 TABLET, DELAYED RELEASE ORAL at 08:05

## 2019-01-27 RX ADMIN — LOSARTAN POTASSIUM 100 MG: 50 TABLET, FILM COATED ORAL at 08:06

## 2019-01-27 RX ADMIN — COLCHICINE 1.2 MG: 0.6 TABLET, FILM COATED ORAL at 10:12

## 2019-01-27 RX ADMIN — METOPROLOL TARTRATE 25 MG: 25 TABLET, FILM COATED ORAL at 17:51

## 2019-01-27 NOTE — PHYSICAL THERAPY NOTE
PHYSICAL THERAPY NOTE  Patient Name: Hosea Torres  P[facility]GD'Y Date: 1/27/2019  Pt off floor at CT scan  Will follow   Edu Espinoza PT

## 2019-01-27 NOTE — ASSESSMENT & PLAN NOTE
· Patient has history of recurrent UTI  Her urine cultures show E coli sensitive to IV cefazolin  She had originally been receiving 1 g b i d  However after discussing the case with pharmacy on 1/26/19 I increased the dosing to 1 mg p o  t i d    She is currently on antibiotic day #6  · Appreciate Infectious Disease input

## 2019-01-27 NOTE — ASSESSMENT & PLAN NOTE
· Patient with persistent fever noted to be 101 9 in past 24 hours  No leukocytosis  · Appreciate Infectious Disease assistance, possible causes:   --inadequate dosing of antibiotics (Ancef was increased from BID to TID); renal abscess was ruled out with normal renal ultrasound   --acute inflammatory arthritis/gout--she is having severe right wrist pain and has  and CRP 74; also need to performed CT scan of the wrist to rule out any collection to be drained     --lyme disease (patient had prior tick bite)--this is less likely, but lyme studies sent   --atelectasis based on decreased mobility   --other non infectious etiology such as thyroid abnormality (TSH 0 1) --less likely but Synthroid dose was decreased nonetheless based on abnormal thyroid function studies  · Repeat blood cultures and procalcitonin requested  · Continue to monitor temperature and clinical picture

## 2019-01-27 NOTE — PROGRESS NOTES
Progress Note - Infectious Disease   Dilip De Santiago 80 y o  female MRN: [id number]  Unit/Bed#: -01 Encounter: [id number]      Impression/Plan:  1  Sepsis-present soon after admission  Fever and tachycardia  The patient is having high fever once again  Initially all felt to be UTI but now I am not convinced  She has remained hemodynamically stable  Possibly all related to the left wrist symptoms  She seems to be tolerating the antibiotics without difficulty  The procalcitonin level is elevated  -continue cefazolin for now at current dose  -follow-up blood cultures  -recheck blood cultures x2 sets  -recheck procalcitonin level  -monitor CBC with diff and creatinine  -supportive care     2  Presumptive UTI-based upon the intermittent fever, abnormal urinalysis, and positive urine culture  The patient is relatively asymptomatic at this time  No resistant organism has been isolated  Renal ultrasound is negative for any complication  -antibiotics as above  -monitor symptomatology  -no additional ID workup for now     3  Left wrist pain and swelling-considerably worse since yesterday     Consideration for the possibility of inflammatory conditions such as pseudogout  Possibly related to the patient's rheumatoid arthritis  With ongoing fever need to consider the possibility of a septic wrist   X-ray without any fracture  Sedimentation rate is quite high  -urgent CT of the wrist to look for joint effusion  -orthopedics evaluation  -serial exams  -suspect patient will need arthrocentesis  -discussed in detail with the primary service     4  Encephalopathy-in a patient with baseline dementia   remains quite confused  -monitor cognition  -no additional ID workup for now     5   Right eye conjunctivitis-continue Ocuflox as per Ophthalmology     Discussed in detail with the primary service    Antibiotics:  Cefazolin 5, antibiotics 6    Subjective:  Patient now with higher fever spike over the last 12 hr; no nausea, vomiting, diarrhea; no cough, shortness of breath; increasing left wrist pain  No other new symptoms  She is very confused  Objective:  Vitals:  Temp:  [98 8 °F (37 1 °C)-101 9 °F (38 8 °C)] 98 8 °F (37 1 °C)  HR:  [78-95] 95  Resp:  [18] 18  BP: (100-163)/(50-71) 163/71  SpO2:  [93 %-94 %] 93 %  Temp (24hrs), Av 6 °F (38 1 °C), Min:98 8 °F (37 1 °C), Max:101 9 °F (38 8 °C)  Current: Temperature: 98 8 °F (37 1 °C)    Physical Exam:   General Appearance:  Alert, interactive, nontoxic, no acute distress  Confused   Throat: Oropharynx moist without lesions  Lungs:   Clear to auscultation bilaterally; no wheezes, rhonchi or rales; respirations unlabored   Heart:  Tachycardic; no murmur, rub or gallop   Abdomen:   Soft, non-tender, non-distended, positive bowel sounds  Extremities: No clubbing, cyanosis  Left wrist with increasing swelling and now some faint erythema and decreased range of motion  The area is quite tender  Skin: No new rashes or lesions  No draining wounds noted  Labs, Imaging, & Other studies:   All pertinent labs and imaging studies were personally reviewed    Results from last 7 days  Lab Units 19  0536 19  0607 19  0529   WBC Thousand/uL 7 17 7 87 7 81   HEMOGLOBIN g/dL 8 7* 8 9* 9 7*   PLATELETS Thousands/uL 201 201 172       Results from last 7 days  Lab Units 19  0607 19  0452 19  1447   SODIUM mmol/L 133* 134* 135*   POTASSIUM mmol/L 3 5 4 2 4 3   CHLORIDE mmol/L 101 104 98*   CO2 mmol/L 22 22 24   BUN mg/dL 10 14 20   CREATININE mg/dL 0 87 0 99 1 24   EGFR ml/min/1 73sq m 60 51 39   CALCIUM mg/dL 8 5 8 5 8 8   AST U/L  --  25 37   ALT U/L  --  26 33   ALK PHOS U/L  --  107 127*       Results from last 7 days  Lab Units 19  1615 19  1520 19  1505 19  1447   BLOOD CULTURE   --  No Growth After 4 Days  --  No Growth After 4 Days     URINE CULTURE  >100,000 cfu/ml Escherichia coli*  --   --   -- INFLUENZA B PCR   --   --  None Detected  --    RSV PCR   --   --  None Detected  --      Sedimentation rate 109    X-ray left wrist-no fracture    Renal ultrasound negative    Images reviewed by me in PACS

## 2019-01-27 NOTE — CONSULTS
Assessment/Plan:  Left wrist pain rule out inflammatory arthropathy versus crystalline disease    Based on patient's x-rays and physical exam II believe there is an inflammatory or crystalline component to this problem  She has diffuse soft tissue swelling about the wrist with no localization of infection  She has been on antibiotics for 6 days  There are no redness, no induration, no abscess palpable  I do not palpate a wrist fusion  I have a feeling patient will get better with colchicine and anti-inflammatories  Of note since yesterday patient has been improving with these medicines  Please consider treating for Lyme or changing antibiotic for UTI if the primary team believes that fevers or secondary to UTI  I do not believe this is surgical problem at this time  I will order wrist brace which would be Velcro wear for comfort  Per infectious diseases CT scan has been ordered to evaluate for an effusion  D/W daugther  Subjective:  L wrist pain which started yesterday    Patient ID: Keren Johnson is a 80 y o  female  HPI   Miss Holland Cooks is a 55-year-old female with a history of dementia and recurrent UTI  She is admitted the hospital today for recurrent UTI  She has a has been on and set for E coli UTI  Yesterday was noticed that her left wrist became swollen  She has been and antibiotics for 6 days  Per the patient she does report history of swelling like this were her wrist would become hot but with subside on its own  She said this would occur every 3 months  She does have a history of rheumatoid arthritis and fibromyalgia  When initially question her she thinks her wrist is related to her fibromyalgia  She said this is fairly similar to her prior attacks  She does know where she lives and she does note that she lives with her   She knows her name  There is a possible history of a tick bite per the records          Review of Systems   Constitutional: Negative for chills, fever and unexpected weight change  HENT: Negative for hearing loss, nosebleeds and sore throat  Eyes: Negative for pain, redness and visual disturbance  Respiratory: Negative for cough, shortness of breath and wheezing  Cardiovascular: Negative for chest pain, palpitations and leg swelling  Gastrointestinal: Negative for abdominal pain, nausea and vomiting  Endocrine: Negative for polydipsia and polyuria  Genitourinary: Negative for dysuria and hematuria  Musculoskeletal: Positive for arthralgias and joint swelling  See HPI   Skin: Negative for rash and wound  Neurological: Negative for dizziness, numbness and headaches  Psychiatric/Behavioral: Negative for decreased concentration and suicidal ideas  The patient is not nervous/anxious            Past Medical History:   Diagnosis Date    Aspiration pneumonia ([facility] [address] )     Last Assessed:  7/18/17    Basal cell carcinoma of nose     Last Assessed:  4/12/17    Blind     blind right eye, 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, Last Assessed:  7/18/17    Dementia     Depression     Disease of thyroid gland     Fibromyalgia     Last Assessed:  7/18/17    Gait disturbance     GERD (gastroesophageal reflux disease)     Glaucoma     Hyperlipidemia     Hypertension     Osteopenia     Peripheral neuropathy     Last Assessed:  4/12/17    Polymyalgia rheumatica ([facility] [address] )     Psychiatric disorder     depression    Renal insufficiency syndrome     Last Assessed:  4/13/17    Rheumatoid arthritis ([address] )     Stroke ([facility] [address] )     x2, Last Assessed:  7/18/17    Systemic lupus erythematosus ([facility] [address] )     Vertigo     Vitamin D deficiency        Past Surgical History:   Procedure Laterality Date    EYE SURGERY      JOINT REPLACEMENT      left hip replacement, left shoulder replacement    SHOULDER SURGERY      Replacement       Family History   Problem Relation Age of Onset    Heart attack Mother     Diabetes Mother     Dementia Father  Coronary artery disease Father         cardiac disorder    Diabetes Sister     Uterine cancer Sister        Social History     Occupational History    Not on file       Social History Main Topics    Smoking status: Never Smoker    Smokeless tobacco: Never Used    Alcohol use No    Drug use: No    Sexual activity: Not on file         Current Facility-Administered Medications:     artificial tear (LUBRIFRESH P M ) ophthalmic ointment, , Both Eyes, TID, Everton Robins MD    aspirin tablet 325 mg, 325 mg, Oral, Daily, Everton Robins MD, 325 mg at 01/27/19 0806    benzocaine (HURRICAINE) 20 % mucosal spray 1 spray, 1 spray, Mucosal, TID PRN, Everton Robins MD    benzonatate (TESSALON PERLES) capsule 100 mg, 100 mg, Oral, TID PRN, Everton Robins MD    calcium carbonate (OYSTER SHELL,OSCAL) 500 mg tablet 1 tablet, 1 tablet, Oral, Daily With Breakfast, Everton Robins MD, 1 tablet at 01/27/19 0806    ceFAZolin (ANCEF) 1 g in sodium chloride 0 9% 50 ml IVPB, 1,000 mg, Intravenous, Q8H, Ruth Hargrove PA-C, Last Rate: 100 mL/hr at 01/27/19 0445, 1,000 mg at 01/27/19 0445    colchicine (COLCRYS) tablet 0 6 mg, 0 6 mg, Oral, BID, Ruth Hargrove PA-C, 0 6 mg at 01/27/19 1120    donepezil (ARICEPT) tablet 5 mg, 5 mg, Oral, HS, Everton Robins MD, 5 mg at 01/26/19 2245    DULoxetine (CYMBALTA) delayed release capsule 60 mg, 60 mg, Oral, Daily, Everton Robins MD, 60 mg at 01/27/19 0806    enoxaparin (LOVENOX) subcutaneous injection 30 mg, 30 mg, Subcutaneous, Daily, Everton Robins MD, 30 mg at 01/27/19 0805    estradiol (ESTRACE) vaginal cream 1 g, 1 g, Vaginal, Daily, Everton Robins MD, 1 g at 01/27/19 0807    fluticasone (FLONASE) 50 mcg/act nasal spray 1 spray, 1 spray, Nasal, Daily, Everton Robins MD, 1 spray at 01/27/19 0806    furosemide (LASIX) tablet 20 mg, 20 mg, Oral, Once per day on Mon Wed Fri, Renea Sena MD, 20 mg at 01/25/19 0817   guaiFENesin (MUCINEX) 12 hr tablet 600 mg, 600 mg, Oral, BID PRN, Everton Robins MD    ketorolac (TORADOL) injection 15 mg, 15 mg, Intravenous, Q6H ESTELA, Ruth Hargrove PA-C, 15 mg at 01/27/19 1012    [START ON 1/28/2019] levothyroxine tablet 88 mcg, 88 mcg, Oral, Early Morning, Ruth Hargrove PA-C    loratadine (CLARITIN) tablet 10 mg, 10 mg, Oral, Daily, Everton Robins MD, 10 mg at 01/27/19 0805    LORazepam (ATIVAN) tablet 0 5 mg, 0 5 mg, Oral, Q8H PRN, Everton Robins MD    LORazepam (ATIVAN) tablet 0 5 mg, 0 5 mg, Oral, HS, Everton Robins MD, 0 5 mg at 01/26/19 2245    losartan (COZAAR) tablet 100 mg, 100 mg, Oral, Daily, Everton Robins MD, 100 mg at 01/27/19 0806    magnesium hydroxide (MILK OF MAGNESIA) 400 mg/5 mL oral suspension 30 mL, 30 mL, Oral, Daily PRN, Everton Robins MD    menthol-methyl salicylate (BENGAY) 44-62 % cream, , Apply externally, 4x Daily PRN, Ruth Hargrove PA-C    metoprolol tartrate (LOPRESSOR) tablet 25 mg, 25 mg, Oral, BID, Everton Robins MD, 25 mg at 01/27/19 0806    NIFEdipine (PROCARDIA XL) 24 hr tablet 60 mg, 60 mg, Oral, Daily, Everton Robins MD, 60 mg at 01/27/19 0805    ofloxacin (OCUFLOX) 0 3 % ophthalmic solution 1 drop, 1 drop, Right Eye, 4x Daily, Everton Robins MD, 1 drop at 01/27/19 1137    ondansetron (ZOFRAN) injection 4 mg, 4 mg, Intravenous, Q6H PRN, Everton Robins MD    pantoprazole (PROTONIX) EC tablet 40 mg, 40 mg, Oral, BID, Everton Robins MD, 40 mg at 01/27/19 0805    potassium chloride (K-DUR,KLOR-CON) CR tablet 10 mEq, 10 mEq, Oral, Once per day on Mon Wed Fri, Everton Robins MD, 10 mEq at 01/25/19 0817    senna (SENOKOT) tablet 8 6 mg, 1 tablet, Oral, HS PRN, Everton Robins MD    sodium chloride (OCEAN) 0 65 % nasal spray 2 spray, 2 spray, Each Nare, BID, Everton Robins MD, 2 spray at 01/27/19 0807    traMADol (ULTRAM) tablet 50 mg, 50 mg, Oral, Q6H PRN, Everton Robins MD, 50 mg at 01/27/19 0520    triamcinolone (KENALOG) 0 1 % cream, , Topical, BID, Everton Robins MD    Allergies   Allergen Reactions    Acetaminophen     Golimumab      Other reaction(s): dedrick colored stool    Lidocaine Other (See Comments)    Neurontin [Gabapentin]     Nortriptyline Tremor    Prasterone      Other reaction(s): pruitis    Tricyclic Antidepressants     Leflunomide Rash    Sulfasalazine Rash       Objective:  Vitals:    01/27/19 0700   BP: 163/71   Pulse: 95   Resp: 18   Temp: 98 8 °F (37 1 °C)   SpO2: 93%       Body mass index is 27 64 kg/m²  Ortho Exam     Left wrist  No palpable abscess new  No erythema  No induration  No pain with passive motion of the finger joints or the wrist  Patient is more tender over the thumb CMC joint  No redness over this area  Compartment soft  Brisk capillary refill  Rings removed from left ring finger  This was done atraumatically  No swelling in the fingers  Can make full fist  Median ulnar radial nerves intact  Light palpation of the joint causes extreme pain    Mild soft tissue swelling about the dorsum of the wrist and thumb CMC  Some pain with passive range of motion of the left thumb CMC          Physical Exam   Constitutional: She appears well-developed and well-nourished  HENT:   Head: Normocephalic and atraumatic  Eyes: Conjunctivae are normal    Neck: Neck supple  Cardiovascular: Intact distal pulses  Pulmonary/Chest: Effort normal    Neurological: She is alert  Skin: Skin is warm and dry  Psychiatric: She has a normal mood and affect  Her behavior is normal    Vitals reviewed  I have personally reviewed pertinent films in PACS  Patient does have mild radiocarpal arthritis as well as some calcifications in the TFCC consistent with possible crystalline disease    Again patient does say that she has history of attack similar to this in the past

## 2019-01-27 NOTE — PROGRESS NOTES
Progress Note - María Salinas 4/27/1931, 80 y o  female MRN: [id number]    Unit/Bed#: -01 Encounter: [id number]    Primary Care Provider: Robert Acosta DO   Date and time admitted to hospital: 1/22/2019  2:13 PM  * Recurrent UTI   Assessment & Plan    · Patient has history of recurrent UTI  Her urine cultures show E coli sensitive to IV cefazolin  She had originally been receiving 1 g b i d  However after discussing the case with pharmacy on 1/26/19 I increased the dosing to 1 mg p o  t i d  She is currently on antibiotic day #6  · Appreciate Infectious Disease input     Swelling of left hand   Assessment & Plan    · Appreciate involvement from Hand surgery and Infectious Disease, with whom I have spoken in detail  · Suspect this is acute recurrent inflammatory arthropathy question gout/pseudogout verses exacerbation of rheumatoid arthritis  · Very high inflammatory markers noted including /CRP 74  · CT scan of the wrist will be obtained; x-ray did show severe degenerative changes  · Will initiate regimen of colchicine and Toradol  Doses have already been provided and nursing reports patient had improvement in her severe pain  · Monitor closely, elevate and use cool compress as able     Fever   Assessment & Plan    · Patient with persistent fever noted to be 101 9 in past 24 hours  No leukocytosis  · Appreciate Infectious Disease assistance, possible causes:   --inadequate dosing of antibiotics (Ancef was increased from BID to TID); renal abscess was ruled out with normal renal ultrasound   --acute inflammatory arthritis/gout--she is having severe right wrist pain and has  and CRP 74; also need to performed CT scan of the wrist to rule out any collection to be drained     --lyme disease (patient had prior tick bite)--this is less likely, but lyme studies sent   --atelectasis based on decreased mobility   --other non infectious etiology such as thyroid abnormality (TSH 0 1) --less likely but Synthroid dose was decreased nonetheless based on abnormal thyroid function studies  · Repeat blood cultures and procalcitonin requested  · Continue to monitor temperature and clinical picture     Dementia   Assessment & Plan    · Continue Aricept  Also with behavioral disturbances including repeatedly saying she wants to die/kill herself--THIS IS CHRONIC AND PATIENT HAS NO PLAN  · Patient requires heavy amount of assistance for ambulation at this time but according to her daughter was previously able to ambulate with her walker (though she fatigues quite easily) or ambulate short distances like to the bathroom without a walker  · Given her severe left wrist pain I am not sure she could bear weight to use a roller walker at this point and therefore have asked physical therapy and OT to assess the patient as she may require higher level of care at discharge     Anemia   Assessment & Plan    · Stable/chronic--no evidence of blood loss         VTE Pharmacologic Prophylaxis:   Pharmacologic: Enoxaparin (Lovenox)  Mechanical VTE Prophylaxis in Place: No    Patient Centered Rounds: I have performed bedside rounds with nursing staff today  Discussions with Specialists or Other Care Team Provider:  Infectious Disease, hand surgeon, my attending    Education and Discussions with Family / Patient:  Patient's daughter    Time Spent for Care: 40 min  More than 50% of total time spent on counseling and coordination of care as described above  Current Length of Stay: 5 day(s)    Current Patient Status: Inpatient   Certification Statement: The patient will continue to require additional inpatient hospital stay due to Evaluation and treatment persistent fever/severe left hand swelling    Discharge Plan: May need STR?  Versus return to independent    Code Status: Level 1 - Full Code    Subjective:   Patient screaming out in pain of her left wrist and thing that we are killing her and that she wants to kill herself because the pain is so bad  She cannot otherwise provide appropriate history and is also yelling out to someone named Matthew  Objective:     Vitals:   Temp (24hrs), Av 6 °F (38 1 °C), Min:98 8 °F (37 1 °C), Max:101 9 °F (38 8 °C)    Temp:  [98 8 °F (37 1 °C)-101 9 °F (38 8 °C)] 98 8 °F (37 1 °C)  HR:  [78-95] 95  Resp:  [18] 18  BP: (100-163)/(50-71) 163/71  SpO2:  [93 %-94 %] 93 %  Body mass index is 27 64 kg/m²  Input and Output Summary (last 24 hours): Intake/Output Summary (Last 24 hours) at 19 1231  Last data filed at 19 0955   Gross per 24 hour   Intake          2595 83 ml   Output             1159 ml   Net          1436 83 ml       Physical Exam:     Physical Exam   Constitutional: She appears distressed  Thin frail elderly   HENT:   Right eye blindness   Cardiovascular: Normal rate and regular rhythm  No murmur heard  Pulmonary/Chest: No respiratory distress  She has no wheezes  Abdominal: Soft  She exhibits no distension  Musculoskeletal: She exhibits edema and tenderness  Neurological: She is alert  Awake alert but confused   Skin: No rash noted  She is not diaphoretic  There is erythema  No pallor  SIGNIFICANT CHANGE OVERNIGHT IN LEFT HAND/WRIST  Warmth and significant swelling as well as exquisite tenderness to palpation noted  There is small amount of ecchymosis from previous blood draw   Psychiatric:   Agitated and in pain   Vitals reviewed      Additional Data:     Labs:      Results from last 7 days  Lab Units 19  0536   WBC Thousand/uL 7 17   HEMOGLOBIN g/dL 8 7*   HEMATOCRIT % 26 7*   PLATELETS Thousands/uL 201   NEUTROS PCT % 72   LYMPHS PCT % 15   MONOS PCT % 9   EOS PCT % 4       Results from last 7 days  Lab Units 19  0607 19  0452   SODIUM mmol/L 133* 134*   POTASSIUM mmol/L 3 5 4 2   CHLORIDE mmol/L 101 104   CO2 mmol/L 22 22   BUN mg/dL 10 14   CREATININE mg/dL 0 87 0 99   ANION GAP mmol/L 10 8   CALCIUM mg/dL 8 5 8 5   ALBUMIN g/dL  --  2 6*   TOTAL BILIRUBIN mg/dL  --  0 60   ALK PHOS U/L  --  107   ALT U/L  --  26   AST U/L  --  25   GLUCOSE RANDOM mg/dL 89 91                   Results from last 7 days  Lab Units 01/26/19  1102 01/22/19  1447   LACTIC ACID mmol/L  --  1 2   PROCALCITONIN ng/ml 0 26*  --      * I Have Reviewed All Lab Data Listed Above  * Additional Pertinent Lab Tests Reviewed: [facility] Admission Reviewed    Imaging:    Imaging Reports Reviewed Today Include:  Renal ultrasound and x-ray  Imaging Personally Reviewed by Myself Includes:      Recent Cultures (last 7 days):       Results from last 7 days  Lab Units 01/22/19  1615 01/22/19  1520 01/22/19  1505 01/22/19  1447   BLOOD CULTURE   --  No Growth After 4 Days  --  No Growth After 4 Days     URINE CULTURE  >100,000 cfu/ml Escherichia coli*  --   --   --    INFLUENZA B PCR   --   --  None Detected  --    RSV PCR   --   --  None Detected  --        Last 24 Hours Medication List:     Current Facility-Administered Medications:  artificial tear  Both Eyes TID Everton Robins MD    aspirin 325 mg Oral Daily Everton Robins MD    benzocaine 1 spray Mucosal TID PRN Everton Robins MD    benzonatate 100 mg Oral TID PRN Everton Robins MD    calcium carbonate 1 tablet Oral Daily With Breakfast Everton Robins MD    cefazolin 1,000 mg Intravenous Q8H Ruth Hargrove PA-C Last Rate: 1,000 mg (01/27/19 0445)   colchicine 0 6 mg Oral BID Ruth Hargrove PA-C    donepezil 5 mg Oral HS Everton Robins MD    DULoxetine 60 mg Oral Daily Everton Robins MD    enoxaparin 30 mg Subcutaneous Daily Everton Robins MD    estradiol 1 g Vaginal Daily Everton Robins MD    fluticasone 1 spray Nasal Daily Everton Robins MD    furosemide 20 mg Oral Once per day on Mon Wed Fri Everton Robins MD    guaiFENesin 600 mg Oral BID PRN Everton Robins MD    ketorolac 15 mg Intravenous Q6H [address] Ruth Hargrove PA-C    [START ON 1/28/2019] levothyroxine 88 mcg Oral Early Morning Ruth Hargrove PA-C    loratadine 10 mg Oral Daily Everton Robins MD    LORazepam 0 5 mg Oral Q8H PRN Everton Robins MD    LORazepam 0 5 mg Oral HS Everton Robins MD    losartan 100 mg Oral Daily Everton Robins MD    magnesium hydroxide 30 mL Oral Daily PRN Everton Robins MD    menthol-methyl salicylate  Apply externally 4x Daily PRN Ruth Hargrove PA-C    metoprolol tartrate 25 mg Oral BID Everton Robins MD    NIFEdipine 60 mg Oral Daily Everton Robins MD    ofloxacin 1 drop Right Eye 4x Daily Everton Robins MD    ondansetron 4 mg Intravenous Q6H PRN Everton Robins MD    pantoprazole 40 mg Oral BID Everton Robins MD    potassium chloride 10 mEq Oral Once per day on Mon Wed Fri Everton Robins MD    senna 1 tablet Oral HS PRN Everton Robins MD    sodium chloride 2 spray Each Nare BID Everton Robins MD    traMADol 50 mg Oral Q6H PRN Everton Robins MD    triamcinolone  Topical BID Denia Green MD         Today, Patient Was Seen By: Sofia Scott PA-C      PLEASE NOTE THAT I TOOK A PICTURE OF THE PATIENT'S HAND IN [place] AND INCLUDED IN THE CHART  ** Please Note: Dictation voice to text software may have been used in the creation of this document   **

## 2019-01-27 NOTE — ASSESSMENT & PLAN NOTE
· Continue Aricept  Also with behavioral disturbances including repeatedly saying she wants to die/kill herself--THIS IS CHRONIC AND PATIENT HAS NO PLAN  · Patient requires heavy amount of assistance for ambulation at this time but according to her daughter was previously able to ambulate with her walker (though she fatigues quite easily) or ambulate short distances like to the bathroom without a walker    · Given her severe left wrist pain I am not sure she could bear weight to use a roller walker at this point and therefore have asked physical therapy and OT to assess the patient as she may require higher level of care at discharge

## 2019-01-28 VITALS
WEIGHT: 141.54 LBS | HEART RATE: 96 BPM | TEMPERATURE: 98 F | DIASTOLIC BLOOD PRESSURE: 50 MMHG | SYSTOLIC BLOOD PRESSURE: 122 MMHG | BODY MASS INDEX: 27.64 KG/M2 | RESPIRATION RATE: 18 BRPM | OXYGEN SATURATION: 98 %

## 2019-01-28 PROBLEM — N39.0 RECURRENT UTI: Status: RESOLVED | Noted: 2019-01-07 | Resolved: 2019-01-28

## 2019-01-28 PROBLEM — M11.232 PSEUDOGOUT OF LEFT WRIST: Status: ACTIVE | Noted: 2019-01-27

## 2019-01-28 LAB
ANION GAP SERPL CALCULATED.3IONS-SCNC: 12 MMOL/L (ref 4–13)
B BURGDOR IGG SER IA-ACNC: 0.36
B BURGDOR IGM SER IA-ACNC: 0.38
BASOPHILS # BLD AUTO: 0.02 THOUSANDS/ΜL (ref 0–0.1)
BASOPHILS NFR BLD AUTO: 1 % (ref 0–1)
BUN SERPL-MCNC: 10 MG/DL (ref 5–25)
CALCIUM SERPL-MCNC: 8.8 MG/DL (ref 8.3–10.1)
CHLORIDE SERPL-SCNC: 102 MMOL/L (ref 100–108)
CO2 SERPL-SCNC: 22 MMOL/L (ref 21–32)
CREAT SERPL-MCNC: 0.84 MG/DL (ref 0.6–1.3)
EOSINOPHIL # BLD AUTO: 0.33 THOUSAND/ΜL (ref 0–0.61)
EOSINOPHIL NFR BLD AUTO: 8 % (ref 0–6)
ERYTHROCYTE [DISTWIDTH] IN BLOOD BY AUTOMATED COUNT: 13.2 % (ref 11.6–15.1)
GFR SERPL CREATININE-BSD FRML MDRD: 63 ML/MIN/1.73SQ M
GLUCOSE SERPL-MCNC: 91 MG/DL (ref 65–140)
HCT VFR BLD AUTO: 25.5 % (ref 34.8–46.1)
HGB BLD-MCNC: 8.5 G/DL (ref 11.5–15.4)
IMM GRANULOCYTES # BLD AUTO: 0.02 THOUSAND/UL (ref 0–0.2)
IMM GRANULOCYTES NFR BLD AUTO: 1 % (ref 0–2)
LYMPHOCYTES # BLD AUTO: 0.75 THOUSANDS/ΜL (ref 0.6–4.47)
LYMPHOCYTES NFR BLD AUTO: 17 % (ref 14–44)
MCH RBC QN AUTO: 30 PG (ref 26.8–34.3)
MCHC RBC AUTO-ENTMCNC: 33.3 G/DL (ref 31.4–37.4)
MCV RBC AUTO: 90 FL (ref 82–98)
MONOCYTES # BLD AUTO: 0.36 THOUSAND/ΜL (ref 0.17–1.22)
MONOCYTES NFR BLD AUTO: 8 % (ref 4–12)
NEUTROPHILS # BLD AUTO: 2.84 THOUSANDS/ΜL (ref 1.85–7.62)
NEUTS SEG NFR BLD AUTO: 65 % (ref 43–75)
NRBC BLD AUTO-RTO: 0 /100 WBCS
PLATELET # BLD AUTO: 209 THOUSANDS/UL (ref 149–390)
PMV BLD AUTO: 9.3 FL (ref 8.9–12.7)
POTASSIUM SERPL-SCNC: 3.4 MMOL/L (ref 3.5–5.3)
PROCALCITONIN SERPL-MCNC: 0.18 NG/ML
RBC # BLD AUTO: 2.83 MILLION/UL (ref 3.81–5.12)
SODIUM SERPL-SCNC: 136 MMOL/L (ref 136–145)
WBC # BLD AUTO: 4.32 THOUSAND/UL (ref 4.31–10.16)

## 2019-01-28 PROCEDURE — 97163 PT EVAL HIGH COMPLEX 45 MIN: CPT

## 2019-01-28 PROCEDURE — 99232 SBSQ HOSP IP/OBS MODERATE 35: CPT | Performed by: INTERNAL MEDICINE

## 2019-01-28 PROCEDURE — 99231 SBSQ HOSP IP/OBS SF/LOW 25: CPT | Performed by: PHYSICIAN ASSISTANT

## 2019-01-28 PROCEDURE — 99239 HOSP IP/OBS DSCHRG MGMT >30: CPT | Performed by: PHYSICIAN ASSISTANT

## 2019-01-28 PROCEDURE — G8979 MOBILITY GOAL STATUS: HCPCS

## 2019-01-28 PROCEDURE — G8978 MOBILITY CURRENT STATUS: HCPCS

## 2019-01-28 PROCEDURE — 97116 GAIT TRAINING THERAPY: CPT

## 2019-01-28 PROCEDURE — 80048 BASIC METABOLIC PNL TOTAL CA: CPT | Performed by: PHYSICIAN ASSISTANT

## 2019-01-28 PROCEDURE — 85025 COMPLETE CBC W/AUTO DIFF WBC: CPT | Performed by: PHYSICIAN ASSISTANT

## 2019-01-28 PROCEDURE — 84145 PROCALCITONIN (PCT): CPT | Performed by: INTERNAL MEDICINE

## 2019-01-28 RX ORDER — COLCHICINE 0.6 MG/1
0.6 TABLET ORAL 2 TIMES DAILY
Qty: 6 TABLET | Refills: 0
Start: 2019-01-28 | End: 2019-03-21

## 2019-01-28 RX ORDER — TRAMADOL HYDROCHLORIDE 50 MG/1
50 TABLET ORAL EVERY 6 HOURS PRN
Qty: 30 TABLET | Refills: 0 | Status: SHIPPED | OUTPATIENT
Start: 2019-01-28 | End: 2019-02-07

## 2019-01-28 RX ORDER — PRAVASTATIN SODIUM 40 MG
40 TABLET ORAL DAILY
Refills: 0
Start: 2019-02-01 | End: 2019-09-16 | Stop reason: HOSPADM

## 2019-01-28 RX ORDER — LORAZEPAM 0.5 MG/1
0.5 TABLET ORAL EVERY 8 HOURS PRN
Qty: 30 TABLET | Refills: 0 | Status: SHIPPED | OUTPATIENT
Start: 2019-01-28 | End: 2019-10-25

## 2019-01-28 RX ORDER — MUSCLE RUB CREAM 100; 150 MG/G; MG/G
CREAM TOPICAL 4 TIMES DAILY PRN
Refills: 0
Start: 2019-01-28 | End: 2019-09-02

## 2019-01-28 RX ORDER — KETOROLAC TROMETHAMINE 30 MG/ML
15 INJECTION, SOLUTION INTRAMUSCULAR; INTRAVENOUS EVERY 6 HOURS PRN
Status: DISCONTINUED | OUTPATIENT
Start: 2019-01-28 | End: 2019-01-28 | Stop reason: HOSPADM

## 2019-01-28 RX ADMIN — METOPROLOL TARTRATE 25 MG: 25 TABLET, FILM COATED ORAL at 17:07

## 2019-01-28 RX ADMIN — LOSARTAN POTASSIUM 100 MG: 50 TABLET, FILM COATED ORAL at 09:13

## 2019-01-28 RX ADMIN — WHITE PETROLATUM 57.7 %-MINERAL OIL 31.9 % EYE OINTMENT: at 17:11

## 2019-01-28 RX ADMIN — CEFAZOLIN SODIUM 1000 MG: 10 INJECTION, POWDER, FOR SOLUTION INTRAVENOUS at 13:25

## 2019-01-28 RX ADMIN — SALINE NASAL SPRAY 2 SPRAY: 1.5 SOLUTION NASAL at 09:22

## 2019-01-28 RX ADMIN — FUROSEMIDE 20 MG: 20 TABLET ORAL at 09:14

## 2019-01-28 RX ADMIN — MENTHOL, METHYL SALICYLATE 1 APPLICATION: 10; 15 CREAM TOPICAL at 11:10

## 2019-01-28 RX ADMIN — WHITE PETROLATUM 57.7 %-MINERAL OIL 31.9 % EYE OINTMENT 1 APPLICATION: at 09:21

## 2019-01-28 RX ADMIN — ESTRADIOL 1 G: 0.1 CREAM VAGINAL at 09:22

## 2019-01-28 RX ADMIN — PANTOPRAZOLE SODIUM 40 MG: 40 TABLET, DELAYED RELEASE ORAL at 17:11

## 2019-01-28 RX ADMIN — FLUTICASONE PROPIONATE 1 SPRAY: 50 SPRAY, METERED NASAL at 09:22

## 2019-01-28 RX ADMIN — LORATADINE 10 MG: 10 TABLET ORAL at 09:13

## 2019-01-28 RX ADMIN — OFLOXACIN 1 DROP: 3 SOLUTION OPHTHALMIC at 09:22

## 2019-01-28 RX ADMIN — NIFEDIPINE 60 MG: 30 TABLET, FILM COATED, EXTENDED RELEASE ORAL at 09:08

## 2019-01-28 RX ADMIN — COLCHICINE 0.6 MG: 0.6 TABLET, FILM COATED ORAL at 09:14

## 2019-01-28 RX ADMIN — COLCHICINE 0.6 MG: 0.6 TABLET, FILM COATED ORAL at 17:07

## 2019-01-28 RX ADMIN — ENOXAPARIN SODIUM 30 MG: 30 INJECTION SUBCUTANEOUS at 09:21

## 2019-01-28 RX ADMIN — CALCIUM 1 TABLET: 500 TABLET ORAL at 09:13

## 2019-01-28 RX ADMIN — SALINE NASAL SPRAY 2 SPRAY: 1.5 SOLUTION NASAL at 17:11

## 2019-01-28 RX ADMIN — DULOXETINE HYDROCHLORIDE 60 MG: 60 CAPSULE, DELAYED RELEASE ORAL at 09:13

## 2019-01-28 RX ADMIN — ASPIRIN 325 MG: 325 TABLET ORAL at 09:14

## 2019-01-28 RX ADMIN — OFLOXACIN 1 DROP: 3 SOLUTION OPHTHALMIC at 17:11

## 2019-01-28 RX ADMIN — LEVOTHYROXINE SODIUM 88 MCG: 88 TABLET ORAL at 05:10

## 2019-01-28 RX ADMIN — POTASSIUM CHLORIDE 10 MEQ: 750 TABLET, EXTENDED RELEASE ORAL at 09:13

## 2019-01-28 RX ADMIN — PANTOPRAZOLE SODIUM 40 MG: 40 TABLET, DELAYED RELEASE ORAL at 09:13

## 2019-01-28 RX ADMIN — METOPROLOL TARTRATE 25 MG: 25 TABLET, FILM COATED ORAL at 09:14

## 2019-01-28 RX ADMIN — OFLOXACIN 1 DROP: 3 SOLUTION OPHTHALMIC at 11:12

## 2019-01-28 NOTE — PROGRESS NOTES
Progress Note - Infectious Disease   Priscilla Yanes 80 y o  female MRN: [id number]  Unit/Bed#: -01 Encounter: [id number]      Impression/Plan:  1  Sepsis-present soon after admission   Fever and tachycardia  The patient is having high fever once again  Initially all felt to be UTI but now I am not convinced   She has remained hemodynamically stable  Possibly all related to the left wrist symptoms   She seems to be tolerating the antibiotics without difficulty  The procalcitonin level is now normalized  -discontinue cefazolin after last dose today  -follow-up blood cultures  -monitor CBC with diff and creatinine  -supportive care     2  Presumptive UTI-based upon the intermittent fever, abnormal urinalysis, and positive urine culture   The patient is relatively asymptomatic at this time   No resistant organism has been isolated  Renal ultrasound is negative for any complication  -antibiotics as above  -monitor symptomatology  -no additional ID workup for now     3  Left wrist pain and swelling-has improved significantly since yesterday  CT without significant effusion  Suspect secondary to pseudogout  Possibly related to the patient's rheumatoid arthritis  -orthopedics follow-up  -continue culture seen  -serial exams  -holding on arthrocentesis for now without significant synovial fluid appreciated     4  Encephalopathy-in a patient with baseline dementia   remains quite confused  Unclear baseline  -monitor cognition  -no additional ID workup for now     5  Right eye conjunctivitis-continue Ocuflox as per Ophthalmology      Discussed in detail with the primary service    Antibiotics:  Cefazolin 6  Antibiotics 7    Subjective:  Patient has no fever, chills, sweats; no nausea, vomiting, diarrhea; no cough, shortness of breath; decreased left wrist pain  No new symptoms      Objective:  Vitals:  Temp:  [98 °F (36 7 °C)-98 5 °F (36 9 °C)] 98 °F (36 7 °C)  HR:  [70-88] 88  Resp:  [18] 18  BP: (112-131)/(50-60) 122/58  SpO2:  [92 %-96 %] 92 %  Temp (24hrs), Av 2 °F (36 8 °C), Min:98 °F (36 7 °C), Max:98 5 °F (36 9 °C)  Current: Temperature: 98 °F (36 7 °C)    Physical Exam:   General Appearance:  Alert, interactive, nontoxic, no acute distress  Throat: Oropharynx moist without lesions  Lungs:   Clear to auscultation bilaterally; no wheezes, rhonchi or rales; respirations unlabored   Heart:  RRR; no murmur, rub or gallop   Abdomen:   Soft, non-tender, non-distended, positive bowel sounds  Extremities: No clubbing, cyanosis  Left wrist with decreased edema and erythema  No drainage   Skin: No new rashes or lesions  No draining wounds noted  Labs, Imaging, & Other studies:   All pertinent labs and imaging studies were personally reviewed    Results from last 7 days  Lab Units 19  0459 19  0536 19  0607   WBC Thousand/uL 4 32 7 17 7 87   HEMOGLOBIN g/dL 8 5* 8 7* 8 9*   PLATELETS Thousands/uL 209 201 201       Results from last 7 days  Lab Units 19  0459 19  0607 19  0452 19  1447   SODIUM mmol/L 136 133* 134* 135*   POTASSIUM mmol/L 3 4* 3 5 4 2 4 3   CHLORIDE mmol/L 102 101 104 98*   CO2 mmol/L 22 22 22 24   BUN mg/dL 10 10 14 20   CREATININE mg/dL 0 84 0 87 0 99 1 24   EGFR ml/min/1 73sq m 63 60 51 39   CALCIUM mg/dL 8 8 8 5 8 5 8 8   AST U/L  --   --  25 37   ALT U/L  --   --  26 33   ALK PHOS U/L  --   --  107 127*     Procalcitonin level 0 22    Results from last 7 days  Lab Units 19  1615 19  1520 19  1505 19  1447   BLOOD CULTURE   --  No Growth After 5 Days  --  No Growth After 5 Days     URINE CULTURE  >100,000 cfu/ml Escherichia coli*  --   --   --    INFLUENZA B PCR   --   --  None Detected  --    RSV PCR   --   --  None Detected  --        CT left wrist-No fluid collection seen    No erosive changes or periostitis    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

## 2019-01-28 NOTE — PROGRESS NOTES
Orthopedics   María Salinas 80 y o  female MRN: [id number]  Unit/Bed#: -01      Subjective:  Patient seen and evaluated  Patient confused  Notes persistent left wrist pain      Labs:    0  Lab Value Date/Time   HCT 25 5 (L) 01/28/2019 0459   HCT 26 7 (L) 01/27/2019 0536   HCT 27 0 (L) 01/26/2019 0607   HCT 38 8 07/19/2016 0828   HGB 8 5 (L) 01/28/2019 0459   HGB 8 7 (L) 01/27/2019 0536   HGB 8 9 (L) 01/26/2019 0607   HGB 12 7 07/19/2016 0828   INR 1 06 11/09/2018 1222   WBC 4 32 01/28/2019 0459   WBC 7 17 01/27/2019 0536   WBC 7 87 01/26/2019 0607   WBC 6 9 07/19/2016 0828    (H) 01/26/2019 1236   CRP 74 0 (H) 01/26/2019 1119       Meds:    Current Facility-Administered Medications:     artificial tear (LUBRIFRESH P M ) ophthalmic ointment, , Both Eyes, TID, Everton Robins MD    aspirin tablet 325 mg, 325 mg, Oral, Daily, Everton Robins MD, 325 mg at 01/27/19 0806    benzocaine (HURRICAINE) 20 % mucosal spray 1 spray, 1 spray, Mucosal, TID PRN, Everton Robins MD    benzonatate (TESSALON PERLES) capsule 100 mg, 100 mg, Oral, TID PRN, Everton Robins MD    calcium carbonate (OYSTER SHELL,OSCAL) 500 mg tablet 1 tablet, 1 tablet, Oral, Daily With Breakfast, Everton Robins MD, 1 tablet at 01/27/19 0806    ceFAZolin (ANCEF) 1 g in sodium chloride 0 9% 50 ml IVPB, 1,000 mg, Intravenous, Q8H, Ruth Hargrove PA-C, Last Rate: 100 mL/hr at 01/27/19 2118, 1,000 mg at 01/27/19 2118    colchicine (COLCRYS) tablet 0 6 mg, 0 6 mg, Oral, BID, Ruth Hargrove PA-C, 0 6 mg at 01/27/19 1751    donepezil (ARICEPT) tablet 5 mg, 5 mg, Oral, HS, Everton Robins MD, 5 mg at 01/27/19 2118    DULoxetine (CYMBALTA) delayed release capsule 60 mg, 60 mg, Oral, Daily, Everton Robins MD, 60 mg at 01/27/19 0806    enoxaparin (LOVENOX) subcutaneous injection 30 mg, 30 mg, Subcutaneous, Daily, Everton Robins MD, 30 mg at 01/27/19 0805    estradiol (ESTRACE) vaginal cream 1 g, 1 g, Vaginal, Daily, Everton Robins MD, 1 g at 01/27/19 0807    fluticasone (FLONASE) 50 mcg/act nasal spray 1 spray, 1 spray, Nasal, Daily, Everton Robins MD, 1 spray at 01/27/19 0806    furosemide (LASIX) tablet 20 mg, 20 mg, Oral, Once per day on Mon Wed Fri, Everton Robins MD, 20 mg at 01/25/19 0817    guaiFENesin (MUCINEX) 12 hr tablet 600 mg, 600 mg, Oral, BID PRN, Everton Robins MD    levothyroxine tablet 88 mcg, 88 mcg, Oral, Early Morning, Ruth Hargrove PA-C, 88 mcg at 01/28/19 0510    loratadine (CLARITIN) tablet 10 mg, 10 mg, Oral, Daily, Everton Robins MD, 10 mg at 01/27/19 0805    LORazepam (ATIVAN) tablet 0 5 mg, 0 5 mg, Oral, Q8H PRN, Everton Robins MD    LORazepam (ATIVAN) tablet 0 5 mg, 0 5 mg, Oral, HS, Everton Robins MD, 0 5 mg at 01/27/19 2118    losartan (COZAAR) tablet 100 mg, 100 mg, Oral, Daily, Everton Robins MD, 100 mg at 01/27/19 0806    magnesium hydroxide (MILK OF MAGNESIA) 400 mg/5 mL oral suspension 30 mL, 30 mL, Oral, Daily PRN, Everton Robins MD    menthol-methyl salicylate (BENGAY) 57-27 % cream, , Apply externally, 4x Daily PRN, Ruth Hargrove PA-C    metoprolol tartrate (LOPRESSOR) tablet 25 mg, 25 mg, Oral, BID, Everton Robins MD, 25 mg at 01/27/19 1751    NIFEdipine (PROCARDIA XL) 24 hr tablet 60 mg, 60 mg, Oral, Daily, Everton Robins MD, 60 mg at 01/27/19 0805    ofloxacin (OCUFLOX) 0 3 % ophthalmic solution 1 drop, 1 drop, Right Eye, 4x Daily, Everton Robins MD, 1 drop at 01/27/19 2118    ondansetron (ZOFRAN) injection 4 mg, 4 mg, Intravenous, Q6H PRN, Everton Robins MD    pantoprazole (PROTONIX) EC tablet 40 mg, 40 mg, Oral, BID, Everton Robins MD, 40 mg at 01/27/19 1751    potassium chloride (K-DUR,KLOR-CON) CR tablet 10 mEq, 10 mEq, Oral, Once per day on Mon Wed Fri, Everton Robins MD, 10 mEq at 01/25/19 0817    senna (SENOKOT) tablet 8 6 mg, 1 tablet, Oral, HS PRN, Everton Robins MD    sodium chloride (OCEAN) 0 65 % nasal spray 2 spray, 2 spray, Each Nare, BID, Everton Robins MD, 2 spray at 01/27/19 1752    traMADol (ULTRAM) tablet 50 mg, 50 mg, Oral, Q6H PRN, Everton Robins MD, 50 mg at 01/27/19 0520    triamcinolone (KENALOG) 0 1 % cream, , Topical, BID, Everton Robins MD    Physical exam:  Vitals:    01/28/19 0700   BP: 131/60   Pulse: 71   Resp: 18   Temp: 98 °F (36 7 °C)   SpO2: 92%     Left wrist:  · Skin intact  No gross deformity  · Mild soft tissue swelling  · No significant tenderness to palpation wrist joint  · Wrist range of motion is intact and limited slightly with minimal discomfort  · Digital range of motion intact  · Unable to perform sensory examination secondary to dementia  · 2+ radial pulse  Brisk capillary refill  · CT scan of the left wrist reveals no evidence of fluid collection or effusion noted  No erosive changes seen  There is evidence chondrocalcinosis of the TFCC representing likely CPPD arthropathy     _*_*_*_*_*_*_*_*_*_*_*_*_*_*_*_*_*_*_*_*_*_*_*_*_*_*_*_*_*_*_*_*_*_*_*_*_*_*_*_*_*    Assessment: 80 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

## 2019-01-28 NOTE — SOCIAL WORK
LOS 6 DAY  CM spoke with patients daughter who requested referral be sent to [facility] for rehab  CM spoke with admissions and determined that they would review the chart and make contact with CM  CM called [facility] and spoke with Evie Bañuelos to request [facility] transport from [facility] to [facility] for today  Evie Bañuelos from [facility] called back and confirmed a transport via [facility] from [facility] to [facility] at 6:45pm and [address] be [address]  IMM reviewed  No other needs  Treatment team aware  No other needs

## 2019-01-28 NOTE — ASSESSMENT & PLAN NOTE
· Patient had persistent fever despite appropriate antibiotics for urinary tract infection  · Appreciate Infectious Disease assistance--suspect persistent fever was due to CPPD arthropathy and is now noted to be renal

## 2019-01-28 NOTE — PHYSICAL THERAPY NOTE
PHYSICAL THERAPY EVALUATION NOTE    Patient Name: Carlos Oliva  AEQAS'W Date: 1/28/2019  Performed at least 2 patient identifiers during session: Name and Birthday        01/28/19 1224   Pain Assessment   Pain Assessment FLACC   Pain Location Hand   Pain Orientation Left   Pain Rating: FLACC (Rest) - Face 0   Pain Rating: FLACC (Rest) - Legs 0   Pain Rating: FLACC (Rest) - Activity 0   Pain Rating: FLACC (Rest) - Cry 1   Pain Rating: FLACC (Rest) - Consolability 2   Score: FLACC (Rest) 3   Pain Rating: FLACC (Activity) - Face 1   Pain Rating: FLACC (Activity) - Legs 0   Pain Rating: FLACC (Activity) - Activity 1   Pain Rating: FLACC (Activity) - Cry 1   Pain Rating: FLACC (Activity) - Consolability 2   Score: FLACC (Activity) 5   Restrictions/Precautions   Weight Bearing Precautions Per Order No  (No current orders; however caution L wrist w/WB d/t pain  )   Braces or Orthoses Other (Comment)  (pt chart shows order for velcro splint for comfort)   Other Precautions Contact/isolation; Impulsive;Cognitive; Chair Alarm; Bed Alarm; Fall Risk;Pain;Visual impairment;Hard of hearing   General   Chart Reviewed Yes   Additional Pertinent History pt PMH: fibromyalgia, HTN, recurrent UTI, Dementia; RA   Family/Caregiver Present No   Cognition   Overall Cognitive Status Impaired   Orientation Level Oriented to person;Oriented to place   Memory (inconsistent recall)   Following Commands Follows one step commands inconsistently   Bed Mobility   Additional Comments pt seated in chair upon time of eval    Transfers   Sit to Stand 4  Minimal assistance   Additional items Assist x 1; Increased time required; Impulsive;Verbal cues;Armrests   Stand to Sit 4  Minimal assistance   Additional items Assist x 1; Increased time required; Impulsive;Verbal cues;Armrests   Ambulation/Elevation   Gait pattern Poor UE support; Improper Weight shift; Forward Flexion; Inconsistent iwona; Short stride; Excessively slow   Gait Assistance 4  Minimal assist   Additional items Assist x 1;Verbal cues  (w/PT assistance for advancement of walker )   Assistive Device Rolling walker   Distance 5'   Additional items (pt decreased  stength L d/t pain rec  platform walker)   Balance   Static Sitting Poor +   Dynamic Sitting Poor   Static Standing Poor +   Ambulatory Poor +   Endurance Deficit   Endurance Deficit Yes   Endurance Deficit Description pt decreased amb tolerance: 11/09/2018 pt amb 20' Min A x1 w/RW   Activity Tolerance   Activity Tolerance Patient limited by pain   Nurse Made Aware Spoke with Og Jackman RN and Carie Childers PCA   Assessment   Prognosis Fair   Problem List Decreased strength;Decreased range of motion;Decreased endurance; Impaired balance;Decreased mobility; Decreased coordination;Decreased cognition;Decreased safety awareness; Impaired vision; Impaired hearing;Pain  (Gait Deviations)   Assessment Pt is a 80 y o  female seen for PT evaluation s/p admission to [address] [place] [address] on 1/22/2019 w/recurrent UTI  Order placed for PT  Prior to admission, pt from [facility], used a rolling walker  Upon evaluation: pt requires Min A x1 for transfers and ambulation w/RW  Pt's clinical presentation is currently unstable/unpredictable given the functional mobility deficits above coupled with weakness, decreased ROM, decreased endurance, gait deviations  Additionally, pt is at risk for falls, balance is impaired, altered vision and hearing, inconsistent ability to follow commands, and impulsivity  This is combined with medical complications of Fibromyalgia, HTN, Dementia, and RA  Pt would benefit from continued skilled PT tx while in hospital and upon DC to address deficits as outlined above and maximize level of functional mobility   From PT/mobility standpoint, recommendation at time of D/C would be short-term skilled PT in order to maximize pt's functional independence, consistency with transitional movements, and facilitate return to PLOF  Recommend trial with Platform RW to offload L wrist in order to increase pt comfort and compliance during amb  Barriers to Discharge Decreased caregiver support  (Impulsivity and inconsistent cognition)   Goals   Patient Goals likes to "keep things clean around the house"   STG Expiration Date 02/07/19   Short Term Goal #1 Pt will: perform sit<>stand transfers to Supervision to increase independence with transitional mvmt  Increase amb distance to 100' with S and least restrictive assistive device to increase endurance for in-home distances and offload L wrist  Increase LE strength by ½ grade to increase functional movement tolerance and decrease risk for falls  Plan   Treatment/Interventions Functional transfer training;LE strengthening/ROM; Therapeutic exercise; Endurance training;Equipment eval/education; Bed mobility;Gait training;Spoke to case management;Spoke to nursing;Cognitive reorientation;Patient/family training   PT Frequency (3-5x/wk)   Recommendation   Recommendation Short-term skilled PT     Candace Urena SPT

## 2019-01-28 NOTE — ASSESSMENT & PLAN NOTE
· Appreciate involvement from Hand surgery and Infectious Disease  · Suspect this is acute recurrent inflammatory arthropathy question gout/pseudogout verses exacerbation of rheumatoid arthritis  · Very high inflammatory markers noted including /CRP 74  · CT scan of the wrist/ xray= severe degenerative changes  · Continue colchicine for next few days and prnToradol      · MUCH IMPROVED TODAY

## 2019-01-28 NOTE — ASSESSMENT & PLAN NOTE
· Continue Aricept  Also with behavioral disturbances including repeatedly saying she wants to die/kill herself--THIS IS CHRONIC AND PATIENT HAS NO PLAN    Noted she is not doing this today  · Discourage patient from picking her nose and picking her eyes

## 2019-01-28 NOTE — PLAN OF CARE
Problem: PHYSICAL THERAPY ADULT  Goal: Performs mobility at highest level of function for planned discharge setting  See evaluation for individualized goals  Treatment/Interventions: Functional transfer training, LE strengthening/ROM, Therapeutic exercise, Endurance training, Equipment eval/education, Bed mobility, Gait training, Spoke to case management, Spoke to nursing  Equipment Recommended: Masha Chávez (w/L platform to offload L wrist as it heals)       See flowsheet documentation for full assessment, interventions and recommendations  Prognosis: Fair  Problem List: Decreased strength, Decreased range of motion, Decreased endurance, Impaired balance, Decreased mobility, Decreased coordination, Decreased cognition, Decreased safety awareness, Impaired vision, Impaired hearing, Pain  Assessment: With L platform RW, pt was able to amb further distance with increased comfort and decreased instability  Pt was able to increase activity tolerance with repetitions of STS on and off of the commode and back into chair  Recommend further amb trials with L platform RW to increase pt endurance and activity tolerance as well as decrease risk of falls  Pt was better able to manage walker with platform attachment to decrease instability with AD  Barriers to Discharge: Decreased caregiver support (Inconsistency with command following; impulsive)     Recommendation: Short-term skilled PT          See flowsheet documentation for full assessment

## 2019-01-28 NOTE — PLAN OF CARE
Problem: PHYSICAL THERAPY ADULT  Goal: Performs mobility at highest level of function for planned discharge setting  See evaluation for individualized goals  Treatment/Interventions: Functional transfer training, LE strengthening/ROM, Therapeutic exercise, Endurance training, Equipment eval/education, Bed mobility, Gait training, Spoke to case management, Spoke to nursing  Equipment Recommended: Masha Chávez (w/L platform to offload L wrist as it heals)       See flowsheet documentation for full assessment, interventions and recommendations  Outcome: Progressing  Prognosis: Fair  Problem List: Decreased strength, Decreased range of motion, Decreased endurance, Impaired balance, Decreased mobility, Decreased coordination, Decreased cognition, Decreased safety awareness, Impaired vision, Impaired hearing, Pain  Assessment: With L platform RW, pt was able to amb further distance with increased comfort and decreased instability  Pt was able to increase activity tolerance with repetitions of STS on and off of the commode and back into chair  Recommend further amb trials with L platform RW to increase pt endurance and activity tolerance as well as decrease risk of falls  Pt was better able to manage walker with platform attachment to decrease instability with AD  Barriers to Discharge: Decreased caregiver support (Inconsistency with command following; impulsive)     Recommendation: Short-term skilled PT          See flowsheet documentation for full assessment

## 2019-01-28 NOTE — PHYSICAL THERAPY NOTE
PHYSICAL THERAPY NOTE  Patient Name: Chely Casas  LWCEF'I Date: 1/28/2019  Performed at least 2 patient identifiers during session: Name and Birthday        01/28/19 1237   Pain Assessment   Pain Assessment FLACC   Pain Location Hand   Pain Orientation Left   Pain Rating: FLACC (Rest) - Face 0   Pain Rating: FLACC (Rest) - Legs 0   Pain Rating: FLACC (Rest) - Activity 0   Pain Rating: FLACC (Rest) - Cry 1   Pain Rating: FLACC (Rest) - Consolability 2   Score: FLACC (Rest) 3   Pain Rating: FLACC (Activity) - Face 1   Pain Rating: FLACC (Activity) - Legs 0   Pain Rating: FLACC (Activity) - Activity 1   Pain Rating: FLACC (Activity) - Cry 1   Pain Rating: FLACC (Activity) - Consolability 2   Score: FLACC (Activity) 5   Restrictions/Precautions   Weight Bearing Precautions Per Order No   Braces or Orthoses Other (Comment)  (pt chart shows order for velcro splint for comfort)   Other Precautions Contact/isolation; Impulsive;Cognitive; Chair Alarm; Bed Alarm; Fall Risk;Pain;Visual impairment;Hard of hearing   General   Chart Reviewed Yes   Additional Pertinent History pt PMH: fibromyalgia, HTN, recurrent UTI, Dementia; RA   Response to Previous Treatment Patient with no complaints from previous session  Family/Caregiver Present No   Cognition   Overall Cognitive Status Impaired   Orientation Level Oriented to person;Oriented to place   Following Commands Follows one step commands inconsistently   Subjective   Subjective pt requested to use the bathroom; pt agreeable to Tx after eval;   Transfers   Sit to Stand 4  Minimal assistance   Additional items (Assist x 1; Increased time required; Impulsive;Verbal cues; Arm)   Stand to Sit 4  Minimal assistance   Additional items Assist x 1; Increased time required; Impulsive;Verbal cues;Armrests   Ambulation/Elevation   Gait pattern Poor UE support; Improper Weight shift; Forward Flexion; Inconsistent iwona; Short stride; Excessively slow   Gait Assistance 4  Minimal assist   Additional items Assist x 1;Verbal cues   Assistive Device Rolling walker  (with L platform to offload wrist)   Distance 5' to commode and 5' back to chair   Balance   Static Sitting Poor +   Dynamic Sitting Poor   Static Standing Poor +   Ambulatory Poor +   Endurance Deficit   Endurance Deficit Yes   Endurance Deficit Description pt was able to tolerate further amb to commode, however, still below baseline amb distance (reported 200' in AFL and 20' on previous admission 11/09/2018)   Activity Tolerance   Activity Tolerance Patient limited by fatigue   Nurse Made Aware Spoke with Og Jackman RN and Carie Childers PCA   Assessment   Prognosis Fair   Problem List Decreased strength;Decreased range of motion;Decreased endurance; Impaired balance;Decreased mobility; Decreased coordination;Decreased cognition;Decreased safety awareness; Impaired vision; Impaired hearing;Pain   Assessment With L platform RW, pt was able to amb further distance with increased comfort and decreased instability  Pt was able to increase activity tolerance with repetitions of STS on and off of the commode and back into chair  Recommend further amb trials with L platform RW to increase pt endurance and activity tolerance as well as decrease risk of falls  Pt was better able to manage walker with platform attachment to decrease instability with AD  Barriers to Discharge Decreased caregiver support  (Inconsistency with command following; impulsive)   Goals   Patient Goals likes to "keep things clean around the house"   STG Expiration Date 02/07/19   Treatment Day 1   Plan   Treatment/Interventions Functional transfer training;LE strengthening/ROM; Therapeutic exercise; Endurance training;Equipment eval/education; Bed mobility;Gait training;Spoke to case management;Spoke to nursing   Progress Progressing toward goals   PT Frequency (3-5x/wk)   Recommendation   Recommendation Short-term skilled PT   Equipment Recommended Walker  (w/L platform to offload L wrist as it heals)     Norma Bailon, SPT

## 2019-01-28 NOTE — DISCHARGE SUMMARY
Discharge Summary    Discharging Physician / Practitioner: Emily Blanco PA-C  PCP: Robert Acosta DO  Admission Date:   Admission Orders     Ordered        01/22/19 1700  Inpatient Admission (expected length of stay for this patient is greater than two midnights)  Once             Discharge Date: 01/28/19    Disposition:      Short Term Rehab or SNF at [facility] to [facility]:   · Not Applicable to this Patient - Not Applicable to this Patient    Reason for Admission: fever    Discharge Diagnoses:     Please see assessment and plan section above for further details regarding discharge diagnoses  Resolved Problems  Date Reviewed: 1/28/2019          Resolved    Fever 1/28/2019     Resolved by  Emily Blanco PA-C    * (Principal)Recurrent UTI 1/28/2019     Resolved by  Emily Blanco PA-C          Consultations During Hospital Stay:  Orthopedics  Infectious Disease    Procedures Performed:   · See above    Medication Adjustments and Discharge Medications:  · Summary of Medication Adjustments made as a result of this hospitalization:  Colchicine  · Medication Dosing Tapers - Please refer to Discharge Medication List for details on any medication dosing tapers (if applicable to patient)  · Medications being temporarily held (include recommended restart time):  Hold statin until doses of colchicine are complete  · Discharge Medication List: See after visit summary for reconciled discharge medications  Wound Care Recommendations:  When applicable, please see wound care section of After Visit Summary  Diet Recommendations at Discharge:   Diet -        Diet Orders            Start     Ordered    01/26/19 1354  Diet Regular; Regular House; No Chocolate, Dysphagia 2-Mechanical Soft;  Thin Liquid  Diet effective now     Comments:  No chocolate items !!!!   Question Answer Comment   Diet Type Regular    Regular Regular House    Other Restriction(s): No Chocolate    Other Restriction(s): Dysphagia 2-Mechanical Soft    Liquid Modifier Thin Liquid    RD to adjust diet per protocol? Yes        01/26/19 1354    01/22/19 1859  Room Service  Once     Question:  Type of Service  Answer:  Room Service - Appropriate with Assistance    01/22/19 1858        Fluid Restriction - No Fluid Restriction at Discharge  Instructions for any Catheters / Lines Present at Discharge (including removal date, if applicable):     Significant Findings / Test Results:   · See above    Incidental Findings:   · none    Test Results Pending at Discharge (will require follow up): Results of repeat blood cultures     Outpatient Tests Requested:  · Routine BMP in CBC    Complications:  Dementia    Hospital Course:     Zach Mejia is a 80 y o  female patient who originally presented to the hospital on 1/22/2019 due to fever and hypotension  She had also been treated Patient had sepsis present on admission which was felt to be due to urinary tract infection and was initially given IV ceftriaxone  This was subsequently converted to IV Ancef at 1 g b i d  Unfortunately although patient seemed clinically stable she persisted in having fevers  Her blood cultures were negative and renal ultrasound did not demonstrate any abscess  Her dose of Ancef was increased to 1 g t i d  And a consultation was placed to Infectious Disease  Patient acutely developed severe left hand and wrist pain with edema  She was seen by Hand surgery  Her inflammatory markers were significantly elevated but CT scan did not find any soft tissue collection to drain  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 and patient responded nicely to doses of colchicine with Toradol  At baseline she has very advanced dementia with behavioral disturbances  She was clearly more calm the day following treatment of her CPPD, and pain appears to be improved    She is now fever free for 24 hr   However, after evaluation by PT and OT it was noted that she requires more assistance than her baseline and she will be discharged to skilled nursing facility    Condition at Discharge: fair     Discharge Day Visit / Exam:     * Please refer to separate progress note for these details *    Goals of Care Discussions:  · Code Status at Discharge: Level 1 - Full Code  · Were there any Goals of Care Discussions during Hospitalization?: No  · Results of any General Goals of Care Discussions:    · POLST Completed: No   · If POLST Completed, Summary of POLST Agreement Provided Here:    · OK to Rehospitalize if Needed? Yes    Discharge instructions/Information to patient and family:   See after visit summary section titled Discharge Instructions for information provided to patient and family  Planned Readmission: none      Discharge Statement:  I spent 45 minutes discharging the patient  This time was spent on the day of discharge  I had direct contact with the patient on the day of discharge  Greater than 50% of the total time was spent examining patient, answering all patient questions, arranging and discussing plan of care with patient as well as directly providing post-discharge instructions  Additional time then spent on discharge activities  SEE NOTE BELOW  Dilip De Santiago 4/27/1931, 80 y o  female MRN: [id number]     Unit/Bed#: -01 Encounter: [id number]     Primary Care Provider: Vesna Eastman DO   Date and time admitted to hospital: 1/22/2019  2:13 PM      * Recurrent UTIresolved as of 1/28/2019   Assessment & Plan     · Patient has history of recurrent UTI  Her urine cultures show E coli sensitive to IV cefazolin  She had originally been receiving 1 g b i d  However after discussing the case with pharmacy on 1/26/19 I increased the dosing to 1 mg p o  t i d  She is currently on antibiotic day #7, course complete after doses today  ?  Appreciate Infectious Disease input      Pseudogout of left wrist   Assessment & Plan     · Appreciate involvement from Hand surgery and Infectious Disease  · Suspect this is acute recurrent inflammatory arthropathy question gout/pseudogout verses exacerbation of rheumatoid arthritis  · Very high inflammatory markers noted including /CRP 74  · CT scan of the wrist/ xray= severe degenerative changes  · Continue colchicine for next few days and prnToradol  · MUCH IMPROVED TODAY      Feverresolved as of 1/28/2019   Assessment & Plan     · Patient had persistent fever despite appropriate antibiotics for urinary tract infection  · Appreciate Infectious Disease assistance--suspect persistent fever was due to CPPD arthropathy and is now noted to be renal      Dementia   Assessment & Plan     · Continue Aricept  Also with behavioral disturbances including repeatedly saying she wants to die/kill herself--THIS IS CHRONIC AND PATIENT HAS NO PLAN  Noted she is not doing this today  · Discourage patient from picking her nose and picking her eyes            Anemia   Assessment & Plan     · Stable/chronic--no evidence of blood loss            VTE Pharmacologic Prophylaxis:   Pharmacologic: Enoxaparin (Lovenox)  Mechanical VTE Prophylaxis in Place: Yes     Patient Centered Rounds: I have performed bedside rounds with nursing staff today      Discussions with Specialists or Other Care Team Provider:  Physical therapy, case management     Education and Discussions with Family / Patient:  Daughter     Time Spent for Care: 30 minutes    More than 50% of total time spent on counseling and coordination of care as described above      Current Length of Stay: 6 day(s)     Current Patient Status: Inpatient   Certification Statement: The patient will continue to require additional inpatient hospital stay due to Patient now needs a skilled nursing facility level care according to discussion with physical therapy     Discharge Plan:  Patient can be discharged to skilled nursing facility as soon as a bed is available     Code Status: Level 1 - Full Code     Subjective:   Patient repeatedly picks her nose and her eyes  She is asking where Godfrey Rios is, and remains generally confused but much more cooperative and pleasant today  However it is noted that she is not complaining of any pain in her left wrist today and has not had any fevers overnight  She complains of intermittent right shoulder pain  She is having bowel movements and is eating and drinking  Patient repeatedly tells me that she needs some "chocolate and she believes that 2 kisses would suffice"     Objective:      Vitals:   Temp (24hrs), Av 2 °F (36 8 °C), Min:98 °F (36 7 °C), Max:98 5 °F (36 9 °C)     Temp:  [98 °F (36 7 °C)-98 5 °F (36 9 °C)] 98 °F (36 7 °C)  HR:  [70-88] 88  Resp:  [18] 18  BP: (112-131)/(50-60) 122/58  SpO2:  [92 %-96 %] 92 %  Body mass index is 27 64 kg/m²       Input and Output Summary (last 24 hours):         Intake/Output Summary (Last 24 hours) at 19 1319  Last data filed at 19 1100    Gross per 24 hour   Intake              220 ml   Output              780 ml   Net             -560 ml         Physical Exam:      Physical Exam   Constitutional: No distress  Thin elderly female sitting up in a chair noted to be frequently picking at her nose   HENT:   Head: Atraumatic  Right eye deformity   Cardiovascular: Normal rate and regular rhythm  No murmur heard  Pulmonary/Chest: No respiratory distress  She has no wheezes  She has no rales  Abdominal: Soft  She exhibits no distension  Musculoskeletal: She exhibits no tenderness or deformity  Mild warmth but no erythema noted in the left hand and rest   Significantly decreased swelling noted  No obvious tenderness to palpation today   Neurological: She is alert  Awake alert confused   Skin: Skin is warm and dry  No rash noted  She is not diaphoretic  No erythema  No pallor     Psychiatric:   More pleasant and cooperative   Vitals reviewed         Additional Data:      Labs:        Results from last 7 days  Lab Units 01/28/19  0459   WBC Thousand/uL 4 32   HEMOGLOBIN g/dL 8 5*   HEMATOCRIT % 25 5*   PLATELETS Thousands/uL 209   NEUTROS PCT % 65   LYMPHS PCT % 17   MONOS PCT % 8   EOS PCT % 8*         Results from last 7 days  Lab Units 01/28/19  0459   01/23/19  0452   SODIUM mmol/L 136  < > 134*   POTASSIUM mmol/L 3 4*  < > 4 2   CHLORIDE mmol/L 102  < > 104   CO2 mmol/L 22  < > 22   BUN mg/dL 10  < > 14   CREATININE mg/dL 0 84  < > 0 99   ANION GAP mmol/L 12  < > 8   CALCIUM mg/dL 8 8  < > 8 5   ALBUMIN g/dL  --   --  2 6*   TOTAL BILIRUBIN mg/dL  --   --  0 60   ALK PHOS U/L  --   --  107   ALT U/L  --   --  26   AST U/L  --   --  25   GLUCOSE RANDOM mg/dL 91  < > 91   < > = values in this interval not displayed                  Results from last 7 days  Lab Units 01/28/19  0459 01/27/19  0536 01/26/19  1102 01/22/19  1447   LACTIC ACID mmol/L  --   --   --  1 2   PROCALCITONIN ng/ml 0 18 0 22 0 26*  --       * I Have Reviewed All Lab Data Listed Above  * Additional Pertinent Lab Tests Reviewed: All Labs Within Last 24 Hours Reviewed     Imaging:     Imaging Reports Reviewed Today Include:   Imaging Personally Reviewed by Myself Includes:       Recent Cultures (last 7 days):         Results from last 7 days  Lab Units 01/22/19  1615 01/22/19  1520 01/22/19  1505 01/22/19  1447   BLOOD CULTURE    --  No Growth After 5 Days  --  No Growth After 5 Days     URINE CULTURE   >100,000 cfu/ml Escherichia coli*  --   --   --    INFLUENZA B PCR    --   --  None Detected  --    RSV PCR    --   --  None Detected  --          Last 24 Hours Medication List:      Current Facility-Administered Medications:  artificial tear   Both Eyes TID Everton Robins MD     aspirin 325 mg Oral Daily Everton Robins MD     benzocaine 1 spray Mucosal TID PRN Everton Robins MD     benzonatate 100 mg Oral TID PRN Everton Robins MD     calcium carbonate 1 tablet Oral Daily With Breakfast Everton Robins MD     cefazolin 1,000 mg Intravenous Q8H Lico Choi MD Last Rate: 1,000 mg (01/27/19 2118)   colchicine 0 6 mg Oral BID Ruth Hargrove PA-C     donepezil 5 mg Oral HS Everton Robins MD     DULoxetine 60 mg Oral Daily Everton Robins MD     enoxaparin 30 mg Subcutaneous Daily Everton Robins MD     estradiol 1 g Vaginal Daily Everton Robins MD     fluticasone 1 spray Nasal Daily Everton Robins MD     furosemide 20 mg Oral Once per day on Mon Wed Fri Everton Robins MD     guaiFENesin 600 mg Oral BID PRN Everton Robins MD     ketorolac 15 mg Intravenous Q6H PRN Ruth Hargrove PA-C     levothyroxine 88 mcg Oral Early Morning Ruth Hargrove PA-C     loratadine 10 mg Oral Daily Everton Robins MD     LORazepam 0 5 mg Oral Q8H PRN Everton Robins MD     LORazepam 0 5 mg Oral HS Everton Robins MD     losartan 100 mg Oral Daily Everton Robins MD     magnesium hydroxide 30 mL Oral Daily PRN Everton Robins MD     menthol-methyl salicylate   Apply externally 4x Daily PRN Ruth Hargrove PA-C     metoprolol tartrate 25 mg Oral BID Everton Robins MD     NIFEdipine 60 mg Oral Daily Everton Robins MD     ofloxacin 1 drop Right Eye 4x Daily Everton Robins MD     ondansetron 4 mg Intravenous Q6H PRN Everton Robins MD     pantoprazole 40 mg Oral BID Everton Robins MD     potassium chloride 10 mEq Oral Once per day on Mon Wed Fri Everton Robins MD     senna 1 tablet Oral HS PRN Everton Robins MD     sodium chloride 2 spray Each Nare BID Everton Robins MD     traMADol 50 mg Oral Q6H PRN Everton Robins MD     triamcinolone   Topical BID Everton Robins MD

## 2019-01-28 NOTE — MEDICAL STUDENT
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, arthritic changes, and soft tissue swelling   -Elevated SED and CRP, without leukocytosis  Pt now 24hr afebrile   -Suspect CPPD  -Continue colchicine and Toradol   -Brace and ice for supportive care  -Continue to monitor     · Recurrent UTI  -Pt Urine culture for E coli 1/22   -Day 7 of abx therapy, currently on 1g Ancef Q8, Per ID, can D/C after last dose today   -Afebrile and without leukocytosis  -pt denies dysuria or abdominal pain     · Conjunctivitis OD  -Per opthalmology, continue Ocuflox x1 week (end date 2/1)   -R eye symptoms improving, no spread to L     · Sepsis, present soon after arrival   -Pt has been afebrile and w/o leukocytosis x 24 hrs   -Continue to monitor   -Potentially multifocal etiology, UTI and L wrist pathology     · Hypokalemia   -3 4 this am  -Has 10 mEq scheduled  -Up to 40 mEq today    · Anemia   -8 5 today  -Continue to monitor     · Cellulitis nasal bridge  -Pt MRSA positive   -New scab and redness nasal bridge, bright red and tender  -Pt has hx of putting finger in nose and touching eye, on exam pt did this and touched the area multiple times   -Will speak with ID, potentially start mupirocin to the area and nares    Subjective:   Wilson Diaz is a 81 yo F with a PMH of dementia, CVA x 2, recurrent UTIs, RA, lupus, and fibromyalgia who presented to [facility] on 1/22 for reported fevers and hypotension at [facility]  On arrival, pt was found to be normotensive wand without fevers  Pt was found to have a UTI, that grew E coli  Pt was given 1 dose Rocephin in ED and has been treated with cefazolin since   During hospitalization pt developed conjunctivitis  She was evaluated by opthalmology and given abx eye drops x 1 week  Pt also developed L hand and wrist pain  CT was preformed and suspicious of CPPD  Inflammatory markers significantly elevated as well  ID and ortho suspect similar disease process, pt has improved with antiinflammatories  Today pt states she is feeling the same as yesterday  She states she is not having any dysuria, flank, or abdominal pain  She notes her eye is not draining much, and denies drainage or buildup in the the left eye  She states her left hand and wrist bother her and cause her pain  She does state her nose is bothering her, stating she doesn't "know if it's the sinuses or what"  Cannot give further detail  No other complaints today  ROS negative: except for Positive nose pain, abdominal bloating, L and R wrist pain  Objective:     Vitals: Blood pressure 122/58, pulse 88, temperature 98 °F (36 7 °C), temperature source Oral, resp  rate 18, weight 64 2 kg (141 lb 8 6 oz), SpO2 92 %  ,Body mass index is 27 64 kg/m²  Intake/Output Summary (Last 24 hours) at 01/28/19 0935  Last data filed at 01/28/19 0900   Gross per 24 hour   Intake            622 5 ml   Output              580 ml   Net             42 5 ml       Physical Exam: /58   Pulse 88   Temp 98 °F (36 7 °C) (Oral)   Resp 18   Wt 64 2 kg (141 lb 8 6 oz)   SpO2 92%   BMI 27 64 kg/m²   General appearance: alert and oriented, in no acute distress  Head: Normocephalic, without obvious abnormality, atraumatic  Eyes: R eye closed  Without discharge or drainage  L eye conjunctiva clear  No drainage or discharge   Ears: External ears normal  Nose: Bright red, well demarcated area on the nose with a central scab  TTP   Pt touched inside of nares to this areas multiple times during exam    Throat: Mucous membranes moist   Neck: supple, symmetrical, trachea midline  Lungs: clear to auscultation bilaterally  Heart: regular rate and rhythm, S1, S2 normal, no murmur, click, rub or gallop  Abdomen: soft, non-tender; bowel sounds normal; no masses,  no organomegaly  Extremities: L wrist mild swelling, nonpitting  NTTP  Non erythematous, not warmth on palpation  Pulses: 2+ and symmetric  Skin: L wrist and nose as above  No other lesions noted  Invasive Devices     Peripheral Intravenous Line            Peripheral IV 01/27/19 Right Wrist less than 1 day                Lab, Imaging and other studies: I have personally reviewed pertinent reports      VTE Pharmacologic Prophylaxis: Enoxaparin (Lovenox)  VTE Mechanical Prophylaxis: sequential compression device

## 2019-01-28 NOTE — PROGRESS NOTES
Progress Note - Aliyala Jonnathan 4/27/1931, 80 y o  female MRN: [id number]    Unit/Bed#: -01 Encounter: [id number]    Primary Care Provider: Vesna Eastman DO   Date and time admitted to hospital: 1/22/2019  2:13 PM  * Recurrent UTIresolved as of 1/28/2019   Assessment & Plan    · Patient has history of recurrent UTI  Her urine cultures show E coli sensitive to IV cefazolin  She had originally been receiving 1 g b i d  However after discussing the case with pharmacy on 1/26/19 I increased the dosing to 1 mg p o  t i d  She is currently on antibiotic day #7, course complete after doses today  · Appreciate Infectious Disease input     Pseudogout of left wrist   Assessment & Plan    · Appreciate involvement from Hand surgery and Infectious Disease  · Suspect this is acute recurrent inflammatory arthropathy question gout/pseudogout verses exacerbation of rheumatoid arthritis  · Very high inflammatory markers noted including /CRP 74  · CT scan of the wrist/ xray= severe degenerative changes  · Continue colchicine for next few days and prnToradol  · MUCH IMPROVED TODAY     Feverresolved as of 1/28/2019   Assessment & Plan    · Patient had persistent fever despite appropriate antibiotics for urinary tract infection  · Appreciate Infectious Disease assistance--suspect persistent fever was due to CPPD arthropathy and is now noted to be renal     Dementia   Assessment & Plan    · Continue Aricept  Also with behavioral disturbances including repeatedly saying she wants to die/kill herself--THIS IS CHRONIC AND PATIENT HAS NO PLAN    Noted she is not doing this today  · Discourage patient from picking her nose and picking her eyes         Anemia   Assessment & Plan    · Stable/chronic--no evidence of blood loss         VTE Pharmacologic Prophylaxis:   Pharmacologic: Enoxaparin (Lovenox)  Mechanical VTE Prophylaxis in Place: Yes    Patient Centered Rounds: I have performed bedside rounds with nursing staff today     Discussions with Specialists or Other Care Team Provider:  Physical therapy, case management    Education and Discussions with Family / Patient:  Daughter    Time Spent for Care: 30 minutes  More than 50% of total time spent on counseling and coordination of care as described above  Current Length of Stay: 6 day(s)    Current Patient Status: Inpatient   Certification Statement: The patient will continue to require additional inpatient hospital stay due to Patient now needs a skilled nursing facility level care according to discussion with physical therapy    Discharge Plan:  Patient can be discharged to skilled nursing facility as soon as a bed is available    Code Status: Level 1 - Full Code    Subjective:   Patient repeatedly picks her nose and her eyes  She is asking where Berta Pyle is, and remains generally confused but much more cooperative and pleasant today  However it is noted that she is not complaining of any pain in her left wrist today and has not had any fevers overnight  She complains of intermittent right shoulder pain  She is having bowel movements and is eating and drinking  Patient repeatedly tells me that she needs some "chocolate and she believes that 2 kisses would suffice"    Objective:     Vitals:   Temp (24hrs), Av 2 °F (36 8 °C), Min:98 °F (36 7 °C), Max:98 5 °F (36 9 °C)    Temp:  [98 °F (36 7 °C)-98 5 °F (36 9 °C)] 98 °F (36 7 °C)  HR:  [70-88] 88  Resp:  [18] 18  BP: (112-131)/(50-60) 122/58  SpO2:  [92 %-96 %] 92 %  Body mass index is 27 64 kg/m²  Input and Output Summary (last 24 hours): Intake/Output Summary (Last 24 hours) at 19 1319  Last data filed at 19 1100   Gross per 24 hour   Intake              220 ml   Output              780 ml   Net             -560 ml       Physical Exam:     Physical Exam   Constitutional: No distress  Thin elderly female sitting up in a chair noted to be frequently picking at her nose   HENT:   Head: Atraumatic  Right eye deformity   Cardiovascular: Normal rate and regular rhythm  No murmur heard  Pulmonary/Chest: No respiratory distress  She has no wheezes  She has no rales  Abdominal: Soft  She exhibits no distension  Musculoskeletal: She exhibits no tenderness or deformity  Mild warmth but no erythema noted in the left hand and rest   Significantly decreased swelling noted  No obvious tenderness to palpation today   Neurological: She is alert  Awake alert confused   Skin: Skin is warm and dry  No rash noted  She is not diaphoretic  No erythema  No pallor  Psychiatric:   More pleasant and cooperative   Vitals reviewed  Additional Data:     Labs:      Results from last 7 days  Lab Units 01/28/19  0459   WBC Thousand/uL 4 32   HEMOGLOBIN g/dL 8 5*   HEMATOCRIT % 25 5*   PLATELETS Thousands/uL 209   NEUTROS PCT % 65   LYMPHS PCT % 17   MONOS PCT % 8   EOS PCT % 8*       Results from last 7 days  Lab Units 01/28/19  0459  01/23/19  0452   SODIUM mmol/L 136  < > 134*   POTASSIUM mmol/L 3 4*  < > 4 2   CHLORIDE mmol/L 102  < > 104   CO2 mmol/L 22  < > 22   BUN mg/dL 10  < > 14   CREATININE mg/dL 0 84  < > 0 99   ANION GAP mmol/L 12  < > 8   CALCIUM mg/dL 8 8  < > 8 5   ALBUMIN g/dL  --   --  2 6*   TOTAL BILIRUBIN mg/dL  --   --  0 60   ALK PHOS U/L  --   --  107   ALT U/L  --   --  26   AST U/L  --   --  25   GLUCOSE RANDOM mg/dL 91  < > 91   < > = values in this interval not displayed  Results from last 7 days  Lab Units 01/28/19  0459 01/27/19  0536 01/26/19  1102 01/22/19  1447   LACTIC ACID mmol/L  --   --   --  1 2   PROCALCITONIN ng/ml 0 18 0 22 0 26*  --      * I Have Reviewed All Lab Data Listed Above  * Additional Pertinent Lab Tests Reviewed:  All Labs Within Last 24 Hours Reviewed    Imaging:    Imaging Reports Reviewed Today Include:   Imaging Personally Reviewed by Myself Includes:      Recent Cultures (last 7 days):       Results from last 7 days  Lab Units 01/22/19  2788 01/22/19  1520 01/22/19  1505 01/22/19  1447   BLOOD CULTURE   --  No Growth After 5 Days  --  No Growth After 5 Days     URINE CULTURE  >100,000 cfu/ml Escherichia coli*  --   --   --    INFLUENZA B PCR   --   --  None Detected  --    RSV PCR   --   --  None Detected  --        Last 24 Hours Medication List:     Current Facility-Administered Medications:  artificial tear  Both Eyes TID Everton Robins MD    aspirin 325 mg Oral Daily Everton Robins MD    benzocaine 1 spray Mucosal TID PRN Everton Robins MD    benzonatate 100 mg Oral TID PRN Everton Robins MD    calcium carbonate 1 tablet Oral Daily With Breakfast Everton Robins MD    cefazolin 1,000 mg Intravenous Q8H Lore Underwood MD Last Rate: 1,000 mg (01/27/19 2118)   colchicine 0 6 mg Oral BID Ruth Hargrove PA-C    donepezil 5 mg Oral HS Everton Robins MD    DULoxetine 60 mg Oral Daily Everton Robins MD    enoxaparin 30 mg Subcutaneous Daily Everton Robins MD    estradiol 1 g Vaginal Daily Everton Robins MD    fluticasone 1 spray Nasal Daily Everton Robins MD    furosemide 20 mg Oral Once per day on Mon Wed Fri Everton Robins MD    guaiFENesin 600 mg Oral BID PRN Everton Robins MD    ketorolac 15 mg Intravenous Q6H PRN Ruth Hargrove PA-C    levothyroxine 88 mcg Oral Early Morning Ruth Hargrove PA-C    loratadine 10 mg Oral Daily Everton Robins MD    LORazepam 0 5 mg Oral Q8H PRN Everton Robins MD    LORazepam 0 5 mg Oral HS Everton Robins MD    losartan 100 mg Oral Daily Everton Robins MD    magnesium hydroxide 30 mL Oral Daily PRN Everton Robins MD    menthol-methyl salicylate  Apply externally 4x Daily PRN JESUS CoyneC    metoprolol tartrate 25 mg Oral BID Everton Robins MD    NIFEdipine 60 mg Oral Daily Everton Robins MD    ofloxacin 1 drop Right Eye 4x Daily Everton Robins MD    ondansetron 4 mg Intravenous Q6H PRN Mukul Roman MD pantoprazole 40 mg Oral BID Everton Robins MD    potassium chloride 10 mEq Oral Once per day on Mon Wed Fri Everton Robins MD    senna 1 tablet Oral HS PRN Everton Robins MD    sodium chloride 2 spray Each Nare BID Everton Robins MD    traMADol 50 mg Oral Q6H PRN Everton Robins MD    triamcinolone  Topical BID Shelby Jha MD         Today, Patient Was Seen By: Agustina Marin PA-C    ** Please Note: Dictation voice to text software may have been used in the creation of this document   **

## 2019-01-28 NOTE — ASSESSMENT & PLAN NOTE
· Patient has history of recurrent UTI  Her urine cultures show E coli sensitive to IV cefazolin  She had originally been receiving 1 g b i d  However after discussing the case with pharmacy on 1/26/19 I increased the dosing to 1 mg p o  t i d    She is currently on antibiotic day #7, course complete after doses today  · Appreciate Infectious Disease input

## 2019-01-29 ENCOUNTER — TRANSITIONAL CARE MANAGEMENT (OUTPATIENT)
Dept: FAMILY MEDICINE CLINIC | Facility: CLINIC | Age: 84
End: 2019-01-29

## 2019-01-29 ENCOUNTER — EPISODE CHANGES (OUTPATIENT)
Dept: CASE MANAGEMENT | Facility: HOSPITAL | Age: 84
End: 2019-01-29

## 2019-01-29 PROCEDURE — TCMNV

## 2019-01-29 NOTE — PLAN OF CARE
DISCHARGE PLANNING - CARE MANAGEMENT     Discharge to post-acute care or home with appropriate resources Completed        INFECTION - ADULT     Absence or prevention of progression during hospitalization Completed        Potential for Falls     Patient will remain free of falls Completed        Prexisting or High Potential for Compromised Skin Integrity     Skin integrity is maintained or improved Completed        SAFETY ADULT     Patient will remain free of falls Completed     Maintain or return to baseline ADL function Completed     Maintain or return mobility status to optimal level Completed

## 2019-01-31 ENCOUNTER — PATIENT OUTREACH (OUTPATIENT)
Dept: CASE MANAGEMENT | Facility: OTHER | Age: 84
End: 2019-01-31

## 2019-02-01 LAB
BACTERIA BLD CULT: NORMAL
BACTERIA BLD CULT: NORMAL

## 2019-02-04 ENCOUNTER — TELEPHONE (OUTPATIENT)
Dept: FAMILY MEDICINE CLINIC | Facility: CLINIC | Age: 84
End: 2019-02-04

## 2019-02-04 DIAGNOSIS — E87.6 HYPOKALEMIA: Primary | ICD-10-CM

## 2019-02-04 RX ORDER — POTASSIUM CHLORIDE 750 MG/1
TABLET, EXTENDED RELEASE ORAL
Qty: 30 TABLET | Refills: 1 | Status: SHIPPED | OUTPATIENT
Start: 2019-02-04 | End: 2019-02-05 | Stop reason: SDUPTHER

## 2019-02-04 NOTE — TELEPHONE ENCOUNTER
Please tell her daughter her lab is okay but potassium a little low  Want to start klor con 10meq  She should take one tab/day on days she takes furosemide  [facility]    Repeat lab in one month

## 2019-02-04 NOTE — TELEPHONE ENCOUNTER
Written script needs to go to [facility] by fax [phone] nurse fax  Please re-order the Klor-Con and hit the "PRINT" tab  [facility] and they will get the meds

## 2019-02-05 DIAGNOSIS — E87.6 HYPOKALEMIA: ICD-10-CM

## 2019-02-05 RX ORDER — POTASSIUM CHLORIDE 750 MG/1
TABLET, EXTENDED RELEASE ORAL
Qty: 30 TABLET | Refills: 1 | Status: SHIPPED | OUTPATIENT
Start: 2019-02-05 | End: 2019-09-02

## 2019-02-05 NOTE — TELEPHONE ENCOUNTER
After speaking to [facility], they stated that patient is currently at [facility] in [place]    I called and faxed the scripts to them (Fx# [phone])

## 2019-02-09 ENCOUNTER — PATIENT OUTREACH (OUTPATIENT)
Dept: CASE MANAGEMENT | Facility: OTHER | Age: 84
End: 2019-02-09

## 2019-03-04 ENCOUNTER — TELEPHONE (OUTPATIENT)
Dept: FAMILY MEDICINE CLINIC | Facility: CLINIC | Age: 84
End: 2019-03-04

## 2019-03-04 ENCOUNTER — PATIENT OUTREACH (OUTPATIENT)
Dept: CASE MANAGEMENT | Facility: OTHER | Age: 84
End: 2019-03-04

## 2019-03-04 NOTE — TELEPHONE ENCOUNTER
Patient has returned to [facility], so they are faxing over the admission orders for your review and signature   The nurse requested a script for PT, OT, Speech evaluation and treat as well

## 2019-03-06 ENCOUNTER — PATIENT OUTREACH (OUTPATIENT)
Dept: CASE MANAGEMENT | Facility: HOSPITAL | Age: 84
End: 2019-03-06

## 2019-03-06 ENCOUNTER — EPISODE CHANGES (OUTPATIENT)
Dept: CASE MANAGEMENT | Facility: OTHER | Age: 84
End: 2019-03-06

## 2019-03-06 NOTE — PROGRESS NOTES
Patient discharged from [facility] to [facility]  Discharge STEPHENIE documents in

## 2019-03-12 ENCOUNTER — OFFICE VISIT (OUTPATIENT)
Dept: FAMILY MEDICINE CLINIC | Facility: CLINIC | Age: 84
End: 2019-03-12
Payer: MEDICARE

## 2019-03-12 VITALS
OXYGEN SATURATION: 92 % | RESPIRATION RATE: 16 BRPM | BODY MASS INDEX: 26.5 KG/M2 | HEART RATE: 79 BPM | HEIGHT: 60 IN | DIASTOLIC BLOOD PRESSURE: 68 MMHG | SYSTOLIC BLOOD PRESSURE: 118 MMHG | TEMPERATURE: 99.9 F | WEIGHT: 135 LBS

## 2019-03-12 DIAGNOSIS — A41.9 SEPSIS, DUE TO UNSPECIFIED ORGANISM: Primary | ICD-10-CM

## 2019-03-12 DIAGNOSIS — F02.81 ALZHEIMER'S DEMENTIA WITH BEHAVIORAL DISTURBANCE, UNSPECIFIED TIMING OF DEMENTIA ONSET (HCC): ICD-10-CM

## 2019-03-12 DIAGNOSIS — G30.9 ALZHEIMER'S DEMENTIA WITH BEHAVIORAL DISTURBANCE, UNSPECIFIED TIMING OF DEMENTIA ONSET (HCC): ICD-10-CM

## 2019-03-12 DIAGNOSIS — M11.20 PSEUDOGOUT: ICD-10-CM

## 2019-03-12 PROBLEM — G83.9 PARESIS (HCC): Status: ACTIVE | Noted: 2019-03-12

## 2019-03-12 PROCEDURE — 1124F ACP DISCUSS-NO DSCNMKR DOCD: CPT | Performed by: INTERNAL MEDICINE

## 2019-03-12 PROCEDURE — 99214 OFFICE O/P EST MOD 30 MIN: CPT | Performed by: INTERNAL MEDICINE

## 2019-03-12 NOTE — PROGRESS NOTES
Assessment/Plan:         Diagnoses and all orders for this visit:    Sepsis, due to unspecified organism [facility])  Comments:  2nd to urine  discussed urology ? consider prophylaxsis for uti  Alzheimer's dementia with behavioral disturbance, unspecified timing of dementia onset  Comments:  she is only on 5mg aricept due to being agitated with 10mg  Pseudogout          Subjective:      Patient ID: Keren Johnson is a 80 y o  female  Pt's daugherStates that her mom was in the hospital   She had uti and was in the hosp  She then went to rehab x 5 weeks  She had a gout attack and could not walk with her walker  She again developed a uti and went back to the hospital   The snf feels her dementia has worsened and she is now in alzheimers unit  Pt is anemic but per discuss with daughter we will not act on it  The following portions of the patient's history were reviewed and updated as appropriate: She  has a past medical history of Aspiration pneumonia ([address] ), Basal cell carcinoma of nose, Blind, Dementia, Depression, Disease of thyroid gland, Fibromyalgia, Gait disturbance, GERD (gastroesophageal reflux disease), Glaucoma, Hyperlipidemia, Hypertension, Osteopenia, Peripheral neuropathy, Polymyalgia rheumatica ([address] ), Psychiatric disorder, Renal insufficiency syndrome, Rheumatoid arthritis ([address] ), Stroke ([address] ), Systemic lupus erythematosus ([address] ), Vertigo, and Vitamin D deficiency    She   Patient Active Problem List    Diagnosis Date Noted    Paresis ([address] ) 03/12/2019    Pseudogout of left wrist 01/27/2019    Conjunctivitis of right eye 01/22/2019    Cough 11/09/2018    Rotator cuff tear arthropathy of right shoulder 09/18/2018    Pneumonia of right upper lobe due to infectious organism ([address] ) 08/22/2018    Sore throat 08/22/2018    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 ([address] ) 06/28/2018    Acute cystitis 05/05/2018    Right lower quadrant abdominal pain 05/05/2018    DEVONTE (acute kidney injury) ([place] [address] ) 05/05/2018    Hypothyroidism 04/14/2018    Anemia 04/14/2018    Dementia 04/13/2018    Hyponatremia 10/03/2016    CVA (cerebral vascular accident) ([place] [address] ) 10/03/2016    Essential hypertension 10/03/2016    Acute metabolic encephalopathy 03/61/6115    Hyperlipidemia     GERD (gastroesophageal reflux disease)     Fibromyalgia     Rheumatoid arthritis ([place] [address] )      She  has a past surgical history that includes Joint replacement; Shoulder surgery; and Eye surgery  Her family history includes Coronary artery disease in her father; Dementia in her father; Diabetes in her mother and sister; Heart attack in her mother; Uterine cancer in her sister  She  reports that she has never smoked  She has never used smokeless tobacco  She reports that she does not drink alcohol or use drugs    Current Outpatient Medications   Medication Sig Dispense Refill    benzonatate (TESSALON PERLES) 100 mg capsule Take 1 capsule (100 mg total) by mouth 3 (three) times a day as needed for cough 20 capsule 0    calcium carbonate (OS-TEDDY) 600 MG tablet Take 600 mg by mouth daily      carboxymethylcellulose (REFRESH LIQUIGEL) 1 % ophthalmic solution Apply 1 drop to eye 3 (three) times a day        cetirizine (ZyrTEC) 10 MG chewable tablet Chew 1 tablet (10 mg total) daily 30 tablet 0    donepezil (ARICEPT) 10 mg tablet Take 0 5 tablets (5 mg total) by mouth daily at bedtime Increased confusion on 10mg 45 tablet 1    DULoxetine (CYMBALTA) 60 mg delayed release capsule Take 1 capsule (60 mg total) by mouth daily  0    estradiol (ESTRACE) 0 1 mg/g vaginal cream Insert 1 g into the vagina daily Do no insert - please apply a pea sized amount to the urethra/vagina Monday, Wednesday and Friday before bedtime 42 5 g 3    fluticasone (FLONASE) 50 mcg/act nasal spray 1 spray into each nostril daily 16 g 0    furosemide (LASIX) 20 mg tablet Take one tab on Monday- Wednesday and friday 36 tablet 1    levothyroxine 100 mcg tablet       LORazepam (ATIVAN) 0 5 mg tablet Take 1 tablet (0 5 mg total) by mouth every 8 (eight) hours as needed for anxiety for up to 10 days She always takes 0 5 mg at bedtime 30 tablet 0    losartan (COZAAR) 100 MG tablet Take 1 tablet (100 mg total) by mouth daily 90 tablet 1    Lutein 10 MG TABS Take 1 tablet by mouth daily        magnesium hydroxide (MILK OF MAGNESIA) 400 mg/5 mL oral suspension Take 30 mL by mouth daily as needed for constipation        metoprolol tartrate (LOPRESSOR) 50 mg tablet Take 0 5 tablets (25 mg total) by mouth 2 (two) times a day 45 tablet 1    NIFEdipine (PROCARDIA XL) 60 mg 24 hr tablet       pantoprazole (PROTONIX) 40 mg tablet Take 40 mg by mouth 2 (two) times a day        potassium chloride (K-DUR,KLOR-CON) 10 mEq tablet Take one tab on days you take furosemide   30 tablet 1    pravastatin (PRAVACHOL) 40 mg tablet Take 1 tablet (40 mg total) by mouth daily  0    aspirin 325 mg tablet Take 325 mg by mouth daily        benzocaine (ORAJEL) 10 % mucosal gel Apply 1 application to the mouth or throat 3 (three) times a day as needed for mucositis (Patient not taking: Reported on 3/12/2019) 5 3 g 0    colchicine (COLCRYS) 0 6 mg tablet Take 1 tablet (0 6 mg total) by mouth 2 (two) times a day for 3 days 6 tablet 0    Dentifrices (BIOTENE DRY MOUTH CARE DT) Apply to teeth      guaiFENesin (MUCINEX) 600 mg 12 hr tablet Take 1 tablet (600 mg total) by mouth 2 (two) times a day as needed for congestion (Patient not taking: Reported on 3/12/2019) 60 tablet 1    ipratropium-albuterol (COMBIVENT RESPIMAT) inhaler Inhale 2 puffs every 4 (four) hours as needed (dyspnea) (Patient not taking: Reported on 3/12/2019) 1 Inhaler 5    menthol-methyl salicylate (BENGAY) 31-90 % cream Apply topically 4 (four) times a day as needed (pain) (Patient not taking: Reported on 3/12/2019)  0    ofloxacin (OCUFLOX) 0 3 % ophthalmic solution Administer 1 drop to the right eye 4 (four) times a day If not improving in 48 hrs need to schedule in with eye dr  (Patient not taking: Reported on 3/12/2019) 5 mL 0    sodium chloride (OCEAN) 0 65 % nasal spray 2 sprays into each nostril 2 (two) times a day      triamcinolone (KENALOG) 0 1 % cream triamcinolone acetonide 0 1 % topical cream       No current facility-administered medications for this visit        Current Outpatient Medications on File Prior to Visit   Medication Sig    benzonatate (TESSALON PERLES) 100 mg capsule Take 1 capsule (100 mg total) by mouth 3 (three) times a day as needed for cough    calcium carbonate (OS-TEDDY) 600 MG tablet Take 600 mg by mouth daily    carboxymethylcellulose (REFRESH LIQUIGEL) 1 % ophthalmic solution Apply 1 drop to eye 3 (three) times a day      cetirizine (ZyrTEC) 10 MG chewable tablet Chew 1 tablet (10 mg total) daily    donepezil (ARICEPT) 10 mg tablet Take 0 5 tablets (5 mg total) by mouth daily at bedtime Increased confusion on 10mg    DULoxetine (CYMBALTA) 60 mg delayed release capsule Take 1 capsule (60 mg total) by mouth daily    estradiol (ESTRACE) 0 1 mg/g vaginal cream Insert 1 g into the vagina daily Do no insert - please apply a pea sized amount to the urethra/vagina Monday, Wednesday and Friday before bedtime    fluticasone (FLONASE) 50 mcg/act nasal spray 1 spray into each nostril daily    furosemide (LASIX) 20 mg tablet Take one tab on Monday- Wednesday and friday    levothyroxine 100 mcg tablet     LORazepam (ATIVAN) 0 5 mg tablet Take 1 tablet (0 5 mg total) by mouth every 8 (eight) hours as needed for anxiety for up to 10 days She always takes 0 5 mg at bedtime    losartan (COZAAR) 100 MG tablet Take 1 tablet (100 mg total) by mouth daily    Lutein 10 MG TABS Take 1 tablet by mouth daily      magnesium hydroxide (MILK OF MAGNESIA) 400 mg/5 mL oral suspension Take 30 mL by mouth daily as needed for constipation      metoprolol tartrate (LOPRESSOR) 50 mg tablet Take 0 5 tablets (25 mg total) by mouth 2 (two) times a day    NIFEdipine (PROCARDIA XL) 60 mg 24 hr tablet     pantoprazole (PROTONIX) 40 mg tablet Take 40 mg by mouth 2 (two) times a day      potassium chloride (K-DUR,KLOR-CON) 10 mEq tablet Take one tab on days you take furosemide   pravastatin (PRAVACHOL) 40 mg tablet Take 1 tablet (40 mg total) by mouth daily    aspirin 325 mg tablet Take 325 mg by mouth daily      benzocaine (ORAJEL) 10 % mucosal gel Apply 1 application to the mouth or throat 3 (three) times a day as needed for mucositis (Patient not taking: Reported on 3/12/2019)    colchicine (COLCRYS) 0 6 mg tablet Take 1 tablet (0 6 mg total) by mouth 2 (two) times a day for 3 days    Dentifrices (BIOTENE DRY MOUTH CARE DT) Apply to teeth    guaiFENesin (MUCINEX) 600 mg 12 hr tablet Take 1 tablet (600 mg total) by mouth 2 (two) times a day as needed for congestion (Patient not taking: Reported on 3/12/2019)    ipratropium-albuterol (COMBIVENT RESPIMAT) inhaler Inhale 2 puffs every 4 (four) hours as needed (dyspnea) (Patient not taking: Reported on 3/12/2019)    menthol-methyl salicylate (BENGAY) 13-47 % cream Apply topically 4 (four) times a day as needed (pain) (Patient not taking: Reported on 3/12/2019)    ofloxacin (OCUFLOX) 0 3 % ophthalmic solution Administer 1 drop to the right eye 4 (four) times a day If not improving in 48 hrs need to schedule in with eye dr  (Patient not taking: Reported on 3/12/2019)    sodium chloride (OCEAN) 0 65 % nasal spray 2 sprays into each nostril 2 (two) times a day    triamcinolone (KENALOG) 0 1 % cream triamcinolone acetonide 0 1 % topical cream     No current facility-administered medications on file prior to visit  She is allergic to acetaminophen; golimumab; lidocaine; neurontin [gabapentin]; nortriptyline; prasterone; tricyclic antidepressants; leflunomide; and sulfasalazine       Review of Systems   Constitutional: Negative  HENT: Negative  Respiratory: Negative  Cardiovascular: Negative  Gastrointestinal: Negative  Objective:      /68 (BP Location: Right arm, Patient Position: Sitting, Cuff Size: Standard)   Pulse 79   Temp 99 9 °F (37 7 °C) (Tympanic)   Resp 16   Ht 5' (1 524 m)   Wt 61 2 kg (135 lb)   SpO2 92%   BMI 26 37 kg/m²          Physical Exam   Constitutional: She appears well-developed and well-nourished  No distress  HENT:   Head: Normocephalic and atraumatic  Right Ear: External ear normal    Left Ear: External ear normal    Nose: Nose normal    Mouth/Throat: Oropharynx is clear and moist  No oropharyngeal exudate  Neck: Normal range of motion  Neck supple  No JVD present  No tracheal deviation present  No thyromegaly present  Cardiovascular: Normal rate, regular rhythm, normal heart sounds and intact distal pulses  Exam reveals no gallop and no friction rub  No murmur heard  Pulmonary/Chest: Effort normal  No respiratory distress  She has no wheezes  She has no rales  She exhibits no tenderness  Lymphadenopathy:     She has no cervical adenopathy  Skin: She is not diaphoretic

## 2019-03-13 ENCOUNTER — PATIENT OUTREACH (OUTPATIENT)
Dept: CASE MANAGEMENT | Facility: OTHER | Age: 84
End: 2019-03-13

## 2019-03-13 NOTE — PROGRESS NOTES
3/13/19-spoke to ROBERT Henning of [facility]  States pt is doing well at this time  On service with OP PT/OT  Has PCP appt  3/25/19 & Urology 4/8/19  Will continue to follow throughout bundle episode 4/28/19

## 2019-03-14 NOTE — PATIENT INSTRUCTIONS
It is too hard for daughter to get her mom to    She is going to try house    Discussed needs urology ? prophylax for uti    Discussed living will with daughter she will ponder

## 2019-03-21 ENCOUNTER — HOSPITAL ENCOUNTER (INPATIENT)
Facility: HOSPITAL | Age: 84
LOS: 4 days | Discharge: HOME | DRG: 872 | End: 2019-03-25
Attending: EMERGENCY MEDICINE | Admitting: INTERNAL MEDICINE
Payer: MEDICARE

## 2019-03-21 ENCOUNTER — APPOINTMENT (EMERGENCY)
Dept: RADIOLOGY | Facility: HOSPITAL | Age: 84
DRG: 872 | End: 2019-03-21
Payer: MEDICARE

## 2019-03-21 DIAGNOSIS — N30.00 ACUTE CYSTITIS WITHOUT HEMATURIA: ICD-10-CM

## 2019-03-21 DIAGNOSIS — N39.0 UTI (URINARY TRACT INFECTION): ICD-10-CM

## 2019-03-21 DIAGNOSIS — A41.9 SEPSIS (HCC): Primary | ICD-10-CM

## 2019-03-21 PROBLEM — F02.80 ALZHEIMER'S DEMENTIA WITHOUT BEHAVIORAL DISTURBANCE (HCC): Status: ACTIVE | Noted: 2018-04-13

## 2019-03-21 PROBLEM — G30.9 ALZHEIMER'S DEMENTIA WITHOUT BEHAVIORAL DISTURBANCE (HCC): Status: ACTIVE | Noted: 2018-04-13

## 2019-03-21 LAB
ALBUMIN SERPL BCP-MCNC: 3 G/DL (ref 3.5–5)
ALP SERPL-CCNC: 133 U/L (ref 46–116)
ALT SERPL W P-5'-P-CCNC: 32 U/L (ref 12–78)
ANION GAP SERPL CALCULATED.3IONS-SCNC: 12 MMOL/L (ref 4–13)
APTT PPP: 31 SECONDS (ref 26–38)
AST SERPL W P-5'-P-CCNC: 44 U/L (ref 5–45)
BACTERIA UR QL AUTO: ABNORMAL /HPF
BASOPHILS # BLD AUTO: 0.02 THOUSANDS/ΜL (ref 0–0.1)
BASOPHILS NFR BLD AUTO: 0 % (ref 0–1)
BILIRUB SERPL-MCNC: 0.7 MG/DL (ref 0.2–1)
BILIRUB UR QL STRIP: NEGATIVE
BUN SERPL-MCNC: 14 MG/DL (ref 5–25)
CALCIUM SERPL-MCNC: 9.2 MG/DL (ref 8.3–10.1)
CHLORIDE SERPL-SCNC: 96 MMOL/L (ref 100–108)
CLARITY UR: ABNORMAL
CO2 SERPL-SCNC: 24 MMOL/L (ref 21–32)
COLOR UR: YELLOW
CREAT SERPL-MCNC: 1.2 MG/DL (ref 0.6–1.3)
EOSINOPHIL # BLD AUTO: 0.01 THOUSAND/ΜL (ref 0–0.61)
EOSINOPHIL NFR BLD AUTO: 0 % (ref 0–6)
ERYTHROCYTE [DISTWIDTH] IN BLOOD BY AUTOMATED COUNT: 15 % (ref 11.6–15.1)
FLUAV AG SPEC QL: NOT DETECTED
FLUBV AG SPEC QL: NOT DETECTED
GFR SERPL CREATININE-BSD FRML MDRD: 41 ML/MIN/1.73SQ M
GLUCOSE SERPL-MCNC: 134 MG/DL (ref 65–140)
GLUCOSE UR STRIP-MCNC: NEGATIVE MG/DL
HCT VFR BLD AUTO: 30.8 % (ref 34.8–46.1)
HGB BLD-MCNC: 10 G/DL (ref 11.5–15.4)
HGB UR QL STRIP.AUTO: NEGATIVE
IMM GRANULOCYTES # BLD AUTO: 0.07 THOUSAND/UL (ref 0–0.2)
IMM GRANULOCYTES NFR BLD AUTO: 1 % (ref 0–2)
INR PPP: 1.15 (ref 0.86–1.17)
KETONES UR STRIP-MCNC: NEGATIVE MG/DL
LACTATE SERPL-SCNC: 1.6 MMOL/L (ref 0.5–2)
LACTATE SERPL-SCNC: 2.6 MMOL/L (ref 0.5–2)
LACTATE SERPL-SCNC: 3.2 MMOL/L (ref 0.5–2)
LEUKOCYTE ESTERASE UR QL STRIP: ABNORMAL
LYMPHOCYTES # BLD AUTO: 0.91 THOUSANDS/ΜL (ref 0.6–4.47)
LYMPHOCYTES NFR BLD AUTO: 7 % (ref 14–44)
MCH RBC QN AUTO: 29.1 PG (ref 26.8–34.3)
MCHC RBC AUTO-ENTMCNC: 32.5 G/DL (ref 31.4–37.4)
MCV RBC AUTO: 90 FL (ref 82–98)
MONOCYTES # BLD AUTO: 1 THOUSAND/ΜL (ref 0.17–1.22)
MONOCYTES NFR BLD AUTO: 7 % (ref 4–12)
NEUTROPHILS # BLD AUTO: 11.81 THOUSANDS/ΜL (ref 1.85–7.62)
NEUTS SEG NFR BLD AUTO: 85 % (ref 43–75)
NITRITE UR QL STRIP: POSITIVE
NON-SQ EPI CELLS URNS QL MICRO: ABNORMAL /HPF
NRBC BLD AUTO-RTO: 0 /100 WBCS
NT-PROBNP SERPL-MCNC: 4181 PG/ML
OTHER STN SPEC: ABNORMAL
PH UR STRIP.AUTO: 7 [PH]
PLATELET # BLD AUTO: 261 THOUSANDS/UL (ref 149–390)
PLATELET # BLD AUTO: 291 THOUSANDS/UL (ref 149–390)
PMV BLD AUTO: 9 FL (ref 8.9–12.7)
PMV BLD AUTO: 9.2 FL (ref 8.9–12.7)
POTASSIUM SERPL-SCNC: 4.5 MMOL/L (ref 3.5–5.3)
PROCALCITONIN SERPL-MCNC: 0.1 NG/ML
PROCALCITONIN SERPL-MCNC: 0.22 NG/ML
PROT SERPL-MCNC: 7.9 G/DL (ref 6.4–8.2)
PROT UR STRIP-MCNC: ABNORMAL MG/DL
PROTHROMBIN TIME: 14.4 SECONDS (ref 11.8–14.2)
RBC # BLD AUTO: 3.44 MILLION/UL (ref 3.81–5.12)
RBC #/AREA URNS AUTO: ABNORMAL /HPF
RSV B RNA SPEC QL NAA+PROBE: NOT DETECTED
SODIUM SERPL-SCNC: 132 MMOL/L (ref 136–145)
SP GR UR STRIP.AUTO: 1.01 (ref 1–1.03)
TROPONIN I SERPL-MCNC: 0.14 NG/ML
TROPONIN I SERPL-MCNC: 0.31 NG/ML
TROPONIN I SERPL-MCNC: 0.39 NG/ML
TROPONIN I SERPL-MCNC: 0.42 NG/ML
UROBILINOGEN UR QL STRIP.AUTO: 1 E.U./DL
WBC # BLD AUTO: 13.82 THOUSAND/UL (ref 4.31–10.16)
WBC #/AREA URNS AUTO: ABNORMAL /HPF

## 2019-03-21 PROCEDURE — 71046 X-RAY EXAM CHEST 2 VIEWS: CPT

## 2019-03-21 PROCEDURE — 85025 COMPLETE CBC W/AUTO DIFF WBC: CPT | Performed by: EMERGENCY MEDICINE

## 2019-03-21 PROCEDURE — 99223 1ST HOSP IP/OBS HIGH 75: CPT | Performed by: INTERNAL MEDICINE

## 2019-03-21 PROCEDURE — 87086 URINE CULTURE/COLONY COUNT: CPT | Performed by: EMERGENCY MEDICINE

## 2019-03-21 PROCEDURE — 87631 RESP VIRUS 3-5 TARGETS: CPT | Performed by: EMERGENCY MEDICINE

## 2019-03-21 PROCEDURE — 84145 PROCALCITONIN (PCT): CPT | Performed by: EMERGENCY MEDICINE

## 2019-03-21 PROCEDURE — 85730 THROMBOPLASTIN TIME PARTIAL: CPT | Performed by: EMERGENCY MEDICINE

## 2019-03-21 PROCEDURE — 94664 DEMO&/EVAL PT USE INHALER: CPT

## 2019-03-21 PROCEDURE — 87631 RESP VIRUS 3-5 TARGETS: CPT | Performed by: INTERNAL MEDICINE

## 2019-03-21 PROCEDURE — 85049 AUTOMATED PLATELET COUNT: CPT | Performed by: INTERNAL MEDICINE

## 2019-03-21 PROCEDURE — 96365 THER/PROPH/DIAG IV INF INIT: CPT

## 2019-03-21 PROCEDURE — 93005 ELECTROCARDIOGRAM TRACING: CPT

## 2019-03-21 PROCEDURE — 96367 TX/PROPH/DG ADDL SEQ IV INF: CPT

## 2019-03-21 PROCEDURE — 87040 BLOOD CULTURE FOR BACTERIA: CPT | Performed by: EMERGENCY MEDICINE

## 2019-03-21 PROCEDURE — 87186 SC STD MICRODIL/AGAR DIL: CPT | Performed by: EMERGENCY MEDICINE

## 2019-03-21 PROCEDURE — 84484 ASSAY OF TROPONIN QUANT: CPT | Performed by: EMERGENCY MEDICINE

## 2019-03-21 PROCEDURE — 83605 ASSAY OF LACTIC ACID: CPT | Performed by: INTERNAL MEDICINE

## 2019-03-21 PROCEDURE — 81001 URINALYSIS AUTO W/SCOPE: CPT | Performed by: EMERGENCY MEDICINE

## 2019-03-21 PROCEDURE — 84145 PROCALCITONIN (PCT): CPT | Performed by: INTERNAL MEDICINE

## 2019-03-21 PROCEDURE — 99285 EMERGENCY DEPT VISIT HI MDM: CPT

## 2019-03-21 PROCEDURE — 36415 COLL VENOUS BLD VENIPUNCTURE: CPT | Performed by: EMERGENCY MEDICINE

## 2019-03-21 PROCEDURE — 83880 ASSAY OF NATRIURETIC PEPTIDE: CPT | Performed by: PHYSICIAN ASSISTANT

## 2019-03-21 PROCEDURE — 80053 COMPREHEN METABOLIC PANEL: CPT | Performed by: EMERGENCY MEDICINE

## 2019-03-21 PROCEDURE — 83605 ASSAY OF LACTIC ACID: CPT | Performed by: EMERGENCY MEDICINE

## 2019-03-21 PROCEDURE — 87077 CULTURE AEROBIC IDENTIFY: CPT | Performed by: EMERGENCY MEDICINE

## 2019-03-21 PROCEDURE — 84484 ASSAY OF TROPONIN QUANT: CPT | Performed by: INTERNAL MEDICINE

## 2019-03-21 PROCEDURE — 85610 PROTHROMBIN TIME: CPT | Performed by: EMERGENCY MEDICINE

## 2019-03-21 RX ORDER — ECHINACEA PURPUREA EXTRACT 125 MG
2 TABLET ORAL AS NEEDED
Status: DISCONTINUED | OUTPATIENT
Start: 2019-03-21 | End: 2019-03-25 | Stop reason: HOSPADM

## 2019-03-21 RX ORDER — LORATADINE 10 MG/1
5 TABLET ORAL DAILY
Status: DISCONTINUED | OUTPATIENT
Start: 2019-03-21 | End: 2019-03-25 | Stop reason: HOSPADM

## 2019-03-21 RX ORDER — LOSARTAN POTASSIUM 50 MG/1
100 TABLET ORAL ONCE
Status: COMPLETED | OUTPATIENT
Start: 2019-03-21 | End: 2019-03-21

## 2019-03-21 RX ORDER — ONDANSETRON 2 MG/ML
4 INJECTION INTRAMUSCULAR; INTRAVENOUS EVERY 6 HOURS PRN
Status: DISCONTINUED | OUTPATIENT
Start: 2019-03-21 | End: 2019-03-25 | Stop reason: HOSPADM

## 2019-03-21 RX ORDER — FUROSEMIDE 20 MG/1
20 TABLET ORAL 3 TIMES WEEKLY
Status: DISCONTINUED | OUTPATIENT
Start: 2019-03-22 | End: 2019-03-25 | Stop reason: HOSPADM

## 2019-03-21 RX ORDER — DULOXETIN HYDROCHLORIDE 60 MG/1
60 CAPSULE, DELAYED RELEASE ORAL DAILY
Status: DISCONTINUED | OUTPATIENT
Start: 2019-03-21 | End: 2019-03-25 | Stop reason: HOSPADM

## 2019-03-21 RX ORDER — DOCUSATE SODIUM 100 MG/1
100 CAPSULE, LIQUID FILLED ORAL 2 TIMES DAILY
Status: DISCONTINUED | OUTPATIENT
Start: 2019-03-21 | End: 2019-03-25 | Stop reason: HOSPADM

## 2019-03-21 RX ORDER — IPRATROPIUM BROMIDE AND ALBUTEROL SULFATE 2.5; .5 MG/3ML; MG/3ML
3 SOLUTION RESPIRATORY (INHALATION) 4 TIMES DAILY PRN
Status: DISCONTINUED | OUTPATIENT
Start: 2019-03-21 | End: 2019-03-25 | Stop reason: HOSPADM

## 2019-03-21 RX ORDER — SODIUM CHLORIDE 9 MG/ML
75 INJECTION, SOLUTION INTRAVENOUS CONTINUOUS
Status: DISCONTINUED | OUTPATIENT
Start: 2019-03-21 | End: 2019-03-24

## 2019-03-21 RX ORDER — BENZONATATE 100 MG/1
100 CAPSULE ORAL 3 TIMES DAILY PRN
Status: DISCONTINUED | OUTPATIENT
Start: 2019-03-21 | End: 2019-03-25 | Stop reason: HOSPADM

## 2019-03-21 RX ORDER — FLUTICASONE PROPIONATE 50 MCG
1 SPRAY, SUSPENSION (ML) NASAL DAILY
Status: DISCONTINUED | OUTPATIENT
Start: 2019-03-21 | End: 2019-03-25 | Stop reason: HOSPADM

## 2019-03-21 RX ORDER — PRAVASTATIN SODIUM 40 MG
40 TABLET ORAL DAILY
Status: DISCONTINUED | OUTPATIENT
Start: 2019-03-21 | End: 2019-03-25 | Stop reason: HOSPADM

## 2019-03-21 RX ORDER — OFLOXACIN 3 MG/ML
1 SOLUTION/ DROPS OPHTHALMIC 4 TIMES DAILY
Status: DISCONTINUED | OUTPATIENT
Start: 2019-03-21 | End: 2019-03-25 | Stop reason: HOSPADM

## 2019-03-21 RX ORDER — PANTOPRAZOLE SODIUM 40 MG/1
40 TABLET, DELAYED RELEASE ORAL
Status: DISCONTINUED | OUTPATIENT
Start: 2019-03-21 | End: 2019-03-25 | Stop reason: HOSPADM

## 2019-03-21 RX ORDER — METOPROLOL TARTRATE 50 MG/1
50 TABLET, FILM COATED ORAL ONCE
Status: COMPLETED | OUTPATIENT
Start: 2019-03-21 | End: 2019-03-21

## 2019-03-21 RX ORDER — LEVOTHYROXINE SODIUM 0.1 MG/1
100 TABLET ORAL
Status: DISCONTINUED | OUTPATIENT
Start: 2019-03-22 | End: 2019-03-25 | Stop reason: HOSPADM

## 2019-03-21 RX ORDER — GUAIFENESIN 600 MG
600 TABLET, EXTENDED RELEASE 12 HR ORAL 2 TIMES DAILY PRN
Status: DISCONTINUED | OUTPATIENT
Start: 2019-03-21 | End: 2019-03-25 | Stop reason: HOSPADM

## 2019-03-21 RX ORDER — LOSARTAN POTASSIUM 50 MG/1
100 TABLET ORAL DAILY
Status: DISCONTINUED | OUTPATIENT
Start: 2019-03-21 | End: 2019-03-25 | Stop reason: HOSPADM

## 2019-03-21 RX ORDER — VANCOMYCIN HYDROCHLORIDE 1 G/200ML
15 INJECTION, SOLUTION INTRAVENOUS ONCE
Status: COMPLETED | OUTPATIENT
Start: 2019-03-21 | End: 2019-03-21

## 2019-03-21 RX ORDER — DONEPEZIL HYDROCHLORIDE 5 MG/1
5 TABLET, FILM COATED ORAL
Status: DISCONTINUED | OUTPATIENT
Start: 2019-03-21 | End: 2019-03-25 | Stop reason: HOSPADM

## 2019-03-21 RX ADMIN — FLUTICASONE PROPIONATE 1 SPRAY: 50 SPRAY, METERED NASAL at 14:15

## 2019-03-21 RX ADMIN — LOSARTAN POTASSIUM 100 MG: 50 TABLET, FILM COATED ORAL at 10:52

## 2019-03-21 RX ADMIN — DOCUSATE SODIUM 100 MG: 100 CAPSULE, LIQUID FILLED ORAL at 17:11

## 2019-03-21 RX ADMIN — ENOXAPARIN SODIUM 30 MG: 30 INJECTION SUBCUTANEOUS at 14:54

## 2019-03-21 RX ADMIN — PRAVASTATIN SODIUM 40 MG: 40 TABLET ORAL at 14:48

## 2019-03-21 RX ADMIN — PIPERACILLIN SODIUM,TAZOBACTAM SODIUM 3.38 G: 3; .375 INJECTION, POWDER, FOR SOLUTION INTRAVENOUS at 10:15

## 2019-03-21 RX ADMIN — METOPROLOL TARTRATE 50 MG: 50 TABLET, FILM COATED ORAL at 10:52

## 2019-03-21 RX ADMIN — METOPROLOL TARTRATE 25 MG: 25 TABLET, FILM COATED ORAL at 21:30

## 2019-03-21 RX ADMIN — VANCOMYCIN HYDROCHLORIDE 1000 MG: 1 INJECTION, SOLUTION INTRAVENOUS at 10:45

## 2019-03-21 RX ADMIN — DOCUSATE SODIUM 100 MG: 100 CAPSULE, LIQUID FILLED ORAL at 14:48

## 2019-03-21 RX ADMIN — LORATADINE 5 MG: 10 TABLET ORAL at 14:48

## 2019-03-21 RX ADMIN — OFLOXACIN 1 DROP: 3 SOLUTION OPHTHALMIC at 21:31

## 2019-03-21 RX ADMIN — SODIUM CHLORIDE 1000 ML: 0.9 INJECTION, SOLUTION INTRAVENOUS at 12:42

## 2019-03-21 RX ADMIN — DONEPEZIL HYDROCHLORIDE 5 MG: 5 TABLET, FILM COATED ORAL at 21:30

## 2019-03-21 RX ADMIN — OFLOXACIN 1 DROP: 3 SOLUTION OPHTHALMIC at 14:15

## 2019-03-21 RX ADMIN — SODIUM CHLORIDE 100 ML/HR: 0.9 INJECTION, SOLUTION INTRAVENOUS at 14:41

## 2019-03-21 RX ADMIN — PANTOPRAZOLE SODIUM 40 MG: 40 TABLET, DELAYED RELEASE ORAL at 17:11

## 2019-03-21 RX ADMIN — DULOXETINE HYDROCHLORIDE 60 MG: 60 CAPSULE, DELAYED RELEASE ORAL at 14:48

## 2019-03-21 RX ADMIN — CEFEPIME HYDROCHLORIDE 1000 MG: 1 INJECTION, POWDER, FOR SOLUTION INTRAMUSCULAR; INTRAVENOUS at 14:41

## 2019-03-21 NOTE — ASSESSMENT & PLAN NOTE
Present on admission as evidenced by tachycardia, leukocytosis and lactic acidosis  Likely source appears to be urinary tract infection  Patient has dementia at baseline and incontinent of urine and unable to provide a good history regarding symptoms of urinary tract infection  No infiltrate on chest x-ray  Follow up on blood and urine cultures  Continue with into empiric cefepime  Follow up on lactic acid level  This is patient's 2nd admission in the last 2 months for sepsis related to urinary tract infection  In between while she was at rehabilitation she was treated for urinary tract infection as well  Will discussed with Infectious Disease regarding need for prophylactic antibiotics  She had a renal ultrasound on 01/26 which did not show any structural abnormalities of the genitourinary system

## 2019-03-21 NOTE — ASSESSMENT & PLAN NOTE
Patient has had cough for the last few weeks  Continue with cough suppressant medication  Chest x-ray negative for any infiltrate  Influenza PCR will also be sent

## 2019-03-21 NOTE — ASSESSMENT & PLAN NOTE
Patient has Alzheimer's dementia but otherwise is ambulatory with assistance and able to feed herself  She continues to moan"I am dying"which her daughter says that is her baseline  Continue with supportive care  Maintain fall precautions

## 2019-03-21 NOTE — PLAN OF CARE
Problem: Potential for Falls  Goal: Patient will remain free of falls  Description  INTERVENTIONS:  - Assess patient frequently for physical needs  -  Identify cognitive and physical deficits and behaviors that affect risk of falls  -  [place] fall precautions as indicated by assessment   - Educate patient/family on patient safety including physical limitations  - Instruct patient to call for assistance with activity based on assessment  - Modify environment to reduce risk of injury  - Consider OT/PT consult to assist with strengthening/mobility  Outcome: Progressing     Problem: Prexisting or High Potential for Compromised Skin Integrity  Goal: Skin integrity is maintained or improved  Description  INTERVENTIONS:  - Identify patients at risk for skin breakdown  - Assess and monitor skin integrity  - Assess and monitor nutrition and hydration status  - Monitor labs (i e  albumin)  - Assess for incontinence   - Turn and reposition patient  - Assist with mobility/ambulation  - Relieve pressure over bony prominences  - Avoid friction and shearing  - Provide appropriate hygiene as needed including keeping skin clean and dry  - Evaluate need for skin moisturizer/barrier cream  - Collaborate with interdisciplinary team (i e  Nutrition, Rehabilitation, etc )   - Patient/family teaching  Outcome: Progressing     Problem: Nutrition/Hydration-ADULT  Goal: Nutrient/Hydration intake appropriate for improving, restoring or maintaining nutritional needs  Description  Monitor and assess patient's nutrition/hydration status for malnutrition (ex- brittle hair, bruises, dry skin, pale skin and conjunctiva, muscle wasting, smooth red tongue, and disorientation)  Collaborate with interdisciplinary team and initiate plan and interventions as ordered  Monitor patient's weight and dietary intake as ordered or per policy  Utilize nutrition screening tool and intervene per policy   Determine patient's food preferences and provide high-protein, high-caloric foods as appropriate       INTERVENTIONS:  - Monitor oral intake, urinary output, labs, and treatment plans  - Assess nutrition and hydration status and recommend course of action  - Evaluate amount of meals eaten  - Assist patient with eating if necessary   - Allow adequate time for meals  - Recommend/ encourage appropriate diets, oral nutritional supplements, and vitamin/mineral supplements  - Order, calculate, and assess calorie counts as needed  - Recommend, monitor, and adjust tube feedings and TPN/PPN based on assessed needs  - Assess need for intravenous fluids  - Provide specific nutrition/hydration education as appropriate  - Include patient/family/caregiver in decisions related to nutrition  Outcome: Progressing     Problem: PAIN - ADULT  Goal: Verbalizes/displays adequate comfort level or baseline comfort level  Description  Interventions:  - Encourage patient to monitor pain and request assistance  - Assess pain using appropriate pain scale  - Administer analgesics based on type and severity of pain and evaluate response  - Implement non-pharmacological measures as appropriate and evaluate response  - Consider cultural and social influences on pain and pain management  - Notify physician/advanced practitioner if interventions unsuccessful or patient reports new pain  Outcome: Progressing     Problem: INFECTION - ADULT  Goal: Absence or prevention of progression during hospitalization  Description  INTERVENTIONS:  - Assess and monitor for signs and symptoms of infection  - Monitor lab/diagnostic results  - Monitor all insertion sites, i e  indwelling lines, tubes, and drains  - Monitor endotracheal (as able) and nasal secretions for changes in amount and color  - [place] appropriate cooling/warming therapies per order  - Administer medications as ordered  - Instruct and encourage patient and family to use good hand hygiene technique  - Identify and instruct in appropriate isolation precautions for identified infection/condition  Outcome: Progressing  Goal: Absence of fever/infection during neutropenic period  Description  INTERVENTIONS:  - Monitor WBC  - Implement neutropenic guidelines  Outcome: Progressing     Problem: SAFETY ADULT  Goal: Maintain or return to baseline ADL function  Description  INTERVENTIONS:  -  Assess patient's ability to carry out ADLs; assess patient's baseline for ADL function and identify physical deficits which impact ability to perform ADLs (bathing, care of mouth/teeth, toileting, grooming, dressing, etc )  - Assess/evaluate cause of self-care deficits   - Assess range of motion  - Assess patient's mobility; develop plan if impaired  - Assess patient's need for assistive devices and provide as appropriate  - Encourage maximum independence but intervene and supervise when necessary  ¯ Involve family in performance of ADLs  ¯ Assess for home care needs following discharge   ¯ Request OT consult to assist with ADL evaluation and planning for discharge  ¯ Provide patient education as appropriate  Outcome: Progressing  Goal: Maintain or return mobility status to optimal level  Description  INTERVENTIONS:  - Assess patient's baseline mobility status (ambulation, transfers, stairs, etc )    - Identify cognitive and physical deficits and behaviors that affect mobility  - Identify mobility aids required to assist with transfers and/or ambulation (gait belt, sit-to-stand, lift, walker, cane, etc )  - [place] fall precautions as indicated by assessment  - Record patient progress and toleration of activity level on Mobility SBAR; progress patient to next Phase/Stage  - Instruct patient to call for assistance with activity based on assessment  - Request Rehabilitation consult to assist with strengthening/weightbearing, etc   Outcome: Progressing     Problem: DISCHARGE PLANNING  Goal: Discharge to home or other facility with appropriate resources  Description  INTERVENTIONS:  - Identify barriers to discharge w/patient and caregiver  - Arrange for needed discharge resources and transportation as appropriate  - Identify discharge learning needs (meds, wound care, etc )  - Arrange for interpretive services to assist at discharge as needed  - Refer to Case Management Department for coordinating discharge planning if the patient needs post-hospital services based on physician/advanced practitioner order or complex needs related to functional status, cognitive ability, or social support system  Outcome: Progressing     Problem: GENITOURINARY - ADULT  Goal: Maintains or returns to baseline urinary function  Description  INTERVENTIONS:  - Assess urinary function  - Encourage oral fluids to ensure adequate hydration  - Administer IV fluids as ordered to ensure adequate hydration  - Administer ordered medications as needed  - Offer frequent toileting  - Follow urinary retention protocol if ordered  Outcome: Progressing  Goal: Absence of urinary retention  Description  INTERVENTIONS:  - Assess patient?s ability to void and empty bladder  - Monitor I/O  - Bladder scan as needed  - Discuss with physician/AP medications to alleviate retention as needed  - Discuss catheterization for long term situations as appropriate  Outcome: Progressing

## 2019-03-21 NOTE — ED NOTES
Patient transported to room 329 and placed on telemetry, receiving RN notified of arrival, transmission confirmed on central station     [facility]  03/21/19 [id number]

## 2019-03-21 NOTE — CONSULTS
Consultation - Infectious Disease   [facility] 80 y o  female MRN: [id number]  Unit/Bed#: -01 Encounter: [id number]      IMPRESSION & RECOMMENDATIONS:   Impression/Recommendations: This is a 80 y o  female, with advanced Alzheimer's dementia, and history of recurrent UTI, admitted earlier today with sepsis and grossly abnormal UA  She also has a mild dry cough  1  Sepsis, POA, with fever and leukocytosis  Source is most likely UTI  Fortunately, despite sepsis, she remains hemodynamically stable, without hypotension  Antibiotic plan as in below  Monitor temperature/WBC  Monitor hemodynamics  Follow-up on pending blood cultures  2  Probable UTI  Patient has had sensitive E coli in her urine in the past   She does have some risk for resistance development  However, a 3rd generation cephalosporin should be adequate  Given multiple UTI recurrences, it is likely the patient has urinary retention  I will check a PVR  If she does have significant urinary retention, she will need bladder catheterization, either permanent [place] catheter or intermittent straight catheterization  Permanent [place] catheter is difficult due to her dementia with agitation  Intermittent straight catheterization may be difficult in assisted living facility  Deescalate antibiotic to IV ceftriaxone  Follow-up on urine culture  Check PVR  Patient will most likely need [place] catheterization versus intermittent straight catheterization  3  Cough  Exam not consistent with pneumonia  CXR without pneumonia  Consider viral URI or influenza as etiology of fever but doubt, given leukocytosis  Supportive care  Follow-up on influenza PCR  4  Advanced dementia, with agitation and poor cooperation  Previous hospitalization records reviewed in detail  Discussed with patient's daughter in detail regarding the above plan  Discussed with Dr Delmar Matthew from [facility] service  Thank you for this consultation    We will follow along with you  HISTORY OF PRESENT ILLNESS:  Reason for Consult:  Sepsis  Probable UTI     HPI: Estelle Guadarrama is a 80 y o  female, with advanced Alzheimer's dementia, has had multiple treatment courses of UTI in the past, was taken from her assisted living facility to the ER earlier today for fever  She also complains of a dry cough  On presentation, patient had fever leukocytosis  UA was grossly abnormal   Patient was admitted  IV cefepime was started  We are asked to evaluate the patient  Unfortunately, due to patient's advanced dementia, she is unable to provide any meaningful history  History was obtained from her daughter, who is at bedside  Patient has had multiple antibiotic treatment course in the past for UTI  She was last admitted here last month with possible UTI versus gout  She had fever then  After discharge to rehab, she had another episode of fever and was treated with antibiotic again for UTI  A    Patient has not been evaluated by Urology  She has 1 daughter  She is currently in assisted facility  Her daughter would prefer for her to stay in assisted living facility, rather than SNF  REVIEW OF SYSTEMS:  A complete 12 point system-based review was done  Except for what is noted in HPI above, ROS of systems is otherwise negative      PAST MEDICAL HISTORY:  Past Medical History:   Diagnosis Date    Asp[facility] pneumonia [facility] [address] )     Last Assessed:  7/18/17    Basal cell carcinoma of nose     Last Assessed:  4/12/17    Blind     blind right eye, 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, Last Assessed:  7/18/17    Dementia     Depression     Disease of thyroid gland     Fibromyalgia     Last Assessed:  7/18/17    Gait disturbance     GERD (gastroesophageal reflux disease)     Glaucoma     Hyperlipidemia     Hypertension     Osteopenia     Peripheral neuropathy     Last Assessed:  4/12/17    Polymyalgia rheumatica ([facility] [address] )     Psychiatric disorder depression    Renal insufficiency syndrome     Last Assessed:  17    Rheumatoid arthritis ([facility] [address] )     Stroke [facility])     x2, Last Assessed:  17    Systemic lupus erythematosus ([facility] )     Vertigo     Vitamin D deficiency      Past Surgical History:   Procedure Laterality Date    EYE SURGERY      JOINT REPLACEMENT      left hip replacement, left shoulder replacement    SHOULDER SURGERY      Replacement     Problem list reviewed  FAMILY HISTORY:  Non-contributory    SOCIAL HISTORY:  Social History     Substance and Sexual Activity   Alcohol Use No     Social History     Substance and Sexual Activity   Drug Use No     Social History     Tobacco Use   Smoking Status Never Smoker   Smokeless Tobacco Never Used       ALLERGIES:  Allergies   Allergen Reactions    Acetaminophen     Golimumab      Other reaction(s): dedrick colored stool    Lidocaine Other (See Comments)    Neurontin [Gabapentin]     Nortriptyline Tremor    Prasterone      Other reaction(s): pruitis    Tricyclic Antidepressants     Leflunomide Rash    Sulfasalazine Rash       MEDICATIONS:  All current active medications have been reviewed  Patient is currently on IV cefepime  PHYSICAL EXAM:  Vitals:  Temp:  [98 6 °F (37 °C)-100 2 °F (37 9 °C)] 98 6 °F (37 °C)  HR:  [] 92  Resp:  [17-18] 17  BP: (139-180)/(63-74) 147/63  SpO2:  [94 %-96 %] 96 %  Temp (24hrs), Av 4 °F (37 4 °C), Min:98 6 °F (37 °C), Max:100 2 °F (37 9 °C)  Current: Temperature: 98 6 °F (37 °C)     Physical Exam:  General:  Well-nourished, well-developed, in no acute distress  Awake, alert but disoriented and somewhat uncooperative/combative     Eyes:  Conjunctive clear with no hemorrhages or effusions  Oropharynx:  No ulcers, no lesions, pharynx benign, no tonsillitis  Neck:  Supple, no lymphadenopathy, no mass, nontender  Lungs:  Expansion symmetric, no rales, no wheezing, no accessory muscle use  Cardiac:  Regular rate and rhythm, normal S1, normal S2, no murmurs  Abdomen:  Soft, nondistended, non-tender, no HSM  Extremities:  No edema, no erythema, nontender  No ulcers  Skin:  No rashes, no ulcers  Neurological:  Moves all four extremities spontaneously, sensation grossly intact    LABS, IMAGING, & OTHER STUDIES:  Lab Results:  I have personally reviewed pertinent labs  Results from last 7 days   Lab Units 03/21/19  1005   POTASSIUM mmol/L 4 5   CHLORIDE mmol/L 96*   CO2 mmol/L 24   BUN mg/dL 14   CREATININE mg/dL 1 20   EGFR ml/min/1 73sq m 41   CALCIUM mg/dL 9 2   AST U/L 44   ALT U/L 32   ALK PHOS U/L 133*     Results from last 7 days   Lab Units 03/21/19  1406 03/21/19  1005   WBC Thousand/uL  --  13 82*   HEMOGLOBIN g/dL  --  10 0*   PLATELETS Thousands/uL 261 291           Imaging Studies:   I have personally reviewed pertinent imaging study reports and images in PACS  CXR reviewed personally  No infiltrates or consolidations  EKG, Pathology, and Other Studies:   I have personally reviewed pertinent reports

## 2019-03-21 NOTE — SEPSIS NOTE
Sepsis Note   Toby Colon 80 y o  female MRN: [id number]  Unit/Bed#: ED 12 Encounter: [id number]      qSOFA     Row Name 03/21/19 1145 03/21/19 1130 03/21/19 1115 03/21/19 0918       Altered mental status GCS < 15             Respiratory Rate > / =22        0     Systolic BP < / =658  0  0  0  0     Q Sofa Score  0  0  0  0         Initial Sepsis Screening     Row Name 03/21/19 0930                Is the patient's history suggestive of a new or worsening infection? Yes (Proceed)  (Abnormal)   -JI        Suspected source of infection  urinary tract infection;pneumonia  -JI        Are two or more of the following signs & symptoms of infection both present and new to the patient? Yes (Proceed)  (Abnormal)   -JI        Indicate SIRS criteria  Leukocytosis (WBC > 63527 IJL); Tachycardia > 90 bpm  -JI        If the answer is yes to both questions, suspicion of sepsis is present          If severe sepsis is present AND tissue hypoperfusion perists in the hour after fluid resuscitation or lactate > 4, the patient meets criteria for SEPTIC SHOCK          Are any of the following organ dysfunction criteria present within 6 hours of suspected infection and SIRS criteria that are NOT considered to be chronic conditions? Yes  (Abnormal)   -JI        Organ dysfunction  Lactate > 2 0 mmol/L  -JI        Date of presentation of severe sepsis  03/21/19  -JI        Time of presentation of severe sepsis          Tissue hypoperfusion persists in the hour after crystalloid fluid administration, evidenced, by either:          Was hypotension present within one hour of the conclusion of crystalloid fluid administration?   No  -JI        Date of presentation of septic shock          Time of presentation of septic shock            User Key  (r) = Recorded By, (t) = Taken By, (c) = Cosigned By    Initials Name Provider Margo Carranza MD Physician

## 2019-03-21 NOTE — SEPSIS NOTE
Sepsis Note   Butch Sherwood 80 y o  female MRN: [id number]  Unit/Bed#: ED 12 Encounter: [id number]      qSOFA     Row Name 03/21/19 1145 03/21/19 1130 03/21/19 1115 03/21/19 0918       Altered mental status GCS < 15             Respiratory Rate > / =22        0     Systolic BP < / =575  0  0  0  0     Q Sofa Score  0  0  0  0         Initial Sepsis Screening     Row Name 03/21/19 0930                Is the patient's history suggestive of a new or worsening infection? Yes (Proceed)  (Abnormal)   -JI        Suspected source of infection  urinary tract infection;pneumonia  -JI        Are two or more of the following signs & symptoms of infection both present and new to the patient? Yes (Proceed)  (Abnormal)   -JI        Indicate SIRS criteria  Leukocytosis (WBC > 38468 IJL); Tachycardia > 90 bpm  -JI        If the answer is yes to both questions, suspicion of sepsis is present          If severe sepsis is present AND tissue hypoperfusion perists in the hour after fluid resuscitation or lactate > 4, the patient meets criteria for SEPTIC SHOCK          Are any of the following organ dysfunction criteria present within 6 hours of suspected infection and SIRS criteria that are NOT considered to be chronic conditions? Yes  (Abnormal)   -JI        Organ dysfunction  Lactate > 2 0 mmol/L  -JI        Date of presentation of severe sepsis  03/21/19  -JI        Time of presentation of severe sepsis          Tissue hypoperfusion persists in the hour after crystalloid fluid administration, evidenced, by either:          Was hypotension present within one hour of the conclusion of crystalloid fluid administration?   No  -JI        Date of presentation of septic shock          Time of presentation of septic shock            User Key  (r) = Recorded By, (t) = Taken By, (c) = Cosigned By    Initials Name Provider Bell Lott MD Physician               Default Flowsheet Data (last 720 hours) Sepsis Reassess     Row Name 03/21/19 1153                   Repeat Volume Status and Tissue Perfusion Assessment Performed    Repeat Volume Status and Tissue Perfusion Assessment Performed  Yes  -JI           Volume Status and Tissue Perfusion Post Fluid Resuscitation * Must Document All *    Vital Signs Reviewed (HR, RR, BP, T)  Yes  -JI        Shock Index Reviewed          Arterial Oxygen Saturation Reviewed (POx, SaO2 or SpO2)          Cardio  Normal S1/S2; Regular rate and rhythm; No murmor; No rub or gallop  -JI        Pulmonary  Normal effort  -JI        Capillary Refill  Brisk  -JI        Peripheral Pulses  Radial  -JI        Peripheral Pulse  +2  -JI        Skin  Dry; Warm  -JI        Urine output assessed             *OR*   Intensive Monitoring- Must Document One of the Following Four *:    Vital Signs Reviewed  Yes  -JI        * Central Venous Pressure (CVP or RAP)          * Central Venous Oxygen (SVO2, ScvO2 or Oxygen saturation via central catheter)          * Bedside Cardiovascular US in IVC diameter and % collapse          * Passive Leg Raise OR Crystalloid Challenge            User Key  (r) = Recorded By, (t) = Taken By, (c) = Cosigned By    Initials Name Provider Kiara Rubin MD Physician

## 2019-03-21 NOTE — QUICK NOTE
QUICK NOTE - Deterioration Index  Dilip De Santiago 80 y o  female MRN: [id number]  Unit/Bed#: -01 Encounter: [id number]      Time Paged: [id number]  Room #: 200  Primary RN: Lkue Kohli  Arrival Time:   Deterioration index score at time of page: 57    PROBLEMS:    Sepsis   Urinary Tract Infection   Advanced Dementia    PLAN:     Discussed with Primary Attending of record, did not arrive at bedside and evaluate   Mental status at baseline per daughter with hx of Alzheimers Dementia   Patient being treated appropriately for suspected UTI, Lactate has cleared  Influenza pending   ID following in consultation    HPI Statement (Background):   Dilip De Santiago is a 80y o  year old female who presents with reported fever 101 3 from her assisted living memory care unit  PMH of Alzheimers dementia  Per chart daughter stated patient at baseline mental status        Historical Information   Past Medical History:   Diagnosis Date    Aspiration pneumonia ([address] )     Last Assessed:  7/18/17    Basal cell carcinoma of nose     Last Assessed:  4/12/17    Blind     blind right eye, 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, Last Assessed:  7/18/17    Dementia     Depression     Disease of thyroid gland     Fibromyalgia     Last Assessed:  7/18/17    Gait disturbance     GERD (gastroesophageal reflux disease)     Glaucoma     Hyperlipidemia     Hypertension     Osteopenia     Peripheral neuropathy     Last Assessed:  4/12/17    Polymyalgia rheumatica ([address] )     Psychiatric disorder     depression    Renal insufficiency syndrome     Last Assessed:  4/13/17    Rheumatoid arthritis ([address] )     Stroke ([address] )     x2, Last Assessed:  7/18/17    Systemic lupus erythematosus ([address] )     Vertigo     Vitamin D deficiency      Past Surgical History:   Procedure Laterality Date    EYE SURGERY      JOINT REPLACEMENT      left hip replacement, left shoulder replacement    SHOULDER SURGERY      Replacement Vitals:   Vitals:    19 1215 19 1330 19 1500 19 1621   BP: 139/65 147/63 137/65    BP Location:  Left arm Left arm    Pulse: 96 92 91    Resp:  17 18    Temp:  98 6 °F (37 °C) (!) 102 4 °F (39 1 °C) (!) 100 6 °F (38 1 °C)   TempSrc:  Axillary Axillary Oral   SpO2: 96% 96% 93%    Weight:           Temperature: Temp (24hrs), Av 5 °F (38 1 °C), Min:98 6 °F (37 °C), Max:102 4 °F (39 1 °C)  Current: Temperature: (!) 100 6 °F (38 1 °C)    DIAGNOSTIC DATA:    Labs:   Results from last 7 days   Lab Units 19  1406 19  1005   WBC Thousand/uL  --  13 82*   HEMOGLOBIN g/dL  --  10 0*   HEMATOCRIT %  --  30 8*   PLATELETS Thousands/uL 261 291   NEUTROS PCT %  --  85*   MONOS PCT %  --  7     Results from last 7 days   Lab Units 19  1005   SODIUM mmol/L 132*   POTASSIUM mmol/L 4 5   CHLORIDE mmol/L 96*   CO2 mmol/L 24   BUN mg/dL 14   CREATININE mg/dL 1 20   CALCIUM mg/dL 9 2   ALK PHOS U/L 133*   ALT U/L 32   AST U/L 44              Results from last 7 days   Lab Units 19  1005   INR  1 15   PTT seconds 31     Results from last 7 days   Lab Units 19  1406 19  1231 19  1005   LACTIC ACID mmol/L 1 6 3 2* 2 6*     Results from last 7 days   Lab Units 19  1629 19  1005   TROPONIN I ng/mL 0 42* 0 14*         Results from last 7 days   Lab Units 19  1005   PROCALCITONIN ng/ml 0 10     No results found for: [facility]             Applicable Imaging Studies:   CXR: RUL nodule per formal read    Code Status: Level 1 - Full Code

## 2019-03-21 NOTE — ASSESSMENT & PLAN NOTE
Likely type 2 NSTEMI in the setting of sepsis  Follow up on serial cardiac enzymes  Will get echocardiogram   Patient's daughter states that she had noncritical coronary artery disease per cardiac catheterization in [place] greater than 2 years back and never had any intervention done

## 2019-03-21 NOTE — ED PROVIDER NOTES
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, advanced dementia, blindness in the right eye, polymyalgia rheumatica, renal insufficiency, and lupus, who presents from her memory care facility with fever to 101 3° this morning and cough with crackles in the lungs  Patient is a very poor historian, knows that she is in the hospital and is able to tell me her full name but unable to give me any additional history  I spoke with Virgil Solis, a nurse at the patient's facility, who states this is the patient's baseline mental status  Virgil Solis tells me that the patient developed a cough yesterday, developed a fever this morning  She was bringing up some sputum, though unsure of the color  No increased work of breathing  Patient denies shortness of breath or chest pain, but repeats over and over I am dying I am dying    Per the nurse at her facility, she frequently says this  Patient denies any abdominal pain  Denies pain when she urinates, though her history is unreliable  She has not been complaining of any additional symptoms at her facility  Prior to Admission Medications   Prescriptions Last Dose Informant Patient Reported? Taking?    DULoxetine (CYMBALTA) 60 mg delayed release capsule  Outside Facility (Specify) No No   Sig: Take 1 capsule (60 mg total) by mouth daily   Dentifrices ([address])  Outside Facility (Specify) Yes No   Sig: Apply to teeth   LORazepam (ATIVAN) 0 5 mg tablet  Outside Facility (Specify) No No   Sig: Take 1 tablet (0 5 mg total) by mouth every 8 (eight) hours as needed for anxiety for up to 10 days She always takes 0 5 mg at bedtime   Lutein [address] (Specify) Yes No   Sig: Take 1 tablet by mouth daily     NIFEdipine (PROCARDIA XL) 60 mg 24 hr tablet  Outside Facility (Specify) Yes No   benzocaine (ORAJEL) 10 % mucosal gel  Outside Facility (Specify) No No   Sig: Apply 1 application to the mouth or throat 3 (three) times a day as needed for mucositis   Patient not taking: Reported on 3/12/2019   benzonatate (TESSALON PERLES) 100 mg capsule  Outside Facility (Specify) No No   Sig: Take 1 capsule (100 mg total) by mouth 3 (three) times a day as needed for cough   calcium carbonate (OS-TEDDY) 600 MG tablet  Outside Facility (Specify) Yes No   Sig: Take 600 mg by mouth daily   carboxymethylcellulose (REFRESH LIQUIGEL) 1 % ophthalmic solution  Outside Facility (Specify) Yes No   Sig: Apply 1 drop to eye 3 (three) times a day     cetirizine (ZyrTEC) 10 MG chewable tablet  Outside Facility (Specify) No No   Sig: Chew 1 tablet (10 mg total) daily   donepezil (ARICEPT) 10 mg tablet  Outside Facility (Specify) No No   Sig: Take 0 5 tablets (5 mg total) by mouth daily at bedtime Increased confusion on 10mg   estradiol (ESTRACE) 0 1 mg/g vaginal cream  Outside Facility (Specify) No No   Sig: Insert 1 g into the vagina daily Do no insert - please apply a pea sized amount to the urethra/vagina Monday, Wednesday and Friday before bedtime   fluticasone (FLONASE) 50 mcg/act nasal spray  Outside Facility (Specify) No No   Si spray into each nostril daily   furosemide (LASIX) 20 mg tablet  Outside Facility (Specify) No No   Sig: Take one tab on Monday- Wednesday and friday   guaiFENesin (MUCINEX) 600 mg 12 hr tablet  Outside Facility (Specify) No No   Sig: Take 1 tablet (600 mg total) by mouth 2 (two) times a day as needed for congestion   Patient not taking: Reported on 3/12/2019   ipratropium-albuterol (COMBIVENT RESPIMAT) inhaler  Outside Facility (Specify) No No   Sig: Inhale 2 puffs every 4 (four) hours as needed (dyspnea)   Patient not taking: Reported on 3/12/2019   levothyroxine 100 mcg tablet  Outside Facility (Specify) Yes No   losartan (COZAAR) 100 MG tablet  Outside Facility (Specify) No No   Sig: Take 1 tablet (100 mg total) by mouth daily   magnesium hydroxide (MILK OF MAGNESIA) 400 mg/5 mL oral suspension  Outside Facility (Specify) Yes No   Sig: Take 30 mL by mouth daily as needed for constipation     menthol-methyl salicylate (BENGAY) 25-21 % cream  Outside Facility (Specify) No No   Sig: Apply topically 4 (four) times a day as needed (pain)   Patient not taking: Reported on 3/12/2019   metoprolol tartrate (LOPRESSOR) 50 mg tablet  Outside Facility (Specify) No No   Sig: Take 0 5 tablets (25 mg total) by mouth 2 (two) times a day   ofloxacin (OCUFLOX) 0 3 % ophthalmic solution  Outside Facility (Specify) No No   Sig: Administer 1 drop to the right eye 4 (four) times a day If not improving in 48 hrs need to schedule in with eye dr    Patient not taking: Reported on 3/12/2019   pantoprazole (PROTONIX) 40 mg tablet  Outside Facility (Specify) Yes No   Sig: Take 40 mg by mouth 2 (two) times a day     potassium chloride (K-DUR,KLOR-CON) 10 mEq tablet  Outside Facility (Specify) No No   Sig: Take one tab on days you take furosemide     pravastatin (PRAVACHOL) 40 mg tablet  Outside Facility (Specify) No No   Sig: Take 1 tablet (40 mg total) by mouth daily   sodium chloride (OCEAN) 0 65 % nasal spray  Outside Facility (Specify) Yes No   Si sprays into each nostril 2 (two) times a day   triamcinolone (KENALOG) 0 1 % cream  Outside Facility (Specify) Yes No   Sig: triamcinolone acetonide 0 1 % topical cream      Facility-Administered Medications: None       Past Medical History:   Diagnosis Date    Aspiration pneumonia ([facility] )     Last Assessed:  17    Basal cell carcinoma of nose     Last Assessed:  17    Blind     blind right eye, 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, Last Assessed:  17    Dementia     Depression     Disease of thyroid gland     Fibromyalgia     Last Assessed:  7/18/17    Gait disturbance     GERD (gastroesophageal reflux disease)     Glaucoma     Hyperlipidemia     Hypertension     Osteopenia     Peripheral neuropathy     Last Assessed:  4/12/17    [facility]a [facility] [address] [facility]     Psychiatric disorder     depression    Renal insufficiency syndrome     Last Assessed:  4/13/17    Rheumatoid arthritis ([facility] [address] )     [facility]     x2, Last Assessed:  7/18/17    Systemic lupus erythematosus ([facility] )     Vertigo     Vitamin D deficiency        Past Surgical History:   Procedure Laterality Date    EYE SURGERY      JOINT REPLACEMENT      left hip replacement, left shoulder replacement    SHOULDER SURGERY      Replacement       Family History   Problem Relation Age of Onset    Heart attack Mother     Diabetes Mother     Dementia Father     Coronary artery disease Father         cardiac disorder    Diabetes Sister     Uterine cancer Sister      I have reviewed and agree with the history as documented  Social History     Tobacco Use    Smoking status: Never Smoker    Smokeless tobacco: Never Used   Substance Use Topics    Alcohol use: No    Drug use: No        Review of Systems   Unable to perform ROS: Dementia       Physical Exam  Physical Exam   Constitutional: She appears well-developed and well-nourished  No distress  HENT:   Head: Normocephalic and atraumatic  Right Ear: External ear normal    Left Ear: External ear normal    Nose: Nose normal    Mouth/Throat: Oropharynx is clear and moist    Eyes: Right eye exhibits no discharge  Left eye exhibits no discharge  Cornea opaque to the R eye, chronic, no drainage  L pupil round and reactive   Neck: Normal range of motion  Neck supple  Cardiovascular: Regular rhythm and normal heart sounds  Exam reveals no gallop and no friction rub  No murmur heard  tachycardic   Pulmonary/Chest: Effort normal  No respiratory distress   She has no wheezes  Scattered rales at the bases   Abdominal: Soft  Bowel sounds are normal  She exhibits no distension  There is no tenderness  There is no guarding  Musculoskeletal: Normal range of motion  She exhibits no edema or deformity  Neurological: She is alert  She exhibits normal muscle tone  Oriented to person, knows she is in the hospital, otherwise disoriented, at baseline, moves all 4 extremities with equal strength, no facial drrop   Skin: Skin is warm and dry  She is not diaphoretic         Vital Signs  ED Triage Vitals [03/21/19 0918]   Temperature Pulse Respirations Blood Pressure SpO2   100 2 °F (37 9 °C) (!) 115 18 (!) 180/74 95 %      Temp Source Heart Rate Source Patient Position - Orthostatic VS BP Location FiO2 (%)   Oral Monitor Lying Right arm --      Pain Score       --           Vitals:    03/21/19 0918   BP: (!) 180/74   Pulse: (!) 115   Patient Position - Orthostatic VS: Lying         Visual Acuity      ED Medications  Medications   piperacillin-tazobactam (ZOSYN) 3 375 g in sodium chloride 0 9 % 50 mL IVPB (has no administration in time range)   vancomycin (VANCOCIN) IVPB (premix) 1,000 mg (has no administration in time range)       Diagnostic Studies  Results Reviewed     Procedure Component Value Units Date/Time    CBC and differential [[id number]]     Lab Status:  No result Specimen:  Blood     Comprehensive metabolic panel [[id number]]     Lab Status:  No result Specimen:  Blood     Lactic acid x2 [[id number]]     Lab Status:  No result Specimen:  Blood     Lactic acid x2 [[id number]]     Lab Status:  No result Specimen:  Blood     Procalcitonin [[id number]]     Lab Status:  No result Specimen:  Blood     Protime-INR [[id number]]     Lab Status:  No result Specimen:  Blood     APTT [[id number]]     Lab Status:  No result Specimen:  Blood     Blood culture #1 [[id number]]     Lab Status:  No result Specimen:  Blood     Blood culture #2 [[id number]]     Lab Status:  No result Specimen:  Blood UA w Reflex to Microscopic w Reflex to Culture [[id number]]     Lab Status:  No result Specimen:  Urine, Straight Cath     Influenza A/B and RSV by PCR [[id number]]     Lab Status:  No result Specimen:  Nasopharyngeal     Troponin I [[id number]]     Lab Status:  No result Specimen:  Blood                  XR chest pa & lateral    (Results Pending)              Procedures  Procedures       Phone Contacts  ED Phone Contact    ED Course                               MDM  Number of Diagnoses or Management Options  Sepsis [facility]): new and requires workup  UTI (urinary tract infection): new and requires workup  Diagnosis management comments: On arrival the patient is tachycardic to 115 beats per minute, low-grade oral temperature to 100 2°, meets SIRS criteria with leukocytosis  Meets criteria for sepsis with lactic acidosis to 2 6  Chest x-ray unremarkable though she does have rales at the bases  Does have a small pulmonary nodule which was discussed with her daughter at bedside who tells me that they were previously aware of this  Urinalysis concerning for UTI  Patient given broad-spectrum antibiotics, vancomycin and Zosyn, on arrival secondary to having received IV antibiotics within the last 3 months  This will likely be able to be de-escalated now that urinary source is known  I personally interpreted the patient's EKG, which reveals tachycardic rate to 113 beats per minute, right bundle branch block unchanged from prior, T-wave inversions in the inferior and anterior leads unchanged from prior, no reciprocal elevation, grossly unchanged from prior  Troponin obtained which is elevated to 0 14  Patient has leaked troponin in the past but not to this degree  Will give aspirin and order 2nd troponin  This was communicated to inpatient team, but heparin not started as I have a low suspicion for acute ischemia  Sepsis alert initiated and discussed with critical care PA    Patient admitted to Medicine in stable condition with telemetry monitoring  Amount and/or Complexity of Data Reviewed  Clinical lab tests: ordered and reviewed  Tests in the radiology section of CPT®: ordered and reviewed  Obtain history from someone other than the patient: yes  Review and summarize past medical records: yes    Patient Progress  Patient progress: stable         Disposition  Final diagnoses:   None     ED Disposition     None      Follow-up Information    None         Patient's Medications   Discharge Prescriptions    No medications on file     No discharge procedures on file      ED Provider  Electronically Signed by           Vitor Presley MD  03/21/19 0910

## 2019-03-21 NOTE — H&P
H&P- [facility] 4/27/1931, 80 y o  female MRN: [id number]    Unit/Bed#: GARY Encounter: [id number]    Primary Care Provider: Danielle Bassett DO   Date and time admitted to hospital: 3/21/2019  9:14 AM        * Sepsis [facility])  Assessment & Plan  Present on admission as evidenced by tachycardia, leukocytosis and lactic acidosis  Likely source appears to be urinary tract infection  Patient has dementia at baseline and incontinent of urine and unable to provide a good history regarding symptoms of urinary tract infection  No infiltrate on chest x-ray  Follow up on blood and urine cultures  Continue with into empiric cefepime  Follow up on lactic acid level  This is patient's 2nd admission in the last 2 months for sepsis related to urinary tract infection  In between while she was at rehabilitation she was treated for urinary tract infection as well  Will discussed with Infectious Disease regarding need for prophylactic antibiotics  She had a renal ultrasound on 01/26 which did not show any structural abnormalities of the genitourinary system  Elevated troponin  Assessment & Plan  Likely type 2 NSTEMI in the setting of sepsis  Follow up on serial cardiac enzymes  Will get echocardiogram   Patient's daughter states that she had noncritical coronary artery disease per cardiac catheterization in [place] greater than 2 years back and never had any intervention done  Cough  Assessment & Plan  Patient has had cough for the last few weeks  Continue with cough suppressant medication  Chest x-ray negative for any infiltrate  Influenza PCR will also be sent  Acute cystitis without hematuria  Assessment & Plan  IV cefepime pending urine/ blood culture results  Alzheimer's dementia without behavioral disturbance  Assessment & Plan  Patient has Alzheimer's dementia but otherwise is ambulatory with assistance and able to feed herself    She continues to moan"I am dying"which her daughter says that is her baseline  Continue with supportive care  Maintain fall precautions  Essential hypertension  Assessment & Plan  Blood pressure is stable in the emergency room  Continue with outpatient antihypertensive regimen  VTE Prophylaxis: Enoxaparin (Lovenox)  / sequential compression device   Code Status:  Code  POLST: POLST form is not discussed and not completed at this time  Discussion with family:  Discussed with daughter at bedside Anticipated Length of Stay:  Patient will be admitted on an Inpatient basis with an anticipated length of stay of  > 2 midnights  Justification for Hospital Stay:  treatment and management of sepsis  Total Time for Visit, including Counseling / Coordination of Care: 30 minutes  Greater than 50% of this total time spent on direct patient counseling and coordination of care  Chief Complaint:   Fever    History of Present Illness:    Keren Johnson is a 80 y o  female who presents from her assisted living memory care unit with complaint of fever  Patient has history of Alzheimer's dementia and is not a good historian  History was obtained from daughter at bedside  She was noted to have a fever of 101 3 at her memory care facility today  She also has been having cough for the last few days which is nonproductive  No shortness of breath was reported by the patient's daughter  Patient herself cannot give a history but keeps repeating over and over court I am dying which her daughter states is her baseline  No history of abdominal pain or dysuria was reported however patient is incontinent of urine at her baseline  He has had hospitalization 3 months back for sepsis related to urinary tract infection and pseudogout  Subsequently she went to the rehabilitation and then to her memory care facility    Her daughter states that patient has a history of aspiration however no increased shortness of breath or difficulty like coughing or choking with food or liquids was noted by family  Review of Systems:    Review of Systems   Constitutional: Positive for fatigue and fever  Negative for activity change  HENT: Negative  Respiratory: Positive for cough  Cardiovascular: Negative  Gastrointestinal: Negative  Endocrine: Negative  Genitourinary: Negative  Musculoskeletal: Negative  Neurological: Negative  Hematological: Negative  Psychiatric/Behavioral: Positive for confusion  Past Medical and Surgical History:     Past Medical History:   Diagnosis Date    Aspiration pneumonia ([facility] [address] )     Last Assessed:  7/18/17    Basal cell carcinoma of nose     Last Assessed:  4/12/17    Blind     blind right eye, 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, Last Assessed:  7/18/17    Dementia     Depression     Disease of thyroid gland     Fibromyalgia     Last Assessed:  7/18/17    Gait disturbance     GERD (gastroesophageal reflux disease)     Glaucoma     Hyperlipidemia     Hypertension     Osteopenia     Peripheral neuropathy     Last Assessed:  4/12/17    Polymyalgia rheumatica ([facility] [address] )     Psychiatric disorder     depression    Renal insufficiency syndrome     Last Assessed:  4/13/17    Rheumatoid arthritis ([facility] [address] )     Stroke ([facility] [address] )     x2, Last Assessed:  7/18/17    Systemic lupus erythematosus ([facility] [address] )     Vertigo     Vitamin D deficiency        Past Surgical History:   Procedure Laterality Date    EYE SURGERY      JOINT REPLACEMENT      left hip replacement, left shoulder replacement    SHOULDER SURGERY      Replacement       Meds/Allergies:    Prior to Admission medications    Medication Sig Start Date End Date Taking?  Authorizing Provider   benzonatate (TESSALON PERLES) 100 mg capsule Take 1 capsule (100 mg total) by mouth 3 (three) times a day as needed for cough 11/12/18  Yes Kristen Gooden MD   calcium carbonate (OS-TEDDY) 600 MG tablet Take 600 mg by mouth daily   Yes Historical Provider, MD   carboxymethylcellulose (REFRESH LIQUIGEL) 1 % ophthalmic solution Apply 1 drop to eye 3 (three) times a day   8/24/17  Yes Historical Provider, MD   cetirizine (ZyrTEC) 10 MG chewable tablet Chew 1 tablet (10 mg total) daily 8/22/18  Yes Kuldeep Kebede PA-C   Dentifrices ([facility] [facility]) Apply to teeth   Yes Historical Provider, MD   donepezil (ARICEPT) 10 mg tablet Take 0 5 tablets (5 mg total) by mouth daily at bedtime Increased confusion on 10mg 8/16/18  Yes Chelsea Oquendo DO   DULoxetine (CYMBALTA) 60 mg delayed release capsule Take 1 capsule (60 mg total) by mouth daily 4/17/18  Yes Ruth Hargrove PA-C   estradiol (ESTRACE) 0 1 mg/g vaginal cream Insert 1 g into the vagina daily Do no insert - please apply a pea sized amount to the urethra/vagina Monday, Wednesday and Friday before bedtime 1/7/19  Yes Margaret More MD   fluticasone [facility]) 50 mcg/act nasal spray 1 spray into each nostril daily 8/22/18  Yes Kuldeep Kebede PA-C   furosemide (LASIX) 20 mg tablet Take one tab on Monday- Wednesday and friday 9/6/18  Yes Chelsea Oquendo DO   levothyroxine 100 mcg tablet  12/22/17  Yes Historical Provider, MD   losartan (COZAAR) 100 MG tablet Take 1 tablet (100 mg total) by mouth daily 8/16/18  Yes Chelsea Oquendo DO   Lutein 10 MG TABS Take 1 tablet by mouth daily     Yes Historical Provider, MD   metoprolol tartrate (LOPRESSOR) 50 mg tablet Take 0 5 tablets (25 mg total) by mouth 2 (two) times a day 8/16/18  Yes Chelsea Oquendo DO   NIFEdipine (PROCARDIA XL) 60 mg 24 hr tablet  6/25/18  Yes Historical Provider, MD   ofloxacin (OCUFLOX) 0 3 % ophthalmic solution Administer 1 drop to the right eye 4 (four) times a day If not improving in 48 hrs need to schedule in with eye dr  12/28/18  Yes Chelsea Oquendo DO   pantoprazole (PROTONIX) 40 mg tablet Take 40 mg by mouth 2 (two) times a day     Yes Historical Provider, MD   potassium chloride (K-DUR,KLOR-CON) 10 mEq tablet Take one tab on days you take furosemide   2/5/19  Yes Chelsea Oquendo, DO pravastatin (PRAVACHOL) 40 mg tablet Take 1 tablet (40 mg total) by mouth daily 2/1/19  Yes Ruth Hargrove PA-C   sodium chloride (OCEAN) 0 65 % nasal spray 2 sprays into each nostril 2 (two) times a day   Yes Historical Provider, MD   triamcinolone (KENALOG) 0 1 % cream triamcinolone acetonide 0 1 % topical cream   Yes Historical Provider, MD   benzocaine (ORAJEL) 10 % mucosal gel Apply 1 application to the mouth or throat 3 (three) times a day as needed for mucositis  Patient not taking: Reported on 3/12/2019 7/16/18   Tash Mehta, DO   guaiFENesin (MUCINEX) 600 mg 12 hr tablet Take 1 tablet (600 mg total) by mouth 2 (two) times a day as needed for congestion  Patient not taking: Reported on 3/12/2019 5/18/18   Tash Mehta, DO   ipratropium-albuterol (COMBIVENT RESPIMAT) inhaler Inhale 2 puffs every 4 (four) hours as needed (dyspnea)  Patient not taking: Reported on 3/12/2019 5/18/18   Tash Mehta, DO   LORazepam (ATIVAN) 0 5 mg tablet Take 1 tablet (0 5 mg total) by mouth every 8 (eight) hours as needed for anxiety for up to 10 days She always takes 0 5 mg at bedtime 1/28/19 3/12/19  Ruth Hargrove PA-C   magnesium hydroxide (MILK OF MAGNESIA) 400 mg/5 mL oral suspension Take 30 mL by mouth daily as needed for constipation      Historical Provider, MD   menthol-methyl salicylate (BENGAY) 10-30 % cream Apply topically 4 (four) times a day as needed (pain)  Patient not taking: Reported on 3/12/2019 1/28/19   Abdirahman Merrill PA-C   aspirin 325 mg tablet Take 325 mg by mouth daily    3/21/19  Historical Provider, MD   colchicine (COLCRYS) 0 6 mg tablet Take 1 tablet (0 6 mg total) by mouth 2 (two) times a day for 3 days 1/28/19 3/21/19  Abdirahman Merrill PA-C     I have reviewed home medications using allscripts  Allergies:    Allergies   Allergen Reactions    Acetaminophen     Golimumab      Other reaction(s): dedrick colored stool    Lidocaine Other (See Comments)    Neurontin [Gabapentin]     Nortriptyline Tremor    Prasterone      Other reaction(s): pruitis    Tricyclic Antidepressants     Leflunomide Rash    Sulfasalazine Rash       Social History:     Marital Status: /Civil Union   Substance Use History:   Social History     Substance and Sexual Activity   Alcohol Use No     Social History     Tobacco Use   Smoking Status Never Smoker   Smokeless Tobacco Never Used     Social History     Substance and Sexual Activity   Drug Use No       Family History:    non-contributory    Physical Exam:     Vitals:   Blood Pressure: 139/65 (03/21/19 1215)  Pulse: 96 (03/21/19 1215)  Temperature: 100 2 °F (37 9 °C) (03/21/19 0918)  Temp Source: Oral (03/21/19 0918)  Respirations: 18 (03/21/19 0918)  Weight - Scale: 65 3 kg (143 lb 15 4 oz) (03/21/19 0918)  SpO2: 96 % (03/21/19 1215)    Physical Exam   Constitutional: No distress  HENT:   Head: Normocephalic and atraumatic  Mouth/Throat: Oropharynx is clear and moist    Eyes: Pupils are equal, round, and reactive to light  Conjunctivae and EOM are normal    Neck: Normal range of motion  Neck supple  Cardiovascular: Regular rhythm  Tachycardia   Pulmonary/Chest: Effort normal and breath sounds normal    Scattered expiratory wheezing   Abdominal: Soft  Bowel sounds are normal    No suprapubic tenderness or flank tenderness   Musculoskeletal: She exhibits no edema  Neurological:   Patient awake and oriented to self  Follow simple commands and able to move all extremities  Skin: Skin is warm  She is not diaphoretic  Psychiatric: She has a normal mood and affect  Additional Data:     Lab Results: I have personally reviewed pertinent reports        Results from last 7 days   Lab Units 03/21/19  1005   WBC Thousand/uL 13 82*   HEMOGLOBIN g/dL 10 0*   HEMATOCRIT % 30 8*   PLATELETS Thousands/uL 291   NEUTROS PCT % 85*   LYMPHS PCT % 7*   MONOS PCT % 7   EOS PCT % 0     Results from last 7 days   Lab Units 03/21/19  1005   SODIUM mmol/L 132*   POTASSIUM mmol/L 4 5   CHLORIDE mmol/L 96*   CO2 mmol/L 24   BUN mg/dL 14   CREATININE mg/dL 1 20   ANION GAP mmol/L 12   CALCIUM mg/dL 9 2   ALBUMIN g/dL 3 0*   TOTAL BILIRUBIN mg/dL 0 70   ALK PHOS U/L 133*   ALT U/L 32   AST U/L 44   GLUCOSE RANDOM mg/dL 134     Results from last 7 days   Lab Units 03/21/19  1005   INR  1 15             Results from last 7 days   Lab Units 03/21/19  1005   LACTIC ACID mmol/L 2 6*       Imaging: I have personally reviewed pertinent reports  XR chest pa & lateral   Final Result by Alpheus Dance, MD (03/21 1056)      Questionable 1 cm right upper lobe nodule as discussed above  Workstation performed: [id number]             EKG, Pathology, and Other Studies Reviewed on Admission:   · EKG: Reviewed    Allscripts / Epic Records Reviewed: Yes     ** Please Note: This note has been constructed using a voice recognition system   **

## 2019-03-22 LAB
ANION GAP SERPL CALCULATED.3IONS-SCNC: 11 MMOL/L (ref 4–13)
ATRIAL RATE: 113 BPM
ATRIAL RATE: 91 BPM
BUN SERPL-MCNC: 12 MG/DL (ref 5–25)
CALCIUM SERPL-MCNC: 8.7 MG/DL (ref 8.3–10.1)
CHLORIDE SERPL-SCNC: 98 MMOL/L (ref 100–108)
CO2 SERPL-SCNC: 20 MMOL/L (ref 21–32)
CREAT SERPL-MCNC: 0.89 MG/DL (ref 0.6–1.3)
ERYTHROCYTE [DISTWIDTH] IN BLOOD BY AUTOMATED COUNT: 14.8 % (ref 11.6–15.1)
FLUAV AG SPEC QL: NOT DETECTED
FLUBV AG SPEC QL: NOT DETECTED
GFR SERPL CREATININE-BSD FRML MDRD: 58 ML/MIN/1.73SQ M
GLUCOSE SERPL-MCNC: 96 MG/DL (ref 65–140)
HCT VFR BLD AUTO: 26.5 % (ref 34.8–46.1)
HGB BLD-MCNC: 8.6 G/DL (ref 11.5–15.4)
MCH RBC QN AUTO: 29.5 PG (ref 26.8–34.3)
MCHC RBC AUTO-ENTMCNC: 32.5 G/DL (ref 31.4–37.4)
MCV RBC AUTO: 91 FL (ref 82–98)
P AXIS: 115 DEGREES
P AXIS: 35 DEGREES
PLATELET # BLD AUTO: 226 THOUSANDS/UL (ref 149–390)
PMV BLD AUTO: 9.5 FL (ref 8.9–12.7)
POTASSIUM SERPL-SCNC: 4 MMOL/L (ref 3.5–5.3)
PR INTERVAL: 100 MS
PR INTERVAL: 128 MS
QRS AXIS: 76 DEGREES
QRS AXIS: 85 DEGREES
QRSD INTERVAL: 118 MS
QRSD INTERVAL: 130 MS
QT INTERVAL: 340 MS
QT INTERVAL: 374 MS
QTC INTERVAL: 460 MS
QTC INTERVAL: 466 MS
RBC # BLD AUTO: 2.92 MILLION/UL (ref 3.81–5.12)
RSV B RNA SPEC QL NAA+PROBE: NOT DETECTED
SODIUM SERPL-SCNC: 129 MMOL/L (ref 136–145)
T WAVE AXIS: -14 DEGREES
T WAVE AXIS: -70 DEGREES
VENTRICULAR RATE: 113 BPM
VENTRICULAR RATE: 91 BPM
WBC # BLD AUTO: 9.55 THOUSAND/UL (ref 4.31–10.16)

## 2019-03-22 PROCEDURE — 80048 BASIC METABOLIC PNL TOTAL CA: CPT | Performed by: INTERNAL MEDICINE

## 2019-03-22 PROCEDURE — 85027 COMPLETE CBC AUTOMATED: CPT | Performed by: INTERNAL MEDICINE

## 2019-03-22 PROCEDURE — 99232 SBSQ HOSP IP/OBS MODERATE 35: CPT | Performed by: INTERNAL MEDICINE

## 2019-03-22 PROCEDURE — 93010 ELECTROCARDIOGRAM REPORT: CPT | Performed by: INTERNAL MEDICINE

## 2019-03-22 RX ORDER — METOPROLOL TARTRATE 5 MG/5ML
2.5 INJECTION INTRAVENOUS ONCE
Status: COMPLETED | OUTPATIENT
Start: 2019-03-22 | End: 2019-03-22

## 2019-03-22 RX ADMIN — DONEPEZIL HYDROCHLORIDE 5 MG: 5 TABLET, FILM COATED ORAL at 21:23

## 2019-03-22 RX ADMIN — OFLOXACIN 1 DROP: 3 SOLUTION OPHTHALMIC at 18:01

## 2019-03-22 RX ADMIN — OFLOXACIN 1 DROP: 3 SOLUTION OPHTHALMIC at 13:02

## 2019-03-22 RX ADMIN — FLUTICASONE PROPIONATE 1 SPRAY: 50 SPRAY, METERED NASAL at 08:32

## 2019-03-22 RX ADMIN — LOSARTAN POTASSIUM 100 MG: 50 TABLET, FILM COATED ORAL at 08:34

## 2019-03-22 RX ADMIN — METOPROLOL TARTRATE 2.5 MG: 5 INJECTION, SOLUTION INTRAVENOUS at 22:48

## 2019-03-22 RX ADMIN — SODIUM CHLORIDE 100 ML/HR: 0.9 INJECTION, SOLUTION INTRAVENOUS at 05:09

## 2019-03-22 RX ADMIN — METOPROLOL TARTRATE 25 MG: 25 TABLET, FILM COATED ORAL at 08:33

## 2019-03-22 RX ADMIN — LORATADINE 5 MG: 10 TABLET ORAL at 08:33

## 2019-03-22 RX ADMIN — ENOXAPARIN SODIUM 30 MG: 30 INJECTION SUBCUTANEOUS at 08:32

## 2019-03-22 RX ADMIN — FUROSEMIDE 20 MG: 20 TABLET ORAL at 08:33

## 2019-03-22 RX ADMIN — PRAVASTATIN SODIUM 40 MG: 40 TABLET ORAL at 08:33

## 2019-03-22 RX ADMIN — OFLOXACIN 1 DROP: 3 SOLUTION OPHTHALMIC at 08:32

## 2019-03-22 RX ADMIN — CEFEPIME HYDROCHLORIDE 1000 MG: 1 INJECTION, POWDER, FOR SOLUTION INTRAMUSCULAR; INTRAVENOUS at 14:24

## 2019-03-22 RX ADMIN — DULOXETINE HYDROCHLORIDE 60 MG: 60 CAPSULE, DELAYED RELEASE ORAL at 08:33

## 2019-03-22 RX ADMIN — PANTOPRAZOLE SODIUM 40 MG: 40 TABLET, DELAYED RELEASE ORAL at 05:09

## 2019-03-22 RX ADMIN — LEVOTHYROXINE SODIUM 100 MCG: 100 TABLET ORAL at 05:09

## 2019-03-22 RX ADMIN — DOCUSATE SODIUM 100 MG: 100 CAPSULE, LIQUID FILLED ORAL at 08:33

## 2019-03-22 RX ADMIN — CEFEPIME HYDROCHLORIDE 1000 MG: 1 INJECTION, POWDER, FOR SOLUTION INTRAMUSCULAR; INTRAVENOUS at 01:33

## 2019-03-22 RX ADMIN — SODIUM CHLORIDE 100 ML/HR: 0.9 INJECTION, SOLUTION INTRAVENOUS at 14:24

## 2019-03-22 RX ADMIN — OFLOXACIN 1 DROP: 3 SOLUTION OPHTHALMIC at 21:24

## 2019-03-22 RX ADMIN — METOPROLOL TARTRATE 25 MG: 25 TABLET, FILM COATED ORAL at 21:23

## 2019-03-22 NOTE — PROGRESS NOTES
Progress Note - Hope Cancel 4/27/1931, 80 y o  female MRN: [id number]    Unit/Bed#: -01 Encounter: [id number]    Primary Care Provider: Olivia Elliott DO   Date and time admitted to hospital: 3/21/2019  9:14 AM        Elevated troponin  Assessment & Plan  Likely type 2 NSTEMI in the setting of sepsis  EKG without any changes  Continue beta-blockers      Cough  Assessment & Plan  Influenza negative  Supportive care    Acute cystitis without hematuria  Assessment & Plan  IV cefepime pending urine/ blood culture results  History of recurrent UTIs  Id follow-up ongoing    Alzheimer's dementia without behavioral disturbance  Assessment & Plan  Patient has Alzheimer's dementia but otherwise is ambulatory with assistance and able to feed herself  She continues to moan"I am dying"which her daughter says that is her baseline  Continue with supportive care  Maintain fall precautions  Essential hypertension  Assessment & Plan  · Continue antihypertensive regimen  · Monitor blood pressure    * Sepsis ([address] )  Assessment & Plan  Due to UTI    Continue antibiotics-IV cefepime  Id follow-up ongoing        VTE Pharmacologic Prophylaxis:   Pharmacologic: Enoxaparin (Lovenox)  Mechanical VTE Prophylaxis in Place: Yes    Patient Centered Rounds: I have performed bedside rounds with nursing staff today  Discussions with Specialists or Other Care Team Provider:  Nursing    Education and Discussions with Family / Patient:  Daughter at bedside    Time Spent for Care: 20 minutes  More than 50% of total time spent on counseling and coordination of care as described above  Current Length of Stay: 1 day(s)    Current Patient Status: Inpatient   Certification Statement: The patient will continue to require additional inpatient hospital stay due to IV antibiotics    Discharge Plan:  Pending improved    Code Status: Level 1 - Full Code      Subjective:   No events reported    Patient reports"I am dying"    Objective:     Vitals:   Temp (24hrs), Av 1 °F (37 8 °C), Min:98 6 °F (37 °C), Max:102 4 °F (39 1 °C)    Temp:  [98 6 °F (37 °C)-102 4 °F (39 1 °C)] 99 6 °F (37 6 °C)  HR:  [91-97] 97  Resp:  [18] 18  BP: (131-152)/(63-67) 152/67  SpO2:  [92 %-94 %] 92 %  Body mass index is 28 12 kg/m²  Input and Output Summary (last 24 hours): Intake/Output Summary (Last 24 hours) at 3/22/2019 1425  Last data filed at 3/22/2019 1424  Gross per 24 hour   Intake 2000 ml   Output 294 ml   Net 1706 ml       Physical Exam:     Physical Exam     Gen -Patient comfortable at rest  Neck- Supple  No thyromegaly or lymphadenopathy  Lungs-Clear bilaterally without any wheeze or rales   Heart S1-S2, regular rate and rhythm, no murmurs  Abdomen-soft nontender, no organomegaly  Bowel sounds present  Extremities-no cyanosi,  clubbing or edema  Skin- no rash  Neuro-awake and alert confused         Additional Data:     Labs:    Results from last 7 days   Lab Units 19  0519  19  1005   WBC Thousand/uL 9 55  --  13 82*   HEMOGLOBIN g/dL 8 6*  --  10 0*   HEMATOCRIT % 26 5*  --  30 8*   PLATELETS Thousands/uL 226   < > 291   NEUTROS PCT %  --   --  85*   LYMPHS PCT %  --   --  7*   MONOS PCT %  --   --  7   EOS PCT %  --   --  0    < > = values in this interval not displayed       Results from last 7 days   Lab Units 19  0519 19  1005   SODIUM mmol/L 129* 132*   POTASSIUM mmol/L 4 0 4 5   CHLORIDE mmol/L 98* 96*   CO2 mmol/L 20* 24   BUN mg/dL 12 14   CREATININE mg/dL 0 89 1 20   ANION GAP mmol/L 11 12   CALCIUM mg/dL 8 7 9 2   ALBUMIN g/dL  --  3 0*   TOTAL BILIRUBIN mg/dL  --  0 70   ALK PHOS U/L  --  133*   ALT U/L  --  32   AST U/L  --  44   GLUCOSE RANDOM mg/dL 96 134     Results from last 7 days   Lab Units 19  1005   INR  1 15             Results from last 7 days   Lab Units 19  1406 19  1231 19  1005   LACTIC ACID mmol/L 1 6 3 2* 2 6*   PROCALCITONIN ng/ml 0 22  --  0 10 * I Have Reviewed All Lab Data Listed Above  * Additional Pertinent Lab Tests Reviewed:  All Labs Within Last 24 Hours Reviewed    Imaging:    Imaging Reports Reviewed Today Include:   Imaging Personally Reviewed by Myself Includes:      Recent Cultures (last 7 days):     Results from last 7 days   Lab Units 03/21/19  1456 03/21/19  1023 03/21/19  1018 03/21/19  1005 03/21/19  0948   BLOOD CULTURE   --  No Growth at 24 hrs   --  No Growth at 24 hrs   --    URINE CULTURE   --   --  >100,000 cfu/ml Non lactose fermenting gram negative sathya*  --   --    INFLUENZA B PCR  Not Detected  --   --   --  Not Detected   RSV PCR  Not Detected  --   --   --  Not Detected       Last 24 Hours Medication List:     Current Facility-Administered Medications:  benzonatate 100 mg Oral TID PRN Pete Javed MD    cefepime 1,000 mg Intravenous Q12H Pete Javed MD Last Rate: 1,000 mg (03/22/19 1424)   docusate sodium 100 mg Oral BID Pete Javed MD    donepezil 5 mg Oral HS Pete Javed MD    DULoxetine 60 mg Oral Daily Pete Javed MD    enoxaparin 30 mg Subcutaneous Daily Pete Javed MD    fluticasone 1 spray Nasal Daily Pete Javed MD    furosemide 20 mg Oral Once per day on Mon Wed Fri Pete Javed MD    glycerin-hypromellose- 1 drop Both Eyes TID PRN Pete Javed MD    guaiFENesin 600 mg Oral BID PRN Pete Javed MD    ipratropium-albuterol 3 mL Nebulization 4x Daily PRN Pete Javed MD    levothyroxine 100 mcg Oral Early Morning Pete Javed MD    loratadine 5 mg Oral Daily Pete Javed MD    losartan 100 mg Oral Daily Pete Javed MD    metoprolol tartrate 25 mg Oral Q12H Jaci Stephenson MD    ofloxacin 1 drop Right Eye 4x Daily Pete Javed MD    ondansetron 4 mg Intravenous Q6H PRN Pete Javed MD    pantoprazole 40 mg Oral BID AC Pete Javed MD    pravastatin 40 mg Oral Daily Pete Javed MD    sodium chloride 2 spray Each Nare PRN Pete Javed MD sodium chloride 100 mL/hr Intravenous Continuous Mason Pena MD Last Rate: 100 mL/hr (03/22/19 0618)        Today, Patient Was Seen By: Andra Keen MD    ** Please Note: Dictation voice to text software may have been used in the creation of this document   **

## 2019-03-22 NOTE — ASSESSMENT & PLAN NOTE
Likely type 2 NSTEMI in the setting of sepsis        EKG without any changes  Continue beta-blockers

## 2019-03-22 NOTE — PROGRESS NOTES
Progress Note - Infectious Disease   Marcia William 80 y o  female MRN: [id number]  Unit/Bed#: -01 Encounter: [id number]      Impression/Recommendations:  1  Sepsis, POA, with fever and leukocytosis  Source is most likely UTI  Fortunately, despite sepsis, she remains hemodynamically stable, without hypotension  Admission blood cultures remain negative  Antibiotic plan as in below  Monitor temperature/WBC  Monitor hemodynamics  Follow-up on pending blood cultures      2  Probable UTI  Patient has had sensitive E coli in her urine in the past   She does have some risk for resistance development  Given growth of non lactose fermenting GNR in urine culture, will stay with IV cefepime for now  Given multiple UTI recurrences, it is likely the patient has urinary retention  I will check a PVR  If she does have significant urinary retention, she will need bladder catheterization, either permanent Milton catheter or intermittent straight catheterization  Permanent Milton catheter is difficult due to her dementia with agitation  Intermittent straight catheterization may be difficult in assisted living facility  PVR was done but not recorded  Continue IV cefepime  Follow-up on urine culture  Recheck PVR  Patient will most likely need Milton catheterization versus intermittent straight catheterization      3  Cough  Exam not consistent with pneumonia  CXR without pneumonia  Consider viral URI or influenza as etiology of fever but doubt, given leukocytosis  Influenza PCR also negative  Supportive care      4  Advanced dementia, with agitation and poor cooperation      Discussed with patient  Antibiotics:  Cefepime # 2     Subjective:  Patient's mental status is about the same  She is awake, alert, with occasional agitation  Patient had urinary incontinence overnight  Temperature trending down  She is tolerating antibiotic well  No nausea, vomiting or diarrhea      Objective:  Vitals:  Temp: [98 6 °F (37 °C)-102 4 °F (39 1 °C)] 99 6 °F (37 6 °C)  HR:  [91-97] 97  Resp:  [17-18] 18  BP: (131-152)/(63-67) 152/67  SpO2:  [92 %-96 %] 92 %  Temp (24hrs), Av 9 °F (37 7 °C), Min:98 6 °F (37 °C), Max:102 4 °F (39 1 °C)  Current: Temperature: 99 6 °F (37 6 °C)    Physical Exam:     General: Sleepy but arousable  Stable disorientation  Stable mild vegetation  Nontoxic  Neck:  Supple  No mass  No lymphadenopathy  Lungs: Expansion symmetric, no rales, no wheezing, respirations unlabored  Heart:  Tachycardic with regular rhythm, S1 and S2 normal, no murmur  Abdomen: Soft, nondistended, non-tender, bowel sounds active all four quadrants,        no masses, no organomegaly  Extremities: No edema  No erythema/warmth  No ulcer  Nontender to palpation  Skin:  No rash  Neuro: Moves all extremities  Invasive Devices     Peripheral Intravenous Line            Peripheral IV 19 Right Antecubital 1 day                Labs studies:   I have personally reviewed pertinent labs  Results from last 7 days   Lab Units 19  0519 19  1005   POTASSIUM mmol/L 4 0 4 5   CHLORIDE mmol/L 98* 96*   CO2 mmol/L 20* 24   BUN mg/dL 12 14   CREATININE mg/dL 0 89 1 20   EGFR ml/min/1 73sq m 58 41   CALCIUM mg/dL 8 7 9 2   AST U/L  --  44   ALT U/L  --  32   ALK PHOS U/L  --  133*     Results from last 7 days   Lab Units 19  0519 19  1406 19  1005   WBC Thousand/uL 9 55  --  13 82*   HEMOGLOBIN g/dL 8 6*  --  10 0*   PLATELETS Thousands/uL 226 261 291     Results from last 7 days   Lab Units 19  1456 19  1018 19  0948   URINE CULTURE   --  >100,000 cfu/ml Non lactose fermenting gram negative sathya*  --    INFLUENZA B PCR  Not Detected  --  Not Detected   RSV PCR  Not Detected  --  Not Detected       Imaging Studies:   I have personally reviewed pertinent imaging study reports and images in PACS      EKG, Pathology, and Other Studies:   I have personally reviewed pertinent reports

## 2019-03-22 NOTE — PHYSICIAN ADVISOR
Current patient class: Inpatient  The patient is currently on Hospital Day: 2 at [address] [facility]      The patient was admitted to the hospital at [phone] on 3/21/19 for the following diagnosis:  UTI (urinary tract infection) [N39 0]  Fever [R50 9]  Sepsis ([address] ) [A41 9]       There is documentation in the medical record of an expected length of stay of at least 2 midnights  The patient is therefore expected to satisfy the 2 midnight benchmark and given the 2 midnight presumption is appropriate for INPATIENT ADMISSION  Given this expectation of a satisfying stay, CMS instructs us that the patient is most often appropriate for inpatient admission under part A provided medical necessity is documented in the chart  After review of the relevant documentation, labs, vital signs and test results, the patient is appropriate for INPATIENT ADMISSION  Admission to the hospital as an inpatient is a complex decision making process which requires the practitioner to consider the patients presenting complaint, history and physical examination and all relevant testing  With this in mind, in this case, the patient was deemed appropriate for INPATIENT ADMISSION  After review of the documentation and testing available at the time of the admission I concur with this clinical determination of medical necessity  Rationale is as follows: The patient is an 80year-old female who presented to the ED with fevers and a cough  The patient was diagnosed with sepsis likely secondary to acute cystitis  The patient was initiated on IV antibiotics and continues to require treatment with IV antibiotics  Infectious Disease was consulted  The patient was also found to have a lactic acidosis and is being treated with continuous IV fluids    In addition, the patient was found to have acute kidney injury, and the patient continues to require treatment with IV fluids as well as serial laboratory to monitor her renal function and electrolytes  The patient was also found to have an elevated troponin level and requires an additional cardiac ischemic work-up  The patient continues to require close hemodynamic monitoring and further work-up of the sepsis  The patient is appropriate for an INPATIENT ADMISSION       The patients vitals on arrival were ED Triage Vitals   Temperature Pulse Respirations Blood Pressure SpO2   03/21/19 0918 03/21/19 0918 03/21/19 0918 03/21/19 0918 03/21/19 0918   100 2 °F (37 9 °C) (!) 115 18 (!) 180/74 95 %      Temp Source Heart Rate Source Patient Position - Orthostatic VS BP Location FiO2 (%)   03/21/19 0918 03/21/19 0918 03/21/19 0918 03/21/19 0918 --   Oral Monitor Lying Right arm       Pain Score       03/22/19 0500       No Pain           Past Medical History:   Diagnosis Date    Aspiration pneumonia ([address] )     Last Assessed:  7/18/17    Basal cell carcinoma of nose     Last Assessed:  4/12/17    Blind     blind right eye, 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, Last Assessed:  7/18/17    Dementia     Depression     Disease of thyroid gland     Fibromyalgia     Last Assessed:  7/18/17    Gait disturbance     GERD (gastroesophageal reflux disease)     Glaucoma     Hyperlipidemia     Hypertension     Osteopenia     Peripheral neuropathy     Last Assessed:  4/12/17    Polymyalgia rheumatica ([address] )     Psychiatric disorder     depression    Renal insufficiency syndrome     Last Assessed:  4/13/17    Rheumatoid arthritis ([address] )     Stroke ([address] )     x2, Last Assessed:  7/18/17    Systemic lupus erythematosus ([address] )     Vertigo     Vitamin D deficiency      Past Surgical History:   Procedure Laterality Date    EYE SURGERY      JOINT REPLACEMENT      left hip replacement, left shoulder replacement    SHOULDER SURGERY      Replacement           Consults have been placed to:   IP CONSULT TO INFECTIOUS DISEASES    Vitals:    03/21/19 2200 03/22/19 0400 03/22/19 0819 03/22/19 1026   BP: 131/63  152/67    BP Location: Left arm  Right arm    Pulse: 96  97    Resp: 18  18    Temp: 98 6 °F (37 °C) 99 2 °F (37 3 °C) (!) 100 8 °F (38 2 °C) 99 6 °F (37 6 °C)   TempSrc: Axillary Oral Oral Axillary   SpO2: 93%  92%    Weight:           Most recent labs:    Recent Labs     03/21/19  1005  03/21/19  2243 03/22/19  0519   WBC 13 82*  --   --  9 55   HGB 10 0*  --   --  8 6*   HCT 30 8*  --   --  26 5*      < >  --  226   K 4 5  --   --  4 0   CALCIUM 9 2  --   --  8 7   BUN 14  --   --  12   CREATININE 1 20  --   --  0 89   INR 1 15  --   --   --    TROPONINI 0 14*   < > 0 31*  --    AST 44  --   --   --    ALT 32  --   --   --    ALKPHOS 133*  --   --   --     < > = values in this interval not displayed         Scheduled Meds:  Current Facility-Administered Medications:  benzonatate 100 mg Oral TID PRN Claudia Levine MD    cefepime 1,000 mg Intravenous Q12H Claudia Levine MD Last Rate: 1,000 mg (03/22/19 0133)   docusate sodium 100 mg Oral BID Claudia Levine MD    donepezil 5 mg Oral HS Claudia Levine MD    DULoxetine 60 mg Oral Daily Claudia Levine MD    enoxaparin 30 mg Subcutaneous Daily Claudia Levine MD    fluticasone 1 spray Nasal Daily Claudia Levine MD    furosemide 20 mg Oral Once per day on Mon Wed Fri Claudia Levine MD    glycerin-hypromellose- 1 drop Both Eyes TID PRN Claudia Levine MD    guaiFENesin 600 mg Oral BID PRN Claudia Levine MD    ipratropium-albuterol 3 mL Nebulization 4x Daily PRN Claudia Levine MD    levothyroxine 100 mcg Oral Early Morning Claudia Levine MD    loratadine 5 mg Oral Daily Claudia Levine MD    losartan 100 mg Oral Daily Claudia Levine MD    metoprolol tartrate 25 mg Oral Q12H Amaury Guerrero MD    ofloxacin 1 drop Right Eye 4x Daily Claudia Levine MD    ondansetron 4 mg Intravenous Q6H PRN Claudia Levine MD    pantoprazole 40 mg Oral BID AC Claudia Levine MD    pravastatin 40 mg Oral Daily Claudia Levine MD    sodium chloride 2 spray Each Nare PRN Jossy Ingram MD    sodium chloride 100 mL/hr Intravenous Continuous Jossy Ingram MD Last Rate: 100 mL/hr (03/22/19 0509)     Continuous Infusions:  sodium chloride 100 mL/hr Last Rate: 100 mL/hr (03/22/19 0509)     PRN Meds:   benzonatate    glycerin-hypromellose-    guaiFENesin    ipratropium-albuterol    ondansetron    sodium chloride    Surgical procedures (if appropriate):

## 2019-03-22 NOTE — PROGRESS NOTES
Pt resting comfortably in bed  Pt refused to take any medications and is refused to eat dinner  Agree with prior nurse's assessment  Bed alarm on

## 2019-03-22 NOTE — PLAN OF CARE
Problem: Potential for Falls  Goal: Patient will remain free of falls  Description  INTERVENTIONS:  - Assess patient frequently for physical needs  -  Identify cognitive and physical deficits and behaviors that affect risk of falls  -  [place] fall precautions as indicated by assessment   - Educate patient/family on patient safety including physical limitations  - Instruct patient to call for assistance with activity based on assessment  - Modify environment to reduce risk of injury  - Consider OT/PT consult to assist with strengthening/mobility  Outcome: Progressing     Problem: Prexisting or High Potential for Compromised Skin Integrity  Goal: Skin integrity is maintained or improved  Description  INTERVENTIONS:  - Identify patients at risk for skin breakdown  - Assess and monitor skin integrity  - Assess and monitor nutrition and hydration status  - Monitor labs (i e  albumin)  - Assess for incontinence   - Turn and reposition patient  - Assist with mobility/ambulation  - Relieve pressure over bony prominences  - Avoid friction and shearing  - Provide appropriate hygiene as needed including keeping skin clean and dry  - Evaluate need for skin moisturizer/barrier cream  - Collaborate with interdisciplinary team (i e  Nutrition, Rehabilitation, etc )   - Patient/family teaching  Outcome: Progressing     Problem: Nutrition/Hydration-ADULT  Goal: Nutrient/Hydration intake appropriate for improving, restoring or maintaining nutritional needs  Description  Monitor and assess patient's nutrition/hydration status for malnutrition (ex- brittle hair, bruises, dry skin, pale skin and conjunctiva, muscle wasting, smooth red tongue, and disorientation)  Collaborate with interdisciplinary team and initiate plan and interventions as ordered  Monitor patient's weight and dietary intake as ordered or per policy  Utilize nutrition screening tool and intervene per policy   Determine patient's food preferences and provide high-protein, high-caloric foods as appropriate       INTERVENTIONS:  - Monitor oral intake, urinary output, labs, and treatment plans  - Assess nutrition and hydration status and recommend course of action  - Evaluate amount of meals eaten  - Assist patient with eating if necessary   - Allow adequate time for meals  - Recommend/ encourage appropriate diets, oral nutritional supplements, and vitamin/mineral supplements  - Order, calculate, and assess calorie counts as needed  - Recommend, monitor, and adjust tube feedings and TPN/PPN based on assessed needs  - Assess need for intravenous fluids  - Provide specific nutrition/hydration education as appropriate  - Include patient/family/caregiver in decisions related to nutrition  Outcome: Progressing     Problem: PAIN - ADULT  Goal: Verbalizes/displays adequate comfort level or baseline comfort level  Description  Interventions:  - Encourage patient to monitor pain and request assistance  - Assess pain using appropriate pain scale  - Administer analgesics based on type and severity of pain and evaluate response  - Implement non-pharmacological measures as appropriate and evaluate response  - Consider cultural and social influences on pain and pain management  - Notify physician/advanced practitioner if interventions unsuccessful or patient reports new pain  Outcome: Progressing     Problem: INFECTION - ADULT  Goal: Absence or prevention of progression during hospitalization  Description  INTERVENTIONS:  - Assess and monitor for signs and symptoms of infection  - Monitor lab/diagnostic results  - Monitor all insertion sites, i e  indwelling lines, tubes, and drains  - Monitor endotracheal (as able) and nasal secretions for changes in amount and color  - [place] appropriate cooling/warming therapies per order  - Administer medications as ordered  - Instruct and encourage patient and family to use good hand hygiene technique  - Identify and instruct in appropriate isolation precautions for identified infection/condition  Outcome: Progressing  Goal: Absence of fever/infection during neutropenic period  Description  INTERVENTIONS:  - Monitor WBC  - Implement neutropenic guidelines  Outcome: Progressing     Problem: SAFETY ADULT  Goal: Maintain or return to baseline ADL function  Description  INTERVENTIONS:  -  Assess patient's ability to carry out ADLs; assess patient's baseline for ADL function and identify physical deficits which impact ability to perform ADLs (bathing, care of mouth/teeth, toileting, grooming, dressing, etc )  - Assess/evaluate cause of self-care deficits   - Assess range of motion  - Assess patient's mobility; develop plan if impaired  - Assess patient's need for assistive devices and provide as appropriate  - Encourage maximum independence but intervene and supervise when necessary  ¯ Involve family in performance of ADLs  ¯ Assess for home care needs following discharge   ¯ Request OT consult to assist with ADL evaluation and planning for discharge  ¯ Provide patient education as appropriate  Outcome: Progressing  Goal: Maintain or return mobility status to optimal level  Description  INTERVENTIONS:  - Assess patient's baseline mobility status (ambulation, transfers, stairs, etc )    - Identify cognitive and physical deficits and behaviors that affect mobility  - Identify mobility aids required to assist with transfers and/or ambulation (gait belt, sit-to-stand, lift, walker, cane, etc )  - [place] fall precautions as indicated by assessment  - Record patient progress and toleration of activity level on Mobility SBAR; progress patient to next Phase/Stage  - Instruct patient to call for assistance with activity based on assessment  - Request Rehabilitation consult to assist with strengthening/weightbearing, etc   Outcome: Progressing     Problem: DISCHARGE PLANNING  Goal: Discharge to home or other facility with appropriate resources  Description  INTERVENTIONS:  - Identify barriers to discharge w/patient and caregiver  - Arrange for needed discharge resources and transportation as appropriate  - Identify discharge learning needs (meds, wound care, etc )  - Arrange for interpretive services to assist at discharge as needed  - Refer to Case Management Department for coordinating discharge planning if the patient needs post-hospital services based on physician/advanced practitioner order or complex needs related to functional status, cognitive ability, or social support system  Outcome: Progressing     Problem: GENITOURINARY - ADULT  Goal: Maintains or returns to baseline urinary function  Description  INTERVENTIONS:  - Assess urinary function  - Encourage oral fluids to ensure adequate hydration  - Administer IV fluids as ordered to ensure adequate hydration  - Administer ordered medications as needed  - Offer frequent toileting  - Follow urinary retention protocol if ordered  Outcome: Progressing  Goal: Absence of urinary retention  Description  INTERVENTIONS:  - Assess patient?s ability to void and empty bladder  - Monitor I/O  - Bladder scan as needed  - Discuss with physician/AP medications to alleviate retention as needed  - Discuss catheterization for long term situations as appropriate  Outcome: Progressing     Problem: Knowledge Deficit  Goal: Patient/family/caregiver demonstrates understanding of disease process, treatment plan, medications, and discharge instructions  Description  Complete learning assessment and assess knowledge base    Interventions:  - Provide teaching at level of understanding  - Provide teaching via preferred learning methods  Outcome: Progressing     Problem: METABOLIC, FLUID AND ELECTROLYTES - ADULT  Goal: Fluid balance maintained  Description  INTERVENTIONS:  - Monitor labs and assess for signs and symptoms of volume excess or deficit  - Monitor I/O and WT  - Instruct patient on fluid and nutrition as appropriate  Outcome: Progressing

## 2019-03-22 NOTE — QUICK NOTE
MS3 RN called due to patient's troponin elevating to 0 42  Initially patient was not complaining of chest pain and this was likely secondary to demand from increased work of breathing and underlying sepsis secondary to UTI  The patient's flu is negative  On my initial assessment the patient is baseline altered with a normal rate and rhythm, but does appear to have some basilar crackles that are arising  RN was suspicious for the same  Patient has been receiving IVF  Has no history of CHF, but last echo was from 8603 and diastolic dysfunction could not be ruled out with that study  It appears that her troponin has peaked and is flattening out (0 42 --> 0 39) and her EKG was unchanged from admission (there is baseline TWI in lateral leads)  Will check BNP, add respiratory protocol, and consider Lasix IV once if BNP is elevated  Night team made aware  Keon Rasmussen PA-C

## 2019-03-22 NOTE — UTILIZATION REVIEW
Initial Clinical Review    Admission: Date/Time/Statement: INPT 3/21/19 @ 1158   Orders Placed This Encounter   Procedures    Inpatient Admission (expected length of stay for this patient Order details is greater than two midnights)     Standing Status:   Standing     Number of Occurrences:   1     Order Specific Question:   Admitting Physician     Answer:   Inocente Person [[id number]]     Order Specific Question:   Level of Care     Answer:   Med Surg [[id number]]     Order Specific Question:   Estimated length of stay     Answer:   More than 2 Midnights     Order Specific Question:   Certification     Answer:   I certify that inpatient services are medically necessary for this patient for a duration of greater than two midnights  See H&P and MD Progress Notes for additional information about the patient's course of treatment  ED: Date/Time/Mode of Arrival:   ED Arrival Information     Expected Arrival Acuity Means of Arrival Escorted By Service Admission Type    - 3/21/2019 09:14 Urgent Amb[facility] [place] [facility] Hospitalist Urgent    Arrival Complaint    fever        Chief Complaint:   Chief Complaint   Patient presents with    Fever - 75 years or older     pt sent from nursing home for temp of 101 7 and cough noted by nursing staff this morning  hx of dementia  80 y o  female who presents from her assisted living memory care unit with complaint of fever  Patient has history of Alzheimer's dementia and is not a good historian  History was obtained from daughter at bedside  She was noted to have a fever of 101 3 at her memory care facility today  She also has been having cough for the last few days which is nonproductive  No shortness of breath was reported by the patient's daughter  Patient herself cannot give a history but keeps repeating over and over court I am dying which her daughter states is her baseline    No history of abdominal pain or dysuria was reported however patient is incontinent of urine at her baseline  He has had hospitalization 3 months back for sepsis related to urinary tract infection and pseudogout  Subsequently she went to the rehabilitation and then to her memory care facility  Her daughter states that patient has a history of aspiration however no increased shortness of breath or difficulty like coughing or choking with food or liquids was noted by family      Assessment/Plan:   81 YO FEMALE     * Sepsis ([address] )  Assessment & Plan  Present on admission as evidenced by tachycardia, leukocytosis and lactic acidosis  Likely source appears to be urinary tract infection  Patient has dementia at baseline and incontinent of urine and unable to provide a good history regarding symptoms of urinary tract infection  No infiltrate on chest x-ray  Follow up on blood and urine cultures  Continue with into empiric cefepime  Follow up on lactic acid level  This is patient's 2nd admission in the last 2 months for sepsis related to urinary tract infection  In between while she was at rehabilitation she was treated for urinary tract infection as well  Will discussed with Infectious Disease regarding need for prophylactic antibiotics  She had a renal ultrasound on 01/26 which did not show any structural abnormalities of the genitourinary system         Elevated troponin  Assessment & Plan  Likely type 2 NSTEMI in the setting of sepsis  Follow up on serial cardiac enzymes  Will get echocardiogram   Patient's daughter states that she had noncritical coronary artery disease per cardiac catheterization in [place] greater than 2 years back and never had any intervention done      Cough  Assessment & Plan  Patient has had cough for the last few weeks  Continue with cough suppressant medication  Chest x-ray negative for any infiltrate    Influenza PCR will also be sent      Acute cystitis without hematuria  Assessment & Plan  IV cefepime pending urine/ blood culture results      Alzheimer's dementia without behavioral disturbance  Assessment & Plan  Patient has Alzheimer's dementia but otherwise is ambulatory with assistance and able to feed herself  She continues to moan"I am dying"which her daughter says that is her baseline  Continue with supportive care  Maintain fall precautions      Essential hypertension  Assessment & Plan  Blood pressure is stable in the emergency room  Continue with outpatient antihypertensive regimen            VTE Prophylaxis: Enoxaparin (Lovenox)  / sequential compression device   Code Status:  Code  POLST: POLST form is not discussed and not completed at this time  Discussion with family:  Discussed with daughter at bedside Anticipated Length of Stay:  Patient will be admitted on an Inpatient basis with an anticipated length of stay of  > 2 midnights  Justification for Hospital Stay:  treatment and management of sepsis  Total Time for Visit, including Counseling / Coordination of Care: 30 minutes  Greater than 50% of this total time spent on direct patient counseling and coordination of care      ED Vital Signs:   ED Triage Vitals   Temperature Pulse Respirations Blood Pressure SpO2   03/21/19 0918 03/21/19 0918 03/21/19 0918 03/21/19 0918 03/21/19 0918   100 2 °F (37 9 °C) (!) 115 18 (!) 180/74 95 %      Temp Source Heart Rate Source Patient Position - Orthostatic VS BP Location FiO2 (%)   03/21/19 0918 03/21/19 0918 03/21/19 0918 03/21/19 0918 --   Oral Monitor Lying Right arm       Pain Score       03/22/19 0500       No Pain        Wt Readings from Last 1 Encounters:   03/21/19 65 3 kg (143 lb 15 4 oz)     Vital Signs (abnormal):   03/21/19 1621  100 6 °F (38 1 °C)Abnormal                  03/21/19 1500  102 4 °F (39 1 °C)Abnormal   91  18  137/65  93  93 %  None (Room air)  Lying   03/21/19 1330  98 6 °F (37 °C)  92  17  147/63    96 %  None (Room air)  Lying   03/21/19 1215    96    139/65    96 %       03/21/19 1145    98    157/68    94 %     03/21/19 1130    104    155/69    94 %       03/21/19 1115    112Abnormal     167/70           03/21/19 0918  100 2 °F (37 9 °C)  115Abnormal   18  180/74Abnormal     95 %  None (Room air)  Lying       Pertinent Labs/Diagnostic Test Results:   Lab Units 03/21/19  1005   WBC Thousand/uL 13 82*   HEMOGLOBIN g/dL 10 0*   HEMATOCRIT % 30 8*   PLATELETS Thousands/uL 291   NEUTROS PCT % 85*   LYMPHS PCT % 7*   MONOS PCT % 7   EOS PCT % 0           Results from last 7 days   Lab Units 03/21/19  1005   SODIUM mmol/L 132*   POTASSIUM mmol/L 4 5   CHLORIDE mmol/L 96*   CO2 mmol/L 24   BUN mg/dL 14   CREATININE mg/dL 1 20   ANION GAP mmol/L 12   CALCIUM mg/dL 9 2   ALBUMIN g/dL 3 0*   TOTAL BILIRUBIN mg/dL 0 70   ALK PHOS U/L 133*   ALT U/L 32   AST U/L 44   GLUCOSE RANDOM mg/dL 134           Results from last 7 days   Lab Units 03/21/19  1005   INR   1 15                   Results from last 7 days   Lab Units 03/21/19  1005   LACTIC ACID mmol/L 2 6*        XR chest pa & lateral   Final Result by Lashon Love MD (03/21 1056)       Questionable 1 cm right upper lobe nodule as discussed above  U/A:  Color, UA    Yellow           Color, UA      Clarity, UA    Sligh    Clarity, UA     SL AMB SPECIFIC GRAVITY_URINE    1 015           SL AMB SPECIFIC GRAVITY_URINE     Glucose, UA    Negat    Glucose, UA     Ketones, UA    Negat    Ketones, UA     Blood, UA    Negat    Blood, UA     Nitrite, UA    Posit    Nitrite, UA     Leukocytes, UA    Small           Leukocytes, UA     pH, UA    7 0           pH, UA     POCT URINE PROTEIN    30 (1+)           POCT URINE PROTEIN     Bilirubin, UA    Negat    Bilirubin, UA     SL AMB POCT UROBILINOGEN    1 0           SL AMB POCT UROBILINOGEN     RBC, UA    None     RBC, UA     WBC, UA    10-20           WBC, UA     Bacteria, UA    Innum    Bacteria, UA     OTHER OBSERVATIONS    WBCs     ED Treatment:   Medication Administration from 03/21/2019 0914 to 03/21/2019 1312       Date/Time Order Dose Route Action Action by Comments     03/21/2019 1112 piperacillin-tazobactam (ZOSYN) 3 375 g in sodium chloride 0 9 % 50 mL IVPB 0 g Intravenous Stopped Deon Dhillon RN      03/21/2019 1015 piperacillin-tazobactam (ZOSYN) 3 375 g in sodium chloride 0 9 % 50 mL IVPB 3 375 g Intravenous [address] Araceli Diaz RN      03/21/2019 1202 vancomycin (VANCOCIN) IVPB (premix) 1,000 mg 0 mg Intravenous Stopped Araceli Diaz RN      03/21/2019 1045 vancomycin (VANCOCIN) IVPB (premix) 1,000 mg 1,000 mg Intravenous [address] Araceli Diaz RN      03/21/2019 1052 losartan (COZAAR) tablet 100 mg 100 mg Oral Given Araceli Diaz RN      03/21/2019 1052 metoprolol tartrate (LOPRESSOR) tablet 50 mg 50 mg Oral Given Araceli Diaz RN      03/21/2019 1242 sodium chloride 0 9 % bolus 1,000 mL 1,000 mL Intravenous New Bag Araceli Diaz RN         Past Medical/Surgical History:    Active Ambulatory Problems     Diagnosis Date Noted    Hyperlipidemia     GERD (gastroesophageal reflux disease)     Fibromyalgia     Rheumatoid arthritis ([facility] )     Hyponatremia 10/03/2016    CVA (cerebral vascular accident) ([facility] ) 10/03/2016    Essential hypertension 10/03/2016    Acute metabolic encephalopathy 74/70/6261    Sepsis ([facility] ) 04/13/2018    Alzheimer's dementia without behavioral disturbance 04/13/2018    Hypothyroidism 04/14/2018    Anemia 04/14/2018    Acute cystitis without hematuria 05/05/2018    Right lower quadrant abdominal pain 05/05/2018    DEVONTE (acute kidney injury) ([facility] ) 05/05/2018    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 ([facility] ) 06/28/2018    Pneumonia of right upper lobe due to infectious organism ([facility] ) 08/22/2018    Sore throat 08/22/2018    Rotator cuff tear arthropathy of right shoulder 09/18/2018    Cough 11/09/2018    Elevated troponin 11/09/2018    Conjunctivitis of right eye 01/22/2019    Pseudogout of left wrist 01/27/2019    Pa[facility] 03/12/2019     Resolved Ambulatory Problems     Diagnosis Date Noted    Fever 10/03/2016    Abdominal pain 10/03/2016    Recurrent UTI 01/07/2019     Past Medical History:   Diagnosis Date    Aspiration pneumonia (HCC)     Basal cell carcinoma of nose     Blind     Dementia     Depression     Disease of thyroid gland     Fibromyalgia     Gait disturbance     GERD (gastroesophageal reflux disease)     Glaucoma     Hyperlipidemia     Hypertension     Osteopenia     Peripheral neuropathy     Polymyalgia rheumatica (HCC)     Psychiatric disorder     Renal insufficiency syndrome     [facility]     Stroke (HCC)     [facility] lupus [facility]matosus [facility]     Vertigo     Vitamin D deficiency      Admitting Diagnosis: UTI (urinary tract infection) [N39 0]  Fever [R50 9]  Sepsis ([facility] ) [A41 9]  Age/Sex: 80 y o  female  Admission Orders:  INPT  TELE  OOB  SEQ COMP DEVICE  CONSULT ID          Scheduled Meds:   Current Facility-Administered Medications:  benzonatate 100 mg Oral TID PRN Bill Ellington, MD    cefepime 1,000 mg Intravenous Q12H Bill Ellington MD Last Rate: 1,000 mg (03/22/19 0133)   docusate sodium 100 mg Oral BID Bill Males, MD    donepezil 5 mg Oral HS Robertogilberto Males, MD    DULoxetine 60 mg Oral Daily Robertogilberto Males, MD    enoxaparin 30 mg Subcutaneous Daily Robertogilberto Males, MD    fluticasone 1 spray Nasal Daily Bill Males, MD    furosemide 20 mg Oral Once per day on Mon Wed Fri Bill Ellington, MD    glycerin-hypromellose- 1 drop Both Eyes TID PRN Bill Ellington, MD    guaiFENesin 600 mg Oral BID PRN Bill Males, MD    ipratropium-albuterol 3 mL Nebulization 4x Daily PRN Bill Ellington, MD    levothyroxine 100 mcg Oral Early Morning Bill Males, MD    loratadine 5 mg Oral Daily Robertoeen Males, MD    losartan 100 mg Oral Daily Robertoeen Males, MD    metoprolol tartrate 25 mg Oral Q12H Criss Cobian MD    ofloxacin 1 drop Right Eye 4x Daily Venu Wren MD    ondansetron 4 mg Intravenous Q6H PRN Venu Wren MD    pantoprazole 40 mg Oral BID AC Venu Wren MD    pravastatin 40 mg Oral Daily Venu Wren MD    sodium chloride 2 spray Each Nare PRN Venu Wren MD    sodium chloride 100 mL/hr Intravenous Continuous Venu Wren MD Last Rate: 100 mL/hr (03/22/19 0509)     Continuous Infusions:   sodium chloride 100 mL/hr Last Rate: 100 mL/hr (03/22/19 0509)     PRN Meds:   benzonatate    glycerin-hypromellose-    guaiFENesin    ipratropium-albuterol    ondansetron    sodium chloride    Network Utilization Review Department  Phone: [phone]; Fax [phone]  [email or web address]  ATTENTION: Please call with any questions or concerns to [phone]  and carefully listen to the prompts so that you are directed to the right person  Send all requests for admission clinical reviews, approved or denied determinations and any other requests to fax [phone]   All voicemails are confidential

## 2019-03-23 LAB
ANION GAP SERPL CALCULATED.3IONS-SCNC: 13 MMOL/L (ref 4–13)
BACTERIA UR CULT: ABNORMAL
BUN SERPL-MCNC: 10 MG/DL (ref 5–25)
CALCIUM SERPL-MCNC: 8.7 MG/DL (ref 8.3–10.1)
CHLORIDE SERPL-SCNC: 100 MMOL/L (ref 100–108)
CO2 SERPL-SCNC: 19 MMOL/L (ref 21–32)
CREAT SERPL-MCNC: 0.83 MG/DL (ref 0.6–1.3)
ERYTHROCYTE [DISTWIDTH] IN BLOOD BY AUTOMATED COUNT: 14.7 % (ref 11.6–15.1)
GFR SERPL CREATININE-BSD FRML MDRD: 64 ML/MIN/1.73SQ M
GLUCOSE SERPL-MCNC: 102 MG/DL (ref 65–140)
HCT VFR BLD AUTO: 29.8 % (ref 34.8–46.1)
HGB BLD-MCNC: 9.7 G/DL (ref 11.5–15.4)
MCH RBC QN AUTO: 29.1 PG (ref 26.8–34.3)
MCHC RBC AUTO-ENTMCNC: 32.6 G/DL (ref 31.4–37.4)
MCV RBC AUTO: 90 FL (ref 82–98)
PLATELET # BLD AUTO: 223 THOUSANDS/UL (ref 149–390)
PMV BLD AUTO: 9 FL (ref 8.9–12.7)
POTASSIUM SERPL-SCNC: 3.6 MMOL/L (ref 3.5–5.3)
RBC # BLD AUTO: 3.33 MILLION/UL (ref 3.81–5.12)
SODIUM SERPL-SCNC: 132 MMOL/L (ref 136–145)
WBC # BLD AUTO: 6.68 THOUSAND/UL (ref 4.31–10.16)

## 2019-03-23 PROCEDURE — 80048 BASIC METABOLIC PNL TOTAL CA: CPT | Performed by: INTERNAL MEDICINE

## 2019-03-23 PROCEDURE — 85027 COMPLETE CBC AUTOMATED: CPT | Performed by: INTERNAL MEDICINE

## 2019-03-23 PROCEDURE — 99232 SBSQ HOSP IP/OBS MODERATE 35: CPT | Performed by: INTERNAL MEDICINE

## 2019-03-23 RX ORDER — CEFAZOLIN SODIUM 1 G/50ML
1000 SOLUTION INTRAVENOUS EVERY 8 HOURS
Status: DISCONTINUED | OUTPATIENT
Start: 2019-03-23 | End: 2019-03-24

## 2019-03-23 RX ADMIN — OFLOXACIN 1 DROP: 3 SOLUTION OPHTHALMIC at 22:07

## 2019-03-23 RX ADMIN — DONEPEZIL HYDROCHLORIDE 5 MG: 5 TABLET, FILM COATED ORAL at 22:00

## 2019-03-23 RX ADMIN — CEFAZOLIN SODIUM 1000 MG: 1 SOLUTION INTRAVENOUS at 13:43

## 2019-03-23 RX ADMIN — DOCUSATE SODIUM 100 MG: 100 CAPSULE, LIQUID FILLED ORAL at 08:48

## 2019-03-23 RX ADMIN — CEFAZOLIN SODIUM 1000 MG: 1 SOLUTION INTRAVENOUS at 22:01

## 2019-03-23 RX ADMIN — ENOXAPARIN SODIUM 30 MG: 30 INJECTION SUBCUTANEOUS at 08:48

## 2019-03-23 RX ADMIN — PANTOPRAZOLE SODIUM 40 MG: 40 TABLET, DELAYED RELEASE ORAL at 17:34

## 2019-03-23 RX ADMIN — OFLOXACIN 1 DROP: 3 SOLUTION OPHTHALMIC at 17:33

## 2019-03-23 RX ADMIN — LORATADINE 5 MG: 10 TABLET ORAL at 08:48

## 2019-03-23 RX ADMIN — CEFEPIME HYDROCHLORIDE 1000 MG: 1 INJECTION, POWDER, FOR SOLUTION INTRAMUSCULAR; INTRAVENOUS at 01:30

## 2019-03-23 RX ADMIN — SODIUM CHLORIDE 75 ML/HR: 0.9 INJECTION, SOLUTION INTRAVENOUS at 05:16

## 2019-03-23 RX ADMIN — METOPROLOL TARTRATE 25 MG: 25 TABLET, FILM COATED ORAL at 22:00

## 2019-03-23 RX ADMIN — DULOXETINE HYDROCHLORIDE 60 MG: 60 CAPSULE, DELAYED RELEASE ORAL at 08:49

## 2019-03-23 RX ADMIN — BENZONATATE 100 MG: 100 CAPSULE ORAL at 22:00

## 2019-03-23 RX ADMIN — OFLOXACIN 1 DROP: 3 SOLUTION OPHTHALMIC at 08:50

## 2019-03-23 RX ADMIN — PANTOPRAZOLE SODIUM 40 MG: 40 TABLET, DELAYED RELEASE ORAL at 06:36

## 2019-03-23 RX ADMIN — FLUTICASONE PROPIONATE 1 SPRAY: 50 SPRAY, METERED NASAL at 08:50

## 2019-03-23 RX ADMIN — PRAVASTATIN SODIUM 40 MG: 40 TABLET ORAL at 08:48

## 2019-03-23 RX ADMIN — METOPROLOL TARTRATE 25 MG: 25 TABLET, FILM COATED ORAL at 08:48

## 2019-03-23 RX ADMIN — LOSARTAN POTASSIUM 100 MG: 50 TABLET, FILM COATED ORAL at 08:48

## 2019-03-23 RX ADMIN — OFLOXACIN 1 DROP: 3 SOLUTION OPHTHALMIC at 12:16

## 2019-03-23 RX ADMIN — LEVOTHYROXINE SODIUM 100 MCG: 100 TABLET ORAL at 06:36

## 2019-03-23 NOTE — PROGRESS NOTES
Addendum:  E coli is susceptible to cefazolin  Change antibiotic to IV cefazolin instead  Subsequent transition to p o  Keflex  Progress Note - Infectious Disease   Priscilla Yanes 80 y o  female MRN: [id number]  Unit/Bed#: -01 Encounter: [id number]      Impression/Recommendations:  1  Sepsis, POA, with fever and leukocytosis   Source is most likely UTI   Fortunately, despite sepsis, she remains hemodynamically stable, without hypotension  Admission blood cultures remain negative  Antibiotic plan as in below  Monitor temperature/WBC  Monitor hemodynamics  Follow-up on pending blood cultures      2  Probable UTI  Patient is clinically improved  Urine culture is growing E coli again  Gertrude Mcknight multiple UTI recurrences, it is likely the patient has urinary retention  PVR ranges from 70 mL to 170 mL, but difficult to interpret due to underlying incontinence   Patient will most likely need bladder catheterization, either permanent Milton catheter or intermittent straight catheterization to prevent recurrences   Permanent Milton catheter is difficult due to her dementia with agitation   Intermittent straight catheterization may be difficult in assisted living facility  PVR was done but not recorded  Change antibiotic to IV ceftriaxone  Patient will most likely need Milton catheterization versus intermittent straight catheterization  Transition to p o  antibiotic in the next 24 hours, if patient continues to improve clinically  Treat x7 days total      3  Cough   Exam not consistent with pneumonia   CXR without pneumonia   Consider viral URI or influenza as etiology of fever but doubt, given leukocytosis  Influenza PCR also negative  Supportive care      4  Advanced dementia, with agitation and poor cooperation      Discussed with patient and her daughter      Antibiotics:  Cefepime # 3      Subjective:  Patient's mental status is about the same    She is awake, alert, with occasional agitation  She continued to have urinary incontinence  Temperature stays down  She is tolerating antibiotic well  No nausea, vomiting or diarrhea  Objective:  Vitals:  Temp:  [97 5 °F (36 4 °C)-100 7 °F (38 2 °C)] 97 5 °F (36 4 °C)  HR:  [] 93  Resp:  [18] 18  BP: (155-182)/(52-75) 173/75  SpO2:  [93 %-96 %] 96 %  Temp (24hrs), Av °F (37 2 °C), Min:97 5 °F (36 4 °C), Max:100 7 °F (38 2 °C)  Current: Temperature: 97 5 °F (36 4 °C)    Physical Exam:     General: Sleepy but arousable  Comfortable  Nontoxic  Intermittent agitation  Neck:  Supple  No mass  No lymphadenopathy  Lungs: Expansion symmetric, no rales, no wheezing, respirations unlabored  Heart:  Regular rate and rhythm, S1 and S2 normal, no murmur  Abdomen: Soft, nondistended, non-tender, bowel sounds active all four quadrants,        no masses, no organomegaly  Extremities: No edema  No erythema/warmth  No ulcer  Nontender to palpation  Skin:  No rash  Neuro: Moves all extremities  Invasive Devices     Peripheral Intravenous Line            Peripheral IV 19 Right Antecubital 2 days                Labs studies:   I have personally reviewed pertinent labs  Results from last 7 days   Lab Units 19  0935 19  0519  1005   POTASSIUM mmol/L 3 6 4 0 4 5   CHLORIDE mmol/L 100 98* 96*   CO2 mmol/L 19* 20* 24   BUN mg/dL 10 12 14   CREATININE mg/dL 0 83 0 89 1 20   EGFR ml/min/1 73sq m 64 58 41   CALCIUM mg/dL 8 7 8 7 9 2   AST U/L  --   --  44   ALT U/L  --   --  32   ALK PHOS U/L  --   --  133*     Results from last 7 days   Lab Units 19  0935 19  0519 19  1406 19  1005   WBC Thousand/uL 6 68 9 55  --  13 82*   HEMOGLOBIN g/dL 9 7* 8 6*  --  10 0*   PLATELETS Thousands/uL 223 226 261 291     Results from last 7 days   Lab Units 19  1456 19  1023 19  1018 19  1005 19  0948   BLOOD CULTURE   --  No Growth at 24 hrs   --  No Growth at 24 hrs    -- URINE CULTURE   --   --  >100,000 cfu/ml Escherichia coli*  --   --    INFLUENZA B PCR  Not Detected  --   --   --  Not Detected   RSV PCR  Not Detected  --   --   --  Not Detected       Imaging Studies:   I have personally reviewed pertinent imaging study reports and images in PACS  EKG, Pathology, and Other Studies:   I have personally reviewed pertinent reports

## 2019-03-23 NOTE — PROGRESS NOTES
Progress Note - Benay Race 1931, 80 y o  female MRN: [id number]    Unit/Bed#: -01 Encounter: [id number]    Primary Care Provider: Jose Armando Hanks DO   Date and time admitted to hospital: 3/21/2019  9:14 AM    * Sepsis [facility])  Assessment & Plan  Due to UTI    Continue antibiotics-IV cefazolin  Id follow-up ongoing       Elevated troponin  Assessment & Plan  Likely type 2 NSTEMI in the setting of sepsis  EKG without any changes  Continue beta-blockers      Acute cystitis without hematuria  Assessment & Plan  IV cefazolin   History of recurrent UTIs  Id follow-up ongoing    Essential hypertension  Assessment & Plan  · Continue antihypertensive regimen  · Monitor blood pressure    Cough  Assessment & Plan  Influenza negative  Supportive care    Alzheimer's dementia without behavioral disturbance  Assessment & Plan  Supportive care      VTE Pharmacologic Prophylaxis:   Pharmacologic: Enoxaparin (Lovenox)  Mechanical VTE Prophylaxis in Place: Yes    Patient Centered Rounds: I have performed bedside rounds with nursing staff today  Discussions with Specialists or Other Care Team Provider:    Education and Discussions with Family / Patient:  Daughter at bedside    Time Spent for Care: 20 minutes  More than 50% of total time spent on counseling and coordination of care as described above  Current Length of Stay: 2 day(s)    Current Patient Status: Inpatient   Certification Statement: The patient will continue to require additional inpatient hospital stay due to IV abx    Discharge Plan:  Pending clinical improvement    Code Status: Level 1 - Full Code      Subjective:   No events reported  Objective:     Vitals:   Temp (24hrs), Av °F (37 2 °C), Min:97 5 °F (36 4 °C), Max:100 7 °F (38 2 °C)    Temp:  [97 5 °F (36 4 °C)-100 7 °F (38 2 °C)] 97 5 °F (36 4 °C)  HR:  [] 93  Resp:  [18] 18  BP: (155-182)/(52-75) 173/75  SpO2:  [93 %-96 %] 96 %  Body mass index is 28 12 kg/m²  Input and Output Summary (last 24 hours): Intake/Output Summary (Last 24 hours) at 3/23/2019 1325  Last data filed at 3/22/2019 1900  Gross per 24 hour   Intake 1240 ml   Output 158 ml   Net 1082 ml       Physical Exam:     Physical Exam     Gen -Patient comfortable at rest  Neck- Supple  No thyromegaly or lymphadenopathy  Lungs-Clear bilaterally without any wheeze or rales   Heart S1-S2, regular rate and rhythm, no murmurs  Abdomen-soft nontender, no organomegaly  Bowel sounds present  Extremities-no cyanosi,  clubbing or edema  Skin- no rash  Neuro-awake and alert,    Additional Data:     Labs:    Results from last 7 days   Lab Units 03/23/19  0935  03/21/19  1005   WBC Thousand/uL 6 68   < > 13 82*   HEMOGLOBIN g/dL 9 7*   < > 10 0*   HEMATOCRIT % 29 8*   < > 30 8*   PLATELETS Thousands/uL 223   < > 291   NEUTROS PCT %  --   --  85*   LYMPHS PCT %  --   --  7*   MONOS PCT %  --   --  7   EOS PCT %  --   --  0    < > = values in this interval not displayed  Results from last 7 days   Lab Units 03/23/19  0935  03/21/19  1005   SODIUM mmol/L 132*   < > 132*   POTASSIUM mmol/L 3 6   < > 4 5   CHLORIDE mmol/L 100   < > 96*   CO2 mmol/L 19*   < > 24   BUN mg/dL 10   < > 14   CREATININE mg/dL 0 83   < > 1 20   ANION GAP mmol/L 13   < > 12   CALCIUM mg/dL 8 7   < > 9 2   ALBUMIN g/dL  --   --  3 0*   TOTAL BILIRUBIN mg/dL  --   --  0 70   ALK PHOS U/L  --   --  133*   ALT U/L  --   --  32   AST U/L  --   --  44   GLUCOSE RANDOM mg/dL 102   < > 134    < > = values in this interval not displayed  Results from last 7 days   Lab Units 03/21/19  1005   INR  1 15             Results from last 7 days   Lab Units 03/21/19  1406 03/21/19  1231 03/21/19  1005   LACTIC ACID mmol/L 1 6 3 2* 2 6*   PROCALCITONIN ng/ml 0 22  --  0 10           * I Have Reviewed All Lab Data Listed Above  * Additional Pertinent Lab Tests Reviewed:  All Labs Within Last 24 Hours Reviewed    Imaging:    Imaging Reports Reviewed Today Include:   Imaging Personally Reviewed by Myself Includes:      Recent Cultures (last 7 days):     Results from last 7 days   Lab Units 03/21/19  1456 03/21/19  1023 03/21/19  1018 03/21/19  1005 03/21/19  0948   BLOOD CULTURE   --  No Growth at 24 hrs   --  No Growth at 24 hrs   --    URINE CULTURE   --   --  >100,000 cfu/ml Escherichia coli*  --   --    INFLUENZA B PCR  Not Detected  --   --   --  Not Detected   RSV PCR  Not Detected  --   --   --  Not Detected       Last 24 Hours Medication List:     Current Facility-Administered Medications:  benzonatate 100 mg Oral TID PRN Gauri Ponce MD    cefazolin 1,000 mg Intravenous Q8H Shaun Astudillo MD    docusate sodium 100 mg Oral BID Gauri Ponce MD    donepezil 5 mg Oral HS Gauri Ponce MD    DULoxetine 60 mg Oral Daily Gauri Ponce MD    enoxaparin 30 mg Subcutaneous Daily Gauri Ponce MD    fluticasone 1 spray Nasal Daily Gauri Ponce MD    furosemide 20 mg Oral Once per day on Mon Wed Fri Gauri Ponce MD    glycerin-hypromellose- 1 drop Both Eyes TID PRN Gauri Ponce MD    guaiFENesin 600 mg Oral BID PRN Gauri Ponce MD    ipratropium-albuterol 3 mL Nebulization 4x Daily PRN Gauri Ponce MD    levothyroxine 100 mcg Oral Early Morning Gauri Ponce MD    loratadine 5 mg Oral Daily Gauri Ponce MD    losartan 100 mg Oral Daily Gauri Ponce MD    metoprolol tartrate 25 mg Oral Q12H Vi Baldwin MD    ofloxacin 1 drop Right Eye 4x Daily Gauri Ponce MD    ondansetron 4 mg Intravenous Q6H PRN Gauri Ponce MD    pantoprazole 40 mg Oral BID AC Gauri Ponce MD    pravastatin 40 mg Oral Daily Gauri Ponce MD    sodium chloride 2 spray Each Nare PRN Gauri Ponce MD    sodium chloride 75 mL/hr Intravenous Continuous Meagan Sloop, PA-C Last Rate: 75 mL/hr (03/23/19 0516)        Today, Patient Was Seen By: Florina Rooney MD    ** Please Note: Dictation voice to text software may have been used in the creation of this document   **

## 2019-03-23 NOTE — PROGRESS NOTES
Bladder scanned patient for PVR  Bladder scan showed 71ml  Pt is incontinent so unsure of exact time patient voided last but pad and bed was saturated during rounding

## 2019-03-24 LAB
ANION GAP SERPL CALCULATED.3IONS-SCNC: 11 MMOL/L (ref 4–13)
BUN SERPL-MCNC: 11 MG/DL (ref 5–25)
CALCIUM SERPL-MCNC: 8.2 MG/DL (ref 8.3–10.1)
CHLORIDE SERPL-SCNC: 102 MMOL/L (ref 100–108)
CO2 SERPL-SCNC: 21 MMOL/L (ref 21–32)
CREAT SERPL-MCNC: 0.84 MG/DL (ref 0.6–1.3)
GFR SERPL CREATININE-BSD FRML MDRD: 63 ML/MIN/1.73SQ M
GLUCOSE SERPL-MCNC: 94 MG/DL (ref 65–140)
POTASSIUM SERPL-SCNC: 3.6 MMOL/L (ref 3.5–5.3)
SODIUM SERPL-SCNC: 134 MMOL/L (ref 136–145)

## 2019-03-24 PROCEDURE — 80048 BASIC METABOLIC PNL TOTAL CA: CPT | Performed by: INTERNAL MEDICINE

## 2019-03-24 PROCEDURE — 99232 SBSQ HOSP IP/OBS MODERATE 35: CPT | Performed by: INTERNAL MEDICINE

## 2019-03-24 RX ORDER — CEPHALEXIN 500 MG/1
500 CAPSULE ORAL EVERY 6 HOURS SCHEDULED
Status: DISCONTINUED | OUTPATIENT
Start: 2019-03-24 | End: 2019-03-25 | Stop reason: HOSPADM

## 2019-03-24 RX ADMIN — OFLOXACIN 1 DROP: 3 SOLUTION OPHTHALMIC at 09:50

## 2019-03-24 RX ADMIN — DOCUSATE SODIUM 100 MG: 100 CAPSULE, LIQUID FILLED ORAL at 17:04

## 2019-03-24 RX ADMIN — PANTOPRAZOLE SODIUM 40 MG: 40 TABLET, DELAYED RELEASE ORAL at 17:00

## 2019-03-24 RX ADMIN — PANTOPRAZOLE SODIUM 40 MG: 40 TABLET, DELAYED RELEASE ORAL at 05:45

## 2019-03-24 RX ADMIN — DONEPEZIL HYDROCHLORIDE 5 MG: 5 TABLET, FILM COATED ORAL at 21:50

## 2019-03-24 RX ADMIN — OFLOXACIN 1 DROP: 3 SOLUTION OPHTHALMIC at 13:45

## 2019-03-24 RX ADMIN — LEVOTHYROXINE SODIUM 100 MCG: 100 TABLET ORAL at 05:45

## 2019-03-24 RX ADMIN — DULOXETINE HYDROCHLORIDE 60 MG: 60 CAPSULE, DELAYED RELEASE ORAL at 09:46

## 2019-03-24 RX ADMIN — ENOXAPARIN SODIUM 30 MG: 30 INJECTION SUBCUTANEOUS at 09:49

## 2019-03-24 RX ADMIN — FLUTICASONE PROPIONATE 1 SPRAY: 50 SPRAY, METERED NASAL at 09:49

## 2019-03-24 RX ADMIN — PRAVASTATIN SODIUM 40 MG: 40 TABLET ORAL at 09:47

## 2019-03-24 RX ADMIN — OFLOXACIN 1 DROP: 3 SOLUTION OPHTHALMIC at 17:04

## 2019-03-24 RX ADMIN — CEFAZOLIN SODIUM 1000 MG: 1 SOLUTION INTRAVENOUS at 05:45

## 2019-03-24 RX ADMIN — DOCUSATE SODIUM 100 MG: 100 CAPSULE, LIQUID FILLED ORAL at 09:47

## 2019-03-24 RX ADMIN — CEPHALEXIN 500 MG: 500 CAPSULE ORAL at 17:04

## 2019-03-24 RX ADMIN — CEPHALEXIN 500 MG: 500 CAPSULE ORAL at 23:06

## 2019-03-24 RX ADMIN — METOPROLOL TARTRATE 25 MG: 25 TABLET, FILM COATED ORAL at 09:48

## 2019-03-24 RX ADMIN — METOPROLOL TARTRATE 25 MG: 25 TABLET, FILM COATED ORAL at 21:50

## 2019-03-24 RX ADMIN — CEPHALEXIN 500 MG: 500 CAPSULE ORAL at 13:44

## 2019-03-24 RX ADMIN — LOSARTAN POTASSIUM 100 MG: 50 TABLET, FILM COATED ORAL at 09:47

## 2019-03-24 RX ADMIN — OFLOXACIN 1 DROP: 3 SOLUTION OPHTHALMIC at 21:51

## 2019-03-24 RX ADMIN — LORATADINE 5 MG: 10 TABLET ORAL at 09:46

## 2019-03-24 NOTE — ASSESSMENT & PLAN NOTE
Continue p o   Antibiotics  History of recurrent UTIs  Likely need Milton catheter  Id follow-up ongoing

## 2019-03-24 NOTE — PROGRESS NOTES
Progress Note - Infectious Disease   Tonya [place] 80 y o  female MRN: [id number]  Unit/Bed#: -01 Encounter: [id number]      Impression/Recommendations:  1  Sepsis, POA, with fever and leukocytosis   Source is most likely UTI   Fortunately, despite sepsis, she remains hemodynamically stable, without hypotension   Admission blood cultures remain negative  Patient is clinically much improved  Temperature is down  WBC normalized  Antibiotic plan as in below  Monitor temperature/WBC  Monitor hemodynamics  Follow-up on pending blood cultures      2  Probable UTI  Patient is clinically improved  Urine culture is growing susceptible E coli   Given multiple UTI recurrences, it is likely the patient has urinary retention  PVR ranges from 70 mL to 170 mL, but difficult to interpret due to underlying incontinence   Patient will most likely need bladder catheterization, either permanent Milton catheter or intermittent straight catheterization to prevent recurrences   Permanent Milton catheter is difficult due to her dementia with agitation   Intermittent straight catheterization may be difficult in assisted living facility     Change antibiotic to p o  Keflex  Patient will most likely need Milton catheterization versus intermittent straight catheterization  Treat x7 days total, another 3 days      3  Cough   Exam not consistent with pneumonia   CXR without pneumonia   Consider viral URI or influenza as etiology of fever but doubt, given leukocytosis   Influenza PCR also negative  Supportive care      4  Advanced dementia, with agitation and poor cooperation      Discussed with patient      Antibiotics:  Cefazolin  Antibiotic # 4      Subjective:  Patient's mental status is about the same  She is awake, alert, with stable confusion  She continues to have urinary incontinence  Temperature stays down  She is tolerating antibiotic well   No nausea, vomiting or diarrhea      Objective:  Vitals:  Temp:  [98 9 °F (37 2 °C)-99 5 °F (37 5 °C)] 98 9 °F (37 2 °C)  HR:  [84-95] 86  Resp:  [18] 18  BP: (141-170)/(71-76) 170/72  SpO2:  [94 %-96 %] 96 %  Temp (24hrs), Av 1 °F (37 3 °C), Min:98 9 °F (37 2 °C), Max:99 5 °F (37 5 °C)  Current: Temperature: 98 9 °F (37 2 °C)    Physical Exam:     General: Awake, alert, cooperative, no distress  Stable confusion  Neck:  Supple  No mass  No lymphadenopathy  Lungs: Expansion symmetric, no rales, no wheezing, respirations unlabored  Heart:  Regular rate and rhythm, S1 and S2 normal, no murmur  Abdomen: Soft, nondistended, non-tender, bowel sounds active all four quadrants,        no masses, no organomegaly  Extremities: Trace edema  No erythema/warmth  No ulcer  Nontender to palpation  Skin:  No rash  Neuro: Moves all extremities  Invasive Devices     Peripheral Intravenous Line            Peripheral IV 19 Right Antecubital 3 days                Labs studies:   I have personally reviewed pertinent labs  Results from last 7 days   Lab Units 19  0522 19  0935 19  0519 19  1005   POTASSIUM mmol/L 3 6 3 6 4 0 4 5   CHLORIDE mmol/L 102 100 98* 96*   CO2 mmol/L 21 19* 20* 24   BUN mg/dL 11 10 12 14   CREATININE mg/dL 0 84 0 83 0 89 1 20   EGFR ml/min/1 73sq m 63 64 58 41   CALCIUM mg/dL 8 2* 8 7 8 7 9 2   AST U/L  --   --   --  44   ALT U/L  --   --   --  32   ALK PHOS U/L  --   --   --  133*     Results from last 7 days   Lab Units 19  0935 19  0519 19  1406 19  1005   WBC Thousand/uL 6 68 9 55  --  13 82*   HEMOGLOBIN g/dL 9 7* 8 6*  --  10 0*   PLATELETS Thousands/uL 223 226 261 291     Results from last 7 days   Lab Units 19  1456 19  1023 19  1018 19  1005 19  0948   BLOOD CULTURE   --  No Growth at 48 hrs  --  No Growth at 48 hrs    --    URINE CULTURE   --   --  >100,000 cfu/ml Escherichia coli*  --   --    INFLUENZA B PCR  Not Detected  --   --   --  Not Detected   RSV PCR  Not Detected  --   --   --  Not Detected       Imaging Studies:   I have personally reviewed pertinent imaging study reports and images in PACS  EKG, Pathology, and Other Studies:   I have personally reviewed pertinent reports

## 2019-03-24 NOTE — PROGRESS NOTES
Progress Note - Toby Colon 1931, 80 y o  female MRN: [id number]    Unit/Bed#: -01 Encounter: [id number]    Primary Care Provider: Alivia Eaton DO   Date and time admitted to hospital: 3/21/2019  9:14 AM    * Sepsis [facility])  Assessment & Plan  Due to UTI    On p o  Keflex  Id follow-up ongoing       Elevated troponin  Assessment & Plan  Likely type 2 NSTEMI in the setting of sepsis  EKG without any changes  Continue beta-blockers      Acute cystitis without hematuria  Assessment & Plan  Continue p o  Antibiotics  History of recurrent UTIs  Likely need Milton catheter  Id follow-up ongoing    Essential hypertension  Assessment & Plan  · Continue antihypertensive regimen  · Monitor blood pressure    Cough  Assessment & Plan  Influenza negative  Supportive care    Alzheimer's dementia without behavioral disturbance  Assessment & Plan  Supportive care      VTE Pharmacologic Prophylaxis:   Pharmacologic: Enoxaparin (Lovenox)  Mechanical VTE Prophylaxis in Place: Yes    Patient Centered Rounds: I have performed bedside rounds with nursing staff today  Discussions with Specialists or Other Care Team Provider:  Nursing    Education and Discussions with Family / Patient:     Time Spent for Care: 30 minutes  More than 50% of total time spent on counseling and coordination of care as described above  Current Length of Stay: 3 day(s)    Current Patient Status: Inpatient   Certification Statement: The patient will continue to require additional inpatient hospital stay due to UTI    Discharge Plan:  Discharge tomorrow    Code Status: Level 1 - Full Code      Subjective:   No events reported    Patient feeling better    Objective:     Vitals:   Temp (24hrs), Av 8 °F (37 1 °C), Min:97 7 °F (36 5 °C), Max:99 5 °F (37 5 °C)    Temp:  [97 7 °F (36 5 °C)-99 5 °F (37 5 °C)] 97 7 °F (36 5 °C)  HR:  [77-95] 77  Resp:  [18] 18  BP: (141-170)/(71-76) 163/75  SpO2:  [94 %-96 %] 96 %  Body mass index is 28 12 kg/m²  Input and Output Summary (last 24 hours): Intake/Output Summary (Last 24 hours) at 3/24/2019 1414  Last data filed at 3/24/2019 1252  Gross per 24 hour   Intake 420 ml   Output 172 ml   Net 248 ml       Physical Exam:     Physical Exam     Gen -Patient comfortable at rest  Neck- Supple  No thyromegaly or lymphadenopathy  Lungs-Clear bilaterally without any wheeze or rales   Heart S1-S2, regular rate and rhythm, no murmurs  Abdomen-soft nontender, no organomegaly  Bowel sounds present  Extremities-no cyanosi,  clubbing or edema  Skin- no rash  Neuro-awake and alert     Additional Data:     Labs:    Results from last 7 days   Lab Units 03/23/19  0935  03/21/19  1005   WBC Thousand/uL 6 68   < > 13 82*   HEMOGLOBIN g/dL 9 7*   < > 10 0*   HEMATOCRIT % 29 8*   < > 30 8*   PLATELETS Thousands/uL 223   < > 291   NEUTROS PCT %  --   --  85*   LYMPHS PCT %  --   --  7*   MONOS PCT %  --   --  7   EOS PCT %  --   --  0    < > = values in this interval not displayed  Results from last 7 days   Lab Units 03/24/19  0522  03/21/19  1005   SODIUM mmol/L 134*   < > 132*   POTASSIUM mmol/L 3 6   < > 4 5   CHLORIDE mmol/L 102   < > 96*   CO2 mmol/L 21   < > 24   BUN mg/dL 11   < > 14   CREATININE mg/dL 0 84   < > 1 20   ANION GAP mmol/L 11   < > 12   CALCIUM mg/dL 8 2*   < > 9 2   ALBUMIN g/dL  --   --  3 0*   TOTAL BILIRUBIN mg/dL  --   --  0 70   ALK PHOS U/L  --   --  133*   ALT U/L  --   --  32   AST U/L  --   --  44   GLUCOSE RANDOM mg/dL 94   < > 134    < > = values in this interval not displayed  Results from last 7 days   Lab Units 03/21/19  1005   INR  1 15             Results from last 7 days   Lab Units 03/21/19  1406 03/21/19  1231 03/21/19  1005   LACTIC ACID mmol/L 1 6 3 2* 2 6*   PROCALCITONIN ng/ml 0 22  --  0 10           * I Have Reviewed All Lab Data Listed Above  * Additional Pertinent Lab Tests Reviewed:  All Labs Within Last 24 Hours Reviewed    Imaging:    Imaging Reports Reviewed Today Include: Imaging Personally Reviewed by Myself Includes:      Recent Cultures (last 7 days):     Results from last 7 days   Lab Units 03/21/19  1456 03/21/19  1023 03/21/19  1018 03/21/19  1005 03/21/19  0948   BLOOD CULTURE   --  No Growth at 72 hrs   --  No Growth at 72 hrs   --    URINE CULTURE   --   --  >100,000 cfu/ml Escherichia coli*  --   --    INFLUENZA B PCR  Not Detected  --   --   --  Not Detected   RSV PCR  Not Detected  --   --   --  Not Detected       Last 24 Hours Medication List:     Current Facility-Administered Medications:  benzonatate 100 mg Oral TID PRN Kristeen Males, MD    cephalexin 500 mg Oral Q6H [address] Butch Irving MD    docusate sodium 100 mg Oral BID Kristeen Males, MD    donepezil 5 mg Oral HS Kristeen Males, MD    DULoxetine 60 mg Oral Daily Kristeen Males, MD    enoxaparin 30 mg Subcutaneous Daily Kristeen Males, MD    fluticasone 1 spray Nasal Daily Kristeen Males, MD    furosemide 20 mg Oral Once per day on Mon Wed Fri Kristeen Males, MD    glycerin-hypromellose- 1 drop Both Eyes TID PRN Kristeen Males, MD    guaiFENesin 600 mg Oral BID PRN Kristeen Males, MD    ipratropium-albuterol 3 mL Nebulization 4x Daily PRN Kristeen Males, MD    levothyroxine 100 mcg Oral Early Morning Kristeen Males, MD    loratadine 5 mg Oral Daily Kristeen Males, MD    losartan 100 mg Oral Daily Kristeen Males, MD    metoprolol tartrate 25 mg Oral Q12H Sd Casas MD    ofloxacin 1 drop Right Eye 4x Daily Kristeen Males, MD    ondansetron 4 mg Intravenous Q6H PRN Kristeen Males, MD    pantoprazole 40 mg Oral BID AC Kristeen Males, MD    pravastatin 40 mg Oral Daily Kristeen Males, MD    sodium chloride 2 spray Each Nare PRN Kristeen Males, MD    sodium chloride 75 mL/hr Intravenous Continuous Becky Campbell PA-C Last Rate: 75 mL/hr (03/23/19 0516)        Today, Patient Was Seen By: Rachael Garces MD    ** Please Note: Dictation voice to text software may have been used in the creation of this document   **

## 2019-03-25 VITALS
WEIGHT: 143.96 LBS | BODY MASS INDEX: 28.12 KG/M2 | RESPIRATION RATE: 18 BRPM | HEART RATE: 76 BPM | SYSTOLIC BLOOD PRESSURE: 181 MMHG | TEMPERATURE: 98.1 F | DIASTOLIC BLOOD PRESSURE: 79 MMHG | OXYGEN SATURATION: 95 %

## 2019-03-25 PROBLEM — A41.9 SEPSIS (HCC): Status: RESOLVED | Noted: 2018-04-13 | Resolved: 2019-03-25

## 2019-03-25 PROCEDURE — G8979 MOBILITY GOAL STATUS: HCPCS

## 2019-03-25 PROCEDURE — 99232 SBSQ HOSP IP/OBS MODERATE 35: CPT | Performed by: INTERNAL MEDICINE

## 2019-03-25 PROCEDURE — G8980 MOBILITY D/C STATUS: HCPCS

## 2019-03-25 PROCEDURE — 97163 PT EVAL HIGH COMPLEX 45 MIN: CPT

## 2019-03-25 PROCEDURE — 97116 GAIT TRAINING THERAPY: CPT

## 2019-03-25 PROCEDURE — 99239 HOSP IP/OBS DSCHRG MGMT >30: CPT | Performed by: INTERNAL MEDICINE

## 2019-03-25 RX ORDER — CEPHALEXIN 500 MG/1
500 CAPSULE ORAL EVERY 6 HOURS SCHEDULED
Qty: 8 CAPSULE | Refills: 0 | Status: SHIPPED | OUTPATIENT
Start: 2019-03-25 | End: 2019-03-27

## 2019-03-25 RX ADMIN — FLUTICASONE PROPIONATE 1 SPRAY: 50 SPRAY, METERED NASAL at 09:59

## 2019-03-25 RX ADMIN — LOSARTAN POTASSIUM 100 MG: 50 TABLET, FILM COATED ORAL at 09:58

## 2019-03-25 RX ADMIN — CEPHALEXIN 500 MG: 500 CAPSULE ORAL at 05:33

## 2019-03-25 RX ADMIN — OFLOXACIN 1 DROP: 3 SOLUTION OPHTHALMIC at 09:59

## 2019-03-25 RX ADMIN — FUROSEMIDE 20 MG: 20 TABLET ORAL at 09:58

## 2019-03-25 RX ADMIN — METOPROLOL TARTRATE 25 MG: 25 TABLET, FILM COATED ORAL at 09:58

## 2019-03-25 RX ADMIN — DOCUSATE SODIUM 100 MG: 100 CAPSULE, LIQUID FILLED ORAL at 09:59

## 2019-03-25 RX ADMIN — OFLOXACIN 1 DROP: 3 SOLUTION OPHTHALMIC at 12:56

## 2019-03-25 RX ADMIN — ENOXAPARIN SODIUM 30 MG: 30 INJECTION SUBCUTANEOUS at 09:58

## 2019-03-25 RX ADMIN — LEVOTHYROXINE SODIUM 100 MCG: 100 TABLET ORAL at 05:33

## 2019-03-25 RX ADMIN — CEPHALEXIN 500 MG: 500 CAPSULE ORAL at 12:56

## 2019-03-25 RX ADMIN — LORATADINE 5 MG: 10 TABLET ORAL at 09:58

## 2019-03-25 RX ADMIN — PANTOPRAZOLE SODIUM 40 MG: 40 TABLET, DELAYED RELEASE ORAL at 05:33

## 2019-03-25 RX ADMIN — DULOXETINE HYDROCHLORIDE 60 MG: 60 CAPSULE, DELAYED RELEASE ORAL at 09:58

## 2019-03-25 RX ADMIN — PRAVASTATIN SODIUM 40 MG: 40 TABLET ORAL at 09:58

## 2019-03-25 NOTE — PROGRESS NOTES
Progress Note - Infectious Disease   Milo Mccormick 80 y o  female MRN: [id number]  Unit/Bed#: -01 Encounter: [id number]      Impression/Plan:  1  Sepsis, POA, with fever and leukocytosis   Source is most likely E coli UTI   Fortunately, despite sepsis, she remains hemodynamically stable, without hypotension   Admission blood cultures remain negative after 72 hours  Patient is clinically much improved  She remains afebrile  WBC count remains normalized  -antibiotic as below  -monitor CBC and BMP  -follow up blood cultures  -monitor vitals  -supportive care     2  Probable UTI   Patient is clinically improved   Urine culture is growing susceptible E coli   Given multiple UTI recurrences, it is likely the patient has urinary retention   PVR ranges from 70 mL to 170 mL, but difficult to interpret due to underlying incontinence  Patient will most likely need bladder catheterization, either permanent Milton catheter or intermittent straight catheterization to prevent recurrences   Permanent Milton catheter is difficult due to her dementia with agitation   Intermittent straight catheterization may be difficult in assisted living facility     -continue p o  Keflex through 03/27/2019 for total of seven days of antibiotic treatment  -monitor CBC and BMP  -monitor vitals  -patient will most likely need Milton catheterization versus intermittent straight catheterization  3  Cough   Exam is not consistent with pneumonia   CXR from 03/21/2019 was without pneumonia   Consider viral URI or influenza as etiology of fever but doubt, given leukocytosis   Influenza PCR was also negative  The patient is telling me today that she does not have a cough  -monitor vitals  -monitor respiratory status     4  Advanced dementia  With agitation and poor cooperation at times  Patient appears calm this morning  Antibiotics:  Keflex 2  Antibiotics 5    Subjective:  Unable to perform full ROS as patient has advanced dementia  She is however interactive despite being confused  Patient has no acute complaints  She denies chills, sweats, vomiting, abdominal pain, diarrhea, cough  Objective:  Vitals:  Temp:  [97 7 °F (36 5 °C)-98 6 °F (37 °C)] 98 1 °F (36 7 °C)  HR:  [76-80] 76  Resp:  [18] 18  BP: (163-194)/(75-88) 181/79  SpO2:  [95 %-97 %] 95 %  Temp (24hrs), Av 1 °F (36 7 °C), Min:97 7 °F (36 5 °C), Max:98 6 °F (37 °C)  Current: Temperature: 98 1 °F (36 7 °C)    Physical Exam:   General Appearance:  Alert, interactive but confused, nontoxic, no acute distress  Throat: Oropharynx moist without lesions  Lungs:   Clear to auscultation bilaterally; no wheezes, rhonchi or rales; respirations unlabored   Heart:  RRR; no murmur, rub or gallop   Abdomen:   Soft, non-tender, non-distended, positive bowel sounds  Extremities: No clubbing or cyanosis; trace lower extremity edema   Skin: No new rashes or lesions  No draining wounds noted  Labs, Imaging, & Other studies:   All pertinent labs and imaging studies were personally reviewed  Results from last 7 days   Lab Units 19  0935 19  0519 19  1406 19  1005   WBC Thousand/uL 6 68 9 55  --  13 82*   HEMOGLOBIN g/dL 9 7* 8 6*  --  10 0*   PLATELETS Thousands/uL 223 226 261 291     Results from last 7 days   Lab Units 19  0522  19  1005   POTASSIUM mmol/L 3 6   < > 4 5   CHLORIDE mmol/L 102   < > 96*   CO2 mmol/L 21   < > 24   BUN mg/dL 11   < > 14   CREATININE mg/dL 0 84   < > 1 20   EGFR ml/min/1 73sq m 63   < > 41   CALCIUM mg/dL 8 2*   < > 9 2   AST U/L  --   --  44   ALT U/L  --   --  32   ALK PHOS U/L  --   --  133*    < > = values in this interval not displayed       Results from last 7 days   Lab Units 19  1456 19  1023 19  1018 19  1005 19  0948   BLOOD CULTURE   --  No Growth at 72 hrs   --  No Growth at 72 hrs   --    URINE CULTURE   --   --  >100,000 cfu/ml Escherichia coli*  --   --    INFLUENZA B PCR Not Detected  --   --   --  Not Detected   RSV PCR  Not Detected  --   --   --  Not Detected

## 2019-03-25 NOTE — PLAN OF CARE
Problem: DISCHARGE PLANNING - CARE MANAGEMENT  Goal: Discharge to post-acute care or home with appropriate resources  Description  INTERVENTIONS:  - Conduct assessment to determine patient/family and health care team treatment goals, and need for post-acute services based on payer coverage, community resources, and patient preferences, and barriers to discharge  - Address psychosocial, clinical, and financial barriers to discharge as identified in assessment in conjunction with the patient/family and health care team  - Arrange appropriate level of post-acute services according to patient?s   needs and preference and payer coverage in collaboration with the physician and health care team  - Communicate with and update the patient/family, physician, and health care team regarding progress on the discharge plan  - Arrange appropriate transportation to post-acute venues  Outcome: Completed  Note:   LOS 4   UTI BUNDLE;  NOT A READMISSION  Pt is stable for DC to return to [facility] assisted living where she lives with her   Cm contacted the assisted living to inform them of the therapy evaluation which the recommendations are for pt to return as she has walked over 200' and still only needs min assist with ADL's  Cm contacted pt's daughter who stated she would be transporting pt back to the facility however it had to be before 1500 as the [id number] nurse called in sick  Daughter also stated she needs to have time to come back to  her father as he is here in the hospital as well and is being discharged today  IMM has been reviewed with pt's daughter over the phone  Nursing aware of discharge and daughter's request of  time  No further cm interventions needed at this time

## 2019-03-25 NOTE — PLAN OF CARE
Problem: PHYSICAL THERAPY ADULT  Goal: Performs mobility at highest level of function for planned discharge setting  See evaluation for individualized goals  Description  Treatment/Interventions: Functional transfer training, LE strengthening/ROM, Therapeutic exercise, Endurance training, Cognitive reorientation, Patient/family training, Equipment eval/education, Bed mobility, Gait training, Spoke to nursing, Spoke to case management  Equipment Recommended: Sakina Sheridan       See flowsheet documentation for full assessment, interventions and recommendations  Note:   Prognosis: Good  Problem List: Decreased strength, Decreased range of motion, Decreased endurance, Impaired balance, Decreased mobility, Decreased safety awareness  Assessment: Pt  is an 79 yo F who presents from [facility] memory care unit with c/o fever (101 3)  Dx; Sepsis, order placed for PT eval and tx, w/ activity order of up w/ A  pt presents w/ comorbidities of  htn, dementia, cough, elevated troponin , HLD, GERD, fibromyalgia, RA, CVA Hx, Anemia and personal factors of advanced age, mobilizing w/ assistive device, inability to navigate level surfaces w/o external assistance, unable to perform dynamic tasks in community, decreased cognition, unable to perform physical activity, inability to perform IADLs, inability to perform ADLs and inability to live alone  pt presents w/ weakness, decreased ROM, decreased endurance, impaired cognition, impaired balance, gait deviations, decreased safety awareness and fall risk  these impairments are evident in findings from physical examination (weakness), mobility assessment (need for Min assist w/ all phases of mobility when usually mobilizing independently, tolerance to only 160 feet of ambulation and need for cueing for mobility technique), and Barthel Index: 50/100  pt needed input for task focus and mobility technique  pt is at risk for falls due to physical and safety awareness deficits   pt's clinical presentation is unstable/unpredictable (evident in need for assist w/ all phases of mobility when usually mobilizing independently, tolerance to only 160 feet of ambulation, need for input for mobility technique, need for input for task focus and mobility technique and need for input for mobility technique/safety)  pt needs inpatient PT tx to improve mobility deficits  discharge recommendation is for home PT upon return to [facility] to reduce fall risk and maximize level of functional independence  Recommendation: (PT upon return to [facility])     PT - OK to Discharge: Yes    See flowsheet documentation for full assessment

## 2019-03-25 NOTE — SOCIAL WORK
LOS 4   UTI BUNDLE;  NOT A READMISSION  Pt is stable for DC to return to [facility] assisted living where she lives with her   Cm contacted the assisted living to inform them of the therapy evaluation which the recommendations are for pt to return as she has walked over 200' and still only needs min assist with ADL's  Cm contacted pt's daughter who stated she would be transporting pt back to the facility however it had to be before 1500 as the [id number] nurse called in sick  Daughter also stated she needs to have time to come back to  her father as he is here in the hospital as well and is being discharged today  IMM has been reviewed with pt's daughter over the phone  Nursing aware of discharge and daughter's request of  time  No further cm interventions needed at this time

## 2019-03-25 NOTE — DISCHARGE SUMMARY
Discharge Summary - [facility] Internal Medicine    Patient Information: Ángel Singh 80 y o  female MRN: [id number]  Unit/Bed#: -01 Encounter: [id number]    Discharging Physician / Practitioner: Kp Ramírez MD  PCP: Fantasma Gee DO  Admission Date: 3/21/2019  Discharge Date: 03/25/19    Disposition:     [address] at m    Reason for Admission:  Sepsis    Discharge Diagnoses:     Principal Problem:    Sepsis ([address] )  Active Problems:    Acute cystitis without hematuria    Elevated troponin    Essential hypertension    Alzheimer's dementia without behavioral disturbance    Cough  Resolved Problems:    * No resolved hospital problems  *      Consultations During Hospital Stay:  · Infectious Disease    Procedures Performed:     · Chest x-ray no active disease    Hospital Course:     Ángel Singh is a 80 y o  female patient with past medical history significant for dementia who originally presented to the hospital on 3/21/2019 due to fever  She was noted to have temperature of 101 3° F at nursing facility  She was brought to the emergency department for evaluation and suspected to have urinary tract infection  She met criteria for sepsis  She was subsequently admitted and placed on IV antibiotics  Seen by Infectious Disease and her urine cultures came back as positive for E coli  Blood cultures were negative  She remained stable and afebrile  She did have mild elevation of troponins which were felt to be due to sepsis  She is being discharged with p o  Keflex until 03/27/2019  She has schedule appointment with Urology to discuss options for urinary drainage  Condition at Discharge: good     Discharge Day Visit / Exam:     Subjective:  No events reported    Vitals: Blood Pressure: (!) 181/79 (03/25/19 0736)  Pulse: 76 (03/25/19 0736)  Temperature: 98 1 °F (36 7 °C) (03/25/19 0736)  Temp Source: Oral (03/25/19 0736)  Respirations: 18 (03/25/19 0736)  Weight - Scale: 65 3 kg (143 lb 15 4 oz) (03/21/19 0918)  SpO2: 95 % (03/25/19 0736)  Exam:   Physical Exam     Gen -Patient comfortable at rest  Neck- Supple  No thyromegaly or lymphadenopathy  Lungs-Clear bilaterally without any wheeze or rales   Heart S1-S2, regular rate and rhythm, no murmurs  Abdomen-soft nontender, no organomegaly  Bowel sounds present  Extremities-no cyanosi,  clubbing or edema  Skin- no rash  Neuro-awake and alert    Discussion with Family:  Discussed with daughter    Discharge instructions/Information to patient and family:   See after visit summary for information provided to patient and family  Provisions for Follow-Up Care:  See after visit summary for information related to follow-up care and any pertinent home health orders  Planned Readmission: no     Discharge Statement:  I spent 35 minutes discharging the patient  This time was spent on the day of discharge  I had direct contact with the patient on the day of discharge  Greater than 50% of the total time was spent examining patient, answering all patient questions, arranging and discussing plan of care with patient as well as directly providing post-discharge instructions  Additional time then spent on discharge activities  Discharge Medications:  See after visit summary for reconciled discharge medications provided to patient and family        ** Please Note: This note has been constructed using a voice recognition system **

## 2019-03-25 NOTE — PHYSICAL THERAPY NOTE
PHYSICAL THERAPY Evaluation NOTE    Patient Name: Marizol Escoto  ZLDVX'H Date: 3/25/2019     AGE:   80 y o  Mrn:   [id number]  ADMIT DX:  UTI (urinary tract infection) [N39 0]  Fever [R50 9]  Sepsis ([facility] [address] ) [A41 9]    Past Medical History:   Diagnosis Date    Aspiration pneumonia ([facility] )     Last Assessed:  7/18/17    Basal cell carcinoma of nose     Last Assessed:  4/12/17    Blind     blind right eye, 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, Last Assessed:  7/18/17    Dementia     Depression     Disease of thyroid gland     Fibromyalgia     Last Assessed:  7/18/17    Gait disturbance     GERD (gastroesophageal reflux disease)     Glaucoma     Hyperlipidemia     Hypertension     Osteopenia     Peripheral neuropathy     Last Assessed:  4/12/17    [facility]     Psychiatric disorder     depression    Renal insufficiency syndrome     Last Assessed:  4/13/17    [facility] ([facility]  )     Stroke ([place] [address] )     x2, Last Assessed:  7/18/17    Systemic lupus erythematosus ([place] [address] )     Vertigo     Vitamin D deficiency      Length Of Stay: 4  PHYSICAL THERAPY EVALUATION :    03/25/19 1230   Note Type   Note type Eval/Treat   Pain Assessment   Pain Assessment No/denies pain   Pain Score No Pain   Home Living   Type of Home   (Pt  from [facility] memory care unit)   Prior Function   Level of [place] Needs assistance with IADLs; Needs assistance with ADLs and functional mobility   Lives With Facility staff   Receives Help From Personal care attendant   ADL Assistance Needs assistance   IADLs Needs assistance   Falls in the last 6 months 0   Vocational Retired   Comments Per daughter, PTA, Pt  receives assistance with ADLs, and IADLs, functional mobility with RW as AD  Restrictions/Precautions   Other Precautions Cognitive; Chair Alarm; Bed Alarm; Fall Risk;Contact/isolation   General   Additional Pertinent History Pt  is an 79 yo F who presents from [facility] memory care unit with c/o fever (101 3)  Dx; Sepsis, comorbidities include htn, dementia, cough, elevated troponin , HLD, GERD, fibromyalgia, RA, CVA Hx, Anemia  Family/Caregiver Present Yes   Cognition   Overall Cognitive Status Impaired   Arousal/Participation Cooperative   Orientation Level Oriented to person;Disoriented to place; Disoriented to time;Disoriented to situation   Memory Decreased recall of recent events;Decreased short term memory   Following Commands Follows multistep commands with increased time or repetition   Comments Pt  was identified with full name and birthdate   RUE Assessment   RUE Assessment WFL   LUE Assessment   LUE Assessment WFL   RLE Assessment   RLE Assessment   (grossly 4-/5)   LLE Assessment   LLE Assessment   (grossly 4-/5)   Coordination   Movements are Fluid and Coordinated 1   Sensation WFL   Light Touch   RLE Light Touch Grossly intact   LLE Light Touch Grossly intact   Bed Mobility   Supine to Sit 4  Minimal assistance   Additional items HOB elevated; Bedrails; Increased time required;LE management   Sit to Supine Unable to assess   Additional Comments Pt  seated OOB in chair following PT session   Transfers   Sit to Stand 4  Minimal assistance   Additional items Increased time required   Stand to Sit 4  Minimal assistance   Additional items Impulsive   Ambulation/Elevation   Gait pattern Improper Weight shift;Narrow DONNY; Forward Flexion;Decreased foot clearance; Inconsistent iwona; Redundant gait at times; Short stride; Excessively slow   Gait Assistance 4  Minimal assist  (CGA)   Additional items Verbal cues  (for AD managment and sequencingh)   Assistive Device Rolling walker   Distance 160'x1, seated rest > 30 seconds, 50'x1   Balance   Static Sitting Fair -   Dynamic Sitting Fair -   Static Standing Poor +   Dynamic Standing Poor +   Ambulatory Poor +   Endurance Deficit   Endurance Deficit Yes   Endurance Deficit Description postural and gait degradation noted with fatigue   Activity Tolerance   Activity Tolerance Patient limited by fatigue   Medical Staff Made Aware spoke to [facility]   Nurse Made Aware Spoke to Janis Angelucci   Assessment   Prognosis Good   Problem List Decreased strength;Decreased range of motion;Decreased endurance; Impaired balance;Decreased mobility; Decreased safety awareness   Assessment Pt  is an 81 yo F who presents from [facility] memory care unit with c/o fever (101 3)  Dx; Sepsis, order placed for PT eval and tx, w/ activity order of up w/ A  pt presents w/ comorbidities of  htn, dementia, cough, elevated troponin , HLD, GERD, fibromyalgia, RA, CVA Hx, Anemia and personal factors of advanced age, mobilizing w/ assistive device, inability to navigate level surfaces w/o external assistance, unable to perform dynamic tasks in community, decreased cognition, unable to perform physical activity, inability to perform IADLs, inability to perform ADLs and inability to live alone  pt presents w/ weakness, decreased ROM, decreased endurance, impaired cognition, impaired balance, gait deviations, decreased safety awareness and fall risk  these impairments are evident in findings from physical examination (weakness), mobility assessment (need for Min assist w/ all phases of mobility when usually mobilizing independently, tolerance to only 160 feet of ambulation and need for cueing for mobility technique), and Barthel Index: 50/100  pt needed input for task focus and mobility technique  pt is at risk for falls due to physical and safety awareness deficits  pt's clinical presentation is unstable/unpredictable (evident in need for assist w/ all phases of mobility when usually mobilizing independently, tolerance to only 160 feet of ambulation, need for input for mobility technique, need for input for task focus and mobility technique and need for input for mobility technique/safety)   pt needs inpatient PT tx to improve mobility deficits  discharge recommendation is for home PT upon return to [facility] to reduce fall risk and maximize level of functional independence  Goals   Patient Goals to get home   STG Expiration Date 04/04/19   Short Term Goal #1 pt will: Increase bilateral LE strength 1/2 grade to facilitate independent mobility, Perform all bed mobility tasks independently to decrease fall risk factors, Perform all transfers w/ supervision to improve independence, Ambulate 350 ft  with roller walker w/ supervision w/o LOB, Increase all balance 1/2 grade to decrease risk for falls and Improve Barthel Index score to 75 or greater to facilitate independence   Treatment Day 0   Plan   Treatment/Interventions Functional transfer training;LE strengthening/ROM; Therapeutic exercise; Endurance training;Cognitive reorientation;Patient/family training;Equipment eval/education; Bed mobility;Gait training;Spoke to nursing;Spoke to case management   PT Frequency 5x/wk   Recommendation   Recommendation   (PT upon return to retirement)   Equipment Recommended Walker   PT - OK to Discharge Yes   Additional Comments when medically appropriate   Barthel Index   Feeding 5   Bathing 0   Grooming Score 0   Dressing Score 5   Bladder Score 0   Bowels Score 10   Toilet Use Score 5   Transfers (Bed/Chair) Score 10   Mobility (Level Surface) Score 10   Stairs Score 5   Barthel Index Score 50     Skilled PT recommended while in hospital and upon DC to progress pt toward treatment goals  [id number] Treatment Session    S: Pt  Agreeable to PT session     O: Pt  Ambulated 15'x2 with RW and Dirk (CGA) with VCs for sequencing, AD management, and course navigation  Time was provided to patient and family for all questions to be answered and patient was returned to sitting in chair, positioned to comfort with all needs within reach      A: Pt  Continues to demonstrate the activity limitations as outlined above, and will benefit from continued skilled therapy to increase functional independence and aid patient in return to PLOF  P: Continue with PT POC as outlined above       Maxi Han, PT 3/25/2019

## 2019-03-26 LAB
BACTERIA BLD CULT: NORMAL
BACTERIA BLD CULT: NORMAL

## 2019-03-27 ENCOUNTER — HOSPITAL ENCOUNTER (EMERGENCY)
Facility: HOSPITAL | Age: 84
Discharge: HOME | End: 2019-03-27
Attending: EMERGENCY MEDICINE | Admitting: EMERGENCY MEDICINE
Payer: MEDICARE

## 2019-03-27 DIAGNOSIS — R50.9 FEVER: ICD-10-CM

## 2019-03-27 DIAGNOSIS — L03.114 CELLULITIS OF LEFT HAND: Primary | ICD-10-CM

## 2019-03-27 LAB
ALBUMIN SERPL BCP-MCNC: 2.6 G/DL (ref 3.5–5)
ALP SERPL-CCNC: 130 U/L (ref 46–116)
ALT SERPL W P-5'-P-CCNC: 21 U/L (ref 12–78)
ANION GAP SERPL CALCULATED.3IONS-SCNC: 10 MMOL/L (ref 4–13)
APTT PPP: 29 SECONDS (ref 26–38)
AST SERPL W P-5'-P-CCNC: 42 U/L (ref 5–45)
BACTERIA UR QL AUTO: ABNORMAL /HPF
BASOPHILS # BLD AUTO: 0.02 THOUSANDS/ΜL (ref 0–0.1)
BASOPHILS NFR BLD AUTO: 0 % (ref 0–1)
BILIRUB SERPL-MCNC: 0.5 MG/DL (ref 0.2–1)
BILIRUB UR QL STRIP: NEGATIVE
BUN SERPL-MCNC: 10 MG/DL (ref 5–25)
CALCIUM SERPL-MCNC: 9.1 MG/DL (ref 8.3–10.1)
CHLORIDE SERPL-SCNC: 99 MMOL/L (ref 100–108)
CLARITY UR: CLEAR
CO2 SERPL-SCNC: 24 MMOL/L (ref 21–32)
COLOR UR: YELLOW
CREAT SERPL-MCNC: 0.91 MG/DL (ref 0.6–1.3)
EOSINOPHIL # BLD AUTO: 0.03 THOUSAND/ΜL (ref 0–0.61)
EOSINOPHIL NFR BLD AUTO: 0 % (ref 0–6)
ERYTHROCYTE [DISTWIDTH] IN BLOOD BY AUTOMATED COUNT: 14.9 % (ref 11.6–15.1)
GFR SERPL CREATININE-BSD FRML MDRD: 57 ML/MIN/1.73SQ M
GLUCOSE SERPL-MCNC: 110 MG/DL (ref 65–140)
GLUCOSE UR STRIP-MCNC: NEGATIVE MG/DL
HCT VFR BLD AUTO: 29 % (ref 34.8–46.1)
HGB BLD-MCNC: 9.4 G/DL (ref 11.5–15.4)
HGB UR QL STRIP.AUTO: NEGATIVE
IMM GRANULOCYTES # BLD AUTO: 0.08 THOUSAND/UL (ref 0–0.2)
IMM GRANULOCYTES NFR BLD AUTO: 1 % (ref 0–2)
INR PPP: 1.03 (ref 0.86–1.17)
KETONES UR STRIP-MCNC: NEGATIVE MG/DL
LEUKOCYTE ESTERASE UR QL STRIP: NEGATIVE
LYMPHOCYTES # BLD AUTO: 1.17 THOUSANDS/ΜL (ref 0.6–4.47)
LYMPHOCYTES NFR BLD AUTO: 12 % (ref 14–44)
MCH RBC QN AUTO: 29 PG (ref 26.8–34.3)
MCHC RBC AUTO-ENTMCNC: 32.4 G/DL (ref 31.4–37.4)
MCV RBC AUTO: 90 FL (ref 82–98)
MONOCYTES # BLD AUTO: 0.89 THOUSAND/ΜL (ref 0.17–1.22)
MONOCYTES NFR BLD AUTO: 9 % (ref 4–12)
MUCOUS THREADS UR QL AUTO: ABNORMAL
NEUTROPHILS # BLD AUTO: 7.23 THOUSANDS/ΜL (ref 1.85–7.62)
NEUTS SEG NFR BLD AUTO: 78 % (ref 43–75)
NITRITE UR QL STRIP: NEGATIVE
NON-SQ EPI CELLS URNS QL MICRO: ABNORMAL /HPF
NRBC BLD AUTO-RTO: 0 /100 WBCS
PH UR STRIP.AUTO: 6 [PH] (ref 4.5–8)
PLATELET # BLD AUTO: 285 THOUSANDS/UL (ref 149–390)
PMV BLD AUTO: 9.1 FL (ref 8.9–12.7)
POTASSIUM SERPL-SCNC: 4.4 MMOL/L (ref 3.5–5.3)
PROT SERPL-MCNC: 7.4 G/DL (ref 6.4–8.2)
PROT UR STRIP-MCNC: ABNORMAL MG/DL
PROTHROMBIN TIME: 13.2 SECONDS (ref 11.8–14.2)
RBC # BLD AUTO: 3.24 MILLION/UL (ref 3.81–5.12)
RBC #/AREA URNS AUTO: ABNORMAL /HPF
SODIUM SERPL-SCNC: 133 MMOL/L (ref 136–145)
SP GR UR STRIP.AUTO: 1.02 (ref 1–1.03)
UROBILINOGEN UR QL STRIP.AUTO: 0.2 E.U./DL
WBC # BLD AUTO: 9.42 THOUSAND/UL (ref 4.31–10.16)
WBC #/AREA URNS AUTO: ABNORMAL /HPF

## 2019-03-27 PROCEDURE — 36415 COLL VENOUS BLD VENIPUNCTURE: CPT | Performed by: PHYSICIAN ASSISTANT

## 2019-03-27 PROCEDURE — 85730 THROMBOPLASTIN TIME PARTIAL: CPT | Performed by: PHYSICIAN ASSISTANT

## 2019-03-27 PROCEDURE — 81001 URINALYSIS AUTO W/SCOPE: CPT

## 2019-03-27 PROCEDURE — 85610 PROTHROMBIN TIME: CPT | Performed by: PHYSICIAN ASSISTANT

## 2019-03-27 PROCEDURE — G8979 MOBILITY GOAL STATUS: HCPCS

## 2019-03-27 PROCEDURE — 97163 PT EVAL HIGH COMPLEX 45 MIN: CPT

## 2019-03-27 PROCEDURE — 80053 COMPREHEN METABOLIC PANEL: CPT | Performed by: PHYSICIAN ASSISTANT

## 2019-03-27 PROCEDURE — 99284 EMERGENCY DEPT VISIT MOD MDM: CPT

## 2019-03-27 PROCEDURE — G8978 MOBILITY CURRENT STATUS: HCPCS

## 2019-03-27 PROCEDURE — 85025 COMPLETE CBC W/AUTO DIFF WBC: CPT | Performed by: PHYSICIAN ASSISTANT

## 2019-03-27 RX ORDER — IBUPROFEN 400 MG/1
400 TABLET ORAL EVERY 6 HOURS PRN
Qty: 20 TABLET | Refills: 0 | Status: SHIPPED | OUTPATIENT
Start: 2019-03-27 | End: 2019-09-16 | Stop reason: HOSPADM

## 2019-03-27 RX ORDER — CEPHALEXIN 250 MG/1
500 CAPSULE ORAL ONCE
Status: COMPLETED | OUTPATIENT
Start: 2019-03-27 | End: 2019-03-27

## 2019-03-27 RX ORDER — CEPHALEXIN 500 MG/1
500 CAPSULE ORAL 4 TIMES DAILY
Qty: 40 CAPSULE | Refills: 0 | Status: SHIPPED | OUTPATIENT
Start: 2019-03-27 | End: 2019-04-06 | Stop reason: HOSPADM

## 2019-03-27 RX ORDER — CEPHALEXIN 500 MG/1
500 CAPSULE ORAL 4 TIMES DAILY
Qty: 40 CAPSULE | Refills: 0 | Status: SHIPPED | OUTPATIENT
Start: 2019-03-27 | End: 2019-03-27 | Stop reason: SDUPTHER

## 2019-03-27 RX ORDER — IBUPROFEN 400 MG/1
400 TABLET ORAL EVERY 6 HOURS PRN
Qty: 20 TABLET | Refills: 0 | Status: SHIPPED | OUTPATIENT
Start: 2019-03-27 | End: 2019-03-27 | Stop reason: SDUPTHER

## 2019-03-27 RX ADMIN — CEPHALEXIN 500 MG: 250 CAPSULE ORAL at 14:22

## 2019-03-27 NOTE — ED NOTES
Pt repositioned  Clean attends applied and personal hygiene performed  Pt denies any complaints at present time  Will continue to monitor       Krysten Ceballos RN  03/27/19 7811

## 2019-03-27 NOTE — PHYSICAL THERAPY NOTE
PHYSICAL THERAPY Evaluation NOTE    Patient Name: Neha Henradez  OPOOV'C Date: 3/27/2019     AGE:   80 y o  Mrn:   [id number]  ADMIT DX:  Weakness [R53 1]    Past Medical History:   Diagnosis Date    [facility]     Last Assessed:  7/18/17    Basal cell carcinoma of nose     Last Assessed:  4/12/17    Blind     blind right eye, 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, Last Assessed:  7/18/17    Dementia     Depression     Disease of thyroid gland     Fibromyalgia     Last Assessed:  7/18/17    Gait disturbance     GERD (gastroesophageal reflux disease)     Glaucoma     Hyperlipidemia     Hypertension     Osteopenia     Peripheral neuropathy     Last Assessed:  4/12/17    [facility]ymyalgia rheumatica ([facility] [address] )     Psychiatric disorder     depression    Renal insufficiency syndrome     Last Assessed:  4/13/17    Rheumatoid arthritis ([facility] )     Stroke ([facility] )     x2, Last Assessed:  7/18/17    Systemic lupus erythematosus ([facility] )     Vertigo     Vitamin D deficiency      Length Of Stay: 0  PHYSICAL THERAPY EVALUATION :    03/27/19 1240   Note Type   Note type Eval only   Pain Assessment   Pain Assessment No/denies pain   Pain Score No Pain   Home Living   Type of Home Assisted living   Home Layout One level   Bathroom Shower/Tub Walk-in shower   Bathroom Toilet Standard   Bathroom Equipment Grab bars in shower;Built-in shower seat;Grab bars around toilet   [address] Walker   Additional Comments Pt  lives in memory care unit at    Prior Function   Level of [place] Needs assistance with IADLs; Needs assistance with ADLs and functional mobility   Lives With Facility staff   Receives Help From Personal care attendant   ADL Assistance Needs assistance   IADLs Needs assistance   Falls in the last 6 months 0   Vocational Retired   Comments PTA, Assistance from facility staff for ADLs, IADLs and functional mobility with RW as AD   Restrictions/Precautions   Other Precautions Cognitive; Chair Alarm; Bed Alarm;Pain; Fall Risk   General   Additional Pertinent History Pt  is an 79 yo F who presents with Weakness, and pain in LUE   Family/Caregiver Present No   Cognition   Overall Cognitive Status WFL   Arousal/Participation Cooperative   Orientation Level Oriented X4   Memory Decreased long term memory;Decreased short term memory;Decreased recall of recent events   Following Commands Follows one step commands with increased time or repetition   Comments Pt  was identified with full name and birthdate verified with ID bracelet  RUE Assessment   RUE Assessment WFL   LUE Assessment   LUE Assessment   (pain, swelling, and redness over LUE hand NSG aware)   RLE Assessment   RLE Assessment   (functionally >3+/5)   LLE Assessment   LLE Assessment   (functionally >3+/5)   Coordination   Movements are Fluid and Coordinated 1   Sensation WFL   Light Touch   RLE Light Touch Grossly intact   LLE Light Touch Grossly intact   Bed Mobility   Supine to Sit 4  Minimal assistance   Additional items Assist x 1; Increased time required;Verbal cues  (for sequencing)   Sit to Supine 4  Minimal assistance   Additional items Assist x 1; Impulsive;Verbal cues  (for sequencing)   Transfers   Sit to Stand 4  Minimal assistance   Additional items Increased time required;Verbal cues  (for hand placement and sequencing)   Stand to Sit 4  Minimal assistance   Additional items Impulsive;Verbal cues  (to reach back for controlled descen)   Ambulation/Elevation   Gait pattern Improper Weight shift;Narrow DONNY; Forward Flexion;Decreased foot clearance;Shuffling; Inconsistent iwona; Foward flexed; Redundant gait at times; Ataxia; Excessively slow   Gait Assistance 4  Minimal assist   Additional items Assist x 1;Verbal cues   Assistive Device Rolling walker   Distance 2'x1 advance retreat at EOB  ( declined further ambulation 2/2 to pain and confusion)   Balance   Static Sitting Fair -   Dynamic Sitting Fair -   Static Standing Poor +   Dynamic Standing Poor +   Ambulatory Poor +   Endurance Deficit   Endurance Deficit Yes   Endurance Deficit Description postural degradation in standing noted with fatigue   Activity Tolerance   Activity Tolerance Patient limited by fatigue;Treatment limited secondary to agitation   Medical Staff Made Aware Spoke to Lindsey Dueñas CM   Nurse Made Aware Spoke to RN   Assessment   Prognosis Good   Problem List Decreased strength;Decreased range of motion;Decreased endurance; Impaired balance;Decreased mobility; Decreased coordination;Decreased safety awareness;Decreased cognition;Pain   Assessment Pt  is an 81 yo F who presents with Weakness, and pain in LUE  Dx: Weakness, comorbidities include order placed for PT eval and tx, w/ activity order of activity as tolerated  pt presents w/ comorbidities of HLD, GERD, Fibromyalgia, RA, CVA, Htn, AD, Anemia, Pseudogout of L wrist and personal factors of advanced age, mobilizing w/ assistive device, inability to ambulate household distances, inability to navigate community distances, inability to navigate level surfaces w/o external assistance, unable to perform dynamic tasks in community, decreased cognition and limited home support  pt presents w/ pain, weakness, decreased ROM, decreased endurance, impaired cognition, impaired balance, gait deviations, altered sensation, decreased safety awareness and fall risk  these impairments are evident in findings from physical examination (weakness, decreased ROM and impaired coordination), mobility assessment (need for Min assist w/ all phases of mobility when usually mobilizing independently, tolerance to only 2 feet of ambulation and need for cueing for mobility technique), and Barthel Index: 35/100  pt needed input for task focus and mobility technique   pt is at risk for falls due to physical and safety awareness deficits  pt's clinical presentation is unstable/unpredictable (evident in need for assist w/ all phases of mobility when usually mobilizing independently, tolerance to only 2 feet of ambulation, pain impacting overall mobility status, need for input for mobility technique, need for input for task focus and mobility technique and need for input for mobility technique/safety)  pt needs inpatient PT tx to improve mobility deficits  discharge recommendation is for inpatient rehab to reduce fall risk and maximize level of functional independence  Goals   Patient Goals to find Leona Wilcox (her spouse)   STG Expiration Date 04/06/19   Short Term Goal #1 pt will: Increase bilateral LE strength 1/2 grade to facilitate independent mobility, Perform all bed mobility tasks w/ supervision to decrease fall risk factors, Perform all transfers w/ supervision to improve independence, Ambulate 150 ft  with least restrictive assistive device w/ supervision w/o LOB, Increase all balance 1/2 grade to decrease risk for falls and Improve Barthel Index score to 70 or greater to facilitate independence   Treatment Day 0   Plan   Treatment/Interventions Functional transfer training;LE strengthening/ROM; Therapeutic exercise; Endurance training;Cognitive reorientation;Patient/family training;Equipment eval/education;Gait training;Bed mobility;Spoke to nursing;Spoke to case management; Family   PT Frequency 5x/wk   Recommendation   Recommendation Short-term skilled PT   Equipment Recommended Walker   PT - OK to Discharge Yes   Additional Comments when medically appropriate   Barthel Index   Feeding 5   Bathing 0   Grooming Score 0   Dressing Score 5   Bladder Score 0   Bowels Score 10   Toilet Use Score 5   Transfers (Bed/Chair) Score 10   Mobility (Level Surface) Score 0   Stairs Score 0   Barthel Index Score 35       Skilled PT recommended while in hospital and upon DC to progress pt toward treatment goals       Danny Hylton, PT 3/27/2019

## 2019-03-27 NOTE — ED NOTES
Pt sleeping on stretcher with HOB elevated in negative distress  Respirations regular and non-labored  Family at bedside  Awaiting results   Will continue to monitor     Estelle Fong RN  03/27/19 4763

## 2019-03-27 NOTE — ED NOTES
Patient to be transferred to [facility] @ 2030 via w/c transport  Room#313, Bed B  Phone# for report [phone]  Will call report prior to transport        Masoud Zavaleta RN  03/27/19 7855

## 2019-03-27 NOTE — ED NOTES
Case Management, Abbey at bedside to evaluate pt and speak to daughter       Thierry Becker, [place]  03/27/19 3445

## 2019-03-27 NOTE — ED NOTES
Assumed care of patient at this time  Patient resting quietly in no acute distress  Offers no c/o's       Arline Wheeler RN  03/27/19 2415

## 2019-03-27 NOTE — ED PROVIDER NOTES
History  Chief Complaint   Patient presents with    Fever - 75 years or older     Pt brought to ER via EMS from NH with c/o continuing fever and possible left hand cellulititis  Pt recently discharged from hospital for sepsis  49-year-old female presents to the emergency department with complaints of left-sided hand pain and fever  Per nursing home staff her temperature was 101 7° tympanic earlier today and she was transferred to the emergency department for treatment  She was not given any medications for her fever prior to arrival   Patient is allergic to Tylenol does not have an order to give Motrin at the facility as needed for fever  Patient was afebrile upon arrival but continues to complain of pain in left hand  Daughter notes she was previously diagnosed with gout in this hand at 1 time  Nursing home staff relates that patient recently finished her antibiotics for urinary tract infection  She has been complaining of pain in her left hand which was noted to be slightly red upon readmission to the [facility] 2 days ago  Patient has a hx/o dementia and is unable to give additional history  History provided by:  Patient, nursing home and relative   used: No    Fever - 75 years or older   Max temp prior to arrival:  101 7  Temp source:  Tympanic  Severity:  Unable to specify  Onset quality:  Unable to specify  Progression:  Resolved  Chronicity:  New  Ineffective treatments:  None tried      Prior to Admission Medications   Prescriptions Last Dose Informant Patient Reported? Taking?    DULoxetine (CYMBALTA) 60 mg delayed release capsule  Outside Facility (Specify) No Yes   Sig: Take 1 capsule (60 mg total) by mouth daily   Dentifrices ([facility])  Outside Facility (Specify) Yes No   Sig: Apply to teeth   LORazepam (ATIVAN) 0 5 mg tablet  Outside Facility (Specify) No No   Sig: Take 1 tablet (0 5 mg total) by mouth every 8 (eight) hours as needed for anxiety for up to 10 days She always takes 0 5 mg at bedtime   Lutein 10 MG TABS  Outside Facility (Specify) Yes Yes   Sig: Take 1 tablet by mouth daily     NIFEdipine (PROCARDIA XL) 60 mg 24 hr tablet  Outside Facility (Specify) Yes Yes   Sig: Take 60 mg by mouth daily    benzocaine (ORAJEL) 10 % mucosal gel  Outside Facility (Specify) No No   Sig: Apply 1 application to the mouth or throat 3 (three) times a day as needed for mucositis   Patient not taking: Reported on 3/12/2019   benzonatate (TESSALON PERLES) 100 mg capsule  Outside Facility (Specify) No No   Sig: Take 1 capsule (100 mg total) by mouth 3 (three) times a day as needed for cough   calcium carbonate (OS-TEDDY) 600 MG tablet  Outside Facility (Specify) Yes Yes   Sig: Take 600 mg by mouth daily   carboxymethylcellulose (REFRESH LIQUIGEL) 1 % ophthalmic solution  Outside Facility (Specify) Yes Yes   Sig: Apply 1 drop to eye 3 (three) times a day     cephalexin (KEFLEX) 500 mg capsule   No No   Sig: Take 1 capsule (500 mg total) by mouth every 6 (six) hours for 2 days   cetirizine (ZyrTEC) 10 MG chewable tablet  Outside Facility (Specify) No Yes   Sig: Chew 1 tablet (10 mg total) daily   donepezil (ARICEPT) 10 mg tablet  Outside Facility (Specify) No Yes   Sig: Take 0 5 tablets (5 mg total) by mouth daily at bedtime Increased confusion on 10mg   estradiol (ESTRACE) 0 1 mg/g vaginal cream  Outside Facility (Specify) No Yes   Sig: Insert 1 g into the vagina daily Do no insert - please apply a pea sized amount to the urethra/vagina Monday, Wednesday and Friday before bedtime   fluticasone (FLONASE) 50 mcg/act nasal spray  Outside Facility (Specify) No Yes   Si spray into each nostril daily   furosemide (LASIX) 20 mg tablet  Outside Facility (Specify) No Yes   Sig: Take one tab on Monday- Wednesday and friday   Patient taking differently: Take 20 mg by mouth Take one tab on Monday- Wednesday and friday   guaiFENesin (MUCINEX) 600 mg 12 hr tablet  Outside Facility (Specify) No No   Sig: Take 1 tablet (600 mg total) by mouth 2 (two) times a day as needed for congestion   Patient not taking: Reported on 3/12/2019   ipratropium-albuterol (COMBIVENT RESPIMAT) inhaler  Outside Facility (Specify) No No   Sig: Inhale 2 puffs every 4 (four) hours as needed (dyspnea)   Patient not taking: Reported on 3/12/2019   levothyroxine 100 mcg tablet  Outside Facility (Specify) Yes Yes   Sig: Take 100 mcg by mouth daily    losartan (COZAAR) 100 MG tablet  Outside Facility (Specify) No Yes   Sig: Take 1 tablet (100 mg total) by mouth daily   magnesium hydroxide (MILK OF MAGNESIA) 400 mg/5 mL oral suspension  Outside Facility (Specify) Yes No   Sig: Take 30 mL by mouth daily as needed for constipation     menthol-methyl salicylate (BENGAY) 73-97 % cream  Outside Facility (Specify) No No   Sig: Apply topically 4 (four) times a day as needed (pain)   Patient not taking: Reported on 3/12/2019   metoprolol tartrate (LOPRESSOR) 50 mg tablet  Outside Facility (Specify) No Yes   Sig: Take 0 5 tablets (25 mg total) by mouth 2 (two) times a day   pantoprazole (PROTONIX) 40 mg tablet  Outside Facility (Specify) Yes Yes   Sig: Take 40 mg by mouth 2 (two) times a day     potassium chloride (K-DUR,KLOR-CON) 10 mEq tablet  Outside Facility (Specify) No Yes   Sig: Take one tab on days you take furosemide  Patient taking differently: Take 10 mEq by mouth daily Take one tab on days you take furosemide   Q Monday, Wednesday, and Friday   pravastatin (PRAVACHOL) 40 mg tablet  Outside Facility (Specify) No Yes   Sig: Take 1 tablet (40 mg total) by mouth daily   sodium chloride (OCEAN) 0 65 % nasal spray  Outside Facility (Specify) Yes Yes   Si sprays into each nostril 2 (two) times a day   triamcinolone (KENALOG) 0 1 % cream  Outside Facility (Specify) Yes No   Sig: triamcinolone acetonide 0 1 % topical cream      Facility-Administered Medications: None       Past Medical History:   Diagnosis Date    Aspiration pneumonia ([facility] )     Last Assessed:  7/18/17    Basal cell carcinoma of nose     Last Assessed:  4/12/17    Blind     blind right eye, 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, Last Assessed:  7/18/17    Dementia     Depression     Disease of thyroid gland     Fibromyalgia     Last Assessed:  7/18/17    Gait disturbance     GERD (gastroesophageal reflux disease)     Glaucoma     Hyperlipidemia     Hypertension     Osteopenia     Peripheral neuropathy     Last Assessed:  4/12/17    Polymyalgia rheumatica ([facility] )     Psychiatric disorder     depression    Renal insufficiency syndrome     Last Assessed:  4/13/17    Rheumatoid arthritis ([facility] )     Stroke ([facility] )     x2, Last Assessed:  7/18/17    Systemic lupus erythematosus ([facility] )     Vertigo     Vitamin D deficiency        Past Surgical History:   Procedure Laterality Date    EYE SURGERY      JOINT REPLACEMENT      left hip replacement, left shoulder replacement    SHOULDER SURGERY      Replacement       Family History   Problem Relation Age of Onset    Heart attack Mother     Diabetes Mother     Dementia Father     Coronary artery disease Father         cardiac disorder    Diabetes Sister     Uterine cancer Sister      I have reviewed and agree with the history as documented  Social History     Tobacco Use    Smoking status: Never Smoker    Smokeless tobacco: Never Used   Substance Use Topics    Alcohol use: No    Drug use: No        Review of Systems   Unable to perform ROS: Dementia       Physical Exam  Physical Exam   Constitutional: She is oriented to person, place, and time  She appears well-developed and well-nourished  No distress  HENT:   Head: Normocephalic and atraumatic  Right Ear: External ear normal    Left Ear: External ear normal    Mouth/Throat: Oropharynx is clear and moist  No oropharyngeal exudate  Eyes: Conjunctivae are normal    Neck: No JVD present  No tracheal deviation present  Cardiovascular: Normal rate, regular rhythm and normal heart sounds  Exam reveals no gallop and no friction rub  No murmur heard  Pulmonary/Chest: Effort normal and breath sounds normal  No respiratory distress  She has no wheezes  She has no rales  She exhibits no tenderness  Musculoskeletal: She exhibits no edema or deformity  Left hand: She exhibits decreased range of motion, tenderness and swelling  Hands:  Lymphadenopathy:     She has no cervical adenopathy  Neurological: She is alert and oriented to person, place, and time  Skin: Skin is warm and dry  No rash noted  She is not diaphoretic  No erythema  Psychiatric: She has a normal mood and affect  Her behavior is normal    Nursing note and vitals reviewed        Vital Signs  ED Triage Vitals [03/27/19 0805]   Temperature Pulse Respirations Blood Pressure SpO2   98 7 °F (37 1 °C) 86 18 150/66 95 %      Temp Source Heart Rate Source Patient Position - Orthostatic VS BP Location FiO2 (%)   Oral Monitor Lying Right arm --      Pain Score       No Pain           Vitals:    03/27/19 0805 03/27/19 1100   BP: 150/66 161/74   Pulse: 86 88   Patient Position - Orthostatic VS: Lying Lying         Visual Acuity  Visual Acuity      Most Recent Value   L Pupil Size (mm)  4   R Pupil Size (mm)  4          ED Medications  Medications   cephalexin (KEFLEX) capsule 500 mg (500 mg Oral Given 3/27/19 1422)       Diagnostic Studies  Results Reviewed     Procedure Component Value Units Date/Time    Urine Microscopic [[id number]]  (Abnormal) Collected:  03/27/19 1009    Lab Status:  Final result Specimen:  Urine, Other Updated:  03/27/19 1040     RBC, UA None Seen /hpf      WBC, UA 0-1 /hpf      Epithelial Cells Occasional /hpf      Bacteria, UA Occasional /hpf      MUCUS THREADS Occasional    ED Urine Macroscopic [[id number]]  (Abnormal) Collected:  03/27/19 1009    Lab Status:  Final result Specimen:  Urine Updated:  03/27/19 1008     Color, UA Yellow Clarity, UA Clear     pH, UA 6 0     Leukocytes, UA Negative     Nitrite, UA Negative     Protein, UA 30 (1+) mg/dl      Glucose, UA Negative mg/dl      Ketones, UA Negative mg/dl      Urobilinogen, UA 0 2 E U /dl      Bilirubin, UA Negative     Blood, UA Negative     Specific Gravity, UA 1 020    Narrative:       CLINITEK RESULT    Comprehensive metabolic panel [[id number]]  (Abnormal) Collected:  03/27/19 0845    Lab Status:  Final result Specimen:  Blood from Arm, Right Updated:  03/27/19 0919     Sodium 133 mmol/L      Potassium 4 4 mmol/L      Chloride 99 mmol/L      CO2 24 mmol/L      ANION GAP 10 mmol/L      BUN 10 mg/dL      Creatinine 0 91 mg/dL      Glucose 110 mg/dL      Calcium 9 1 mg/dL      AST 42 U/L      ALT 21 U/L      Alkaline Phosphatase 130 U/L      Total Protein 7 4 g/dL      Albumin 2 6 g/dL      Total Bilirubin 0 50 mg/dL      eGFR 57 ml/min/1 73sq m     Narrative:       National Kidney Disease Education Program recommendations are as follows:  GFR calculation is accurate only with a steady state creatinine  Chronic Kidney disease less than 60 ml/min/1 73 sq  meters  Kidney failure less than 15 ml/min/1 73 sq  meters      Protime-INR [[id number]]  (Normal) Collected:  03/27/19 0845    Lab Status:  Final result Specimen:  Blood from Arm, Right Updated:  03/27/19 0908     Protime 13 2 seconds      INR 1 03    APTT [[id number]]  (Normal) Collected:  03/27/19 0845    Lab Status:  Final result Specimen:  Blood from Arm, Right Updated:  03/27/19 0908     PTT 29 seconds     CBC and differential [[id number]]  (Abnormal) Collected:  03/27/19 0845    Lab Status:  Final result Specimen:  Blood from Arm, Right Updated:  03/27/19 0858     WBC 9 42 Thousand/uL      RBC 3 24 Million/uL      Hemoglobin 9 4 g/dL      Hematocrit 29 0 %      MCV 90 fL      MCH 29 0 pg      MCHC 32 4 g/dL      RDW 14 9 %      MPV 9 1 fL      Platelets 829 Thousands/uL      nRBC 0 /100 WBCs      Neutrophils Relative 78 %      Immat GRANS % 1 %      Lymphocytes Relative 12 %      Monocytes Relative 9 %      Eosinophils Relative 0 %      Basophils Relative 0 %      Neutrophils Absolute 7 23 Thousands/µL      Immature Grans Absolute 0 08 Thousand/uL      Lymphocytes Absolute 1 17 Thousands/µL      Monocytes Absolute 0 89 Thousand/µL      Eosinophils Absolute 0 03 Thousand/µL      Basophils Absolute 0 02 Thousands/µL                  No orders to display              Procedures  Procedures       Phone Contacts  ED Phone Contact    ED Course  ED Course as of Mar 27 1625   Wed Mar 27, 2019   1059 Spoke with daughter at bedside  States that the patient needs to be able to walk 250 feet with 1 assist and [facility]  Notes that she is unable to do that here due to pain in the left hand when using her walker  States she has been to another rehab facility recently they were not satisfied with patient's care  I will review this with case management to see if the patient qualifies for inpatient physical therapy due to recent hospital admission   with case management  Will review case  States the patient may qualify for inpatient physical therapy  Will come down to talk with daughter  2758 PT at bedside  [phone] with case management  Patient will likely be placed in a skilled nursing facility for rehabilitation direct from the ER  MDM  Number of Diagnoses or Management Options  Cellulitis of left hand:   Fever:   Diagnosis management comments: Differential diagnosis includes but not limited to:  Cellulitis, gout, ambulatory dysfunction, failure to thrive           Amount and/or Complexity of Data Reviewed  Clinical lab tests: ordered and reviewed  Obtain history from someone other than the patient: yes        Disposition  Final diagnoses:   Cellulitis of left hand   Fever     Time reflects when diagnosis was documented in both MDM as applicable and the Disposition within this note     Time User Action Codes Description Comment    3/27/2019 10:16 AM Jose Desir Add [N03 [id number]] Cellulitis of left hand     3/27/2019  2:57 PM Jose Desir Add [R50 9] Fever       ED Disposition     ED Disposition Condition Date/Time Comment    Discharge Stable Wed Mar 27, 2019 10:12 AM Marciacassie William discharge to home/self care  Follow-up Information     Follow up With Specialties Details Why Contact Info    Steph Segovia,  Internal Medicine, Family Medicine   [address] [place] [address] [place], [place] [address]  [phone]            Current Discharge Medication List      START taking these medications    Details   !! cephalexin (KEFLEX) 500 mg capsule Take 1 capsule (500 mg total) by mouth 4 (four) times a day for 10 days  Qty: 40 capsule, Refills: 0    Associated Diagnoses: Cellulitis of left hand      ibuprofen (MOTRIN) 400 mg tablet Take 1 tablet (400 mg total) by mouth every 6 (six) hours as needed for mild pain (fever)  Qty: 20 tablet, Refills: 0    Associated Diagnoses: Fever       !! - Potential duplicate medications found  Please discuss with provider  CONTINUE these medications which have NOT CHANGED    Details   calcium carbonate (OS-TEDDY) 600 MG tablet Take 600 mg by mouth daily      carboxymethylcellulose (REFRESH LIQUIGEL) 1 % ophthalmic solution Apply 1 drop to eye 3 (three) times a day        cetirizine (ZyrTEC) 10 MG chewable tablet Chew 1 tablet (10 mg total) daily  Qty: 30 tablet, Refills: 0    Associated Diagnoses:  Allergic rhinitis, unspecified seasonality, unspecified trigger      donepezil (ARICEPT) 10 mg tablet Take 0 5 tablets (5 mg total) by mouth daily at bedtime Increased confusion on 10mg  Qty: 45 tablet, Refills: 1    Associated Diagnoses: Dementia with behavioral disturbance, unspecified dementia type      DULoxetine (CYMBALTA) 60 mg delayed release capsule Take 1 capsule (60 mg total) by mouth daily  Refills: 0    Associated Diagnoses: Pneumonia of right lower lobe due to infectious organism ([facility] )      estradiol (ESTRACE) 0 1 mg/g vaginal cream Insert 1 g into the vagina daily Do no insert - please apply a pea sized amount to the urethra/vagina Monday, Wednesday and Friday before bedtime  Qty: 42 5 g, Refills: 3    Associated Diagnoses: Recurrent UTI      fluticasone (FLONASE) 50 mcg/act nasal spray 1 spray into each nostril daily  Qty: 16 g, Refills: 0    Associated Diagnoses: Allergic rhinitis, unspecified seasonality, unspecified trigger      furosemide (LASIX) 20 mg tablet Take one tab on Monday- Wednesday and friday  Qty: 36 tablet, Refills: 1    Associated Diagnoses: Localized edema      levothyroxine 100 mcg tablet Take 100 mcg by mouth daily       losartan (COZAAR) 100 MG tablet Take 1 tablet (100 mg total) by mouth daily  Qty: 90 tablet, Refills: 1    Associated Diagnoses: Bradycardia      Lutein 10 MG TABS Take 1 tablet by mouth daily        metoprolol tartrate (LOPRESSOR) 50 mg tablet Take 0 5 tablets (25 mg total) by mouth 2 (two) times a day  Qty: 45 tablet, Refills: 1    Associated Diagnoses: Essential hypertension      NIFEdipine (PROCARDIA XL) 60 mg 24 hr tablet Take 60 mg by mouth daily       pantoprazole (PROTONIX) 40 mg tablet Take 40 mg by mouth 2 (two) times a day        potassium chloride (K-DUR,KLOR-CON) 10 mEq tablet Take one tab on days you take furosemide    Qty: 30 tablet, Refills: 1    Associated Diagnoses: Hypokalemia      pravastatin (PRAVACHOL) 40 mg tablet Take 1 tablet (40 mg total) by mouth daily  Refills: 0    Comments: Resume on friday  Associated Diagnoses: Pseudogout of left wrist      sodium chloride (OCEAN) 0 65 % nasal spray 2 sprays into each nostril 2 (two) times a day      benzocaine (ORAJEL) 10 % mucosal gel Apply 1 application to the mouth or throat 3 (three) times a day as needed for mucositis  Qty: 5 3 g, Refills: 0    Associated Diagnoses: Ulcer (traumatic) of oral mucosa      benzonatate (TESSALON PERLES) 100 mg capsule Take 1 capsule (100 mg total) by mouth 3 (three) times a day as needed for cough  Qty: 20 capsule, Refills: 0    Associated Diagnoses: Cough      !! cephalexin (KEFLEX) 500 mg capsule Take 1 capsule (500 mg total) by mouth every 6 (six) hours for 2 days  Qty: 8 capsule, Refills: 0    Associated Diagnoses: Acute cystitis without hematuria      Dentifrices ([facility] DT) Apply to teeth      guaiFENesin (MUCINEX) 600 mg 12 hr tablet Take 1 tablet (600 mg total) by mouth 2 (two) times a day as needed for congestion  Qty: 60 tablet, Refills: 1    Associated Diagnoses: Bronchitis      ipratropium-albuterol (COMBIVENT RESPIMAT) inhaler Inhale 2 puffs every 4 (four) hours as needed (dyspnea)  Qty: 1 Inhaler, Refills: 5    Associated Diagnoses: Bronchitis      LORazepam (ATIVAN) 0 5 mg tablet Take 1 tablet (0 5 mg total) by mouth every 8 (eight) hours as needed for anxiety for up to 10 days She always takes 0 5 mg at bedtime  Qty: 30 tablet, Refills: 0    Associated Diagnoses: Anxiety      magnesium hydroxide (MILK OF MAGNESIA) 400 mg/5 mL oral suspension Take 30 mL by mouth daily as needed for constipation        menthol-methyl salicylate (BENGAY) 20-31 % cream Apply topically 4 (four) times a day as needed (pain)  Refills: 0    Associated Diagnoses: Pseudogout of left wrist      triamcinolone (KENALOG) 0 1 % cream triamcinolone acetonide 0 1 % topical cream       !! - Potential duplicate medications found  Please discuss with provider  No discharge procedures on file      ED Provider  Electronically Signed by           Zari Vicente PA-C  03/27/19 0407

## 2019-03-27 NOTE — ED NOTES
Pt resting comfortably  Denies any needs or complaints  Daughter at bedside        Melissa Kimble RN  03/27/19 9529

## 2019-03-27 NOTE — SOCIAL WORK
Patient has been accepted at [facility] as per Samy  Negative PASRR and medication list uploaded through [address]  Patient accepted to room 313, Bed B  Nurse report to be called to [phone] prior to discharge  DCI to be faxed to [phone]  Patient's daughter requesting [address] transport  CM called Cipriano at [facility]; [address] arranged with [facility] for 8:30 pm  [facility] will bring stretcher but will bill it as wheelchair  ED provider, RN, facility and patient's daughter aware of transportation plans
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
## 2019-03-27 NOTE — PLAN OF CARE
Problem: PHYSICAL THERAPY ADULT  Goal: Performs mobility at highest level of function for planned discharge setting  See evaluation for individualized goals  Description  Treatment/Interventions: Functional transfer training, LE strengthening/ROM, Therapeutic exercise, Endurance training, Cognitive reorientation, Patient/family training, Equipment eval/education, Gait training, Bed mobility, Spoke to nursing, Spoke to case management, Family  Equipment Recommended: Valerie Castellano       See flowsheet documentation for full assessment, interventions and recommendations  Note:   Prognosis: Good  Problem List: Decreased strength, Decreased range of motion, Decreased endurance, Impaired balance, Decreased mobility, Decreased coordination, Decreased safety awareness, Decreased cognition, Pain  Assessment: Pt  is an 81 yo F who presents with Weakness, and pain in LUE  Dx: Weakness, comorbidities include order placed for PT eval and tx, w/ activity order of activity as tolerated  pt presents w/ comorbidities of HLD, GERD, Fibromyalgia, RA, CVA, Htn, AD, Anemia, Pseudogout of L wrist and personal factors of advanced age, mobilizing w/ assistive device, inability to ambulate household distances, inability to navigate community distances, inability to navigate level surfaces w/o external assistance, unable to perform dynamic tasks in community, decreased cognition and limited home support  pt presents w/ pain, weakness, decreased ROM, decreased endurance, impaired cognition, impaired balance, gait deviations, altered sensation, decreased safety awareness and fall risk   these impairments are evident in findings from physical examination (weakness, decreased ROM and impaired coordination), mobility assessment (need for Min assist w/ all phases of mobility when usually mobilizing independently, tolerance to only 2 feet of ambulation and need for cueing for mobility technique), and Barthel Index: 35/100  pt needed input for task focus and mobility technique  pt is at risk for falls due to physical and safety awareness deficits  pt's clinical presentation is unstable/unpredictable (evident in need for assist w/ all phases of mobility when usually mobilizing independently, tolerance to only 2 feet of ambulation, pain impacting overall mobility status, need for input for mobility technique, need for input for task focus and mobility technique and need for input for mobility technique/safety)  pt needs inpatient PT tx to improve mobility deficits  discharge recommendation is for inpatient rehab to reduce fall risk and maximize level of functional independence  Recommendation: Short-term skilled PT     PT - OK to Discharge: Yes    See flowsheet documentation for full assessment

## 2019-03-27 NOTE — ED NOTES
Pt's daughter requesting to speak to attending regarding pt's ability to ambulate with walker  TAWANDA Lakhani notified       Leonila Carrington RN  03/27/19 6720

## 2019-03-27 NOTE — ED NOTES
Pt's daughter requesting to speak to the attending regarding pt's ability to ambulate with walker safely if discharged back to nursing home  TAWANDA Lakhani at bedside  Pt verbally refused to ambulate, stated "I don't want to"  Pt encouraged to stand with assist of ED [facility] with negative complications  Pt only able to ambulate approx 3 ft before pt refused to walk anymore  Pt was noted to be able to grasp walker with B/L hands and push walker slowly by herself, slow shuffled gait noted  PT evaluation to be ordered  Case Management notified by PA regarding possible rehab placement   Will continue to monitor     Mariana Dougherty RN  03/27/19 8315

## 2019-03-27 NOTE — SOCIAL WORK
CM met with patient's daughter Maggie Yost at bedside  Maggie Yost explained patient discharged home to [facility] on 3/25 and was able to ambulate well and at baseline  Today, [facility] reached out to patient's daughter to let her know she was feverish and refusing to utilize her left hand on her RW and effectively was unable to ambulate safely  Patient's daughter feels that if patient is unable to ambulate the minimum 200' needed at [facility], she will need to discharge to SNF  Patient was recently at [facility] and patient's daughter unhappy with the care there  CM discussed that PT eval will be ordered and they will assess patient and if STR recommended, ED provider would like patient to direct admit from ED and patient's daughter understood  Patient's daughter provided list of SNF facilities in the area and requested referrals be sent to [facility], and [facility] as well as [facility] and [address]  Patient's daughter Maggie Yost requesting [facility] transport at discharge and is agreeable to [facility] cost  CM will follow up on PT evaluation, will send [facility] referrals and will continue to assess patient  ED provider and RN aware

## 2019-03-28 ENCOUNTER — TRANSITIONAL CARE MANAGEMENT (OUTPATIENT)
Dept: FAMILY MEDICINE CLINIC | Facility: CLINIC | Age: 84
End: 2019-03-28

## 2019-03-28 VITALS
OXYGEN SATURATION: 98 % | SYSTOLIC BLOOD PRESSURE: 139 MMHG | TEMPERATURE: 98.5 F | WEIGHT: 143.3 LBS | RESPIRATION RATE: 16 BRPM | HEART RATE: 88 BPM | BODY MASS INDEX: 27.99 KG/M2 | DIASTOLIC BLOOD PRESSURE: 77 MMHG

## 2019-03-29 NOTE — PROGRESS NOTES
Patient was admitted to [facility] for Acute Cystitis for 4 days then was d/c to [facility], went to [facility] ED for L hand Cellulitis  She is now at [facility]  Case handed off to Liliane Rodriguez to follow 3/29/19

## 2019-04-03 ENCOUNTER — TELEPHONE (OUTPATIENT)
Dept: UROLOGY | Facility: CLINIC | Age: 84
End: 2019-04-03

## 2019-04-04 ENCOUNTER — HOSPITAL ENCOUNTER (INPATIENT)
Facility: HOSPITAL | Age: 84
LOS: 2 days | Discharge: NURSING, REHAB OR LONG-TERM CARE | DRG: 871 | End: 2019-04-06
Attending: EMERGENCY MEDICINE | Admitting: INTERNAL MEDICINE
Payer: MEDICARE

## 2019-04-04 ENCOUNTER — APPOINTMENT (EMERGENCY)
Dept: RADIOLOGY | Facility: HOSPITAL | Age: 84
DRG: 871 | End: 2019-04-04
Payer: MEDICARE

## 2019-04-04 DIAGNOSIS — R77.8 ELEVATED TROPONIN: Primary | ICD-10-CM

## 2019-04-04 DIAGNOSIS — F41.9 ANXIETY: ICD-10-CM

## 2019-04-04 DIAGNOSIS — R41.82 ALTERED MENTAL STATUS: ICD-10-CM

## 2019-04-04 DIAGNOSIS — R50.9 FEVER: ICD-10-CM

## 2019-04-04 LAB
ALBUMIN SERPL BCP-MCNC: 2.7 G/DL (ref 3.5–5)
ALP SERPL-CCNC: 125 U/L (ref 46–116)
ALT SERPL W P-5'-P-CCNC: 22 U/L (ref 12–78)
ANION GAP SERPL CALCULATED.3IONS-SCNC: 8 MMOL/L (ref 4–13)
AST SERPL W P-5'-P-CCNC: 45 U/L (ref 5–45)
BACTERIA UR QL AUTO: ABNORMAL /HPF
BASOPHILS # BLD AUTO: 0.01 THOUSANDS/ΜL (ref 0–0.1)
BASOPHILS NFR BLD AUTO: 0 % (ref 0–1)
BILIRUB SERPL-MCNC: 0.57 MG/DL (ref 0.2–1)
BILIRUB UR QL STRIP: NEGATIVE
BUN SERPL-MCNC: 14 MG/DL (ref 5–25)
CALCIUM SERPL-MCNC: 8.8 MG/DL (ref 8.3–10.1)
CHLORIDE SERPL-SCNC: 97 MMOL/L (ref 100–108)
CLARITY UR: CLEAR
CO2 SERPL-SCNC: 23 MMOL/L (ref 21–32)
COLOR UR: ABNORMAL
CREAT SERPL-MCNC: 1.15 MG/DL (ref 0.6–1.3)
EOSINOPHIL # BLD AUTO: 0 THOUSAND/ΜL (ref 0–0.61)
EOSINOPHIL NFR BLD AUTO: 0 % (ref 0–6)
ERYTHROCYTE [DISTWIDTH] IN BLOOD BY AUTOMATED COUNT: 15.1 % (ref 11.6–15.1)
GFR SERPL CREATININE-BSD FRML MDRD: 43 ML/MIN/1.73SQ M
GLUCOSE SERPL-MCNC: 101 MG/DL (ref 65–140)
GLUCOSE UR STRIP-MCNC: NEGATIVE MG/DL
HCT VFR BLD AUTO: 29.9 % (ref 34.8–46.1)
HGB BLD-MCNC: 9.5 G/DL (ref 11.5–15.4)
HGB UR QL STRIP.AUTO: NEGATIVE
HYALINE CASTS #/AREA URNS LPF: ABNORMAL /LPF
IMM GRANULOCYTES # BLD AUTO: 0.08 THOUSAND/UL (ref 0–0.2)
IMM GRANULOCYTES NFR BLD AUTO: 1 % (ref 0–2)
KETONES UR STRIP-MCNC: NEGATIVE MG/DL
LACTATE SERPL-SCNC: 1.7 MMOL/L (ref 0.5–2)
LEUKOCYTE ESTERASE UR QL STRIP: NEGATIVE
LYMPHOCYTES # BLD AUTO: 2.09 THOUSANDS/ΜL (ref 0.6–4.47)
LYMPHOCYTES NFR BLD AUTO: 16 % (ref 14–44)
MCH RBC QN AUTO: 28.8 PG (ref 26.8–34.3)
MCHC RBC AUTO-ENTMCNC: 31.8 G/DL (ref 31.4–37.4)
MCV RBC AUTO: 91 FL (ref 82–98)
MONOCYTES # BLD AUTO: 1.52 THOUSAND/ΜL (ref 0.17–1.22)
MONOCYTES NFR BLD AUTO: 11 % (ref 4–12)
NEUTROPHILS # BLD AUTO: 9.71 THOUSANDS/ΜL (ref 1.85–7.62)
NEUTS SEG NFR BLD AUTO: 72 % (ref 43–75)
NITRITE UR QL STRIP: NEGATIVE
NON-SQ EPI CELLS URNS QL MICRO: ABNORMAL /HPF
NRBC BLD AUTO-RTO: 0 /100 WBCS
PH UR STRIP.AUTO: 6 [PH] (ref 4.5–8)
PLATELET # BLD AUTO: 254 THOUSANDS/UL (ref 149–390)
PMV BLD AUTO: 9.5 FL (ref 8.9–12.7)
POTASSIUM SERPL-SCNC: 4 MMOL/L (ref 3.5–5.3)
PROCALCITONIN SERPL-MCNC: 0.25 NG/ML
PROT SERPL-MCNC: 7.6 G/DL (ref 6.4–8.2)
PROT UR STRIP-MCNC: >=300 MG/DL
RBC # BLD AUTO: 3.3 MILLION/UL (ref 3.81–5.12)
RBC #/AREA URNS AUTO: ABNORMAL /HPF
SODIUM SERPL-SCNC: 128 MMOL/L (ref 136–145)
SP GR UR STRIP.AUTO: >=1.03 (ref 1–1.03)
TROPONIN I SERPL-MCNC: 0.07 NG/ML
TROPONIN I SERPL-MCNC: 0.09 NG/ML
UROBILINOGEN UR QL STRIP.AUTO: 1 E.U./DL
WBC # BLD AUTO: 13.41 THOUSAND/UL (ref 4.31–10.16)
WBC #/AREA URNS AUTO: ABNORMAL /HPF

## 2019-04-04 PROCEDURE — 99285 EMERGENCY DEPT VISIT HI MDM: CPT | Performed by: EMERGENCY MEDICINE

## 2019-04-04 PROCEDURE — 80053 COMPREHEN METABOLIC PANEL: CPT | Performed by: EMERGENCY MEDICINE

## 2019-04-04 PROCEDURE — 74176 CT ABD & PELVIS W/O CONTRAST: CPT

## 2019-04-04 PROCEDURE — 85025 COMPLETE CBC W/AUTO DIFF WBC: CPT | Performed by: EMERGENCY MEDICINE

## 2019-04-04 PROCEDURE — 71046 X-RAY EXAM CHEST 2 VIEWS: CPT

## 2019-04-04 PROCEDURE — 96366 THER/PROPH/DIAG IV INF ADDON: CPT

## 2019-04-04 PROCEDURE — 84484 ASSAY OF TROPONIN QUANT: CPT | Performed by: EMERGENCY MEDICINE

## 2019-04-04 PROCEDURE — 87631 RESP VIRUS 3-5 TARGETS: CPT | Performed by: EMERGENCY MEDICINE

## 2019-04-04 PROCEDURE — 36415 COLL VENOUS BLD VENIPUNCTURE: CPT | Performed by: EMERGENCY MEDICINE

## 2019-04-04 PROCEDURE — 93005 ELECTROCARDIOGRAM TRACING: CPT

## 2019-04-04 PROCEDURE — 84145 PROCALCITONIN (PCT): CPT | Performed by: EMERGENCY MEDICINE

## 2019-04-04 PROCEDURE — 99223 1ST HOSP IP/OBS HIGH 75: CPT | Performed by: INTERNAL MEDICINE

## 2019-04-04 PROCEDURE — 87040 BLOOD CULTURE FOR BACTERIA: CPT | Performed by: EMERGENCY MEDICINE

## 2019-04-04 PROCEDURE — 96360 HYDRATION IV INFUSION INIT: CPT

## 2019-04-04 PROCEDURE — 83605 ASSAY OF LACTIC ACID: CPT | Performed by: EMERGENCY MEDICINE

## 2019-04-04 PROCEDURE — 99285 EMERGENCY DEPT VISIT HI MDM: CPT

## 2019-04-04 PROCEDURE — 81001 URINALYSIS AUTO W/SCOPE: CPT

## 2019-04-04 PROCEDURE — 96365 THER/PROPH/DIAG IV INF INIT: CPT

## 2019-04-04 RX ORDER — ESTRADIOL 0.1 MG/G
1 CREAM VAGINAL DAILY
Status: DISCONTINUED | OUTPATIENT
Start: 2019-04-05 | End: 2019-04-06 | Stop reason: HOSPADM

## 2019-04-04 RX ORDER — FLUTICASONE PROPIONATE 50 MCG
1 SPRAY, SUSPENSION (ML) NASAL DAILY
Status: DISCONTINUED | OUTPATIENT
Start: 2019-04-05 | End: 2019-04-06 | Stop reason: HOSPADM

## 2019-04-04 RX ORDER — CALCIUM CARBONATE 500(1250)
1 TABLET ORAL
Status: DISCONTINUED | OUTPATIENT
Start: 2019-04-05 | End: 2019-04-06 | Stop reason: HOSPADM

## 2019-04-04 RX ORDER — LORATADINE 10 MG/1
10 TABLET ORAL DAILY
Status: DISCONTINUED | OUTPATIENT
Start: 2019-04-05 | End: 2019-04-06 | Stop reason: HOSPADM

## 2019-04-04 RX ORDER — ECHINACEA PURPUREA EXTRACT 125 MG
2 TABLET ORAL 2 TIMES DAILY
Status: DISCONTINUED | OUTPATIENT
Start: 2019-04-05 | End: 2019-04-06 | Stop reason: HOSPADM

## 2019-04-04 RX ORDER — ASCORBIC ACID 500 MG
500 TABLET ORAL DAILY
COMMUNITY

## 2019-04-04 RX ORDER — PANTOPRAZOLE SODIUM 40 MG/1
40 TABLET, DELAYED RELEASE ORAL
Status: DISCONTINUED | OUTPATIENT
Start: 2019-04-05 | End: 2019-04-06 | Stop reason: HOSPADM

## 2019-04-04 RX ORDER — 0.9 % SODIUM CHLORIDE 0.9 %
3 VIAL (ML) INJECTION AS NEEDED
Status: DISCONTINUED | OUTPATIENT
Start: 2019-04-04 | End: 2019-04-06 | Stop reason: HOSPADM

## 2019-04-04 RX ORDER — POLYVINYL ALCOHOL 14 MG/ML
1 SOLUTION/ DROPS OPHTHALMIC
Status: DISCONTINUED | OUTPATIENT
Start: 2019-04-04 | End: 2019-04-06 | Stop reason: HOSPADM

## 2019-04-04 RX ORDER — ASCORBIC ACID 500 MG
500 TABLET ORAL DAILY
Status: DISCONTINUED | OUTPATIENT
Start: 2019-04-05 | End: 2019-04-06 | Stop reason: HOSPADM

## 2019-04-04 RX ORDER — DONEPEZIL HYDROCHLORIDE 5 MG/1
5 TABLET, FILM COATED ORAL
Status: DISCONTINUED | OUTPATIENT
Start: 2019-04-04 | End: 2019-04-06 | Stop reason: HOSPADM

## 2019-04-04 RX ORDER — DULOXETIN HYDROCHLORIDE 60 MG/1
60 CAPSULE, DELAYED RELEASE ORAL DAILY
Status: DISCONTINUED | OUTPATIENT
Start: 2019-04-05 | End: 2019-04-06 | Stop reason: HOSPADM

## 2019-04-04 RX ORDER — ALBUTEROL SULFATE 2.5 MG/3ML
2.5 SOLUTION RESPIRATORY (INHALATION) EVERY 4 HOURS PRN
COMMUNITY

## 2019-04-04 RX ORDER — LEVOTHYROXINE SODIUM 0.1 MG/1
100 TABLET ORAL
Status: DISCONTINUED | OUTPATIENT
Start: 2019-04-05 | End: 2019-04-06 | Stop reason: HOSPADM

## 2019-04-04 RX ORDER — LUTEIN 10 MG
1 TABLET ORAL DAILY
Status: DISCONTINUED | OUTPATIENT
Start: 2019-04-05 | End: 2019-04-04 | Stop reason: CLARIF

## 2019-04-04 RX ORDER — DOXYCYCLINE HYCLATE 50 MG/1
65 CAPSULE, GELATIN COATED ORAL
COMMUNITY

## 2019-04-04 RX ORDER — ALBUTEROL SULFATE 2.5 MG/3ML
2.5 SOLUTION RESPIRATORY (INHALATION) EVERY 4 HOURS PRN
Status: DISCONTINUED | OUTPATIENT
Start: 2019-04-04 | End: 2019-04-06 | Stop reason: HOSPADM

## 2019-04-04 RX ORDER — TRIAMCINOLONE ACETONIDE 1 MG/G
CREAM TOPICAL 2 TIMES DAILY
Status: DISCONTINUED | OUTPATIENT
Start: 2019-04-05 | End: 2019-04-06 | Stop reason: HOSPADM

## 2019-04-04 RX ORDER — HEPARIN SODIUM 5000 [USP'U]/ML
5000 INJECTION, SOLUTION INTRAVENOUS; SUBCUTANEOUS EVERY 8 HOURS SCHEDULED
Status: DISCONTINUED | OUTPATIENT
Start: 2019-04-04 | End: 2019-04-06 | Stop reason: HOSPADM

## 2019-04-04 RX ORDER — PRAVASTATIN SODIUM 40 MG
40 TABLET ORAL DAILY
Status: DISCONTINUED | OUTPATIENT
Start: 2019-04-05 | End: 2019-04-06 | Stop reason: HOSPADM

## 2019-04-04 RX ORDER — MUSCLE RUB CREAM 100; 150 MG/G; MG/G
CREAM TOPICAL 4 TIMES DAILY PRN
Status: DISCONTINUED | OUTPATIENT
Start: 2019-04-04 | End: 2019-04-06 | Stop reason: HOSPADM

## 2019-04-04 RX ORDER — SODIUM CHLORIDE 9 MG/ML
75 INJECTION, SOLUTION INTRAVENOUS CONTINUOUS
Status: DISCONTINUED | OUTPATIENT
Start: 2019-04-04 | End: 2019-04-05

## 2019-04-04 RX ORDER — NIFEDIPINE 60 MG/1
60 TABLET, EXTENDED RELEASE ORAL DAILY
Status: DISCONTINUED | OUTPATIENT
Start: 2019-04-05 | End: 2019-04-06 | Stop reason: HOSPADM

## 2019-04-04 RX ADMIN — SODIUM CHLORIDE 1000 ML: 0.9 INJECTION, SOLUTION INTRAVENOUS at 18:40

## 2019-04-04 RX ADMIN — CEFEPIME HYDROCHLORIDE 2000 MG: 2 INJECTION, POWDER, FOR SOLUTION INTRAVENOUS at 18:40

## 2019-04-04 RX ADMIN — SODIUM CHLORIDE 75 ML/HR: 0.9 INJECTION, SOLUTION INTRAVENOUS at 23:51

## 2019-04-04 RX ADMIN — SODIUM CHLORIDE 1000 ML: 0.9 INJECTION, SOLUTION INTRAVENOUS at 17:39

## 2019-04-05 PROBLEM — L89.151 PRESSURE INJURY OF SACRAL REGION, STAGE 1: Status: ACTIVE | Noted: 2019-04-05

## 2019-04-05 LAB
ANION GAP SERPL CALCULATED.3IONS-SCNC: 4 MMOL/L (ref 4–13)
ATRIAL RATE: 87 BPM
ATRIAL RATE: 87 BPM
ATRIAL RATE: 95 BPM
BASOPHILS # BLD AUTO: 0.01 THOUSANDS/ΜL (ref 0–0.1)
BASOPHILS NFR BLD AUTO: 0 % (ref 0–1)
BUN SERPL-MCNC: 11 MG/DL (ref 5–25)
CALCIUM SERPL-MCNC: 7.8 MG/DL (ref 8.3–10.1)
CHLORIDE SERPL-SCNC: 105 MMOL/L (ref 100–108)
CO2 SERPL-SCNC: 25 MMOL/L (ref 21–32)
CREAT SERPL-MCNC: 0.82 MG/DL (ref 0.6–1.3)
EOSINOPHIL # BLD AUTO: 0.02 THOUSAND/ΜL (ref 0–0.61)
EOSINOPHIL NFR BLD AUTO: 0 % (ref 0–6)
ERYTHROCYTE [DISTWIDTH] IN BLOOD BY AUTOMATED COUNT: 15.2 % (ref 11.6–15.1)
FLUAV AG SPEC QL: NOT DETECTED
FLUBV AG SPEC QL: NOT DETECTED
GFR SERPL CREATININE-BSD FRML MDRD: 65 ML/MIN/1.73SQ M
GLUCOSE SERPL-MCNC: 86 MG/DL (ref 65–140)
HCT VFR BLD AUTO: 26.1 % (ref 34.8–46.1)
HGB BLD-MCNC: 8.2 G/DL (ref 11.5–15.4)
IMM GRANULOCYTES # BLD AUTO: 0.03 THOUSAND/UL (ref 0–0.2)
IMM GRANULOCYTES NFR BLD AUTO: 0 % (ref 0–2)
LYMPHOCYTES # BLD AUTO: 1.57 THOUSANDS/ΜL (ref 0.6–4.47)
LYMPHOCYTES NFR BLD AUTO: 19 % (ref 14–44)
MAGNESIUM SERPL-MCNC: 1.7 MG/DL (ref 1.6–2.6)
MCH RBC QN AUTO: 29 PG (ref 26.8–34.3)
MCHC RBC AUTO-ENTMCNC: 31.4 G/DL (ref 31.4–37.4)
MCV RBC AUTO: 92 FL (ref 82–98)
MONOCYTES # BLD AUTO: 1.03 THOUSAND/ΜL (ref 0.17–1.22)
MONOCYTES NFR BLD AUTO: 12 % (ref 4–12)
NEUTROPHILS # BLD AUTO: 5.79 THOUSANDS/ΜL (ref 1.85–7.62)
NEUTS SEG NFR BLD AUTO: 69 % (ref 43–75)
NRBC BLD AUTO-RTO: 0 /100 WBCS
P AXIS: 31 DEGREES
P AXIS: 42 DEGREES
P AXIS: 83 DEGREES
PLATELET # BLD AUTO: 213 THOUSANDS/UL (ref 149–390)
PMV BLD AUTO: 9.3 FL (ref 8.9–12.7)
POTASSIUM SERPL-SCNC: 3.6 MMOL/L (ref 3.5–5.3)
PR INTERVAL: 124 MS
PR INTERVAL: 132 MS
PR INTERVAL: 136 MS
PROCALCITONIN SERPL-MCNC: 0.33 NG/ML
QRS AXIS: 69 DEGREES
QRS AXIS: 73 DEGREES
QRS AXIS: 89 DEGREES
QRSD INTERVAL: 110 MS
QRSD INTERVAL: 114 MS
QRSD INTERVAL: 120 MS
QT INTERVAL: 352 MS
QT INTERVAL: 388 MS
QT INTERVAL: 396 MS
QTC INTERVAL: 442 MS
QTC INTERVAL: 466 MS
QTC INTERVAL: 476 MS
RBC # BLD AUTO: 2.83 MILLION/UL (ref 3.81–5.12)
RSV B RNA SPEC QL NAA+PROBE: NOT DETECTED
SODIUM SERPL-SCNC: 134 MMOL/L (ref 136–145)
T WAVE AXIS: -27 DEGREES
T WAVE AXIS: -39 DEGREES
T WAVE AXIS: -73 DEGREES
TROPONIN I SERPL-MCNC: 0.09 NG/ML
TROPONIN I SERPL-MCNC: 0.11 NG/ML
VENTRICULAR RATE: 87 BPM
VENTRICULAR RATE: 87 BPM
VENTRICULAR RATE: 95 BPM
WBC # BLD AUTO: 8.45 THOUSAND/UL (ref 4.31–10.16)

## 2019-04-05 PROCEDURE — 93010 ELECTROCARDIOGRAM REPORT: CPT | Performed by: INTERNAL MEDICINE

## 2019-04-05 PROCEDURE — 87147 CULTURE TYPE IMMUNOLOGIC: CPT | Performed by: INTERNAL MEDICINE

## 2019-04-05 PROCEDURE — 99232 SBSQ HOSP IP/OBS MODERATE 35: CPT | Performed by: PHYSICIAN ASSISTANT

## 2019-04-05 PROCEDURE — 84145 PROCALCITONIN (PCT): CPT | Performed by: INTERNAL MEDICINE

## 2019-04-05 PROCEDURE — 85025 COMPLETE CBC W/AUTO DIFF WBC: CPT | Performed by: INTERNAL MEDICINE

## 2019-04-05 PROCEDURE — 87081 CULTURE SCREEN ONLY: CPT | Performed by: INTERNAL MEDICINE

## 2019-04-05 PROCEDURE — 99221 1ST HOSP IP/OBS SF/LOW 40: CPT | Performed by: INTERNAL MEDICINE

## 2019-04-05 PROCEDURE — 80048 BASIC METABOLIC PNL TOTAL CA: CPT | Performed by: INTERNAL MEDICINE

## 2019-04-05 PROCEDURE — 87493 C DIFF AMPLIFIED PROBE: CPT | Performed by: PHYSICIAN ASSISTANT

## 2019-04-05 PROCEDURE — 94760 N-INVAS EAR/PLS OXIMETRY 1: CPT

## 2019-04-05 PROCEDURE — 84484 ASSAY OF TROPONIN QUANT: CPT | Performed by: INTERNAL MEDICINE

## 2019-04-05 PROCEDURE — 84484 ASSAY OF TROPONIN QUANT: CPT | Performed by: PHYSICIAN ASSISTANT

## 2019-04-05 PROCEDURE — 83735 ASSAY OF MAGNESIUM: CPT | Performed by: INTERNAL MEDICINE

## 2019-04-05 RX ORDER — TRAMADOL HYDROCHLORIDE 50 MG/1
25 TABLET ORAL EVERY 4 HOURS PRN
Status: DISCONTINUED | OUTPATIENT
Start: 2019-04-05 | End: 2019-04-06 | Stop reason: HOSPADM

## 2019-04-05 RX ADMIN — METOPROLOL TARTRATE 25 MG: 25 TABLET, FILM COATED ORAL at 17:41

## 2019-04-05 RX ADMIN — HEPARIN SODIUM 5000 UNITS: 5000 INJECTION INTRAVENOUS; SUBCUTANEOUS at 00:38

## 2019-04-05 RX ADMIN — DONEPEZIL HYDROCHLORIDE 5 MG: 5 TABLET, FILM COATED ORAL at 21:15

## 2019-04-05 RX ADMIN — DULOXETINE HYDROCHLORIDE 60 MG: 60 CAPSULE, DELAYED RELEASE ORAL at 09:19

## 2019-04-05 RX ADMIN — HEPARIN SODIUM 5000 UNITS: 5000 INJECTION INTRAVENOUS; SUBCUTANEOUS at 14:03

## 2019-04-05 RX ADMIN — PANTOPRAZOLE SODIUM 40 MG: 40 TABLET, DELAYED RELEASE ORAL at 16:20

## 2019-04-05 RX ADMIN — LEVOTHYROXINE SODIUM 100 MCG: 100 TABLET ORAL at 05:41

## 2019-04-05 RX ADMIN — CEFEPIME HYDROCHLORIDE 2000 MG: 2 INJECTION, POWDER, FOR SOLUTION INTRAVENOUS at 21:15

## 2019-04-05 RX ADMIN — NIFEDIPINE 60 MG: 60 TABLET, FILM COATED, EXTENDED RELEASE ORAL at 09:19

## 2019-04-05 RX ADMIN — CALCIUM 1 TABLET: 500 TABLET ORAL at 09:19

## 2019-04-05 RX ADMIN — HEPARIN SODIUM 5000 UNITS: 5000 INJECTION INTRAVENOUS; SUBCUTANEOUS at 21:15

## 2019-04-05 RX ADMIN — Medication 2 SPRAY: at 17:42

## 2019-04-05 RX ADMIN — PANTOPRAZOLE SODIUM 40 MG: 40 TABLET, DELAYED RELEASE ORAL at 05:45

## 2019-04-05 RX ADMIN — DONEPEZIL HYDROCHLORIDE 5 MG: 5 TABLET, FILM COATED ORAL at 00:38

## 2019-04-05 RX ADMIN — TRIAMCINOLONE ACETONIDE 1 APPLICATION: 1 CREAM TOPICAL at 17:42

## 2019-04-05 RX ADMIN — FLUTICASONE PROPIONATE 1 SPRAY: 50 SPRAY, METERED NASAL at 09:56

## 2019-04-05 RX ADMIN — LORATADINE 10 MG: 10 TABLET ORAL at 09:19

## 2019-04-05 RX ADMIN — Medication 2 SPRAY: at 09:56

## 2019-04-05 RX ADMIN — OXYCODONE HYDROCHLORIDE AND ACETAMINOPHEN 500 MG: 500 TABLET ORAL at 09:19

## 2019-04-05 RX ADMIN — METOPROLOL TARTRATE 25 MG: 25 TABLET, FILM COATED ORAL at 09:20

## 2019-04-05 RX ADMIN — TRAMADOL HYDROCHLORIDE 25 MG: 50 TABLET, COATED ORAL at 20:28

## 2019-04-05 RX ADMIN — PRAVASTATIN SODIUM 40 MG: 40 TABLET ORAL at 09:19

## 2019-04-05 RX ADMIN — HEPARIN SODIUM 5000 UNITS: 5000 INJECTION INTRAVENOUS; SUBCUTANEOUS at 05:41

## 2019-04-06 VITALS
WEIGHT: 119.9 LBS | RESPIRATION RATE: 20 BRPM | DIASTOLIC BLOOD PRESSURE: 64 MMHG | TEMPERATURE: 97.9 F | SYSTOLIC BLOOD PRESSURE: 112 MMHG | BODY MASS INDEX: 23.54 KG/M2 | HEIGHT: 60 IN | HEART RATE: 94 BPM | OXYGEN SATURATION: 98 %

## 2019-04-06 PROBLEM — G93.40 ENCEPHALOPATHY: Status: RESOLVED | Noted: 2019-04-06 | Resolved: 2019-04-06

## 2019-04-06 PROBLEM — R19.7 DIARRHEA: Status: ACTIVE | Noted: 2019-04-06

## 2019-04-06 PROBLEM — A41.9 SEPSIS (HCC): Status: RESOLVED | Noted: 2018-04-13 | Resolved: 2019-04-06

## 2019-04-06 PROBLEM — G93.40 ENCEPHALOPATHY: Status: ACTIVE | Noted: 2019-04-06

## 2019-04-06 PROBLEM — R77.8 ELEVATED TROPONIN: Status: RESOLVED | Noted: 2018-11-09 | Resolved: 2019-04-06

## 2019-04-06 PROBLEM — E43 SEVERE PROTEIN-CALORIE MALNUTRITION (HCC): Status: ACTIVE | Noted: 2019-04-06

## 2019-04-06 PROBLEM — R19.7 DIARRHEA: Status: RESOLVED | Noted: 2019-04-06 | Resolved: 2019-04-06

## 2019-04-06 LAB
ANION GAP SERPL CALCULATED.3IONS-SCNC: 7 MMOL/L (ref 4–13)
BASOPHILS # BLD AUTO: 0.02 THOUSANDS/ΜL (ref 0–0.1)
BASOPHILS NFR BLD AUTO: 0 % (ref 0–1)
BUN SERPL-MCNC: 9 MG/DL (ref 5–25)
C DIFF TOX GENS STL QL NAA+PROBE: NORMAL
CALCIUM SERPL-MCNC: 8 MG/DL (ref 8.3–10.1)
CHLORIDE SERPL-SCNC: 100 MMOL/L (ref 100–108)
CO2 SERPL-SCNC: 24 MMOL/L (ref 21–32)
CREAT SERPL-MCNC: 0.69 MG/DL (ref 0.6–1.3)
EOSINOPHIL # BLD AUTO: 0.05 THOUSAND/ΜL (ref 0–0.61)
EOSINOPHIL NFR BLD AUTO: 1 % (ref 0–6)
ERYTHROCYTE [DISTWIDTH] IN BLOOD BY AUTOMATED COUNT: 15 % (ref 11.6–15.1)
GFR SERPL CREATININE-BSD FRML MDRD: 79 ML/MIN/1.73SQ M
GLUCOSE SERPL-MCNC: 92 MG/DL (ref 65–140)
HCT VFR BLD AUTO: 25.8 % (ref 34.8–46.1)
HGB BLD-MCNC: 8.1 G/DL (ref 11.5–15.4)
IMM GRANULOCYTES # BLD AUTO: 0.05 THOUSAND/UL (ref 0–0.2)
IMM GRANULOCYTES NFR BLD AUTO: 1 % (ref 0–2)
LYMPHOCYTES # BLD AUTO: 1.64 THOUSANDS/ΜL (ref 0.6–4.47)
LYMPHOCYTES NFR BLD AUTO: 17 % (ref 14–44)
MCH RBC QN AUTO: 28.6 PG (ref 26.8–34.3)
MCHC RBC AUTO-ENTMCNC: 31.4 G/DL (ref 31.4–37.4)
MCV RBC AUTO: 91 FL (ref 82–98)
MONOCYTES # BLD AUTO: 0.96 THOUSAND/ΜL (ref 0.17–1.22)
MONOCYTES NFR BLD AUTO: 10 % (ref 4–12)
MRSA NOSE QL CULT: ABNORMAL
MRSA NOSE QL CULT: ABNORMAL
NEUTROPHILS # BLD AUTO: 6.88 THOUSANDS/ΜL (ref 1.85–7.62)
NEUTS SEG NFR BLD AUTO: 71 % (ref 43–75)
NRBC BLD AUTO-RTO: 0 /100 WBCS
PLATELET # BLD AUTO: 229 THOUSANDS/UL (ref 149–390)
PMV BLD AUTO: 9.5 FL (ref 8.9–12.7)
POTASSIUM SERPL-SCNC: 3.7 MMOL/L (ref 3.5–5.3)
PROCALCITONIN SERPL-MCNC: 0.21 NG/ML
RBC # BLD AUTO: 2.83 MILLION/UL (ref 3.81–5.12)
SODIUM SERPL-SCNC: 131 MMOL/L (ref 136–145)
WBC # BLD AUTO: 9.6 THOUSAND/UL (ref 4.31–10.16)

## 2019-04-06 PROCEDURE — 85025 COMPLETE CBC W/AUTO DIFF WBC: CPT | Performed by: PHYSICIAN ASSISTANT

## 2019-04-06 PROCEDURE — 99239 HOSP IP/OBS DSCHRG MGMT >30: CPT | Performed by: PHYSICIAN ASSISTANT

## 2019-04-06 PROCEDURE — 80048 BASIC METABOLIC PNL TOTAL CA: CPT | Performed by: PHYSICIAN ASSISTANT

## 2019-04-06 PROCEDURE — 84145 PROCALCITONIN (PCT): CPT | Performed by: PHYSICIAN ASSISTANT

## 2019-04-06 RX ORDER — LORAZEPAM 0.5 MG/1
0.5 TABLET ORAL
Qty: 3 TABLET | Refills: 0 | Status: SHIPPED | OUTPATIENT
Start: 2019-04-06 | End: 2019-09-02

## 2019-04-06 RX ADMIN — LORATADINE 10 MG: 10 TABLET ORAL at 08:26

## 2019-04-06 RX ADMIN — TRIAMCINOLONE ACETONIDE: 1 CREAM TOPICAL at 08:26

## 2019-04-06 RX ADMIN — SODIUM CHLORIDE 250 ML: 0.9 INJECTION, SOLUTION INTRAVENOUS at 10:52

## 2019-04-06 RX ADMIN — PANTOPRAZOLE SODIUM 40 MG: 40 TABLET, DELAYED RELEASE ORAL at 05:30

## 2019-04-06 RX ADMIN — LEVOTHYROXINE SODIUM 100 MCG: 100 TABLET ORAL at 05:30

## 2019-04-06 RX ADMIN — ESTRADIOL 1 G: 0.1 CREAM VAGINAL at 08:27

## 2019-04-06 RX ADMIN — HEPARIN SODIUM 5000 UNITS: 5000 INJECTION INTRAVENOUS; SUBCUTANEOUS at 05:30

## 2019-04-06 RX ADMIN — DULOXETINE HYDROCHLORIDE 60 MG: 60 CAPSULE, DELAYED RELEASE ORAL at 08:26

## 2019-04-06 RX ADMIN — OXYCODONE HYDROCHLORIDE AND ACETAMINOPHEN 500 MG: 500 TABLET ORAL at 08:26

## 2019-04-06 RX ADMIN — CALCIUM 1 TABLET: 500 TABLET ORAL at 07:50

## 2019-04-06 RX ADMIN — PRAVASTATIN SODIUM 40 MG: 40 TABLET ORAL at 08:26

## 2019-04-06 RX ADMIN — FLUTICASONE PROPIONATE 1 SPRAY: 50 SPRAY, METERED NASAL at 08:26

## 2019-04-06 RX ADMIN — Medication 2 SPRAY: at 08:26

## 2019-04-06 RX ADMIN — NIFEDIPINE 60 MG: 60 TABLET, FILM COATED, EXTENDED RELEASE ORAL at 08:26

## 2019-04-06 RX ADMIN — METOPROLOL TARTRATE 25 MG: 25 TABLET, FILM COATED ORAL at 08:26

## 2019-04-09 ENCOUNTER — TELEPHONE (OUTPATIENT)
Dept: OBGYN CLINIC | Facility: HOSPITAL | Age: 84
End: 2019-04-09

## 2019-04-09 LAB
BACTERIA BLD CULT: NORMAL
BACTERIA BLD CULT: NORMAL

## 2019-04-12 ENCOUNTER — PATIENT OUTREACH (OUTPATIENT)
Dept: CASE MANAGEMENT | Facility: HOSPITAL | Age: 84
End: 2019-04-12

## 2019-04-24 ENCOUNTER — PATIENT OUTREACH (OUTPATIENT)
Dept: CASE MANAGEMENT | Facility: HOSPITAL | Age: 84
End: 2019-04-24

## 2019-04-29 ENCOUNTER — PATIENT OUTREACH (OUTPATIENT)
Dept: CASE MANAGEMENT | Facility: HOSPITAL | Age: 84
End: 2019-04-29

## 2019-05-29 ENCOUNTER — NURSING HOME VISIT (OUTPATIENT)
Dept: GERIATRICS | Facility: OTHER | Age: 84
End: 2019-05-29
Payer: MEDICARE

## 2019-05-29 DIAGNOSIS — F02.80 LATE ONSET ALZHEIMER'S DISEASE WITHOUT BEHAVIORAL DISTURBANCE (HCC): ICD-10-CM

## 2019-05-29 DIAGNOSIS — G30.1 LATE ONSET ALZHEIMER'S DISEASE WITHOUT BEHAVIORAL DISTURBANCE (HCC): ICD-10-CM

## 2019-05-29 DIAGNOSIS — R53.81 DEBILITY: ICD-10-CM

## 2019-05-29 DIAGNOSIS — I10 ESSENTIAL HYPERTENSION: Primary | ICD-10-CM

## 2019-05-29 DIAGNOSIS — E03.9 HYPOTHYROIDISM, UNSPECIFIED TYPE: ICD-10-CM

## 2019-05-29 DIAGNOSIS — D50.9 IRON DEFICIENCY ANEMIA, UNSPECIFIED IRON DEFICIENCY ANEMIA TYPE: ICD-10-CM

## 2019-05-29 PROCEDURE — 99309 SBSQ NF CARE MODERATE MDM 30: CPT | Performed by: FAMILY MEDICINE

## 2019-06-04 ENCOUNTER — NURSING HOME VISIT (OUTPATIENT)
Dept: GERIATRICS | Facility: OTHER | Age: 84
End: 2019-06-04
Payer: MEDICARE

## 2019-06-04 DIAGNOSIS — T14.90XA TRAUMATIC INJURY: Primary | ICD-10-CM

## 2019-06-04 DIAGNOSIS — F03.90 DEMENTIA WITHOUT BEHAVIORAL DISTURBANCE, UNSPECIFIED DEMENTIA TYPE (HCC): ICD-10-CM

## 2019-06-04 PROCEDURE — 99308 SBSQ NF CARE LOW MDM 20: CPT | Performed by: NURSE PRACTITIONER

## 2019-06-26 ENCOUNTER — NURSING HOME VISIT (OUTPATIENT)
Dept: GERIATRICS | Facility: OTHER | Age: 84
End: 2019-06-26
Payer: MEDICARE

## 2019-06-26 DIAGNOSIS — G30.1 LATE ONSET ALZHEIMER'S DISEASE WITH BEHAVIORAL DISTURBANCE (HCC): Primary | ICD-10-CM

## 2019-06-26 DIAGNOSIS — F02.81 LATE ONSET ALZHEIMER'S DISEASE WITH BEHAVIORAL DISTURBANCE (HCC): Primary | ICD-10-CM

## 2019-06-26 DIAGNOSIS — D50.9 IRON DEFICIENCY ANEMIA, UNSPECIFIED IRON DEFICIENCY ANEMIA TYPE: ICD-10-CM

## 2019-06-26 DIAGNOSIS — E03.9 HYPOTHYROIDISM, UNSPECIFIED TYPE: ICD-10-CM

## 2019-06-26 DIAGNOSIS — I10 ESSENTIAL HYPERTENSION: ICD-10-CM

## 2019-06-26 DIAGNOSIS — R53.81 DEBILITY: ICD-10-CM

## 2019-06-26 PROCEDURE — 99309 SBSQ NF CARE MODERATE MDM 30: CPT | Performed by: FAMILY MEDICINE

## 2019-07-25 ENCOUNTER — NURSING HOME VISIT (OUTPATIENT)
Dept: GERIATRICS | Facility: OTHER | Age: 84
End: 2019-07-25
Payer: MEDICARE

## 2019-07-25 DIAGNOSIS — J31.0 CHRONIC RHINITIS: ICD-10-CM

## 2019-07-25 DIAGNOSIS — F02.81 LATE ONSET ALZHEIMER'S DISEASE WITH BEHAVIORAL DISTURBANCE (HCC): ICD-10-CM

## 2019-07-25 DIAGNOSIS — I10 ESSENTIAL HYPERTENSION: ICD-10-CM

## 2019-07-25 DIAGNOSIS — R53.81 DEBILITY: Primary | ICD-10-CM

## 2019-07-25 DIAGNOSIS — G30.1 LATE ONSET ALZHEIMER'S DISEASE WITH BEHAVIORAL DISTURBANCE (HCC): ICD-10-CM

## 2019-07-25 DIAGNOSIS — E03.9 HYPOTHYROIDISM, UNSPECIFIED TYPE: ICD-10-CM

## 2019-07-25 DIAGNOSIS — D50.9 IRON DEFICIENCY ANEMIA, UNSPECIFIED IRON DEFICIENCY ANEMIA TYPE: ICD-10-CM

## 2019-07-25 DIAGNOSIS — G89.29 CHRONIC PAIN OF RIGHT KNEE: ICD-10-CM

## 2019-07-25 DIAGNOSIS — M25.561 CHRONIC PAIN OF RIGHT KNEE: ICD-10-CM

## 2019-07-25 PROCEDURE — 99309 SBSQ NF CARE MODERATE MDM 30: CPT | Performed by: NURSE PRACTITIONER

## 2019-07-26 NOTE — PROGRESS NOTES
[facility] [address], [place], [address]  Progress Note      Chief Complaint/Reason for visit: Routine follow-up visit for chronic medical conditions    History of Present Illness: This is an 80 y o  Female patient admitted in [facility] for debility and Dementia  Patient is seen and examined today as a routine follo-wup of acute and chronic medical conditions: HTN, Hypothyroidism, Allergic Rhinitis and Right knee pain  Patient OOB for this visit - initially asleep on her wheel chair - easily awakened, alert, engaged and lucid, no verbal reports of dying  Reported Right knee pain, " This knee hurts  The other one doesn't bother me"  Per nursing no acute medical concerns for this visit; no concerns related to meal and fluid intake; behavior have been stable and patient easily re-directed instead of being verbally inappropriate to staff; often declines medication but can be re-directed  Past Medical History: Reviewed and unchanged from admission H&P  Past Medical History:   Diagnosis Date    Aspiration pneumonia ([facility] )     Last Assessed:  7/18/17    Basal cell carcinoma of nose     Last Assessed:  4/12/17    Blind     blind right eye, 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, Last Assessed:  7/18/17    Dementia     Depression     Disease of thyroid gland     Fibromyalgia     Last Assessed:  7/18/17    Gait disturbance     GERD (gastroesophageal reflux disease)     Glaucoma     Hyperlipidemia     Hypertension     Osteopenia     Peripheral neuropathy     Last Assessed:  4/12/17    Polymyalgia rheumatica ([address] )     Psychiatric disorder     depression    Renal insufficiency syndrome     Last Assessed:  4/13/17    Rheumatoid arthritis ([address] )     Stroke ([facility] )     x2, Last Assessed:  7/18/17    Systemic lupus erythematosus ([facility] )     Vertigo     Vitamin D deficiency      Family History:  Reviewed and unchanged from admission H&P      Social History: Reviewed and unchanged from admission H&P  Resident Since: March 27, 2019    Review of systems: Review of Systems   Constitutional: Negative  Patient very lucid and engaged on this visit  HENT: Negative  Eyes: Negative  Respiratory: Negative  Cardiovascular: Negative  Gastrointestinal: Negative  Endocrine: Negative  Genitourinary: Negative  Musculoskeletal: Positive for arthralgias  Negative for back pain and neck stiffness  Reported pain to Right knee  Skin: Negative  Allergic/Immunologic: Negative  Neurological: Negative  Hematological: Negative  Psychiatric/Behavioral: Negative  Medications: Reviewed and signed  Allergies: Reviewed and unchanged from admission H&P  Consults reviewed: No new consults to review at this time  Labs/Diagnostics (reviewed by this provider): No new laboratory results to review at this time  Imaging Reviewed: No imaging to review at this time  Physical Exam    Weight: 124 8 lbs (7/22/19) <= 124 60 lbs  (7/1/19)  Temp:98 6F BP: 154/66 Pulse: 74 Resp: 18 O2 Sat: 98% RA      Physical Exam   Constitutional: She appears well-developed and well-nourished  No distress  Patient very lucid and egaged on this visit  Patient did not express any questions related to dying on this visit  Patient typically ask, " Am I dying" or say " I'm dying"  HENT:   Head: Normocephalic and atraumatic  Right Ear: External ear normal    Left Ear: External ear normal    Nose: Nose normal    Mouth/Throat: Oropharynx is clear and moist  No oropharyngeal exudate  Slight nasal mucosa erythema; No rhinorrhea   Eyes: Pupils are equal, round, and reactive to light  Conjunctivae are normal  Right eye exhibits no discharge  Left eye exhibits no discharge  No scleral icterus  Right eye droop versus chronically closed  Wears eye glasses  Neck: Neck supple  No JVD present  No tracheal deviation present  No thyromegaly present     Cardiovascular: Normal rate and regular rhythm  Exam reveals no gallop and no friction rub  Murmur heard  Pulmonary/Chest: Effort normal and breath sounds normal  No stridor  No respiratory distress  She has no wheezes  She has no rales  She exhibits no tenderness  Abdominal: Soft  Bowel sounds are normal  She exhibits no distension and no mass  There is no tenderness  There is no rebound and no guarding  Musculoskeletal: She exhibits no edema, tenderness or deformity  Lymphadenopathy:     She has no cervical adenopathy  Neurological: She is alert  Patient very lucid and egaged on this visit  Skin: Skin is warm and dry  No rash noted  She is not diaphoretic  No erythema  No pallor  Skin intact  Psychiatric: She has a normal mood and affect  Her behavior is normal        Assessment/Plan:    1 ) Debility  - Continue 24 hours LTCF supportive care and management  - PT/OT/ST as needed  - Non ambulatory; uses manual wheel chair but need staff assistance to move around  2 ) HTN  - BP range (April 2019 to May 2019) = 120/54 to 154/86  - Continue Nifedipine ER 60mg daily in AM, Losartan 75mg daily and Metoprolol tartrate 25mg Q12 hours  - BMP on November 4, 2019    3 ) 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  - Patient behavior have been stable the last few weeks  - Per nursing occasionally declines medication and often needs re-direction to prompt compliance  - Patient very lucid and engaged on this visit  - Weight stable  - Followed by Behavioral Health : [facility]  - Continue Donepezil 5mg at bedtime and Seroquel 12 5mg BID     4 ) Anemia  -  Slight improvement to Hbg/Hct levels  - Continue Ferrous Gluconate 324mg daily and Vit  C 500mg daily  - Check CBC by November 4, 2019     5 ) Hypothyroidism  - Continue Levothyroxine 100mcg daily  - Will continue to monitor      6 ) Allergic Rhinitis  - Continue Cetirizine 10mg daily and Saline nasal spray BID    7 ) Right knee pain  - Reported Right knee pain  - Will schedule Bengay greaseless creme to BID (6AM and 6PM)  - Patient has listed allergy to Acetaminophen and Lidocaine  - Continue PRN Ibuprofen    MOSHE Alves  7/25/20198:28 PM

## 2019-09-02 ENCOUNTER — HOSPITAL ENCOUNTER (INPATIENT)
Facility: HOSPITAL | Age: 84
LOS: 4 days | Discharge: NURSING, REHAB OR LONG-TERM CARE | DRG: 871 | End: 2019-09-06
Attending: EMERGENCY MEDICINE | Admitting: INTERNAL MEDICINE
Payer: MEDICARE

## 2019-09-02 ENCOUNTER — APPOINTMENT (EMERGENCY)
Dept: RADIOLOGY | Facility: HOSPITAL | Age: 84
DRG: 871 | End: 2019-09-02
Payer: MEDICARE

## 2019-09-02 ENCOUNTER — TELEPHONE (OUTPATIENT)
Dept: OTHER | Facility: OTHER | Age: 84
End: 2019-09-02

## 2019-09-02 DIAGNOSIS — N39.0 URINARY TRACT INFECTION: Primary | ICD-10-CM

## 2019-09-02 DIAGNOSIS — K57.92 DIVERTICULITIS: ICD-10-CM

## 2019-09-02 DIAGNOSIS — R10.9 ABDOMINAL PAIN: ICD-10-CM

## 2019-09-02 DIAGNOSIS — N17.9 AKI (ACUTE KIDNEY INJURY) (HCC): ICD-10-CM

## 2019-09-02 LAB
ALBUMIN SERPL BCP-MCNC: 3.4 G/DL (ref 3.5–5)
ALP SERPL-CCNC: 120 U/L (ref 46–116)
ALT SERPL W P-5'-P-CCNC: 25 U/L (ref 12–78)
ANION GAP SERPL CALCULATED.3IONS-SCNC: 10 MMOL/L (ref 4–13)
AST SERPL W P-5'-P-CCNC: 51 U/L (ref 5–45)
BACTERIA UR QL AUTO: ABNORMAL /HPF
BASOPHILS # BLD AUTO: 0.02 THOUSANDS/ΜL (ref 0–0.1)
BASOPHILS NFR BLD AUTO: 0 % (ref 0–1)
BILIRUB SERPL-MCNC: 0.51 MG/DL (ref 0.2–1)
BILIRUB UR QL STRIP: ABNORMAL
BUN SERPL-MCNC: 17 MG/DL (ref 5–25)
CALCIUM SERPL-MCNC: 9.2 MG/DL (ref 8.3–10.1)
CHLORIDE SERPL-SCNC: 105 MMOL/L (ref 100–108)
CLARITY UR: ABNORMAL
CO2 SERPL-SCNC: 22 MMOL/L (ref 21–32)
COLOR UR: ABNORMAL
CREAT SERPL-MCNC: 1.41 MG/DL (ref 0.6–1.3)
EOSINOPHIL # BLD AUTO: 0 THOUSAND/ΜL (ref 0–0.61)
EOSINOPHIL NFR BLD AUTO: 0 % (ref 0–6)
ERYTHROCYTE [DISTWIDTH] IN BLOOD BY AUTOMATED COUNT: 14.6 % (ref 11.6–15.1)
GFR SERPL CREATININE-BSD FRML MDRD: 33 ML/MIN/1.73SQ M
GLUCOSE SERPL-MCNC: 211 MG/DL (ref 65–140)
GLUCOSE UR STRIP-MCNC: NEGATIVE MG/DL
HCT VFR BLD AUTO: 32.3 % (ref 34.8–46.1)
HGB BLD-MCNC: 10.4 G/DL (ref 11.5–15.4)
HGB UR QL STRIP.AUTO: ABNORMAL
IMM GRANULOCYTES # BLD AUTO: 0.07 THOUSAND/UL (ref 0–0.2)
IMM GRANULOCYTES NFR BLD AUTO: 1 % (ref 0–2)
KETONES UR STRIP-MCNC: ABNORMAL MG/DL
LACTATE SERPL-SCNC: 2 MMOL/L (ref 0.5–2)
LACTATE SERPL-SCNC: 3.7 MMOL/L (ref 0.5–2)
LEUKOCYTE ESTERASE UR QL STRIP: ABNORMAL
LIPASE SERPL-CCNC: 203 U/L (ref 73–393)
LYMPHOCYTES # BLD AUTO: 0.49 THOUSANDS/ΜL (ref 0.6–4.47)
LYMPHOCYTES NFR BLD AUTO: 5 % (ref 14–44)
MCH RBC QN AUTO: 29.2 PG (ref 26.8–34.3)
MCHC RBC AUTO-ENTMCNC: 32.2 G/DL (ref 31.4–37.4)
MCV RBC AUTO: 91 FL (ref 82–98)
MONOCYTES # BLD AUTO: 0.86 THOUSAND/ΜL (ref 0.17–1.22)
MONOCYTES NFR BLD AUTO: 8 % (ref 4–12)
NEUTROPHILS # BLD AUTO: 9 THOUSANDS/ΜL (ref 1.85–7.62)
NEUTS SEG NFR BLD AUTO: 86 % (ref 43–75)
NITRITE UR QL STRIP: POSITIVE
NON-SQ EPI CELLS URNS QL MICRO: ABNORMAL /HPF
NRBC BLD AUTO-RTO: 0 /100 WBCS
PH UR STRIP.AUTO: 5.5 [PH]
PLATELET # BLD AUTO: 198 THOUSANDS/UL (ref 149–390)
PMV BLD AUTO: 9.7 FL (ref 8.9–12.7)
POTASSIUM SERPL-SCNC: 3.9 MMOL/L (ref 3.5–5.3)
PROT SERPL-MCNC: 7.5 G/DL (ref 6.4–8.2)
PROT UR STRIP-MCNC: ABNORMAL MG/DL
RBC # BLD AUTO: 3.56 MILLION/UL (ref 3.81–5.12)
RBC #/AREA URNS AUTO: ABNORMAL /HPF
SODIUM SERPL-SCNC: 137 MMOL/L (ref 136–145)
SP GR UR STRIP.AUTO: 1.02 (ref 1–1.03)
TROPONIN I SERPL-MCNC: <0.02 NG/ML
UROBILINOGEN UR QL STRIP.AUTO: 2 E.U./DL
WBC # BLD AUTO: 10.44 THOUSAND/UL (ref 4.31–10.16)
WBC #/AREA URNS AUTO: ABNORMAL /HPF

## 2019-09-02 PROCEDURE — 99285 EMERGENCY DEPT VISIT HI MDM: CPT

## 2019-09-02 PROCEDURE — 81001 URINALYSIS AUTO W/SCOPE: CPT | Performed by: EMERGENCY MEDICINE

## 2019-09-02 PROCEDURE — 83690 ASSAY OF LIPASE: CPT | Performed by: EMERGENCY MEDICINE

## 2019-09-02 PROCEDURE — 74176 CT ABD & PELVIS W/O CONTRAST: CPT

## 2019-09-02 PROCEDURE — 93005 ELECTROCARDIOGRAM TRACING: CPT

## 2019-09-02 PROCEDURE — 87086 URINE CULTURE/COLONY COUNT: CPT | Performed by: EMERGENCY MEDICINE

## 2019-09-02 PROCEDURE — 94760 N-INVAS EAR/PLS OXIMETRY 1: CPT

## 2019-09-02 PROCEDURE — 84484 ASSAY OF TROPONIN QUANT: CPT | Performed by: EMERGENCY MEDICINE

## 2019-09-02 PROCEDURE — 85025 COMPLETE CBC W/AUTO DIFF WBC: CPT | Performed by: EMERGENCY MEDICINE

## 2019-09-02 PROCEDURE — 71046 X-RAY EXAM CHEST 2 VIEWS: CPT

## 2019-09-02 PROCEDURE — 87040 BLOOD CULTURE FOR BACTERIA: CPT | Performed by: EMERGENCY MEDICINE

## 2019-09-02 PROCEDURE — 80053 COMPREHEN METABOLIC PANEL: CPT | Performed by: EMERGENCY MEDICINE

## 2019-09-02 PROCEDURE — 36415 COLL VENOUS BLD VENIPUNCTURE: CPT | Performed by: EMERGENCY MEDICINE

## 2019-09-02 PROCEDURE — 99223 1ST HOSP IP/OBS HIGH 75: CPT | Performed by: INTERNAL MEDICINE

## 2019-09-02 PROCEDURE — 87186 SC STD MICRODIL/AGAR DIL: CPT | Performed by: EMERGENCY MEDICINE

## 2019-09-02 PROCEDURE — 83605 ASSAY OF LACTIC ACID: CPT | Performed by: EMERGENCY MEDICINE

## 2019-09-02 PROCEDURE — 96360 HYDRATION IV INFUSION INIT: CPT

## 2019-09-02 PROCEDURE — 87077 CULTURE AEROBIC IDENTIFY: CPT | Performed by: EMERGENCY MEDICINE

## 2019-09-02 PROCEDURE — 99285 EMERGENCY DEPT VISIT HI MDM: CPT | Performed by: EMERGENCY MEDICINE

## 2019-09-02 RX ORDER — QUETIAPINE FUMARATE 25 MG/1
12.5 TABLET, FILM COATED ORAL 2 TIMES DAILY
Status: DISCONTINUED | OUTPATIENT
Start: 2019-09-02 | End: 2019-09-06 | Stop reason: HOSPADM

## 2019-09-02 RX ORDER — GUAIFENESIN 600 MG
600 TABLET, EXTENDED RELEASE 12 HR ORAL 2 TIMES DAILY PRN
Status: DISCONTINUED | OUTPATIENT
Start: 2019-09-02 | End: 2019-09-06 | Stop reason: HOSPADM

## 2019-09-02 RX ORDER — NIFEDIPINE 60 MG/1
60 TABLET, EXTENDED RELEASE ORAL DAILY
Status: DISCONTINUED | OUTPATIENT
Start: 2019-09-02 | End: 2019-09-06 | Stop reason: HOSPADM

## 2019-09-02 RX ORDER — ONDANSETRON 2 MG/ML
1 INJECTION INTRAMUSCULAR; INTRAVENOUS ONCE
Status: COMPLETED | OUTPATIENT
Start: 2019-09-02 | End: 2019-09-02

## 2019-09-02 RX ORDER — ACETAMINOPHEN 325 MG/1
650 TABLET ORAL EVERY 6 HOURS PRN
Status: DISCONTINUED | OUTPATIENT
Start: 2019-09-02 | End: 2019-09-02

## 2019-09-02 RX ORDER — DONEPEZIL HYDROCHLORIDE 5 MG/1
5 TABLET, FILM COATED ORAL
Status: DISCONTINUED | OUTPATIENT
Start: 2019-09-02 | End: 2019-09-06 | Stop reason: HOSPADM

## 2019-09-02 RX ORDER — ESTRADIOL 0.1 MG/G
1 CREAM VAGINAL DAILY
Status: DISCONTINUED | OUTPATIENT
Start: 2019-09-02 | End: 2019-09-06 | Stop reason: HOSPADM

## 2019-09-02 RX ORDER — ASCORBIC ACID 500 MG
500 TABLET ORAL DAILY
Status: DISCONTINUED | OUTPATIENT
Start: 2019-09-02 | End: 2019-09-06 | Stop reason: HOSPADM

## 2019-09-02 RX ORDER — ECHINACEA PURPUREA EXTRACT 125 MG
2 TABLET ORAL 2 TIMES DAILY
Status: DISCONTINUED | OUTPATIENT
Start: 2019-09-02 | End: 2019-09-06 | Stop reason: HOSPADM

## 2019-09-02 RX ORDER — DULOXETIN HYDROCHLORIDE 60 MG/1
60 CAPSULE, DELAYED RELEASE ORAL DAILY
Status: DISCONTINUED | OUTPATIENT
Start: 2019-09-02 | End: 2019-09-06 | Stop reason: HOSPADM

## 2019-09-02 RX ORDER — PRAVASTATIN SODIUM 40 MG
40 TABLET ORAL DAILY
Status: DISCONTINUED | OUTPATIENT
Start: 2019-09-02 | End: 2019-09-06 | Stop reason: HOSPADM

## 2019-09-02 RX ORDER — CEFAZOLIN SODIUM 1 G/50ML
1000 SOLUTION INTRAVENOUS EVERY 8 HOURS
Status: DISCONTINUED | OUTPATIENT
Start: 2019-09-02 | End: 2019-09-06 | Stop reason: HOSPADM

## 2019-09-02 RX ORDER — SODIUM CHLORIDE 9 MG/ML
50 INJECTION, SOLUTION INTRAVENOUS CONTINUOUS
Status: DISCONTINUED | OUTPATIENT
Start: 2019-09-02 | End: 2019-09-06 | Stop reason: HOSPADM

## 2019-09-02 RX ORDER — PANTOPRAZOLE SODIUM 40 MG/1
40 TABLET, DELAYED RELEASE ORAL 2 TIMES DAILY
Status: DISCONTINUED | OUTPATIENT
Start: 2019-09-02 | End: 2019-09-06 | Stop reason: HOSPADM

## 2019-09-02 RX ORDER — BISACODYL 10 MG
10 SUPPOSITORY, RECTAL RECTAL DAILY PRN
COMMUNITY

## 2019-09-02 RX ORDER — HEPARIN SODIUM 5000 [USP'U]/ML
5000 INJECTION, SOLUTION INTRAVENOUS; SUBCUTANEOUS EVERY 8 HOURS SCHEDULED
Status: DISCONTINUED | OUTPATIENT
Start: 2019-09-02 | End: 2019-09-06 | Stop reason: HOSPADM

## 2019-09-02 RX ORDER — NYSTATIN 100000 [USP'U]/G
POWDER TOPICAL 2 TIMES DAILY
COMMUNITY

## 2019-09-02 RX ORDER — LOSARTAN POTASSIUM 50 MG/1
100 TABLET ORAL DAILY
Status: DISCONTINUED | OUTPATIENT
Start: 2019-09-02 | End: 2019-09-02

## 2019-09-02 RX ORDER — LORAZEPAM 0.5 MG/1
0.5 TABLET ORAL EVERY 8 HOURS PRN
Status: DISCONTINUED | OUTPATIENT
Start: 2019-09-02 | End: 2019-09-06 | Stop reason: HOSPADM

## 2019-09-02 RX ORDER — QUETIAPINE FUMARATE 25 MG/1
12.5 TABLET, FILM COATED ORAL 2 TIMES DAILY
COMMUNITY

## 2019-09-02 RX ORDER — LEVOTHYROXINE SODIUM 0.1 MG/1
100 TABLET ORAL
Status: DISCONTINUED | OUTPATIENT
Start: 2019-09-03 | End: 2019-09-06 | Stop reason: HOSPADM

## 2019-09-02 RX ORDER — SODIUM CHLORIDE 9 MG/ML
500 INJECTION, SOLUTION INTRAVENOUS ONCE
Status: COMPLETED | OUTPATIENT
Start: 2019-09-02 | End: 2019-09-02

## 2019-09-02 RX ORDER — IPRATROPIUM BROMIDE AND ALBUTEROL SULFATE 2.5; .5 MG/3ML; MG/3ML
3 SOLUTION RESPIRATORY (INHALATION)
Status: DISCONTINUED | OUTPATIENT
Start: 2019-09-02 | End: 2019-09-02

## 2019-09-02 RX ORDER — ONDANSETRON 2 MG/ML
4 INJECTION INTRAMUSCULAR; INTRAVENOUS EVERY 6 HOURS PRN
Status: DISCONTINUED | OUTPATIENT
Start: 2019-09-02 | End: 2019-09-06 | Stop reason: HOSPADM

## 2019-09-02 RX ORDER — FLUTICASONE PROPIONATE 50 MCG
1 SPRAY, SUSPENSION (ML) NASAL DAILY
Status: DISCONTINUED | OUTPATIENT
Start: 2019-09-02 | End: 2019-09-06 | Stop reason: HOSPADM

## 2019-09-02 RX ORDER — NYSTATIN 100000 [USP'U]/G
POWDER TOPICAL 2 TIMES DAILY
Status: DISCONTINUED | OUTPATIENT
Start: 2019-09-02 | End: 2019-09-06 | Stop reason: HOSPADM

## 2019-09-02 RX ORDER — ALBUTEROL SULFATE 2.5 MG/3ML
2.5 SOLUTION RESPIRATORY (INHALATION) EVERY 4 HOURS PRN
Status: DISCONTINUED | OUTPATIENT
Start: 2019-09-02 | End: 2019-09-06 | Stop reason: HOSPADM

## 2019-09-02 RX ADMIN — SODIUM CHLORIDE 500 ML: 0.9 INJECTION, SOLUTION INTRAVENOUS at 13:03

## 2019-09-02 RX ADMIN — SODIUM CHLORIDE 500 ML/HR: 0.9 INJECTION, SOLUTION INTRAVENOUS at 14:21

## 2019-09-02 RX ADMIN — FLUTICASONE PROPIONATE 1 SPRAY: 50 SPRAY, METERED NASAL at 17:30

## 2019-09-02 RX ADMIN — HEPARIN SODIUM 5000 UNITS: 5000 INJECTION INTRAVENOUS; SUBCUTANEOUS at 21:46

## 2019-09-02 RX ADMIN — HEPARIN SODIUM 5000 UNITS: 5000 INJECTION INTRAVENOUS; SUBCUTANEOUS at 17:29

## 2019-09-02 RX ADMIN — NYSTATIN: 100000 POWDER TOPICAL at 20:28

## 2019-09-02 RX ADMIN — LORAZEPAM 0.5 MG: 0.5 TABLET ORAL at 21:46

## 2019-09-02 RX ADMIN — CEFTRIAXONE 1000 MG: 1 INJECTION, POWDER, FOR SOLUTION INTRAMUSCULAR; INTRAVENOUS at 15:00

## 2019-09-02 RX ADMIN — SODIUM CHLORIDE 50 ML/HR: 0.9 INJECTION, SOLUTION INTRAVENOUS at 16:18

## 2019-09-02 RX ADMIN — METRONIDAZOLE 500 MG: 500 INJECTION, SOLUTION INTRAVENOUS at 16:18

## 2019-09-02 RX ADMIN — ONDANSETRON 4 MG: 2 INJECTION INTRAMUSCULAR; INTRAVENOUS at 17:27

## 2019-09-02 RX ADMIN — DONEPEZIL HYDROCHLORIDE 5 MG: 5 TABLET ORAL at 21:46

## 2019-09-02 RX ADMIN — CEFAZOLIN SODIUM 1000 MG: 1 SOLUTION INTRAVENOUS at 17:05

## 2019-09-02 NOTE — RESPIRATORY THERAPY NOTE
RT Protocol Note  Christina Chatman 80 y o  female MRN: [id number]  Unit/Bed#: [facility] 808-01 Encounter: [id number]    Assessment    Principal Problem:    Diverticulitis  Active Problems:    Hyperlipidemia    GERD (gastroesophageal reflux disease)    CVA [facility]cerebral vascular accident) ([facility] )    Essential hypertension    Hypothyroidism    DEVONTE (acute kidney injury) ([facility] )    Severe protein-calorie malnutrition ([facility] )      Home Pulmonary Medications:  Albuterol ,combivent       Past Medical History:   Diagnosis Date    Aspiration pneumonia ([place] [address] )     Last Assessed:  7/18/17    Basal cell carcinoma of nose     Last Assessed:  4/12/17    Blind     blind right eye, 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, Last Assessed:  7/18/17    Dementia     Depression     Disease of thyroid gland     Fibromyalgia     Last Assessed:  7/18/17    Gait disturbance     GERD (gastroesophageal reflux disease)     Glaucoma     Hyperlipidemia     Hypertension     Osteopenia     Peripheral neuropathy     Last Assessed:  4/12/17    Polymyalgia rheumatica ([place] [address] )     Psychiatric disorder     depression    Renal insufficiency syndrome     Last Assessed:  4/13/17    Rheumatoid arthritis ([place] [address] )     Stroke ([place] [address] )     x2, Last Assessed:  7/18/17    Systemic lupus erythematosus ([place] [address] )     Vertigo     Vitamin D deficiency      Social History     Socioeconomic History    Marital status: /Civil Union     Spouse name: None    Number of children: None    Years of education: None    Highest education level: None   Occupational History    None   Social Needs    Financial resource strain: None    Food insecurity:     Worry: None     Inability: None    Transportation needs:     Medical: None     Non-medical: None   Tobacco Use    Smoking status: Never Smoker    Smokeless tobacco: Never Used   Substance and Sexual Activity    Alcohol use: Never     Frequency: Never    Drug use: No    Sexual activity: None   Lifestyle    Physical activity:     Days per week: None     Minutes per session: None    Stress: None   Relationships    Social connections:     Talks on phone: None     Gets together: None     Attends Islam service: None     Active member of club or organization: None     Attends meetings of clubs or organizations: None     Relationship status: None    Intimate partner violence:     Fear of current or ex partner: None     Emotionally abused: None     Physically abused: None     Forced sexual activity: None   Other Topics Concern    None   Social History Narrative    None       Subjective         Objective    Physical Exam:   Assessment Type: (P) Assess only  General Appearance: (P) Awake, Alert  Respiratory Pattern: (P) Normal  Chest Assessment: (P) Chest expansion symmetrical  Bilateral Breath Sounds: (P) Clear    Vitals:  Blood pressure 127/61, pulse 74, temperature 98 4 °F (36 9 °C), resp  rate 18, height 5' (1 524 m), weight 59 kg (130 lb), SpO2 95 %  Imaging and other studies: I have personally reviewed pertinent reports              Plan    Respiratory Plan: (P) Home Bronchodilator Patient pathway        Resp Comments: (P) Pt has udn ordered prn @ home, pt bs are clear, pt SpO2 on rma = 96%, juan m xray shows clear lungs , pt is not admitted for respiratory issuesd/c duo neb q4  pt did state she does not take anything at home for breathing but chart states udn prn  and combivernt MDi prn, will continue with albuterol udn prn while pt is admitted per protocol

## 2019-09-02 NOTE — ED NOTES
Pt c/o chest pain    Dr Star Sánchez at bedside and is aware l      Garth Anguiano RN  06/34/45 [id number]

## 2019-09-02 NOTE — ASSESSMENT & PLAN NOTE
Malnutrition Findings:           BMI Findings: Body mass index is 25 39 kg/m²     Monitor nutritional status

## 2019-09-02 NOTE — PLAN OF CARE
Problem: Potential for Falls  Goal: Patient will remain free of falls  Description  INTERVENTIONS:  - Assess patient frequently for physical needs  -  Identify cognitive and physical deficits and behaviors that affect risk of falls  -  [place] fall precautions as indicated by assessment   - Educate patient/family on patient safety including physical limitations  - Instruct patient to call for assistance with activity based on assessment  - Modify environment to reduce risk of injury  - Consider OT/PT consult to assist with strengthening/mobility  Outcome: Progressing     Problem: Prexisting or High Potential for Compromised Skin Integrity  Goal: Skin integrity is maintained or improved  Description  INTERVENTIONS:  - Identify patients at risk for skin breakdown  - Assess and monitor skin integrity  - Assess and monitor nutrition and hydration status  - Monitor labs   - Assess for incontinence   - Turn and reposition patient  - Assist with mobility/ambulation  - Relieve pressure over bony prominences  - Avoid friction and shearing  - Provide appropriate hygiene as needed including keeping skin clean and dry  - Evaluate need for skin moisturizer/barrier cream  - Collaborate with interdisciplinary team   - Patient/family teaching  - Consider wound care consult   Outcome: Progressing     Problem: Nutrition/Hydration-ADULT  Goal: Nutrient/Hydration intake appropriate for improving, restoring or maintaining nutritional needs  Description  Monitor and assess patient's nutrition/hydration status for malnutrition  Collaborate with interdisciplinary team and initiate plan and interventions as ordered  Monitor patient's weight and dietary intake as ordered or per policy  Utilize nutrition screening tool and intervene as necessary  Determine patient's food preferences and provide high-protein, high-caloric foods as appropriate       INTERVENTIONS:  - Monitor oral intake, urinary output, labs, and treatment plans  - Assess nutrition and hydration status and recommend course of action  - Evaluate amount of meals eaten  - Assist patient with eating if necessary   - Allow adequate time for meals  - Recommend/ encourage appropriate diets, oral nutritional supplements, and vitamin/mineral supplements  - Order, calculate, and assess calorie counts as needed  - Recommend, monitor, and adjust tube feedings and TPN/PPN based on assessed needs  - Assess need for intravenous fluids  - Provide specific nutrition/hydration education as appropriate  - Include patient/family/caregiver in decisions related to nutrition  Outcome: Not Progressing     Problem: GASTROINTESTINAL - ADULT  Goal: Minimal or absence of nausea and/or vomiting  Description  INTERVENTIONS:  - Administer IV fluids if ordered to ensure adequate hydration  - Maintain NPO status until nausea and vomiting are resolved  - Nasogastric tube if ordered  - Administer ordered antiemetic medications as needed  - Provide nonpharmacologic comfort measures as appropriate  - Advance diet as tolerated, if ordered  - Consider nutrition services referral to assist patient with adequate nutrition and appropriate food choices  Outcome: Not Progressing  Goal: Maintains or returns to baseline bowel function  Description  INTERVENTIONS:  - Assess bowel function  - Encourage oral fluids to ensure adequate hydration  - Administer IV fluids if ordered to ensure adequate hydration  - Administer ordered medications as needed  - Encourage mobilization and activity  - Consider nutritional services referral to assist patient with adequate nutrition and appropriate food choices  Outcome: Not Progressing  Goal: Maintains adequate nutritional intake  Description  INTERVENTIONS:  - Monitor percentage of each meal consumed  - Identify factors contributing to decreased intake, treat as appropriate  - Assist with meals as needed  - Monitor I&O, weight, and lab values if indicated  - Obtain nutrition services referral as needed  Outcome: Not Progressing     Problem: PAIN - ADULT  Goal: Verbalizes/displays adequate comfort level or baseline comfort level  Description  Interventions:  - Encourage patient to monitor pain and request assistance  - Assess pain using appropriate pain scale  - Administer analgesics based on type and severity of pain and evaluate response  - Implement non-pharmacological measures as appropriate and evaluate response  - Consider cultural and social influences on pain and pain management  - Notify physician/advanced practitioner if interventions unsuccessful or patient reports new pain  Outcome: Not Progressing     Problem: INFECTION - ADULT  Goal: Absence or prevention of progression during hospitalization  Description  INTERVENTIONS:  - Assess and monitor for signs and symptoms of infection  - Monitor lab/diagnostic results  - Monitor all insertion sites, i e  indwelling lines, tubes, and drains  - Monitor endotracheal if appropriate and nasal secretions for changes in amount and color  - [place] appropriate cooling/warming therapies per order  - Administer medications as ordered  - Instruct and encourage patient and family to use good hand hygiene technique  - Identify and instruct in appropriate isolation precautions for identified infection/condition  Outcome: Not Progressing  Goal: Absence of fever/infection during neutropenic period  Description  INTERVENTIONS:  - Monitor WBC    Outcome: Progressing     Problem: SAFETY ADULT  Goal: Maintain or return to baseline ADL function  Description  INTERVENTIONS:  -  Assess patient's ability to carry out ADLs; assess patient's baseline for ADL function and identify physical deficits which impact ability to perform ADLs (bathing, care of mouth/teeth, toileting, grooming, dressing, etc )  - Assess/evaluate cause of self-care deficits   - Assess range of motion  - Assess patient's mobility; develop plan if impaired  - Assess patient's need for assistive devices and provide as appropriate  - Encourage maximum independence but intervene and supervise when necessary  - Involve family in performance of ADLs  - Assess for home care needs following discharge   - Consider OT consult to assist with ADL evaluation and planning for discharge  - Provide patient education as appropriate  Outcome: Progressing  Goal: Maintain or return mobility status to optimal level  Description  INTERVENTIONS:  - Assess patient's baseline mobility status (ambulation, transfers, stairs, etc )    - Identify cognitive and physical deficits and behaviors that affect mobility  - Identify mobility aids required to assist with transfers and/or ambulation (gait belt, sit-to-stand, lift, walker, cane, etc )  - [place] fall precautions as indicated by assessment  - Record patient progress and toleration of activity level on Mobility SBAR; progress patient to next Phase/Stage  - Instruct patient to call for assistance with activity based on assessment  - Consider rehabilitation consult to assist with strengthening/weightbearing, etc   Outcome: Progressing     Problem: DISCHARGE PLANNING  Goal: Discharge to home or other facility with appropriate resources  Description  INTERVENTIONS:  - Identify barriers to discharge w/patient and caregiver  - Arrange for needed discharge resources and transportation as appropriate  - Identify discharge learning needs (meds, wound care, etc )  - Arrange for interpretive services to assist at discharge as needed  - Refer to Case Management Department for coordinating discharge planning if the patient needs post-hospital services based on physician/advanced practitioner order or complex needs related to functional status, cognitive ability, or social support system  Outcome: Progressing     Problem: Knowledge Deficit  Goal: Patient/family/caregiver demonstrates understanding of disease process, treatment plan, medications, and discharge instructions  Description  Complete learning assessment and assess knowledge base    Interventions:  - Provide teaching at level of understanding  - Provide teaching via preferred learning methods  Outcome: Progressing

## 2019-09-02 NOTE — ED ATTENDING ATTESTATION
Christy Wright MD, saw and evaluated the patient  All available labs and X-rays were ordered by me or the resident and have been reviewed by myself  I discussed the patient with the resident / non-physician and agree with the resident's / non-physician practitioner's findings and plan as documented in the resident's / non-physician practicitioner's note, except where noted  At this point, I agree with the current assessment done in the ED  I was present during key portions of all procedures performed unless otherwise stated  Chief Complaint   Patient presents with    Fever - 75 years or older     Pt comes from nursing home with fever and geveralized body aches  Pt states "I'm dying"  This is an 81 y/o F presenting from acute rehab for evaluation of "I'm dying", not feeling well, body aches, and vomiting  The patient was at her baseline as far as the daughter knows  Today, the NH called her b/c of the above  The patient was sent in for evaluation  Patient complained of belly pain and chest pain at some point, and has a known coronary stenosis that is being "watched" per daughter  Denies fevers  +vomiting (unknown if nbnb)  No diarrhea  Denies SOB to me but mentioned it to the resident   PMH:  - HLD  - Hypothyroidism  - GERD  - Dementia  - R  A   - Vertigo  - HTN  - Depression  - Fibromalgia  - Stroke  - SLE  PSH:  - joint replacement  - shoulder surgery  - eye surgery  n osmoking drinking drugs  PE:  Vitals:    09/03/19 0709 09/03/19 0730 09/03/19 1201 09/03/19 1539   BP:  123/58  127/52   BP Location:       Pulse:  103 86 89   Resp: 17 16 16 20   Temp:  100 3 °F (37 9 °C) 98 7 °F (37 1 °C) 100 3 °F (37 9 °C)   TempSrc:       SpO2:  97% 97% 97%   Weight:       Height:       General: VS reviewed  NAD, awake, alert  Well-nourished, well-developed  Appears stated age  Head: Normocephalic, atraumatic, nontender  Eyes: PERRL, EOM-I  No diplopia  No hyphema     No subconjunctival hemorrhages  Symmetrical lids  ENT: Atraumatic external nose and ears  Pharynx normal    Mildly dry MM  No malocclusion  No stridor  Normal phonation  No drooling  Normal swallowing  Base of mouth is soft  No mastoid tenderness  Neck: Symmetric, trachea midline  No JVD  No midline neck tenderness  CV: RRR  +L1/Y1  Systolic grade 2 murmur consistent w/ AS   Peripheral pulses +2 throughout  No chest wall tenderness  Lungs:   Unlabored No retractions  CTAB, lungs sounds equal bilateral    No crepitus  No tachypnea  No paradoxical motion  Abd: +BS, soft  Diffuse tenderness, no one place is maximal   No rebound  +guarding  No rigidity  No hepatosplenomegaly noted  No peritoneal signs  Using leg to press into belly causes severe pain  No CVAT  MSK:   FROM   No lower extremity edema  Back:   No CVAT  Skin: Dry, intact  Neuro: AAOx3, GCS 15, CN II-XII grossly intact  Motor grossly intact  Psychiatric/Behavioral: Appropriate mood and affect   Exam: deferred  A:  - Belly pain / tenderness  - fever  - CP  P:  - CXR / CT of belly for acute pathology given vomiting + severe pain  - labs  - ACS r/o given complaint of CP  - 13 point ROS was performed and all are normal unless stated in the history above  - DC vs admit based on workup    - Nursing note reviewed  Vitals reviewed  - Orders placed by myself and/or advanced practitioner / resident     - Previous chart was reviewed  - No language barrier    - History obtained from patient  - There are no limitations to the history obtained  - Critical care time: Not applicable for this patient  Final Diagnosis:  1  Urinary tract infection    2  DEVONTE (acute kidney injury) ([facility] [address] )    3  Diverticulitis    4   Abdominal pain        ED Course as of Sep 03 1541   Mon Sep 02, 2019   1306 WBC(!): 10 44   1306 Hemoglobin(!): 10 4   1306 Platelet Count: 913   1319 DEVONTE   Creatinine(!): 1 41     Medications   albuterol inhalation solution 2 5 mg (has no administration in time range)   ascorbic acid (VITAMIN C) tablet 500 mg ( Oral Canceled Entry 9/3/19 0804)   donepezil (ARICEPT) tablet 5 mg (5 mg Oral Given 9/2/19 2146)   DULoxetine (CYMBALTA) delayed release capsule 60 mg (0 mg Oral Hold 9/3/19 1103)   estradiol (ESTRACE) vaginal cream 1 g (1 g Vaginal Not Given 9/3/19 0802)   fluticasone (FLONASE) 50 mcg/act nasal spray 1 spray (1 spray Nasal Given 9/3/19 0803)   guaiFENesin (MUCINEX) 12 hr tablet 600 mg (has no administration in time range)   levothyroxine tablet 100 mcg (100 mcg Oral Given 9/3/19 0533)   LORazepam (ATIVAN) tablet 0 5 mg (0 5 mg Oral Given 9/2/19 2146)   magnesium hydroxide (MILK OF MAGNESIA) 400 mg/5 mL oral suspension 30 mL (has no administration in time range)   metoprolol tartrate (LOPRESSOR) tablet 25 mg ( Oral Canceled Entry 9/3/19 0803)   NIFEdipine (PROCARDIA XL) 24 hr tablet 60 mg ( Oral Canceled Entry 9/3/19 0803)   pantoprazole (PROTONIX) EC tablet 40 mg ( Oral Canceled Entry 9/3/19 0803)   pravastatin (PRAVACHOL) tablet 40 mg ( Oral Canceled Entry 9/3/19 0803)   QUEtiapine (SEROquel) tablet 12 5 mg ( Oral Canceled Entry 9/3/19 0804)   sodium chloride (OCEAN) 0 65 % nasal spray 2 spray (2 sprays Each Nare Given 9/3/19 0804)   ondansetron (ZOFRAN) injection 4 mg (4 mg Intravenous Given 9/2/19 1727)   heparin (porcine) subcutaneous injection 5,000 Units (5,000 Units Subcutaneous Given 9/3/19 1352)   sodium chloride 0 9 % infusion (50 mL/hr Intravenous New Bag 9/2/19 1618)   ceFAZolin (ANCEF) IVPB (premix) 1,000 mg (0 mg Intravenous Stopped 9/3/19 0840)   metroNIDAZOLE (FLAGYL) IVPB (premix) 500 mg (0 mg Intravenous Stopped 9/3/19 1051)   losartan (COZAAR) tablet 75 mg (75 mg Oral Not Given 9/3/19 1103)   nystatin (MYCOSTATIN) powder ( Topical Given 9/3/19 0804)   ondansetron (FOR EMS ONLY) (ZOFRAN) 4 mg/2 mL injection 4 mg (0 mg Does not apply Given to EMS 9/2/19 1202)   sodium chloride 0 9 % bolus 500 mL (0 mL Intravenous Stopped 9/2/19 1416)   sodium chloride 0 9 % infusion (500 mL/hr Intravenous New Bag 9/2/19 1421)   ceftriaxone (ROCEPHIN) 1 g/50 mL in dextrose IVPB (0 mg Intravenous Stopped 9/2/19 1626)     X-ray chest 2 views   ED Interpretation   No acute cardiopulmonary abnormality  Final Result      No acute cardiopulmonary disease  Workstation performed: [id number]         CT abdomen pelvis wo contrast   Final Result      Segment of asymmetric moderate colonic wall thickening with mild pericolonic inflammatory stranding involving the mid sigmoid colon with multiple associated diverticula most compatible with acute diverticulitis  There is no evidence of perforation  Mild to moderate stool noted within the rectum  No large or small bowel obstruction  Workstation performed: [id number]           Orders Placed This Encounter   Procedures    Straight cath for urine    Blood culture #1    Blood culture #2    Urine culture    X-ray chest 2 views    CT abdomen pelvis wo contrast    CBC and differential    CMP    Lipase    UA w Reflex to Microscopic w Reflex to Culture    Lactic acid x2    Troponin I    Urine Microscopic    Comprehensive metabolic panel    CBC and differential    Platelet count    Procalcitonin    Basic metabolic panel    CBC and differential    Procalcitonin    Diet Clear Liquid    Insert peripheral IV    Nursing communcation Continue IV as ordered     [place] Notify admitting physician    Notify admitting physician on arrival    Vital Signs per unit routine    Up and OOB as tolerated    Place sequential compression device    Nursing Communication Apply ice packs to patient    Level 1-Full Code: all life saving measures are indicated    Inpatient consult to gastroenterology    OT eval and treat    PT eval and treat    Respiratory Protocol    EKG RESULTS    ECG 12 lead    Inpatient Admission     Labs Reviewed   CBC AND DIFFERENTIAL - Abnormal       Result Value Ref Range Status    WBC 10 44 (*) 4 31 - 10 16 Thousand/uL Final    RBC 3 56 (*) 3 81 - 5 12 Million/uL Final    Hemoglobin 10 4 (*) 11 5 - 15 4 g/dL Final    Hematocrit 32 3 (*) 34 8 - 46 1 % Final    MCV 91  82 - 98 fL Final    MCH 29 2  26 8 - 34 3 pg Final    MCHC 32 2  31 4 - 37 4 g/dL Final    RDW 14 6  11 6 - 15 1 % Final    MPV 9 7  8 9 - 12 7 fL Final    Platelets 017  702 - 390 Thousands/uL Final    nRBC 0  /100 WBCs Final    Neutrophils Relative 86 (*) 43 - 75 % Final    Immat GRANS % 1  0 - 2 % Final    Lymphocytes Relative 5 (*) 14 - 44 % Final    Monocytes Relative 8  4 - 12 % Final    Eosinophils Relative 0  0 - 6 % Final    Basophils Relative 0  0 - 1 % Final    Neutrophils Absolute 9 00 (*) 1 85 - 7 62 Thousands/µL Final    Immature Grans Absolute 0 07  0 00 - 0 20 Thousand/uL Final    Lymphocytes Absolute 0 49 (*) 0 60 - 4 47 Thousands/µL Final    Monocytes Absolute 0 86  0 17 - 1 22 Thousand/µL Final    Eosinophils Absolute 0 00  0 00 - 0 61 Thousand/µL Final    Basophils Absolute 0 02  0 00 - 0 10 Thousands/µL Final   COMPREHENSIVE METABOLIC PANEL - Abnormal    Sodium 137  136 - 145 mmol/L Final    Potassium 3 9  3 5 - 5 3 mmol/L Final    Chloride 105  100 - 108 mmol/L Final    CO2 22  21 - 32 mmol/L Final    ANION GAP 10  4 - 13 mmol/L Final    BUN 17  5 - 25 mg/dL Final    Creatinine 1 41 (*) 0 60 - 1 30 mg/dL Final    Comment: Standardized to IDMS reference method    Glucose 211 (*) 65 - 140 mg/dL Final    Comment:   If the patient is fasting, the ADA then defines impaired fasting glucose as > 100 mg/dL and diabetes as > or equal to 123 mg/dL  Specimen collection should occur prior to Sulfasalazine administration due to the potential for falsely depressed results  Specimen collection should occur prior to Sulfapyridine administration due to the potential for falsely elevated results      Calcium 9 2  8 3 - 10 1 mg/dL Final    AST 51 (*) 5 - 45 U/L Final    Comment:   Specimen collection should occur prior to Sulfasalazine administration due to the potential for falsely depressed results  ALT 25  12 - 78 U/L Final    Comment:   Specimen collection should occur prior to Sulfasalazine and/or Sulfapyridine administration due to the potential for falsely depressed results  Alkaline Phosphatase 120 (*) 46 - 116 U/L Final    Total Protein 7 5  6 4 - 8 2 g/dL Final    Albumin 3 4 (*) 3 5 - 5 0 g/dL Final    Total Bilirubin 0 51  0 20 - 1 00 mg/dL Final    eGFR 33  ml/min/1 73sq m Final    Narrative:     [place] guidelines for Chronic Kidney Disease (CKD):     Stage 1 with normal or high GFR (GFR > 90 mL/min/1 73 square meters)    Stage 2 Mild CKD (GFR = 60-89 mL/min/1 73 square meters)    Stage 3A Moderate CKD (GFR = 45-59 mL/min/1 73 square meters)    Stage 3B Moderate CKD (GFR = 30-44 mL/min/1 73 square meters)    Stage 4 Severe CKD (GFR = 15-29 mL/min/1 73 square meters)    Stage 5 End Stage CKD (GFR <15 mL/min/1 73 square meters)  Note: GFR calculation is accurate only with a steady state creatinine   UA W REFLEX TO MICROSCOPIC WITH REFLEX TO CULTURE - Abnormal    Color, UA Sherrie   Final    Clarity, UA Turbid   Final    Specific [place], UA 1 016  1 003 - 1 030 Final    pH, UA 5 5  4 5, 5 0, 5 5, 6 0, 6 5, 7 0, 7 5, 8 0 Final    Leukocytes, UA Large (*) Negative Final    Nitrite, UA Positive (*) Negative Final    Protein,  (2+) (*) Negative mg/dl Final    Glucose, UA Negative  Negative mg/dl Final    Ketones, UA Trace (*) Negative mg/dl Final    Urobilinogen, UA 2 0 (*) 0 2, 1 0 E U /dl E U /dl Final    Bilirubin, UA Interference- unable to analyze (*) Negative Final    Comment: The dipstick result may be falsely positive due to interfering substances  We recommend reliance upon serum bilirubin, liver & kidney function tests to guide patient care if clinically indicated      Blood, UA Moderate (*) Negative Final   LACTIC ACID, PLASMA - Abnormal    LACTIC ACID 3 7 (*) 0 5 - 2 0 mmol/L Final    Narrative:     Result may be elevated if tourniquet was used during collection  URINE MICROSCOPIC - Abnormal    RBC, UA 4-10 (*) None Seen, 0-5 /hpf Final    WBC, UA Innumerable (*) None Seen, 0-5, 5-55, 5-65 /hpf Final    Epithelial Cells Occasional  None Seen, Occasional /hpf Final    Bacteria, UA Innumerable (*) None Seen, Occasional /hpf Final   LIPASE - Normal    Lipase 203  73 - 393 u/L Final   TROPONIN I - Normal    Troponin I <0 02  <=0 04 ng/mL Final    Comment:   [employer] Chemistry analyzer 99% cutoff is > 0 04 ng/mL in network labs     o cTnI 99% cutoff is useful only when applied to patients in the clinical setting of myocardial ischemia   o cTnI 99% cutoff should be interpreted in the context of clinical history, ECG findings and possibly cardiac imaging to establish correct diagnosis  o cTnI 99% cutoff may be suggestive but clearly not indicative of a coronary event without the clinical setting of myocardial ischemia  BLOOD CULTURE   BLOOD CULTURE     Time reflects when diagnosis was documented in both MDM as applicable and the Disposition within this note     Time User Action Codes Description Comment    9/2/2019  2:46 PM Darron Coco Add [N39 0] Urinary tract infection     9/2/2019  2:46 PM Luis Carlos Luque Add [N17 9] DEVONTE (acute kidney injury) ([facility] [address] )     9/2/2019  2:46 PM Darron Charleysh Add [K57 [id number]] Diverticulitis     9/2/2019  3:18 PM Nhi ENRIQUE Add [R10 9] Abdominal pain       ED Disposition     ED Disposition Condition Date/Time Comment    Admit Stable Mon Sep 2, 2019  2:46 PM Case was discussed with LAYNE and the patient's admission status was agreed to be Admission Status: inpatient status to the service of Dr Deena Horta           Follow-up Information    None       Current Discharge Medication List      CONTINUE these medications which have NOT CHANGED    Details   albuterol (2 5 mg/3 mL) 0 083 % nebulizer solution Take 2 5 mg by nebulization every 4 (four) hours as needed for wheezing or shortness of breath      ascorbic acid (VITAMIN C) 500 mg tablet Take 500 mg by mouth daily      benzocaine (ORAJEL) 10 % mucosal gel Apply 1 application to the mouth or throat 3 (three) times a day as needed for mucositis  Qty: 5 3 g, Refills: 0    Associated Diagnoses: Ulcer (traumatic) of oral mucosa      benzonatate (TESSALON PERLES) 100 mg capsule Take 1 capsule (100 mg total) by mouth 3 (three) times a day as needed for cough  Qty: 20 capsule, Refills: 0    Associated Diagnoses: Cough      bisacodyl (DULCOLAX) 10 mg suppository Insert 10 mg into the rectum daily as needed for constipation      calcium carbonate (OS-TEDDY) 600 MG tablet Take 600 mg by mouth daily      carboxymethylcellulose (REFRESH LIQUIGEL) 1 % ophthalmic solution Apply 1 drop to eye 3 (three) times a day        cetirizine (ZyrTEC) 10 MG chewable tablet Chew 1 tablet (10 mg total) daily  Qty: 30 tablet, Refills: 0    Associated Diagnoses:  Allergic rhinitis, unspecified seasonality, unspecified trigger      Dentifrices (BIOTENE DRY MOUTH CARE DT) Apply to teeth      donepezil (ARICEPT) 10 mg tablet Take 0 5 tablets (5 mg total) by mouth daily at bedtime Increased confusion on 10mg  Qty: 45 tablet, Refills: 1    Associated Diagnoses: Dementia with behavioral disturbance, unspecified dementia type      DULoxetine (CYMBALTA) 60 mg delayed release capsule Take 1 capsule (60 mg total) by mouth daily  Refills: 0    Associated Diagnoses: Pneumonia of right lower lobe due to infectious organism (HCC)      estradiol (ESTRACE) 0 1 mg/g vaginal cream Insert 1 g into the vagina daily Do no insert - please apply a pea sized amount to the urethra/vagina Monday, Wednesday and Friday before bedtime  Qty: 42 5 g, Refills: 3    Associated Diagnoses: Recurrent UTI      ferrous gluconate (FERGON) 324 mg tablet Take 65 mg by mouth daily with breakfast      fluticasone (FLONASE) 50 mcg/act nasal spray 1 spray into each nostril daily  Qty: 16 g, Refills: 0    Associated Diagnoses: Allergic rhinitis, unspecified seasonality, unspecified trigger      ibuprofen (MOTRIN) 400 mg tablet Take 1 tablet (400 mg total) by mouth every 6 (six) hours as needed for mild pain (fever)  Qty: 20 tablet, Refills: 0    Associated Diagnoses: Fever      ipratropium-albuterol (COMBIVENT RESPIMAT) inhaler Inhale 2 puffs every 4 (four) hours as needed (dyspnea)  Qty: 1 Inhaler, Refills: 5    Associated Diagnoses: Bronchitis      levothyroxine 100 mcg tablet Take 100 mcg by mouth daily       losartan (COZAAR) 100 MG tablet Take 1 tablet (100 mg total) by mouth daily  Qty: 90 tablet, Refills: 1    Associated Diagnoses: Bradycardia      Lutein 10 MG TABS Take 1 tablet by mouth daily        magnesium hydroxide (MILK OF MAGNESIA) 400 mg/5 mL oral suspension Take 30 mL by mouth daily as needed for constipation        metoprolol tartrate (LOPRESSOR) 50 mg tablet Take 0 5 tablets (25 mg total) by mouth 2 (two) times a day  Qty: 45 tablet, Refills: 1    Associated Diagnoses: Essential hypertension      NIFEdipine (PROCARDIA XL) 60 mg 24 hr tablet Take 60 mg by mouth daily       nystatin (MYCOSTATIN) powder Apply topically 2 (two) times a day Apply to b/l groin folds      pantoprazole (PROTONIX) 40 mg tablet Take 40 mg by mouth 2 (two) times a day        pravastatin (PRAVACHOL) 40 mg tablet Take 1 tablet (40 mg total) by mouth daily  Refills: 0    Comments: Resume on friday  Associated Diagnoses: Pseudogout of left wrist      QUEtiapine (SEROquel) 25 mg tablet Take 12 5 mg by mouth 2 (two) times a day May crush medications        sodium chloride (OCEAN) 0 65 % nasal spray 2 sprays into each nostril 2 (two) times a day      Sodium Phosphates (FLEET ENEMA RE) Insert 1 Dose into the rectum daily PRN if dulcolax supp is not effective      guaiFENesin (MUCINEX) 600 mg 12 hr tablet Take 1 tablet (600 mg total) by mouth 2 (two) times a day as needed for congestion  Qty: 60 tablet, Refills: 1    Associated Diagnoses: Bronchitis      LORazepam (ATIVAN) 0 5 mg tablet Take 1 tablet (0 5 mg total) by mouth every 8 (eight) hours as needed for anxiety for up to 10 days She always takes 0 5 mg at bedtime  Qty: 30 tablet, Refills: 0    Associated Diagnoses: Anxiety      nystatin (MYCOSTATIN) 100,000 units/mL suspension Apply 100,000 Units to the mouth or throat 2 (two) times a day      triamcinolone (KENALOG) 0 1 % cream triamcinolone acetonide 0 1 % topical cream      tuberculin (TUBERSOL) 5 units/0 1 mL Inject 5 Units into the skin once           No discharge procedures on file  Prior to Admission Medications   Prescriptions Last Dose Informant Patient Reported? Taking? DULoxetine (CYMBALTA) 60 mg delayed release capsule  Outside Facility (Specify) No Yes   Sig: Take 1 capsule (60 mg total) by mouth daily   Dentifrices ([address] [facility] DT)  Outside Facility (Specify) Yes Yes   Sig: Apply to teeth   LORazepam (ATIVAN) 0 5 mg tablet Unknown at Unknown time Outside Facility (Specify) No No   Sig: Take 1 tablet (0 5 mg total) by mouth every 8 (eight) hours as needed for anxiety for up to 10 days She always takes 0 5 mg at bedtime   Patient taking differently: Take 0 5 mg by mouth daily at bedtime She always takes 0 5 mg at bedtime   Lutein 10 MG TABS  Outside Facility (Specify) Yes Yes   Sig: Take 1 tablet by mouth daily     NIFEdipine (PROCARDIA XL) 60 mg 24 hr tablet  Outside Facility (Specify) Yes Yes   Sig: Take 60 mg by mouth daily    QUEtiapine (SEROquel) 25 mg tablet   Yes Yes   Sig: Take 12 5 mg by mouth 2 (two) times a day May crush medications     Sodium Phosphates (FLEET ENEMA RE)   Yes Yes   Sig: Insert 1 Dose into the rectum daily PRN if dulcolax supp is not effective   albuterol (2 5 mg/3 mL) 0 083 % nebulizer solution   Yes Yes   Sig: Take 2 5 mg by nebulization every 4 (four) hours as needed for wheezing or shortness of breath   ascorbic acid (VITAMIN C) 500 mg tablet   Yes Yes Sig: Take 500 mg by mouth daily   benzocaine (ORAJEL) 10 % mucosal gel  Outside Facility (Specify) No Yes   Sig: Apply 1 application to the mouth or throat 3 (three) times a day as needed for mucositis   benzonatate (TESSALON PERLES) 100 mg capsule  Outside Facility (Specify) No Yes   Sig: Take 1 capsule (100 mg total) by mouth 3 (three) times a day as needed for cough   bisacodyl (DULCOLAX) 10 mg suppository   Yes Yes   Sig: Insert 10 mg into the rectum daily as needed for constipation   calcium carbonate (OS-TEDDY) 600 MG tablet  Outside Facility (Specify) Yes Yes   Sig: Take 600 mg by mouth daily   carboxymethylcellulose (REFRESH LIQUIGEL) 1 % ophthalmic solution  Outside Facility (Specify) Yes Yes   Sig: Apply 1 drop to eye 3 (three) times a day     cetirizine (ZyrTEC) 10 MG chewable tablet  Outside Facility (Specify) No Yes   Sig: Chew 1 tablet (10 mg total) daily   donepezil (ARICEPT) 10 mg tablet  Outside Facility (Specify) No Yes   Sig: Take 0 5 tablets (5 mg total) by mouth daily at bedtime Increased confusion on 10mg   estradiol (ESTRACE) 0 1 mg/g vaginal cream  Outside Facility (Specify) No Yes   Sig: Insert 1 g into the vagina daily Do no insert - please apply a pea sized amount to the urethra/vagina Monday, Wednesday and Friday before bedtime   ferrous gluconate (FERGON) 324 mg tablet   Yes Yes   Sig: Take 65 mg by mouth daily with breakfast   fluticasone (FLONASE) 50 mcg/act nasal spray  Outside Facility (Specify) No Yes   Si spray into each nostril daily   guaiFENesin (MUCINEX) 600 mg 12 hr tablet Unknown at Unknown time Outside Facility (Specify) No No   Sig: Take 1 tablet (600 mg total) by mouth 2 (two) times a day as needed for congestion   ibuprofen (MOTRIN) 400 mg tablet 2019 at Unknown time  No Yes   Sig: Take 1 tablet (400 mg total) by mouth every 6 (six) hours as needed for mild pain (fever)   ipratropium-albuterol (COMBIVENT RESPIMAT) inhaler  Outside Facility (Specify) No Yes   Sig: Inhale 2 puffs every 4 (four) hours as needed (dyspnea)   levothyroxine 100 mcg tablet  Outside Facility (Specify) Yes Yes   Sig: Take 100 mcg by mouth daily    losartan (COZAAR) 100 MG tablet  Outside Facility (Specify) No Yes   Sig: Take 1 tablet (100 mg total) by mouth daily   Patient taking differently: Take 100 mg by mouth daily Patient takes 75mg daily   magnesium hydroxide (MILK OF MAGNESIA) 400 mg/5 mL oral suspension  Outside Facility (Specify) Yes Yes   Sig: Take 30 mL by mouth daily as needed for constipation     metoprolol tartrate (LOPRESSOR) 50 mg tablet  Outside Facility (Specify) No Yes   Sig: Take 0 5 tablets (25 mg total) by mouth 2 (two) times a day   nystatin (MYCOSTATIN) 100,000 units/mL suspension Not Taking at Unknown time  Yes No   Sig: Apply 100,000 Units to the mouth or throat 2 (two) times a day   nystatin (MYCOSTATIN) powder   Yes Yes   Sig: Apply topically 2 (two) times a day Apply to b/l groin folds   pantoprazole (PROTONIX) 40 mg tablet  Outside Facility (Specify) Yes Yes   Sig: Take 40 mg by mouth 2 (two) times a day     pravastatin (PRAVACHOL) 40 mg tablet  Outside Facility (Specify) No Yes   Sig: Take 1 tablet (40 mg total) by mouth daily   sodium chloride (OCEAN) 0 65 % nasal spray  Outside Facility (Specify) Yes Yes   Si sprays into each nostril 2 (two) times a day   triamcinolone (KENALOG) 0 1 % cream Unknown at Unknown time Outside Facility (Specify) Yes No   Sig: triamcinolone acetonide 0 1 % topical cream   tuberculin (TUBERSOL) 5 units/0 1 mL Unknown at Unknown time  Yes No   Sig: Inject 5 Units into the skin once      Facility-Administered Medications: None       Portions of the record may have been created with voice recognition software  Occasional wrong word or "sound a like" substitutions may have occurred due to the inherent limitations of voice recognition software   Read the chart carefully and recognize, using context, where substitutions have occurred      Electronically signed by:  Mackenzie Cha

## 2019-09-02 NOTE — ED PROVIDER NOTES
History  Chief Complaint   Patient presents with    Fever - 75 years or older     Pt comes from nursing home with fever and geveralized body aches  Pt states "I'm dying"  80year old female past medical history dementia, hyperlipidemia, hypothyroidism, hypertension and septic episode in April 2019 presenting with fever from a nursing home  She says that she is dying repeatedly but endorses no complaint  She denies dysuria, chest pain, vomiting, diarrhea, headache, cough  She is slightly nauseous  She did have urosepsis in April 2019  She is oriented to her name and to hospital        History provided by:  Nursing home and patient (Daughter)  History limited by:  Dementia   used: No    Fever - 76 years or older   Max temp prior to arrival:  101 2  Temp source:  Oral  Severity:  Moderate  Onset quality:  Gradual  Timing:  Intermittent  Progression:  Waxing and waning  Chronicity:  New  Relieved by:  None tried  Worsened by:  Nothing  Ineffective treatments:  Ibuprofen  Associated symptoms: confusion and somnolence    Associated symptoms: no chest pain, no chills, no congestion, no cough, no dysuria, no myalgias, no nausea, no rash and no vomiting    Risk factors comment:  Lives in nursing hoem      Prior to Admission Medications   Prescriptions Last Dose Informant Patient Reported? Taking?    DULoxetine (CYMBALTA) 60 mg delayed release capsule  Outside Facility (Specify) No Yes   Sig: Take 1 capsule (60 mg total) by mouth daily   Dentifrices ([facility])  Outside Facility (Specify) Yes Yes   Sig: Apply to teeth   LORazepam (ATIVAN) 0 5 mg tablet Unknown at Unknown time Outside Facility (Specify) No No   Sig: Take 1 tablet (0 5 mg total) by mouth every 8 (eight) hours as needed for anxiety for up to 10 days She always takes 0 5 mg at bedtime   Patient taking differently: Take 0 5 mg by mouth daily at bedtime She always takes 0 5 mg at bedtime   Lutein 10 MG TABS  Outside Facility (Specify) Yes Yes   Sig: Take 1 tablet by mouth daily     NIFEdipine (PROCARDIA XL) 60 mg 24 hr tablet  Outside Facility (Specify) Yes Yes   Sig: Take 60 mg by mouth daily    QUEtiapine (SEROquel) 25 mg tablet   Yes Yes   Sig: Take 12 5 mg by mouth 2 (two) times a day May crush medications     Sodium Phosphates (FLEET ENEMA RE)   Yes Yes   Sig: Insert 1 Dose into the rectum daily PRN if dulcolax supp is not effective   albuterol (2 5 mg/3 mL) 0 083 % nebulizer solution   Yes Yes   Sig: Take 2 5 mg by nebulization every 4 (four) hours as needed for wheezing or shortness of breath   ascorbic acid (VITAMIN C) 500 mg tablet   Yes Yes   Sig: Take 500 mg by mouth daily   benzocaine (ORAJEL) 10 % mucosal gel  Outside Facility (Specify) No Yes   Sig: Apply 1 application to the mouth or throat 3 (three) times a day as needed for mucositis   benzonatate (TESSALON PERLES) 100 mg capsule  Outside Facility (Specify) No Yes   Sig: Take 1 capsule (100 mg total) by mouth 3 (three) times a day as needed for cough   bisacodyl (DULCOLAX) 10 mg suppository   Yes Yes   Sig: Insert 10 mg into the rectum daily as needed for constipation   calcium carbonate (OS-TEDDY) 600 MG tablet  Outside Facility (Specify) Yes Yes   Sig: Take 600 mg by mouth daily   carboxymethylcellulose (REFRESH LIQUIGEL) 1 % ophthalmic solution  Outside Facility (Specify) Yes Yes   Sig: Apply 1 drop to eye 3 (three) times a day     cetirizine (ZyrTEC) 10 MG chewable tablet  Outside Facility (Specify) No Yes   Sig: Chew 1 tablet (10 mg total) daily   donepezil (ARICEPT) 10 mg tablet  Outside Facility (Specify) No Yes   Sig: Take 0 5 tablets (5 mg total) by mouth daily at bedtime Increased confusion on 10mg   estradiol (ESTRACE) 0 1 mg/g vaginal cream  Outside Facility (Specify) No Yes   Sig: Insert 1 g into the vagina daily Do no insert - please apply a pea sized amount to the urethra/vagina Monday, Wednesday and Friday before bedtime   ferrous gluconate (FERGON) 324 mg tablet   Yes Yes   Sig: Take 65 mg by mouth daily with breakfast   fluticasone (FLONASE) 50 mcg/act nasal spray  Outside Facility (Specify) No Yes   Si spray into each nostril daily   guaiFENesin (MUCINEX) 600 mg 12 hr tablet Unknown at Unknown time Outside Facility (Specify) No No   Sig: Take 1 tablet (600 mg total) by mouth 2 (two) times a day as needed for congestion   ibuprofen (MOTRIN) 400 mg tablet 2019 at Unknown time  No Yes   Sig: Take 1 tablet (400 mg total) by mouth every 6 (six) hours as needed for mild pain (fever)   ipratropium-albuterol (COMBIVENT RESPIMAT) inhaler  Outside Facility (Specify) No Yes   Sig: Inhale 2 puffs every 4 (four) hours as needed (dyspnea)   levothyroxine 100 mcg tablet  Outside Facility (Specify) Yes Yes   Sig: Take 100 mcg by mouth daily    losartan (COZAAR) 100 MG tablet  Outside Facility (Specify) No Yes   Sig: Take 1 tablet (100 mg total) by mouth daily   Patient taking differently: Take 100 mg by mouth daily Patient takes 75mg daily   magnesium hydroxide (MILK OF MAGNESIA) 400 mg/5 mL oral suspension  Outside Facility (Specify) Yes Yes   Sig: Take 30 mL by mouth daily as needed for constipation     metoprolol tartrate (LOPRESSOR) 50 mg tablet  Outside Facility (Specify) No Yes   Sig: Take 0 5 tablets (25 mg total) by mouth 2 (two) times a day   nystatin (MYCOSTATIN) 100,000 units/mL suspension Not Taking at Unknown time  Yes No   Sig: Apply 100,000 Units to the mouth or throat 2 (two) times a day   nystatin (MYCOSTATIN) powder   Yes Yes   Sig: Apply topically 2 (two) times a day Apply to b/l groin folds   pantoprazole (PROTONIX) 40 mg tablet  Outside Facility (Specify) Yes Yes   Sig: Take 40 mg by mouth 2 (two) times a day     pravastatin (PRAVACHOL) 40 mg tablet  Outside Facility (Specify) No Yes   Sig: Take 1 tablet (40 mg total) by mouth daily   sodium chloride (OCEAN) 0 65 % nasal spray  Outside Facility (Specify) Yes Yes   Si sprays into each nostril 2 (two) times a day   triamcinolone (KENALOG) 0 1 % cream Unknown at Unknown time Outside Facility (Specify) Yes No   Sig: triamcinolone acetonide 0 1 % topical cream   tuberculin (TUBERSOL) 5 units/0 1 mL Unknown at Unknown time  Yes No   Sig: Inject 5 Units into the skin once      Facility-Administered Medications: None       Past Medical History:   Diagnosis Date    Aspiration pneumonia (HCC)     Last Assessed:  7/18/17    Basal cell carcinoma of nose     Last Assessed:  4/12/17    Blind     blind right eye, 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, Last Assessed:  7/18/17    Dementia     Depression     Disease of thyroid gland     Fibromyalgia     Last Assessed:  7/18/17    Gait disturbance     GERD (gastroesophageal reflux disease)     Glaucoma     Hyperlipidemia     Hypertension     Osteopenia     Peripheral neuropathy     Last Assessed:  4/12/17    Polymyalgia rheumatica ([address] )     Psychiatric disorder     depression    Renal insufficiency syndrome     Last Assessed:  4/13/17    Rheumatoid arthritis ([address] )     Stroke ([facility] [address] )     x2, Last Assessed:  7/18/17    Systemic lupus erythematosus ([facility] [address] )     Vertigo     Vitamin D deficiency        Past Surgical History:   Procedure Laterality Date    EYE SURGERY      JOINT REPLACEMENT      left hip replacement, left shoulder replacement    SHOULDER SURGERY      Replacement       Family History   Problem Relation Age of Onset    Heart attack Mother     Diabetes Mother     Dementia Father     Coronary artery disease Father         cardiac disorder    Diabetes Sister     Uterine cancer Sister      I have reviewed and agree with the history as documented  Social History     Tobacco Use    Smoking status: Never Smoker    Smokeless tobacco: Never Used   Substance Use Topics    Alcohol use: Never     Frequency: Never    Drug use: No        Review of Systems   Constitutional: Negative for chills, diaphoresis and fever     HENT: Negative  Negative for congestion  Eyes: Negative  Negative for visual disturbance  Respiratory: Negative  Negative for cough and shortness of breath  Cardiovascular: Negative  Negative for chest pain  Gastrointestinal: Negative  Negative for abdominal pain, nausea and vomiting  Endocrine: Negative  Genitourinary: Negative  Negative for dysuria  Musculoskeletal: Negative  Negative for myalgias  Skin: Negative  Negative for rash  Allergic/Immunologic: Negative  Neurological: Negative  Negative for light-headedness and numbness  Hematological: Negative  Psychiatric/Behavioral: Positive for confusion  All other systems reviewed and are negative  Physical Exam  ED Triage Vitals [09/02/19 1153]   Temperature Pulse Respirations Blood Pressure SpO2   (!) 101 1 °F (38 4 °C) 89 18 110/54 96 %      Temp Source Heart Rate Source Patient Position - Orthostatic VS BP Location FiO2 (%)   Rectal Monitor Lying Right arm --      Pain Score       Worst Possible Pain             Orthostatic Vital Signs  Vitals:    09/02/19 1412 09/02/19 1429 09/02/19 1500 09/02/19 1532   BP: 94/55 101/51 98/52 127/61   Pulse: 72 73 72 74   Patient Position - Orthostatic VS: Lying Lying         Physical Exam   Constitutional: She appears well-developed and well-nourished  HENT:   Head: Normocephalic and atraumatic  Mouth/Throat: Oropharynx is clear and moist    Eyes: Conjunctivae are normal    Neck: Normal range of motion  Neck supple  Cardiovascular: Normal rate and regular rhythm  Pulmonary/Chest: Effort normal and breath sounds normal    Abdominal: Soft  Bowel sounds are normal  She exhibits no distension  There is tenderness  Tenderness throughout her abdomen   Musculoskeletal: Normal range of motion  Neurological: She has normal strength  She is disoriented  No cranial nerve deficit  Tired   Skin: Skin is warm and dry  Psychiatric: She has a normal mood and affect     Nursing note and vitals reviewed        ED Medications  Medications   albuterol inhalation solution 2 5 mg (has no administration in time range)   ascorbic acid (VITAMIN C) tablet 500 mg (has no administration in time range)   donepezil (ARICEPT) tablet 5 mg (has no administration in time range)   DULoxetine (CYMBALTA) delayed release capsule 60 mg (has no administration in time range)   estradiol (ESTRACE) vaginal cream 1 g (has no administration in time range)   fluticasone (FLONASE) 50 mcg/act nasal spray 1 spray (has no administration in time range)   guaiFENesin (MUCINEX) 12 hr tablet 600 mg (has no administration in time range)   levothyroxine tablet 100 mcg (has no administration in time range)   LORazepam (ATIVAN) tablet 0 5 mg (has no administration in time range)   losartan (COZAAR) tablet 100 mg (has no administration in time range)   magnesium hydroxide (MILK OF MAGNESIA) 400 mg/5 mL oral suspension 30 mL (has no administration in time range)   metoprolol tartrate (LOPRESSOR) tablet 25 mg (has no administration in time range)   NIFEdipine (PROCARDIA XL) 24 hr tablet 60 mg (has no administration in time range)   nystatin (MYCOSTATIN) oral suspension 100,000 Units (has no administration in time range)   pantoprazole (PROTONIX) EC tablet 40 mg (has no administration in time range)   pravastatin (PRAVACHOL) tablet 40 mg (has no administration in time range)   QUEtiapine (SEROquel) tablet 12 5 mg (has no administration in time range)   sodium chloride (OCEAN) 0 65 % nasal spray 2 spray (has no administration in time range)   ondansetron (ZOFRAN) injection 4 mg (has no administration in time range)   heparin (porcine) subcutaneous injection 5,000 Units (has no administration in time range)   sodium chloride 0 9 % infusion (50 mL/hr Intravenous New Bag 9/2/19 1618)   ceFAZolin (ANCEF) IVPB (premix) 1,000 mg (has no administration in time range)   metroNIDAZOLE (FLAGYL) IVPB (premix) 500 mg (500 mg Intravenous New Bag 9/2/19 1618)   ondansetron (FOR EMS ONLY) (ZOFRAN) 4 mg/2 mL injection 4 mg (0 mg Does not apply Given to EMS 9/2/19 1202)   sodium chloride 0 9 % bolus 500 mL (0 mL Intravenous Stopped 9/2/19 1416)   sodium chloride 0 9 % infusion (500 mL/hr Intravenous New Bag 9/2/19 1421)   ceftriaxone (ROCEPHIN) 1 g/50 mL in dextrose IVPB (0 mg Intravenous Stopped 9/2/19 1626)       Diagnostic Studies  Results Reviewed     Procedure Component Value Units Date/Time    Lactic acid x2 [[id number]]  (Normal) Collected:  09/02/19 1453    Lab Status:  Final result Specimen:  Blood from Arm, Right Updated:  09/02/19 1552     LACTIC ACID 2 0 mmol/L     Narrative:       Result may be elevated if tourniquet was used during collection  Urine Microscopic [[id number]]  (Abnormal) Collected:  09/02/19 1330    Lab Status:  Final result Specimen:  Urine, Straight Cath Updated:  09/02/19 1435     RBC, UA 4-10 /hpf      WBC, UA Innumerable /hpf      Epithelial Cells Occasional /hpf      Bacteria, UA Innumerable /hpf     Urine culture [[id number]] Collected:  09/02/19 1330    Lab Status: In process Specimen:  Urine, Straight Cath Updated:  09/02/19 1435    Lactic acid x2 [[id number]]  (Abnormal) Collected:  09/02/19 1243    Lab Status:  Final result Specimen:  Blood from Arm, Right Updated:  09/02/19 1435     LACTIC ACID 3 7 mmol/L     Narrative:       Result may be elevated if tourniquet was used during collection      UA w Reflex to Microscopic w Reflex to Culture [[id number]]  (Abnormal) Collected:  09/02/19 1330    Lab Status:  Final result Specimen:  Urine, Straight Cath Updated:  09/02/19 1434     Color, UA Sherrie     Clarity, UA Turbid     Specific [place], UA 1 016     pH, UA 5 5     Leukocytes, UA Large     Nitrite, UA Positive     Protein,  (2+) mg/dl      Glucose, UA Negative mg/dl      Ketones, UA Trace mg/dl      Urobilinogen, UA 2 0 E U /dl      Bilirubin, UA Interference- unable to analyze     Blood, UA Moderate    Troponin I [[id number]]  (Normal) Collected:  09/02/19 1244    Lab Status:  Final result Specimen:  Blood from Arm, Right Updated:  09/02/19 1316     Troponin I <0 02 ng/mL     CMP [[id number]]  (Abnormal) Collected:  09/02/19 1242    Lab Status:  Final result Specimen:  Blood from Arm, Right Updated:  09/02/19 1315     Sodium 137 mmol/L      Potassium 3 9 mmol/L      Chloride 105 mmol/L      CO2 22 mmol/L      ANION GAP 10 mmol/L      BUN 17 mg/dL      Creatinine 1 41 mg/dL      Glucose 211 mg/dL      Calcium 9 2 mg/dL      AST 51 U/L      ALT 25 U/L      Alkaline Phosphatase 120 U/L      Total Protein 7 5 g/dL      Albumin 3 4 g/dL      Total Bilirubin 0 51 mg/dL      eGFR 33 ml/min/1 73sq m     Narrative:       [place] guidelines for Chronic Kidney Disease (CKD):     Stage 1 with normal or high GFR (GFR > 90 mL/min/1 73 square meters)    Stage 2 Mild CKD (GFR = 60-89 mL/min/1 73 square meters)    Stage 3A Moderate CKD (GFR = 45-59 mL/min/1 73 square meters)    Stage 3B Moderate CKD (GFR = 30-44 mL/min/1 73 square meters)    Stage 4 Severe CKD (GFR = 15-29 mL/min/1 73 square meters)    Stage 5 End Stage CKD (GFR <15 mL/min/1 73 square meters)  Note: GFR calculation is accurate only with a steady state creatinine    Lipase [[id number]]  (Normal) Collected:  09/02/19 1242    Lab Status:  Final result Specimen:  Blood from Arm, Right Updated:  09/02/19 1315     Lipase 203 u/L     Blood culture #2 [[id number]] Collected:  09/02/19 1303    Lab Status:   In process Specimen:  Blood from Hand, Left Updated:  09/02/19 1306    CBC and differential [[id number]]  (Abnormal) Collected:  09/02/19 1242    Lab Status:  Final result Specimen:  Blood from Arm, Right Updated:  09/02/19 1255     WBC 10 44 Thousand/uL      RBC 3 56 Million/uL      Hemoglobin 10 4 g/dL      Hematocrit 32 3 %      MCV 91 fL      MCH 29 2 pg      MCHC 32 2 g/dL      RDW 14 6 %      MPV 9 7 fL      Platelets 773 Thousands/uL      nRBC 0 /100 WBCs      Neutrophils Relative 86 %      Immat GRANS % 1 %      Lymphocytes Relative 5 %      Monocytes Relative 8 %      Eosinophils Relative 0 %      Basophils Relative 0 %      Neutrophils Absolute 9 00 Thousands/µL      Immature Grans Absolute 0 07 Thousand/uL      Lymphocytes Absolute 0 49 Thousands/µL      Monocytes Absolute 0 86 Thousand/µL      Eosinophils Absolute 0 00 Thousand/µL      Basophils Absolute 0 02 Thousands/µL     Blood culture #1 [[id number]] Collected:  09/02/19 1242    Lab Status: In process Specimen:  Blood from Arm, Right Updated:  09/02/19 1249                 X-ray chest 2 views   ED Interpretation by Vera Payne MD (09/02 1404)   No acute cardiopulmonary abnormality  Final Result by Anabel Cabrera MD (09/02 1408)      No acute cardiopulmonary disease  Workstation performed: [id number]         CT abdomen pelvis wo contrast   Final Result by Milagros Dawn MD (09/02 1405)      Segment of asymmetric moderate colonic wall thickening with mild pericolonic inflammatory stranding involving the mid sigmoid colon with multiple associated diverticula most compatible with acute diverticulitis  There is no evidence of perforation  Mild to moderate stool noted within the rectum  No large or small bowel obstruction  Workstation performed: [id number]               Procedures  Procedures        ED Course  ED Course as of Sep 02 1633   Mon Sep 02, 2019   1208 Temperature(!): 101 1 °F (38 4 °C)   1208 Blood Pressure: 110/54   1208 Temp Source: Rectal   1208 Pulse: 89   1208 Respirations: 18   1208 SpO2: 96 %   1318 WBC(!): 10 44   1321 GFR 33 so CT done without IV contrast      1407 Segment of asymmetric moderate colonic wall thickening with mild pericolonic inflammatory stranding involving the mid sigmoid colon with multiple associated diverticula most compatible with acute diverticulitis  There is no evidence of perforation     CT abdomen pelvis wo contrast   1428 Given additional fluids and IV antibiotics      1439 WBC, UA(!): Innumerable   1439 Bacteria, UA(!): Innumerable   1447 With UTI switched antibiotics from unasyn to ceftriaxone and metronidazole      1447 Receiving another 500 mL of fluids   LACTIC ACID(!!): 3 7           Identification of Seniors at Risk      Most Recent Value   (ISAR) Identification of Seniors at Risk   Before the illness or injury that brought you to the Emergency, did you need someone to help you on a regular basis? 1 Filed at: 09/02/2019 1206   In the last 24 hours, have you needed more help than usual?  0 Filed at: 09/02/2019 1206   Have you been hospitalized for one or more nights during the past 6 months? 0 Filed at: 09/02/2019 1206   In general, do you see well? 1 Filed at: 09/02/2019 1206   In general, do you have serious problems with your memory? 1 Filed at: 09/02/2019 1206   Do you take more than three different medications every day? 1 Filed at: 09/02/2019 1206   ISAR Score  4 Filed at: 09/02/2019 1206              Initial Sepsis Screening     Row Name 09/02/19 1430                Is the patient's history suggestive of a new or worsening infection? (!) Yes (Proceed)  LIFESCAPE        Suspected source of infection  urinary tract infection;acute abdominal infection  LIFESCAPE        Are two or more of the following signs & symptoms of infection both present and new to the patient? No  -JH        Indicate SIRS criteria          If the answer is yes to both questions, suspicion of sepsis is present          If severe sepsis is present AND tissue hypoperfusion perists in the hour after fluid resuscitation or lactate > 4, the patient meets criteria for SEPTIC SHOCK          Are any of the following organ dysfunction criteria present within 6 hours of suspected infection and SIRS criteria that are NOT considered to be chronic conditions?           Organ dysfunction          Date of presentation of severe sepsis          Time of presentation of severe sepsis          Tissue hypoperfusion persists in the hour after crystalloid fluid administration, evidenced, by either:          Was hypotension present within one hour of the conclusion of crystalloid fluid administration?         Date of presentation of septic shock          Time of presentation of septic shock            User Key  (r) = Recorded By, (t) = Taken By, (c) = Cosigned By    [address] Name Provider Type    Jignesh Traylor MD Resident                  MDM  Number of Diagnoses or Management Options  DEVONTE (acute kidney injury) [facility]): new and requires workup  Diverticulitis: new and requires workup  Urinary tract infection: new and does not require workup  Diagnosis management comments:  71-year-old female past medical history hyperlipidemia hypothyroidism recent sepsis in April 2019  Presenting with fever  Undifferentiated source so performed UA, Chest X-ray, abdominal CT, blood cultures  She does not meet sepsis Criteria by SIRS or by Qsofa  For her DEVONTE and for her high lactate, she has been given IV fluids  With her diverticulitis and her urinary tract infection, she has been given ceftriaxone and metronidazole   She was admitted to [facility] for further management       Amount and/or Complexity of Data Reviewed  Clinical lab tests: ordered and reviewed  Tests in the radiology section of CPT®: ordered and reviewed  Tests in the medicine section of CPT®: ordered and reviewed  Decide to obtain previous medical records or to obtain history from someone other than the patient: yes  Review and summarize past medical records: yes  Independent visualization of images, tracings, or specimens: yes    Risk of Complications, Morbidity, and/or Mortality  Presenting problems: moderate  Diagnostic procedures: low  Management options: low    Patient Progress  Patient progress: stable      Disposition  Final diagnoses:   Urinary tract infection   DEVONTE (acute kidney injury) ([facility] [address] ) Diverticulitis     Time reflects when diagnosis was documented in both MDM as applicable and the Disposition within this note     Time User Action Codes Description Comment    9/2/2019  2:46 PM Ricarda Ibarra Add [N39 0] Urinary tract infection     9/2/2019  2:46 PM Luis Carlos Luque Add [N17 9] DEVONTE (acute kidney injury) ([facility] [address] )     9/2/2019  2:46 PM Ricarda bIarra Add [K57 92] Diverticulitis     9/2/2019  3:18 PM Jean Pierre ENRIQUE Add [R10 9] Abdominal pain       ED Disposition     ED Disposition Condition Date/Time Comment    Admit Stable Mon Sep 2, 2019  2:46 PM Case was discussed with LAYNE and the patient's admission status was agreed to be Admission Status: inpatient status to the service of Dr Anh Bravo   Follow-up Information    None         Current Discharge Medication List      CONTINUE these medications which have NOT CHANGED    Details   albuterol (2 5 mg/3 mL) 0 083 % nebulizer solution Take 2 5 mg by nebulization every 4 (four) hours as needed for wheezing or shortness of breath      ascorbic acid (VITAMIN C) 500 mg tablet Take 500 mg by mouth daily      benzocaine (ORAJEL) 10 % mucosal gel Apply 1 application to the mouth or throat 3 (three) times a day as needed for mucositis  Qty: 5 3 g, Refills: 0    Associated Diagnoses: Ulcer (traumatic) of oral mucosa      benzonatate (TESSALON PERLES) 100 mg capsule Take 1 capsule (100 mg total) by mouth 3 (three) times a day as needed for cough  Qty: 20 capsule, Refills: 0    Associated Diagnoses: Cough      bisacodyl (DULCOLAX) 10 mg suppository Insert 10 mg into the rectum daily as needed for constipation      calcium carbonate (OS-TEDDY) 600 MG tablet Take 600 mg by mouth daily      carboxymethylcellulose (REFRESH LIQUIGEL) 1 % ophthalmic solution Apply 1 drop to eye 3 (three) times a day        cetirizine (ZyrTEC) 10 MG chewable tablet Chew 1 tablet (10 mg total) daily  Qty: 30 tablet, Refills: 0    Associated Diagnoses:  Allergic rhinitis, unspecified seasonality, unspecified trigger      Dentifrices (BIOTENE DRY MOUTH CARE DT) Apply to teeth      donepezil (ARICEPT) 10 mg tablet Take 0 5 tablets (5 mg total) by mouth daily at bedtime Increased confusion on 10mg  Qty: 45 tablet, Refills: 1    Associated Diagnoses: Dementia with behavioral disturbance, unspecified dementia type      DULoxetine (CYMBALTA) 60 mg delayed release capsule Take 1 capsule (60 mg total) by mouth daily  Refills: 0    Associated Diagnoses: Pneumonia of right lower lobe due to infectious organism (HCC)      estradiol (ESTRACE) 0 1 mg/g vaginal cream Insert 1 g into the vagina daily Do no insert - please apply a pea sized amount to the urethra/vagina Monday, Wednesday and Friday before bedtime  Qty: 42 5 g, Refills: 3    Associated Diagnoses: Recurrent UTI      ferrous gluconate (FERGON) 324 mg tablet Take 65 mg by mouth daily with breakfast      fluticasone (FLONASE) 50 mcg/act nasal spray 1 spray into each nostril daily  Qty: 16 g, Refills: 0    Associated Diagnoses:  Allergic rhinitis, unspecified seasonality, unspecified trigger      ibuprofen (MOTRIN) 400 mg tablet Take 1 tablet (400 mg total) by mouth every 6 (six) hours as needed for mild pain (fever)  Qty: 20 tablet, Refills: 0    Associated Diagnoses: Fever      ipratropium-albuterol (COMBIVENT RESPIMAT) inhaler Inhale 2 puffs every 4 (four) hours as needed (dyspnea)  Qty: 1 Inhaler, Refills: 5    Associated Diagnoses: Bronchitis      levothyroxine 100 mcg tablet Take 100 mcg by mouth daily       losartan (COZAAR) 100 MG tablet Take 1 tablet (100 mg total) by mouth daily  Qty: 90 tablet, Refills: 1    Associated Diagnoses: Bradycardia      Lutein 10 MG TABS Take 1 tablet by mouth daily        magnesium hydroxide (MILK OF MAGNESIA) 400 mg/5 mL oral suspension Take 30 mL by mouth daily as needed for constipation        metoprolol tartrate (LOPRESSOR) 50 mg tablet Take 0 5 tablets (25 mg total) by mouth 2 (two) times a day  Qty: 45 tablet, Refills: 1 Associated Diagnoses: Essential hypertension      NIFEdipine (PROCARDIA XL) 60 mg 24 hr tablet Take 60 mg by mouth daily       nystatin (MYCOSTATIN) powder Apply topically 2 (two) times a day Apply to b/l groin folds      pantoprazole (PROTONIX) 40 mg tablet Take 40 mg by mouth 2 (two) times a day        pravastatin (PRAVACHOL) 40 mg tablet Take 1 tablet (40 mg total) by mouth daily  Refills: 0    Comments: Resume on friday  Associated Diagnoses: Pseudogout of left wrist      QUEtiapine (SEROquel) 25 mg tablet Take 12 5 mg by mouth 2 (two) times a day May crush medications  sodium chloride (OCEAN) 0 65 % nasal spray 2 sprays into each nostril 2 (two) times a day      Sodium Phosphates (FLEET ENEMA RE) Insert 1 Dose into the rectum daily PRN if dulcolax supp is not effective      guaiFENesin (MUCINEX) 600 mg 12 hr tablet Take 1 tablet (600 mg total) by mouth 2 (two) times a day as needed for congestion  Qty: 60 tablet, Refills: 1    Associated Diagnoses: Bronchitis      LORazepam (ATIVAN) 0 5 mg tablet Take 1 tablet (0 5 mg total) by mouth every 8 (eight) hours as needed for anxiety for up to 10 days She always takes 0 5 mg at bedtime  Qty: 30 tablet, Refills: 0    Associated Diagnoses: Anxiety      nystatin (MYCOSTATIN) 100,000 units/mL suspension Apply 100,000 Units to the mouth or throat 2 (two) times a day      triamcinolone (KENALOG) 0 1 % cream triamcinolone acetonide 0 1 % topical cream      tuberculin (TUBERSOL) 5 units/0 1 mL Inject 5 Units into the skin once           No discharge procedures on file  ED Provider  Attending physically available and evaluated Vera Morton  ARELIS managed the patient along with the ED Attending      Electronically Signed by         Georgia Garcia MD  09/02/19 0518

## 2019-09-02 NOTE — H&P
H&P- Sanchez Espinoza 4/27/1931, 80 y o  female MRN: [id number]    Unit/Bed#: ED 19 Encounter: [id number]    Primary Care Provider: Barry Gray DO   Date and time admitted to hospital: 9/2/2019 11:49 AM        * Diverticulitis  Assessment & Plan  IV antibiotics  IV fluids  Pain control    Severe protein-calorie malnutrition ([facility] [address] )  Assessment & Plan  Malnutrition Findings:           BMI Findings: Body mass index is 25 39 kg/m²  Monitor nutritional status    DEVONTE (acute kidney injury) ([facility] [address] )  Assessment & Plan  IV fluids    Hypothyroidism  Assessment & Plan  Levothyroxine    Essential hypertension  Assessment & Plan  Cozaar, metoprolol and Procardia    CVA (cerebral vascular accident) ([facility] [address] )  Assessment & Plan  Continue monitor    GERD (gastroesophageal reflux disease)  Assessment & Plan  PPI    Hyperlipidemia  Assessment & Plan  Statin          VTE Prophylaxis: Enoxaparin (Lovenox)  / sequential compression device   Code Status:  Full code  POLST: There is no POLST form on file for this patient (pre-hospital)      Anticipated Length of Stay:  Patient will be admitted on an Inpatient basis with an anticipated length of stay of  greater than 2 midnights  Justification for Hospital Stay:  For evaluation of symptoms  Total Time for Visit, including Counseling / Coordination of Care: 45 minutes  Greater than 50% of this total time spent on direct patient counseling and coordination of care  Chief Complaint:  Abdominal pain    History of Present Illness:    Sanchez Espinoza is a 80 y o  female who presents with symptoms of abdominal pain and fever  She was previously admitted on April 2654 for complications associated with sepsis it was attributed secondary to viral URI versus aspiration versus viral gastroenteritis at that time  She presents today with fevers and generalized body aches with a complaint of I am dying  Patient describes a sensation of vomiting    Denies any shortness of breath  She presents the hospital for further workup evaluation  Patient has a known h/o dementia  On going discussion regarding level of care  Daughter reports that she wishes to discuss code status with her father  Review of Systems:    Review of Systems   Constitutional: Negative for activity change, appetite change, chills and diaphoresis  HENT: Negative for congestion  Respiratory: Negative for apnea and chest tightness  Cardiovascular: Negative for chest pain and leg swelling  Gastrointestinal: Negative for abdominal distention and abdominal pain  Musculoskeletal: Negative for arthralgias and back pain  Skin: Negative for color change and pallor  Neurological: Negative for dizziness and facial asymmetry  Hematological: Negative for adenopathy  Psychiatric/Behavioral: Negative for agitation and behavioral problems  All other systems reviewed and are negative        Past Medical and Surgical History:     Past Medical History:   Diagnosis Date    Aspiration pneumonia ([address] )     Last Assessed:  7/18/17    Basal cell carcinoma of nose     Last Assessed:  4/12/17    Blind     blind right eye, 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, Last Assessed:  7/18/17    Dementia     Depression     Disease of thyroid gland     Fibromyalgia     Last Assessed:  7/18/17    Gait disturbance     GERD (gastroesophageal reflux disease)     Glaucoma     Hyperlipidemia     Hypertension     Osteopenia     Peripheral neuropathy     Last Assessed:  4/12/17    Polymyalgia rheumatica ([address] )     Psychiatric disorder     depression    Renal insufficiency syndrome     Last Assessed:  4/13/17    Rheumatoid arthritis ([address] )     Stroke ([address] )     x2, Last Assessed:  7/18/17    Systemic lupus erythematosus ([address] )     Vertigo     Vitamin D deficiency        Past Surgical History:   Procedure Laterality Date    EYE SURGERY      JOINT REPLACEMENT      left hip replacement, left shoulder replacement    SHOULDER SURGERY      Replacement       Meds/Allergies:    Prior to Admission medications    Medication Sig Start Date End Date Taking?  Authorizing Provider   albuterol (2 5 mg/3 mL) 0 083 % nebulizer solution Take 2 5 mg by nebulization every 4 (four) hours as needed for wheezing or shortness of breath    Historical Provider, MD   ascorbic acid (VITAMIN C) 500 mg tablet Take 500 mg by mouth daily    Historical Provider, MD   benzocaine (ORAJEL) 10 % mucosal gel Apply 1 application to the mouth or throat 3 (three) times a day as needed for mucositis 7/16/18   Yaneth Kenji, DO   benzonatate (TESSALON PERLES) 100 mg capsule Take 1 capsule (100 mg total) by mouth 3 (three) times a day as needed for cough 11/12/18   Bertha Epperson MD   calcium carbonate (OS-TEDDY) 600 MG tablet Take 600 mg by mouth daily    Historical Provider, MD   carboxymethylcellulose (REFRESH LIQUIGEL) 1 % ophthalmic solution Apply 1 drop to eye 3 (three) times a day   8/24/17   Historical Provider, MD   cetirizine (ZyrTEC) 10 MG chewable tablet Chew 1 tablet (10 mg total) daily 8/22/18   Carlton Dubon PA-C   Dentifrices (BIOTENE DRY MOUTH CARE DT) Apply to teeth    Historical Provider, MD   donepezil (ARICEPT) 10 mg tablet Take 0 5 tablets (5 mg total) by mouth daily at bedtime Increased confusion on 10mg 8/16/18   [place] Kenji, DO   DULoxetine (CYMBALTA) 60 mg delayed release capsule Take 1 capsule (60 mg total) by mouth daily 4/17/18   Nita Snyder PA-C   estradiol (ESTRACE) 0 1 mg/g vaginal cream Insert 1 g into the vagina daily Do no insert - please apply a pea sized amount to the urethra/vagina Monday, Wednesday and Friday before bedtime 1/7/19   Bobby Stock MD   ferrous gluconate (FERGON) 324 mg tablet Take 65 mg by mouth daily with breakfast    Historical Provider, MD   fluticasone (FLONASE) 50 mcg/act nasal spray 1 spray into each nostril daily 8/22/18   Carlton Dubon PA-C   furosemide (LASIX) 20 mg tablet Take one tab on Monday- Wednesday and friday  Patient taking differently: Take 20 mg by mouth Take one tab on Monday- Wednesday and friday 9/6/18   Melissa Humble, DO   guaiFENesin (MUCINEX) 600 mg 12 hr tablet Take 1 tablet (600 mg total) by mouth 2 (two) times a day as needed for congestion 5/18/18   Melissa Humble, DO   ibuprofen (MOTRIN) 400 mg tablet Take 1 tablet (400 mg total) by mouth every 6 (six) hours as needed for mild pain (fever) 3/27/19   Jairo Desir PA-C   ipratropium-albuterol (COMBIVENT RESPIMAT) inhaler Inhale 2 puffs every 4 (four) hours as needed (dyspnea) 5/18/18   [place] Humble, DO   levothyroxine 100 mcg tablet Take 100 mcg by mouth daily  12/22/17   Historical Provider, MD   LORazepam (ATIVAN) 0 5 mg tablet Take 1 tablet (0 5 mg total) by mouth every 8 (eight) hours as needed for anxiety for up to 10 days She always takes 0 5 mg at bedtime  Patient taking differently: Take 0 5 mg by mouth daily at bedtime She always takes 0 5 mg at bedtime 1/28/19 4/4/19  Ruth Hargrove PA-C   LORazepam (ATIVAN) 0 5 mg tablet Take 1 tablet (0 5 mg total) by mouth daily at bedtime for 10 days She always takes 0 5 mg at bedtime 4/6/19 4/16/19  Soco Alegria PA-C   losartan (COZAAR) 100 MG tablet Take 1 tablet (100 mg total) by mouth daily 8/16/18   Melissa Humble, DO   Lutein 10 MG TABS Take 1 tablet by mouth daily      Historical Provider, MD   magnesium hydroxide (MILK OF MAGNESIA) 400 mg/5 mL oral suspension Take 30 mL by mouth daily as needed for constipation      Historical Provider, MD   menthol-methyl salicylate (BENGAY) 31-02 % cream Apply topically 4 (four) times a day as needed (pain) 1/28/19   Ruth Hargrove PA-C   metoprolol tartrate (LOPRESSOR) 50 mg tablet Take 0 5 tablets (25 mg total) by mouth 2 (two) times a day 8/16/18   [place] Humble, DO   NIFEdipine (PROCARDIA XL) 60 mg 24 hr tablet Take 60 mg by mouth daily  6/25/18   Historical Provider, MD   nystatin (MYCOSTATIN) 100,000 units/mL suspension Apply 100,000 Units to the mouth or throat 2 (two) times a day    Historical Provider, MD   pantoprazole (PROTONIX) 40 mg tablet Take 40 mg by mouth 2 (two) times a day      Historical Provider, MD   potassium chloride (K-DUR,KLOR-CON) 10 mEq tablet Take one tab on days you take furosemide  Patient taking differently: Take 10 mEq by mouth daily Take one tab on days you take furosemide  Q Monday, Wednesday, and Friday 2/5/19   Arpan Sutton DO   pravastatin (PRAVACHOL) 40 mg tablet Take 1 tablet (40 mg total) by mouth daily 2/1/19   Ruth Hargrove PA-C   sodium chloride (OCEAN) 0 65 % nasal spray 2 sprays into each nostril 2 (two) times a day    Historical Provider, MD   triamcinolone (KENALOG) 0 1 % cream triamcinolone acetonide 0 1 % topical cream    Historical Provider, MD   tuberculin (TUBERSOL) 5 units/0 1 mL Inject 5 Units into the skin once    Historical Provider, MD     I have reviewed home medications with patient personally  Allergies:    Allergies   Allergen Reactions    Acetaminophen     Golimumab      Other reaction(s): dedrick colored stool    Lidocaine Other (See Comments)    Neurontin [Gabapentin]     Nortriptyline Tremor    Prasterone      Other reaction(s): pruitis    Tricyclic Antidepressants     Leflunomide Rash    Sulfasalazine Rash       Social History:     Marital Status: /Civil Union   Occupation:  Retired  Patient Pre-hospital Living Situation:  Nursing  Patient Pre-hospital Level of Mobility:  Ambulatory  Patient Pre-hospital Diet Restrictions:  Denies  Substance Use History:   Social History     Substance and Sexual Activity   Alcohol Use Never    Frequency: Never     Social History     Tobacco Use   Smoking Status Never Smoker   Smokeless Tobacco Never Used     Social History     Substance and Sexual Activity   Drug Use No       Family History:    Family History   Problem Relation Age of Onset    Heart attack Mother     Diabetes Mother     Dementia Father     Coronary artery disease Father         cardiac disorder    Diabetes Sister     Uterine cancer Sister        Physical Exam:     Vitals:   Blood Pressure: 101/51 (09/02/19 1429)  Pulse: 73 (09/02/19 1429)  Temperature: 98 3 °F (36 8 °C) (09/02/19 1412)  Temp Source: Oral (09/02/19 1412)  Respirations: 16 (09/02/19 1429)  Weight - Scale: 59 kg (130 lb) (09/02/19 1203)  SpO2: 94 % (09/02/19 1429)    Physical Exam   HENT:   Head: Normocephalic  Eyes: Pupils are equal, round, and reactive to light  EOM are normal    Pulmonary/Chest: Effort normal and breath sounds normal    Abdominal: She exhibits no distension and no mass  There is tenderness  There is no rebound and no guarding  Musculoskeletal: Normal range of motion  Additional Data:     Lab Results: I have personally reviewed pertinent reports  Results from last 7 days   Lab Units 09/02/19  1242   WBC Thousand/uL 10 44*   HEMOGLOBIN g/dL 10 4*   HEMATOCRIT % 32 3*   PLATELETS Thousands/uL 198   NEUTROS PCT % 86*   LYMPHS PCT % 5*   MONOS PCT % 8   EOS PCT % 0     Results from last 7 days   Lab Units 09/02/19  1242   SODIUM mmol/L 137   POTASSIUM mmol/L 3 9   CHLORIDE mmol/L 105   CO2 mmol/L 22   BUN mg/dL 17   CREATININE mg/dL 1 41*   ANION GAP mmol/L 10   CALCIUM mg/dL 9 2   ALBUMIN g/dL 3 4*   TOTAL BILIRUBIN mg/dL 0 51   ALK PHOS U/L 120*   ALT U/L 25   AST U/L 51*   GLUCOSE RANDOM mg/dL 211*                 Results from last 7 days   Lab Units 09/02/19  1243   LACTIC ACID mmol/L 3 7*       Imaging: I have personally reviewed pertinent reports  X-ray chest 2 views   ED Interpretation by Ferny Rees MD (09/02 1404)   No acute cardiopulmonary abnormality  Final Result by Calista Cordon MD (09/02 1408)      No acute cardiopulmonary disease              Workstation performed: [id number]         CT abdomen pelvis wo contrast   Final Result by López Aldrich MD (09/02 1405)      Segment of asymmetric moderate colonic wall thickening with mild pericolonic inflammatory stranding involving the mid sigmoid colon with multiple associated diverticula most compatible with acute diverticulitis  There is no evidence of perforation  Mild to moderate stool noted within the rectum  No large or small bowel obstruction  Workstation performed: [id number]               ** Please Note: This note has been constructed using a voice recognition system   **

## 2019-09-03 PROBLEM — Z91.89 AT HIGH RISK FOR ASPIRATION: Status: ACTIVE | Noted: 2019-09-03

## 2019-09-03 LAB
ALBUMIN SERPL BCP-MCNC: 2.8 G/DL (ref 3.5–5)
ALP SERPL-CCNC: 103 U/L (ref 46–116)
ALT SERPL W P-5'-P-CCNC: 20 U/L (ref 12–78)
ANION GAP SERPL CALCULATED.3IONS-SCNC: 10 MMOL/L (ref 4–13)
AST SERPL W P-5'-P-CCNC: 41 U/L (ref 5–45)
ATRIAL RATE: 80 BPM
BASOPHILS # BLD AUTO: 0.01 THOUSANDS/ΜL (ref 0–0.1)
BASOPHILS NFR BLD AUTO: 0 % (ref 0–1)
BILIRUB SERPL-MCNC: 0.53 MG/DL (ref 0.2–1)
BUN SERPL-MCNC: 16 MG/DL (ref 5–25)
CALCIUM SERPL-MCNC: 8.3 MG/DL (ref 8.3–10.1)
CHLORIDE SERPL-SCNC: 107 MMOL/L (ref 100–108)
CO2 SERPL-SCNC: 20 MMOL/L (ref 21–32)
CREAT SERPL-MCNC: 1.26 MG/DL (ref 0.6–1.3)
EOSINOPHIL # BLD AUTO: 0 THOUSAND/ΜL (ref 0–0.61)
EOSINOPHIL NFR BLD AUTO: 0 % (ref 0–6)
ERYTHROCYTE [DISTWIDTH] IN BLOOD BY AUTOMATED COUNT: 15.1 % (ref 11.6–15.1)
GFR SERPL CREATININE-BSD FRML MDRD: 38 ML/MIN/1.73SQ M
GLUCOSE SERPL-MCNC: 117 MG/DL (ref 65–140)
HCT VFR BLD AUTO: 31.7 % (ref 34.8–46.1)
HGB BLD-MCNC: 10.2 G/DL (ref 11.5–15.4)
IMM GRANULOCYTES # BLD AUTO: 0.06 THOUSAND/UL (ref 0–0.2)
IMM GRANULOCYTES NFR BLD AUTO: 0 % (ref 0–2)
LYMPHOCYTES # BLD AUTO: 1.9 THOUSANDS/ΜL (ref 0.6–4.47)
LYMPHOCYTES NFR BLD AUTO: 14 % (ref 14–44)
MCH RBC QN AUTO: 29.6 PG (ref 26.8–34.3)
MCHC RBC AUTO-ENTMCNC: 32.2 G/DL (ref 31.4–37.4)
MCV RBC AUTO: 92 FL (ref 82–98)
MONOCYTES # BLD AUTO: 1.06 THOUSAND/ΜL (ref 0.17–1.22)
MONOCYTES NFR BLD AUTO: 8 % (ref 4–12)
NEUTROPHILS # BLD AUTO: 10.44 THOUSANDS/ΜL (ref 1.85–7.62)
NEUTS SEG NFR BLD AUTO: 78 % (ref 43–75)
NRBC BLD AUTO-RTO: 0 /100 WBCS
P AXIS: 262 DEGREES
PLATELET # BLD AUTO: 181 THOUSANDS/UL (ref 149–390)
PMV BLD AUTO: 9.9 FL (ref 8.9–12.7)
POTASSIUM SERPL-SCNC: 3.7 MMOL/L (ref 3.5–5.3)
PR INTERVAL: 134 MS
PROCALCITONIN SERPL-MCNC: 4.46 NG/ML
PROT SERPL-MCNC: 7 G/DL (ref 6.4–8.2)
QRS AXIS: 73 DEGREES
QRSD INTERVAL: 112 MS
QT INTERVAL: 374 MS
QTC INTERVAL: 431 MS
RBC # BLD AUTO: 3.45 MILLION/UL (ref 3.81–5.12)
SODIUM SERPL-SCNC: 137 MMOL/L (ref 136–145)
T WAVE AXIS: -76 DEGREES
VENTRICULAR RATE: 80 BPM
WBC # BLD AUTO: 13.47 THOUSAND/UL (ref 4.31–10.16)

## 2019-09-03 PROCEDURE — G8978 MOBILITY CURRENT STATUS: HCPCS

## 2019-09-03 PROCEDURE — 85025 COMPLETE CBC W/AUTO DIFF WBC: CPT | Performed by: INTERNAL MEDICINE

## 2019-09-03 PROCEDURE — G8987 SELF CARE CURRENT STATUS: HCPCS

## 2019-09-03 PROCEDURE — 84145 PROCALCITONIN (PCT): CPT | Performed by: INTERNAL MEDICINE

## 2019-09-03 PROCEDURE — 97163 PT EVAL HIGH COMPLEX 45 MIN: CPT

## 2019-09-03 PROCEDURE — 97530 THERAPEUTIC ACTIVITIES: CPT

## 2019-09-03 PROCEDURE — 97167 OT EVAL HIGH COMPLEX 60 MIN: CPT

## 2019-09-03 PROCEDURE — 80053 COMPREHEN METABOLIC PANEL: CPT | Performed by: INTERNAL MEDICINE

## 2019-09-03 PROCEDURE — G8979 MOBILITY GOAL STATUS: HCPCS

## 2019-09-03 PROCEDURE — 97535 SELF CARE MNGMENT TRAINING: CPT

## 2019-09-03 PROCEDURE — 93010 ELECTROCARDIOGRAM REPORT: CPT | Performed by: INTERNAL MEDICINE

## 2019-09-03 PROCEDURE — 94760 N-INVAS EAR/PLS OXIMETRY 1: CPT

## 2019-09-03 PROCEDURE — G8988 SELF CARE GOAL STATUS: HCPCS

## 2019-09-03 PROCEDURE — 99223 1ST HOSP IP/OBS HIGH 75: CPT | Performed by: INTERNAL MEDICINE

## 2019-09-03 RX ADMIN — METOPROLOL TARTRATE 25 MG: 25 TABLET, FILM COATED ORAL at 18:04

## 2019-09-03 RX ADMIN — Medication 2 SPRAY: at 08:04

## 2019-09-03 RX ADMIN — NYSTATIN: 100000 POWDER TOPICAL at 08:04

## 2019-09-03 RX ADMIN — QUETIAPINE FUMARATE 12.5 MG: 25 TABLET ORAL at 18:04

## 2019-09-03 RX ADMIN — PRAVASTATIN SODIUM 40 MG: 40 TABLET ORAL at 07:55

## 2019-09-03 RX ADMIN — HEPARIN SODIUM 5000 UNITS: 5000 INJECTION INTRAVENOUS; SUBCUTANEOUS at 13:52

## 2019-09-03 RX ADMIN — DONEPEZIL HYDROCHLORIDE 5 MG: 5 TABLET ORAL at 22:39

## 2019-09-03 RX ADMIN — QUETIAPINE FUMARATE 12.5 MG: 25 TABLET ORAL at 07:54

## 2019-09-03 RX ADMIN — HEPARIN SODIUM 5000 UNITS: 5000 INJECTION INTRAVENOUS; SUBCUTANEOUS at 22:39

## 2019-09-03 RX ADMIN — FLUTICASONE PROPIONATE 1 SPRAY: 50 SPRAY, METERED NASAL at 08:03

## 2019-09-03 RX ADMIN — NIFEDIPINE 60 MG: 60 TABLET, FILM COATED, EXTENDED RELEASE ORAL at 07:55

## 2019-09-03 RX ADMIN — CEFAZOLIN SODIUM 1000 MG: 1 SOLUTION INTRAVENOUS at 07:55

## 2019-09-03 RX ADMIN — METRONIDAZOLE 500 MG: 500 INJECTION, SOLUTION INTRAVENOUS at 09:24

## 2019-09-03 RX ADMIN — CEFAZOLIN SODIUM 1000 MG: 1 SOLUTION INTRAVENOUS at 23:54

## 2019-09-03 RX ADMIN — PANTOPRAZOLE SODIUM 40 MG: 40 TABLET, DELAYED RELEASE ORAL at 18:04

## 2019-09-03 RX ADMIN — PANTOPRAZOLE SODIUM 40 MG: 40 TABLET, DELAYED RELEASE ORAL at 07:54

## 2019-09-03 RX ADMIN — NYSTATIN: 100000 POWDER TOPICAL at 18:05

## 2019-09-03 RX ADMIN — HEPARIN SODIUM 5000 UNITS: 5000 INJECTION INTRAVENOUS; SUBCUTANEOUS at 05:33

## 2019-09-03 RX ADMIN — LEVOTHYROXINE SODIUM 100 MCG: 100 TABLET ORAL at 05:33

## 2019-09-03 RX ADMIN — CEFAZOLIN SODIUM 1000 MG: 1 SOLUTION INTRAVENOUS at 16:20

## 2019-09-03 RX ADMIN — METOPROLOL TARTRATE 25 MG: 25 TABLET, FILM COATED ORAL at 07:55

## 2019-09-03 RX ADMIN — CEFAZOLIN SODIUM 1000 MG: 1 SOLUTION INTRAVENOUS at 00:54

## 2019-09-03 RX ADMIN — METRONIDAZOLE 500 MG: 500 INJECTION, SOLUTION INTRAVENOUS at 16:19

## 2019-09-03 RX ADMIN — SODIUM CHLORIDE 50 ML/HR: 0.9 INJECTION, SOLUTION INTRAVENOUS at 16:16

## 2019-09-03 RX ADMIN — OXYCODONE HYDROCHLORIDE AND ACETAMINOPHEN 500 MG: 500 TABLET ORAL at 07:54

## 2019-09-03 RX ADMIN — METRONIDAZOLE 500 MG: 500 INJECTION, SOLUTION INTRAVENOUS at 00:17

## 2019-09-03 NOTE — UTILIZATION REVIEW
Initial Clinical Review    Admission: Date/Time/Statement: Inpatient Admission Orders (From admission, onward)     Ordered        09/02/19 1447  Inpatient Admission  Once                   Orders Placed This Encounter   Procedures    Inpatient Admission     Standing Status:   Standing     Number of Occurrences:   1     Order Specific Question:   Admitting Physician     Answer:   Jorge Luis Baltazar [[id number]]     Order Specific Question:   Level of Care     Answer:   Med Surg [[id number]]     Order Specific Question:   Estimated length of stay     Answer:   More than 2 Midnights     Order Specific Question:   Certification     Answer:   I certify that inpatient services are medically necessary for this patient for a duration of greater than two midnights  See H&P and MD Progress Notes for additional information about the patient's course of treatment  ED Arrival Information     Expected Arrival Acuity Means of Arrival Escorted By Service Admission Type    - 9/2/2019 11:49 Urgent Ambulance [facility] ([address]) General Medicine Urgent    Arrival Complaint    fever        Chief Complaint   Patient presents with    Fever - 76 years or older     Pt comes from nursing home with fever and geveralized body aches  Pt states "I'm dying"  Assessment/Plan:   Alexey Waddell is a 80 y o  female who presents with symptoms of abdominal pain and fever  She was previously admitted on April 1580 for complications associated with sepsis it was attributed secondary to viral URI versus aspiration versus viral gastroenteritis at that time  She presents today with fevers and generalized body aches with a complaint of I am dying  Patient describes a sensation of vomiting  Denies any shortness of breath  She presents the hospital for further workup evaluation  Patient has a known h/o dementia  On going discussion regarding level of care  Daughter reports that she wishes to discuss code status with her father      Review of Systems:    * Diverticulitis  Assessment & Plan  IV antibiotics  IV fluids  Pain control     Severe protein-calorie malnutrition (HCC)  Assessment & Plan    Body mass index is 25 39 kg/m²     Monitor nutritional status     DEVONTE (acute kidney injury) ([facility] [address] )  Assessment & Plan  IV fluids     Hypothyroidism  Assessment & Plan  Levothyroxine     Essential hypertension  Assessment & Plan  Cozaar, metoprolol and Procardia     CVA (cerebral vascular accident) [facility])  Assessment & Plan  Continue monitor     GERD (gastroesophageal reflux disease)  Assessment & Plan  PPI     Hyperlipidemia  Assessment & Plan  Statin      VTE Prophylaxis: Enoxaparin (Lovenox)  / sequential compression device   Code Status:  Full code    ED Triage Vitals [09/02/19 1153]   Temperature Pulse Respirations Blood Pressure SpO2   (!) 101 1 °F (38 4 °C) 89 18 110/54 96 %      Temp Source Heart Rate Source Patient Position - Orthostatic VS BP Location FiO2 (%)   Rectal Monitor Lying Right arm --      Pain Score       Worst Possible Pain        Wt Readings from Last 1 Encounters:   09/02/19 59 kg (130 lb)     Additional Vital Signs:   09/03/19 12:01:52  98 7 °F (37 1 °C)  86  16      97 %     09/03/19 07:30:08  100 3 °F (37 9 °C)  103  16  123/58  80  97 %     09/03/19 0709      17           09/03/19 03:05:48  98 2 °F (36 8 °C)  101        96 %     09/03/19 02:11:50  98 3 °F (36 8 °C)  103        96 %     09/03/19 0058  101 2 °F (38 4 °C)Abnormal                09/02/19 23:15:37  101 9 °F (38 8 °C)Abnormal   103  20  118/61  80  96 %     09/02/19 15:32:52  98 4 °F (36 9 °C)  74  18  127/61  83  95 %     09/02/19 1500    72  16  98/52    95 %  None (Room air)   09/02/19 1429    73  16  101/51    94 %  None (Room air)   09/02/19 1412  98 3 °F (36 8 °C)  72    94/55    95 %  None (Room air)   09/02/19 1313    75    95/50    93 %  None (Room air)     Pertinent Labs/Diagnostic Test Results:     9/2 CT ABD, PELVIS - Segment of asymmetric moderate colonic wall thickening with mild pericolonic inflammatory stranding involving the mid sigmoid colon with multiple associated diverticula most compatible with acute diverticulitis  There is no evidence of perforation  Mild to moderate stool noted within the rectum  No large or small bowel obstruction  9/2 CXR - No acute cardiopulmonary disease      9/2 ECG - Sinus rhythm  Right bundle branch block  Marked ST abnormality, possible inferior subendocardial injury  Abnormal ECG    Results from last 7 days   Lab Units 09/03/19  0447 09/02/19  1242   WBC Thousand/uL 13 47* 10 44*   HEMOGLOBIN g/dL 10 2* 10 4*   HEMATOCRIT % 31 7* 32 3*   PLATELETS Thousands/uL 181 198   NEUTROS ABS Thousands/µL 10 44* 9 00*     Results from last 7 days   Lab Units 09/03/19  0447 09/02/19  1242   SODIUM mmol/L 137 137   POTASSIUM mmol/L 3 7 3 9   CHLORIDE mmol/L 107 105   CO2 mmol/L 20* 22   ANION GAP mmol/L 10 10   BUN mg/dL 16 17   CREATININE mg/dL 1 26 1 41*   EGFR ml/min/1 73sq m 38 33   CALCIUM mg/dL 8 3 9 2     Results from last 7 days   Lab Units 09/03/19  0447 09/02/19  1242   AST U/L 41 51*   ALT U/L 20 25   ALK PHOS U/L 103 120*   TOTAL PROTEIN g/dL 7 0 7 5   ALBUMIN g/dL 2 8* 3 4*   TOTAL BILIRUBIN mg/dL 0 53 0 51     Results from last 7 days   Lab Units 09/03/19  0447 09/02/19  1242   GLUCOSE RANDOM mg/dL 117 211*     Results from last 7 days   Lab Units 09/02/19  1244   TROPONIN I ng/mL <0 02     Results from last 7 days   Lab Units 09/03/19  0447   PROCALCITONIN ng/ml 4 46*     Results from last 7 days   Lab Units 09/02/19  1453 09/02/19  1243   LACTIC ACID mmol/L 2 0 3 7*     Results from last 7 days   Lab Units 09/02/19  1242   LIPASE u/L 203     Results from last 7 days   Lab Units 09/02/19  1330   CLARITY UA  Turbid   COLOR UA  Sherrie   SPEC GRAV UA  1 016   PH UA  5 5   GLUCOSE UA mg/dl Negative   KETONES UA mg/dl Trace*   BLOOD UA  Moderate*   PROTEIN UA mg/dl 100 (2+)*   NITRITE UA  Positive* BILIRUBIN UA  Interference- unable to analyze*   UROBILINOGEN UA E U /dl 2 0*   LEUKOCYTES UA  Large*   WBC UA /hpf Innumerable*   RBC UA /hpf 4-10*   BACTERIA UA /hpf Innumerable*   EPITHELIAL CELLS WET PREP /hpf Occasional     Results from last 7 days   Lab Units 09/02/19  1330   URINE CULTURE  >100,000 cfu/ml Gram Negative Olegario*     ED Treatment:   Medication Administration from 09/02/2019 1149 to 09/02/2019 1527    Date/Time Order Dose Route Action   09/02/2019 1303 sodium chloride 0 9 % bolus 500 mL 500 mL Intravenous New Bag   09/02/2019 1421 sodium chloride 0 9 % infusion 500 mL/hr Intravenous New Bag   09/02/2019 1500 ceftriaxone (ROCEPHIN) 1 g/50 mL in dextrose IVPB 1,000 mg Intravenous New Bag        Past Medical History:   Diagnosis Date    Aspiration pneumonia ([facility] [address] )     Last Assessed:  7/18/17    Basal cell carcinoma of nose     Last Assessed:  4/12/17    Blind     blind right eye, 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, Last Assessed:  7/18/17    Dementia     Depression     Disease of thyroid gland     Fibromyalgia     Last Assessed:  7/18/17    Gait disturbance     GERD (gastroesophageal reflux disease)     Glaucoma     Hyperlipidemia     Hypertension     Osteopenia     Peripheral neuropathy     Last Assessed:  4/12/17    Polymyalgia rheumatica ([facility] [address] )     Psychiatric disorder     depression    Renal insufficiency syndrome     Last Assessed:  4/13/17    Rheumatoid arthritis ([address] )     Stroke ([facility] [address] )     x2, Last Assessed:  7/18/17    Systemic lupus erythematosus ([facility] [address] )     Vertigo     Vitamin D deficiency      Present on Admission:   GERD (gastroesophageal reflux disease)   CVA (cerebral vascular accident) ([address] )   Acquired hypothyroidism   Severe protein-calorie malnutrition ([address] )   DEVONTE (acute kidney injury) ([address] )   Essential hypertension   Late onset Alzheimer's disease with behavioral disturbance   Acute cystitis without hematuria    Admitting Diagnosis: Diverticulitis [K57 92]  Urinary tract infection [N39 0]  Abdominal pain [R10 9]  Fever [R50 9]  DEVONTE (acute kidney injury) ([facility] [address] ) [N17 9]     Age/Sex: 80 y o  female     Admission Orders:    Current Facility-Administered Medications:  albuterol 2 5 mg Nebulization Q4H PRN     ascorbic acid 500 mg Oral Daily     cefazolin 1,000 mg Intravenous Q8H Last Rate: Stopped (09/03/19 0840)    donepezil 5 mg Oral HS     DULoxetine 60 mg Oral Daily     estradiol 1 g Vaginal Daily     fluticasone 1 spray Nasal Daily     guaiFENesin 600 mg Oral BID PRN     heparin (porcine) 5,000 Units Subcutaneous Q8H [facility]     levothyroxine 100 mcg Oral Early Morning     LORazepam 0 5 mg Oral Q8H PRN  X1 9/2   losartan 75 mg Oral Daily     magnesium hydroxide 30 mL Oral Daily PRN     metoprolol tartrate 25 mg Oral BID     metroNIDAZOLE 500 mg Intravenous Q8H Last Rate: Stopped (09/03/19 1051)    NIFEdipine 60 mg Oral Daily     nystatin  Topical BID     ondansetron 4 mg Intravenous Q6H PRN  X1 9/2   pantoprazole 40 mg Oral BID     pravastatin 40 mg Oral Daily     QUEtiapine 12 5 mg Oral BID     sodium chloride 2 spray Each Nare BID     sodium chloride 50 mL/hr Intravenous Continuous Last Rate: 50 mL/hr (09/02/19 1618)      SCDs  OOB AS MAXIMO   DIET CL LIQ   ST CATH FOR URINE   IP CONSULT TO GASTROENTEROLOGY    Network Utilization Review Department  Phone: [phone]; Fax [phone]  [email or web address]  ATTENTION: Please call with any questions or concerns to [phone]  and carefully listen to the prompts so that you are directed to the right person  Send all requests for admission clinical reviews, approved or denied determinations and any other requests to fax [phone]   All voicemails are confidential

## 2019-09-03 NOTE — OCCUPATIONAL THERAPY NOTE
OccupationalTherapy Evaluation/Progress Note     Patient Name: Estevan Licona  Today's Date: 9/3/2019  Problem List  Patient Active Problem List   Diagnosis    Hyperlipidemia    GERD (gastroesophageal reflux disease)    Fibromyalgia    Rheumatoid arthritis ([address] )    Hyponatremia    CVA (cerebral vascular accident) ([address] )    Essential hypertension    Acute metabolic encephalopathy    Sepsis ([address] )    Late onset Alzheimer's disease with behavioral disturbance    Acquired hypothyroidism    Anemia    Acute cystitis without hematuria    Right lower quadrant abdominal pain    DEVONTE (acute kidney injury) ([address] )    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 ([address] )    Pneumonia of right upper lobe due to infectious organism ([address] )    Sore throat    Rotator cuff tear arthropathy of right shoulder    Cough    Conjunctivitis of right eye    Pseudogout of left wrist    Paresis ([address] )    Pressure injury of sacral region, stage 1    Severe protein-calorie malnutrition (HCC)    Debility    Chronic rhinitis    Chronic pain of right knee    Diverticulitis    At high risk for aspiration     Past Medical History  Past Medical History:   Diagnosis Date    Aspiration pneumonia ([address] )     Last Assessed:  7/18/17    Basal cell carcinoma of nose     Last Assessed:  4/12/17    Blind     blind right eye, 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, Last Assessed:  7/18/17    Dementia     Depression     Disease of thyroid gland     Fibromyalgia     Last Assessed:  7/18/17    Gait disturbance     GERD (gastroesophageal reflux disease)     Glaucoma     Hyperlipidemia     Hypertension     Osteopenia     Peripheral neuropathy     Last Assessed:  4/12/17    Polymyalgia rheumatica ([address] )     Psychiatric disorder     depression    Renal insufficiency syndrome     Last Assessed:  4/13/17    Rheumatoid arthritis ([address] )     Stroke ([facility] [address] )     x2, Last Assessed:  7/18/17    Systemic lupus erythematosus ([facility] [address] )     Vertigo     Vitamin D deficiency      Past Surgical History  Past Surgical History:   Procedure Laterality Date    EYE SURGERY      JOINT REPLACEMENT      left hip replacement, left shoulder replacement    SHOULDER SURGERY      Replacement         09/03/19 0913   Note Type   Note type Eval only   Restrictions/Precautions   Other Precautions Contact/isolation;Telemetry; Fall Risk;Pain;Hard of hearing;Visual impairment; Chair Alarm; Bed Alarm;Cognitive;Agitated   Pain Assessment   Pain Assessment Varela-Baker FACES   Varela-Baker FACES Pain Rating 2   Pain Type Acute pain   Pain Location Abdomen   Hospital Pain Intervention(s) Rest;Repositioned; Ambulation/increased activity   Home Living   Type of Home Assisted living  [facility])   Home Layout One level   Bathroom Shower/Tub Walk-in shower   Bathroom Toilet Raised   Bathroom Equipment Grab bars in shower;Grab bars around toilet; Shower chair   Bathroom Accessibility Accessible via walker   Home Equipment Walker;Grab bars; Wheelchair-manual   Prior Function   Level of [place] Needs assistance with IADLs; Needs assistance with ADLs and functional mobility   Lives With Facility staff   Receives Help From Personal care attendant  (+spouse, daughter)   ADL Assistance Needs assistance   IADLs Needs assistance   Vocational Retired   Lifestyle   Autonomy Assistance with ADL's/IADL's, +RW and assist x1 for functional ambulation   Reciprocal Relationships facility   Service to Others retired   Psychosocial   Psychosocial (WDL) X   Patient Behaviors/Mood Anxious;Irritable   Subjective   Subjective Verbal cues for encouragement and emotional support, receptive to therapy   ADL   Where Assessed Supine, bed   Eating Assistance 5  Supervision/Setup   Grooming Assistance 2  Maximal Assistance   UB Bathing Assistance 2  Maximal Assistance   LB Bathing Assistance 1  Total Assistance   [address] 2  Maximal Assistance   LB Dressing Assistance 1  Total Assistance Toileting Assistance  1  Total Assistance   Toileting Deficit   (incontinent of bowel/bladder)   Bed Mobility   Supine to Sit 2  Maximal assistance   Additional items Assist x 2;HOB elevated   Sit to Supine 2  Maximal assistance   Additional items Assist x 2;Verbal cues   Transfers   Sit to Stand 3  Moderate assistance   Additional items Assist x 2; Increased time required;Verbal cues   Stand to Sit 3  Moderate assistance   Additional items Assist x 2; Increased time required;Verbal cues   Functional Mobility   Additional Comments unsafe to assess    Balance   Static Sitting Poor +   Static Standing Poor   Activity Tolerance   Activity Tolerance Patient limited by fatigue  (low motivation and verbal cues for alertness)   Nurse Made Aware RN cleared pt for therapy   RUE Assessment   RUE Assessment   (not formally tested at least 3/5)   LUE Assessment   LUE Assessment   (Not formally tested 2* cognitive deficits at least 3/5)   Hand Function   Gross Motor Coordination Functional   Fine Motor Coordination Impaired   Vision-Basic Assessment   Current Vision   (R eye blind)   Cognition   Overall Cognitive Status Impaired   Arousal/Participation Arousable;Lethargic   Attention Difficulty attending to directions   Orientation Level Oriented to person;Disoriented to place; Disoriented to time;Disoriented to situation   Memory Decreased recall of precautions;Decreased recall of recent events;Decreased short term memory;Decreased recall of biographical information  (poor memory/confused)   Following Commands Follows one step commands inconsistently   Comments pt with baseline dementia/memory deficits -inconsistent arousal levels - more participatory with RN during mobility than with therapy this date  Assessment   Limitation Decreased ADL status; Decreased Safe judgement during ADL;Decreased cognition;Decreased endurance;Decreased self-care trans; Visual deficit; Decreased UE strength   Prognosis Poor   Assessment Pt is a 88 y o  female who was [facility] on [facility]/2019 with Sepsis [facility]), diverticulitis, risk for aspiration   Pt's problem list also includes PMH of hyperlipidemia, fibromyalgia, anemia, RA, cough, paresis, debility, chronic pain to right knee, acute metabolic encephalopathy, R lower quadrant abdominal pain  At baseline pt was completing assistance needed with ADl's/IADL's assist x 1 with RW for functional ambulation with verbal cues for encouragement  Pt lives in a [address]  Currently pt requires max a for overall ADLS and max a 1 supine to sit, mod a x 2 with RW for functional transfers  Pt currently presents with impairments in the following categories -difficulty performing ADLS, limited insight into deficits, compliance and flat affect activity tolerance, endurance, standing balance/tolerance, arousal, memory, insight, safety , judgement , attention , sensation , task initiation , coping skills , communication and interpersonal skills  These impairments, as well as pt's fatigue and risk for falls  limit pt's ability to safely engage in all baseline areas of occupation, includingeating, grooming, bathing, dressing, toileting, functional mobility/transfers and community mobility From OT standpoint, recommend STR vs return to [facility] with increased assistance upon D/C  OT will continue to follow to address the below stated goals  Goals   Patient Goals pt not able to state 2* to cogntive deficits   LTG Time Frame 10-14   Long Term Goal #1 see goasl below   Plan   Treatment Interventions ADL retraining;Functional transfer training; Endurance training;Cognitive reorientation;Patient/family training;Equipment evaluation/education; Compensatory technique education; Activityengagement   Goal Expiration Date 09/17/19   OT Frequency 1-2x/wk   Additional Treatment Session   Start Time 0920   End Time 0928   Treatment Assessment Pt seen for an additional OT treatment session with interventions consisting of sit to stand transfers 3x, standing tolerance approx~ 20 on first -30 seconds on 2nd trial for toileting tasks, toileting total assist and incontinent of bowel and bladder -see assistance levels above  Pt requires verbal cues for encouragement, emotional support (stating "I am going to die, I cant do this" per chart is baseline behavior-RN notified and aware  Pt significantly confused and difficulty maintaining eyes open  From OT standpoint recommend return to [facility] with increased assistance with transfers/mobility or STR  Pt is a continues to function below baseline levels, occupational performance remains limited with the above noted deficits  Continue plan of care to maximize functional I with all ADL's/mobility  Recommendation   OT Discharge Recommendation   ([address] with increase support vs STR)   OT - OK to Discharge   (when medically cleared)   Barthel Index   Feeding 5   Bathing 0   Grooming Score 0   Dressing Score 0   Bladder Score 0   Bowels Score 0   Toilet Use Score 0   Transfers (Bed/Chair) Score 5   Mobility (Level Surface) Score 0   Stairs Score 0   Barthel Index Score 10   Modified [place] Scale   Modified [place] Scale 4      Occupational Therapy Goals:    *Min a with bed mobility to engage in functional tasks  *Min a  Adl's after setup with use of AE PRN  *Min a toileting and clothing management   *Min a  transfers to/from all surfaces with Fair + dynamic balance and safety for ADL's    *Demonstrate good carryover with safe use of RW during functional tasks   *Increase activity tolerance to 25-30 minutes for participation in adls and enjoyable activities    Severiano Sees MOT, OTR/L

## 2019-09-03 NOTE — PROGRESS NOTES
Progress Note - Geovani Faulkner 4/27/1931, 80 y o  female MRN: [id number]  Unit/Bed#: [facility] 808-01 Encounter: [id number]  Primary Care Provider: Davene Paget, DO   Date and time admitted to hospital: 9/2/2019 11:49 AM    * Sepsis ([facility] )  Assessment & Plan  · POA as evidenced by leukocytosis, fever, lactic acidosis (3 7), DEVONTE in patient with acute infections (UTI, diverticulitis)  · Procalcitonin is elevated:  4 46  Continue to trend until normalized  · Continue IV cefazolin and Flagyl  · Blood cultures:  Pending  · Urine culture:  Pending  · Continue to trend fever curve and WBCs  · Lactic acidosis has resolved (2 0)  At high risk for aspiration  Assessment & Plan  · Aspiration precautions  Diverticulitis  Assessment & Plan  · CT abdomen pelvis:  Segment of asymmetric moderate colonic wall thickening with mild pericolonic inflammatory stranding involving the mid sigmoid colon with multiple associated diverticula most compatible with acute diverticulitis  No evidence of perforation  Mild to moderate stool noted in the rectum  No evidence of bowel obstruction  · Continue with IV cefazolin and Flagyl  Severe protein-calorie malnutrition ([facility] [address] )  Assessment & Plan  Malnutrition Findings:     BMI Findings: Body mass index is 25 39 kg/m²  · Nutrition consult  · Encourage PO intake  · Nutritional supplements  DEVONTE (acute kidney injury) ([facility] [address] )  Assessment & Plan  · POA likely secondary to acute infection and mild dehydration  ARB may be contributing  OK to continue ARB for now given improving creatinine with IVFs  · Baseline creatinine:  0 8  · Creatinine on admission:  1 41  · Creatinine today:  1 26  · Continue with IV fluids for now  Monitor volume status closely  · Avoid hypotension and nephrotoxic agents  Late onset Alzheimer's disease with behavioral disturbance  Assessment & Plan  · Continue with Aricept, Cymbalta and Seroquel      Essential hypertension  Assessment & Plan  · Stable on losartan, metoprolol and Procardia    Acute cystitis without hematuria  Assessment & Plan  · UA (+)  · Currently on CTX  · Await urine cultures  VTE Pharmacologic Prophylaxis:   Pharmacologic: Heparin  Mechanical: Mechanical VTE prophylaxis in place  Patient Centered Rounds: I have performed bedside rounds with nursing staff today  Discussions with Specialists or Other Care Team Provider: None  Education and Discussions with Family / Patient: Called patient's daughter, Zonia Winter, to provide an update  All questions answered to the best of my ability  Time Spent for Care: 20 minutes  More than 50% of total time spent on counseling and coordination of care as described above  Current Length of Stay: 1 day(s)  Current Patient Status: Inpatient   Certification Statement: The patient will continue to require additional inpatient hospital stay due to continued need for IV antibiotics  Discharge Plan: Patient is not medically stable for discharge  Patient resides at [address] [place] [address]  Code Status: Level 1 - Full Code    Subjective:   Patient is pleasantly confused  She is complaining of nasal pain secondary to her sinuses  She is an unreliable historian  "I'm dying"  Objective:   Vitals:   Temp (24hrs), Av 7 °F (37 6 °C), Min:98 2 °F (36 8 °C), Max:101 9 °F (38 8 °C)    Temp:  [98 2 °F (36 8 °C)-101 9 °F (38 8 °C)] 100 3 °F (37 9 °C)  HR:  [] 103  Resp:  [16-20] 16  BP: ()/(50-61) 123/58  SpO2:  [93 %-97 %] 97 %  Body mass index is 25 39 kg/m²  Input and Output Summary (last 24 hours): Intake/Output Summary (Last 24 hours) at 9/3/2019 0930  Last data filed at 9/3/2019 0055  Gross per 24 hour   Intake 770 ml   Output 391 ml   Net 379 ml       Physical Exam:     Physical Exam   HENT:   Head: Normocephalic and atraumatic  Mouth/Throat: Oropharynx is clear and moist and mucous membranes are normal    Eyes: No scleral icterus     Cardiovascular: Normal rate and regular rhythm  Murmur heard  Pulmonary/Chest: Breath sounds normal  She has no wheezes  She has no rales  She exhibits no tenderness  Abdominal: Soft  Bowel sounds are normal  She exhibits no distension  There is tenderness in the left lower quadrant  Musculoskeletal: Normal range of motion  She exhibits no edema  Skin: Skin is warm and dry  No rash noted  Psychiatric: She has a normal mood and affect  Cognition and memory are impaired  Vitals reviewed  Additional Data:   Labs:  Results from last 7 days   Lab Units 09/03/19  0447   WBC Thousand/uL 13 47*   HEMOGLOBIN g/dL 10 2*   HEMATOCRIT % 31 7*   PLATELETS Thousands/uL 181   NEUTROS PCT % 78*   LYMPHS PCT % 14   MONOS PCT % 8   EOS PCT % 0     Results from last 7 days   Lab Units 09/03/19  0447   POTASSIUM mmol/L 3 7   CHLORIDE mmol/L 107   CO2 mmol/L 20*   BUN mg/dL 16   CREATININE mg/dL 1 26   CALCIUM mg/dL 8 3   ALK PHOS U/L 103   ALT U/L 20   AST U/L 41           * I Have Reviewed All Lab Data Listed Above  * Additional Pertinent Lab Tests Reviewed: All Labs Within Last 24 Hours Reviewed    Imaging:    Imaging Reports Reviewed Today Include: None new    Cultures:   Blood Culture:   Lab Results   Component Value Date    BLOODCX No Growth After 5 Days  04/04/2019    BLOODCX No Growth After 5 Days  04/04/2019    BLOODCX No Growth After 5 Days  03/21/2019    BLOODCX No Growth After 5 Days  03/21/2019    BLOODCX No Growth After 5 Days  01/27/2019    BLOODCX No Growth After 5 Days  01/27/2019    BLOODCX No Growth After 5 Days   01/22/2019     Urine Culture:   Lab Results   Component Value Date    URINECX >100,000 cfu/ml Escherichia coli (A) 03/21/2019    URINECX >100,000 cfu/ml Escherichia coli (A) 01/22/2019    URINECX >100,000 cfu/ml Escherichia coli (A) 11/09/2018    URINECX >100,000 cfu/ml Escherichia coli (A) 05/05/2018    URINECX >100,000 cfu/ml Escherichia coli (A) 05/05/2018    URINECX >100,000 cfu/ml Escherichia coli (A) 04/13/2018 URINECX >100,000 cfu/ml Escherichia coli 10/05/2016     Sputum Culture: No components found for: SPUTUMCX  Wound Culture: No results found for: WOUNDCULT    Last 24 Hours Medication List:     Current Facility-Administered Medications:  albuterol 2 5 mg Nebulization Q4H PRN Hetul Ortega, DO    ascorbic acid 500 mg Oral Daily Hetul Ortega, DO    cefazolin 1,000 mg Intravenous Q8H Hetul Ortega, DO Last Rate: 1,000 mg (09/03/19 0755)   donepezil 5 mg Oral HS Hetul Ortega, DO    DULoxetine 60 mg Oral Daily Hetul Ortega, DO    estradiol 1 g Vaginal Daily Hetul Ortega, DO    fluticasone 1 spray Nasal Daily Hetul Ortega, DO    guaiFENesin 600 mg Oral BID PRN Hetul Ortega, DO    heparin (porcine) 5,000 Units Subcutaneous Q8H [address] Hetul Ortega, DO    levothyroxine 100 mcg Oral Early Morning Hetul Ortega, DO    LORazepam 0 5 mg Oral Q8H PRN Hetul Ortega, DO    losartan 75 mg Oral Daily Hetul Ortega, DO    magnesium hydroxide 30 mL Oral Daily PRN Hetul Ortega, DO    metoprolol tartrate 25 mg Oral BID Hetul Ortega, DO    metroNIDAZOLE 500 mg Intravenous Q8H Hetul Ortega, DO Last Rate: 500 mg (09/03/19 0924)   NIFEdipine 60 mg Oral Daily Hetul Ortega, DO    nystatin  Topical BID Hetul Ortega, DO    ondansetron 4 mg Intravenous Q6H PRN Hetul Ortega, DO    pantoprazole 40 mg Oral BID Hetul Ortega, DO    pravastatin 40 mg Oral Daily Hetul Ortega, DO    QUEtiapine 12 5 mg Oral BID Hetul Ortega, DO    sodium chloride 2 spray Each Nare BID Hetul Ortega, DO    sodium chloride 50 mL/hr Intravenous Continuous Hetul Ortega, DO Last Rate: 50 mL/hr (09/02/19 1618)      Today, Patient Was Seen By: Chana Poole PA-C    ** Please Note: Dragon 360 Dictation voice to text software may have been used in the creation of this document   **

## 2019-09-03 NOTE — ASSESSMENT & PLAN NOTE
· CT abdomen pelvis:  Segment of asymmetric moderate colonic wall thickening with mild pericolonic inflammatory stranding involving the mid sigmoid colon with multiple associated diverticula most compatible with acute diverticulitis  No evidence of perforation  Mild to moderate stool noted in the rectum  No evidence of bowel obstruction  · Continue with IV cefazolin and Flagyl

## 2019-09-03 NOTE — NURSING NOTE
Patient's temp noted to be 101 9 orally  Patient has an allergy to tylenol, but there is no reaction documented  The patient is also quite demented and unable to provide reaction information  Tawanna Matamoros PA-C with [facility] aware; will pack patient with ice packs and continue to monitor temperature

## 2019-09-03 NOTE — PLAN OF CARE
Problem: PHYSICAL THERAPY ADULT  Goal: Performs mobility at highest level of function for planned discharge setting  See evaluation for individualized goals  Description  Treatment/Interventions: Functional transfer training, LE strengthening/ROM, Therapeutic exercise, Endurance training, Cognitive reorientation, Bed mobility, Gait training, Spoke to nursing, Spoke to case management, OT          See flowsheet documentation for full assessment, interventions and recommendations  Note:   Prognosis: Fair  Problem List: Decreased strength, Decreased endurance, Impaired balance, Decreased mobility, Decreased cognition  Assessment: Pt is 80 y o  female admitted with hx of abd pain and Dx including sepsis, diverticulitis, DEVONTE  Pt 's comorbidities affecting POC include: blindness (R) eye, dementia, depression, glaucoma, fibromyalgia, HTM peripheral neuropathy, RA, CVA and vertigo and personal factors of: advanced age, living at the [facility] and limited mobility status prior to admission (will need to clarify amb status/ level of (A) provided; spoke to CM)  Pt's clinical presentation is currently  unstable/unpredictable which is evident in ongoing telem monitoring, abn lab values and inability to progress further w/ mobilization to limited balance and noted incontinence  Pt presents w/ general disorientation (likely premorbid), overall weakness, incl decreased LE strength, decreased functional endurance and activity tolerance, impaired standing balance and inability to progress to amb at this time  Will cont to follow pt in PT for progressive mobilization to address above functional deficits and to max level of (I), endurance, and safety  D/C recommendations (back to [facility] w/ available support and home PT follow up vs rehab) will depend on clinical course, progress w/ mobility skills and level of (A) required vs available at the [facility]; will follow          Recommendation: Defer at this time(rehab vs home PT if (A)x2 for mobility is available at correction)          See flowsheet documentation for full assessment

## 2019-09-03 NOTE — ASSESSMENT & PLAN NOTE
Malnutrition Findings:     BMI Findings: Body mass index is 25 39 kg/m²  · Nutrition consult  · Encourage PO intake  · Nutritional supplements

## 2019-09-03 NOTE — SOCIAL WORK
CM reviewed chart from previous admissions  CM contacted staff at [facility] to inquiry about pt level of functioning pta  CM spoke to Lucia at [facility] [place] ([phone])  Pt is a long term resident  She is not receiving PT/OT at the facility  Pt requires assist x1 for all ADLs  Pt requires assist x1 for ambulation with a rolling walker  Pt requires much prompting to stand and ambulate  Pt is able to roll herself back and forth with verbal cues  Per Lucia pt is not on their skilled dementia unit  Pt can be combative at time  At baseline pt does curse and yell out things such as "I'm dying or I'm dead"     Pt to return to [facility] [place] once medically stable  [facility] referral sent  CM following  Pt has previous hx with [facility] and [facility] ([facility]) prior to being placed at [facility] [place] long term  Contact: Harman Dowling (daughter) [phone]  No POA or Living Will on File  Pt will need transportation back to facility once medically stable

## 2019-09-03 NOTE — ASSESSMENT & PLAN NOTE
· POA as evidenced by leukocytosis, fever, lactic acidosis (3 7), DEVONTE in patient with acute infections (UTI, diverticulitis)  · Procalcitonin is elevated:  4 46  Continue to trend until normalized  · Continue IV cefazolin and Flagyl  · Blood cultures:  Pending  · Urine culture:  Pending  · Continue to trend fever curve and WBCs  · Lactic acidosis has resolved (2 0)

## 2019-09-03 NOTE — CONSULTS
Consultation - GI   Mercedes Colon 80 y o  female MRN: [id number]  Unit/Bed#: [facility] 808-01 Encounter: [id number]      Assessment/Plan     Abdominal pain  Suspect secondary to acute diverticulitis vs UTI  Pt met sepsis criteria on admission with leukocytosis, fever in the setting of diverticulitis and UTI  Lactic acid elevated to 3 7 with improvement to 2 0   CT abdomen and pelvis showed segment of asymmetric moderate colonic wall thickening with mild pericolonic stranding along mid sigmoid colon with multiple diverticula and no evidence of perforation  Pt diffusely tender on abdominal exam when palpated, unable to provide history secondary to dementia  Continue Cefazolin and Flagyl  Continue bowel rest with clear liquid diet  On gentle fluid hydration with NS at 50ml/hr  Blood culture/urine cultures pending  Maroon liquid stools reported by nursing this morning, pt refused rectal exam today  Three bowel movements documented yesterday and were reported nonbloody and formed  Hgb stable today at 10 2, baseline appears to be approximately 10  Pt remains hemodynamically stable  Will continue to monitor  Will discuss with attending  History of Present Illness   Physician Requesting Consult: Simone Cortes MD  Reason for Consult / Principal Problem: Abdominal pain  Hx and PE limited by: dementia  HPI: Mercedes Colon is a 80y o  year old female with history of dementia, history of CVA, hypothyroidism presenting from nursing home with diffuse abdominal pain/body aches and fever and complaint "I am dying"  CT abdomen pelvis showed colonic wall thickening with pericolonic stranding consistent with acute diverticulitis and UA was positive for UTI with large leukocytes, positive nitrates and moderate blood    Patient was given 1 dose of ceftriaxone and then started on IV cefazolin and metronidazole for sepsis (present on admission with fever and leukocytosis) suspected secondary to UTI versus diverticulitis  Today, patient is resting comfortably but continues to state "I am dying" and reports pain in her left lower quadrant  Patient is unable to provide further history secondary to her dementia  History through chart review and per nursing      Consults    Review of Systems   Unable to perform ROS: Dementia       Historical Information   Past Medical History:   Diagnosis Date    Aspiration pneumonia ([facility] )     Last Assessed:  7/18/17    Basal cell carcinoma of nose     Last Assessed:  4/12/17    Blind     blind right eye, 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, Last Assessed:  7/18/17    Dementia     Depression     Disease of thyroid gland     Fibromyalgia     Last Assessed:  7/18/17    Gait disturbance     GERD (gastroesophageal reflux disease)     Glaucoma     Hyperlipidemia     Hypertension     Osteopenia     Peripheral neuropathy     Last Assessed:  4/12/17    Polymyalgia rheumatica ([facility] )     Psychiatric disorder     depression    Renal insufficiency syndrome     Last Assessed:  4/13/17    Rheumatoid arthritis ([facility] )     Stroke ([facility] )     x2, Last Assessed:  7/18/17    Systemic lupus erythematosus ([place] [address] )     Vertigo     Vitamin D deficiency      Past Surgical History:   Procedure Laterality Date    EYE SURGERY      JOINT REPLACEMENT      left hip replacement, left shoulder replacement    SHOULDER SURGERY      Replacement     Social History   Social History     Substance and Sexual Activity   Alcohol Use Never    Frequency: Never     Social History     Substance and Sexual Activity   Drug Use No     Social History     Tobacco Use   Smoking Status Never Smoker   Smokeless Tobacco Never Used     Family History: non-contributory    Meds/Allergies   all current active meds have been reviewed and current meds:   Current Facility-Administered Medications   Medication Dose Route Frequency    albuterol inhalation solution 2 5 mg  2 5 mg Nebulization Q4H PRN    ascorbic acid (VITAMIN C) tablet 500 mg  500 mg Oral Daily    ceFAZolin (ANCEF) IVPB (premix) 1,000 mg  1,000 mg Intravenous Q8H    donepezil (ARICEPT) tablet 5 mg  5 mg Oral HS    DULoxetine (CYMBALTA) delayed release capsule 60 mg  60 mg Oral Daily    estradiol (ESTRACE) vaginal cream 1 g  1 g Vaginal Daily    fluticasone (FLONASE) 50 mcg/act nasal spray 1 spray  1 spray Nasal Daily    guaiFENesin (MUCINEX) 12 hr tablet 600 mg  600 mg Oral BID PRN    heparin (porcine) subcutaneous injection 5,000 Units  5,000 Units Subcutaneous Q8H [facility]    levothyroxine tablet 100 mcg  100 mcg Oral Early Morning    LORazepam (ATIVAN) tablet 0 5 mg  0 5 mg Oral Q8H PRN    losartan (COZAAR) tablet 75 mg  75 mg Oral Daily    magnesium hydroxide (MILK OF MAGNESIA) 400 mg/5 mL oral suspension 30 mL  30 mL Oral Daily PRN    metoprolol tartrate (LOPRESSOR) tablet 25 mg  25 mg Oral BID    metroNIDAZOLE (FLAGYL) IVPB (premix) 500 mg  500 mg Intravenous Q8H    NIFEdipine (PROCARDIA XL) 24 hr tablet 60 mg  60 mg Oral Daily    nystatin (MYCOSTATIN) powder   Topical BID    ondansetron (ZOFRAN) injection 4 mg  4 mg Intravenous Q6H PRN    pantoprazole (PROTONIX) EC tablet 40 mg  40 mg Oral BID    pravastatin (PRAVACHOL) tablet 40 mg  40 mg Oral Daily    QUEtiapine (SEROquel) tablet 12 5 mg  12 5 mg Oral BID    sodium chloride (OCEAN) 0 65 % nasal spray 2 spray  2 spray Each Nare BID    sodium chloride 0 9 % infusion  50 mL/hr Intravenous Continuous       Allergies   Allergen Reactions    Acetaminophen     Golimumab      Other reaction(s): dedrick colored stool    Lidocaine Other (See Comments)    Neurontin [Gabapentin]     Nortriptyline Tremor    Prasterone      Other reaction(s): pruitis    Tricyclic Antidepressants     Leflunomide Rash    Sulfasalazine Rash       Objective       Intake/Output Summary (Last 24 hours) at 9/3/2019 1019  Last data filed at 9/3/2019 0055  Gross per 24 hour   Intake 770 ml   Output 391 ml   Net 379 ml       Invasive Devices:   Peripheral IV 09/02/19 Left Hand (Active)   Site Assessment Clean;Dry; Intact 9/2/2019  8:31 PM   Dressing Type Transparent 9/2/2019  8:31 PM   Line Status Infusing 9/2/2019  8:31 PM   Dressing Status Clean;Dry; Intact 9/2/2019  8:31 PM       Peripheral IV 09/02/19 Right Antecubital (Active)   Site Assessment Clean;Dry; Intact 9/2/2019  8:31 PM   Dressing Type Transparent 9/2/2019  8:31 PM   Line Status Saline locked; Flushed 9/2/2019  8:31 PM   Dressing Status Clean;Dry; Intact 9/2/2019  8:31 PM       Physical Exam   Constitutional: She appears well-developed and well-nourished  HENT:   Head: Normocephalic and atraumatic  Nose: Nose normal    Mouth/Throat: Oropharynx is clear and moist    Eyes: Right eye exhibits no discharge  Left eye exhibits no discharge  Right eye ptosis   Cardiovascular: Normal rate, regular rhythm and normal heart sounds  Exam reveals no gallop and no friction rub  No murmur heard  Pulmonary/Chest: Effort normal and breath sounds normal  No respiratory distress  She has no wheezes  She has no rales  Abdominal: Soft  Bowel sounds are normal  She exhibits no distension  There is no tenderness  Diffusely tender to light palpation in all four quadrants with mild guarding  Genitourinary:   Genitourinary Comments: No blood or excoriations noted on external rectal exam     Neurological: She is disoriented  Awakens to verbal stimuli but appears fatigued  Skin: Skin is warm and dry  Psychiatric: Her speech is normal        Lab Results: I have personally reviewed pertinent reports  Imaging Studies: I have personally reviewed pertinent reports  EKG, Pathology, and Other Studies: I have personally reviewed pertinent reports  VTE Prophylaxis: Heparin    Counseling / Coordination of Care  Total floor / unit time spent today 30 minutes  Greater than 50% of total time was spent with the patient and / or family counseling and / or coordination of care

## 2019-09-03 NOTE — PLAN OF CARE
Problem: OCCUPATIONAL THERAPY ADULT  Goal: Performs self-care activities at highest level of function for planned discharge setting  See evaluation for individualized goals  Description  Treatment Interventions: ADL retraining, Functional transfer training, Endurance training, Cognitive reorientation, Patient/family training, Equipment evaluation/education, Compensatory technique education, Activityengagement          See flowsheet documentation for full assessment, interventions and recommendations  Note:   Limitation: Decreased ADL status, Decreased Safe judgement during ADL, Decreased cognition, Decreased endurance, Decreased self-care trans, Visual deficit, Decreased UE strength  Prognosis: Poor  Assessment: Pt is a 80 y o  female who was admitted to [facility] on 9/2/2019 with Sepsis ([address] ), diverticulitis, risk for aspiration   Pt's problem list also includes PMH of hyperlipidemia, fibromyalgia, anemia, RA, cough, paresis, debility, chronic pain to right knee, acute metabolic encephalopathy, R lower quadrant abdominal pain  At baseline pt was completing assistance needed with ADl's/IADL's assist x 1 with RW for functional ambulation with verbal cues for encouragement  Pt lives in a [id number] [place] [address],[address]  Currently pt requires max a for overall ADLS and max a 1 supine to sit, mod a x 2 with RW for functional transfers  Pt currently presents with impairments in the following categories -difficulty performing ADLS, limited insight into deficits, compliance and flat affect activity tolerance, endurance, standing balance/tolerance, arousal, memory, insight, safety , judgement , attention , sensation , task initiation , coping skills , communication and interpersonal skills   These impairments, as well as pt's fatigue and risk for falls  limit pt's ability to safely engage in all baseline areas of occupation, includingeating, grooming, bathing, dressing, toileting, functional mobility/transfers and community mobility From OT standpoint, recommend STR vs return to [facility] with increased assistance upon D/C  OT will continue to follow to address the below stated goals        OT Discharge Recommendation: ([address] with increase support vs STR)  OT - OK to Discharge: (when medically cleared)

## 2019-09-03 NOTE — ASSESSMENT & PLAN NOTE
· POA likely secondary to acute infection and mild dehydration  ARB may be contributing  OK to continue ARB for now given improving creatinine with IVFs  · Baseline creatinine:  0 8  · Creatinine on admission:  1 41  · Creatinine today:  1 26  · Continue with IV fluids for now  Monitor volume status closely  · Avoid hypotension and nephrotoxic agents

## 2019-09-03 NOTE — PHYSICAL THERAPY NOTE
Physical Therapy Evaluation     Patient's Name: Kb Ricardo    Admitting Diagnosis  Diverticulitis [K57 92]  Urinary tract infection [N39 0]  Abdominal pain [R10 9]  Fever [R50 9]  DEVONTE (acute kidney injury) ([facility] [address] ) [N17 9]    Problem List  Patient Active Problem List   Diagnosis    Hyperlipidemia    GERD (gastroesophageal reflux disease)    Fibromyalgia    Rheumatoid arthritis ([address] )    Hyponatremia    CVA (cerebral vascular accident) ([address] )    Essential hypertension    Acute metabolic encephalopathy    Sepsis ([facility] [address] [facility]    Late onset Alzheimer's disease with behavioral disturbance    Acquired hypothyroidism    Anemia    Acute cystitis without hematuria    Right lower quadrant abdominal pain    DEVONTE (acute kidney injury) ([address] )    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 ([facility] [address] [facility]    Pneumonia of right upper lobe due to infectious organism ([facility] [address] )    Sore throat    Rotator cuff tear arthropathy of right shoulder    Cough    Conjunctivitis of right eye    Pseudogout of left wrist    Paresis ([address] )    Pressure injury of sacral region, stage 1    Severe protein-calorie malnutrition (HCC)    Debility    Chronic rhinitis    Chronic pain of right knee    Diverticulitis    At high risk for aspiration       Past Medical History  Past Medical History:   Diagnosis Date    Aspiration pneumonia ([facility] [address] )     Last Assessed:  7/18/17    Basal cell carcinoma of nose     Last Assessed:  4/12/17    Blind     blind right eye, 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, Last Assessed:  7/18/17    Dementia     Depression     Disease of thyroid gland     Fibromyalgia     Last Assessed:  7/18/17    Gait disturbance     GERD (gastroesophageal reflux disease)     Glaucoma     Hyperlipidemia     Hypertension     Osteopenia     Peripheral neuropathy     Last Assessed:  4/12/17    Polymyalgia rheumatica ([address] )     Psychiatric disorder     depression    Renal insufficiency syndrome     Last Assessed:  4/13/17  Rheumatoid arthritis ([facility] )     Stroke [facility])     x2, Last Assessed:  7/18/17    Systemic lupus erythematosus ([facility] )     Vertigo     Vitamin D deficiency        Past Surgical History  Past Surgical History:   Procedure Laterality Date    EYE SURGERY      JOINT REPLACEMENT      left hip replacement, left shoulder replacement    SHOULDER SURGERY      Replacement          09/03/19 0928   Note Type   Note type Eval/Treat   Pain Assessment   Pain Assessment FLACC   Pain Type Acute pain   Pain Location Abdomen   Pain Descriptors Aching;Discomfort   Pain Frequency Intermittent   Pain Onset Ongoing   Clinical Progression Not changed   Effect of Pain on Daily Activities general guarding   Patient's Stated Pain Goal No pain   Hospital Pain Intervention(s) Repositioned; Ambulation/increased activity; Distraction; Emotional support   Response to Interventions no change   Pain Rating: FLACC (Rest) - Face 0   Pain Rating: FLACC (Rest) - Legs 0   Pain Rating: FLACC (Rest) - Activity 0   Pain Rating: FLACC (Rest) - Cry 1   Pain Rating: FLACC (Rest) - Consolability 1   Score: FLACC (Rest) 2   Pain Rating: FLACC (Activity) - Face 1   Pain Rating: FLACC (Activity) - Legs 0   Pain Rating: FLACC (Activity) - Activity 0   Pain Rating: FLACC (Activity) - Cry 1   Pain Rating: FLACC (Activity) - Consolability 1   Score: FLACC (Activity) 3   Home Living   Type of Home Assisted living  (Memory care unit)   Home Layout One level   Prior Function   Level of [place] Needs assistance with ADLs and functional mobility  (reportedly (per nsg), pt requires max (A)x2 for amb w/ rw;)   Lives With Facility staff   Receives Help From Personal care attendant   Restrictions/Precautions   Other Precautions Contact/isolation;Cognitive;Multiple lines; Fall Risk;Bed Alarm;Visual impairment  (bed alarm re-activated at the end of session)   General   Additional Pertinent History cleared for assessment (spoke to nsg)   Cognition   Overall Cognitive Status Impaired   Arousal/Participation Responsive   Orientation Level Oriented to person   Memory Decreased recall of biographical information;Decreased recall of recent events;Decreased recall of precautions   Following Commands Follows one step commands inconsistently   Comments Pt is observed in bed; generally disoriented; limited responses to questions;   RUE Assessment   RUE Assessment WFL  (AROM)   LUE Assessment   LUE Assessment WFL  (AROM)   RLE Assessment   RLE Assessment WFL  (AROM)   Strength RLE   RLE Overall Strength   (fair (grossly))   LLE Assessment   LLE Assessment WFL  (AROM)   Strength LLE   LLE Overall Strength   (fair (grossly))   Bed Mobility   Supine to Sit 2  Maximal assistance   Additional items Assist x 1; Increased time required;Verbal cues;LE management   Transfers   Sit to Stand 3  Moderate assistance   Additional items Assist x 2; Increased time required;Verbal cues   Stand to Sit 3  Moderate assistance   Additional items Assist x 2; Increased time required;Verbal cues   Ambulation/Elevation   Gait pattern Not appropriate; Not tested  (noted to be incontinent of bowel upon standing)   Assistive Device Rolling walker   Balance   Static Sitting Poor +   Static Standing Poor   Activity Tolerance   Activity Tolerance Patient limited by fatigue;Treatment limited secondary to medical complications (Comment)   Nurse Made Aware spoke to Jaimie Singleton, [address] [place] [address]   Assessment   Prognosis Fair   Problem List Decreased strength;Decreased endurance; Impaired balance;Decreased mobility; Decreased cognition   Assessment Pt is 80 y o  female admitted with hx of abd pain and Dx including sepsis, diverticulitis, DEVONTE   Pt 's comorbidities affecting POC include: blindness (R) eye, dementia, depression, glaucoma, fibromyalgia, HTM peripheral neuropathy, RA, CVA and vertigo and personal factors of: advanced age, living at the [facility] and limited mobility status prior to admission (will need to clarify amb status/ level of (A) provided; spoke to CM)  Pt's clinical presentation is currently  unstable/unpredictable which is evident in ongoing telem monitoring, abn lab values and inability to progress further w/ mobilization to limited balance and noted incontinence  Pt presents w/ general disorientation (likely premorbid), overall weakness, incl decreased LE strength, decreased functional endurance and activity tolerance, impaired standing balance and inability to progress to amb at this time  Will cont to follow pt in PT for progressive mobilization to address above functional deficits and to max level of (I), endurance, and safety  D/C recommendations (back to [facility] w/ available support and home PT follow up vs rehab) will depend on clinical course, progress w/ mobility skills and level of (A) required vs available at the [facility]; will follow  Goals   Patient Goals pt did not express   STG Expiration Date 09/13/19   Short Term Goal #1 7-10 days  Pt will amb 20 ft w/ rw, mod (A)x1 and chair follow in order to initiate return to at least household amb status  Pt will achieve min (A)x1 level w/ bed mob in order to facilitate safety with OOB and back to bed transitions in prior living environment and to decrease overall burden of care  Pt will perform transfers w/ min (A)x1 to assure safety w/ functional mobility/transitions w/ all aspects of mobility/locomotion  Pt will participate in LE therex and balance activities to max progression w/ mobility skills  Treatment Day 1  (follow up Tx session)   Plan   Treatment/Interventions Functional transfer training;LE strengthening/ROM; Therapeutic exercise; Endurance training;Cognitive reorientation; Bed mobility;Gait training;Spoke to nursing;Spoke to case management;OT   PT Frequency Other (Comment)  (3-5x/wk)   Recommendation   Recommendation Defer at this time  (rehab vs home PT if (A)x2 for mobility is available at [facility])   Modified [place] Scale   Modified [place] Scale 4   Barthel Index   Feeding 5 Bathing 0   Grooming Score 0   Dressing Score 0   Bladder Score 0   Bowels Score 0   Toilet Use Score 0   Transfers (Bed/Chair) Score 5   Mobility (Level Surface) Score 0   Stairs Score 0   Barthel Index Score 10         Stephen Durand, PT  PT Tx note    Time In: 09:28  Time Out: 09:38  Total Time: 10 min      S:  Pt is on the edge of bed; agreeable to try going to [facility]; upon standing, ongoing BM/loose stool noted and additional pericare w/ staff and mobilization performed at bedside as outlined below  O:  Additional functional mobility training performed at bedside as following: sit <--> stand transfers w/ mod (A)x2 and rw for support (2 trials); standing balance/tolerance training x 20 sec and x 30 sec w/ seated rest periods in between; sit to supine bed mob transition w/ max (A)x2; repositioning in bed w/ (A)x2; bed alarm re-activated at the end of session; RN remained w/ the pt at the end of session;    A:  Additional follow up consecutive session performed to facilitate additional care activities after noted active BM and for repositioning back to bed; pt cont to require (A)x2 w/ all aspects of limited mobility at bedside demonstrating decreased standing balance and overall tolerance to activities/mobility; pt was unable to progress further w/ OOB amb at this time due to loose stool issues; pt remained in NAD and appeared to be comfortable at the end of session; will cont to follow pt in PT for graded mobilization as clinical course allows; more definitive D/C recommendations will follow  P:  Cont to follow pt 3-5x/week for LE therex, functional mobility training, endurance training and pt education per POC to max functional (I) and safety          Vicky Andrade, PT

## 2019-09-04 LAB
ANION GAP SERPL CALCULATED.3IONS-SCNC: 8 MMOL/L (ref 4–13)
BACTERIA UR CULT: ABNORMAL
BASOPHILS # BLD AUTO: 0.01 THOUSANDS/ΜL (ref 0–0.1)
BASOPHILS NFR BLD AUTO: 0 % (ref 0–1)
BUN SERPL-MCNC: 17 MG/DL (ref 5–25)
CALCIUM SERPL-MCNC: 8.2 MG/DL (ref 8.3–10.1)
CHLORIDE SERPL-SCNC: 110 MMOL/L (ref 100–108)
CO2 SERPL-SCNC: 22 MMOL/L (ref 21–32)
CREAT SERPL-MCNC: 0.97 MG/DL (ref 0.6–1.3)
EOSINOPHIL # BLD AUTO: 0.01 THOUSAND/ΜL (ref 0–0.61)
EOSINOPHIL NFR BLD AUTO: 0 % (ref 0–6)
ERYTHROCYTE [DISTWIDTH] IN BLOOD BY AUTOMATED COUNT: 15.2 % (ref 11.6–15.1)
GFR SERPL CREATININE-BSD FRML MDRD: 52 ML/MIN/1.73SQ M
GLUCOSE SERPL-MCNC: 93 MG/DL (ref 65–140)
HCT VFR BLD AUTO: 28.2 % (ref 34.8–46.1)
HGB BLD-MCNC: 9 G/DL (ref 11.5–15.4)
IMM GRANULOCYTES # BLD AUTO: 0.07 THOUSAND/UL (ref 0–0.2)
IMM GRANULOCYTES NFR BLD AUTO: 1 % (ref 0–2)
LYMPHOCYTES # BLD AUTO: 1.84 THOUSANDS/ΜL (ref 0.6–4.47)
LYMPHOCYTES NFR BLD AUTO: 14 % (ref 14–44)
MCH RBC QN AUTO: 29.2 PG (ref 26.8–34.3)
MCHC RBC AUTO-ENTMCNC: 31.9 G/DL (ref 31.4–37.4)
MCV RBC AUTO: 92 FL (ref 82–98)
MONOCYTES # BLD AUTO: 0.82 THOUSAND/ΜL (ref 0.17–1.22)
MONOCYTES NFR BLD AUTO: 6 % (ref 4–12)
NEUTROPHILS # BLD AUTO: 10.93 THOUSANDS/ΜL (ref 1.85–7.62)
NEUTS SEG NFR BLD AUTO: 79 % (ref 43–75)
NRBC BLD AUTO-RTO: 0 /100 WBCS
PLATELET # BLD AUTO: 156 THOUSANDS/UL (ref 149–390)
PMV BLD AUTO: 10 FL (ref 8.9–12.7)
POTASSIUM SERPL-SCNC: 3.5 MMOL/L (ref 3.5–5.3)
PROCALCITONIN SERPL-MCNC: 3.4 NG/ML
RBC # BLD AUTO: 3.08 MILLION/UL (ref 3.81–5.12)
SODIUM SERPL-SCNC: 140 MMOL/L (ref 136–145)
WBC # BLD AUTO: 13.68 THOUSAND/UL (ref 4.31–10.16)

## 2019-09-04 PROCEDURE — 80048 BASIC METABOLIC PNL TOTAL CA: CPT | Performed by: PHYSICIAN ASSISTANT

## 2019-09-04 PROCEDURE — 99232 SBSQ HOSP IP/OBS MODERATE 35: CPT | Performed by: PHYSICIAN ASSISTANT

## 2019-09-04 PROCEDURE — 94760 N-INVAS EAR/PLS OXIMETRY 1: CPT

## 2019-09-04 PROCEDURE — 84145 PROCALCITONIN (PCT): CPT | Performed by: PHYSICIAN ASSISTANT

## 2019-09-04 PROCEDURE — 85025 COMPLETE CBC W/AUTO DIFF WBC: CPT | Performed by: PHYSICIAN ASSISTANT

## 2019-09-04 RX ADMIN — LEVOTHYROXINE SODIUM 100 MCG: 100 TABLET ORAL at 05:13

## 2019-09-04 RX ADMIN — HEPARIN SODIUM 5000 UNITS: 5000 INJECTION INTRAVENOUS; SUBCUTANEOUS at 05:13

## 2019-09-04 RX ADMIN — METRONIDAZOLE 500 MG: 500 INJECTION, SOLUTION INTRAVENOUS at 00:27

## 2019-09-04 RX ADMIN — NYSTATIN: 100000 POWDER TOPICAL at 08:40

## 2019-09-04 RX ADMIN — METRONIDAZOLE 500 MG: 500 INJECTION, SOLUTION INTRAVENOUS at 16:12

## 2019-09-04 RX ADMIN — NYSTATIN: 100000 POWDER TOPICAL at 18:21

## 2019-09-04 RX ADMIN — METRONIDAZOLE 500 MG: 500 INJECTION, SOLUTION INTRAVENOUS at 09:30

## 2019-09-04 RX ADMIN — HEPARIN SODIUM 5000 UNITS: 5000 INJECTION INTRAVENOUS; SUBCUTANEOUS at 13:57

## 2019-09-04 RX ADMIN — CEFAZOLIN SODIUM 1000 MG: 1 SOLUTION INTRAVENOUS at 16:10

## 2019-09-04 RX ADMIN — SODIUM CHLORIDE 50 ML/HR: 0.9 INJECTION, SOLUTION INTRAVENOUS at 05:17

## 2019-09-04 RX ADMIN — HEPARIN SODIUM 5000 UNITS: 5000 INJECTION INTRAVENOUS; SUBCUTANEOUS at 21:44

## 2019-09-04 RX ADMIN — CEFAZOLIN SODIUM 1000 MG: 1 SOLUTION INTRAVENOUS at 08:37

## 2019-09-04 NOTE — PROGRESS NOTES
Pt refusing PO meds earlier in the day r/t to only being able to take clear liquids, per report given by sreekanth Chan sent to [facility] to see if pt could be advanced to atleast full liquids to see if pt will take meds with pudding  OK given to advance diet to full liquids  Pt still refused meds or to take anything by mouth  Refusing to eat any dinner

## 2019-09-04 NOTE — ASSESSMENT & PLAN NOTE
· POA as evidenced by leukocytosis, fever, lactic acidosis (3 7), DEVONTE in patient with acute infections (UTI, diverticulitis)  · Procalcitonin is elevated:  4 46  Continue to trend until normalized, 3 4 this morning  · Continue IV cefazolin and Flagyl  · Blood cultures:  Negative at 24 hours  · Urine culture:  Positive for E coli  · Continue to trend fever curve and WBCs  · Lactic acidosis has resolved (2 0)

## 2019-09-04 NOTE — ASSESSMENT & PLAN NOTE
· POA likely secondary to acute infection and mild dehydration  ARB may be contributing  OK to continue ARB for now given improving creatinine with IVFs  · Baseline creatinine:  0 8  · Creatinine on admission:  1 41  · Creatinine today:  0 97  · Continue with IV fluids for now  Monitor volume status closely  · Avoid hypotension and nephrotoxic agents

## 2019-09-04 NOTE — PLAN OF CARE
Problem: Potential for Falls  Goal: Patient will remain free of falls  Description  INTERVENTIONS:  - Assess patient frequently for physical needs  -  Identify cognitive and physical deficits and behaviors that affect risk of falls  -  [place] fall precautions as indicated by assessment   - Educate patient/family on patient safety including physical limitations  - Instruct patient to call for assistance with activity based on assessment  - Modify environment to reduce risk of injury  - Consider OT/PT consult to assist with strengthening/mobility  Outcome: Progressing     Problem: Prexisting or High Potential for Compromised Skin Integrity  Goal: Skin integrity is maintained or improved  Description  INTERVENTIONS:  - Identify patients at risk for skin breakdown  - Assess and monitor skin integrity  - Assess and monitor nutrition and hydration status  - Monitor labs   - Assess for incontinence   - Turn and reposition patient  - Assist with mobility/ambulation  - Relieve pressure over bony prominences  - Avoid friction and shearing  - Provide appropriate hygiene as needed including keeping skin clean and dry  - Evaluate need for skin moisturizer/barrier cream  - Collaborate with interdisciplinary team   - Patient/family teaching  - Consider wound care consult   Outcome: Progressing     Problem: Nutrition/Hydration-ADULT  Goal: Nutrient/Hydration intake appropriate for improving, restoring or maintaining nutritional needs  Description  Monitor and assess patient's nutrition/hydration status for malnutrition  Collaborate with interdisciplinary team and initiate plan and interventions as ordered  Monitor patient's weight and dietary intake as ordered or per policy  Utilize nutrition screening tool and intervene as necessary  Determine patient's food preferences and provide high-protein, high-caloric foods as appropriate       INTERVENTIONS:  - Monitor oral intake, urinary output, labs, and treatment plans  - Assess nutrition and hydration status and recommend course of action  - Evaluate amount of meals eaten  - Assist patient with eating if necessary   - Allow adequate time for meals  - Recommend/ encourage appropriate diets, oral nutritional supplements, and vitamin/mineral supplements  - Order, calculate, and assess calorie counts as needed  - Recommend, monitor, and adjust tube feedings and TPN/PPN based on assessed needs  - Assess need for intravenous fluids  - Provide specific nutrition/hydration education as appropriate  - Include patient/family/caregiver in decisions related to nutrition  Outcome: Progressing     Problem: GASTROINTESTINAL - ADULT  Goal: Minimal or absence of nausea and/or vomiting  Description  INTERVENTIONS:  - Administer IV fluids if ordered to ensure adequate hydration  - Maintain NPO status until nausea and vomiting are resolved  - Nasogastric tube if ordered  - Administer ordered antiemetic medications as needed  - Provide nonpharmacologic comfort measures as appropriate  - Advance diet as tolerated, if ordered  - Consider nutrition services referral to assist patient with adequate nutrition and appropriate food choices  Outcome: Progressing  Goal: Maintains or returns to baseline bowel function  Description  INTERVENTIONS:  - Assess bowel function  - Encourage oral fluids to ensure adequate hydration  - Administer IV fluids if ordered to ensure adequate hydration  - Administer ordered medications as needed  - Encourage mobilization and activity  - Consider nutritional services referral to assist patient with adequate nutrition and appropriate food choices  Outcome: Progressing  Goal: Maintains adequate nutritional intake  Description  INTERVENTIONS:  - Monitor percentage of each meal consumed  - Identify factors contributing to decreased intake, treat as appropriate  - Assist with meals as needed  - Monitor I&O, weight, and lab values if indicated  - Obtain nutrition services referral as needed  Outcome: Progressing     Problem: PAIN - ADULT  Goal: Verbalizes/displays adequate comfort level or baseline comfort level  Description  Interventions:  - Encourage patient to monitor pain and request assistance  - Assess pain using appropriate pain scale  - Administer analgesics based on type and severity of pain and evaluate response  - Implement non-pharmacological measures as appropriate and evaluate response  - Consider cultural and social influences on pain and pain management  - Notify physician/advanced practitioner if interventions unsuccessful or patient reports new pain  Outcome: Progressing     Problem: INFECTION - ADULT  Goal: Absence or prevention of progression during hospitalization  Description  INTERVENTIONS:  - Assess and monitor for signs and symptoms of infection  - Monitor lab/diagnostic results  - Monitor all insertion sites, i e  indwelling lines, tubes, and drains  - Monitor endotracheal if appropriate and nasal secretions for changes in amount and color  - [place] appropriate cooling/warming therapies per order  - Administer medications as ordered  - Instruct and encourage patient and family to use good hand hygiene technique  - Identify and instruct in appropriate isolation precautions for identified infection/condition  Outcome: Progressing  Goal: Absence of fever/infection during neutropenic period  Description  INTERVENTIONS:  - Monitor WBC    Outcome: Progressing     Problem: SAFETY ADULT  Goal: Maintain or return to baseline ADL function  Description  INTERVENTIONS:  -  Assess patient's ability to carry out ADLs; assess patient's baseline for ADL function and identify physical deficits which impact ability to perform ADLs (bathing, care of mouth/teeth, toileting, grooming, dressing, etc )  - Assess/evaluate cause of self-care deficits   - Assess range of motion  - Assess patient's mobility; develop plan if impaired  - Assess patient's need for assistive devices and provide as appropriate  - Encourage maximum independence but intervene and supervise when necessary  - Involve family in performance of ADLs  - Assess for home care needs following discharge   - Consider OT consult to assist with ADL evaluation and planning for discharge  - Provide patient education as appropriate  Outcome: Progressing  Goal: Maintain or return mobility status to optimal level  Description  INTERVENTIONS:  - Assess patient's baseline mobility status (ambulation, transfers, stairs, etc )    - Identify cognitive and physical deficits and behaviors that affect mobility  - Identify mobility aids required to assist with transfers and/or ambulation (gait belt, sit-to-stand, lift, walker, cane, etc )  - [place] fall precautions as indicated by assessment  - Record patient progress and toleration of activity level on Mobility SBAR; progress patient to next Phase/Stage  - Instruct patient to call for assistance with activity based on assessment  - Consider rehabilitation consult to assist with strengthening/weightbearing, etc   Outcome: Progressing     Problem: DISCHARGE PLANNING  Goal: Discharge to home or other facility with appropriate resources  Description  INTERVENTIONS:  - Identify barriers to discharge w/patient and caregiver  - Arrange for needed discharge resources and transportation as appropriate  - Identify discharge learning needs (meds, wound care, etc )  - Arrange for interpretive services to assist at discharge as needed  - Refer to Case Management Department for coordinating discharge planning if the patient needs post-hospital services based on physician/advanced practitioner order or complex needs related to functional status, cognitive ability, or social support system  Outcome: Progressing     Problem: Knowledge Deficit  Goal: Patient/family/caregiver demonstrates understanding of disease process, treatment plan, medications, and discharge instructions  Description  Complete learning assessment and assess knowledge base    Interventions:  - Provide teaching at level of understanding  - Provide teaching via preferred learning methods  Outcome: Progressing

## 2019-09-04 NOTE — PROGRESS NOTES
[facility]  Progress Note - [facility] 4/27/1931, 80 y o  female MRN: [id number]  Unit/Bed#: [facility] 808-01 Encounter: [id number]  Primary Care Provider: Kimberley Brooke DO   Date and time admitted to hospital: 9/2/2019 11:49 AM    * Sepsis ([facility] [address] )  Assessment & Plan  · POA as evidenced by leukocytosis, fever, lactic acidosis (3 7), DEVONTE in patient with acute infections (UTI, diverticulitis)  · Procalcitonin is elevated:  4 46  Continue to trend until normalized, 3 4 this morning  · Continue IV cefazolin and Flagyl  · Blood cultures:  Negative at 24 hours  · Urine culture:  Positive for E coli  · Continue to trend fever curve and WBCs  · Lactic acidosis has resolved (2 0)  At high risk for aspiration  Assessment & Plan  · Aspiration precautions  Diverticulitis  Assessment & Plan  · CT abdomen pelvis:  Segment of asymmetric moderate colonic wall thickening with mild pericolonic inflammatory stranding involving the mid sigmoid colon with multiple associated diverticula most compatible with acute diverticulitis  No evidence of perforation  Mild to moderate stool noted in the rectum  No evidence of bowel obstruction  · Continue with IV cefazolin and Flagyl  Severe protein-calorie malnutrition ([address] )  Assessment & Plan  Malnutrition Findings:     BMI Findings: Body mass index is 25 39 kg/m²  · Nutrition consult  · Encourage PO intake  · Nutritional supplements  · Patient dislikes clear liquid diet, advancement per GI    DEVONTE (acute kidney injury) ([facility] [address] )  Assessment & Plan  · POA likely secondary to acute infection and mild dehydration  ARB may be contributing  OK to continue ARB for now given improving creatinine with IVFs  · Baseline creatinine:  0 8  · Creatinine on admission:  1 41  · Creatinine today:  0 97  · Continue with IV fluids for now  Monitor volume status closely  · Avoid hypotension and nephrotoxic agents      Acute cystitis without hematuria  Assessment & Plan  · UA (+)  · Continue antibiotics  · Urine culture positive for E coli    Acquired hypothyroidism  Assessment & Plan  · Continue Levothyroxine    Late onset Alzheimer's disease with behavioral disturbance  Assessment & Plan  · Continue with Aricept, Cymbalta and Seroquel  · Preoccupied, repeats "I am dying"    Essential hypertension  Assessment & Plan  · Stable on losartan, metoprolol and Procardia    CVA (cerebral vascular accident) ([facility] [address] )  Assessment & Plan  · Continue monitor    GERD (gastroesophageal reflux disease)  Assessment & Plan  · PPI      VTE Pharmacologic Prophylaxis:   Pharmacologic: Heparin  Mechanical VTE Prophylaxis in Place: Yes    Patient Centered Rounds: I have performed bedside rounds with nursing staff today  Discussions with Specialists or Other Care Team Provider:  None    Education and Discussions with Family / Patient:  Discussed care plan with patient at bedside  Will call patient's daughter for update    Time Spent for Care: 20 minutes  More than 50% of total time spent on counseling and coordination of care as described above  Current Length of Stay: 2 day(s)    Current Patient Status: Inpatient   Certification Statement: The patient will continue to require additional inpatient hospital stay due to IV antibiotics    Discharge Plan:  Patient comes from [facility], plan to return once course of IV antibiotics have been completed/can be safely transitioned to p o  Antibiotics, and blood cultures are negative  Anticipate 48 hours prior to discharge  Code Status: Level 1 - Full Code      Subjective:   Patient preoccupied with "I am dying" and difficult to redirect  She denies pain at rest, difficulty urinating or having BM  Nursing reported patient refused medications this morning and once either cola or orange juice, not pleased with clear liquid diet      Objective:     Vitals:   Temp (24hrs), Av 1 °F (37 3 °C), Min:97 4 °F (36 3 °C), Max:100 3 °F (37 9 °C)    Temp:  [97 4 °F (36 3 °C)-100 3 °F (37 9 °C)] 97 4 °F (36 3 °C)  HR:  [86-98] 90  Resp:  [16-20] 16  BP: (102-143)/(52-74) 143/56  SpO2:  [94 %-97 %] 96 %  Body mass index is 25 39 kg/m²  Input and Output Summary (last 24 hours): Intake/Output Summary (Last 24 hours) at 9/4/2019 0948  Last data filed at 9/4/2019 0930  Gross per 24 hour   Intake 2198 33 ml   Output 0 ml   Net 2198 33 ml       Physical Exam:     Physical Exam   Constitutional: She appears well-developed and well-nourished  No distress  HENT:   Head: Normocephalic and atraumatic  Eyes: Conjunctivae are normal  No scleral icterus  Cardiovascular: Normal rate and regular rhythm  No murmur heard  Pulmonary/Chest: Effort normal and breath sounds normal  No respiratory distress  She has no wheezes  She has no rales  Abdominal: Soft  She exhibits no distension  There is tenderness  Musculoskeletal: She exhibits no edema  Neurological: She is alert  Skin: Skin is warm and dry  No erythema  Psychiatric: Her mood appears anxious  A&O x2 (self and location)   Nursing note and vitals reviewed        Additional Data:     Labs:    Results from last 7 days   Lab Units 09/04/19  0507   WBC Thousand/uL 13 68*   HEMOGLOBIN g/dL 9 0*   HEMATOCRIT % 28 2*   PLATELETS Thousands/uL 156   NEUTROS PCT % 79*   LYMPHS PCT % 14   MONOS PCT % 6   EOS PCT % 0     Results from last 7 days   Lab Units 09/04/19  0507 09/03/19  0447   SODIUM mmol/L 140 137   POTASSIUM mmol/L 3 5 3 7   CHLORIDE mmol/L 110* 107   CO2 mmol/L 22 20*   BUN mg/dL 17 16   CREATININE mg/dL 0 97 1 26   ANION GAP mmol/L 8 10   CALCIUM mg/dL 8 2* 8 3   ALBUMIN g/dL  --  2 8*   TOTAL BILIRUBIN mg/dL  --  0 53   ALK PHOS U/L  --  103   ALT U/L  --  20   AST U/L  --  41   GLUCOSE RANDOM mg/dL 93 117                 Results from last 7 days   Lab Units 09/04/19  0507 09/03/19  0447 09/02/19  1453 09/02/19  1243   LACTIC ACID mmol/L  --   --  2 0 3 7*   PROCALCITONIN ng/ml 3 40* 4 46*  --   --            * I Have Reviewed All Lab Data Listed Above  * Additional Pertinent Lab Tests Reviewed: All Labs Within Last 24 Hours Reviewed    Imaging:    Imaging Reports Reviewed Today Include:   · CT Abdomen/Pelvis 9/2:   · Segment of asymmetric moderate colonic wall thickening with mild pericolonic inflammatory stranding involving the mid sigmoid colon with multiple associated diverticula most compatible with acute diverticulitis  There is no evidence of perforation  · Mild to moderate stool noted within the rectum  No large or small bowel obstruction  · CXR 9/2:  No acute cardiopulmonary disease  Imaging Personally Reviewed by Myself Includes:  None    Recent Cultures (last 7 days):     Results from last 7 days   Lab Units 09/02/19  1330 09/02/19  1303 09/02/19  1242   BLOOD CULTURE   --  No Growth at 24 hrs  No Growth at 24 hrs     URINE CULTURE  >100,000 cfu/ml Escherichia coli*  --   --        Last 24 Hours Medication List:     Current Facility-Administered Medications:  albuterol 2 5 mg Nebulization Q4H PRN Hetul Ortega, DO    ascorbic acid 500 mg Oral Daily Hetul Ortega, DO    cefazolin 1,000 mg Intravenous Q8H Hetul Ortega, DO Last Rate: Stopped (09/04/19 0930)   donepezil 5 mg Oral HS Hetul Ortega, DO    DULoxetine 60 mg Oral Daily Hetul Ortega, DO    estradiol 1 g Vaginal Daily Hetul Ortega, DO    fluticasone 1 spray Nasal Daily Hetul Ortega, DO    guaiFENesin 600 mg Oral BID PRN Hetul Ortega, DO    heparin (porcine) 5,000 Units Subcutaneous Q8H [address] Hetul Ortega, DO    levothyroxine 100 mcg Oral Early Morning Hetul Ortega, DO    LORazepam 0 5 mg Oral Q8H PRN Hetul Ortega, DO    losartan 75 mg Oral Daily Hetul Ortega, DO    magnesium hydroxide 30 mL Oral Daily PRN Hetul Ortega, DO    metoprolol tartrate 25 mg Oral BID Hetul Ortega, DO    metroNIDAZOLE 500 mg Intravenous Q8H Hetul Ortega, DO Last Rate: 500 mg (09/04/19 0930)   NIFEdipine 60 mg Oral Daily Hetul Ortega, DO    nystatin  Topical BID Hetul Cummingtondejah Fong, DO    ondansetron 4 mg Intravenous Q6H PRN Hetul Ortega, DO    pantoprazole 40 mg Oral BID Hetul Ortega, DO    pravastatin 40 mg Oral Daily Hetul Ortega, DO    QUEtiapine 12 5 mg Oral BID Hetul Ortega, DO    sodium chloride 2 spray Each Nare BID Hetul Ortega, DO    sodium chloride 50 mL/hr Intravenous Continuous Hetul Ortega, DO Last Rate: 50 mL/hr (09/04/19 0517)        Today, Patient Was Seen By: Bertha Fererr PA-C    ** Please Note: Dictation voice to text software may have been used in the creation of this document   **

## 2019-09-04 NOTE — PLAN OF CARE
Problem: Potential for Falls  Goal: Patient will remain free of falls  Description  INTERVENTIONS:  - Assess patient frequently for physical needs  -  Identify cognitive and physical deficits and behaviors that affect risk of falls  -  [place] fall precautions as indicated by assessment   - Educate patient/family on patient safety including physical limitations  - Instruct patient to call for assistance with activity based on assessment  - Modify environment to reduce risk of injury  - Consider OT/PT consult to assist with strengthening/mobility  Outcome: Not Progressing     Problem: Prexisting or High Potential for Compromised Skin Integrity  Goal: Skin integrity is maintained or improved  Description  INTERVENTIONS:  - Identify patients at risk for skin breakdown  - Assess and monitor skin integrity  - Assess and monitor nutrition and hydration status  - Monitor labs   - Assess for incontinence   - Turn and reposition patient  - Assist with mobility/ambulation  - Relieve pressure over bony prominences  - Avoid friction and shearing  - Provide appropriate hygiene as needed including keeping skin clean and dry  - Evaluate need for skin moisturizer/barrier cream  - Collaborate with interdisciplinary team   - Patient/family teaching  - Consider wound care consult   Outcome: Not Progressing     Problem: Nutrition/Hydration-ADULT  Goal: Nutrient/Hydration intake appropriate for improving, restoring or maintaining nutritional needs  Description  Monitor and assess patient's nutrition/hydration status for malnutrition  Collaborate with interdisciplinary team and initiate plan and interventions as ordered  Monitor patient's weight and dietary intake as ordered or per policy  Utilize nutrition screening tool and intervene as necessary  Determine patient's food preferences and provide high-protein, high-caloric foods as appropriate       INTERVENTIONS:  - Monitor oral intake, urinary output, labs, and treatment plans  - Assess nutrition and hydration status and recommend course of action  - Evaluate amount of meals eaten  - Assist patient with eating if necessary   - Allow adequate time for meals  - Recommend/ encourage appropriate diets, oral nutritional supplements, and vitamin/mineral supplements  - Order, calculate, and assess calorie counts as needed  - Recommend, monitor, and adjust tube feedings and TPN/PPN based on assessed needs  - Assess need for intravenous fluids  - Provide specific nutrition/hydration education as appropriate  - Include patient/family/caregiver in decisions related to nutrition  Outcome: Not Progressing     Problem: GASTROINTESTINAL - ADULT  Goal: Minimal or absence of nausea and/or vomiting  Description  INTERVENTIONS:  - Administer IV fluids if ordered to ensure adequate hydration  - Maintain NPO status until nausea and vomiting are resolved  - Nasogastric tube if ordered  - Administer ordered antiemetic medications as needed  - Provide nonpharmacologic comfort measures as appropriate  - Advance diet as tolerated, if ordered  - Consider nutrition services referral to assist patient with adequate nutrition and appropriate food choices  Outcome: Not Progressing  Goal: Maintains or returns to baseline bowel function  Description  INTERVENTIONS:  - Assess bowel function  - Encourage oral fluids to ensure adequate hydration  - Administer IV fluids if ordered to ensure adequate hydration  - Administer ordered medications as needed  - Encourage mobilization and activity  - Consider nutritional services referral to assist patient with adequate nutrition and appropriate food choices  Outcome: Not Progressing  Goal: Maintains adequate nutritional intake  Description  INTERVENTIONS:  - Monitor percentage of each meal consumed  - Identify factors contributing to decreased intake, treat as appropriate  - Assist with meals as needed  - Monitor I&O, weight, and lab values if indicated  - Obtain nutrition services referral as needed  Outcome: Not Progressing     Problem: PAIN - ADULT  Goal: Verbalizes/displays adequate comfort level or baseline comfort level  Description  Interventions:  - Encourage patient to monitor pain and request assistance  - Assess pain using appropriate pain scale  - Administer analgesics based on type and severity of pain and evaluate response  - Implement non-pharmacological measures as appropriate and evaluate response  - Consider cultural and social influences on pain and pain management  - Notify physician/advanced practitioner if interventions unsuccessful or patient reports new pain  Outcome: Not Progressing     Problem: INFECTION - ADULT  Goal: Absence or prevention of progression during hospitalization  Description  INTERVENTIONS:  - Assess and monitor for signs and symptoms of infection  - Monitor lab/diagnostic results  - Monitor all insertion sites, i e  indwelling lines, tubes, and drains  - Monitor endotracheal if appropriate and nasal secretions for changes in amount and color  - [place] appropriate cooling/warming therapies per order  - Administer medications as ordered  - Instruct and encourage patient and family to use good hand hygiene technique  - Identify and instruct in appropriate isolation precautions for identified infection/condition  Outcome: Not Progressing  Goal: Absence of fever/infection during neutropenic period  Description  INTERVENTIONS:  - Monitor WBC    Outcome: Not Progressing     Problem: SAFETY ADULT  Goal: Maintain or return to baseline ADL function  Description  INTERVENTIONS:  -  Assess patient's ability to carry out ADLs; assess patient's baseline for ADL function and identify physical deficits which impact ability to perform ADLs (bathing, care of mouth/teeth, toileting, grooming, dressing, etc )  - Assess/evaluate cause of self-care deficits   - Assess range of motion  - Assess patient's mobility; develop plan if impaired  - Assess patient's need for assistive devices and provide as appropriate  - Encourage maximum independence but intervene and supervise when necessary  - Involve family in performance of ADLs  - Assess for home care needs following discharge   - Consider OT consult to assist with ADL evaluation and planning for discharge  - Provide patient education as appropriate  Outcome: Not Progressing  Goal: Maintain or return mobility status to optimal level  Description  INTERVENTIONS:  - Assess patient's baseline mobility status (ambulation, transfers, stairs, etc )    - Identify cognitive and physical deficits and behaviors that affect mobility  - Identify mobility aids required to assist with transfers and/or ambulation (gait belt, sit-to-stand, lift, walker, cane, etc )  - [place] fall precautions as indicated by assessment  - Record patient progress and toleration of activity level on Mobility SBAR; progress patient to next Phase/Stage  - Instruct patient to call for assistance with activity based on assessment  - Consider rehabilitation consult to assist with strengthening/weightbearing, etc   Outcome: Not Progressing     Problem: DISCHARGE PLANNING  Goal: Discharge to home or other facility with appropriate resources  Description  INTERVENTIONS:  - Identify barriers to discharge w/patient and caregiver  - Arrange for needed discharge resources and transportation as appropriate  - Identify discharge learning needs (meds, wound care, etc )  - Arrange for interpretive services to assist at discharge as needed  - Refer to Case Management Department for coordinating discharge planning if the patient needs post-hospital services based on physician/advanced practitioner order or complex needs related to functional status, cognitive ability, or social support system  Outcome: Not Progressing     Problem: Knowledge Deficit  Goal: Patient/family/caregiver demonstrates understanding of disease process, treatment plan, medications, and discharge instructions  Description  Complete learning assessment and assess knowledge base    Interventions:  - Provide teaching at level of understanding  - Provide teaching via preferred learning methods  Outcome: Not Progressing

## 2019-09-04 NOTE — ASSESSMENT & PLAN NOTE
Malnutrition Findings:     BMI Findings: Body mass index is 25 39 kg/m²  · Nutrition consult  · Encourage PO intake  · Nutritional supplements    · Patient dislikes clear liquid diet, advancement per GI

## 2019-09-05 LAB
ANION GAP SERPL CALCULATED.3IONS-SCNC: 8 MMOL/L (ref 4–13)
BASOPHILS # BLD AUTO: 0.01 THOUSANDS/ΜL (ref 0–0.1)
BASOPHILS NFR BLD AUTO: 0 % (ref 0–1)
BUN SERPL-MCNC: 10 MG/DL (ref 5–25)
CALCIUM SERPL-MCNC: 8.4 MG/DL (ref 8.3–10.1)
CHLORIDE SERPL-SCNC: 110 MMOL/L (ref 100–108)
CO2 SERPL-SCNC: 22 MMOL/L (ref 21–32)
CREAT SERPL-MCNC: 0.77 MG/DL (ref 0.6–1.3)
EOSINOPHIL # BLD AUTO: 0.08 THOUSAND/ΜL (ref 0–0.61)
EOSINOPHIL NFR BLD AUTO: 1 % (ref 0–6)
ERYTHROCYTE [DISTWIDTH] IN BLOOD BY AUTOMATED COUNT: 15.2 % (ref 11.6–15.1)
GFR SERPL CREATININE-BSD FRML MDRD: 69 ML/MIN/1.73SQ M
GLUCOSE SERPL-MCNC: 89 MG/DL (ref 65–140)
HCT VFR BLD AUTO: 29.6 % (ref 34.8–46.1)
HGB BLD-MCNC: 9.5 G/DL (ref 11.5–15.4)
IMM GRANULOCYTES # BLD AUTO: 0.05 THOUSAND/UL (ref 0–0.2)
IMM GRANULOCYTES NFR BLD AUTO: 1 % (ref 0–2)
LYMPHOCYTES # BLD AUTO: 1.18 THOUSANDS/ΜL (ref 0.6–4.47)
LYMPHOCYTES NFR BLD AUTO: 13 % (ref 14–44)
MCH RBC QN AUTO: 29.6 PG (ref 26.8–34.3)
MCHC RBC AUTO-ENTMCNC: 32.1 G/DL (ref 31.4–37.4)
MCV RBC AUTO: 92 FL (ref 82–98)
MONOCYTES # BLD AUTO: 0.55 THOUSAND/ΜL (ref 0.17–1.22)
MONOCYTES NFR BLD AUTO: 6 % (ref 4–12)
NEUTROPHILS # BLD AUTO: 6.97 THOUSANDS/ΜL (ref 1.85–7.62)
NEUTS SEG NFR BLD AUTO: 79 % (ref 43–75)
NRBC BLD AUTO-RTO: 0 /100 WBCS
PLATELET # BLD AUTO: 158 THOUSANDS/UL (ref 149–390)
PMV BLD AUTO: 10.7 FL (ref 8.9–12.7)
POTASSIUM SERPL-SCNC: 3.6 MMOL/L (ref 3.5–5.3)
PROCALCITONIN SERPL-MCNC: 1.81 NG/ML
RBC # BLD AUTO: 3.21 MILLION/UL (ref 3.81–5.12)
SODIUM SERPL-SCNC: 140 MMOL/L (ref 136–145)
WBC # BLD AUTO: 8.84 THOUSAND/UL (ref 4.31–10.16)

## 2019-09-05 PROCEDURE — 80048 BASIC METABOLIC PNL TOTAL CA: CPT | Performed by: PHYSICIAN ASSISTANT

## 2019-09-05 PROCEDURE — 97530 THERAPEUTIC ACTIVITIES: CPT

## 2019-09-05 PROCEDURE — 84145 PROCALCITONIN (PCT): CPT | Performed by: PHYSICIAN ASSISTANT

## 2019-09-05 PROCEDURE — 85025 COMPLETE CBC W/AUTO DIFF WBC: CPT | Performed by: PHYSICIAN ASSISTANT

## 2019-09-05 PROCEDURE — 97116 GAIT TRAINING THERAPY: CPT

## 2019-09-05 PROCEDURE — 99232 SBSQ HOSP IP/OBS MODERATE 35: CPT | Performed by: PHYSICIAN ASSISTANT

## 2019-09-05 RX ADMIN — METRONIDAZOLE 500 MG: 500 INJECTION, SOLUTION INTRAVENOUS at 18:09

## 2019-09-05 RX ADMIN — HEPARIN SODIUM 5000 UNITS: 5000 INJECTION INTRAVENOUS; SUBCUTANEOUS at 15:29

## 2019-09-05 RX ADMIN — LEVOTHYROXINE SODIUM 100 MCG: 100 TABLET ORAL at 06:10

## 2019-09-05 RX ADMIN — CEFAZOLIN SODIUM 1000 MG: 1 SOLUTION INTRAVENOUS at 00:26

## 2019-09-05 RX ADMIN — METRONIDAZOLE 500 MG: 500 INJECTION, SOLUTION INTRAVENOUS at 00:27

## 2019-09-05 RX ADMIN — CEFAZOLIN SODIUM 1000 MG: 1 SOLUTION INTRAVENOUS at 16:26

## 2019-09-05 RX ADMIN — NYSTATIN: 100000 POWDER TOPICAL at 18:10

## 2019-09-05 RX ADMIN — METRONIDAZOLE 500 MG: 500 INJECTION, SOLUTION INTRAVENOUS at 08:04

## 2019-09-05 RX ADMIN — HEPARIN SODIUM 5000 UNITS: 5000 INJECTION INTRAVENOUS; SUBCUTANEOUS at 06:11

## 2019-09-05 RX ADMIN — CEFAZOLIN SODIUM 1000 MG: 1 SOLUTION INTRAVENOUS at 09:00

## 2019-09-05 RX ADMIN — LORAZEPAM 0.5 MG: 0.5 TABLET ORAL at 21:10

## 2019-09-05 RX ADMIN — CEFAZOLIN SODIUM 1000 MG: 1 SOLUTION INTRAVENOUS at 23:47

## 2019-09-05 RX ADMIN — DONEPEZIL HYDROCHLORIDE 5 MG: 5 TABLET ORAL at 21:10

## 2019-09-05 RX ADMIN — HEPARIN SODIUM 5000 UNITS: 5000 INJECTION INTRAVENOUS; SUBCUTANEOUS at 21:09

## 2019-09-05 RX ADMIN — SODIUM CHLORIDE 50 ML/HR: 0.9 INJECTION, SOLUTION INTRAVENOUS at 01:48

## 2019-09-05 RX ADMIN — NYSTATIN: 100000 POWDER TOPICAL at 08:04

## 2019-09-05 NOTE — PLAN OF CARE
Problem: PHYSICAL THERAPY ADULT  Goal: Performs mobility at highest level of function for planned discharge setting  See evaluation for individualized goals  Description  Treatment/Interventions: Functional transfer training, LE strengthening/ROM, Therapeutic exercise, Endurance training, Cognitive reorientation, Bed mobility, Gait training, Spoke to nursing, Spoke to case management, OT          See flowsheet documentation for full assessment, interventions and recommendations  Outcome: Progressing  Note:   Prognosis: Fair  Problem List: Decreased strength, Decreased endurance, Impaired balance, Decreased mobility, Decreased coordination, Decreased cognition, Impaired judgement, Decreased safety awareness, Pain, Impaired vision  Assessment: Pt  able to tolerate PT session well  Performed BM with VCs and S  Pt  dependent in perihygeieb in supine  Supine to sit with HOB flat and Min A with cues  STS and back transfers with min A and impusive while stand to sit transfer  Pt  able to ambulate and gait noted to be unsteady with short stride and gave min-Mod A  2nd A for lines  Pt  seated on chair with alarm engaged at the end of session  Pt  c/o abdominal pain while donning brife on her  Reported the same to RN  Will continue to follow during rodrigo stay to maximize fun ctional mobility  Recommendation: Other (Comment)(Rehab vs home PT pending progress & increased A avail  @[facility])     PT - OK to Discharge: Yes    See flowsheet documentation for full assessment

## 2019-09-05 NOTE — SOCIAL WORK
Cm reviewed patient during care coordination rounds with Evaristo Mancini  Patient not stable for discharge today but anticipated for possible discharge tomorrow pending GI clearance  Patient to return to [facility]  Transport arranged through [employer] for [employer]  Cm informed patient's dtr Kyleigh Orozco who is aware and agreeable to [facility] cost for [facility] AUSTIN Shearer, Adele Toussaint with [facility], and Damion with [facility]  All aware and agreeable to discharge plan pending GI  Cm following

## 2019-09-05 NOTE — ASSESSMENT & PLAN NOTE
· CT abdomen pelvis:  Segment of asymmetric moderate colonic wall thickening with mild pericolonic inflammatory stranding involving the mid sigmoid colon with multiple associated diverticula most compatible with acute diverticulitis  No evidence of perforation  Mild to moderate stool noted in the rectum  No evidence of bowel obstruction    · Continue with IV cefazolin and Flagyl per GI

## 2019-09-05 NOTE — ASSESSMENT & PLAN NOTE
· POA likely secondary to acute infection and mild dehydration  ARB may be contributing  OK to continue ARB for now given improving creatinine with IVFs  · Baseline creatinine:  0 8  · Creatinine on admission:  1 41  · Creatinine today:  0 77  · Continue with IV fluids for now  Monitor volume status closely  · Avoid hypotension and nephrotoxic agents

## 2019-09-05 NOTE — PHYSICAL THERAPY NOTE
Physical Therapy Treatment Note     09/05/19 1013   Pain Assessment   Pain Assessment FLACC   Pain Type Acute pain   Pain Location Abdomen   Pain Descriptors Patient unable to describe   Restrictions/Precautions   Weight Bearing Precautions Per Order No   Other Precautions Cognitive; Chair Alarm; Bed Alarm;Multiple lines;Telemetry; Fall Risk;Pain;Visual impairment;Contact/isolation   General   Chart Reviewed Yes   Cognition   Overall Cognitive Status Impaired   Subjective   Subjective Pt  in bed upon entry  Agreeable to work with PT  Pt  kept asking "am i going to die? I wish I was dead " Pt  pleasant and cooperative t/o session  Pt  did not report any pain however yelled when touch her abdomen while donning the brief on  Pt  noted to have had BM upon entry  Bed Mobility   Rolling R 5  Supervision   Additional items Bedrails;Assist x 1; Increased time required;Verbal cues   Rolling L 5  Supervision   Additional items Assist x 1;Bedrails; Increased time required;Verbal cues   Supine to Sit 4  Minimal assistance   Additional items Assist x 1; Increased time required;Verbal cues; Bedrails   Transfers   Sit to Stand 4  Minimal assistance   Additional items Assist x 1; Increased time required;Verbal cues;Armrests   Stand to Sit 4  Minimal assistance   Additional items Assist x 1; Impulsive;Armrests; Verbal cues   Stand pivot 4  Minimal assistance   Additional items Assist x 1; Increased time required;Verbal cues   Ambulation/Elevation   Gait pattern Foward flexed; Inconsistent [place];Decreased foot clearance; Excessively slow; Short stride   Gait Assistance 4  Minimal assist   Additional items Assist x 1;Assist x 2;Verbal cues  (2nd A for lines)   Assistive Device Rolling walker   Distance 20ft x 2   Balance   Static Sitting Fair   Static Standing Fair -  (with RW)   Ambulatory Poor +  (with RW and Min A )   Activity Tolerance   Activity Tolerance Patient tolerated treatment well   Nurse Made Aware yes   Assessment   Prognosis Fair Problem List Decreased strength;Decreased endurance; Impaired balance;Decreased mobility; Decreased coordination;Decreased cognition; Impaired judgement;Decreased safety awareness;Pain; Impaired vision   Assessment Pt  able to tolerate PT session well  Performed BM with VCs and S  Pt  dependent in perihygeieb in supine  Supine to sit with HOB flat and Min A with cues  STS and back transfers with min A and impusive while stand to sit transfer  Pt  able to ambulate and gait noted to be unsteady with short stride and gave min-Mod A  2nd A for lines  Pt  seated on chair with alarm engaged at the end of session  Pt  c/o abdominal pain while donning brife on her  Reported the same to RN  Will continue to follow during rodrigo stay to maximize fun ctional mobility  Goals   Patient Goals None reported   STG Expiration Date 09/13/19   Treatment Day 2   Plan   Treatment/Interventions Functional transfer training;Cognitive reorientation;Patient/family training;Equipment eval/education; Bed mobility;Gait training;Spoke to nursing   Progress Progressing toward goals   PT Frequency Other (Comment)  (3-5x/week)   Recommendation   Recommendation Other (Comment)  (Rehab vs home PT pending progress & increased A avail   @assisted)   Equipment Recommended [facility] [place]   PT - OK to Discharge Yes         Maria E Downs, PTA

## 2019-09-05 NOTE — ASSESSMENT & PLAN NOTE
· POA as evidenced by leukocytosis, fever, lactic acidosis (3 7), DEVONTE in patient with acute infections (UTI, diverticulitis)  · Procalcitonin is elevated:  4 46  Continue to trend until normalized, 1 8 this morning  · Continue IV cefazolin and Flagyl  · Blood cultures:  Negative at 48 hours  · Urine culture:  Positive for E coli  · Continue to trend fever curve and WBCs  · Afebrile, WBC wnl today   · Lactic acidosis has resolved (2 0)

## 2019-09-05 NOTE — PROGRESS NOTES
[address] [facility]  Progress Note - Noemy Macario 4/27/1931, 80 y o  female MRN: [id number]  Unit/Bed#: [facility] 808-01 Encounter: [id number]  Primary Care Provider: Seth Peacock DO   Date and time admitted to hospital: 9/2/2019 11:49 AM    * Sepsis ([address] )  Assessment & Plan  · POA as evidenced by leukocytosis, fever, lactic acidosis (3 7), DEVONTE in patient with acute infections (UTI, diverticulitis)  · Procalcitonin is elevated:  4 46  Continue to trend until normalized, 1 8 this morning  · Continue IV cefazolin and Flagyl  · Blood cultures:  Negative at 48 hours  · Urine culture:  Positive for E coli  · Continue to trend fever curve and WBCs  · Lactic acidosis has resolved (2 0)  At high risk for aspiration  Assessment & Plan  · Aspiration precautions  Diverticulitis  Assessment & Plan  · CT abdomen pelvis:  Segment of asymmetric moderate colonic wall thickening with mild pericolonic inflammatory stranding involving the mid sigmoid colon with multiple associated diverticula most compatible with acute diverticulitis  No evidence of perforation  Mild to moderate stool noted in the rectum  No evidence of bowel obstruction  · Continue with IV cefazolin and Flagyl per GI    Severe protein-calorie malnutrition ([address] )  Assessment & Plan  Malnutrition Findings:     BMI Findings: Body mass index is 25 39 kg/m²  · Nutrition consult  · Encourage PO intake  · Nutritional supplements  · Patient dislikes clear liquid diet, advancement per GI    DEVONTE (acute kidney injury) ([facility] [address] )  Assessment & Plan  · POA likely secondary to acute infection and mild dehydration  ARB may be contributing  OK to continue ARB for now given improving creatinine with IVFs  · Baseline creatinine:  0 8  · Creatinine on admission:  1 41  · Creatinine today:  0 77  · Continue with IV fluids for now  Monitor volume status closely  · Avoid hypotension and nephrotoxic agents      Acute cystitis without hematuria  Assessment & Plan  · Continue antibiotics  · Urine culture positive for E coli    Acquired hypothyroidism  Assessment & Plan  · Continue Levothyroxine    Late onset Alzheimer's disease with behavioral disturbance  Assessment & Plan  · Continue with Aricept, Cymbalta and Seroquel  · Preoccupied, repeats "I am dying"    Essential hypertension  Assessment & Plan  · Stable on losartan, metoprolol and Procardia    CVA (cerebral vascular accident) ([facility] [address] )  Assessment & Plan  · Continue monitor    GERD (gastroesophageal reflux disease)  Assessment & Plan  · PPI      VTE Pharmacologic Prophylaxis:   Pharmacologic: Heparin  Mechanical VTE Prophylaxis in Place: Yes    Patient Centered Rounds: I have performed bedside rounds with nursing staff today  Discussions with Specialists or Other Care Team Provider: None    Education and Discussions with Family / Patient: Discussed care plan with patient at bedside, answered all questions to the best of my ability  Discussed care plan with patient's daughter and patient's  also  Both her comfortable with discharge pending advancement of diet per with GI likely tomorrow  Patient's daughter reports patient will only accept p o  Medication if given Pepsi or apple juice and orange juice mixed together  Hopefully with this recommendation we will to encourage patient to continue antibiotics in p o  Form outpatient  Time Spent for Care: 30 minutes  More than 50% of total time spent on counseling and coordination of care as described above  Current Length of Stay: 3 day(s)    Current Patient Status: Inpatient   Certification Statement: The patient will continue to require additional inpatient hospital stay due to IV antibiotics, pending advancement of diet    Discharge Plan:  Discharge to [facility], anticipate tomorrow pending toleration of diet and agree ability to take p o  Medication, as well as GI clearance      Code Status: Level 1 - Full Code      Subjective: Patient somewhat more interactive today    Objective:     Vitals:   Temp (24hrs), Av 6 °F (37 °C), Min:98 5 °F (36 9 °C), Max:98 6 °F (37 °C)    Temp:  [98 5 °F (36 9 °C)-98 6 °F (37 °C)] 98 5 °F (36 9 °C)  HR:  [] 87  Resp:  [18] 18  BP: (145-156)/(71-74) 145/73  SpO2:  [96 %] 96 %  Body mass index is 25 39 kg/m²  Input and Output Summary (last 24 hours): Intake/Output Summary (Last 24 hours) at 2019 0921  Last data filed at 2019 0617  Gross per 24 hour   Intake 1835 84 ml   Output 54 ml   Net 1781 84 ml       Physical Exam:     Physical Exam   Constitutional: She appears well-developed and well-nourished  No distress  HENT:   Head: Normocephalic and atraumatic  Eyes: Conjunctivae are normal  No scleral icterus  Cardiovascular: Normal rate and regular rhythm  Murmur heard  Pulmonary/Chest: Effort normal and breath sounds normal  No respiratory distress  She has no wheezes  She has no rales  Abdominal: Soft  She exhibits no distension  There is tenderness in the left lower quadrant  Musculoskeletal: She exhibits no edema  Neurological: She is alert  R eye droop at baseline   Skin: Skin is warm and dry  No erythema  Psychiatric: She has a normal mood and affect  Nursing note and vitals reviewed        Additional Data:     Labs:    Results from last 7 days   Lab Units 19  0516   WBC Thousand/uL 8 84   HEMOGLOBIN g/dL 9 5*   HEMATOCRIT % 29 6*   PLATELETS Thousands/uL 158   NEUTROS PCT % 79*   LYMPHS PCT % 13*   MONOS PCT % 6   EOS PCT % 1     Results from last 7 days   Lab Units 19  0516  19  0447   SODIUM mmol/L 140   < > 137   POTASSIUM mmol/L 3 6   < > 3 7   CHLORIDE mmol/L 110*   < > 107   CO2 mmol/L 22   < > 20*   BUN mg/dL 10   < > 16   CREATININE mg/dL 0 77   < > 1 26   ANION GAP mmol/L 8   < > 10   CALCIUM mg/dL 8 4   < > 8 3   ALBUMIN g/dL  --   --  2 8*   TOTAL BILIRUBIN mg/dL  --   --  0 53   ALK PHOS U/L  --   --  103   ALT U/L  --   -- 20   AST U/L  --   --  41   GLUCOSE RANDOM mg/dL 89   < > 117    < > = values in this interval not displayed  Results from last 7 days   Lab Units 09/05/19  0516 09/04/19  0507 09/03/19  0447 09/02/19  1453 09/02/19  1243   LACTIC ACID mmol/L  --   --   --  2 0 3 7*   PROCALCITONIN ng/ml 1 81* 3 40* 4 46*  --   --            * I Have Reviewed All Lab Data Listed Above  * Additional Pertinent Lab Tests Reviewed: All Labs Within Last 24 Hours Reviewed    Imaging:    Imaging Reports Reviewed Today Include: None  Imaging Personally Reviewed by Myself Includes:  None    Recent Cultures (last 7 days):     Results from last 7 days   Lab Units 09/02/19  1330 09/02/19  1303 09/02/19  1242   BLOOD CULTURE   --  No Growth at 48 hrs  No Growth at 48 hrs     URINE CULTURE  >100,000 cfu/ml Escherichia coli*  --   --        Last 24 Hours Medication List:     Current Facility-Administered Medications:  albuterol 2 5 mg Nebulization Q4H PRN Hetul Ortega, DO    ascorbic acid 500 mg Oral Daily Hetul Ortega, DO    cefazolin 1,000 mg Intravenous Q8H Hetul Ortega, DO Last Rate: Stopped (09/05/19 0056)   donepezil 5 mg Oral HS Hetul Ortega, DO    DULoxetine 60 mg Oral Daily Hetul Ortega, DO    estradiol 1 g Vaginal Daily Hetul Ortega, DO    fluticasone 1 spray Nasal Daily Hetul Ortega, DO    guaiFENesin 600 mg Oral BID PRN Hetul Ortega, DO    heparin (porcine) 5,000 Units Subcutaneous Q8H [address] Hetul Ortega, DO    levothyroxine 100 mcg Oral Early Morning Hetul Ortega, DO    LORazepam 0 5 mg Oral Q8H PRN Hetul Ortega, DO    losartan 75 mg Oral Daily Hetul Ortega, DO    magnesium hydroxide 30 mL Oral Daily PRN Hetul Ortega, DO    metoprolol tartrate 25 mg Oral BID Hetul Ortega, DO    metroNIDAZOLE 500 mg Intravenous Q8H Hetul Ortega, DO Last Rate: 500 mg (09/05/19 0804)   NIFEdipine 60 mg Oral Daily Hetul Ortega, DO    nystatin  Topical BID Hetul Ortega, DO    ondansetron 4 mg Intravenous Q6H PRN Hetul Ortega, DO    pantoprazole 40 mg Oral BID Hetul Ortega, DO    pravastatin 40 mg Oral Daily Hetul Ortega, DO    QUEtiapine 12 5 mg Oral BID Hetul Ortega, DO    sodium chloride 2 spray Each Nare BID Hetul Ortega, DO    sodium chloride 50 mL/hr Intravenous Continuous Hetul Ortega, DO Last Rate: 50 mL/hr (09/05/19 0148)        Today, Patient Was Seen By: Gutierrez Chung PA-C    ** Please Note: Dictation voice to text software may have been used in the creation of this document   **

## 2019-09-06 VITALS
RESPIRATION RATE: 16 BRPM | HEIGHT: 60 IN | DIASTOLIC BLOOD PRESSURE: 78 MMHG | BODY MASS INDEX: 25.52 KG/M2 | HEART RATE: 87 BPM | OXYGEN SATURATION: 98 % | WEIGHT: 130 LBS | SYSTOLIC BLOOD PRESSURE: 158 MMHG | TEMPERATURE: 98.3 F

## 2019-09-06 LAB
ANION GAP SERPL CALCULATED.3IONS-SCNC: 9 MMOL/L (ref 4–13)
BASOPHILS # BLD AUTO: 0.01 THOUSANDS/ΜL (ref 0–0.1)
BASOPHILS NFR BLD AUTO: 0 % (ref 0–1)
BUN SERPL-MCNC: 7 MG/DL (ref 5–25)
CALCIUM SERPL-MCNC: 8.2 MG/DL (ref 8.3–10.1)
CHLORIDE SERPL-SCNC: 108 MMOL/L (ref 100–108)
CO2 SERPL-SCNC: 21 MMOL/L (ref 21–32)
CREAT SERPL-MCNC: 0.66 MG/DL (ref 0.6–1.3)
EOSINOPHIL # BLD AUTO: 0.09 THOUSAND/ΜL (ref 0–0.61)
EOSINOPHIL NFR BLD AUTO: 1 % (ref 0–6)
ERYTHROCYTE [DISTWIDTH] IN BLOOD BY AUTOMATED COUNT: 15.2 % (ref 11.6–15.1)
GFR SERPL CREATININE-BSD FRML MDRD: 79 ML/MIN/1.73SQ M
GLUCOSE SERPL-MCNC: 92 MG/DL (ref 65–140)
HCT VFR BLD AUTO: 28.8 % (ref 34.8–46.1)
HGB BLD-MCNC: 9.2 G/DL (ref 11.5–15.4)
IMM GRANULOCYTES # BLD AUTO: 0.03 THOUSAND/UL (ref 0–0.2)
IMM GRANULOCYTES NFR BLD AUTO: 1 % (ref 0–2)
LYMPHOCYTES # BLD AUTO: 0.91 THOUSANDS/ΜL (ref 0.6–4.47)
LYMPHOCYTES NFR BLD AUTO: 15 % (ref 14–44)
MCH RBC QN AUTO: 29.4 PG (ref 26.8–34.3)
MCHC RBC AUTO-ENTMCNC: 31.9 G/DL (ref 31.4–37.4)
MCV RBC AUTO: 92 FL (ref 82–98)
MONOCYTES # BLD AUTO: 0.55 THOUSAND/ΜL (ref 0.17–1.22)
MONOCYTES NFR BLD AUTO: 9 % (ref 4–12)
NEUTROPHILS # BLD AUTO: 4.63 THOUSANDS/ΜL (ref 1.85–7.62)
NEUTS SEG NFR BLD AUTO: 74 % (ref 43–75)
NRBC BLD AUTO-RTO: 0 /100 WBCS
PLATELET # BLD AUTO: 170 THOUSANDS/UL (ref 149–390)
PMV BLD AUTO: 10.5 FL (ref 8.9–12.7)
POTASSIUM SERPL-SCNC: 3.3 MMOL/L (ref 3.5–5.3)
RBC # BLD AUTO: 3.13 MILLION/UL (ref 3.81–5.12)
SODIUM SERPL-SCNC: 138 MMOL/L (ref 136–145)
WBC # BLD AUTO: 6.22 THOUSAND/UL (ref 4.31–10.16)

## 2019-09-06 PROCEDURE — 85025 COMPLETE CBC W/AUTO DIFF WBC: CPT | Performed by: PHYSICIAN ASSISTANT

## 2019-09-06 PROCEDURE — 94760 N-INVAS EAR/PLS OXIMETRY 1: CPT

## 2019-09-06 PROCEDURE — 99239 HOSP IP/OBS DSCHRG MGMT >30: CPT | Performed by: PHYSICIAN ASSISTANT

## 2019-09-06 PROCEDURE — 80048 BASIC METABOLIC PNL TOTAL CA: CPT | Performed by: PHYSICIAN ASSISTANT

## 2019-09-06 RX ORDER — CIPROFLOXACIN 500 MG/1
500 TABLET, FILM COATED ORAL EVERY 12 HOURS SCHEDULED
Qty: 10 TABLET | Refills: 0 | Status: ON HOLD
Start: 2019-09-06 | End: 2019-09-11

## 2019-09-06 RX ORDER — METRONIDAZOLE 500 MG/1
500 TABLET ORAL EVERY 8 HOURS SCHEDULED
Qty: 15 TABLET | Refills: 0
Start: 2019-09-06 | End: 2019-09-16 | Stop reason: HOSPADM

## 2019-09-06 RX ADMIN — DULOXETINE HYDROCHLORIDE 60 MG: 60 CAPSULE, DELAYED RELEASE ORAL at 08:13

## 2019-09-06 RX ADMIN — METOPROLOL TARTRATE 25 MG: 25 TABLET, FILM COATED ORAL at 08:11

## 2019-09-06 RX ADMIN — METRONIDAZOLE 500 MG: 500 INJECTION, SOLUTION INTRAVENOUS at 00:41

## 2019-09-06 RX ADMIN — LOSARTAN POTASSIUM 75 MG: 50 TABLET, FILM COATED ORAL at 08:11

## 2019-09-06 RX ADMIN — Medication 2 SPRAY: at 08:14

## 2019-09-06 RX ADMIN — ESTRADIOL 1 G: 0.1 CREAM VAGINAL at 08:14

## 2019-09-06 RX ADMIN — CEFAZOLIN SODIUM 1000 MG: 1 SOLUTION INTRAVENOUS at 08:13

## 2019-09-06 RX ADMIN — OXYCODONE HYDROCHLORIDE AND ACETAMINOPHEN 500 MG: 500 TABLET ORAL at 08:13

## 2019-09-06 RX ADMIN — QUETIAPINE FUMARATE 12.5 MG: 25 TABLET ORAL at 08:12

## 2019-09-06 RX ADMIN — PANTOPRAZOLE SODIUM 40 MG: 40 TABLET, DELAYED RELEASE ORAL at 08:11

## 2019-09-06 RX ADMIN — FLUTICASONE PROPIONATE 1 SPRAY: 50 SPRAY, METERED NASAL at 08:14

## 2019-09-06 RX ADMIN — METRONIDAZOLE 500 MG: 500 INJECTION, SOLUTION INTRAVENOUS at 09:25

## 2019-09-06 RX ADMIN — HEPARIN SODIUM 5000 UNITS: 5000 INJECTION INTRAVENOUS; SUBCUTANEOUS at 06:01

## 2019-09-06 RX ADMIN — NYSTATIN: 100000 POWDER TOPICAL at 08:14

## 2019-09-06 RX ADMIN — NIFEDIPINE 60 MG: 60 TABLET, FILM COATED, EXTENDED RELEASE ORAL at 08:13

## 2019-09-06 RX ADMIN — PRAVASTATIN SODIUM 40 MG: 40 TABLET ORAL at 08:13

## 2019-09-06 NOTE — ASSESSMENT & PLAN NOTE
· POA as evidenced by leukocytosis, fever, lactic acidosis (3 7), DEVONTE in patient with acute infections (UTI, diverticulitis)  · Procalcitonin is elevated:  4 46   1 8 yesterday  · Continue IV cefazolin and Flagyl  · Blood cultures:  Negative at 72 hours  · Urine culture:  Positive for E coli  · Patient has received 3 days Ancef, completed IV abx therapy for UTI  · Continue to trend fever curve and WBCs  · Afebrile, WBC wnl today   · Lactic acidosis has resolved (2 0)

## 2019-09-06 NOTE — ASSESSMENT & PLAN NOTE
· POA likely secondary to acute infection and mild dehydration  ARB may be contributing  OK to continue ARB for now given improving creatinine with IVFs  · Baseline creatinine:  0 8  · Creatinine on admission:  1 41  · Creatinine today:  0 66  · Continue with IV fluids for now  Monitor volume status closely  · Avoid hypotension and nephrotoxic agents

## 2019-09-06 NOTE — DISCHARGE SUMMARY
[address] [facility]  Discharge- Karla Blakely 4/27/1931, 80 y o  female MRN: [id number]  [facility] Encounter: [id number]  Primary Care Provider: Gregg Chamberlain DO   Date and time admitted to hospital: 9/2/2019 11:49 AM    No new Assessment & Plan notes have been filed under this hospital service since the last note was generated  Service: Hospitalist    Discharging Physician / Practitioner: David Mendoza PA-C  PCP: Gregg Chamberlain DO  Admission Date:   Admission Orders (From admission, onward)     Ordered        09/02/19 1447  Inpatient Admission  Once                   Discharge Date: 09/06/19    Resolved Problems  Date Reviewed: 9/6/2019    None          Consultations During Hospital Stay:  · Gastroenterology    Procedures Performed:   · None    Significant Findings / Test Results:   · CT abdomen pelvis 9/2:   · Segment of asymmetric moderate colonic wall thickening with mild pericolonic inflammatory stranding involving the mid sigmoid colon with multiple associated diverticula most compatible with acute diverticulitis  There is no evidence of perforation  · Mild to moderate stool noted within the rectum  No large or small bowel obstruction  · CXR 9/2: No acute cardiopulmonary disease  · Blood Cultures negative x72 hours  · Urine Culture positive for E Coli       Incidental Findings:   · None    Test Results Pending at Discharge (will require follow up): · None     Outpatient Tests Requested:  · None    Complications:  Patient refused medication multiple days     Reason for Admission: Abdominal pain    Hospital Course:     Karla Blakely is a 80 y o  female patient with past medical history of hypothyroidism, hypertension, CVA, GERD, hyperlipidemia who originally presented to the hospital on 9/2/2019 due to abdominal pain and fever    Patient is oriented to self only and perseverates over the complaint of I am dying " On admission patient was noted to have DEVONTE, for which she received fluids  Creatinine gradually improved  She was also noted to have acute diverticulitis  She was consulted with the initial recommendation for IV hydration, clear liquid diet and antibiotics  Kidney function gradually improved and patient was able to tolerate advancement of diet  Patient did refuse medication frequently throughout hospitalization  After discussion with patient's daughter, patient must take her medication with either Pepsi or orange juice diluted with apple juice  Patient gradually returned to baseline and is appropriate for outpatient continuation of p o  Antibiotics  Will continue antibiotics for acute diverticulitis for additional 5 days at discharge  Patient to resume normal diet  Patient [facility] to [facility]  Please see above list of diagnoses and related plan for additional information  Condition at Discharge: stable     Discharge Day Visit / Exam:     Subjective:  Patient reports she feels well and is thirsty, requesting more p o  Intake  Vitals: Blood Pressure: 158/78 (09/06/19 0729)  Pulse: 87 (09/06/19 0729)  Temperature: 98 3 °F (36 8 °C) (09/06/19 0729)  Temp Source: Oral (09/03/19 0058)  Respirations: 16 (09/06/19 0729)  Height: 5' (152 4 cm) (09/02/19 1606)  Weight - Scale: 59 kg (130 lb) (09/02/19 1606)  SpO2: 98 % (09/06/19 0729)  Exam:   Physical Exam   Constitutional: She appears well-developed and well-nourished  No distress  HENT:   Head: Normocephalic and atraumatic  Eyes: Conjunctivae are normal  No scleral icterus  Cardiovascular: Normal rate and regular rhythm  Murmur heard  Pulmonary/Chest: Effort normal and breath sounds normal  No respiratory distress  She has no wheezes  She has no rales  Abdominal: Soft  She exhibits no distension  There is tenderness in the left lower quadrant  Musculoskeletal: She exhibits no edema  Neurological: She is alert  Skin: Skin is warm and dry  No erythema  Psychiatric: She has a normal mood and affect  Oriented to self only   Nursing note and vitals reviewed  Discussion with Family:  Discussed discharge plan with patient's family yesterday, discussed care plan with patient at bedside today  Discharge instructions/Information to patient and family:   See after visit summary for information provided to patient and family  Provisions for Follow-Up Care:  See after visit summary for information related to follow-up care and any pertinent home health orders  Disposition:     Other [facility] [address] [facility] [address] [facility]:   · Not Applicable to this Patient - Not Applicable to this Patient    Planned Readmission: None     Discharge Statement:  I spent 60 minutes discharging the patient  This time was spent on the day of discharge  I had direct contact with the patient on the day of discharge  Greater than 50% of the total time was spent examining patient, answering all patient questions, arranging and discussing plan of care with patient as well as directly providing post-discharge instructions  Additional time then spent on discharge activities  Discharge Medications:  See after visit summary for reconciled discharge medications provided to patient and family        ** Please Note: This note has been constructed using a voice recognition system **

## 2019-09-06 NOTE — SOCIAL WORK
Cm reviewed patient during care coordination rounds during care coordination rounds with Guanaco Rader  Patient anticipated for [facility] today at 2:30PM through [facility]  All aware and agreeable to discharge plan (informed yesterday and confirmed today)  Cm following

## 2019-09-07 LAB
BACTERIA BLD CULT: NORMAL
BACTERIA BLD CULT: NORMAL

## 2019-09-08 PROBLEM — F01.51 VASCULAR DEMENTIA WITH BEHAVIOR DISTURBANCE (HCC): Status: ACTIVE | Noted: 2019-09-08

## 2019-09-10 ENCOUNTER — TELEPHONE (OUTPATIENT)
Dept: OTHER | Facility: OTHER | Age: 84
End: 2019-09-10

## 2019-09-10 ENCOUNTER — APPOINTMENT (EMERGENCY)
Dept: RADIOLOGY | Facility: HOSPITAL | Age: 84
DRG: 064 | End: 2019-09-10
Payer: MEDICARE

## 2019-09-10 ENCOUNTER — HOSPITAL ENCOUNTER (INPATIENT)
Facility: HOSPITAL | Age: 84
LOS: 6 days | Discharge: NURSING, REHAB OR LONG-TERM CARE | DRG: 064 | End: 2019-09-16
Attending: EMERGENCY MEDICINE | Admitting: INTERNAL MEDICINE
Payer: MEDICARE

## 2019-09-10 DIAGNOSIS — A41.9 SEPSIS, DUE TO UNSPECIFIED ORGANISM: ICD-10-CM

## 2019-09-10 DIAGNOSIS — F02.81 LATE ONSET ALZHEIMER'S DISEASE WITH BEHAVIORAL DISTURBANCE (HCC): ICD-10-CM

## 2019-09-10 DIAGNOSIS — R56.9 SEIZURE (HCC): ICD-10-CM

## 2019-09-10 DIAGNOSIS — G93.41 ACUTE METABOLIC ENCEPHALOPATHY: ICD-10-CM

## 2019-09-10 DIAGNOSIS — N17.9 AKI (ACUTE KIDNEY INJURY) (HCC): ICD-10-CM

## 2019-09-10 DIAGNOSIS — R56.9 SEIZURE-LIKE ACTIVITY (HCC): ICD-10-CM

## 2019-09-10 DIAGNOSIS — I63.412 CEREBROVASCULAR ACCIDENT (CVA) DUE TO EMBOLISM OF LEFT MIDDLE CEREBRAL ARTERY (HCC): Primary | ICD-10-CM

## 2019-09-10 DIAGNOSIS — G30.1 LATE ONSET ALZHEIMER'S DISEASE WITH BEHAVIORAL DISTURBANCE (HCC): ICD-10-CM

## 2019-09-10 LAB
ALBUMIN SERPL BCP-MCNC: 2.6 G/DL (ref 3.5–5)
ALP SERPL-CCNC: 126 U/L (ref 46–116)
ALT SERPL W P-5'-P-CCNC: 7 U/L (ref 12–78)
ANION GAP BLD CALC-SCNC: 20 MMOL/L (ref 4–13)
ANION GAP SERPL CALCULATED.3IONS-SCNC: 13 MMOL/L (ref 4–13)
APTT PPP: 30 SECONDS (ref 23–37)
ARTERIAL PATENCY WRIST A: YES
AST SERPL W P-5'-P-CCNC: 31 U/L (ref 5–45)
ATRIAL RATE: 127 BPM
ATRIAL RATE: 97 BPM
BACTERIA UR QL AUTO: ABNORMAL /HPF
BASE EXCESS BLDA CALC-SCNC: -14 MMOL/L (ref -2–3)
BASE EXCESS BLDA CALC-SCNC: -4.5 MMOL/L
BASOPHILS # BLD MANUAL: 0 THOUSAND/UL (ref 0–0.1)
BASOPHILS NFR MAR MANUAL: 0 % (ref 0–1)
BILIRUB SERPL-MCNC: 0.22 MG/DL (ref 0.2–1)
BILIRUB UR QL STRIP: NEGATIVE
BUN BLD-MCNC: 8 MG/DL (ref 5–25)
BUN SERPL-MCNC: 10 MG/DL (ref 5–25)
BURR CELLS BLD QL SMEAR: PRESENT
CA-I BLD-SCNC: 1.13 MMOL/L (ref 1.12–1.32)
CA-I BLD-SCNC: 1.14 MMOL/L (ref 1.12–1.32)
CALCIUM SERPL-MCNC: 8.2 MG/DL (ref 8.3–10.1)
CHLORIDE BLD-SCNC: 103 MMOL/L (ref 100–108)
CHLORIDE SERPL-SCNC: 105 MMOL/L (ref 100–108)
CK SERPL-CCNC: 38 U/L (ref 26–192)
CLARITY UR: CLEAR
CO2 SERPL-SCNC: 15 MMOL/L (ref 21–32)
COLOR UR: YELLOW
CREAT BLD-MCNC: 1.7 MG/DL (ref 0.6–1.3)
CREAT SERPL-MCNC: 1.88 MG/DL (ref 0.6–1.3)
EOSINOPHIL # BLD MANUAL: 0 THOUSAND/UL (ref 0–0.4)
EOSINOPHIL NFR BLD MANUAL: 0 % (ref 0–6)
ERYTHROCYTE [DISTWIDTH] IN BLOOD BY AUTOMATED COUNT: 15.3 % (ref 11.6–15.1)
GFR SERPL CREATININE-BSD FRML MDRD: 24 ML/MIN/1.73SQ M
GFR SERPL CREATININE-BSD FRML MDRD: 27 ML/MIN/1.73SQ M
GLUCOSE SERPL-MCNC: 191 MG/DL (ref 65–140)
GLUCOSE SERPL-MCNC: 198 MG/DL (ref 65–140)
GLUCOSE SERPL-MCNC: 200 MG/DL (ref 65–140)
GLUCOSE UR STRIP-MCNC: NEGATIVE MG/DL
HCO3 BLDA-SCNC: 15.6 MMOL/L (ref 24–30)
HCO3 BLDA-SCNC: 19.3 MMOL/L (ref 22–28)
HCT VFR BLD AUTO: 32.8 % (ref 34.8–46.1)
HCT VFR BLD CALC: 31 % (ref 34.8–46.1)
HCT VFR BLD CALC: 32 % (ref 34.8–46.1)
HGB BLD-MCNC: 10.1 G/DL (ref 11.5–15.4)
HGB BLDA-MCNC: 10.5 G/DL (ref 11.5–15.4)
HGB BLDA-MCNC: 10.9 G/DL (ref 11.5–15.4)
HGB UR QL STRIP.AUTO: ABNORMAL
HOROWITZ INDEX BLDA+IHG-RTO: 30 MM[HG]
HYALINE CASTS #/AREA URNS LPF: ABNORMAL /LPF
INR PPP: 1.28 (ref 0.84–1.19)
KETONES UR STRIP-MCNC: NEGATIVE MG/DL
LACTATE SERPL-SCNC: 1.5 MMOL/L (ref 0.5–2)
LACTATE SERPL-SCNC: 8.7 MMOL/L (ref 0.5–2)
LEUKOCYTE ESTERASE UR QL STRIP: NEGATIVE
LYMPHOCYTES # BLD AUTO: 20 % (ref 14–44)
LYMPHOCYTES # BLD AUTO: 3.56 THOUSAND/UL (ref 0.6–4.47)
MCH RBC QN AUTO: 29.4 PG (ref 26.8–34.3)
MCHC RBC AUTO-ENTMCNC: 30.8 G/DL (ref 31.4–37.4)
MCV RBC AUTO: 96 FL (ref 82–98)
METAMYELOCYTES NFR BLD MANUAL: 1 % (ref 0–1)
MONOCYTES # BLD AUTO: 0.89 THOUSAND/UL (ref 0–1.22)
MONOCYTES NFR BLD: 5 % (ref 4–12)
NEUTROPHILS # BLD MANUAL: 11.93 THOUSAND/UL (ref 1.85–7.62)
NEUTS SEG NFR BLD AUTO: 67 % (ref 43–75)
NITRITE UR QL STRIP: NEGATIVE
NON-SQ EPI CELLS URNS QL MICRO: ABNORMAL /HPF
NRBC BLD AUTO-RTO: 0 /100 WBCS
O2 CT BLDA-SCNC: 13.2 ML/DL (ref 16–23)
OXYHGB MFR BLDA: 94.5 % (ref 94–97)
P AXIS: 191 DEGREES
P AXIS: 60 DEGREES
PCO2 BLD: 17 MMOL/L (ref 21–32)
PCO2 BLD: 17 MMOL/L (ref 21–32)
PCO2 BLD: 50.3 MM HG (ref 42–50)
PCO2 BLDA: 31.1 MM HG (ref 36–44)
PEEP RESPIRATORY: 5 CM[H2O]
PH BLD: 7.1 [PH] (ref 7.3–7.4)
PH BLDA: 7.41 [PH] (ref 7.35–7.45)
PH UR STRIP.AUTO: 6 [PH]
PLATELET # BLD AUTO: 195 THOUSANDS/UL (ref 149–390)
PLATELET # BLD AUTO: 451 THOUSANDS/UL (ref 149–390)
PLATELET BLD QL SMEAR: ABNORMAL
PMV BLD AUTO: 9.7 FL (ref 8.9–12.7)
PMV BLD AUTO: 9.9 FL (ref 8.9–12.7)
PO2 BLD: 99 MM HG (ref 35–45)
PO2 BLDA: 75.8 MM HG (ref 75–129)
POIKILOCYTOSIS BLD QL SMEAR: PRESENT
POLYCHROMASIA BLD QL SMEAR: PRESENT
POTASSIUM BLD-SCNC: 4.1 MMOL/L (ref 3.5–5.3)
POTASSIUM BLD-SCNC: 4.1 MMOL/L (ref 3.5–5.3)
POTASSIUM SERPL-SCNC: 4 MMOL/L (ref 3.5–5.3)
PR INTERVAL: 121 MS
PR INTERVAL: 158 MS
PROT SERPL-MCNC: 6.9 G/DL (ref 6.4–8.2)
PROT UR STRIP-MCNC: NEGATIVE MG/DL
PROTHROMBIN TIME: 15.6 SECONDS (ref 11.6–14.5)
QRS AXIS: 55 DEGREES
QRS AXIS: 77 DEGREES
QRSD INTERVAL: 120 MS
QRSD INTERVAL: 125 MS
QT INTERVAL: 286 MS
QT INTERVAL: 338 MS
QTC INTERVAL: 415 MS
QTC INTERVAL: 430 MS
RBC # BLD AUTO: 3.43 MILLION/UL (ref 3.81–5.12)
RBC #/AREA URNS AUTO: ABNORMAL /HPF
RBC MORPH BLD: PRESENT
SAO2 % BLD FROM PO2: 94 % (ref 95–98)
SODIUM BLD-SCNC: 135 MMOL/L (ref 136–145)
SODIUM BLD-SCNC: 136 MMOL/L (ref 136–145)
SODIUM SERPL-SCNC: 133 MMOL/L (ref 136–145)
SP GR UR STRIP.AUTO: 1.01 (ref 1–1.03)
SPECIMEN SOURCE: ABNORMAL
T WAVE AXIS: -71 DEGREES
T WAVE AXIS: -73 DEGREES
TROPONIN I SERPL-MCNC: <0.02 NG/ML
TSH SERPL DL<=0.05 MIU/L-ACNC: 6.3 UIU/ML (ref 0.36–3.74)
UROBILINOGEN UR QL STRIP.AUTO: 0.2 E.U./DL
VARIANT LYMPHS # BLD AUTO: 7 %
VENT AC: 16
VENT- AC: AC
VENTRICULAR RATE: 127 BPM
VENTRICULAR RATE: 97 BPM
VT SETTING VENT: 400 ML
WBC # BLD AUTO: 17.8 THOUSAND/UL (ref 4.31–10.16)
WBC #/AREA URNS AUTO: ABNORMAL /HPF

## 2019-09-10 PROCEDURE — 96360 HYDRATION IV INFUSION INIT: CPT

## 2019-09-10 PROCEDURE — 84132 ASSAY OF SERUM POTASSIUM: CPT

## 2019-09-10 PROCEDURE — 99291 CRITICAL CARE FIRST HOUR: CPT | Performed by: INTERNAL MEDICINE

## 2019-09-10 PROCEDURE — 5A1935Z RESPIRATORY VENTILATION, LESS THAN 24 CONSECUTIVE HOURS: ICD-10-PCS | Performed by: PODIATRIST

## 2019-09-10 PROCEDURE — 0BH17EZ INSERTION OF ENDOTRACHEAL AIRWAY INTO TRACHEA, VIA NATURAL OR ARTIFICIAL OPENING: ICD-10-PCS | Performed by: PODIATRIST

## 2019-09-10 PROCEDURE — 93005 ELECTROCARDIOGRAM TRACING: CPT

## 2019-09-10 PROCEDURE — 85730 THROMBOPLASTIN TIME PARTIAL: CPT | Performed by: EMERGENCY MEDICINE

## 2019-09-10 PROCEDURE — 85610 PROTHROMBIN TIME: CPT | Performed by: EMERGENCY MEDICINE

## 2019-09-10 PROCEDURE — 36600 WITHDRAWAL OF ARTERIAL BLOOD: CPT

## 2019-09-10 PROCEDURE — 31500 INSERT EMERGENCY AIRWAY: CPT | Performed by: EMERGENCY MEDICINE

## 2019-09-10 PROCEDURE — 82803 BLOOD GASES ANY COMBINATION: CPT

## 2019-09-10 PROCEDURE — 85049 AUTOMATED PLATELET COUNT: CPT | Performed by: PHYSICIAN ASSISTANT

## 2019-09-10 PROCEDURE — 94760 N-INVAS EAR/PLS OXIMETRY 1: CPT

## 2019-09-10 PROCEDURE — 84443 ASSAY THYROID STIM HORMONE: CPT | Performed by: EMERGENCY MEDICINE

## 2019-09-10 PROCEDURE — 87040 BLOOD CULTURE FOR BACTERIA: CPT | Performed by: EMERGENCY MEDICINE

## 2019-09-10 PROCEDURE — 80053 COMPREHEN METABOLIC PANEL: CPT | Performed by: EMERGENCY MEDICINE

## 2019-09-10 PROCEDURE — 82805 BLOOD GASES W/O2 SATURATION: CPT | Performed by: PHYSICIAN ASSISTANT

## 2019-09-10 PROCEDURE — 99291 CRITICAL CARE FIRST HOUR: CPT | Performed by: EMERGENCY MEDICINE

## 2019-09-10 PROCEDURE — 82947 ASSAY GLUCOSE BLOOD QUANT: CPT

## 2019-09-10 PROCEDURE — 72125 CT NECK SPINE W/O DYE: CPT

## 2019-09-10 PROCEDURE — 71250 CT THORAX DX C-: CPT

## 2019-09-10 PROCEDURE — 1123F ACP DISCUSS/DSCN MKR DOCD: CPT | Performed by: INTERNAL MEDICINE

## 2019-09-10 PROCEDURE — 70450 CT HEAD/BRAIN W/O DYE: CPT

## 2019-09-10 PROCEDURE — 84484 ASSAY OF TROPONIN QUANT: CPT | Performed by: EMERGENCY MEDICINE

## 2019-09-10 PROCEDURE — 93010 ELECTROCARDIOGRAM REPORT: CPT | Performed by: INTERNAL MEDICINE

## 2019-09-10 PROCEDURE — 96374 THER/PROPH/DIAG INJ IV PUSH: CPT

## 2019-09-10 PROCEDURE — 85027 COMPLETE CBC AUTOMATED: CPT | Performed by: EMERGENCY MEDICINE

## 2019-09-10 PROCEDURE — 80047 BASIC METABLC PNL IONIZED CA: CPT

## 2019-09-10 PROCEDURE — 99223 1ST HOSP IP/OBS HIGH 75: CPT | Performed by: PSYCHIATRY & NEUROLOGY

## 2019-09-10 PROCEDURE — 94002 VENT MGMT INPAT INIT DAY: CPT

## 2019-09-10 PROCEDURE — 82948 REAGENT STRIP/BLOOD GLUCOSE: CPT

## 2019-09-10 PROCEDURE — 99291 CRITICAL CARE FIRST HOUR: CPT

## 2019-09-10 PROCEDURE — 82330 ASSAY OF CALCIUM: CPT

## 2019-09-10 PROCEDURE — 36415 COLL VENOUS BLD VENIPUNCTURE: CPT | Performed by: EMERGENCY MEDICINE

## 2019-09-10 PROCEDURE — 83605 ASSAY OF LACTIC ACID: CPT | Performed by: PHYSICIAN ASSISTANT

## 2019-09-10 PROCEDURE — 85007 BL SMEAR W/DIFF WBC COUNT: CPT | Performed by: EMERGENCY MEDICINE

## 2019-09-10 PROCEDURE — 85014 HEMATOCRIT: CPT

## 2019-09-10 PROCEDURE — 74176 CT ABD & PELVIS W/O CONTRAST: CPT

## 2019-09-10 PROCEDURE — 83605 ASSAY OF LACTIC ACID: CPT | Performed by: EMERGENCY MEDICINE

## 2019-09-10 PROCEDURE — 84295 ASSAY OF SERUM SODIUM: CPT

## 2019-09-10 PROCEDURE — 82550 ASSAY OF CK (CPK): CPT | Performed by: PHYSICIAN ASSISTANT

## 2019-09-10 PROCEDURE — 81001 URINALYSIS AUTO W/SCOPE: CPT | Performed by: EMERGENCY MEDICINE

## 2019-09-10 RX ORDER — VANCOMYCIN HYDROCHLORIDE 1 G/200ML
15 INJECTION, SOLUTION INTRAVENOUS DAILY PRN
Status: DISCONTINUED | OUTPATIENT
Start: 2019-09-11 | End: 2019-09-10

## 2019-09-10 RX ORDER — CLOPIDOGREL BISULFATE 75 MG/1
75 TABLET ORAL DAILY
Status: DISCONTINUED | OUTPATIENT
Start: 2019-09-11 | End: 2019-09-13

## 2019-09-10 RX ORDER — PROPOFOL 10 MG/ML
50 INJECTION, EMULSION INTRAVENOUS ONCE
Status: COMPLETED | OUTPATIENT
Start: 2019-09-10 | End: 2019-09-10

## 2019-09-10 RX ORDER — ATORVASTATIN CALCIUM 40 MG/1
40 TABLET, FILM COATED ORAL
Status: DISCONTINUED | OUTPATIENT
Start: 2019-09-10 | End: 2019-09-16 | Stop reason: HOSPADM

## 2019-09-10 RX ORDER — PRAVASTATIN SODIUM 40 MG
40 TABLET ORAL DAILY
Status: DISCONTINUED | OUTPATIENT
Start: 2019-09-10 | End: 2019-09-10

## 2019-09-10 RX ORDER — MIDAZOLAM HYDROCHLORIDE 1 MG/ML
2 INJECTION INTRAMUSCULAR; INTRAVENOUS ONCE
Status: COMPLETED | OUTPATIENT
Start: 2019-09-10 | End: 2019-09-10

## 2019-09-10 RX ORDER — CHLORHEXIDINE GLUCONATE 0.12 MG/ML
15 RINSE ORAL EVERY 12 HOURS SCHEDULED
Status: DISCONTINUED | OUTPATIENT
Start: 2019-09-10 | End: 2019-09-11

## 2019-09-10 RX ORDER — ETOMIDATE 2 MG/ML
20 INJECTION INTRAVENOUS ONCE
Status: COMPLETED | OUTPATIENT
Start: 2019-09-10 | End: 2019-09-10

## 2019-09-10 RX ORDER — HEPARIN SODIUM 5000 [USP'U]/ML
5000 INJECTION, SOLUTION INTRAVENOUS; SUBCUTANEOUS EVERY 8 HOURS SCHEDULED
Status: DISCONTINUED | OUTPATIENT
Start: 2019-09-10 | End: 2019-09-10

## 2019-09-10 RX ORDER — SODIUM CHLORIDE, SODIUM GLUCONATE, SODIUM ACETATE, POTASSIUM CHLORIDE, MAGNESIUM CHLORIDE, SODIUM PHOSPHATE, DIBASIC, AND POTASSIUM PHOSPHATE .53; .5; .37; .037; .03; .012; .00082 G/100ML; G/100ML; G/100ML; G/100ML; G/100ML; G/100ML; G/100ML
75 INJECTION, SOLUTION INTRAVENOUS CONTINUOUS
Status: DISCONTINUED | OUTPATIENT
Start: 2019-09-10 | End: 2019-09-11

## 2019-09-10 RX ORDER — PROPOFOL 10 MG/ML
5-50 INJECTION, EMULSION INTRAVENOUS
Status: DISCONTINUED | OUTPATIENT
Start: 2019-09-10 | End: 2019-09-11

## 2019-09-10 RX ORDER — LORAZEPAM 2 MG/ML
1 INJECTION INTRAMUSCULAR ONCE
Status: COMPLETED | OUTPATIENT
Start: 2019-09-10 | End: 2019-09-10

## 2019-09-10 RX ORDER — CLOPIDOGREL BISULFATE 75 MG/1
300 TABLET ORAL ONCE
Status: COMPLETED | OUTPATIENT
Start: 2019-09-10 | End: 2019-09-10

## 2019-09-10 RX ORDER — DONEPEZIL HYDROCHLORIDE 5 MG/1
5 TABLET, FILM COATED ORAL
Status: DISCONTINUED | OUTPATIENT
Start: 2019-09-10 | End: 2019-09-16 | Stop reason: HOSPADM

## 2019-09-10 RX ORDER — SODIUM CHLORIDE, SODIUM GLUCONATE, SODIUM ACETATE, POTASSIUM CHLORIDE, MAGNESIUM CHLORIDE, SODIUM PHOSPHATE, DIBASIC, AND POTASSIUM PHOSPHATE .53; .5; .37; .037; .03; .012; .00082 G/100ML; G/100ML; G/100ML; G/100ML; G/100ML; G/100ML; G/100ML
1000 INJECTION, SOLUTION INTRAVENOUS ONCE
Status: COMPLETED | OUTPATIENT
Start: 2019-09-10 | End: 2019-09-10

## 2019-09-10 RX ORDER — FENTANYL CITRATE 50 UG/ML
50 INJECTION, SOLUTION INTRAMUSCULAR; INTRAVENOUS
Status: DISCONTINUED | OUTPATIENT
Start: 2019-09-10 | End: 2019-09-16 | Stop reason: HOSPADM

## 2019-09-10 RX ORDER — PANTOPRAZOLE SODIUM 40 MG/1
40 TABLET, DELAYED RELEASE ORAL
Status: DISCONTINUED | OUTPATIENT
Start: 2019-09-10 | End: 2019-09-14

## 2019-09-10 RX ORDER — SUCCINYLCHOLINE/SOD CL,ISO/PF 100 MG/5ML
1.5 SYRINGE (ML) INTRAVENOUS ONCE
Status: DISCONTINUED | OUTPATIENT
Start: 2019-09-10 | End: 2019-09-10

## 2019-09-10 RX ORDER — LEVOTHYROXINE SODIUM 0.05 MG/1
100 TABLET ORAL
Status: DISCONTINUED | OUTPATIENT
Start: 2019-09-10 | End: 2019-09-16 | Stop reason: HOSPADM

## 2019-09-10 RX ORDER — ATROPINE SULFATE 0.1 MG/ML
0.5 INJECTION INTRAVENOUS ONCE
Status: DISCONTINUED | OUTPATIENT
Start: 2019-09-10 | End: 2019-09-11

## 2019-09-10 RX ORDER — BISACODYL 10 MG
10 SUPPOSITORY, RECTAL RECTAL DAILY PRN
Status: DISCONTINUED | OUTPATIENT
Start: 2019-09-10 | End: 2019-09-16 | Stop reason: HOSPADM

## 2019-09-10 RX ORDER — PROPOFOL 10 MG/ML
INJECTION, EMULSION INTRAVENOUS
Status: DISPENSED
Start: 2019-09-10 | End: 2019-09-10

## 2019-09-10 RX ORDER — ONDANSETRON 2 MG/ML
4 INJECTION INTRAMUSCULAR; INTRAVENOUS EVERY 4 HOURS PRN
Status: DISCONTINUED | OUTPATIENT
Start: 2019-09-10 | End: 2019-09-16 | Stop reason: HOSPADM

## 2019-09-10 RX ORDER — LORAZEPAM 2 MG/ML
2 INJECTION INTRAMUSCULAR AS NEEDED
Status: DISCONTINUED | OUTPATIENT
Start: 2019-09-10 | End: 2019-09-16 | Stop reason: HOSPADM

## 2019-09-10 RX ORDER — ATROPINE SULFATE 1 MG/ML
0.4 INJECTION, SOLUTION INTRAMUSCULAR; INTRAVENOUS; SUBCUTANEOUS ONCE
Status: DISCONTINUED | OUTPATIENT
Start: 2019-09-10 | End: 2019-09-10

## 2019-09-10 RX ORDER — QUETIAPINE FUMARATE 25 MG/1
12.5 TABLET, FILM COATED ORAL 2 TIMES DAILY
Status: DISCONTINUED | OUTPATIENT
Start: 2019-09-10 | End: 2019-09-16 | Stop reason: HOSPADM

## 2019-09-10 RX ADMIN — PANTOPRAZOLE SODIUM 40 MG: 40 TABLET, DELAYED RELEASE ORAL at 15:05

## 2019-09-10 RX ADMIN — CHLORHEXIDINE GLUCONATE 0.12% ORAL RINSE 15 ML: 1.2 LIQUID ORAL at 13:38

## 2019-09-10 RX ADMIN — MIDAZOLAM 2 MG: 1 INJECTION INTRAMUSCULAR; INTRAVENOUS at 07:30

## 2019-09-10 RX ADMIN — CLOPIDOGREL BISULFATE 300 MG: 75 TABLET ORAL at 15:05

## 2019-09-10 RX ADMIN — CEFEPIME HYDROCHLORIDE 1000 MG: 1 INJECTION, POWDER, FOR SOLUTION INTRAMUSCULAR; INTRAVENOUS at 09:48

## 2019-09-10 RX ADMIN — SODIUM CHLORIDE, SODIUM GLUCONATE, SODIUM ACETATE, POTASSIUM CHLORIDE AND MAGNESIUM CHLORIDE 75 ML/HR: 526; 502; 368; 37; 30 INJECTION, SOLUTION INTRAVENOUS at 13:38

## 2019-09-10 RX ADMIN — VANCOMYCIN HYDROCHLORIDE 1250 MG: 1 INJECTION, POWDER, LYOPHILIZED, FOR SOLUTION INTRAVENOUS at 11:05

## 2019-09-10 RX ADMIN — PROPOFOL 5 MCG/KG/MIN: 10 INJECTION, EMULSION INTRAVENOUS at 08:08

## 2019-09-10 RX ADMIN — LEVOTHYROXINE SODIUM 100 MCG: 100 TABLET ORAL at 13:39

## 2019-09-10 RX ADMIN — SODIUM CHLORIDE 1000 ML: 0.9 INJECTION, SOLUTION INTRAVENOUS at 08:50

## 2019-09-10 RX ADMIN — SODIUM CHLORIDE, SODIUM GLUCONATE, SODIUM ACETATE, POTASSIUM CHLORIDE, MAGNESIUM CHLORIDE, SODIUM PHOSPHATE, DIBASIC, AND POTASSIUM PHOSPHATE 1000 ML: .53; .5; .37; .037; .03; .012; .00082 INJECTION, SOLUTION INTRAVENOUS at 09:56

## 2019-09-10 RX ADMIN — ETOMIDATE 20 MG: 20 INJECTION, SOLUTION INTRAVENOUS at 08:02

## 2019-09-10 RX ADMIN — METRONIDAZOLE 500 MG: 500 INJECTION, SOLUTION INTRAVENOUS at 13:38

## 2019-09-10 RX ADMIN — PROPOFOL 50 MG: 10 INJECTION, EMULSION INTRAVENOUS at 08:06

## 2019-09-10 NOTE — SEPSIS NOTE
Sepsis Note   Octavio Boyd 80 y o  female MRN: [id number]  Unit/Bed#: MICU 01 Encounter: [id number]      qSOFA     Row Name 09/10/19 1239 09/10/19 1100 09/10/19 0910 09/10/19 0847 09/10/19 0842    Altered mental status GCS < 15              Respiratory Rate > / =22      0  0  0    Systolic BP < / =477  0  0  0  0      Q Sofa Score  0  0  0  0  0    Row Name 09/10/19 0804 09/10/19 0758 09/10/19 0746 09/10/19 0745 09/10/19 0728    Altered mental status GCS < 15        1      Respiratory Rate > / =22  0  0  0  0  0    Systolic BP < / =259  0  0  0  0  0    Q Sofa Score  0  0  0  1  0                 Default Flowsheet Data (last 720 hours)      Sepsis Reassess     Row Name 09/10/19 1336                   Repeat Volume Status and Tissue Perfusion Assessment Performed    Repeat Volume Status and Tissue Perfusion Assessment Performed  Yes  -BS           Volume Status and Tissue Perfusion Post Fluid Resuscitation * Must Document All *    Vital Signs Reviewed (HR, RR, BP, T)  Yes  -BS        Shock Index Reviewed  Yes  -BS        Arterial Oxygen Saturation Reviewed (POx, SaO2 or SpO2)  Yes (comment %)  -BS        Cardio  Normal S1/S2; Regular rate and rhythm  -BS        Pulmonary  Normal effort;Clear to auscultation  -BS        Capillary Refill  Brisk  -BS        Peripheral Pulses  Dorsalis Pedis  -BS        Dorsalis Pedis  +3  -BS        Skin  Warm;Dry  -BS        Urine output assessed  Adequate  -BS           *OR*   Intensive Monitoring- Must Document One of the Following Four *:    Vital Signs Reviewed          * Central Venous Pressure (CVP or RAP)          * Central Venous Oxygen (SVO2, ScvO2 or Oxygen saturation via central catheter)          * Bedside Cardiovascular US in IVC diameter and % collapse          * Passive Leg Raise OR Crystalloid Challenge            User Key  (r) = Recorded By, (t) = Taken By, (c) = Cosigned By    Initials Name Provider Type    Mackenzie Carroll MD Resident Late entry patient assessed at 10:58AM

## 2019-09-10 NOTE — TELEPHONE ENCOUNTER
[id number] : Yanna Burleson Loge with the following message: [phone] / Jacob Stahl / Joshua [employer]yel[employer] / 22- / 3rd Seizure

## 2019-09-10 NOTE — RESPIRATORY THERAPY NOTE
RT Ventilator Management Note  Kb Ricardo 80 y o  female MRN: [id number]  Unit/Bed#: ED 29 Encounter: [id number]      Daily Screen       9/10/2019  0842             Patient safety screen outcome[de-identified]  Failed            Physical Exam:   Assessment Type: (P) Assess only  General Appearance: (P) Sedated  Respiratory Pattern: (P) Assisted, Normal  Chest Assessment: (P) Chest expansion symmetrical  Bilateral Breath Sounds: (P) Clear, Diminished      Resp Comments: pt was placed on the transport vent to go to CT once back in room pt was placed on Vent 30389     Pt was intubated on first attempt, ETCO2 had good color change, with Bilateral breath sounds  CT to confirm ETT placement  After intubation Pt was transferred to CT then brought back to ED room and placed on vent 14970  Pt tolerated well, waiting on room assignment on floor

## 2019-09-10 NOTE — CONSULTS
ASSESSMENT/PLAN     1  Seizure activity likely secondary to decreased seizure threshold due to current infection, recent medications, and prior infarct  59-year-old female past medical history significant for prior CVAs and dementia presents with witnessed convulsions at skilled nursing facility  Patient was administered 2 mg of Ativan by EMS and 2 mg of Versed in the emergency department  Given history of ciprofloxacin use, prior stroke, and recent infections, patient likely suffering from provoked seizure  No AED treatment at this time    -routine EEG  -seizure precautions  -avoid medications that would decrease seizure threshold  -2 mg lorazepam p r n  for continuous seizure activity lasting greater than 2 minutes    2  Left upper extremity weakness with questionable facial droop secondary to postictal state versus new CVA  Per daughter, patient had no focal neurologic deficit prior to today  Patient was able to participate in PT/OT yesterday  On admission, CT head demonstrated no acute pathology however showed prior infarcts in the right temporoparietal and left parietal regions  Patient's daughter denies any prior left upper extremity weakness in the past   She states that during both prior strokes patient experienced a decreased ability to focus, difficulty seeing and short-term memory loss     -ASA 81 mg or Plavix 300 mg loading dose, then 75 mg qd   -Atorvastatin 80 mg  -permissive hypertension  -possible MRI given that the symptoms seem new and may be unrelated to prior CVAs  -CTA/CT perfusion can be considered especially once a DEVONTE has resolved    HISTORY OF PRESENT ILLNESS   Reason for Admission / Principal Problem:  Seizures  Reason for Consult:  Seizures    Daily Naik 80 y o  female with past will history of dementia, hypothyroidism, hypertension, 2 prior CVAs in 2016 and 2017, GERD and hyperlipidemia was found by nursing home staff at [facility] having generalized convulsions this morning  EMS was called and on arrival was provided with 2 mg Ativan which stopped generalized convulsions  However, upon arrival to the emergency department patient was still having pulsating eye movements which is presumed to be a seizure and was administered 2 mg of Versed which is stopped the rhythmic eye movement  Laboratory investigation demonstrated acute kidney injury  Imaging revealed no visual deformities of the C or L-spine and no acute pathology identified on CT head  Patient was still unresponsive and was intubated for airway protection  She was admitted to the medical ICU for further management  SUBJECTIVE     Patient intubated and sedated  Unable to gather much from her at this time however she does deny pain  Per discussion with daughter, patient has no history of seizures in the past   Patient was at baseline yesterday  She was able to participate in PT, OT and speech therapy for at least 3-4 hours per day at the [facility] nursing [facility]  Regarding past CVAs, daughter does not remember many details  She does not remember there being any left-sided weakness however she does remember that she could not focus, for got to do daily activities of living, and some short-term memory loss  Both strokes in 2016 and  presented the same for what she could remember  She does not remember what hospital patient presented to at this time       REVIEW OF SYSTEMS   Review of Systems   Unable to perform ROS: Intubated     OBJECTIVE     Vitals:    09/10/19 0910 09/10/19 1100 09/10/19 1239 09/10/19 1336   BP: 124/60 136/65 148/62 129/57   BP Location: Right arm Right arm Left arm    Pulse: 97 76  96   Resp: 16   18   Temp:    98 4 °F (36 9 °C)   TempSrc:       SpO2: 97% 98%  98%   Weight:   60 5 kg (133 lb 6 1 oz)    Height:   5' 2 6" (1 59 m)       Temperature:   Temp (24hrs), Av 8 °F (37 7 °C), Min:98 4 °F (36 9 °C), Max:101 2 °F (38 4 °C)    Temperature: 98 4 °F (36 9 °C)  Intake & Output:  I/O       09/08 0701 - 09/09 0700 09/09 0701 - 09/10 0700 09/10 0701 - 09/11 0700    I V  (mL/kg)   1007 4 (16 7)    IV Piggyback   1100    Total Intake(mL/kg)   2107 4 (34 8)    Net   +2107 4               Weights:   IBW: 51 48 kg    Body mass index is 23 93 kg/m²  Weight (last 2 days)     Date/Time   Weight    09/10/19 1239   60 5 (133 38)    09/10/19 0730   64 8 (142 86)    09/10/19 0728   59 (130 07)            Physical Exam   Constitutional: She appears well-developed and well-nourished  She appears lethargic  No distress  HENT:   Head: Normocephalic and atraumatic  Some dried blood around the mouth, possibly due to tongue biting   Eyes: Pupils are equal, round, and reactive to light  EOM are normal    Cardiovascular: Normal rate, regular rhythm and normal heart sounds  Pulmonary/Chest: Effort normal and breath sounds normal    Abdominal: Soft  Bowel sounds are normal  She exhibits no distension  There is no tenderness  Neurological: She has normal reflexes  She appears lethargic  She is disoriented (Oriented to person)  A cranial nerve deficit (Questionable facial droop) is present  No sensory deficit  She exhibits abnormal muscle tone (Increased tone on the left upper and lower extremity)  GCS eye subscore is 3  GCS motor subscore is 4  Right Babinski's sign: Equivocal  She displays no Babinski's sign on the left side  Patient able to follow some commands  Patient showed presence of blink to threat and vestibulocular reflex    There seems to be some weakness in the left upper extremity as patient would grimace to noxious stimuli applied to the left but would not withdraw  In addition does not seem to be able to localize pain  Unable to assess coordination and gait at this time     Skin: Skin is warm and dry  She is not diaphoretic  Psychiatric: She has a normal mood and affect  Nursing note and vitals reviewed      PAST MEDICAL HISTORY     Past Medical History:   Diagnosis Date  Aspiration pneumonia ([facility] [address] )     Last Assessed:  7/18/17    Basal cell carcinoma of nose     Last Assessed:  4/12/17    Blind     blind right eye, 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, Last Assessed:  7/18/17    Dementia     Depression     Disease of thyroid gland     Fibromyalgia     Last Assessed:  7/18/17    Gait disturbance     GERD (gastroesophageal reflux disease)     Glaucoma     Hyperlipidemia     Hypertension     Osteopenia     Peripheral neuropathy     Last Assessed:  4/12/17    Polymyalgia rheumatica ([facility] )     Psychiatric disorder     depression    Renal insufficiency syndrome     Last Assessed:  4/13/17    Rheumatoid arthritis ([facility] )     Seizure ([facility] ) 9/10/2019    Stroke ([facility] )     x2, Last Assessed:  7/18/17    Systemic lupus erythematosus ([facility] )     Vertigo     Vitamin D deficiency      PAST SURGICAL HISTORY     Past Surgical History:   Procedure Laterality Date    EYE SURGERY      JOINT REPLACEMENT      left hip replacement, left shoulder replacement    SHOULDER SURGERY      Replacement     SOCIAL & FAMILY HISTORY     Social History     Substance and Sexual Activity   Alcohol Use Never    Frequency: Never     Social History     Substance and Sexual Activity   Drug Use No     Social History     Tobacco Use   Smoking Status Never Smoker   Smokeless Tobacco Never Used     Family History   Problem Relation Age of Onset    Heart attack Mother     Diabetes Mother     Dementia Father     Coronary artery disease Father         cardiac disorder    Diabetes Sister     Uterine cancer Sister      LABORATORY DATA     Labs: I have personally reviewed pertinent reports      Results from last 7 days   Lab Units 09/10/19  1135 09/10/19  0804 09/10/19  0729 09/10/19  0728 09/06/19  0534 09/05/19  0516 09/04/19  0507   WBC Thousand/uL  --  17 80*  --   --  6 22 8 84 13 68*   HEMOGLOBIN g/dL  --  10 1*  --   --  9 2* 9 5* 9 0*   I STAT HEMOGLOBIN g/dl  --   --  10 5* 10 9*  --   -- --    HEMATOCRIT %  --  32 8*  --   --  28 8* 29 6* 28 2*   HEMATOCRIT, ISTAT %  --   --  31* 32*  --   --   --    PLATELETS Thousands/uL 195 451*  --   --  170 158 156   NEUTROS PCT %  --   --   --   --  74 79* 79*   MONOS PCT %  --   --   --   --  9 6 6   MONO PCT %  --  5  --   --   --   --   --     Results from last 7 days   Lab Units 09/10/19  0730 09/10/19  0729 09/10/19  0728 09/06/19  0534 09/05/19  0516   POTASSIUM mmol/L 4 0  --   --  3 3* 3 6   CHLORIDE mmol/L 105  --   --  108 110*   CO2 mmol/L 15*  --   --  21 22   CO2, I-STAT mmol/L  --  17* 17*  --   --    BUN mg/dL 10  --   --  7 10   CREATININE mg/dL 1 88*  --   --  0 66 0 77   CALCIUM mg/dL 8 2*  --   --  8 2* 8 4   ALK PHOS U/L 126*  --   --   --   --    ALT U/L 7*  --   --   --   --    AST U/L 31  --   --   --   --    GLUCOSE, ISTAT mg/dl  --  198* 200*  --   --               Results from last 7 days   Lab Units 09/10/19  0749   INR  1 28*   PTT seconds 30     Results from last 7 days   Lab Units 09/10/19  1135   LACTIC ACID mmol/L 1 5     Results from last 7 days   Lab Units 09/10/19  0730   TROPONIN I ng/mL <0 02     Micro:  Lab Results   Component Value Date    BLOODCX No Growth After 5 Days  09/02/2019    BLOODCX No Growth After 5 Days  09/02/2019    BLOODCX No Growth After 5 Days  04/04/2019    URINECX >100,000 cfu/ml Escherichia coli (A) 09/02/2019    URINECX >100,000 cfu/ml Escherichia coli (A) 03/21/2019    URINECX >100,000 cfu/ml Escherichia coli (A) 01/22/2019     IMAGING & DIAGNOSTIC TESTS     Imaging: I have personally reviewed pertinent reports  Ct Chest Abdomen Pelvis Wo Contrast    Result Date: 9/10/2019  Impression: Overall interval improvement in previously seen diverticulitis involving the mid sigmoid colon  Phlegmonous change is noted in this region without discrete drainable collection or gross pneumoperitoneum  Small volume ascites tracking into the pelvis is new since prior examination    Nonspecific pericholecystic fluid is also noted  New bibasilar consolidation compatible subsegmental atelectasis versus pneumonia  Small bilateral pleural effusions are present  Workstation performed: [id number]     Ct Head Without Contrast    Result Date: 9/10/2019  Impression: No CT evidence of acute intracranial process or significant interval change  Chronic microangiopathy, chronic moderate right temporoparietal and small left parietal infarctions  Overall, no significant interval change  Workstation performed: [id number]     Ct Spine Cervical Without Contrast    Result Date: 9/10/2019  Impression: No cervical spine fracture or traumatic malalignment  Stable minimal grade 1 anterolisthesis C3 on C4  Stable moderate multilevel cervical degenerative changes  Stable 2 cm nodule right lobe of thyroid gland  Workstation performed: [id number]     EKG, Pathology, and Other Studies: I have personally reviewed pertinent reports  ALLERGIES     Allergies   Allergen Reactions    Acetaminophen     Golimumab      Other reaction(s): dedrick colored stool    Lidocaine Other (See Comments)    Neurontin [Gabapentin]     Nortriptyline Tremor    Prasterone      Other reaction(s): pruitis    Tricyclic Antidepressants     Leflunomide Rash    Sulfasalazine Rash     MEDICATIONS PRIOR TO ARRIVAL     Prior to Admission medications    Medication Sig Start Date End Date Taking?  Authorizing Provider   albuterol (2 5 mg/3 mL) 0 083 % nebulizer solution Take 2 5 mg by nebulization every 4 (four) hours as needed for wheezing or shortness of breath    Historical Provider, MD   ascorbic acid (VITAMIN C) 500 mg tablet Take 500 mg by mouth daily    Historical Provider, MD   benzocaine (ORAJEL) 10 % mucosal gel Apply 1 application to the mouth or throat 3 (three) times a day as needed for mucositis 7/16/18   Lianna Earing, DO   benzonatate (TESSALON PERLES) 100 mg capsule Take 1 capsule (100 mg total) by mouth 3 (three) times a day as needed for cough 11/12/18 Pieter Argueta MD   bisacodyl (DULCOLAX) 10 mg suppository Insert 10 mg into the rectum daily as needed for constipation    Historical Provider, MD   calcium carbonate (OS-TEDDY) 600 MG tablet Take 600 mg by mouth daily    Historical Provider, MD   carboxymethylcellulose (REFRESH LIQUIGEL) 1 % ophthalmic solution Apply 1 drop to eye 3 (three) times a day   8/24/17   Historical Provider, MD   cetirizine (ZyrTEC) 10 MG chewable tablet Chew 1 tablet (10 mg total) daily 8/22/18   Jordin Diaz PA-C   ciprofloxacin (CIPRO) 500 mg tablet Take 1 tablet (500 mg total) by mouth every 12 (twelve) hours for 5 days 9/6/19 9/11/19  Areli Marie PA-C   Dentifrices (BIOTENE DRY MOUTH CARE DT) Apply to teeth    Historical Provider, MD   donepezil (ARICEPT) 10 mg tablet Take 0 5 tablets (5 mg total) by mouth daily at bedtime Increased confusion on 10mg 8/16/18   Mckenzie Lujan DO   DULoxetine (CYMBALTA) 60 mg delayed release capsule Take 1 capsule (60 mg total) by mouth daily 4/17/18   Richelle Villalobos PA-C   estradiol (ESTRACE) 0 1 mg/g vaginal cream Insert 1 g into the vagina daily Do no insert - please apply a pea sized amount to the urethra/vagina Monday, Wednesday and Friday before bedtime 1/7/19   Rianna Webb MD   ferrous gluconate (FERGON) 324 mg tablet Take 65 mg by mouth daily with breakfast    Historical Provider, MD   fluticasone (FLONASE) 50 mcg/act nasal spray 1 spray into each nostril daily 8/22/18   Jordin Diaz PA-C   guaiFENesin (MUCINEX) 600 mg 12 hr tablet Take 1 tablet (600 mg total) by mouth 2 (two) times a day as needed for congestion 5/18/18   Mckenzie Lujan DO   ibuprofen (MOTRIN) 400 mg tablet Take 1 tablet (400 mg total) by mouth every 6 (six) hours as needed for mild pain (fever) 3/27/19   Torri Desir PA-C   ipratropium-albuterol (COMBIVENT RESPIMAT) inhaler Inhale 2 puffs every 4 (four) hours as needed (dyspnea) 5/18/18   Miltonella Solders, DO   levothyroxine 100 mcg tablet Take 100 mcg by mouth daily  12/22/17   Historical Provider, MD   LORazepam (ATIVAN) 0 5 mg tablet Take 1 tablet (0 5 mg total) by mouth every 8 (eight) hours as needed for anxiety for up to 10 days She always takes 0 5 mg at bedtime  Patient taking differently: Take 0 5 mg by mouth daily at bedtime She always takes 0 5 mg at bedtime 1/28/19 9/2/19  Ruth Hargrove PA-C   losartan (COZAAR) 100 MG tablet Take 1 tablet (100 mg total) by mouth daily  Patient taking differently: Take 100 mg by mouth daily Patient takes 75mg daily 8/16/18   Ya Miranda DO   Lutein 10 MG TABS Take 1 tablet by mouth daily      Historical Provider, MD   magnesium hydroxide (MILK OF MAGNESIA) 400 mg/5 mL oral suspension Take 30 mL by mouth daily as needed for constipation      Historical Provider, MD   metoprolol tartrate (LOPRESSOR) 50 mg tablet Take 0 5 tablets (25 mg total) by mouth 2 (two) times a day 8/16/18   Ya Miranda DO   metroNIDAZOLE (FLAGYL) 500 mg tablet Take 1 tablet (500 mg total) by mouth every 8 (eight) hours for 5 days 9/6/19 9/11/19  Frandy THOMAS PA-C   NIFEdipine (PROCARDIA XL) 60 mg 24 hr tablet Take 60 mg by mouth daily  6/25/18   Historical Provider, MD   nystatin (MYCOSTATIN) 100,000 units/mL suspension Apply 100,000 Units to the mouth or throat 2 (two) times a day    Historical Provider, MD   nystatin (MYCOSTATIN) powder Apply topically 2 (two) times a day Apply to b/l groin folds    Historical Provider, MD   pantoprazole (PROTONIX) 40 mg tablet Take 40 mg by mouth 2 (two) times a day      Historical Provider, MD   pravastatin (PRAVACHOL) 40 mg tablet Take 1 tablet (40 mg total) by mouth daily 2/1/19   Ruth Hargrove PA-C   QUEtiapine (SEROquel) 25 mg tablet Take 12 5 mg by mouth 2 (two) times a day May crush medications      Historical Provider, MD   sodium chloride (OCEAN) 0 65 % nasal spray 2 sprays into each nostril 2 (two) times a day    Historical Provider, MD   Sodium Phosphates (FLEET ENEMA RE) Insert 1 Dose into the rectum daily PRN if dulcolax supp is not effective    Historical Provider, MD   triamcinolone (KENALOG) 0 1 % cream triamcinolone acetonide 0 1 % topical cream    Historical Provider, MD   tuberculin (TUBERSOL) 5 units/0 1 mL Inject 5 Units into the skin once    Historical Provider, MD     MEDICATIONS ADMINISTERED IN LAST 24 HOURS     Medication Administration - last 24 hours from 09/09/2019 1452 to 09/10/2019 1452       Date/Time Order Dose Route Action Action by     09/10/2019 0741 LORazepam (FOR EMS ONLY) (ATIVAN) 2 mg/mL injection 2 mg 0 mg Does not apply Given to [place]     09/10/2019 0730 midazolam (VERSED) injection 2 mg 2 mg Intravenous Given Kat Mueller RN     09/10/2019 0802 etomidate (AMIDATE) 2 mg/mL injection 20 mg 20 mg Intravenous Given Kat Mueller RN     09/10/2019 0808 propofol (DIPRIVAN) 1000 mg in 100 mL infusion (premix) 5 mcg/kg/min Intravenous New Bev9 Ludin Smyth RN     09/10/2019 0806 propofol (DIPRIVAN) 200 MG/20ML bolus injection 50 mg 50 mg Intravenous Given Kat Mueller RN     09/10/2019 0955 sodium chloride 0 9 % bolus 1,000 mL 0 mL Intravenous Stopped Kat Mueller RN     09/10/2019 0850 sodium chloride 0 9 % bolus 1,000 mL 1,000 mL Intravenous New Stephanie Mueller RN     09/10/2019 1105 vancomycin (VANCOCIN) 1,250 mg in sodium chloride 0 9 % 250 mL IVPB 1,250 mg Intravenous New Bag Kat Mueller RN     09/10/2019 1104 cefepime (MAXIPIME) 1,000 mg in dextrose 5 % 50 mL IVPB 0 mg Intravenous Stopped Kat Mueller RN     09/10/2019 0948 cefepime (MAXIPIME) 1,000 mg in dextrose 5 % 50 mL IVPB 1,000 mg Intravenous New Stephanie Mueller RN     09/10/2019 1045 multi-electrolyte (ISOLYTE-S PH 7 4) bolus 1,000 mL 0 mL Intravenous Stopped Shahid ePrsaud RN     09/10/2019 0956 multi-electrolyte (ISOLYTE-S PH 7 4) bolus 1,000 mL 1,000 mL Intravenous New Stephanie Mueller RN     09/10/2019 1338 metroNIDAZOLE (FLAGYL) IVPB (premix) 500 mg 500 mg Intravenous [address] Femi Collins RN     09/10/2019 1338 chlorhexidine (PERIDEX) 0 12 % oral rinse 15 mL 15 mL Swish & Spit Given Femi Collins RN     09/10/2019 1339 levothyroxine tablet 100 mcg 100 mcg Oral Given Femi Collins RN     09/10/2019 1439 metoprolol tartrate (LOPRESSOR) tablet 25 mg 25 mg Oral Not Given Femi Collins RN     09/10/2019 1338 multi-electrolyte (ISOLYTE-S PH 7 4 equivalent) IV solution 75 mL/hr Intravenous New Bag Femi Collins RN        CURRENT MEDICATIONS     Current Facility-Administered Medications:  atorvastatin 40 mg Oral Daily With Gabo Logan MD    atropine 0 5 mg Intravenous Once Matty Brar MD    bisacodyl 10 mg Rectal Daily PRN Algie Flirma, ZULEIMA    chlorhexidine 15 mL Swish & Spit Q12H [id number], ZULEIMA    clopidogrel 300 mg Oral Once Isabella Verdin MD    [START ON 9/11/2019] clopidogrel 75 mg Oral Daily Isabella Verdin MD    donepezil 5 mg Oral HS Giuliano Tomas PA-C    fentanyl citrate (PF) 50 mcg Intravenous Q1H PRN Algie FlZUELIMA solis    levothyroxine 100 mcg Oral Early Morning Algie ZULEIMA Lambert    LORazepam 2 mg Intravenous PRN Isabella Verdin MD    multi-electrolyte 75 mL/hr Intravenous Continuous Emily Lambert PA-C Last Rate: 75 mL/hr (09/10/19 1338)   ondansetron 4 mg Intravenous Q4H PRN Algie FlZULEIMA solis    pantoprazole 40 mg Oral BID AC Giuliano Tomas PA-C    propofol 5-50 mcg/kg/min Intravenous Titrated Matty Brar MD Last Rate: 5 mcg/kg/min (09/10/19 0808)   QUEtiapine 12 5 mg Oral BID Algie FlZULEIMA solis        multi-electrolyte 75 mL/hr Last Rate: 75 mL/hr (09/10/19 1338)   propofol 5-50 mcg/kg/min Last Rate: 5 mcg/kg/min (09/10/19 0808)       bisacodyl 10 mg Daily PRN   fentanyl citrate (PF) 50 mcg Q1H PRN   LORazepam 2 mg PRN   ondansetron 4 mg Q4H PRN       Portions of the record may have been created with voice recognition software    Occasional wrong word or "sound a like" substitutions may have occurred due to the inherent limitations of voice recognition software    Read the chart carefully and recognize, using context, where substitutions have occurred     ==  Skyler Chaves MD  [address] [place] [address]  Internal Medicine Residency  [id number]

## 2019-09-10 NOTE — ED PROVIDER NOTES
History  Chief Complaint   Patient presents with    Altered Mental Status     Patient presents to the E R  being semi-responsive, tachycardic and with a report of seizing this morning at [facility]  44-year-old woman with a history of multiple visits for encephalopathy recently seen for urinary tract infection and diverticulitis    Patient was found at [facility] tonic clonic seizures in her bed no apparent fall patient was reportedly observed for 20 minutes and then EMS was called  On arrival EMS administered 2 mg of Ativan with cessation of tonic clonic seizure however patient remained unresponsive blood glucose was 170  Patient was tachycardic other vitals remained stable within normal limits    On ray of on arrival patient is still unresponsive she has no corneal reflex eyes are deviated in a pulsating [place] she has rhythmic flinches presume still seizing given 2 of Versed with cessation  Prior to Admission Medications   Prescriptions Last Dose Informant Patient Reported? Taking? DULoxetine (CYMBALTA) 60 mg delayed release capsule  Outside Facility (Specify) No No   Sig: Take 1 capsule (60 mg total) by mouth daily   Dentifrices ([facility])  Outside Facility (Specify) Yes No   Sig: Apply to teeth   LORazepam (ATIVAN) 0 5 mg tablet  Outside Facility (Specify) No No   Sig: Take 1 tablet (0 5 mg total) by mouth every 8 (eight) hours as needed for anxiety for up to 10 days She always takes 0 5 mg at bedtime   Patient taking differently: Take 0 5 mg by mouth daily at bedtime She always takes 0 5 mg at bedtime   Lutein 10 MG TABS  Outside Facility (Specify) Yes No   Sig: Take 1 tablet by mouth daily     NIFEdipine (PROCARDIA XL) 60 mg 24 hr tablet  Outside Facility (Specify) Yes No   Sig: Take 60 mg by mouth daily    QUEtiapine (SEROquel) 25 mg tablet   Yes No   Sig: Take 12 5 mg by mouth 2 (two) times a day May crush medications     Sodium Phosphates (FLEET ENEMA RE) Yes No   Sig: Insert 1 Dose into the rectum daily PRN if dulcolax supp is not effective   albuterol (2 5 mg/3 mL) 0 083 % nebulizer solution   Yes No   Sig: Take 2 5 mg by nebulization every 4 (four) hours as needed for wheezing or shortness of breath   ascorbic acid (VITAMIN C) 500 mg tablet   Yes No   Sig: Take 500 mg by mouth daily   benzocaine (ORAJEL) 10 % mucosal gel  Outside Facility (Specify) No No   Sig: Apply 1 application to the mouth or throat 3 (three) times a day as needed for mucositis   benzonatate (TESSALON PERLES) 100 mg capsule  Outside Facility (Specify) No No   Sig: Take 1 capsule (100 mg total) by mouth 3 (three) times a day as needed for cough   bisacodyl (DULCOLAX) 10 mg suppository   Yes No   Sig: Insert 10 mg into the rectum daily as needed for constipation   calcium carbonate (OS-TEDDY) 600 MG tablet  Outside Facility (Specify) Yes No   Sig: Take 600 mg by mouth daily   carboxymethylcellulose (REFRESH LIQUIGEL) 1 % ophthalmic solution  Outside Facility (Specify) Yes No   Sig: Apply 1 drop to eye 3 (three) times a day     cetirizine (ZyrTEC) 10 MG chewable tablet  Outside Facility (Specify) No No   Sig: Chew 1 tablet (10 mg total) daily   donepezil (ARICEPT) 10 mg tablet  Outside Facility (Specify) No No   Sig: Take 0 5 tablets (5 mg total) by mouth daily at bedtime Increased confusion on 10mg   estradiol (ESTRACE) 0 1 mg/g vaginal cream  Outside Facility (Specify) No No   Sig: Insert 1 g into the vagina daily Do no insert - please apply a pea sized amount to the urethra/vagina Monday, Wednesday and Friday before bedtime   ferrous gluconate (FERGON) 324 mg tablet   Yes No   Sig: Take 65 mg by mouth daily with breakfast   fluticasone (FLONASE) 50 mcg/act nasal spray  Outside Facility (Specify) No No   Si spray into each nostril daily   guaiFENesin (MUCINEX) 600 mg 12 hr tablet  Outside Facility (Specify) No No   Sig: Take 1 tablet (600 mg total) by mouth 2 (two) times a day as needed for congestion   ibuprofen (MOTRIN) 400 mg tablet   No No   Sig: Take 1 tablet (400 mg total) by mouth every 6 (six) hours as needed for mild pain (fever)   ipratropium-albuterol (COMBIVENT RESPIMAT) inhaler  Outside Facility (Specify) No No   Sig: Inhale 2 puffs every 4 (four) hours as needed (dyspnea)   levothyroxine 100 mcg tablet  Outside Facility (Specify) Yes No   Sig: Take 100 mcg by mouth daily    losartan (COZAAR) 100 MG tablet  Outside Facility (Specify) No No   Sig: Take 1 tablet (100 mg total) by mouth daily   Patient taking differently: Take 100 mg by mouth daily Patient takes 75mg daily   magnesium hydroxide (MILK OF MAGNESIA) 400 mg/5 mL oral suspension  Outside Facility (Specify) Yes No   Sig: Take 30 mL by mouth daily as needed for constipation     metoprolol tartrate (LOPRESSOR) 50 mg tablet  Outside Facility (Specify) No No   Sig: Take 0 5 tablets (25 mg total) by mouth 2 (two) times a day   metroNIDAZOLE (FLAGYL) 500 mg tablet   No No   Sig: Take 1 tablet (500 mg total) by mouth every 8 (eight) hours for 5 days   nystatin (MYCOSTATIN) 100,000 units/mL suspension   Yes No   Sig: Apply 100,000 Units to the mouth or throat 2 (two) times a day   nystatin (MYCOSTATIN) powder   Yes No   Sig: Apply topically 2 (two) times a day Apply to b/l groin folds   pantoprazole (PROTONIX) 40 mg tablet  Outside Facility (Specify) Yes No   Sig: Take 40 mg by mouth 2 (two) times a day     pravastatin (PRAVACHOL) 40 mg tablet  Outside Facility (Specify) No No   Sig: Take 1 tablet (40 mg total) by mouth daily   sodium chloride (OCEAN) 0 65 % nasal spray  Outside Facility (Specify) Yes No   Si sprays into each nostril 2 (two) times a day   triamcinolone (KENALOG) 0 1 % cream  Outside Facility (Specify) Yes No   Sig: triamcinolone acetonide 0 1 % topical cream   tuberculin (TUBERSOL) 5 units/0 1 mL   Yes No   Sig: Inject 5 Units into the skin once      Facility-Administered Medications: None       Past Medical History: Diagnosis Date    Aspiration pneumonia ([facility] [address] )     Last Assessed:  7/18/17    Basal cell carcinoma of nose     Last Assessed:  4/12/17    Blind     blind right eye, 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, Last Assessed:  7/18/17    Dementia     Depression     Disease of thyroid gland     Fibromyalgia     Last Assessed:  7/18/17    Gait disturbance     GERD (gastroesophageal reflux disease)     Glaucoma     Hyperlipidemia     Hypertension     Osteopenia     Peripheral neuropathy     Last Assessed:  4/12/17    Polymyalgia rheumatica ([facility] [address] )     Psychiatric disorder     depression    Renal insufficiency syndrome     Last Assessed:  4/13/17    Rheumatoid arthritis ([facility] [address] )     Seizure ([facility] [address] ) 9/10/2019    Stroke ([facility] )     x2, Last Assessed:  7/18/17    Systemic lupus erythematosus ([facility] )     Vertigo     Vitamin D deficiency        Past Surgical History:   Procedure Laterality Date    EYE SURGERY      JOINT REPLACEMENT      left hip replacement, left shoulder replacement    SHOULDER SURGERY      Replacement       Family History   Problem Relation Age of Onset    Heart attack Mother     Diabetes Mother     Dementia Father     Coronary artery disease Father         cardiac disorder    Diabetes Sister     Uterine cancer Sister      I have reviewed and agree with the history as documented      Social History     Tobacco Use    Smoking status: Never Smoker    Smokeless tobacco: Never Used   Substance Use Topics    Alcohol use: Never     Frequency: Never    Drug use: No        Review of Systems   Unable to perform ROS: Mental status change       Physical Exam  ED Triage Vitals [09/10/19 0728]   Temperature Pulse Respirations Blood Pressure SpO2   (!) 101 2 °F (38 4 °C) (!) 127 19 153/66 100 %      Temp Source Heart Rate Source Patient Position - Orthostatic VS BP Location FiO2 (%)   Rectal Monitor Lying Left arm --      Pain Score       No Pain             Orthostatic Vital Signs  Vitals: 09/13/19 2028 09/13/19 2307 09/14/19 0803 09/14/19 1212   BP: 150/83 159/63 146/66 121/54   Pulse: 81 85 76 55   Patient Position - Orthostatic VS:           Physical Exam   Constitutional: She appears well-developed and well-nourished  She appears distressed  HENT:   Head: Normocephalic and atraumatic  Right Ear: External ear normal    Left Ear: External ear normal    Nose: Nose normal    Eyes: Conjunctivae and EOM are normal  Right eye exhibits no discharge  Left eye exhibits no discharge  No scleral icterus  Right conjunctival with chronic change/sclerosis    Left eye reactive but left deviated   Neck: Normal range of motion  Neck supple  No JVD present  No tracheal deviation present  Cardiovascular: Normal heart sounds and intact distal pulses  No murmur heard  Tachycardic    After intubation with intermittent bradycardia   Pulmonary/Chest: No stridor  She is in respiratory distress  She has no wheezes  She has rales  Sonorous respiration, no airway protection   Abdominal: Soft  Bowel sounds are normal  She exhibits no distension and no mass  There is no tenderness  There is no rebound and no guarding  Musculoskeletal: Normal range of motion  She exhibits no edema, tenderness or deformity  Lymphadenopathy:     She has no cervical adenopathy  Neurological: She displays normal reflexes  No cranial nerve deficit  GCS 6    Non responsive    Responds to pain after versed IV   Skin: Skin is warm and dry  Capillary refill takes 2 to 3 seconds  No rash noted  She is not diaphoretic  Nursing note and vitals reviewed        ED Medications  Medications   bisacodyl (DULCOLAX) rectal suppository 10 mg ( Rectal MAR Unhold 9/11/19 1700)   levothyroxine tablet 100 mcg (100 mcg Oral Given 9/14/19 0619)   QUEtiapine (SEROquel) tablet 12 5 mg (12 5 mg Oral Given 9/14/19 0939)   donepezil (ARICEPT) tablet 5 mg (5 mg Oral Given 9/13/19 2110)   fentanyl citrate (PF) 100 MCG/2ML 50 mcg ( Intravenous MAR Unhold 9/11/19 1700)   ondansetron (ZOFRAN) injection 4 mg (4 mg Intravenous Given 9/11/19 1753)   LORazepam (ATIVAN) 2 mg/mL injection 2 mg ( Intravenous MAR Unhold 9/11/19 1700)   atorvastatin (LIPITOR) tablet 40 mg (40 mg Oral Given 9/13/19 1718)   NIFEdipine (PROCARDIA XL) 24 hr tablet 60 mg (60 mg Oral Given 9/14/19 0939)   cefTRIAXone (ROCEPHIN) 1,000 mg in dextrose 5 % 50 mL IVPB (1,000 mg Intravenous New Bag 9/13/19 2026)   azithromycin (ZITHROMAX) tablet 500 mg (500 mg Oral Given 9/13/19 2026)   sodium chloride 0 9 % infusion (0 mL/hr Intravenous Stopped 9/13/19 2025)   apixaban (ELIQUIS) tablet 2 5 mg (2 5 mg Oral Given 9/14/19 0938)   potassium chloride (K-DUR,KLOR-CON) CR tablet 20 mEq (20 mEq Oral Given 9/14/19 0931)   metoprolol tartrate (LOPRESSOR) tablet 50 mg (50 mg Oral Given 9/14/19 0932)   pantoprazole (PROTONIX) EC tablet 40 mg (has no administration in time range)   LORazepam (FOR EMS ONLY) (ATIVAN) 2 mg/mL injection 2 mg (0 mg Does not apply Given to EMS 9/10/19 0741)   midazolam (VERSED) injection 2 mg (2 mg Intravenous Given 9/10/19 0730)   etomidate (AMIDATE) 2 mg/mL injection 20 mg (20 mg Intravenous Given 9/10/19 0802)   propofol (DIPRIVAN) 200 MG/20ML bolus injection 50 mg (50 mg Intravenous Given 9/10/19 0806)   sodium chloride 0 9 % bolus 1,000 mL (0 mL Intravenous Stopped 9/10/19 0955)   vancomycin (VANCOCIN) 1,250 mg in sodium chloride 0 9 % 250 mL IVPB (1,250 mg Intravenous New Bag 9/10/19 1105)   cefepime (MAXIPIME) 1,000 mg in dextrose 5 % 50 mL IVPB (0 mg Intravenous Stopped 9/10/19 1104)   multi-electrolyte (ISOLYTE-S PH 7 4) bolus 1,000 mL (0 mL Intravenous Stopped 9/10/19 1045)   metroNIDAZOLE (FLAGYL) IVPB (premix) 500 mg (500 mg Intravenous New Bag 9/10/19 1338)   clopidogrel (PLAVIX) tablet 300 mg (300 mg Oral Given 9/10/19 1505)   potassium chloride (K-DUR,KLOR-CON) CR tablet 40 mEq (40 mEq Oral Given 9/11/19 1037)       Diagnostic Studies  Results Reviewed     Procedure Component Value Units Date/Time    Blood culture #1 [[id number]] Collected:  09/10/19 0742    Lab Status:  Preliminary result Specimen:  Blood from Arm, Left Updated:  09/14/19 1201     Blood Culture No Growth After 4 Days  Blood culture #2 [[id number]] Resulted:  09/14/19 1201    Lab Status:  Preliminary result Specimen:  Blood Updated:  09/14/19 1201     Blood Culture No Growth After 4 Days      Basic metabolic panel [[id number]]  (Abnormal) Collected:  09/11/19 0524    Lab Status:  Final result Specimen:  Blood from Arm, Left Updated:  09/11/19 0611     Sodium 138 mmol/L      Potassium 3 6 mmol/L      Chloride 107 mmol/L      CO2 21 mmol/L      ANION GAP 10 mmol/L      BUN 9 mg/dL      Creatinine 1 84 mg/dL      Glucose 101 mg/dL      Calcium 7 8 mg/dL      eGFR 24 ml/min/1 73sq m     Narrative:       National Kidney Disease Foundation guidelines for Chronic Kidney Disease (CKD):     Stage 1 with normal or high GFR (GFR > 90 mL/min/1 73 square meters)    Stage 2 Mild CKD (GFR = 60-89 mL/min/1 73 square meters)    Stage 3A Moderate CKD (GFR = 45-59 mL/min/1 73 square meters)    Stage 3B Moderate CKD (GFR = 30-44 mL/min/1 73 square meters)    Stage 4 Severe CKD (GFR = 15-29 mL/min/1 73 square meters)    Stage 5 End Stage CKD (GFR <15 mL/min/1 73 square meters)  Note: GFR calculation is accurate only with a steady state creatinine    CBC and differential [[id number]]  (Abnormal) Collected:  09/11/19 0524    Lab Status:  Final result Specimen:  Blood from Arm, Left Updated:  09/11/19 0556     WBC 9 42 Thousand/uL      RBC 2 97 Million/uL      Hemoglobin 8 7 g/dL      Hematocrit 27 0 %      MCV 91 fL      MCH 29 3 pg      MCHC 32 2 g/dL      RDW 15 5 %      MPV 9 4 fL      Platelets 763 Thousands/uL      nRBC 0 /100 WBCs      Neutrophils Relative 77 %      Immat GRANS % 1 %      Lymphocytes Relative 12 %      Monocytes Relative 10 %      Eosinophils Relative 0 %      Basophils Relative 0 %      Neutrophils Absolute 7 37 Thousands/µL      Immature Grans Absolute 0 05 Thousand/uL      Lymphocytes Absolute 1 09 Thousands/µL      Monocytes Absolute 0 90 Thousand/µL      Eosinophils Absolute 0 00 Thousand/µL      Basophils Absolute 0 01 Thousands/µL     UA w Reflex to Microscopic w Reflex to Culture [[id number]]  (Abnormal) Collected:  09/10/19 1744    Lab Status:  Final result Specimen:  Urine, Straight Cath Updated:  09/10/19 1839     Color, UA Yellow     Clarity, UA Clear     Specific Gravity, UA 1 010     pH, UA 6 0     Leukocytes, UA Negative     Nitrite, UA Negative     Protein, UA Negative mg/dl      Glucose, UA Negative mg/dl      Ketones, UA Negative mg/dl      Urobilinogen, UA 0 2 E U /dl      Bilirubin, UA Negative     Blood, UA Large    Lactic acid, plasma [[id number]]  (Normal) Collected:  09/10/19 1135    Lab Status:  Final result Specimen:  Blood from Arm, Left Updated:  09/10/19 1208     LACTIC ACID 1 5 mmol/L     Narrative:       Result may be elevated if tourniquet was used during collection  Platelet count [[id number]]  (Normal) Collected:  09/10/19 1135    Lab Status:  Final result Specimen:  Blood from Arm, Left Updated:  09/10/19 1149     Platelets 696 Thousands/uL      MPV 9 7 fL     TSH [[id number]]  (Abnormal) Collected:  09/10/19 0742    Lab Status:  Final result Specimen:  Blood from Arm, Left Updated:  09/10/19 0819     TSH 3RD GENERATON 6 300 uIU/mL     Narrative:       Patients undergoing fluorescein dye angiography may retain small amounts of fluorescein in the body for 48-72 hours post procedure  Samples containing fluorescein can produce falsely depressed TSH values  If the patient had this procedure,a specimen should be resubmitted post fluorescein clearance        Lactic acid x2 [[id number]]  (Abnormal) Collected:  09/10/19 0730    Lab Status:  Final result Specimen:  Blood Updated:  09/10/19 0817     LACTIC ACID 8 7 mmol/L     Narrative:       Result may be elevated if tourniquet was used during collection  Comprehensive metabolic panel [[id number]]  (Abnormal) Collected:  09/10/19 0730    Lab Status:  Final result Specimen:  Blood Updated:  09/10/19 0810     Sodium 133 mmol/L      Potassium 4 0 mmol/L      Chloride 105 mmol/L      CO2 15 mmol/L      ANION GAP 13 mmol/L      BUN 10 mg/dL      Creatinine 1 88 mg/dL      Glucose 191 mg/dL      Calcium 8 2 mg/dL      AST 31 U/L      ALT 7 U/L      Alkaline Phosphatase 126 U/L      Total Protein 6 9 g/dL      Albumin 2 6 g/dL      Total Bilirubin 0 22 mg/dL      eGFR 24 ml/min/1 73sq m     Narrative:       [place] guidelines for Chronic Kidney Disease (CKD):     Stage 1 with normal or high GFR (GFR > 90 mL/min/1 73 square meters)    Stage 2 Mild CKD (GFR = 60-89 mL/min/1 73 square meters)    Stage 3A Moderate CKD (GFR = 45-59 mL/min/1 73 square meters)    Stage 3B Moderate CKD (GFR = 30-44 mL/min/1 73 square meters)    Stage 4 Severe CKD (GFR = 15-29 mL/min/1 73 square meters)    Stage 5 End Stage CKD (GFR <15 mL/min/1 73 square meters)  Note: GFR calculation is accurate only with a steady state creatinine    CBC and differential [[id number]]  (Abnormal) Resulted:  09/10/19 0804    Lab Status:  Final result Specimen:  Blood Updated:  09/10/19 0804     WBC 17 80 Thousand/uL      RBC 3 43 Million/uL      Hemoglobin 10 1 g/dL      Hematocrit 32 8 %      MCV 96 fL      MCH 29 4 pg      MCHC 30 8 g/dL      RDW 15 3 %      MPV 9 9 fL      Platelets 578 Thousands/uL      nRBC 0 /100 WBCs     Narrative: This is an appended report  These results have been appended to a previously verified report      Troponin I [[id number]]  (Normal) Collected:  09/10/19 0730    Lab Status:  Final result Specimen:  Blood Updated:  09/10/19 0800     Troponin I <0 02 ng/mL     APTT [[id number]]  (Normal) Collected:  09/10/19 0749    Lab Status:  Final result Specimen:  Blood Updated:  09/10/19 0753     PTT 30 seconds     Protime-INR [[id number]] (Abnormal) Collected:  09/10/19 0749    Lab Status:  Final result Specimen:  Blood Updated:  09/10/19 0753     Protime 15 6 seconds      INR 1 28    POCT Chem 8+ [[id number]]  (Abnormal) Collected:  09/10/19 0729    Lab Status:  Final result Specimen:  Venous Updated:  09/10/19 0734     SODIUM, I-STAT 135 mmol/l      Potassium, i-STAT 4 1 mmol/L      Chloride, istat 103 mmol/L      CO2, i-STAT 17 mmol/L      Anion Gap, i-STAT 20 mmol/L      Calcium, Ionized i-STAT 1 14 mmol/L      BUN, I-STAT 8 mg/dl      Creatinine, i-STAT 1 7 mg/dl      eGFR 27 ml/min/1 73sq m      Glucose, i-STAT 198 mg/dl      Hct, i-STAT 31 %      Hgb, i-STAT 10 5 g/dl      Specimen Type VENOUS    Narrative:       National Kidney Disease Foundation guidelines for Chronic Kidney Disease (CKD):     Stage 1 with normal or high GFR (GFR > 90 mL/min/1 73 square meters)    Stage 2 Mild CKD (GFR = 60-89 mL/min/1 73 square meters)    Stage 3A Moderate CKD (GFR = 45-59 mL/min/1 73 square meters)    Stage 3B Moderate CKD (GFR = 30-44 mL/min/1 73 square meters)    Stage 4 Severe CKD (GFR = 15-29 mL/min/1 73 square meters)    Stage 5 End Stage CKD (GFR <15 mL/min/1 73 square meters)  Note: GFR calculation is accurate only with a steady state creatinine    POCT Blood Gas (CG8+) [[id number]]  (Abnormal) Collected:  09/10/19 0728    Lab Status:  Final result Specimen:  Venous Updated:  09/10/19 0733     ph, Alonzo ISTAT 7 101     pCO2, Alonzo i-STAT 50 3 mm HG      pO2, Alonzo i-STAT 99 0 mm HG      BE, i-STAT -14 mmol/L      HCO3, Alonzo i-STAT 15 6 mmol/L      CO2, i-STAT 17 mmol/L      O2 Sat, i-STAT 94 %      SODIUM, I-STAT 136 mmol/l      Potassium, i-STAT 4 1 mmol/L      Calcium, Ionized i-STAT 1 13 mmol/L      Hct, i-STAT 32 %      Hgb, i-STAT 10 9 g/dl      Glucose, i-STAT 200 mg/dl      Specimen Type VENOUS                 CT head wo contrast   Final Result by Ricarda Adams MD (09/12 9476)      No acute intracranial abnormality  Microangiopathic changes  Chronic right temporoparietal encephalomalacia consistent with stable insult  Workstation performed: [id number]         MRI brain wo contrast   Final Result by Tahir Cuellar MD (09/12 0429)      Small focus of restricted diffusion at the left precentral sulcus consistent with acute/subacute infarct  No evidence of hemorrhage      White matter changes suggestive of chronic microangiopathy  Old right temporoparietal, left cerebellar and left occipital and parietal infarcts         The study was marked in EPIC for immediate notification  Workstation performed: [id number]         MRA head wo contrast   Final Result by Tahir Cuellar MD (09/12 0431)      No evidence of large vessel occlusion, large aneurysm or hemodynamically significant vessel disease throughout the arterial vasculature of the head               Workstation performed: [id number]         CT head without contrast   Final Result by Eris Jeong MD (09/10 0902)      No CT evidence of acute intracranial process or significant interval change  Chronic microangiopathy, chronic moderate right temporoparietal and small left parietal infarctions  Overall, no significant interval change  Workstation performed: [id number]         CT spine cervical without contrast   Final Result by Eris Jeong MD (09/10 0908)      No cervical spine fracture or traumatic malalignment  Stable minimal grade 1 anterolisthesis C3 on C4  Stable moderate multilevel cervical degenerative changes  Stable 2 cm nodule right lobe of thyroid gland  Workstation performed: [id number]         CT chest abdomen pelvis wo contrast   Final Result by Rubi Beltran MD (09/10 4121)      Overall interval improvement in previously seen diverticulitis involving the mid sigmoid colon  Phlegmonous change is noted in this region without discrete drainable collection or gross pneumoperitoneum        Small volume ascites tracking into the pelvis is new since prior examination  Nonspecific pericholecystic fluid is also noted  New bibasilar consolidation compatible subsegmental atelectasis versus pneumonia  Small bilateral pleural effusions are present  Workstation performed: [id number]               Procedures  Intubation  Date/Time: 9/14/2019 12:27 PM  Performed by: Dereje Coyne MD  Authorized by: Dereje Coyne MD     Patient location:  ED  Consent:     Consent obtained:  Emergent situation  Universal protocol:     Patient identity confirmed:  Verbally with patient, arm band and hospital-assigned identification number  Pre-procedure details:     Patient status:  Unresponsive    Mallampati score:  2    Pretreatment medications:  Midazolam and etomidate    Paralytics:  Succinylcholine  Indications:     Indications for intubation: respiratory distress and airway protection    Procedure details:     Preoxygenation:  Bag valve mask    CPR in progress: no      Intubation method:  Oral    Oral intubation technique:  Direct    Laryngoscope blade: Mac 3    Tube size (mm):  8 0    Tube type:  Cuffed    Number of attempts:  1    Ventilation between attempts: no      Cricoid pressure: yes      Tube visualized through cords: yes    Placement assessment:     ETT to lip:  22    Tube secured with:  ETT linton    Breath sounds:  Equal    Placement verification: chest rise, condensation, CXR verification, direct visualization, equal breath sounds, ETCO2 detector, tube exhalation and capnography      CXR findings:  ETT in proper place  Post-procedure details:     Patient tolerance of procedure:   Tolerated well, no immediate complications  Procedural Sedation  Date/Time: 9/14/2019 12:28 PM  Performed by: Dereje Coyne MD  Authorized by: Dereje Coyne MD     Procedure details (see MAR for exact dosages):     Preoxygenation:  Bag valve mask, nonrebreather mask and nasal cannula    Sedation:  Midazolam Intra-procedure monitoring:  Blood pressure monitoring, cardiac monitor, continuous pulse oximetry, continuous capnometry and frequent vital sign checks    Intra-procedure events: none    Post-procedure details:     Patient tolerance: Tolerated well, no immediate complications            ED Course                   Initial Sepsis Screening     Row Name 09/10/19 0734                Is the patient's history suggestive of a new or worsening infection? (!) Yes (Proceed)  -ISELA        Suspected source of infection  suspect infection, source unknown  -ISELA        Are two or more of the following signs & symptoms of infection both present and new to the patient?         Indicate SIRS criteria  Hyperthemia > 38 3C (100 9F); Altered mental status; Tachycardia > 90 bpm  -ISELA        If the answer is yes to both questions, suspicion of sepsis is present          If severe sepsis is present AND tissue hypoperfusion perists in the hour after fluid resuscitation or lactate > 4, the patient meets criteria for SEPTIC SHOCK          Are any of the following organ dysfunction criteria present within 6 hours of suspected infection and SIRS criteria that are NOT considered to be chronic conditions?         Organ dysfunction  Lactate > 2 0 mmol/L  -ISELA        Date of presentation of severe sepsis  09/10/19  -ISELA        Time of presentation of severe sepsis  0815  -ISELA        Tissue hypoperfusion persists in the hour after crystalloid fluid administration, evidenced, by either:  * OR * Lactate level is greater to or equal 4 mmol/dL ( ___ mmol/dL in comment field)  -ISELA        Was hypotension present within one hour of the conclusion of crystalloid fluid administration?   No  -ISELA        Date of presentation of septic shock          Time of presentation of septic shock            User Key  (r) = Recorded By, (t) = Taken By, (c) = Cosigned By    [address] Name Provider Type    Malik Butcher MD Resident           Default Flowsheet Data (last 720 hours)      Sepsis Reassess     Row Name 09/10/19 9862                   Repeat Volume Status and Tissue Perfusion Assessment Performed    Repeat Volume Status and Tissue Perfusion Assessment Performed  Yes  -BS           Volume Status and Tissue Perfusion Post Fluid Resuscitation * Must Document All *    Vital Signs Reviewed (HR, RR, BP, T)  Yes  -BS        Shock Index Reviewed  Yes  -BS        Arterial Oxygen Saturation Reviewed (POx, SaO2 or SpO2)  Yes (comment %)  -BS        Cardio  Normal S1/S2; Regular rate and rhythm  -BS        Pulmonary  Normal effort;Clear to auscultation  -BS        Capillary Refill  Brisk  -BS        Peripheral Pulses  Dorsalis Pedis  -BS        Dorsalis Pedis  +3  -BS        Skin  Warm;Dry  -BS        Urine output assessed  Adequate  -BS           *OR*   Intensive Monitoring- Must Document One of the Following Four *:    Vital Signs Reviewed          * Central Venous Pressure (CVP or RAP)          * Central Venous Oxygen (SVO2, ScvO2 or Oxygen saturation via central catheter)          * Bedside Cardiovascular US in IVC diameter and % collapse          * Passive Leg Raise OR Crystalloid Challenge            User Key  (r) = Recorded By, (t) = Taken By, (c) = Cosigned By    Initials Name Provider Type    BS Vivian Randle MD Resident                MDM  Number of Diagnoses or Management Options  Seizure-like activity [facility]):   Sepsis, due to unspecified organism [facility]):   Diagnosis management comments: Patient with lactic acidosis and fever unclear if caused seizure or 2/2 seizure    Unclear if primary seizure but unlikely given age for new onset    CT head without bleed or mass causing seizure    Patient given broad antibiotics and fluid in the setting of sepsis, intubated for airway protection and admitted to ICU        Disposition  Final diagnoses:   Sepsis, due to unspecified organism ([facility] [address] )   Se[facility] activity ([facility] )     Time reflects when diagnosis was documented in both MDM as applicable and the Disposition within this note     Time User Action Codes Description Comment    9/10/2019  9:43 AM Eboni Lank Add [A41 9] Sepsis, due to unspecified organism ([place] [address] )     9/10/2019  9:43 AM Eboni Lank Add [R56 9] Seizure-like activity ([place] [address] )     9/10/2019  1:18 PM Knute Ivanoff Add [R56 9] Seizure ([place] [address] )     9/13/2019 10:49 AM Carmen Ghassan Add [N17 9] DEVONTE (acute kidney injury) ([facility] )     9/13/2019 10:49 AM Carmen Ghassan Remove [N17 9] DEVONTE (acute kidney injury) ([place] [address] )     9/13/2019 10:49 AM Carmen Ghassan Add [N17 9] DEVONTE (acute kidney injury) ([place] [address] )     9/13/2019 10:49 AM Carmen Ghassan Remove [N17 9] DEVONTE (acute kidney injury) ([place] [address] )     9/13/2019 10:49 AM Carmen Ghassan Add [N17 9] DEVONTE (acute kidney injury) ([place] [address] )     9/13/2019 11:07 AM Carmen Ghassan Add [G30 1,  F02 81] Late onset Alzheimer's disease with behavioral disturbance     9/13/2019 11:07 AM Carmen Ghassan Add [U30 35] Acute metabolic encephalopathy       ED Disposition     ED Disposition Condition Date/Time Comment    Admit  Tue Sep 10, 2019 11:19 AM Admitting Physician: Riya Mcknight [[id number]]   Level of Care: Critical Care [15]   Bed Type: EMU [8]        Follow-up Information    None         Current Discharge Medication List      CONTINUE these medications which have NOT CHANGED    Details   albuterol (2 5 mg/3 mL) 0 083 % nebulizer solution Take 2 5 mg by nebulization every 4 (four) hours as needed for wheezing or shortness of breath      ascorbic acid (VITAMIN C) 500 mg tablet Take 500 mg by mouth daily      benzocaine (ORAJEL) 10 % mucosal gel Apply 1 application to the mouth or throat 3 (three) times a day as needed for mucositis  Qty: 5 3 g, Refills: 0    Associated Diagnoses: Ulcer (traumatic) of oral mucosa      benzonatate (TESSALON PERLES) 100 mg capsule Take 1 capsule (100 mg total) by mouth 3 (three) times a day as needed for cough  Qty: 20 capsule, Refills: 0    Associated Diagnoses: Cough      bisacodyl (DULCOLAX) 10 mg suppository Insert 10 mg into the rectum daily as needed for constipation      calcium carbonate (OS-TEDDY) 600 MG tablet Take 600 mg by mouth daily      carboxymethylcellulose (REFRESH LIQUIGEL) 1 % ophthalmic solution Apply 1 drop to eye 3 (three) times a day        cetirizine (ZyrTEC) 10 MG chewable tablet Chew 1 tablet (10 mg total) daily  Qty: 30 tablet, Refills: 0    Associated Diagnoses: Allergic rhinitis, unspecified seasonality, unspecified trigger      Dentifrices (BIOTENE DRY MOUTH CARE DT) Apply to teeth      donepezil (ARICEPT) 10 mg tablet Take 0 5 tablets (5 mg total) by mouth daily at bedtime Increased confusion on 10mg  Qty: 45 tablet, Refills: 1    Associated Diagnoses: Dementia with behavioral disturbance, unspecified dementia type      DULoxetine (CYMBALTA) 60 mg delayed release capsule Take 1 capsule (60 mg total) by mouth daily  Refills: 0    Associated Diagnoses: Pneumonia of right lower lobe due to infectious organism (HCC)      estradiol (ESTRACE) 0 1 mg/g vaginal cream Insert 1 g into the vagina daily Do no insert - please apply a pea sized amount to the urethra/vagina Monday, Wednesday and Friday before bedtime  Qty: 42 5 g, Refills: 3    Associated Diagnoses: Recurrent UTI      ferrous gluconate (FERGON) 324 mg tablet Take 65 mg by mouth daily with breakfast      fluticasone (FLONASE) 50 mcg/act nasal spray 1 spray into each nostril daily  Qty: 16 g, Refills: 0    Associated Diagnoses:  Allergic rhinitis, unspecified seasonality, unspecified trigger      guaiFENesin (MUCINEX) 600 mg 12 hr tablet Take 1 tablet (600 mg total) by mouth 2 (two) times a day as needed for congestion  Qty: 60 tablet, Refills: 1    Associated Diagnoses: Bronchitis      ibuprofen (MOTRIN) 400 mg tablet Take 1 tablet (400 mg total) by mouth every 6 (six) hours as needed for mild pain (fever)  Qty: 20 tablet, Refills: 0    Associated Diagnoses: Fever      ipratropium-albuterol (COMBIVENT RESPIMAT) inhaler Inhale 2 puffs every 4 (four) hours as needed (dyspnea)  Qty: 1 Inhaler, Refills: 5    Associated Diagnoses: Bronchitis      levothyroxine 100 mcg tablet Take 100 mcg by mouth daily       LORazepam (ATIVAN) 0 5 mg tablet Take 1 tablet (0 5 mg total) by mouth every 8 (eight) hours as needed for anxiety for up to 10 days She always takes 0 5 mg at bedtime  Qty: 30 tablet, Refills: 0    Associated Diagnoses: Anxiety      losartan (COZAAR) 100 MG tablet Take 1 tablet (100 mg total) by mouth daily  Qty: 90 tablet, Refills: 1    Associated Diagnoses: Bradycardia      Lutein 10 MG TABS Take 1 tablet by mouth daily        magnesium hydroxide (MILK OF MAGNESIA) 400 mg/5 mL oral suspension Take 30 mL by mouth daily as needed for constipation        metoprolol tartrate (LOPRESSOR) 50 mg tablet Take 0 5 tablets (25 mg total) by mouth 2 (two) times a day  Qty: 45 tablet, Refills: 1    Associated Diagnoses: Essential hypertension      NIFEdipine (PROCARDIA XL) 60 mg 24 hr tablet Take 60 mg by mouth daily       nystatin (MYCOSTATIN) 100,000 units/mL suspension Apply 100,000 Units to the mouth or throat 2 (two) times a day      nystatin (MYCOSTATIN) powder Apply topically 2 (two) times a day Apply to b/l groin folds      pantoprazole (PROTONIX) 40 mg tablet Take 40 mg by mouth 2 (two) times a day        pravastatin (PRAVACHOL) 40 mg tablet Take 1 tablet (40 mg total) by mouth daily  Refills: 0    Comments: Resume on friday  Associated Diagnoses: Pseudogout of left wrist      QUEtiapine (SEROquel) 25 mg tablet Take 12 5 mg by mouth 2 (two) times a day May crush medications        sodium chloride (OCEAN) 0 65 % nasal spray 2 sprays into each nostril 2 (two) times a day      Sodium Phosphates (FLEET ENEMA RE) Insert 1 Dose into the rectum daily PRN if dulcolax supp is not effective      triamcinolone (KENALOG) 0 1 % cream triamcinolone acetonide 0 1 % topical cream      tuberculin (TUBERSOL) 5 units/0 1 mL Inject 5 Units into the skin once         STOP taking these medications       metroNIDAZOLE (FLAGYL) 500 mg tablet Comments:   Reason for Stopping:             No discharge procedures on file  ED Provider  Attending physically available and evaluated Thena Lundborg I managed the patient along with the ED Attending      Electronically Signed by         Alycia Hwang MD  09/14/19 1892

## 2019-09-10 NOTE — RESPIRATORY THERAPY NOTE
Pt was extubated and placed on 2L NC  Pt was able to speak her name and no stridor noted at this time  Family was bedside and pt was talked to daughter

## 2019-09-10 NOTE — SOCIAL WORK
Pt is intubated and sedated  Pt is a < 30 days readmission  Pt was admitted for seizure at this time  Pt is not a bundle  CM reviewed pt's MR from last admission  Pt is a long term resident at [facility]  Pt requires assist x1 for all ADLs  Pt requires assist x1 for ambulation with a rolling walker  Pt requires much prompting to stand and ambulate  Pt is able to roll herself back and forth with verbal cues       Pt is not on the skilled dementia unit at [facility]  Pt can be combative at times  At baseline pt does curse and yell out things such as "I'm dying or I'm dead"      Pt has previous hx with [facility] and [facility] ([facility]) prior to being placed at [facility] long term       Contact: Kelley Monzon (daughter) [phone]  No POA or Living Will on File  Pt will need transportation back to facility once medically stable  CM will follow with pt's dcp needs

## 2019-09-10 NOTE — ED NOTES
Patient has no gag reflex at this time  Dr Petra Mayorga and Dr Wilton Crook at bedside   Intubation tray and RSI box at bedside      [facility], [address] [place] [address]  09/10/19 1433

## 2019-09-10 NOTE — SEPSIS NOTE
Sepsis Note   Vera Morton 80 y o  female MRN: [id number]  Unit/Bed#: MICU 01 Encounter: [id number]      qSOFA     [address] Name 09/10/19 1336 09/10/19 1239 09/10/19 1100 09/10/19 0910 09/10/19 0847    Altered mental status GCS < 15              Respiratory Rate > / =22  0      0  0    Systolic BP < / =183  0  0  0  0  0    Q Sofa Score  0  0  0  0  0    Row Name 09/10/19 0842 09/10/19 0804 09/10/19 0758 09/10/19 0746 09/10/19 0745    Altered mental status GCS < 15          1    Respiratory Rate > / =22  0  0  0  0  0    Systolic BP < / =255    0  0  0  0    Q Sofa Score  0  0  0  0  1    Row Name 09/10/19 0728                Altered mental status GCS < 15          Respiratory Rate > / =19  0        Systolic BP < / =266  0        Q Sofa Score  0            Initial Sepsis Screening     Row Name 09/10/19 0734                Is the patient's history suggestive of a new or worsening infection? (!) Yes (Proceed)  -ISELA        Suspected source of infection  suspect infection, source unknown  -ISELA        Are two or more of the following signs & symptoms of infection both present and new to the patient?         Indicate SIRS criteria  Hyperthemia > 38 3C (100 9F); Altered mental status; Tachycardia > 90 bpm  -ISELA        If the answer is yes to both questions, suspicion of sepsis is present          If severe sepsis is present AND tissue hypoperfusion perists in the hour after fluid resuscitation or lactate > 4, the patient meets criteria for SEPTIC SHOCK          Are any of the following organ dysfunction criteria present within 6 hours of suspected infection and SIRS criteria that are NOT considered to be chronic conditions?           Organ dysfunction  Lactate > 2 0 mmol/L  -ISELA        Date of presentation of severe sepsis  09/10/19  -ISELA        Time of presentation of severe sepsis  0815  -        Tissue hypoperfusion persists in the hour after crystalloid fluid administration, evidenced, by either:  * OR * Lactate level is greater to or equal 4 mmol/dL ( ___ mmol/dL in comment field)  -ISELA        Was hypotension present within one hour of the conclusion of crystalloid fluid administration? No  -ISELA        Date of presentation of septic shock          Time of presentation of septic shock            User Key  (r) = Recorded By, (t) = Taken By, (c) = Cosigned By    Initials Name Provider Type    Festus Salinas MD Resident               Default Flowsheet Data (last 720 hours)      Sepsis Reassess     Row Name 09/10/19 1336                   Repeat Volume Status and Tissue Perfusion Assessment Performed    Repeat Volume Status and Tissue Perfusion Assessment Performed  Yes  -BS           Volume Status and Tissue Perfusion Post Fluid Resuscitation * Must Document All *    Vital Signs Reviewed (HR, RR, BP, T)  Yes  -BS        Shock Index Reviewed  Yes  -BS        Arterial Oxygen Saturation Reviewed (POx, SaO2 or SpO2)  Yes (comment %)  -BS        Cardio  Normal S1/S2; Regular rate and rhythm  -BS        Pulmonary  Normal effort;Clear to auscultation  -BS        Capillary Refill  Brisk  -BS        Peripheral Pulses  Dorsalis Pedis  -BS        Dorsalis Pedis  +3  -BS        Skin  Warm;Dry  -BS        Urine output assessed  Adequate  -BS           *OR*   Intensive Monitoring- Must Document One of the Following Four *:    Vital Signs Reviewed          * Central Venous Pressure (CVP or RAP)          * Central Venous Oxygen (SVO2, ScvO2 or Oxygen saturation via central catheter)          * Bedside Cardiovascular US in IVC diameter and % collapse          * Passive Leg Raise OR Crystalloid Challenge            User Key  (r) = Recorded By, (t) = Taken By, (c) = Cosigned By    Initials Name Provider Type    Breanna Rodrigez MD Resident

## 2019-09-10 NOTE — H&P
History and Physical - Critical Care   Mercedes Colon 80 y o  female MRN: [id number]  Unit/Bed#: ED 29 Encounter: [id number]    Reason for Admission / Chief Complaint:  Seizure    History of Present Illness:  Mercedes Colon is a 80 y o  female who presents from [place]  with witnessed tonic colonic seizures this morning  She has a past medical history of hypothyroidism hypertension  2 CVAs in 2016 & 2017, GERD and hyperlipidemia  No hx of seizures  Per daughter she  was at baseline yesterday  Her daughter  said she was coughing yesterday but it was nonproductive  She has  This morning she was heard screaming in her bedroom and when staff went to go check on her she was found to be having a seizure  EMS was called and when they arrived she was given 2 mg of Ativan which stopped tonic colonic seizure  Patient remained unresponsive  Blood glucose at the time was 170  She was tachycardic at the time  When she arrived in the ED she was still unresponsive   She was not not exhibiting  any tonic colonic posturing however did have pulsating eye movements is presumed to still be having a seizure and was given 2 mg of Versed which stopped rhythmic eye movement  She was intubated for airway protection  Of note patient was recently discharged from the hospital   She was found to have acute diverticulitis and UTI ( E coli)  She received IV Cefazolin and flagyl  while hospitalized  She was discharged with 5 days of Ciprofloxacin  History obtained from daughter  and chart review      Past Medical History:  Past Medical History:   Diagnosis Date    Aspiration pneumonia ([facility] [address] )     Last Assessed:  7/18/17    Basal cell carcinoma of nose     Last Assessed:  4/12/17    Blind     blind right eye, 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, Last Assessed:  7/18/17    Dementia     Depression     Disease of thyroid gland     Fibromyalgia     Last Assessed:  7/18/17    Gait disturbance     GERD (gastroesophageal reflux disease)     Glaucoma     Hyperlipidemia     Hypertension     Osteopenia     Peripheral neuropathy     Last Assessed:  4/12/17    [facility]ymyalgia rhe[facility]a [facility] [address] [facility]     Psychiatric disorder     depression    Renal insufficiency syndrome     Last Assessed:  4/13/17    Rheumatoid arthritis ([facility] )     Stroke ([facility] )     x2, Last Assessed:  7/18/17    Systemic lupus erythematosus ([facility] )     Vertigo     Vitamin D deficiency        Past Surgical History:  Past Surgical History:   Procedure Laterality Date    EYE SURGERY      JOINT REPLACEMENT      left hip replacement, left shoulder replacement    SHOULDER SURGERY      Replacement       Past Family History:  Family History   Problem Relation Age of Onset    Heart attack Mother     Diabetes Mother     Dementia Father     Coronary artery disease Father         cardiac disorder    Diabetes Sister     Uterine cancer Sister        Social History:  Social History     Tobacco Use   Smoking Status Never Smoker   Smokeless Tobacco Never Used     Social History     Substance and Sexual Activity   Alcohol Use Never    Frequency: Never     Social History     Substance and Sexual Activity   Drug Use No         Medications:  Current Facility-Administered Medications   Medication Dose Route Frequency    atropine 1 mg/10 mL injection 0 5 mg  0 5 mg Intravenous Once    cefepime (MAXIPIME) 1,000 mg in dextrose 5 % 50 mL IVPB  1,000 mg Intravenous Once    metroNIDAZOLE (FLAGYL) IVPB (premix) 500 mg  500 mg Intravenous Once    multi-electrolyte (ISOLYTE-S PH 7 4) bolus 1,000 mL  1,000 mL Intravenous Once    propofol (DIPRIVAN) 1000 mg in 100 mL infusion (premix)  5-50 mcg/kg/min Intravenous Titrated    vancomycin (VANCOCIN) 1,250 mg in sodium chloride 0 9 % 250 mL IVPB  20 mg/kg Intravenous Once     Home medications:  Prior to Admission medications    Medication Sig Start Date End Date Taking?  Authorizing Provider   albuterol (2 5 mg/3 mL) 0 083 % nebulizer solution Take 2 5 mg by nebulization every 4 (four) hours as needed for wheezing or shortness of breath    Historical Provider, MD   ascorbic acid (VITAMIN C) 500 mg tablet Take 500 mg by mouth daily    Historical Provider, MD   benzocaine (ORAJEL) 10 % mucosal gel Apply 1 application to the mouth or throat 3 (three) times a day as needed for mucositis 7/16/18   Cinderella Phils,    benzonatate (TESSALON PERLES) 100 mg capsule Take 1 capsule (100 mg total) by mouth 3 (three) times a day as needed for cough 11/12/18   Mandi Ayon MD   bisacodyl (DULCOLAX) 10 mg suppository Insert 10 mg into the rectum daily as needed for constipation    Historical Provider, MD   calcium carbonate (OS-TEDDY) 600 MG tablet Take 600 mg by mouth daily    Historical Provider, MD   carboxymethylcellulose (REFRESH LIQUIGEL) 1 % ophthalmic solution Apply 1 drop to eye 3 (three) times a day   8/24/17   Historical Provider, MD   cetirizine (ZyrTEC) 10 MG chewable tablet Chew 1 tablet (10 mg total) daily 8/22/18   Jong Cee PA-C   ciprofloxacin (CIPRO) 500 mg tablet Take 1 tablet (500 mg total) by mouth every 12 (twelve) hours for 5 days 9/6/19 9/11/19  Shay Garibay PA-C   Dentifrices (BIOTENE DRY MOUTH CARE DT) Apply to teeth    Historical Provider, MD   donepezil (ARICEPT) 10 mg tablet Take 0 5 tablets (5 mg total) by mouth daily at bedtime Increased confusion on 10mg 8/16/18   Cinderella Fass, DO   DULoxetine (CYMBALTA) 60 mg delayed release capsule Take 1 capsule (60 mg total) by mouth daily 4/17/18   Jonelle Patrick PA-C   estradiol (ESTRACE) 0 1 mg/g vaginal cream Insert 1 g into the vagina daily Do no insert - please apply a pea sized amount to the urethra/vagina Monday, Wednesday and Friday before bedtime 1/7/19   Stephanie Flowers MD   ferrous gluconate (FERGON) 324 mg tablet Take 65 mg by mouth daily with breakfast    Historical Provider, MD   fluticasone (FLONASE) 50 mcg/act nasal spray 1 spray into each nostril daily 8/22/18   Musa Linares PA-C   guaiFENesin (MUCINEX) 600 mg 12 hr tablet Take 1 tablet (600 mg total) by mouth 2 (two) times a day as needed for congestion 5/18/18   Seema Gallegos DO   ibuprofen (MOTRIN) 400 mg tablet Take 1 tablet (400 mg total) by mouth every 6 (six) hours as needed for mild pain (fever) 3/27/19   Eric Desir PA-C   ipratropium-albuterol (COMBIVENT RESPIMAT) inhaler Inhale 2 puffs every 4 (four) hours as needed (dyspnea) 5/18/18   Seema Gallegos DO   levothyroxine 100 mcg tablet Take 100 mcg by mouth daily  12/22/17   Historical Provider, MD   LORazepam (ATIVAN) 0 5 mg tablet Take 1 tablet (0 5 mg total) by mouth every 8 (eight) hours as needed for anxiety for up to 10 days She always takes 0 5 mg at bedtime  Patient taking differently: Take 0 5 mg by mouth daily at bedtime She always takes 0 5 mg at bedtime 1/28/19 9/2/19  Ruth Hargrove PA-C   losartan (COZAAR) 100 MG tablet Take 1 tablet (100 mg total) by mouth daily  Patient taking differently: Take 100 mg by mouth daily Patient takes 75mg daily 8/16/18   Seema Gallegos DO   Lutein 10 MG TABS Take 1 tablet by mouth daily      Historical Provider, MD   magnesium hydroxide (MILK OF MAGNESIA) 400 mg/5 mL oral suspension Take 30 mL by mouth daily as needed for constipation      Historical Provider, MD   metoprolol tartrate (LOPRESSOR) 50 mg tablet Take 0 5 tablets (25 mg total) by mouth 2 (two) times a day 8/16/18   Seema Gallegos DO   metroNIDAZOLE (FLAGYL) 500 mg tablet Take 1 tablet (500 mg total) by mouth every 8 (eight) hours for 5 days 9/6/19 9/11/19  Linsey THOMAS PA-C   NIFEdipine (PROCARDIA XL) 60 mg 24 hr tablet Take 60 mg by mouth daily  6/25/18   Historical Provider, MD   nystatin (MYCOSTATIN) 100,000 units/mL suspension Apply 100,000 Units to the mouth or throat 2 (two) times a day    Historical Provider, MD   nystatin (MYCOSTATIN) powder Apply topically 2 (two) times a day Apply to b/l groin folds    Historical Provider, MD   pantoprazole (PROTONIX) 40 mg tablet Take 40 mg by mouth 2 (two) times a day      Historical Provider, MD   pravastatin (PRAVACHOL) 40 mg tablet Take 1 tablet (40 mg total) by mouth daily 19   Ruth Hargrove PA-C   QUEtiapine (SEROquel) 25 mg tablet Take 12 5 mg by mouth 2 (two) times a day May crush medications  Historical Provider, MD   sodium chloride (OCEAN) 0 65 % nasal spray 2 sprays into each nostril 2 (two) times a day    Historical Provider, MD   Sodium Phosphates (FLEET ENEMA RE) Insert 1 Dose into the rectum daily PRN if dulcolax supp is not effective    Historical Provider, MD   triamcinolone (KENALOG) 0 1 % cream triamcinolone acetonide 0 1 % topical cream    Historical Provider, MD   tuberculin (TUBERSOL) 5 units/0 1 mL Inject 5 Units into the skin once    Historical Provider, MD     Allergies: Allergies   Allergen Reactions    Acetaminophen     Golimumab      Other reaction(s): dedrick colored stool    Lidocaine Other (See Comments)    Neurontin [Gabapentin]     Nortriptyline Tremor    Prasterone      Other reaction(s): pruitis    Tricyclic Antidepressants     Leflunomide Rash    Sulfasalazine Rash       ROS:   Review of Systems   Unable to perform ROS: Intubated       Vitals:  Vitals:    09/10/19 0804 09/10/19 0842 09/10/19 0847 09/10/19 0910   BP: 153/69  116/55 124/60   BP Location: Right arm  Right arm Right arm   Pulse: (!) 133 82 96 97   Resp: 20 18 16 16   Temp:       TempSrc:       SpO2: 100% 100% 100% 97%   Weight:       Height:         Temperature:   Temp (24hrs), Av 2 °F (38 4 °C), Min:101 2 °F (38 4 °C), Max:101 2 °F (38 4 °C)    Current Temperature: (!) 101 2 °F (38 4 °C)    Weights:   IBW: 45 5 kg  Body mass index is 27 9 kg/m²      Hemodynamic Monitoring:  CVP:       Non-Invasive/Invasive Ventilation Settings:  Respiratory    Lab Data (Last 4 hours)    None         O2/Vent Data (Last 4 hours)      09/10 0809 09/10 0196         Vent Mode  AC/VC      Resp Rate (BPM) (BPM) 18 16      Vt (mL) (mL) 400 400      FIO2 (%) (%) 100 30      PEEP (cmH2O) (cmH2O) 5 5      Patient safety screen outcome:  Failed  Comment: pt just intubated       MV  6 21                No results found for: PHART, DDU7XTY, PO2ART, MEX2JYM, B1WAPJOP, BEART, SOURCE  SpO2: SpO2: 98 %     Physical Exam:  Physical Exam   Constitutional: No distress  Cardiovascular: Normal rate, regular rhythm and normal heart sounds  Pulmonary/Chest: Effort normal and breath sounds normal    Abdominal: Bowel sounds are normal  She exhibits distension  There is no tenderness  Neurological:   Opens eyes and withdrawals to pain  Skin: She is not diaphoretic  Nursing note and vitals reviewed        Labs:  Results from last 7 days   Lab Units 09/10/19  0804 09/10/19  0729 09/10/19  0728 09/06/19  0534 09/05/19  0516 09/04/19  0507   WBC Thousand/uL 17 80*  --   --  6 22 8 84 13 68*   HEMOGLOBIN g/dL 10 1*  --   --  9 2* 9 5* 9 0*   I STAT HEMOGLOBIN g/dl  --  10 5* 10 9*  --   --   --    HEMATOCRIT % 32 8*  --   --  28 8* 29 6* 28 2*   HEMATOCRIT, ISTAT %  --  31* 32*  --   --   --    PLATELETS Thousands/uL 451*  --   --  170 158 156   NEUTROS PCT %  --   --   --  74 79* 79*   MONOS PCT %  --   --   --  9 6 6   MONO PCT % 5  --   --   --   --   --       Results from last 7 days   Lab Units 09/10/19  0730 09/10/19  0729 09/10/19  0728 09/06/19  0534 09/05/19  0516   POTASSIUM mmol/L 4 0  --   --  3 3* 3 6   CHLORIDE mmol/L 105  --   --  108 110*   CO2 mmol/L 15*  --   --  21 22   CO2, I-STAT mmol/L  --  17* 17*  --   --    BUN mg/dL 10  --   --  7 10   CREATININE mg/dL 1 88*  --   --  0 66 0 77   CALCIUM mg/dL 8 2*  --   --  8 2* 8 4   ALK PHOS U/L 126*  --   --   --   --    ALT U/L 7*  --   --   --   --    AST U/L 31  --   --   --   --    GLUCOSE, ISTAT mg/dl  --  198* 200*  --   --               Results from last 7 days   Lab Units 09/10/19  0749   INR  1 28*   PTT seconds 30 Results from last 7 days   Lab Units 09/10/19  0730   LACTIC ACID mmol/L 8 7*     0   Lab Value Date/Time    TROPONINI <0 02 09/10/2019 0730    TROPONINI <0 02 09/02/2019 1244    TROPONINI 0 11 (H) 04/05/2019 0348    TROPONINI 0 09 (H) 04/05/2019 0018    TROPONINI 0 09 (H) 04/04/2019 2029    TROPONINI 0 07 (H) 04/04/2019 1739    TROPONINI 0 31 (H) 03/21/2019 2243    TROPONINI 0 39 (H) 03/21/2019 1942    TROPONINI 0 42 (H) 03/21/2019 1629    TROPONINI 0 14 (H) 03/21/2019 1005    TROPONINI 0 02 11/11/2018 0632    TROPONINI 0 09 (H) 11/09/2018 2013    TROPONINI 0 06 (H) 11/09/2018 1649    TROPONINI 0 04 11/09/2018 1222       Imaging:  I have personally reviewed pertinent reports  EKG: This was personally reviewed by myself  Micro:  Lab Results   Component Value Date    BLOODCX No Growth After 5 Days  09/02/2019    BLOODCX No Growth After 5 Days  09/02/2019    BLOODCX No Growth After 5 Days  04/04/2019    URINECX >100,000 cfu/ml Escherichia coli (A) 09/02/2019    URINECX >100,000 cfu/ml Escherichia coli (A) 03/21/2019    URINECX >100,000 cfu/ml Escherichia coli (A) 01/22/2019       ______________________________________________________________________    Assessment/Plan:   Seizure and AMS possibly secondary to infection  Sepsis GI versus   DEVONTE  Diverticulitis  UTI  Hypertension  History of CVA  Hypothyroidism       Neuro:   -Altered mental status with tonic colonic seizure this morning  S/p 2 mg Ativan and Versed  - 9/11Head CT :  No evidence of acute intracranial process or significant interval change  Chronic microangiopathy, chronic moderate right temporoparietal and small left parietal infarctions   -Seizure  Likely infectious   -Intubated for airway protection  -2 mg Ativan  p r n   For active seizure  -  Opens eyes to   Name , withdrawals to pain   -Consult Neurology   - consider Video EEG  -Seizure precautions      CV:   -Currently hemodynamically stable  -Will hold home antihypertensive medications: Cozaar metoprolol Procardia  -No history of CHF  Last echo 2016 showed EF 70%    Pulm:   -Intubated for airway protection  -CT abdomen and pelvis show new bibasilar consolidation atelectasis versus pneumonia  -Consider chest x-ray    GI:   -Diagnosis of diverticulitis on September 2nd  Discharged on 09/06 with 5 day course of Cipro  Unsure if this was completed or not  -Repeat CT abdomen pelvis today shows interval improvement  -Received cefepime 1 g and 500 mg metronidazole in ED  -NPO  -Continue PPI for GERD    :   -Recent UTI secondary to E coli  -UA with reflex pending  -DEVONTE with creatinine 1 88    F/E/N:   -sodium 133  -Potassium 4 1  - receive 2 L of fluids in ED     ID:   -Diverticulitis on 09/02  -UTI secondary to E coli on 09/02  -Receive vancomycin cefepime and Flagyl in ED  -T max 101 2  -WBC count 17  Continue to monitor  -lactic acid 8 7  Will trend  - Blood cultures x2 active  Will follow up  -2 L  ( 1 normal saline 1 isolyte) in ED    Heme:   -no active concerns  -INR 1 28  -DVT prophylaxis heparin     Endo:   -Continue levothyroxine 100 mg for hypothyroidism  -TSH above normal   Will likely benefit from levothyroxine titration on hospital Chronic PPI use may require higher dose    Msk/Skin:   No active concerns      Disposition:  ICU level of care     ______________________________________________________________________    VTE Pharmacologic Prophylaxis: Heparin  VTE Mechanical Prophylaxis: sequential compression device    Invasive lines and devices: Invasive Devices     Peripheral Intravenous Line            Peripheral IV 09/10/19 Left Antecubital less than 1 day    Peripheral IV 09/10/19 Left Wrist less than 1 day    Peripheral IV 09/10/19 Right Wrist less than 1 day          Airway            ETT  Cuffed 8 mm less than 1 day                Code Status: Prior  POA:    POLST:      Given critical illness, patient length of stay will require greater than two midnights      Portions of the record may have been created with voice recognition software  Occasional wrong word or "sound a like" substitutions may have occurred due to the inherent limitations of voice recognition software  Read the chart carefully and recognize, using context, where substitutions have occurred        Eli Messina MD

## 2019-09-10 NOTE — ED ATTENDING ATTESTATION
Andrei Pastrana MD, saw and evaluated the patient  I have discussed the patient with the resident/non-physician practitioner and agree with the resident's/non-physician practitioner's findings, Plan of Care, and MDM as documented in the resident's/non-physician practitioner's note, except where noted  All available labs and Radiology studies were reviewed  I was present for key portions of any procedure(s) performed by the resident/non-physician practitioner and I was immediately available to provide assistance  At this point I agree with the current assessment done in the Emergency Department  I have conducted an independent evaluation of this patient a history and physical is as follows:    OA: This is evaluation 69-year-old female presents to the emergency department from a nursing facility  With concern for altered mental status and seizure  Per report patient was heard screaming and when staff went to check on her she was having a generalized tonic-clonic seizure  EMS was called she was given 2 of Ativan EN route and upon arrival to the emergency department she appeared sedated but still with focal  Movements with eye deviation to the left  Of note patient was also found to be febrile  She has snoring respirations  Decision was made to intubate the patient after discussion with the family who noted that she was full code and they would want her to be intubated with CPR if necessary  No further history available from nursing facility however with discussions with daughter, she notes that yesterday her mom was feeling well participated in PT OT as well as speech therapy  She was recently discharged from the hospital after an admission for diverticulitis as well as a urinary tract infection  At that time she was also found to have and an DEVONTE  On exam patient is currently sedated with intermittent leftward gaze  She has no purposeful movements but does occasionally withdrawal to pain  Mucous membranes appear dry  There is dry blood around her mouth  No active bleeding lacerations appreciated  Right eye blind left eye sluggish but reactive  No hemotympanum  No appreciable deformities over CT or L-spine  No deformities of her extremities  Heart is tachycardic intermittently irregular within AS murmur  Lungs decreased at bases with poor effort  No wheezing rhonchi or rales appreciated  Abdomen mildly distended soft positive bowel sounds cannot appreciate tenderness given patient's mental status  There is no lower extremity edema  Capillary refill 2 seconds  Palpable distal pulses  Assessment and plan concern for seizure with altered mental status  Unclear cause of encephalopathy whether it is infectious or traumatic at this time  Unclear if primary neurological event occurred  Will do a broad workup including CT imaging for traumatic injury as well as for signs of stroke  Patient cannot receive contrast given she has kidney failure at this time  Will give IV fluid hydration is no evidence of congestive heart failure found in record review  Will do broad-spectrum labs including lactate and cultures  Will monitor closely on telemetry  Given that patient was unable to protect her airway she required intubation  One attempt with direct visualization performed by Dr Twan Sr using an 8 0 tube  Will discuss with Critical Care  Family in the emergency department updated on patient's care  Portions of the record may have been created with voice recognition software  Occasional wrong word or sound-a-like" substitutions may have occurred due to the inherent limitations of voice recognition software  Review chart carefully and recognize, using context, where substitutions have occurred        Critical Care Time  CriticalCare Time  Performed by: Sonia Aldrich MD  Authorized by: Sonia Aldrich MD     Critical care provider statement:     Critical care time (minutes): 28    Critical care time was exclusive of:  Separately billable procedures and treating other patients and teaching time    Critical care was necessary to treat or prevent imminent or life-threatening deterioration of the following conditions:  Circulatory failure, CNS failure or compromise, dehydration, metabolic crisis, trauma, sepsis, shock, respiratory failure and renal failure    Critical care was time spent personally by me on the following activities:  Blood draw for specimens, obtaining history from patient or surrogate, development of treatment plan with patient or surrogate, evaluation of patient's response to treatment, examination of patient, ordering and performing treatments and interventions, ordering and review of laboratory studies, ordering and review of radiographic studies, re-evaluation of patient's condition, review of old charts and ventilator management

## 2019-09-11 ENCOUNTER — APPOINTMENT (INPATIENT)
Dept: NEUROLOGY | Facility: CLINIC | Age: 84
DRG: 064 | End: 2019-09-11
Payer: MEDICARE

## 2019-09-11 ENCOUNTER — APPOINTMENT (INPATIENT)
Dept: RADIOLOGY | Facility: HOSPITAL | Age: 84
DRG: 064 | End: 2019-09-11
Payer: MEDICARE

## 2019-09-11 ENCOUNTER — APPOINTMENT (INPATIENT)
Dept: NON INVASIVE DIAGNOSTICS | Facility: HOSPITAL | Age: 84
DRG: 064 | End: 2019-09-11
Payer: MEDICARE

## 2019-09-11 LAB
ANION GAP SERPL CALCULATED.3IONS-SCNC: 10 MMOL/L (ref 4–13)
BASOPHILS # BLD AUTO: 0.01 THOUSANDS/ΜL (ref 0–0.1)
BASOPHILS NFR BLD AUTO: 0 % (ref 0–1)
BUN SERPL-MCNC: 9 MG/DL (ref 5–25)
CALCIUM SERPL-MCNC: 7.8 MG/DL (ref 8.3–10.1)
CHLORIDE SERPL-SCNC: 107 MMOL/L (ref 100–108)
CO2 SERPL-SCNC: 21 MMOL/L (ref 21–32)
CREAT SERPL-MCNC: 1.84 MG/DL (ref 0.6–1.3)
EOSINOPHIL # BLD AUTO: 0 THOUSAND/ΜL (ref 0–0.61)
EOSINOPHIL NFR BLD AUTO: 0 % (ref 0–6)
ERYTHROCYTE [DISTWIDTH] IN BLOOD BY AUTOMATED COUNT: 15.5 % (ref 11.6–15.1)
GFR SERPL CREATININE-BSD FRML MDRD: 24 ML/MIN/1.73SQ M
GLUCOSE SERPL-MCNC: 101 MG/DL (ref 65–140)
GLUCOSE SERPL-MCNC: 101 MG/DL (ref 65–140)
GLUCOSE SERPL-MCNC: 118 MG/DL (ref 65–140)
GLUCOSE SERPL-MCNC: 139 MG/DL (ref 65–140)
HCT VFR BLD AUTO: 27 % (ref 34.8–46.1)
HGB BLD-MCNC: 8.7 G/DL (ref 11.5–15.4)
IMM GRANULOCYTES # BLD AUTO: 0.05 THOUSAND/UL (ref 0–0.2)
IMM GRANULOCYTES NFR BLD AUTO: 1 % (ref 0–2)
LYMPHOCYTES # BLD AUTO: 1.09 THOUSANDS/ΜL (ref 0.6–4.47)
LYMPHOCYTES NFR BLD AUTO: 12 % (ref 14–44)
MCH RBC QN AUTO: 29.3 PG (ref 26.8–34.3)
MCHC RBC AUTO-ENTMCNC: 32.2 G/DL (ref 31.4–37.4)
MCV RBC AUTO: 91 FL (ref 82–98)
MONOCYTES # BLD AUTO: 0.9 THOUSAND/ΜL (ref 0.17–1.22)
MONOCYTES NFR BLD AUTO: 10 % (ref 4–12)
NEUTROPHILS # BLD AUTO: 7.37 THOUSANDS/ΜL (ref 1.85–7.62)
NEUTS SEG NFR BLD AUTO: 77 % (ref 43–75)
NRBC BLD AUTO-RTO: 0 /100 WBCS
PLATELET # BLD AUTO: 282 THOUSANDS/UL (ref 149–390)
PMV BLD AUTO: 9.4 FL (ref 8.9–12.7)
POTASSIUM SERPL-SCNC: 3.6 MMOL/L (ref 3.5–5.3)
PROCALCITONIN SERPL-MCNC: 3.94 NG/ML
RBC # BLD AUTO: 2.97 MILLION/UL (ref 3.81–5.12)
SODIUM SERPL-SCNC: 138 MMOL/L (ref 136–145)
WBC # BLD AUTO: 9.42 THOUSAND/UL (ref 4.31–10.16)

## 2019-09-11 PROCEDURE — 97167 OT EVAL HIGH COMPLEX 60 MIN: CPT

## 2019-09-11 PROCEDURE — G8987 SELF CARE CURRENT STATUS: HCPCS

## 2019-09-11 PROCEDURE — NC001 PR NO CHARGE: Performed by: FAMILY MEDICINE

## 2019-09-11 PROCEDURE — 85025 COMPLETE CBC W/AUTO DIFF WBC: CPT | Performed by: PHYSICIAN ASSISTANT

## 2019-09-11 PROCEDURE — 82948 REAGENT STRIP/BLOOD GLUCOSE: CPT

## 2019-09-11 PROCEDURE — G8997 SWALLOW GOAL STATUS: HCPCS

## 2019-09-11 PROCEDURE — 99233 SBSQ HOSP IP/OBS HIGH 50: CPT | Performed by: INTERNAL MEDICINE

## 2019-09-11 PROCEDURE — 83036 HEMOGLOBIN GLYCOSYLATED A1C: CPT | Performed by: PSYCHIATRY & NEUROLOGY

## 2019-09-11 PROCEDURE — 80061 LIPID PANEL: CPT | Performed by: PSYCHIATRY & NEUROLOGY

## 2019-09-11 PROCEDURE — 95816 EEG AWAKE AND DROWSY: CPT

## 2019-09-11 PROCEDURE — 95816 EEG AWAKE AND DROWSY: CPT | Performed by: PSYCHIATRY & NEUROLOGY

## 2019-09-11 PROCEDURE — 80048 BASIC METABOLIC PNL TOTAL CA: CPT | Performed by: PHYSICIAN ASSISTANT

## 2019-09-11 PROCEDURE — 92610 EVALUATE SWALLOWING FUNCTION: CPT

## 2019-09-11 PROCEDURE — 84145 PROCALCITONIN (PCT): CPT | Performed by: INTERNAL MEDICINE

## 2019-09-11 PROCEDURE — 70551 MRI BRAIN STEM W/O DYE: CPT

## 2019-09-11 PROCEDURE — G8988 SELF CARE GOAL STATUS: HCPCS

## 2019-09-11 PROCEDURE — 99233 SBSQ HOSP IP/OBS HIGH 50: CPT | Performed by: PSYCHIATRY & NEUROLOGY

## 2019-09-11 PROCEDURE — G8996 SWALLOW CURRENT STATUS: HCPCS

## 2019-09-11 PROCEDURE — 70544 MR ANGIOGRAPHY HEAD W/O DYE: CPT

## 2019-09-11 RX ORDER — POTASSIUM CHLORIDE 20 MEQ/1
40 TABLET, EXTENDED RELEASE ORAL ONCE
Status: COMPLETED | OUTPATIENT
Start: 2019-09-11 | End: 2019-09-11

## 2019-09-11 RX ORDER — NIFEDIPINE 60 MG/1
60 TABLET, EXTENDED RELEASE ORAL DAILY
Status: DISCONTINUED | OUTPATIENT
Start: 2019-09-12 | End: 2019-09-16 | Stop reason: HOSPADM

## 2019-09-11 RX ORDER — HEPARIN SODIUM 5000 [USP'U]/ML
5000 INJECTION, SOLUTION INTRAVENOUS; SUBCUTANEOUS EVERY 8 HOURS SCHEDULED
Status: DISCONTINUED | OUTPATIENT
Start: 2019-09-11 | End: 2019-09-14

## 2019-09-11 RX ADMIN — CLOPIDOGREL BISULFATE 75 MG: 75 TABLET ORAL at 10:38

## 2019-09-11 RX ADMIN — HEPARIN SODIUM 5000 UNITS: 5000 INJECTION INTRAVENOUS; SUBCUTANEOUS at 15:16

## 2019-09-11 RX ADMIN — METOPROLOL TARTRATE 25 MG: 25 TABLET, FILM COATED ORAL at 10:39

## 2019-09-11 RX ADMIN — HEPARIN SODIUM 5000 UNITS: 5000 INJECTION INTRAVENOUS; SUBCUTANEOUS at 10:53

## 2019-09-11 RX ADMIN — METOPROLOL TARTRATE 25 MG: 25 TABLET, FILM COATED ORAL at 21:13

## 2019-09-11 RX ADMIN — DONEPEZIL HYDROCHLORIDE 5 MG: 5 TABLET ORAL at 21:12

## 2019-09-11 RX ADMIN — QUETIAPINE FUMARATE 12.5 MG: 25 TABLET ORAL at 19:07

## 2019-09-11 RX ADMIN — POTASSIUM CHLORIDE 40 MEQ: 1500 TABLET, EXTENDED RELEASE ORAL at 10:37

## 2019-09-11 RX ADMIN — PANTOPRAZOLE SODIUM 40 MG: 40 TABLET, DELAYED RELEASE ORAL at 16:16

## 2019-09-11 RX ADMIN — HEPARIN SODIUM 5000 UNITS: 5000 INJECTION INTRAVENOUS; SUBCUTANEOUS at 21:14

## 2019-09-11 RX ADMIN — ONDANSETRON 4 MG: 2 INJECTION INTRAMUSCULAR; INTRAVENOUS at 17:53

## 2019-09-11 RX ADMIN — QUETIAPINE FUMARATE 12.5 MG: 25 TABLET ORAL at 10:39

## 2019-09-11 RX ADMIN — ATORVASTATIN CALCIUM 40 MG: 40 TABLET, FILM COATED ORAL at 16:16

## 2019-09-11 NOTE — SPEECH THERAPY NOTE
Speech Language/Pathology  Speech/Language Pathology  Assessment    Patient Name: Octavio Boyd  VSRBS'T Date: 9/11/2019     Problem List  Principal Problem:    Seizure ([facility] )  Active Problems:    Hyperlipidemia    GERD (gastroesophageal reflux disease)    Essential hypertension    Sepsis ([facility] )    Acquired hypothyroidism    DEVONTE (acute kidney injury) ([facility] )    Vascular dementia with behavior disturbance    Seizure-like activity ([facility] )    Octavio Boyd is a 80 y o  female who presents from [place]  with witnessed tonic colonic seizures this morning  She has a past medical history of hypothyroidism hypertension  2 CVAs in 2016 & 2017, GERD and hyperlipidemia  No hx of seizures  Per daughter she  was at baseline yesterday  Her daughter  said she was coughing yesterday but it was nonproductive  She has  This morning she was heard screaming in her bedroom and when staff went to go check on her she was found to be having a seizure  EMS was called and when they arrived she was given 2 mg of Ativan which stopped tonic colonic seizure  Patient remained unresponsive  Blood glucose at the time was 170  She was tachycardic at the time  When she arrived in the ED she was still unresponsive   She was not not exhibiting  any tonic colonic posturing however did have pulsating eye movements is presumed to still be having a seizure and was given 2 mg of Versed which stopped rhythmic eye movement  She was intubated for airway protection  Of note patient was recently discharged from the hospital   She was found to have acute diverticulitis and UTI ( E coli)  She received IV Cefazolin and flagyl  while hospitalized  She was discharged with 5 days of Ciprofloxacin  Bedside Swallow Evaluation:    Summary:  Pt presents w/ functional oropharyngeal swallow skills for baseline diet of level 3 chopped c thin liquids   Per [facility], pt is on regular diet c thin liquids but nursing reported that family reports chopping all food  When trialing thin liquids, pt stated "no straws, 3 Dr's told me no straws because of my pharynx"  Pt demonstrated adequate retrieval from cup c no overt s/s penetration/aspiration  Recommendations:  Diet: level 3  Liquid: thin  Meds: as tolerated  Supervision: assist as needed  Positioning:Upright  Strategies: Pt to take PO/Meds only when fully alert and upright  Oral care: encourage twice daily brushing  Aspiration precautions  Reflux precautions    Eval and f/u x1 to ensure diet tolerance       Consider consult w/:  Nutrition      Reason for consult:  Determine safest and least restrictive diet  h/o dysphagia     Precautions:  Contact    Current diet:  NPO  Premorbid diet[de-identified]  Reg/thin (foods chopped by family)  Previous VBS:  No  O2 requirement:  RA  Voice/Speech:  WFL  Social:  [facility]  Follows commands:   yes                       Cognitive Status:  impaired  Oral mech exam:  Dentition: adequate  Labial strength and ROM: WFL  Lingual strength and ROM: WFL  Mandibular strength and ROM: WFL  Velum: WFL  Secretion management: WFL  Volitional swallow: WFL    Items administered:  Puree, hard solid, thin liquids  Liquids were taken by cup       Oral stage:  Lip closure: adequate  Mastication: adequate  Bolus formation: adequate  Bolus control: adequate  Transfer: prompt  Oral residue: no  Pocketing: no    Pharyngeal stage:  Swallow promptness: prompt  Laryngeal rise: adequate  Wet voice: no  Throat clear: no  Cough: no  Secondary swallows: no  Audible swallows: no  No overt s/s aspiration    Esophageal stage:  H/o GERD    Results d/w:  Pt, nursing, physician

## 2019-09-11 NOTE — PLAN OF CARE
Problem: Prexisting or High Potential for Compromised Skin Integrity  Goal: Skin integrity is maintained or improved  Description  INTERVENTIONS:  - Identify patients at risk for skin breakdown  - Assess and monitor skin integrity  - Assess and monitor nutrition and hydration status  - Monitor labs   - Assess for incontinence   - Turn and reposition patient  - Assist with mobility/ambulation  - Relieve pressure over bony prominences  - Avoid friction and shearing  - Provide appropriate hygiene as needed including keeping skin clean and dry  - Evaluate need for skin moisturizer/barrier cream  - Collaborate with interdisciplinary team   - Patient/family teaching  - Consider wound care consult   Outcome: Progressing     Problem: Potential for Falls  Goal: Patient will remain free of falls  Description  INTERVENTIONS:  - Assess patient frequently for physical needs  -  Identify cognitive and physical deficits and behaviors that affect risk of falls    -  [place] fall precautions as indicated by assessment   - Educate patient/family on patient safety including physical limitations  - Instruct patient to call for assistance with activity based on assessment  - Modify environment to reduce risk of injury  - Consider OT/PT consult to assist with strengthening/mobility  Outcome: Progressing     Problem: PAIN - ADULT  Goal: Verbalizes/displays adequate comfort level or baseline comfort level  Description  Interventions:  - Encourage patient to monitor pain and request assistance  - Assess pain using appropriate pain scale  - Administer analgesics based on type and severity of pain and evaluate response  - Implement non-pharmacological measures as appropriate and evaluate response  - Consider cultural and social influences on pain and pain management  - Notify physician/advanced practitioner if interventions unsuccessful or patient reports new pain  Outcome: Progressing     Problem: INFECTION - ADULT  Goal: Absence or prevention of progression during hospitalization  Description  INTERVENTIONS:  - Assess and monitor for signs and symptoms of infection  - Monitor lab/diagnostic results  - Monitor all insertion sites, i e  indwelling lines, tubes, and drains  - Monitor endotracheal if appropriate and nasal secretions for changes in amount and color  - [place] appropriate cooling/warming therapies per order  - Administer medications as ordered  - Instruct and encourage patient and family to use good hand hygiene technique  - Identify and instruct in appropriate isolation precautions for identified infection/condition  Outcome: Progressing  Goal: Absence of fever/infection during neutropenic period  Description  INTERVENTIONS:  - Monitor WBC    Outcome: Progressing     Problem: SAFETY ADULT  Goal: Maintain or return to baseline ADL function  Description  INTERVENTIONS:  -  Assess patient's ability to carry out ADLs; assess patient's baseline for ADL function and identify physical deficits which impact ability to perform ADLs (bathing, care of mouth/teeth, toileting, grooming, dressing, etc )  - Assess/evaluate cause of self-care deficits   - Assess range of motion  - Assess patient's mobility; develop plan if impaired  - Assess patient's need for assistive devices and provide as appropriate  - Encourage maximum independence but intervene and supervise when necessary  - Involve family in performance of ADLs  - Assess for home care needs following discharge   - Consider OT consult to assist with ADL evaluation and planning for discharge  - Provide patient education as appropriate  Outcome: Progressing  Goal: Maintain or return mobility status to optimal level  Description  INTERVENTIONS:  - Assess patient's baseline mobility status (ambulation, transfers, stairs, etc )    - Identify cognitive and physical deficits and behaviors that affect mobility  - Identify mobility aids required to assist with transfers and/or ambulation (gait belt, sit-to-stand, lift, walker, cane, etc )  - [place] fall precautions as indicated by assessment  - Record patient progress and toleration of activity level on Mobility SBAR; progress patient to next Phase/Stage  - Instruct patient to call for assistance with activity based on assessment  - Consider rehabilitation consult to assist with strengthening/weightbearing, etc   Outcome: Progressing     Problem: DISCHARGE PLANNING  Goal: Discharge to home or other facility with appropriate resources  Description  INTERVENTIONS:  - Identify barriers to discharge w/patient and caregiver  - Arrange for needed discharge resources and transportation as appropriate  - Identify discharge learning needs (meds, wound care, etc )  - Arrange for interpretive services to assist at discharge as needed  - Refer to Case Management Department for coordinating discharge planning if the patient needs post-hospital services based on physician/advanced practitioner order or complex needs related to functional status, cognitive ability, or social support system  Outcome: Progressing     Problem: Knowledge Deficit  Goal: Patient/family/caregiver demonstrates understanding of disease process, treatment plan, medications, and discharge instructions  Description  Complete learning assessment and assess knowledge base    Interventions:  - Provide teaching at level of understanding  - Provide teaching via preferred learning methods  Outcome: Progressing     Problem: RESPIRATORY - ADULT  Goal: Achieves optimal ventilation and oxygenation  Description  INTERVENTIONS:  - Assess for changes in respiratory status  - Assess for changes in mentation and behavior  - Position to facilitate oxygenation and minimize respiratory effort  - Oxygen administered by appropriate delivery if ordered  - Initiate smoking cessation education as indicated  - Encourage broncho-pulmonary hygiene including cough, deep breathe, Incentive Spirometry  - Assess the need for suctioning and aspirate as needed  - Assess and instruct to report SOB or any respiratory difficulty  - Respiratory Therapy support as indicated  Outcome: Progressing     Problem: SKIN/TISSUE INTEGRITY - ADULT  Goal: Skin integrity remains intact  Description  INTERVENTIONS  - Identify patients at risk for skin breakdown  - Assess and monitor skin integrity  - Assess and monitor nutrition and hydration status  - Monitor labs (i e  albumin)  - Assess for incontinence   - Turn and reposition patient  - Assist with mobility/ambulation  - Relieve pressure over bony prominences  - Avoid friction and shearing  - Provide appropriate hygiene as needed including keeping skin clean and dry  - Evaluate need for skin moisturizer/barrier cream  - Collaborate with interdisciplinary team (i e  Nutrition, Rehabilitation, etc )   - Patient/family teaching  Outcome: Progressing

## 2019-09-11 NOTE — PROGRESS NOTES
Progress Note - ICU Transfer to Step down/med  surg  - Susy Aguirre 80 y o  female MRN: [id number]    [facility]: [id number]    Code Status: Level 1 - Full Code  POA:    POLST:      Reason for ICU adm: seizure     Active problems: Principal Problem:    Seizure ([facility] )  Active Problems:    Acquired hypothyroidism    Hyperlipidemia    GERD (gastroesophageal reflux disease)    Essential hypertension    Sepsis ([facility] )    DEVONTE (acute kidney injury) ([facility] )    Vascular dementia with behavior disturbance      Consultants:  Neurology    History of Present Illness/Summary of clinical course:   Susy Aguirre is a 80 y o  female who presents from [place]  with witnessed tonic colonic seizures this morning  She has a past medical history of hypothyroidism hypertension  2 CVAs in 2016 & 2017, GERD and hyperlipidemia  No hx of seizures  Per daughter she  was at baseline yesterday  Her daughter  said she was coughing yesterday but it was nonproductive  She has  This morning she was heard screaming in her bedroom and when staff went to go check on her she was found to be having a seizure  EMS was called and when they arrived she was given 2 mg of Ativan which stopped tonic colonic seizure  Patient remained unresponsive  Blood glucose at the time was 170  She was tachycardic at the time  When she arrived in the ED she was still unresponsive   She was not not exhibiting  any tonic colonic posturing however did have pulsating eye movements is presumed to still be having a seizure and was given 2 mg of Versed which stopped rhythmic eye movement  She was intubated for airway protection  Of note patient was recently discharged from the hospital   She was found to have acute diverticulitis and UTI ( E coli)  She received IV Cefazolin and flagyl  while hospitalized    She was discharged with 5 days of Ciprofloxacin    ICU course:    She was extubated shortly after transfer to ICU   She was transitioned to 2 L nasal cannula  She initially met sepsis criteria in the ED  However CT scan showed improved diverticulitis  She remained afebrile  Lactic acid cleared as well  Antibiotics were stopped after moving to ICU  She continued to improve off antibiotics  CT head at admission showed no acute abnormalities just old temporal parietal infarcts however patient was exhibiting left-sided weakness  Neurology was consulted  After discussion with ICU team it was felt at left-sided weakness was 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 and unlikely new CVA  Repeat imaging was deferred  Seizure prophylaxis was held since she had no history of seizures and this was likely a multifactorial provoked seizure likely due to infection and recent antibiotic use  She was placed on high-intensity statin  Anti-platelet therapy with clopidogrel and allowed permissive hypertension  Home procardia, cozar and metoprolol were held  On day of transfer out of ICU neuro exam normal   No focal neurological deficits  Left-sided weakness completely resolved  Outstanding/pending diagnostics:           Nutrition Plan:  dysphagia at baseline   Dysphagia diet     Discharge Plan:      Specific Diagnosis Plan:    Hypertension:  Resume PTA metoprolol, Procardia   Hold cozar in setting of DEVONTE    Dementia:  Resume quetiapine and donezpile    Seizure:  Monitor off anti epileptics  Follow-up EEG    History of stroke:  No stroke on this admission however patient is on anti-platelet therapy or high-intensity statin  Continue atorvastatin 40 mg and clopidogrel 75 mg daily    Hypothyroidism:  Levothyroxine 100 mics  TSH elevated during this admission would benefit from titration an outpatient follow-up  Recent diverticulitis UTI:  Monitor off antibiotics  Remains afebrile and clinically well      DEVONTE: contiue fluids 75 ml/he   Creatine slowly improving 1 88>1 84     Spoke with Dr Ankita Quiroz at [facility] regarding transfer       Harman Goff MD  Family medicine pgy2

## 2019-09-11 NOTE — PLAN OF CARE
Problem: OCCUPATIONAL THERAPY ADULT  Goal: Performs self-care activities at highest level of function for planned discharge setting  See evaluation for individualized goals  Description  Treatment Interventions: ADL retraining, Functional transfer training, UE strengthening/ROM, Endurance training, Cognitive reorientation, Patient/family training, Equipment evaluation/education, Compensatory technique education, Continued evaluation, Energy conservation, Activityengagement          See flowsheet documentation for full assessment, interventions and recommendations  Note:   Limitation: Decreased ADL status, Decreased UE strength, Decreased Safe judgement during ADL, Decreased cognition, Decreased endurance, Decreased self-care trans, Decreased high-level ADLs, Visual deficit  Prognosis: Fair  Assessment: Pt is a 81 y/o female seen for OT eval s/p adm to [facility] w/ witnessed tonic clonic seizures; pt admitted from [facility]  Pt is dx'd w/ seizures  Pt  has a past medical history of Aspiration pneumonia ([address] ), Basal cell carcinoma of nose, Blind, Dementia, Depression, Disease of thyroid gland, Fibromyalgia, Gait disturbance, GERD (gastroesophageal reflux disease), Glaucoma, Hyperlipidemia, Hypertension, Osteopenia, Peripheral neuropathy, Polymyalgia rheumatica ([address] ), Psychiatric disorder, Renal insufficiency syndrome, Rheumatoid arthritis ([address] ), Seizure ([address] ), Stroke ([address] ), Systemic lupus erythematosus ([address] ), Vertigo, and Vitamin D deficiency  She also has no past medical history of ADHD (attention deficit hyperactivity disorder), Asthma, Bleeding disorder ([address] ), Diabetes insipidus ([address] ), Fractures, Head injury, Heart disease, History of transfusion, Liver disease, Neurological disorder, Osteoarthritis, Rheumatic disease, Skin disorder, Stomach disorder, Substance abuse ([address] ), Substance abuse ([address] ), or Vascular disorder  Pt with active OT orders and up with assistance  orders   Pt lives with facility staff at [facility] as a long term resident  Pt requires assist Ax1-2 w/ ADLS and IADLS, does not drive, & required use of DME PTA including RW and grab bars in the shower  Pt is currently demonstrating the following occupational deficits: Mod A overall ADLS, transfers and functional mobility w/ HHA and +VC for safety/proper technique  These deficits that are impacting pt's baseline areas of occupation are a result of the following impairments: pain, endurance, activity tolerance, functional mobility, forward functional reach, balance, trunk control, functional standing tolerance, decreased I w/ ADLS/IADLS, strength, visual deficits, cognitive impairments, decreased safety awareness and decreased insight into deficits  The following Occupational Performance Areas to address include: eating, grooming, bathing/shower, toilet hygiene, dressing, socialization, health maintenance, functional mobility and clothing management  Pt scored overall 30/100 on the Barthel Index  Based on the aforementioned OT evaluation, functional performance deficits, and assessments, pt has been identified as a high complexity evaluation  Recommend return to [facility] w/ increased assistance upon D/C, when medically stable   Pt to continue to benefit from acute immediate OT services to address the following goals 3-5x/week to  w/in 7-10 days:      OT Discharge Recommendation: Other (Comment)(return to [facility] w/ previous level of assist)  OT - OK to Discharge: Yes(when medically stable)     Collette Gleason MS, OTR/L

## 2019-09-11 NOTE — OCCUPATIONAL THERAPY NOTE
[address] Evaluation     Patient Name: Radha LOWERY Date: 9/11/2019  Problem List  Principal Problem:    Seizure ([facility] )  Active Problems:    Hyperlipidemia    GERD (gastroesophageal reflux disease)    Essential hypertension    Sepsis ([facility] )    Acquired hypothyroidism    DEVONTE (acute kidney injury) ([facility] [address] )    Vascular dementia with behavior disturbance    Seizure-like activity [facility])    Past Medical History  Past Medical History:   Diagnosis Date    [facility]     Last Assessed:  7/18/17    Basal cell carcinoma of nose     Last Assessed:  4/12/17    Blind     blind right eye, 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, Last Assessed:  7/18/17    Dementia     Depression     Disease of thyroid gland     Fibromyalgia     Last Assessed:  7/18/17    Gait disturbance     GERD (gastroesophageal reflux disease)     Glaucoma     Hyperlipidemia     Hypertension     Osteopenia     Peripheral neuropathy     Last Assessed:  4/12/17    Polymyalgia rheumatica ([facility] )     Psychiatric disorder     depression    Renal insufficiency syndrome     Last Assessed:  4/13/17    Rheumatoid arthritis ([facility] )     Seizure ([facility] ) 9/10/2019    Stroke ([facility] )     x2, Last Assessed:  7/18/17    Systemic lupus erythematosus [facility]     Vertigo     Vitamin D deficiency      Past Surgical History  Past Surgical History:   Procedure Laterality Date    EYE SURGERY      JOINT REPLACEMENT      left hip replacement, left shoulder replacement    SHOULDER SURGERY      Replacement             09/11/19 1058   Note Type   Note type Eval/Treat   Restrictions/Precautions   Weight Bearing Precautions Per Order No   Other Precautions Cognitive; Impulsive; Chair Alarm; Bed Alarm;Multiple lines;Telemetry; Fall Risk;Pain;Contact/isolation   Pain Assessment   Pain Assessment No/denies pain   Pain Score No Pain   Home Living   Type of Home SNF  ([address] [place] [address])   Home Layout One level; Able to live on main level with bedroom/bathroom; Performs ADLs on one level;Elevator   Bathroom Shower/Tub   (unable to report)   [address] bars in [facility] 124   Additional Comments pt is a poor historian; per chart review lives at [facility] as a long term resident   Prior Function   Level of [place] Needs assistance with IADLs; Needs assistance with ADLs and functional mobility   Lives With Facility staff   Receives Help From Other (Comment)  (facility staff)   ADL Assistance Needs assistance   IADLs Needs assistance   Falls in the last 6 months   (unable to report)   Vocational Retired   Comments pt is a poor historian; per chart review pt required A x1 for ADLS and A Q7/3 for functional mobility w/ RW   Lifestyle   Autonomy Assist ADLS/IADLS, transfers and functional mobility PTA   Reciprocal Relationships Pt lives w/ facility staff   Service to Others Pt is retired   Intrinsic Gratification Pt enjoys TV   Psychosocial   Psychosocial (WDL) [address]  5  Supervision/Setup   Grooming Assistance 4  Minimal Assistance   [address] 3  Moderate Assistance   [facility] [address] 3  Moderate Assistance   [address] 3  Moderate Assistance    [facility] 3  Moderate [address]  3  Moderate Assistance   Functional Assistance 3  Moderate Assistance   Functional Deficit Steadying;Verbal cueing;Supervision/safety; Increased time to complete   Bed Mobility   Supine to Sit Unable to assess   Sit to Supine Unable to assess   Additional Comments Pt seated up OOB in chair prior to and end of session   Transfers   Sit to Stand 3  Moderate assistance   Additional items Assist x 1; Increased time required;Verbal cues   Stand to Sit 3  Moderate assistance   Additional items Assist x 1; Increased time required;Verbal cues   Additional Comments pt performed sit-stand from chair w/ Mod A x1 for moderate force production into standing and +VC for safety/proper technique   Functional Mobility   Functional Mobility 3  Moderate assistance   Additional Comments Pt took few small steps in room w/ Mod A x1, HHA used, +VC for safety and assist for steadying balance 2/2 LE weakness and fatigue   Additional items Hand hold assistance   Balance   Static Sitting Fair -   Dynamic Sitting Poor   Static Standing Poor   Dynamic Standing Poor   Ambulatory Poor   Activity Tolerance   Activity Tolerance Patient limited by fatigue; Other (Comment)  (limited by cognition)   Nurse Made Aware Bailey AVILA Assessment   RUE Assessment WFL  (grossly 4/5)   LUE Assessment   LUE Assessment WFL  (grossly 4/5)   Hand Function   Gross Motor Coordination Functional   Fine Motor Coordination Functional   Sensation   Light Touch No apparent deficits   Vision-Basic Assessment   Patient Visual Report   (impaired vision R eye)   Cognition   Overall Cognitive Status Impaired   Arousal/Participation Responsive; Cooperative   Attention Attends with cues to redirect   Orientation Level Oriented to person;Disoriented to place; Disoriented to time;Disoriented to situation   Memory Decreased recall of precautions;Decreased recall of recent events;Decreased short term memory   Following Commands Follows one step commands with increased time or repetition   Comments Pt is cooperative; only oriented to name; has decreased safey awareness and understanding of deficits  Requires cues for re-direction to task   Assessment   Limitation Decreased ADL status; Decreased UE strength;Decreased Safe judgement during ADL;Decreased cognition;Decreased endurance;Decreased self-care trans;Decreased high-level ADLs; Visual deficit   Prognosis Fair   Assessment Pt is a 81 y/o female seen for OT eval s/p adm to [facility] w/ witnessed tonic clonic seizures; pt admitted from [facility]  Pt is dx'd w/ seizures   Pt  has a past medical history of Aspiration pneumonia ([facility] ), Basal cell carcinoma of nose, Blind, Dementia, Depression, Disease of thyroid gland, Fibromyalgia, Gait disturbance, GERD (gastroesophageal reflux disease), Glaucoma, Hyperlipidemia, Hypertension, Osteopenia, Peripheral neuropathy, Polymyalgia rheumatica ([facility] ), Psychiatric disorder, Renal insufficiency syndrome, Rheumatoid arthritis ([facility] ), [facility] ), Stroke ([facility] ), Systemic lupus erythematosus ([facility] ), Vertigo, and Vitamin D deficiency  She also has no past medical history of ADHD (attention deficit hyperactivity disorder), Asthma, Bleeding disorder ([facility] ), Diabetes insipidus ([facility] ), Fractures, Head injury, Heart disease, History of transfusion, Liver disease, Neurological disorder, Osteoarthritis, Rheumatic disease, Skin disorder, Stomach disorder, Substance abuse ([facility] ), Substance abuse ([facility] ), or Vascular disorder  Pt with active OT orders and up with assistance  orders  Pt lives with facility staff at [facility] as a long term resident  Pt requires assist Ax1-2 w/ ADLS and IADLS, does not drive, & required use of DME PTA including RW and grab bars in the shower  Pt is currently demonstrating the following occupational deficits: Mod A overall ADLS, transfers and functional mobility w/ HHA and +VC for safety/proper technique  These deficits that are impacting pt's baseline areas of occupation are a result of the following impairments: pain, endurance, activity tolerance, functional mobility, forward functional reach, balance, trunk control, functional standing tolerance, decreased I w/ ADLS/IADLS, strength, visual deficits, cognitive impairments, decreased safety awareness and decreased insight into deficits  The following Occupational Performance Areas to address include: eating, grooming, bathing/shower, toilet hygiene, dressing, socialization, health maintenance, functional mobility and clothing management  Pt scored overall 30/100 on the Barthel Index   Based on the aforementioned OT evaluation, functional performance deficits, and assessments, pt has been identified as a high complexity evaluation  Recommend return to [facility] w/ increased assistance upon D/C, when medically stable  Pt to continue to benefit from acute immediate OT services to address the following goals 3-5x/week to  w/in 7-10 days:    Goals   Patient Goals to have coca cola   LTG Time Frame 7-10   Long Term Goal #1 see below listed goals   Plan   Treatment Interventions ADL retraining;Functional transfer training;UE strengthening/ROM; Endurance training;Cognitive reorientation;Patient/family training;Equipment evaluation/education; Compensatory technique education;Continued evaluation; Energy conservation; Activityengagement   Goal Expiration Date 19   OT Frequency 3-5x/wk   Recommendation   OT Discharge Recommendation Other (Comment)  (return to [facility] w/ previous level of assist)   OT - OK to Discharge Yes  (when medically stable)   Barthel Index   Feeding 5   Bathing 0   Grooming Score 0   Dressing Score 5   Bladder Score 5   Bowels Score 5   Toilet Use Score 5   Transfers (Bed/Chair) Score 5   Mobility (Level Surface) Score 0   Stairs Score 0   Barthel Index Score 30   Modified Drew Scale   Modified Cass Scale 4        GOALS    1) Pt will improve activity tolerance to G for min 30 min txment sessions for increase engagement in functional tasks  2) Pt will complete UB/LB dressing/self care w/ Min A using adaptive device and DME as needed  3) Pt will complete bathing w/ Min A w/ use of AE and DME as needed  4) Pt will complete toileting w/ Min A w/ G hygiene/thoroughness using DME as needed  5) Pt will improve functional transfers to Min A on/off all surfaces using DME as needed w/ G balance/safety   6) Pt will improve functional mobility during ADL/IADL/leisure tasks to Min A using DME as needed w/ G balance/safety   7) Pt will be attentive 75% of the time during ongoing cognitive assessment w/ G participation to assist w/ safe d/c planning/recommendations  8) Pt will demonstrate G carryover of pt/caregiver education and training as appropriate w/o cues w/ good tolerance to increase safety during functional tasks  9) Pt will demonstrate 75% carryover of energy conservation techniques t/o functional I/ADL/leisure tasks w/o cues s/p skilled education to increase endurance during functional tasks     Christina Espinoza MS, OTR/L

## 2019-09-11 NOTE — PROGRESS NOTES
Progress Note - Critical Care   Veatrice Round 80 y o  female MRN: [id number]  Unit/Bed#: MICU 01 Encounter: [id number]    Assessment: 80year old female with tonic clonic seizure    Plan:          Neuro:   New onset tonic clonic seizure  -Most likely provoked due to current infection, current medications (cipro) and history of CVAs  -Was given 2 mg of Ativan and 2 mg of Versed yesterday  -EEG ordered  -Seizure precautions  -Avoid seizure threshold decreasing meds  -PRN Lorazepam for seizure activity > 2 minutes  New onset left upper and lower extremity weakness  -Unclear whether symptoms are due to postictal Todds paralysis vs New CVA  -Today left sided paralysis significantly improved -> most likely postictal  -Continue ASA 81 mg  -Continue Plavix 75 mg   -Continue Atorvastatin 80 mg   -Permissive HTN  -Possible need for MRI and CTA once DEVONTE has resolved -> unlikely due to left sided improvement today  -Neuro if following                CV:   -Currently mildy hypertensive and tachycardic  -Home antiHTN meds on hold (Cozaar, Metoprolol, Procardia) -> consider restarting  -Hold DVT prophylaxis due to possible CVA -> consider starting                 Lung:   Extubated yesterday  -SpO2 96% this morning  -Mild tachypnea today (RR 20's)  -CT yesterday showed new bibasilar consolidation atelectasis vs pneumonia  -Procal elevated today (3 94) -> trend  -Consider CXR today                 GI:   Diverticulitis diagnosed 9/2 and given cipro -> CT showed interval improvement  -Cipro was discontinued  -1g Cefepime and 500 mg Flagyl given in ED  -Continue PPI for GERD  -NPO                 FEN:   Lactic acidosis  -LA 8 7 yesterday, 1 5 today  -Procal elevated this morning ->3 94  -Monitor and replete electrolytes as needed                 :   DEVONTE  -Creatinine today 1 84  -Recent UTI on 9/7 (E  Coli)  -UA w/ reflex pending  -Continue IV fluids   -2 L NS given in ED  -Monitor Vitals                 ID:   -Recent diverticulitis and UTI (9/7)  -Received vancomycin, cefepime, and Flagyl in ED  -Blood cultures X2 active                 Heme:   -Note acute concerns                 Endo:   Hypothyroidism  -Continue Levothyroxine 100mg  -TSH elevated yesterday                            Msk/Skin:   -No acute concerns                 Disposition:   ICU    Chief Complaint: 80year old female with tonic clonic seizure  HPI/24hr events: The patient is an 80year old female with a PMH of HTN, HLD, hypothyroidism, GERD and two CVAs in 2016 and 2017 who presented to the ED yesterday after she was found having a tonic clonic seizure at her nursing home  She has no history of seizures in the past  She was also recently discharged from the hospital where she was found to have diverticulitis and a UTI for which she was given ciprofloxacin  In the hospital yesterday the patient was found to have left upper and lower extremity paralysis, which according to the daughter was new  It remains unclear whether the etiology of these findings is due to postictal Todds paralysis vs new CVA  Overnight the patient had no acute events  She is oriented to person but not to place and time  On neuro exam her ability to move her left upper and lower extremity has significantly improved  Today she states that she feels the same as yesterday and she continues to ask if she's dying  She states that her head, chest, and stomach hurt, although she was not able to describe in what way  Physical Exam:   Physical Exam   Constitutional: She appears well-developed and well-nourished  HENT:   Head: Normocephalic and atraumatic  Eyes:   Right eyes cloudy, left eye reactive to light   Neck: No JVD present  Cardiovascular: Normal rate, regular rhythm, normal heart sounds and intact distal pulses  Exam reveals no gallop and no friction rub  No murmur heard  Pulmonary/Chest: Effort normal and breath sounds normal  She has no wheezes     Abdominal: Soft  Bowel sounds are normal  She exhibits no distension  There is tenderness  Musculoskeletal: She exhibits no edema or deformity  Able to move all 4 extremities   Neurological: She is alert  Oriented to person, not place and time  Left extremity strength improved from yesterday   Skin: Skin is warm and dry  Psychiatric: She is agitated  Continues to ask "am I dying?"       Vitals:    19 0405 19 0501 19 0605 19 0737   BP: 140/73 164/73 (!) 171/78 165/74   BP Location:    Left arm   Pulse: 100 100 100    Resp: 21 (!)    Temp: 99 1 °F (37 3 °C)   99 7 °F (37 6 °C)   TempSrc: Oral   Oral   SpO2: 99% 97% 96%    Weight: 60 3 kg (132 lb 15 oz)      Height:                 Temperature:   Temp (24hrs), Av 2 °F (37 3 °C), Min:97 8 °F (36 6 °C), Max:100 3 °F (37 9 °C)    Current: Temperature: 99 7 °F (37 6 °C)    Weights:   IBW: 51 48 kg    Body mass index is 23 85 kg/m²    Weight (last 2 days)     Date/Time   Weight    19 0405   60 3 (132 94)    09/10/19 1239   60 5 (133 38)    09/10/19 0730   64 8 (142 86)    09/10/19 0728   59 (130 07)               Non-Invasive/Invasive Ventilation Settings:  Respiratory    Lab Data (Last 4 hours)    None         O2/Vent Data (Last 4 hours)    None              Lab Results   Component Value Date    PHART 7 411 09/10/2019    TGD5NPS 31 1 (L) 09/10/2019    PO2ART 75 8 09/10/2019    AWN5LEM 19 3 (L) 09/10/2019    BEART -4 5 09/10/2019    SOURCE Radial, Right 09/10/2019     SpO2: SpO2: 96 %    Intake and Outputs:  I/O       701 - 09/10 0700 09/10 07 -  07    I V  (mL/kg)  2243 7 (37 2)    IV Piggyback  1550    Total Intake(mL/kg)  3793 7 (62 9)    Urine (mL/kg/hr)  1735    Total Output  1735    Net  +2058 7          Unmeasured Urine Occurrence  1 x          Nutrition:        Diet Orders   (From admission, onward)             Start     Ordered    19 0650  Diet NPO; Sips with meds  Diet effective now     Question Answer Comment   Diet Type NPO    NPO Except: Sips with meds    RD to adjust diet per protocol? Yes        09/11/19 0650                Labs:   Results from last 7 days   Lab Units 09/11/19  0524 09/10/19  1135 09/10/19  0804 09/10/19  0729  09/06/19  0534 09/05/19  0516   WBC Thousand/uL 9 42  --  17 80*  --   --  6 22 8 84   HEMOGLOBIN g/dL 8 7*  --  10 1*  --   --  9 2* 9 5*   I STAT HEMOGLOBIN g/dl  --   --   --  10 5*   < >  --   --    HEMATOCRIT % 27 0*  --  32 8*  --   --  28 8* 29 6*   HEMATOCRIT, ISTAT %  --   --   --  31*   < >  --   --    PLATELETS Thousands/uL 282 195 451*  --   --  170 158   NEUTROS PCT % 77*  --   --   --   --  74 79*   MONOS PCT % 10  --   --   --   --  9 6   MONO PCT %  --   --  5  --   --   --   --     < > = values in this interval not displayed  Results from last 7 days   Lab Units 09/11/19  0524 09/10/19  0730 09/10/19  0729 09/10/19  0728  09/06/19  0534   SODIUM mmol/L 138 133*  --   --   --  138   POTASSIUM mmol/L 3 6 4 0  --   --   --  3 3*   CHLORIDE mmol/L 107 105  --   --   --  108   CO2 mmol/L 21 15*  --   --   --  21   CO2, I-STAT mmol/L  --   --  17* 17*   < >  --    BUN mg/dL 9 10  --   --   --  7   CREATININE mg/dL 1 84* 1 88*  --   --   --  0 66   CALCIUM mg/dL 7 8* 8 2*  --   --   --  8 2*   ALK PHOS U/L  --  126*  --   --   --   --    ALT U/L  --  7*  --   --   --   --    AST U/L  --  31  --   --   --   --    GLUCOSE, ISTAT mg/dl  --   --  198* 200*  --   --     < > = values in this interval not displayed                Results from last 7 days   Lab Units 09/10/19  0749   INR  1 28*   PTT seconds 30     Results from last 7 days   Lab Units 09/10/19  1135   LACTIC ACID mmol/L 1 5     0   Lab Value Date/Time    TROPONINI <0 02 09/10/2019 0730    TROPONINI <0 02 09/02/2019 1244    TROPONINI 0 11 (H) 04/05/2019 0348    TROPONINI 0 09 (H) 04/05/2019 0018    TROPONINI 0 09 (H) 04/04/2019 2029    TROPONINI 0 07 (H) 04/04/2019 1739    TROPONINI 0 31 (H) 03/21/2019 2246 TROPONINI 0 39 (H) 03/21/2019 1942    TROPONINI 0 42 (H) 03/21/2019 1629    TROPONINI 0 14 (H) 03/21/2019 1005    TROPONINI 0 02 11/11/2018 0632    TROPONINI 0 09 (H) 11/09/2018 2013    TROPONINI 0 06 (H) 11/09/2018 1649    TROPONINI 0 04 11/09/2018 1222       Imaging:  I have personally reviewed pertinent reports  CT c/a/p yesterday -> Interval improvement in previously seen diverticulitis  Small bilateral pleural effusions and new bibasilar consolidation (subsegmental atelectasis vs pneumonia)    CT head yesterday -> Chronic right temporoparietal infarct and small left parietal infarcts    EKG:     Yesterday-> NSR with occasional premature supraventricular complexed and RBB     Micro:  Lab Results   Component Value Date    BLOODCX No Growth After 5 Days  09/02/2019    BLOODCX No Growth After 5 Days  09/02/2019    BLOODCX No Growth After 5 Days  04/04/2019    URINECX >100,000 cfu/ml Escherichia coli (A) 09/02/2019    URINECX >100,000 cfu/ml Escherichia coli (A) 03/21/2019    URINECX >100,000 cfu/ml Escherichia coli (A) 01/22/2019       Allergies:    Allergies   Allergen Reactions    Acetaminophen     Golimumab      Other reaction(s): dedrick colored stool    Lidocaine Other (See Comments)    Neurontin [Gabapentin]     Nortriptyline Tremor    Prasterone      Other reaction(s): pruitis    Tricyclic Antidepressants     Leflunomide Rash    Sulfasalazine Rash       Medications:   Scheduled Meds:    Current Facility-Administered Medications:  atorvastatin 40 mg Oral Daily With Kyleigh Gardner MD    atropine 0 5 mg Intravenous Once Raj Hung MD    bisacodyl 10 mg Rectal Daily PRN Adrian Valdes PA-C    clopidogrel 75 mg Oral Daily Sussy Bui MD    donepezil 5 mg Oral HS Giuliano Tomas PA-C    fentanyl citrate (PF) 50 mcg Intravenous Q1H PRN Adrian Valdes PA-C    levothyroxine 100 mcg Oral Early Morning Giuliano Tomas PA-C    LORazepam 2 mg Intravenous PRN Sussy Bui MD multi-electrolyte 75 mL/hr Intravenous Continuous Radha Bey PA-C Last Rate: 75 mL/hr (09/10/19 1338)   ondansetron 4 mg Intravenous Q4H PRN Radha Bey PA-C    pantoprazole 40 mg Oral BID AC Giuliano Tomas PA-C    potassium chloride 40 mEq Oral Once Radha Bey PA-C    QUEtiapine 12 5 mg Oral BID Radha Bey PA-C      Continuous Infusions:    multi-electrolyte 75 mL/hr Last Rate: 75 mL/hr (09/10/19 1338)     PRN Meds:    bisacodyl 10 mg Daily PRN   fentanyl citrate (PF) 50 mcg Q1H PRN   LORazepam 2 mg PRN   ondansetron 4 mg Q4H PRN       VTE Pharmacologic Prophylaxis: Sequential compression device (Venodyne)   VTE Mechanical Prophylaxis: sequential compression device    Invasive lines and devices: Invasive Devices     Peripheral Intravenous Line            Peripheral IV 09/10/19 Left Antecubital 1 day    Peripheral IV 09/10/19 Right Wrist 1 day          Drain            External Urinary Catheter less than 1 day    NG/OG Tube Orogastric Center mouth less than 1 day          Airway            ETT  Cuffed 8 mm 1 day                   Counseling / Coordination of Care  Total Critical Care time spent 90 minutes excluding procedures, teaching and family updates  Code Status: Level 1 - Full Code     Portions of the record may have been created with voice recognition software  Occasional wrong word or "sound a like" substitutions may have occurred due to the inherent limitations of voice recognition software  Read the chart carefully and recognize, using context, where substitutions have occurred       Jacobo Garner

## 2019-09-11 NOTE — PROGRESS NOTES
ASSESSMENT/PLAN     1  Seizure activity likely 2/2 decreased seizure threshold due to recent medications, prior infarct, and possible infection  80 yr old female with PMHx significant for prior CVAs and dementia presents with witnessed convulsions at skilled nursing facility  Patient without any seizure events overnight  -EEG routine, in process  -seizure precautions   -avoid medications that would lower seizure threshold  -2 mg lorazepam prn for continuous seziure activity lasting more than 2 mintues     2  Left upper extremity weakness, questionable facial droop with decreased ability to localize to pain on left side secondary to post-ictal state vs new CVA  Per, daughter no focal neurologic deficit prior to today  Patient is extubated, awake and able to participate in neurologic exam  She exhibits no weakness on left side, questionable facial droop seems to be a chronic issue for her  Patient was started on Plavix yesterday  Her symptoms seem to be resolving which indicates symptoms are likely related to Rahul's paralysis post ictally  -Given current DEVONTE, CT angio and CT perfusion will be deferred  However, MR Brain and MRA (with priority given to MR brain) can be considered to remove CVA from differential   -Continue plavix and atorvastatin       SUBJECTIVE   Sebastian Curtis 80 y o  female sitting comfortably in bed  She is complaining of an itching in her throat and the sensation that she has to vomit  In addition, patient frequently repeats that she "is going to die" or that she "is dying " Patient does not remember the details of her symptomatology during her prior CVAs or where she was when they occurred  REVIEW OF SYSTEMS   Review of Systems   Constitutional: Negative  HENT: Negative  Eyes: Negative  Respiratory: Positive for cough  Negative for shortness of breath  Cardiovascular: Negative  Gastrointestinal: Positive for abdominal pain  Endocrine: Negative      Genitourinary: Negative  Musculoskeletal: Positive for arthralgias  Skin: Negative  Allergic/Immunologic: Negative  Neurological: Negative for facial asymmetry and headaches  Hematological: Negative  Psychiatric/Behavioral: Negative  OBJECTIVE     Vitals:    19 0405 19 0501 19 0605 19 0737   BP: 140/73 164/73 (!) 171/78 165/74   BP Location:    Left arm   Pulse: 100 100 100    Resp: 21 (!)    Temp: 99 1 °F (37 3 °C)   99 7 °F (37 6 °C)   TempSrc: Oral   Oral   SpO2: 99% 97% 96%    Weight: 60 3 kg (132 lb 15 oz)      Height:          Temperature:   Temp (24hrs), Av 2 °F (37 3 °C), Min:97 8 °F (36 6 °C), Max:100 3 °F (37 9 °C)    Temperature: 99 7 °F (37 6 °C)  Intake & Output:  I/O       701 - 09/10 0700 09/10 07 -  07 07 -  0700    I V  (mL/kg)  2243 7 (37 2) 146 3 (2 4)    IV Piggyback  1550     Total Intake(mL/kg)  3793 7 (62 9) 146 3 (2 4)    Urine (mL/kg/hr)  1735     Total Output  1735     Net  +2058 7 +146  3           Unmeasured Urine Occurrence  1 x         Weights:   IBW: 51 48 kg    Body mass index is 23 85 kg/m²  Weight (last 2 days)     Date/Time   Weight    19 0405   60 3 (132 94)    09/10/19 1239   60 5 (133 38)    09/10/19 0730   64 8 (142 86)    09/10/19 0728   59 (130 07)            Physical Exam   Constitutional: She appears well-developed and well-nourished  No distress  HENT:   Head: Normocephalic and atraumatic  Cardiovascular: Normal rate, regular rhythm and normal heart sounds  Pulmonary/Chest: Effort normal and breath sounds normal    Abdominal: Soft  Bowel sounds are normal  She exhibits no distension  There is no tenderness  Neurological: She is alert  She has normal strength  She displays no tremor  No cranial nerve deficit  She exhibits normal muscle tone  Coordination (predominantly on the left side) abnormal  GCS eye subscore is 4  GCS verbal subscore is 5  GCS motor subscore is 6   She displays no Babinski's sign on the right side  She displays no Babinski's sign on the left side  Patient follows some commands  Speech is fluent  Patient has at least 3/5 strength in all extremities however, seems to be favoring the right side  Questionable sensory loss on the right side, patient was unable to answer my questions and just kept repeating "I'm dying"   Skin: Skin is warm and dry  She is not diaphoretic  Psychiatric: She has a normal mood and affect       PAST MEDICAL HISTORY     Past Medical History:   Diagnosis Date    Aspiration pneumonia ([facility] [address] )     Last Assessed:  7/18/17    Basal cell carcinoma of nose     Last Assessed:  4/12/17    Blind     blind right eye, 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, Last Assessed:  7/18/17    Dementia     Depression     Disease of thyroid gland     Fibromyalgia     Last Assessed:  7/18/17    Gait disturbance     GERD (gastroesophageal reflux disease)     Glaucoma     Hyperlipidemia     Hypertension     Osteopenia     Peripheral neuropathy     Last Assessed:  4/12/17    Polymyalgia rheumatica ([address] )     Psychiatric disorder     depression    Renal insufficiency syndrome     Last Assessed:  4/13/17    Rheumatoid arthritis ([facility] [address] )     Seizure ([facility] [address] ) 9/10/2019    Stroke ([facility] [address] )     x2, Last Assessed:  7/18/17    Systemic lupus erythematosus ([facility] [address] )     Vertigo     Vitamin D deficiency      PAST SURGICAL HISTORY     Past Surgical History:   Procedure Laterality Date    EYE SURGERY      JOINT REPLACEMENT      left hip replacement, left shoulder replacement    SHOULDER SURGERY      Replacement     SOCIAL & FAMILY HISTORY     Social History     Substance and Sexual Activity   Alcohol Use Never    Frequency: Never     Social History     Substance and Sexual Activity   Drug Use No     Social History     Tobacco Use   Smoking Status Never Smoker   Smokeless Tobacco Never Used     Family History   Problem Relation Age of Onset    Heart attack Mother    Cara Diabetes Mother     Dementia Father     Coronary artery disease Father         cardiac disorder    Diabetes Sister     Uterine cancer Sister      LABORATORY DATA     Labs: I have personally reviewed pertinent reports  Results from last 7 days   Lab Units 09/11/19  0524 09/10/19  1135 09/10/19  0804 09/10/19  0729  09/06/19  0534 09/05/19  0516   WBC Thousand/uL 9 42  --  17 80*  --   --  6 22 8 84   HEMOGLOBIN g/dL 8 7*  --  10 1*  --   --  9 2* 9 5*   I STAT HEMOGLOBIN g/dl  --   --   --  10 5*   < >  --   --    HEMATOCRIT % 27 0*  --  32 8*  --   --  28 8* 29 6*   HEMATOCRIT, ISTAT %  --   --   --  31*   < >  --   --    PLATELETS Thousands/uL 282 195 451*  --   --  170 158   NEUTROS PCT % 77*  --   --   --   --  74 79*   MONOS PCT % 10  --   --   --   --  9 6   MONO PCT %  --   --  5  --   --   --   --     < > = values in this interval not displayed  Results from last 7 days   Lab Units 09/11/19  0524 09/10/19  0730 09/10/19  0729 09/10/19  0728  09/06/19  0534   POTASSIUM mmol/L 3 6 4 0  --   --   --  3 3*   CHLORIDE mmol/L 107 105  --   --   --  108   CO2 mmol/L 21 15*  --   --   --  21   CO2, I-STAT mmol/L  --   --  17* 17*   < >  --    BUN mg/dL 9 10  --   --   --  7   CREATININE mg/dL 1 84* 1 88*  --   --   --  0 66   CALCIUM mg/dL 7 8* 8 2*  --   --   --  8 2*   ALK PHOS U/L  --  126*  --   --   --   --    ALT U/L  --  7*  --   --   --   --    AST U/L  --  31  --   --   --   --    GLUCOSE, ISTAT mg/dl  --   --  198* 200*  --   --     < > = values in this interval not displayed  Results from last 7 days   Lab Units 09/10/19  0749   INR  1 28*   PTT seconds 30     Results from last 7 days   Lab Units 09/10/19  1135   LACTIC ACID mmol/L 1 5     Results from last 7 days   Lab Units 09/10/19  0730   TROPONIN I ng/mL <0 02     Micro:  Lab Results   Component Value Date    BLOODCX No Growth After 5 Days  09/02/2019    BLOODCX No Growth After 5 Days   09/02/2019    BLOODCX No Growth After 5 Days  04/04/2019    URINECX >100,000 cfu/ml Escherichia coli (A) 09/02/2019    URINECX >100,000 cfu/ml Escherichia coli (A) 03/21/2019    URINECX >100,000 cfu/ml Escherichia coli (A) 01/22/2019     IMAGING & DIAGNOSTIC TESTS     Imaging: I have personally reviewed pertinent reports  Ct Chest Abdomen Pelvis Wo Contrast    Result Date: 9/10/2019  Impression: Overall interval improvement in previously seen diverticulitis involving the mid sigmoid colon  Phlegmonous change is noted in this region without discrete drainable collection or gross pneumoperitoneum  Small volume ascites tracking into the pelvis is new since prior examination  Nonspecific pericholecystic fluid is also noted  New bibasilar consolidation compatible subsegmental atelectasis versus pneumonia  Small bilateral pleural effusions are present  Workstation performed: [id number]     Ct Head Without Contrast    Result Date: 9/10/2019  Impression: No CT evidence of acute intracranial process or significant interval change  Chronic microangiopathy, chronic moderate right temporoparietal and small left parietal infarctions  Overall, no significant interval change  Workstation performed: [id number]     Ct Spine Cervical Without Contrast    Result Date: 9/10/2019  Impression: No cervical spine fracture or traumatic malalignment  Stable minimal grade 1 anterolisthesis C3 on C4  Stable moderate multilevel cervical degenerative changes  Stable 2 cm nodule right lobe of thyroid gland  Workstation performed: [id number]     EKG, Pathology, and Other Studies: I have personally reviewed pertinent reports       ALLERGIES     Allergies   Allergen Reactions    Ciprofloxacin Seizures    Acetaminophen     Golimumab      Other reaction(s): dedrick colored stool    Lidocaine Other (See Comments)    Neurontin [Gabapentin]     Nortriptyline Tremor    Prasterone      Other reaction(s): pruitis    Tricyclic Antidepressants     Leflunomide Rash    Sulfasalazine Rash     MEDICATIONS PRIOR TO ARRIVAL     Prior to Admission medications    Medication Sig Start Date End Date Taking?  Authorizing Provider   albuterol (2 5 mg/3 mL) 0 083 % nebulizer solution Take 2 5 mg by nebulization every 4 (four) hours as needed for wheezing or shortness of breath    Historical Provider, MD   ascorbic acid (VITAMIN C) 500 mg tablet Take 500 mg by mouth daily    Historical Provider, MD   benzocaine (ORAJEL) 10 % mucosal gel Apply 1 application to the mouth or throat 3 (three) times a day as needed for mucositis 7/16/18   Naida Rodriguez DO   benzonatate (TESSALON PERLES) 100 mg capsule Take 1 capsule (100 mg total) by mouth 3 (three) times a day as needed for cough 11/12/18   Isreal Olivas MD   bisacodyl (DULCOLAX) 10 mg suppository Insert 10 mg into the rectum daily as needed for constipation    Historical Provider, MD   calcium carbonate (OS-TEDDY) 600 MG tablet Take 600 mg by mouth daily    Historical Provider, MD   carboxymethylcellulose (REFRESH LIQUIGEL) 1 % ophthalmic solution Apply 1 drop to eye 3 (three) times a day   8/24/17   Historical Provider, MD   cetirizine (ZyrTEC) 10 MG chewable tablet Chew 1 tablet (10 mg total) daily 8/22/18   Sobia Bhagat PA-C   Dentifrices (BIOTENE DRY MOUTH CARE DT) Apply to teeth    Historical Provider, MD   donepezil (ARICEPT) 10 mg tablet Take 0 5 tablets (5 mg total) by mouth daily at bedtime Increased confusion on 10mg 8/16/18   Naida Rodriguez DO   DULoxetine (CYMBALTA) 60 mg delayed release capsule Take 1 capsule (60 mg total) by mouth daily 4/17/18   Myrna Hernandez PA-C   estradiol (ESTRACE) 0 1 mg/g vaginal cream Insert 1 g into the vagina daily Do no insert - please apply a pea sized amount to the urethra/vagina Monday, Wednesday and Friday before bedtime 1/7/19   Stella Joseph MD   ferrous gluconate (FERGON) 324 mg tablet Take 65 mg by mouth daily with breakfast    Historical Provider, MD   fluticasone (FLONASE) 50 mcg/act nasal spray 1 spray into each nostril daily 8/22/18   Marta Quiroz PA-C   guaiFENesin (MUCINEX) 600 mg 12 hr tablet Take 1 tablet (600 mg total) by mouth 2 (two) times a day as needed for congestion 5/18/18   Madelyn Nelson,    ibuprofen (MOTRIN) 400 mg tablet Take 1 tablet (400 mg total) by mouth every 6 (six) hours as needed for mild pain (fever) 3/27/19   Aiden Desir PA-C   ipratropium-albuterol (COMBIVENT RESPIMAT) inhaler Inhale 2 puffs every 4 (four) hours as needed (dyspnea) 5/18/18   Madelyn Nelson,    levothyroxine 100 mcg tablet Take 100 mcg by mouth daily  12/22/17   Historical Provider, MD   LORazepam (ATIVAN) 0 5 mg tablet Take 1 tablet (0 5 mg total) by mouth every 8 (eight) hours as needed for anxiety for up to 10 days She always takes 0 5 mg at bedtime  Patient taking differently: Take 0 5 mg by mouth daily at bedtime She always takes 0 5 mg at bedtime 1/28/19 9/2/19  Ruth Hargrove PA-C   losartan (COZAAR) 100 MG tablet Take 1 tablet (100 mg total) by mouth daily  Patient taking differently: Take 100 mg by mouth daily Patient takes 75mg daily 8/16/18   Madelyn Nelson DO   Lutein 10 MG TABS Take 1 tablet by mouth daily      Historical Provider, MD   magnesium hydroxide (MILK OF MAGNESIA) 400 mg/5 mL oral suspension Take 30 mL by mouth daily as needed for constipation      Historical Provider, MD   metoprolol tartrate (LOPRESSOR) 50 mg tablet Take 0 5 tablets (25 mg total) by mouth 2 (two) times a day 8/16/18   Madelyn Nelson,    metroNIDAZOLE (FLAGYL) 500 mg tablet Take 1 tablet (500 mg total) by mouth every 8 (eight) hours for 5 days 9/6/19 9/11/19  Tano THOMAS PA-C   NIFEdipine (PROCARDIA XL) 60 mg 24 hr tablet Take 60 mg by mouth daily  6/25/18   Historical Provider, MD   nystatin (MYCOSTATIN) 100,000 units/mL suspension Apply 100,000 Units to the mouth or throat 2 (two) times a day    Historical Provider, MD   nystatin (MYCOSTATIN) powder Apply topically 2 (two) times a day Apply to b/l groin folds    Historical Provider, MD   pantoprazole (PROTONIX) 40 mg tablet Take 40 mg by mouth 2 (two) times a day      Historical Provider, MD   pravastatin (PRAVACHOL) 40 mg tablet Take 1 tablet (40 mg total) by mouth daily 2/1/19   Ruth Hargrove PA-C   QUEtiapine (SEROquel) 25 mg tablet Take 12 5 mg by mouth 2 (two) times a day May crush medications      Historical Provider, MD   sodium chloride (OCEAN) 0 65 % nasal spray 2 sprays into each nostril 2 (two) times a day    Historical Provider, MD   Sodium Phosphates (FLEET ENEMA RE) Insert 1 Dose into the rectum daily PRN if dulcolax supp is not effective    Historical Provider, MD   triamcinolone (KENALOG) 0 1 % cream triamcinolone acetonide 0 1 % topical cream    Historical Provider, MD   tuberculin (TUBERSOL) 5 units/0 1 mL Inject 5 Units into the skin once    Historical Provider, MD   ciprofloxacin (CIPRO) 500 mg tablet Take 1 tablet (500 mg total) by mouth every 12 (twelve) hours for 5 days 9/6/19 9/11/19  Vipin Torres PA-C     MEDICATIONS ADMINISTERED IN LAST 24 HOURS     Medication Administration - last 24 hours from 09/10/2019 1028 to 09/11/2019 1028       Date/Time Order Dose Route Action Action by     09/10/2019 1400 propofol (DIPRIVAN) 1000 mg in 100 mL infusion (premix) 0 mcg/kg/min Intravenous Stopped Christal Novoa RN     09/10/2019 1105 vancomycin (VANCOCIN) 1,250 mg in sodium chloride 0 9 % 250 mL IVPB 1,250 mg Intravenous New Bag Souleymane Milner RN     09/10/2019 1104 cefepime (MAXIPIME) 1,000 mg in dextrose 5 % 50 mL IVPB 0 mg Intravenous Stopped Souleymane Milner RN     09/10/2019 1045 multi-electrolyte (ISOLYTE-S PH 7 4) bolus 1,000 mL 0 mL Intravenous Stopped Christal Novoa RN     09/10/2019 1338 metroNIDAZOLE (FLAGYL) IVPB (premix) 500 mg 500 mg Intravenous [address] Christal Novoa RN     09/10/2019 2058 chlorhexidine (PERIDEX) 0 12 % oral rinse 15 mL 15 mL Swish & Spit Not Given Akanksha Mcneal RN     09/10/2019 3639 chlorhexidine (PERIDEX) 0 12 % oral rinse 15 mL 15 mL Swish & Spit Given Shola Orta RN     09/11/2019 0612 levothyroxine tablet 100 mcg 0 mcg Oral Hold Art Avalos RN     09/10/2019 1339 levothyroxine tablet 100 mcg 100 mcg Oral Given Shola Orta RN     09/10/2019 1439 metoprolol tartrate (LOPRESSOR) tablet 25 mg 25 mg Oral Not Given Shola Orta RN     09/11/2019 0612 pantoprazole (PROTONIX) EC tablet 40 mg 0 mg Oral Hold Art Avalos RN     09/10/2019 1505 pantoprazole (PROTONIX) EC tablet 40 mg 40 mg Oral Given Shola Orta RN     09/10/2019 2101 QUEtiapine (SEROquel) tablet 12 5 mg 12 5 mg Oral Not Given Art Avalos RN     09/10/2019 2112 donepezil (ARICEPT) tablet 5 mg 5 mg Oral Not Given Art Avalos RN     09/10/2019 1338 multi-electrolyte (ISOLYTE-S PH 7 4 equivalent) IV solution 75 mL/hr Intravenous [address] Shola Orta RN     09/10/2019 1743 atorvastatin (LIPITOR) tablet 40 mg 40 mg Oral Not Given Shola Orta RN     09/10/2019 1505 clopidogrel (PLAVIX) tablet 300 mg 300 mg Oral Given Shola Orta RN        CURRENT MEDICATIONS     Current Facility-Administered Medications:  atorvastatin 40 mg Oral Daily With Chas Champion MD   bisacodyl 10 mg Rectal Daily PRN Masoud Sanchez PA-C   clopidogrel 75 mg Oral Daily Milton Burr MD   donepezil 5 mg Oral HS Giuliano Tomas PA-C   fentanyl citrate (PF) 50 mcg Intravenous Q1H PRN Masoud Sanchez PA-C   heparin (porcine) 5,000 Units Subcutaneous Q8H [facility] Nilson Colvin MD   levothyroxine 100 mcg Oral Early Morning Masoud Sanchez PA-C   LORazepam 2 mg Intravenous PRN Milton Burr MD   metoprolol tartrate 25 mg Oral Q12H [address] Dana Page MD   ondansetron 4 mg Intravenous Q4H PRN Masoud Sanchez PA-C   pantoprazole 40 mg Oral BID AC Giuliano Tomas PA-C   potassium chloride 40 mEq Oral Once Masoud Sanchez PA-C   QUEtiapine 12 5 mg Oral BID Masoud Sanchez PA-C          bisacodyl 10 mg Daily PRN   fentanyl citrate (PF) 50 mcg Q1H PRN   LORazepam 2 mg PRN   ondansetron 4 mg Q4H PRN       Portions of the record may have been created with voice recognition software  Occasional wrong word or "sound a like" substitutions may have occurred due to the inherent limitations of voice recognition software    Read the chart carefully and recognize, using context, where substitutions have occurred     ==  Saige Francis MD  [id number] [facility]  Internal Medicine Residency  [id number]

## 2019-09-12 ENCOUNTER — APPOINTMENT (INPATIENT)
Dept: RADIOLOGY | Facility: HOSPITAL | Age: 84
DRG: 064 | End: 2019-09-12
Payer: MEDICARE

## 2019-09-12 ENCOUNTER — APPOINTMENT (INPATIENT)
Dept: NON INVASIVE DIAGNOSTICS | Facility: HOSPITAL | Age: 84
DRG: 064 | End: 2019-09-12
Payer: MEDICARE

## 2019-09-12 LAB
CHOLEST SERPL-MCNC: 94 MG/DL (ref 50–200)
EST. AVERAGE GLUCOSE BLD GHB EST-MCNC: 108 MG/DL
HBA1C MFR BLD: 5.4 % (ref 4.2–6.3)
HDLC SERPL-MCNC: 14 MG/DL (ref 40–60)
LDLC SERPL CALC-MCNC: 53 MG/DL (ref 0–100)
TRIGL SERPL-MCNC: 135 MG/DL

## 2019-09-12 PROCEDURE — G8978 MOBILITY CURRENT STATUS: HCPCS

## 2019-09-12 PROCEDURE — 70450 CT HEAD/BRAIN W/O DYE: CPT

## 2019-09-12 PROCEDURE — 99232 SBSQ HOSP IP/OBS MODERATE 35: CPT | Performed by: INTERNAL MEDICINE

## 2019-09-12 PROCEDURE — 97530 THERAPEUTIC ACTIVITIES: CPT

## 2019-09-12 PROCEDURE — 97163 PT EVAL HIGH COMPLEX 45 MIN: CPT

## 2019-09-12 PROCEDURE — G8979 MOBILITY GOAL STATUS: HCPCS

## 2019-09-12 PROCEDURE — 99233 SBSQ HOSP IP/OBS HIGH 50: CPT | Performed by: PSYCHIATRY & NEUROLOGY

## 2019-09-12 RX ORDER — AZITHROMYCIN 250 MG/1
500 TABLET, FILM COATED ORAL EVERY 24 HOURS
Status: DISCONTINUED | OUTPATIENT
Start: 2019-09-12 | End: 2019-09-16 | Stop reason: HOSPADM

## 2019-09-12 RX ADMIN — QUETIAPINE FUMARATE 12.5 MG: 25 TABLET ORAL at 17:56

## 2019-09-12 RX ADMIN — HEPARIN SODIUM 5000 UNITS: 5000 INJECTION INTRAVENOUS; SUBCUTANEOUS at 13:15

## 2019-09-12 RX ADMIN — HEPARIN SODIUM 5000 UNITS: 5000 INJECTION INTRAVENOUS; SUBCUTANEOUS at 21:56

## 2019-09-12 RX ADMIN — METOPROLOL TARTRATE 25 MG: 25 TABLET, FILM COATED ORAL at 21:58

## 2019-09-12 RX ADMIN — LEVOTHYROXINE SODIUM 100 MCG: 100 TABLET ORAL at 05:34

## 2019-09-12 RX ADMIN — AZITHROMYCIN 500 MG: 250 TABLET, FILM COATED ORAL at 21:56

## 2019-09-12 RX ADMIN — CEFTRIAXONE SODIUM 1000 MG: 10 INJECTION, POWDER, FOR SOLUTION INTRAVENOUS at 21:27

## 2019-09-12 RX ADMIN — PANTOPRAZOLE SODIUM 40 MG: 40 TABLET, DELAYED RELEASE ORAL at 05:26

## 2019-09-12 RX ADMIN — ATORVASTATIN CALCIUM 40 MG: 40 TABLET, FILM COATED ORAL at 17:56

## 2019-09-12 RX ADMIN — HEPARIN SODIUM 5000 UNITS: 5000 INJECTION INTRAVENOUS; SUBCUTANEOUS at 05:26

## 2019-09-12 RX ADMIN — METOPROLOL TARTRATE 25 MG: 25 TABLET, FILM COATED ORAL at 08:51

## 2019-09-12 RX ADMIN — PANTOPRAZOLE SODIUM 40 MG: 40 TABLET, DELAYED RELEASE ORAL at 17:56

## 2019-09-12 RX ADMIN — QUETIAPINE FUMARATE 12.5 MG: 25 TABLET ORAL at 08:51

## 2019-09-12 RX ADMIN — DONEPEZIL HYDROCHLORIDE 5 MG: 5 TABLET ORAL at 21:56

## 2019-09-12 RX ADMIN — CLOPIDOGREL BISULFATE 75 MG: 75 TABLET ORAL at 08:51

## 2019-09-12 RX ADMIN — NIFEDIPINE 60 MG: 60 TABLET, FILM COATED, EXTENDED RELEASE ORAL at 08:51

## 2019-09-12 NOTE — PROGRESS NOTES
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  Pertinent history of previous stroke and dementia  MRA head demonstrated no large vessel occlusion, aneurysm or hemodynamically significant vessel disease  MRI brain demonstrated small restricted diffusion left precentral sulcus consistent with acute/subacute infarct; old right temporoparietal, left cerebellar and left occipital parietal infarct  CT head demonstrated no acute intracranial abnormality; chronic right temporal parietal encephalomalacia consistent with prior insult  · PT/OT/SLP recommendations appreciated  · Continue telemetry  · Continue vEEG  · Continue neuro checks  · Ativan 2 mg for seizures or seizure-like activity longer than 2 minutes  · Continue Plavix 75 mg and Lipitor 40 mg   · Echo pending  VTE Pharmacologic Prophylaxis:   Pharmacologic: Heparin  Mechanical VTE Prophylaxis in Place: Yes    Patient Centered Rounds: I have performed bedside rounds with nursing staff today  Discussions with Specialists or Other Care Team Provider: SLIM  Education and Discussions with Family / Patient:  Patient  Time Spent for Care: 30 minutes  More than 50% of total time spent on counseling and coordination of care as described above  Current Length of Stay: 2 day(s)  Current Patient Status: Inpatient   Certification Statement: The patient will continue to require additional inpatient hospital stay due to vEEG monitoring  Discharge Plan: defer to SLIM  Code Status: Level 1 - Full Code    Subjective:   Patient endorsed severe headache at time of exam   She denied dizziness, lightheadedness, visual changes in tinnitus    Additionally, she perseverated on dying and continually inquired if she was dying; patient was reassured  Objective:     Vitals:   Temp (24hrs), Av 6 °F (37 °C), Min:98 2 °F (36 8 °C), Max:99 °F (37 2 °C)    Temp:  [98 2 °F (36 8 °C)-99 °F (37 2 °C)] 99 °F (37 2 °C)  HR:  [68-79] 68  Resp:  [16-18] 16  BP: (131-164)/(57-79) 131/57  SpO2:  [98 %-99 %] 99 %  Body mass index is 25 57 kg/m²  Input and Output Summary (last 24 hours): Intake/Output Summary (Last 24 hours) at 2019 1611  Last data filed at 2019 1300  Gross per 24 hour   Intake 580 ml   Output 266 ml   Net 314 ml       Physical Exam:     Physical Exam   Constitutional: Vital signs are normal  She appears well-developed and well-nourished  She is cooperative  She does not appear ill  No distress  HENT:   Head: Normocephalic and atraumatic  Eyes: Pupils are equal, round, and reactive to light  EOM are normal    Neck: Normal range of motion  Neck supple  Cardiovascular: Normal rate, regular rhythm, normal heart sounds, intact distal pulses and normal pulses  No murmur heard  Pulmonary/Chest: Effort normal and breath sounds normal  No respiratory distress  She has no decreased breath sounds  Abdominal: Soft  Bowel sounds are normal  She exhibits no distension  Musculoskeletal: Normal range of motion  Neurological: She is alert  Skin: Skin is warm, dry and intact  Capillary refill takes less than 2 seconds  She is not diaphoretic  Psychiatric: Her speech is normal and behavior is normal  Judgment and thought content normal  Her mood appears anxious  Vitals reviewed  Neurologic Exam   Mental Status:  Alert and oriented x4  Able to adhere to some commands  Perseveration on death  Appropriate attention and concentration  Regular rate and rhythm regarding speech  Cranial Nerves:  CN 2 - 12 intact  Motor Exam:  Muscle bulk: normal  Overall muscle tone: normal  Strength: 3/5 throughout, particularly right-sided  Motor:  Appropriate finger-to-nose    Sensory Exam:  Crude touch, temperature and vibration intact throughout  Gait, Coordination, and Reflexes   Reflexes:  Right brachioradialis: 2/4  Left brachioradialis: 2/4  Right biceps: 2/4  Left biceps: 2/4  Right triceps: 2/4  Left triceps: 2/4  Right patellar: 2/4  Left patellar: 2/4  Right achilles: 2/4  Left achilles: 2/4  Babinski: downward flexed toes  Gait: deferred  Additional Data:     Labs:    Results from last 7 days   Lab Units 09/11/19  0524   WBC Thousand/uL 9 42   HEMOGLOBIN g/dL 8 7*   HEMATOCRIT % 27 0*   PLATELETS Thousands/uL 282   NEUTROS PCT % 77*   LYMPHS PCT % 12*   MONOS PCT % 10   EOS PCT % 0     Results from last 7 days   Lab Units 09/11/19  0524 09/10/19  0730 09/10/19  0729   POTASSIUM mmol/L 3 6 4 0  --    CHLORIDE mmol/L 107 105  --    CO2 mmol/L 21 15*  --    CO2, I-STAT mmol/L  --   --  17*   BUN mg/dL 9 10  --    CREATININE mg/dL 1 84* 1 88*  --    CALCIUM mg/dL 7 8* 8 2*  --    ALK PHOS U/L  --  126*  --    ALT U/L  --  7*  --    AST U/L  --  31  --    GLUCOSE, ISTAT mg/dl  --   --  198*     Results from last 7 days   Lab Units 09/10/19  0749   INR  1 28*       * I Have Reviewed All Lab Data Listed Above  * Additional Pertinent Lab Tests Reviewed: All Labs For Current Hospital Admission Reviewed    Imaging:    Imaging Reports Reviewed Today Include:  MRI brain CT head  Imaging Personally Reviewed by Myself Includes:  None  Recent Cultures (last 7 days):     Results from last 7 days   Lab Units 09/12/19  1201 09/10/19  0742   BLOOD CULTURE  No Growth at 48 hrs  No Growth at 48 hrs         Last 24 Hours Medication List:     Current Facility-Administered Medications:  atorvastatin 40 mg Oral Daily With Rodney Mariano MD   bisacodyl 10 mg Rectal Daily PRN Hilda Roland MD   clopidogrel 75 mg Oral Daily Hilda Roland MD   donepezil 5 mg Oral HS Hilda Roland MD   fentanyl citrate (PF) 50 mcg Intravenous Q1H PRN Hilda Roland MD   heparin (porcine) 5,000 Units Subcutaneous [facility] Jimmy Manning MD   levothyroxine 100 mcg Oral Early Morning Jimmy Manning MD   LORazepam 2 mg Intravenous PRN Jimmy Manning MD   metoprolol tartrate 25 mg Oral Q12H 301 Dana Page MD   NIFEdipine 60 mg Oral Daily Jimmy Manning MD   ondansetron 4 mg Intravenous Q4H PRN Jimmy Manning MD   pantoprazole 40 mg Oral BID AC Jimmy Manning MD   QUEtiapine 12 5 mg Oral BID Jimmy Manning MD        Today, Patient Was Seen By: Vallerie Gail Holter, DO    ** Please Note: Dragon 360 Dictation voice to text software may have been used in the creation of this document   **

## 2019-09-12 NOTE — PLAN OF CARE
Problem: PHYSICAL THERAPY ADULT  Goal: Performs mobility at highest level of function for planned discharge setting  See evaluation for individualized goals  Description  Treatment/Interventions: Functional transfer training, LE strengthening/ROM, Therapeutic exercise, Endurance training, Patient/family training, Equipment eval/education, Bed mobility, Gait training  Equipment Recommended: Hassan Shone       See flowsheet documentation for full assessment, interventions and recommendations  Note:   Prognosis: Fair  Problem List: Decreased strength, Decreased endurance, Decreased coordination, Impaired balance, Decreased mobility, Decreased cognition, Impaired judgement, Decreased safety awareness, Pain  Assessment: Pt seen for high complexity evaluation , as well as additional treatment session for standing trial, repositoning as above  Pt is an 79 y/o female w/ history/comorbidities of CVA, GERD, HLD who is now admitted w/ ? seizure activity  Intubated on admit  dx include new onset seizures, and MRI brain showed small focus of decreased diffusion L precentral sulcus consistant w/ acute to subacute infarct  Due to acute medical issues, pain, fall risk, note unstable clinical picture  PT consulted to assess mobility, d/c needs  Pt presents w/ decreased functional mob, standing balance, endurance, B LE strength and coordination  Pt will benefit from skilled PT to correct for the above problems  recommend return to snf when stable, w/ continued therapy services          Recommendation: (recommend return to snf w/ continued therapy services  )     PT - OK to Discharge: (S) Yes(when stable- see above for recs)    See flowsheet documentation for full assessment

## 2019-09-12 NOTE — PROGRESS NOTES
Progress Note - Johnathan Soloing 4/27/1931, 80 y o  female MRN: [id number]    Unit/Bed#: [facility] 702-01 Encounter: [id number]    Primary Care Provider: Mary Blanchard DO   Date and time admitted to hospital: 9/10/2019  7:09 AM        DEVONTE (acute kidney injury) [facility])  Assessment & Plan  Patient with baseline creatinine around 1; currently creatinine 1 84  Overnight creatinine slightly trended down, unclear of etiology however as BUN /creatinine ratio is less than 20-1  consider intrinsic renal failure; ATN versus interstitial disease secondary to offending drug  Will place [place] to rule out post renal disease  Review med list to exclude a for toxic medications  Monitor morning labs      Acquired hypothyroidism  Assessment & Plan  Levothyroxine 100 mcg per day    Essential hypertension  Assessment & Plan  Blood pressure well controlled on home medications that include Lopressor and Procardia  Continue present therapy    GERD (gastroesophageal reflux disease)  Assessment & Plan  PPI    Hyperlipidemia  Assessment & Plan  Will continue Lipitor 40 mg q day    * Seizure [facility])  Assessment & Plan  Appreciate Neurology recs, as per Neurology;  · PT/OT/SLP recommendations appreciated  · Continue telemetry  · Continue vEEG  · Continue neuro checks  · Ativan 2 mg for seizures or seizure-like activity longer than 2 minutes  · Continue Plavix 75 mg and Lipitor 40 mg   · Echo pending  Concern to for CVA, as MRI showedsmall restricted diffusion left precentral sulcus consistent with acute/subacute infarct; old right temporoparietal, left cerebellar and left occipital parietal infarct  CT head demonstrated no acute intracranial abnormality; chronic right temporal parietal encephalomalacia consistent with prior insult  VTE Pharmacologic Prophylaxis:   Pharmacologic: Heparin  Mechanical VTE Prophylaxis in Place: Yes    Patient Centered Rounds: I have performed bedside rounds with nursing staff today      Discussions with Specialists or Other Care Team Provider:  None    Education and Discussions with Family / Patient:  Discussed care plan with patient who voiced understanding and agreed with findings    Time Spent for Care: 30 minutes  More than 50% of total time spent on counseling and coordination of care as described above  Current Length of Stay: 2 day(s)    Current Patient Status: Inpatient   Certification Statement: The patient will continue to require additional inpatient hospital stay due to Assessment treatment of possible seizures and lower respiratory tract infection    Discharge Plan: To be determined    Code Status: Level 1 - Full Code      Subjective:   Patient seen examined bedside, no acute distress or discomfort noted  Currently patient is alert and oriented to self only however was able to answer most of examiner questions appropriately  Currently being evaluated for seizure activity, possible subacute infarct noticed on MRI brain  Followed by Neurology  Also noted to have elevated white count and fevers yesterday, currently afebrile with normal white count now, however pro count noted to be elevated  CT chest with some concerns for bilateral consolidations, have started antibiotic therapy for possible lower respiratory tract infection  Objective:     Vitals:   Temp (24hrs), Av 6 °F (37 °C), Min:98 3 °F (36 8 °C), Max:99 °F (37 2 °C)    Temp:  [98 3 °F (36 8 °C)-99 °F (37 2 °C)] 98 3 °F (36 8 °C)  HR:  [68-80] 80  Resp:  [16-18] 16  BP: (123-164)/(57-74) 123/70  SpO2:  [98 %-99 %] 99 %  Body mass index is 25 57 kg/m²  Input and Output Summary (last 24 hours): Intake/Output Summary (Last 24 hours) at 2019  Last data filed at 2019 1846  Gross per 24 hour   Intake 860 ml   Output 516 ml   Net 344 ml       Physical Exam:     Physical Exam   Constitutional: She appears well-developed  No distress  Thin   HENT:   Head: Normocephalic and atraumatic     Nose: Nose normal  Mouth/Throat: Oropharynx is clear and moist  No oropharyngeal exudate  Eyes: Right eye exhibits no discharge  Left eye exhibits no discharge  No scleral icterus  Patient with drooping right eyelid as well as visible cataract disease and right eye   Neck: Normal range of motion  No JVD present  No tracheal deviation present  Cardiovascular: Normal rate and regular rhythm  Exam reveals no gallop and no friction rub  Murmur heard  Pulmonary/Chest: Effort normal and breath sounds normal  No stridor  No respiratory distress  She has no wheezes  She has no rales  Abdominal: Soft  She exhibits no distension and no mass  There is tenderness  There is no rebound and no guarding  Musculoskeletal: Normal range of motion  She exhibits no edema, tenderness or deformity  Neurological: She is alert  Alert oriented x1   Skin: Skin is warm and dry  She is not diaphoretic           Additional Data:     Labs:    Results from last 7 days   Lab Units 09/11/19  0524   WBC Thousand/uL 9 42   HEMOGLOBIN g/dL 8 7*   HEMATOCRIT % 27 0*   PLATELETS Thousands/uL 282   NEUTROS PCT % 77*   LYMPHS PCT % 12*   MONOS PCT % 10   EOS PCT % 0     Results from last 7 days   Lab Units 09/11/19  0524 09/10/19  0730   SODIUM mmol/L 138 133*   POTASSIUM mmol/L 3 6 4 0   CHLORIDE mmol/L 107 105   CO2 mmol/L 21 15*   BUN mg/dL 9 10   CREATININE mg/dL 1 84* 1 88*   ANION GAP mmol/L 10 13   CALCIUM mg/dL 7 8* 8 2*   ALBUMIN g/dL  --  2 6*   TOTAL BILIRUBIN mg/dL  --  0 22   ALK PHOS U/L  --  126*   ALT U/L  --  7*   AST U/L  --  31   GLUCOSE RANDOM mg/dL 101 191*     Results from last 7 days   Lab Units 09/10/19  0749   INR  1 28*     Results from last 7 days   Lab Units 09/11/19  1148 09/11/19  0613 09/10/19  2359   POC GLUCOSE mg/dl 139 101 118     Results from last 7 days   Lab Units 09/11/19  0524   HEMOGLOBIN A1C % 5 4     Results from last 7 days   Lab Units 09/11/19  0524 09/10/19  1135 09/10/19  0730   LACTIC ACID mmol/L  --  1 5 8 7* PROCALCITONIN ng/ml 3 94*  --   --            * I Have Reviewed All Lab Data Listed Above  * Additional Pertinent Lab Tests Reviewed: All Labs Within Last 24 Hours Reviewed    Imaging:    Imaging Reports Reviewed Today Include:  MRI brain, CTA head  Imaging Personally Reviewed by Myself Includes:  None    Recent Cultures (last 7 days):     Results from last 7 days   Lab Units 09/12/19  1201 09/10/19  0742   BLOOD CULTURE  No Growth at 48 hrs  No Growth at 48 hrs  Last 24 Hours Medication List:     Current Facility-Administered Medications:  atorvastatin 40 mg Oral Daily With ePdro Buenrostro MD   azithromycin 500 mg Oral Q24H Lucas Anguiano MD   bisacodyl 10 mg Rectal Daily PRN Iqra Fernandez MD   cefTRIAXone 1,000 mg Intravenous Q24H Lucas Anguiano MD   clopidogrel 75 mg Oral Daily Iqra Fernandez MD   donepezil 5 mg Oral HS Iqra Fernandez MD   fentanyl citrate (PF) 50 mcg Intravenous Q1H PRN Iqra Fernandez MD   heparin (porcine) 5,000 Units Subcutaneous [facility] Iqra Fernandez MD   levothyroxine 100 mcg Oral Early Morning Iqra Fernandez MD   LORazepam 2 mg Intravenous PRN Iqra Fernandez MD   metoprolol tartrate 25 mg Oral Q12H [facility] Iqra Fernandez MD   NIFEdipine 60 mg Oral Daily Iqra Fernandez MD   ondansetron 4 mg Intravenous Q4H PRN Iqra Fernandez MD   pantoprazole 40 mg Oral BID AC Iqra Fernandez MD   QUEtiapine 12 5 mg Oral BID Iqra Fernandez MD        Today, Patient Was Seen By: Marisol Shelley MD    ** Please Note: Dictation voice to text software may have been used in the creation of this document   **

## 2019-09-12 NOTE — QUICK NOTE
Reviewed chart, patient on stroke pathway  Attempted to meet with patient to discuss follow up care and stroke education  Patient confused, thinks we are at [facility]  Continually states "I'm dying" and is not receptive to teaching  Left stroke education at bedside  Will follow up

## 2019-09-12 NOTE — PLAN OF CARE
Problem: Prexisting or High Potential for Compromised Skin Integrity  Goal: Skin integrity is maintained or improved  Description  INTERVENTIONS:  - Identify patients at risk for skin breakdown  - Assess and monitor skin integrity  - Assess and monitor nutrition and hydration status  - Monitor labs   - Assess for incontinence   - Turn and reposition patient  - Assist with mobility/ambulation  - Relieve pressure over bony prominences  - Avoid friction and shearing  - Provide appropriate hygiene as needed including keeping skin clean and dry  - Evaluate need for skin moisturizer/barrier cream  - Collaborate with interdisciplinary team   - Patient/family teaching  - Consider wound care consult   Outcome: Progressing     Problem: Potential for Falls  Goal: Patient will remain free of falls  Description  INTERVENTIONS:  - Assess patient frequently for physical needs  -  Identify cognitive and physical deficits and behaviors that affect risk of falls    -  [place] fall precautions as indicated by assessment   - Educate patient/family on patient safety including physical limitations  - Instruct patient to call for assistance with activity based on assessment  - Modify environment to reduce risk of injury  - Consider OT/PT consult to assist with strengthening/mobility  Outcome: Progressing     Problem: PAIN - ADULT  Goal: Verbalizes/displays adequate comfort level or baseline comfort level  Description  Interventions:  - Encourage patient to monitor pain and request assistance  - Assess pain using appropriate pain scale  - Administer analgesics based on type and severity of pain and evaluate response  - Implement non-pharmacological measures as appropriate and evaluate response  - Consider cultural and social influences on pain and pain management  - Notify physician/advanced practitioner if interventions unsuccessful or patient reports new pain  Outcome: Progressing     Problem: INFECTION - ADULT  Goal: Absence or prevention of progression during hospitalization  Description  INTERVENTIONS:  - Assess and monitor for signs and symptoms of infection  - Monitor lab/diagnostic results  - Monitor all insertion sites, i e  indwelling lines, tubes, and drains  - Monitor endotracheal if appropriate and nasal secretions for changes in amount and color  - [place] appropriate cooling/warming therapies per order  - Administer medications as ordered  - Instruct and encourage patient and family to use good hand hygiene technique  - Identify and instruct in appropriate isolation precautions for identified infection/condition  Outcome: Progressing  Goal: Absence of fever/infection during neutropenic period  Description  INTERVENTIONS:  - Monitor WBC    Outcome: Progressing     Problem: SAFETY ADULT  Goal: Maintain or return to baseline ADL function  Description  INTERVENTIONS:  -  Assess patient's ability to carry out ADLs; assess patient's baseline for ADL function and identify physical deficits which impact ability to perform ADLs (bathing, care of mouth/teeth, toileting, grooming, dressing, etc )  - Assess/evaluate cause of self-care deficits   - Assess range of motion  - Assess patient's mobility; develop plan if impaired  - Assess patient's need for assistive devices and provide as appropriate  - Encourage maximum independence but intervene and supervise when necessary  - Involve family in performance of ADLs  - Assess for home care needs following discharge   - Consider OT consult to assist with ADL evaluation and planning for discharge  - Provide patient education as appropriate  Outcome: Progressing  Goal: Maintain or return mobility status to optimal level  Description  INTERVENTIONS:  - Assess patient's baseline mobility status (ambulation, transfers, stairs, etc )    - Identify cognitive and physical deficits and behaviors that affect mobility  - Identify mobility aids required to assist with transfers and/or ambulation (gait belt, sit-to-stand, lift, walker, cane, etc )  - [place] fall precautions as indicated by assessment  - Record patient progress and toleration of activity level on Mobility SBAR; progress patient to next Phase/Stage  - Instruct patient to call for assistance with activity based on assessment  - Consider rehabilitation consult to assist with strengthening/weightbearing, etc   Outcome: Progressing     Problem: DISCHARGE PLANNING  Goal: Discharge to home or other facility with appropriate resources  Description  INTERVENTIONS:  - Identify barriers to discharge w/patient and caregiver  - Arrange for needed discharge resources and transportation as appropriate  - Identify discharge learning needs (meds, wound care, etc )  - Arrange for interpretive services to assist at discharge as needed  - Refer to Case Management Department for coordinating discharge planning if the patient needs post-hospital services based on physician/advanced practitioner order or complex needs related to functional status, cognitive ability, or social support system  Outcome: Progressing     Problem: Knowledge Deficit  Goal: Patient/family/caregiver demonstrates understanding of disease process, treatment plan, medications, and discharge instructions  Description  Complete learning assessment and assess knowledge base    Interventions:  - Provide teaching at level of understanding  - Provide teaching via preferred learning methods  Outcome: Progressing     Problem: RESPIRATORY - ADULT  Goal: Achieves optimal ventilation and oxygenation  Description  INTERVENTIONS:  - Assess for changes in respiratory status  - Assess for changes in mentation and behavior  - Position to facilitate oxygenation and minimize respiratory effort  - Oxygen administered by appropriate delivery if ordered  - Initiate smoking cessation education as indicated  - Encourage broncho-pulmonary hygiene including cough, deep breathe, Incentive Spirometry  - Assess the need for suctioning and aspirate as needed  - Assess and instruct to report SOB or any respiratory difficulty  - Respiratory Therapy support as indicated  Outcome: Progressing     Problem: SKIN/TISSUE INTEGRITY - ADULT  Goal: Skin integrity remains intact  Description  INTERVENTIONS  - Identify patients at risk for skin breakdown  - Assess and monitor skin integrity  - Assess and monitor nutrition and hydration status  - Monitor labs (i e  albumin)  - Assess for incontinence   - Turn and reposition patient  - Assist with mobility/ambulation  - Relieve pressure over bony prominences  - Avoid friction and shearing  - Provide appropriate hygiene as needed including keeping skin clean and dry  - Evaluate need for skin moisturizer/barrier cream  - Collaborate with interdisciplinary team (i e  Nutrition, Rehabilitation, etc )   - Patient/family teaching  Outcome: Progressing     Problem: Nutrition/Hydration-ADULT  Goal: Nutrient/Hydration intake appropriate for improving, restoring or maintaining nutritional needs  Description  Monitor and assess patient's nutrition/hydration status for malnutrition  Collaborate with interdisciplinary team and initiate plan and interventions as ordered  Monitor patient's weight and dietary intake as ordered or per policy  Utilize nutrition screening tool and intervene as necessary  Determine patient's food preferences and provide high-protein, high-caloric foods as appropriate       INTERVENTIONS:  - Monitor oral intake, urinary output, labs, and treatment plans  - Assess nutrition and hydration status and recommend course of action  - Evaluate amount of meals eaten  - Assist patient with eating if necessary   - Allow adequate time for meals  - Recommend/ encourage appropriate diets, oral nutritional supplements, and vitamin/mineral supplements  - Order, calculate, and assess calorie counts as needed  - Recommend, monitor, and adjust tube feedings and TPN/PPN based on assessed needs  - Assess need for intravenous fluids  - Provide specific nutrition/hydration education as appropriate  - Include patient/family/caregiver in decisions related to nutrition  Outcome: Progressing

## 2019-09-12 NOTE — RESTORATIVE TECHNICIAN NOTE
Restorative Specialist Mobility Note       Activity: Other (Comment)(Educated/encouraged pt to ambulate with assistance 3-4 x's/day, pt refused nursing aware  Bed alarm on  Pt callbell, phone/tray within reach )              Repositioned:  Other (Comment)(Rep /sat pt upright in bed )          Nahid CAPONE, Restorative Technician, [employer] [facility]

## 2019-09-12 NOTE — PHYSICAL THERAPY NOTE
Physical Therapy Evaluation    Patient's Name: Kb Ricardo    Admitting Diagnosis  Seizure ([facility] ) [R56 9]  [facility] [R56 9]  Sepsis, due to unspecified organism [facility]) [A41 9]    Problem List  Patient Active Problem List   Diagnosis    Hyperlipidemia    GERD (gastroesophageal reflux disease)    Fibromyalgia    Rheumatoid arthritis ([facility] )    Hyponatremia    CVA (cerebral vascular accident) ([facility] )    Essential hypertension    Acute metabolic encephalopathy    Sepsis ([facility] )    Late onset Alzheimer's disease with behavioral disturbance    Acquired hypothyroidism    Anemia    Acute cystitis without hematuria    Right lower quadrant abdominal pain    DEVONTE (acute kidney injury) ([facility] )    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 ([facility] )    Pneumonia of right upper lobe due to infectious organism ([facility] )    Sore throat    Rotator cuff tear arthropathy of right shoulder    Cough    Conjunctivitis of right eye    Pseudogout of left wrist    Paresis ([address] )    Pressure injury of sacral region, stage 1    Severe protein-calorie malnutrition (HCC)    Debility    Chronic rhinitis    Chronic pain of right knee    Diverticulitis    At high risk for aspiration    Vascular dementia with behavior disturbance    Seizure ([facility] )    Seizure-like activity ([facility] )       Past Medical History  Past Medical History:   Diagnosis Date    Aspiration pneumonia ([facility] )     Last Assessed:  7/18/17    Basal cell carcinoma of nose     Last Assessed:  4/12/17    Blind     blind right eye, 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, Last Assessed:  7/18/17    Dementia     Depression     Disease of thyroid gland     Fibromyalgia     Last Assessed:  7/18/17    Gait disturbance     GERD (gastroesophageal reflux disease)     Glaucoma     Hyperlipidemia     Hypertension     Osteopenia     Peripheral neuropathy     Last Assessed:  4/12/17    Polymyalgia rheumatica ([facility] )     Psychiatric disorder depression    Renal insufficiency syndrome     Last Assessed:  4/13/17    Rheumatoid arthritis ([facility] )     Seizure ([facility] ) 9/10/2019    Stroke ([facility] )     x2, Last Assessed:  7/18/17    Systemic lupus erythematosus ([facility] )     Vertigo     Vitamin D deficiency        Past Surgical History  Past Surgical History:   Procedure Laterality Date    EYE SURGERY      JOINT REPLACEMENT      left hip replacement, left shoulder replacement    SHOULDER SURGERY      Replacement        09/12/19 1210   Note Type   Note type Eval/Treat  (eval 11:40-12:00, treat 12:00-12:10)   Pain Assessment   Pain Assessment FLACC   Pain Rating: FLACC (Rest) - Face 1   Pain Rating: FLACC (Rest) - Legs 1   Pain Rating: FLACC (Rest) - Activity 1   Pain Rating: FLACC (Rest) - Cry 1   Pain Rating: FLACC (Rest) - Consolability 2   Score: FLACC (Rest) 6   Pain Rating: FLACC (Activity) - Face 1   Pain Rating: FLACC (Activity) - Legs 1   Pain Rating: FLACC (Activity) - Activity 1   Pain Rating: FLACC (Activity) - Cry 1   Pain Rating: FLACC (Activity) - Consolability 2   Score: FLACC (Activity) 6   Home Living   Type of Home SNF   Additional Comments Admitted from [facility]   ambulatory w/ RW and A of 1, A for ADLs   Prior Function   Level of [place] Needs assistance with ADLs and functional mobility   Falls in the last 6 months   (unknown)   Restrictions/Precautions   Weight Bearing Precautions Per Order No   Other Precautions Cognitive; Chair Alarm; Bed Alarm;Multiple lines; Fall Risk;Pain; Impulsive;Contact/isolation   General   Family/Caregiver Present No   Cognition   Overall Cognitive Status Impaired   Arousal/Participation Arousable   Attention Difficulty attending to directions   Orientation Level Oriented to person   Memory Unable to assess   Following Commands Follows one step commands inconsistently   Comments awake, frequently states one or a combination of: Am I dead, I want to die, I wish I could die now    Quickly redirects and states she is adria  marginally cooperative w/ eval and treatment session   RLE Assessment   RLE Assessment   (strength grossly 3+/5)   LLE Assessment   LLE Assessment   (strength grossly 3+/5)   Coordination   Movements are Fluid and Coordinated 0   Coordination and Movement Description B LE ataxia   Bed Mobility   Supine to Sit 3  Moderate assistance   Additional items Assist x 1   Transfers   Sit to Stand 3  Moderate assistance   Additional items Assist x 1   Stand to Sit 3  Moderate assistance   Additional items Assist x 1   Additional Comments During gait as below to chair, noted to have a great deal of bowel incontinence  Pt positioned safely in chair  Time spent obtaining linens, gown  Pt then stood during treatment session x approx 5 min w/ min/mod A of 1, and 2nd to help clean  Pt then returned to chair, further cleaned and repositioned during treatment session- 10 min total spent p eval   Ambulation/Elevation   Gait pattern Excessively slow  (slow, ataxia, posterior and lateral LOB)   Gait Assistance 3  Moderate assist   Additional items Assist x 1   Assistive Device Rolling walker   Distance 5'x1 from bed to chair, noted bowel incontinence as above   Balance   Static Sitting Fair   Dynamic Sitting Poor +   Static Standing Poor +   Dynamic Standing Poor   Ambulatory Poor   Endurance Deficit   Endurance Deficit Yes   Endurance Deficit Description fatigue, weakness, pain, cog   Activity Tolerance   Activity Tolerance Patient limited by fatigue;Patient limited by pain;Treatment limited secondary to medical complications (Comment); Other (Comment)  (cog)   Nurse Made Aware yes   Assessment   Prognosis Fair   Problem List Decreased strength;Decreased endurance;Decreased coordination; Impaired balance;Decreased mobility; Decreased cognition; Impaired judgement;Decreased safety awareness;Pain   Assessment Pt seen for high complexity evaluation , as well as additional treatment session for standing trial, repositoning as above   Pt is an 79 y/o female w/ history/comorbidities of CVA, GERD, HLD who is now admitted w/ ? seizure activity  Intubated on admit  dx include new onset seizures, and MRI brain showed small focus of decreased diffusion L precentral sulcus consistant w/ acute to subacute infarct  Due to acute medical issues, pain, fall risk, note unstable clinical picture  PT consulted to assess mobility, d/c needs  Pt presents w/ decreased functional mob, standing balance, endurance, B LE strength and coordination  Pt will benefit from skilled PT to correct for the above problems  recommend return to snf when stable, w/ continued therapy services     Goals   Patient Goals to eat, to die   STG Expiration Date 09/26/19   Short Term Goal #1 1-2 wks: bed mob and transfers w/ S, standing balance to good/normal w/ device, ambulate 100-150 ft w/ RW and S, increase B LE strength by 1/2 -1 grade   Treatment Day 0   Plan   Treatment/Interventions Functional transfer training;LE strengthening/ROM; Therapeutic exercise; Endurance training;Patient/family training;Equipment eval/education; Bed mobility;Gait training   PT Frequency   (2-3x/wk)   Recommendation   Recommendation   (recommend return to snf w/ continued therapy services   )   Equipment Recommended Lesly Redder   PT - OK to Discharge Yes  (when stable- see above for recs)   Modified Drew Scale   Modified [place] Scale 4   Barthel Index   Feeding 5   Bathing 0   Grooming Score 0   Dressing Score 0   Bladder Score 0   Bowels Score 0   Toilet Use Score 5   Transfers (Bed/Chair) Score 5   Mobility (Level Surface) Score 0   Stairs Score 0   Barthel Index Score 15           Frederick Israel PT, DPT, CSRS

## 2019-09-12 NOTE — ASSESSMENT & PLAN NOTE
Appreciate Neurology recs, as per Neurology;  · PT/OT/SLP recommendations appreciated  · Continue telemetry  · Continue vEEG  · Continue neuro checks  · Ativan 2 mg for seizures or seizure-like activity longer than 2 minutes  · Continue Plavix 75 mg and Lipitor 40 mg   · Echo pending  Concern to for CVA, as MRI showedsmall restricted diffusion left precentral sulcus consistent with acute/subacute infarct; old right temporoparietal, left cerebellar and left occipital parietal infarct  CT head demonstrated no acute intracranial abnormality; chronic right temporal parietal encephalomalacia consistent with prior insult

## 2019-09-12 NOTE — ASSESSMENT & PLAN NOTE
Blood pressure well controlled on home medications that include Lopressor and Procardia  Continue present therapy

## 2019-09-12 NOTE — ASSESSMENT & PLAN NOTE
Patient with baseline creatinine around 1; currently creatinine 1 84  Overnight creatinine slightly trended down, unclear of etiology however as BUN /creatinine ratio is less than 20-1  consider intrinsic renal failure; ATN versus interstitial disease secondary to offending drug  Will place Milton to rule out post renal disease  Review med list to exclude a for toxic medications  Monitor morning labs

## 2019-09-13 ENCOUNTER — APPOINTMENT (INPATIENT)
Dept: NON INVASIVE DIAGNOSTICS | Facility: HOSPITAL | Age: 84
DRG: 064 | End: 2019-09-13
Payer: MEDICARE

## 2019-09-13 LAB
ALBUMIN SERPL BCP-MCNC: 2.2 G/DL (ref 3.5–5)
ALP SERPL-CCNC: 107 U/L (ref 46–116)
ALT SERPL W P-5'-P-CCNC: 10 U/L (ref 12–78)
ANION GAP SERPL CALCULATED.3IONS-SCNC: 7 MMOL/L (ref 4–13)
AST SERPL W P-5'-P-CCNC: 43 U/L (ref 5–45)
BASOPHILS # BLD AUTO: 0.02 THOUSANDS/ΜL (ref 0–0.1)
BASOPHILS NFR BLD AUTO: 0 % (ref 0–1)
BILIRUB SERPL-MCNC: 0.3 MG/DL (ref 0.2–1)
BUN SERPL-MCNC: 12 MG/DL (ref 5–25)
CALCIUM SERPL-MCNC: 7.7 MG/DL (ref 8.3–10.1)
CHLORIDE SERPL-SCNC: 105 MMOL/L (ref 100–108)
CO2 SERPL-SCNC: 23 MMOL/L (ref 21–32)
CREAT SERPL-MCNC: 2.01 MG/DL (ref 0.6–1.3)
EOSINOPHIL # BLD AUTO: 0.08 THOUSAND/ΜL (ref 0–0.61)
EOSINOPHIL NFR BLD AUTO: 1 % (ref 0–6)
ERYTHROCYTE [DISTWIDTH] IN BLOOD BY AUTOMATED COUNT: 15.3 % (ref 11.6–15.1)
GFR SERPL CREATININE-BSD FRML MDRD: 22 ML/MIN/1.73SQ M
GLUCOSE SERPL-MCNC: 82 MG/DL (ref 65–140)
HCT VFR BLD AUTO: 29.2 % (ref 34.8–46.1)
HGB BLD-MCNC: 9.5 G/DL (ref 11.5–15.4)
IMM GRANULOCYTES # BLD AUTO: 0.04 THOUSAND/UL (ref 0–0.2)
IMM GRANULOCYTES NFR BLD AUTO: 1 % (ref 0–2)
LYMPHOCYTES # BLD AUTO: 0.99 THOUSANDS/ΜL (ref 0.6–4.47)
LYMPHOCYTES NFR BLD AUTO: 11 % (ref 14–44)
MCH RBC QN AUTO: 29.8 PG (ref 26.8–34.3)
MCHC RBC AUTO-ENTMCNC: 32.5 G/DL (ref 31.4–37.4)
MCV RBC AUTO: 92 FL (ref 82–98)
MONOCYTES # BLD AUTO: 0.62 THOUSAND/ΜL (ref 0.17–1.22)
MONOCYTES NFR BLD AUTO: 7 % (ref 4–12)
NEUTROPHILS # BLD AUTO: 6.98 THOUSANDS/ΜL (ref 1.85–7.62)
NEUTS SEG NFR BLD AUTO: 80 % (ref 43–75)
NRBC BLD AUTO-RTO: 0 /100 WBCS
PLATELET # BLD AUTO: 305 THOUSANDS/UL (ref 149–390)
PMV BLD AUTO: 9.9 FL (ref 8.9–12.7)
POTASSIUM SERPL-SCNC: 3.6 MMOL/L (ref 3.5–5.3)
PROCALCITONIN SERPL-MCNC: 1.4 NG/ML
PROT SERPL-MCNC: 6 G/DL (ref 6.4–8.2)
RBC # BLD AUTO: 3.19 MILLION/UL (ref 3.81–5.12)
SODIUM SERPL-SCNC: 135 MMOL/L (ref 136–145)
WBC # BLD AUTO: 8.73 THOUSAND/UL (ref 4.31–10.16)

## 2019-09-13 PROCEDURE — 80053 COMPREHEN METABOLIC PANEL: CPT | Performed by: INTERNAL MEDICINE

## 2019-09-13 PROCEDURE — 93306 TTE W/DOPPLER COMPLETE: CPT | Performed by: INTERNAL MEDICINE

## 2019-09-13 PROCEDURE — 93306 TTE W/DOPPLER COMPLETE: CPT

## 2019-09-13 PROCEDURE — 99232 SBSQ HOSP IP/OBS MODERATE 35: CPT | Performed by: INTERNAL MEDICINE

## 2019-09-13 PROCEDURE — 99232 SBSQ HOSP IP/OBS MODERATE 35: CPT | Performed by: PSYCHIATRY & NEUROLOGY

## 2019-09-13 PROCEDURE — 99222 1ST HOSP IP/OBS MODERATE 55: CPT | Performed by: INTERNAL MEDICINE

## 2019-09-13 PROCEDURE — 84145 PROCALCITONIN (PCT): CPT | Performed by: INTERNAL MEDICINE

## 2019-09-13 PROCEDURE — 85025 COMPLETE CBC W/AUTO DIFF WBC: CPT | Performed by: INTERNAL MEDICINE

## 2019-09-13 PROCEDURE — 92526 ORAL FUNCTION THERAPY: CPT

## 2019-09-13 PROCEDURE — 97530 THERAPEUTIC ACTIVITIES: CPT

## 2019-09-13 RX ORDER — SODIUM CHLORIDE 9 MG/ML
100 INJECTION, SOLUTION INTRAVENOUS CONTINUOUS
Status: DISPENSED | OUTPATIENT
Start: 2019-09-13 | End: 2019-09-13

## 2019-09-13 RX ADMIN — CEFTRIAXONE SODIUM 1000 MG: 10 INJECTION, POWDER, FOR SOLUTION INTRAVENOUS at 20:26

## 2019-09-13 RX ADMIN — APIXABAN 2.5 MG: 2.5 TABLET, FILM COATED ORAL at 20:31

## 2019-09-13 RX ADMIN — QUETIAPINE FUMARATE 12.5 MG: 25 TABLET ORAL at 08:42

## 2019-09-13 RX ADMIN — METOPROLOL TARTRATE 25 MG: 25 TABLET, FILM COATED ORAL at 20:28

## 2019-09-13 RX ADMIN — HEPARIN SODIUM 5000 UNITS: 5000 INJECTION INTRAVENOUS; SUBCUTANEOUS at 21:10

## 2019-09-13 RX ADMIN — PANTOPRAZOLE SODIUM 40 MG: 40 TABLET, DELAYED RELEASE ORAL at 08:42

## 2019-09-13 RX ADMIN — ATORVASTATIN CALCIUM 40 MG: 40 TABLET, FILM COATED ORAL at 17:18

## 2019-09-13 RX ADMIN — METOPROLOL TARTRATE 25 MG: 25 TABLET, FILM COATED ORAL at 08:42

## 2019-09-13 RX ADMIN — CLOPIDOGREL BISULFATE 75 MG: 75 TABLET ORAL at 08:42

## 2019-09-13 RX ADMIN — PANTOPRAZOLE SODIUM 40 MG: 40 TABLET, DELAYED RELEASE ORAL at 17:18

## 2019-09-13 RX ADMIN — DONEPEZIL HYDROCHLORIDE 5 MG: 5 TABLET ORAL at 21:10

## 2019-09-13 RX ADMIN — HEPARIN SODIUM 5000 UNITS: 5000 INJECTION INTRAVENOUS; SUBCUTANEOUS at 05:20

## 2019-09-13 RX ADMIN — NIFEDIPINE 60 MG: 60 TABLET, FILM COATED, EXTENDED RELEASE ORAL at 08:42

## 2019-09-13 RX ADMIN — QUETIAPINE FUMARATE 12.5 MG: 25 TABLET ORAL at 17:18

## 2019-09-13 RX ADMIN — LEVOTHYROXINE SODIUM 100 MCG: 100 TABLET ORAL at 05:20

## 2019-09-13 RX ADMIN — SODIUM CHLORIDE 100 ML/HR: 0.9 INJECTION, SOLUTION INTRAVENOUS at 11:13

## 2019-09-13 RX ADMIN — HEPARIN SODIUM 5000 UNITS: 5000 INJECTION INTRAVENOUS; SUBCUTANEOUS at 13:08

## 2019-09-13 RX ADMIN — AZITHROMYCIN 500 MG: 250 TABLET, FILM COATED ORAL at 20:26

## 2019-09-13 NOTE — QUICK NOTE
Attempted to meet with patient to discuss follow up care with outpatient neurology and stroke education  Patient confused, no family present at this time  Will follow up

## 2019-09-13 NOTE — PROGRESS NOTES
Progress Note - Mercedes April 4/27/1931, 80 y o  female MRN: [id number]    Unit/Bed#: [facility] 702-01 Encounter: [id number]    Primary Care Provider: Kamron Concepcion DO   Date and time admitted to hospital: 9/10/2019  7:09 AM        DEVONTE (acute kidney injury) [facility])  Assessment & Plan  Patient with baseline creatinine around 1; currently creatinine 1 84  Overnight creatinine slightly trended down, unclear of etiology however as BUN /creatinine ratio is less than 20-1  consider intrinsic renal failure; ATN versus interstitial disease secondary to offending drug  Will place Milton to rule out post renal disease  Review med list to exclude a for toxic medications  Monitor morning labs    9/13-unfortunately, creatinine trended up over night  Urine output appears to be appropriate  Review of past medical records indicate that creatinine has improved with gentle hydration  IV infusion of normal saline started  UA negative for infection; blood but no protein  Review of imaging shows no apparent contrast used; NSAIDs or other nephrotoxin medications not medical record  Will consult Nephrology  Acquired hypothyroidism  Assessment & Plan  Levothyroxine 100 mcg per day    Essential hypertension  Assessment & Plan  Blood pressure well controlled on home medications that include Lopressor and Procardia  Continue present therapy    GERD (gastroesophageal reflux disease)  Assessment & Plan  PPI    Hyperlipidemia  Assessment & Plan  Will continue Lipitor 40 mg q day    * Seizure [facility])  Assessment & Plan  Appreciate Neurology recs, as per Neurology;  · PT/OT/SLP recommendations appreciated  · Continue telemetry  · Continue vEEG  · Continue neuro checks  · Ativan 2 mg for seizures or seizure-like activity longer than 2 minutes  · Continue Plavix 75 mg and Lipitor 40 mg   · Echo pending      Concern to for CVA, as MRI showedsmall restricted diffusion left precentral sulcus consistent with acute/subacute infarct; old right temporoparietal, left cerebellar and left occipital parietal infarct  CT head demonstrated no acute intracranial abnormality; chronic right temporal parietal encephalomalacia consistent with prior insult  -will await neurological recommendations  Patient currently alert and oriented x1, likely her baseline status given Alzheimer's dementia  GCS 15  VTE Pharmacologic Prophylaxis:   Pharmacologic: Heparin  Mechanical VTE Prophylaxis in Place: Yes    Patient Centered Rounds: I have performed bedside rounds with nursing staff today  Discussions with Specialists or Other Care Team Provider:  None    Education and Discussions with Family / Patient:     Time Spent for Care: 45 minutes  More than 50% of total time spent on counseling and coordination of care as described above  Current Length of Stay: 3 day(s)    Current Patient Status: Inpatient   Certification Statement: The patient will continue to require additional inpatient hospital stay due to Treatment of seizure activity and a KI    Discharge Plan: To be determined    Code Status: Level 1 - Full Code      Subjective:   Patient seen examined bedside, no acute distress or discomfort noted  Afebrile and vital signs are stable, some concern for lower respiratory tract infection, so azithromycin and Rocephin started yesterday  Appreciate Neurology recommendations, awaiting further recommendations regarding seizures  Patient continues to have climbing creatinine, Nephrology consulted  Milton placed to rule out post renal obstruction, urinary output is appropriate  Geriatrics also consult  Objective:     Vitals:   Temp (24hrs), Av 1 °F (37 3 °C), Min:98 3 °F (36 8 °C), Max:99 7 °F (37 6 °C)    Temp:  [98 3 °F (36 8 °C)-99 7 °F (37 6 °C)] 99 3 °F (37 4 °C)  HR:  [68-85] 75  Resp:  [16-20] 19  BP: (123-154)/(57-70) 154/67  SpO2:  [98 %-99 %] 99 %  Body mass index is 25 48 kg/m²       Input and Output Summary (last 24 hours): Intake/Output Summary (Last 24 hours) at 9/13/2019 1133  Last data filed at 9/13/2019 0900  Gross per 24 hour   Intake 560 ml   Output 1050 ml   Net -490 ml       Physical Exam:     Physical Exam  Constitutional: She appears well-developed  No distress  Thin   HENT:   Head: Normocephalic and atraumatic  Nose: Nose normal    Mouth/Throat: Oropharynx is clear and moist  No oropharyngeal exudate  Eyes: Right eye exhibits no discharge  Left eye exhibits no discharge  No scleral icterus  Patient with drooping right eyelid as well as visible cataract disease and right eye   Neck: Normal range of motion  No JVD present  No tracheal deviation present  Cardiovascular: Normal rate and regular rhythm  Exam reveals no gallop and no friction rub  Murmur heard  Pulmonary/Chest: Effort normal and breath sounds normal  No stridor  No respiratory distress  Wheezing appreciated on left lower lobe  She has no rales  Abdominal: Soft  She exhibits no distension and no mass  There is tenderness  There is no rebound and no guarding  Musculoskeletal: Normal range of motion  She exhibits no edema, tenderness or deformity  Neurological: She is alert  Alert oriented x1; GCS 15  Skin: Skin is warm and dry  She is not diaphoretic       Additional Data:     Labs:    Results from last 7 days   Lab Units 09/13/19  0523   WBC Thousand/uL 8 73   HEMOGLOBIN g/dL 9 5*   HEMATOCRIT % 29 2*   PLATELETS Thousands/uL 305   NEUTROS PCT % 80*   LYMPHS PCT % 11*   MONOS PCT % 7   EOS PCT % 1     Results from last 7 days   Lab Units 09/13/19  0523   SODIUM mmol/L 135*   POTASSIUM mmol/L 3 6   CHLORIDE mmol/L 105   CO2 mmol/L 23   BUN mg/dL 12   CREATININE mg/dL 2 01*   ANION GAP mmol/L 7   CALCIUM mg/dL 7 7*   ALBUMIN g/dL 2 2*   TOTAL BILIRUBIN mg/dL 0 30   ALK PHOS U/L 107   ALT U/L 10*   AST U/L 43   GLUCOSE RANDOM mg/dL 82     Results from last 7 days   Lab Units 09/10/19  0749   INR  1 28*     Results from last 7 days   Lab Units 09/11/19  1148 09/11/19  0613 09/10/19  2359   POC GLUCOSE mg/dl 139 101 118     Results from last 7 days   Lab Units 09/11/19  0524   HEMOGLOBIN A1C % 5 4     Results from last 7 days   Lab Units 09/13/19  0523 09/11/19  0524 09/10/19  1135 09/10/19  0730   LACTIC ACID mmol/L  --   --  1 5 8 7*   PROCALCITONIN ng/ml 1 40* 3 94*  --   --            * I Have Reviewed All Lab Data Listed Above  * Additional Pertinent Lab Tests Reviewed: All Labs Within Last 24 Hours Reviewed    Imaging:    Imaging Reports Reviewed Today Include:  CT chest  Imaging Personally Reviewed by Myself Includes:  None    Recent Cultures (last 7 days):     Results from last 7 days   Lab Units 09/12/19  1201 09/10/19  0742   BLOOD CULTURE  No Growth at 48 hrs  No Growth at 48 hrs         Last 24 Hours Medication List:     Current Facility-Administered Medications:  atorvastatin 40 mg Oral Daily With Giorgi Hawthorne MD    azithromycin 500 mg Oral Q24H Mile Antonio MD    bisacodyl 10 mg Rectal Daily PRN Brandie Dong MD    cefTRIAXone 1,000 mg Intravenous Q24H Mile Antonio MD Last Rate: 1,000 mg (09/12/19 2127)   clopidogrel 75 mg Oral Daily Brandie Dong MD    donepezil 5 mg Oral HS Brandie Dong MD    fentanyl citrate (PF) 50 mcg Intravenous Q1H PRN Brandie Dong MD    heparin (porcine) 5,000 Units Subcutaneous [facility] Brandie Dong MD    levothyroxine 100 mcg Oral Early Morning Brandie Dong MD    LORazepam 2 mg Intravenous PRN Brandie Dong MD    metoprolol tartrate 25 mg Oral Q12H [address] Brandie Dong MD    NIFEdipine 60 mg Oral Daily Brandie Dong MD    ondansetron 4 mg Intravenous Q4H PRN Brandie Dong MD    pantoprazole 40 mg Oral BID AC Brandie Dong MD    QUEtiapine 12 5 mg Oral BID Brandie Dong MD    sodium chloride 100 mL/hr Intravenous Continuous Mile Antonio MD Last Rate: 100 mL/hr (09/13/19 1113)        Today, Patient Was Seen By: Dipika Galvin MD    ** Please Note: Dictation voice to text software may have been used in the creation of this document   **

## 2019-09-13 NOTE — RESTORATIVE TECHNICIAN NOTE
Restorative Specialist Mobility Note       Activity: Other (Comment)(Educated/encouraged pt to ambulate with assistance 3-4 x's/day, pt refused nursing aware  Bed alarm on  Pt callbell, phone/tray within reach )              Repositioned:  Other (Comment)(Rep /sat pt upright in bed )          Ed CAPONE, Restorative Technician, [employer]

## 2019-09-13 NOTE — SPEECH THERAPY NOTE
Speech Language/Pathology    Speech/Language Pathology Progress Note    Patient Name: Sebastian Curtis  [id number] Date: 9/13/2019     Problem List  Principal Problem:    Seizure ([facility] )  Active Problems:    Hyperlipidemia    GERD (gastroesophageal reflux disease)    Essential hypertension    Sepsis ([facility] )    Acquired hypothyroidism    DEVONTE (acute kidney injury) ([facility] )    Vascular dementia with behavior disturbance    Seizure-like activity [facility])       Past Medical History  Past Medical History:   Diagnosis Date    [facility]     Last Assessed:  7/18/17    Basal cell carcinoma of nose     Last Assessed:  4/12/17    Blind     blind right eye, 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, Last Assessed:  7/18/17    Dementia     Depression     Disease of thyroid gland     Fibromyalgia     Last Assessed:  7/18/17    Gait disturbance     GERD (gastroesophageal reflux disease)     Glaucoma     Hyperlipidemia     Hypertension     Osteopenia     Peripheral neuropathy     Last Assessed:  4/12/17    Polymyalgia rheumatica ([facility] )     Psychiatric disorder     depression    Renal insufficiency syndrome     Last Assessed:  4/13/17    Rheumatoid arthritis ([facility] )     Seizure ([facility] ) 9/10/2019    Stroke ([facility] )     x2, Last Assessed:  7/18/17    Systemic lupus erythematosus ([facility] )     Vertigo     Vitamin D deficiency         Past Surgical History  Past Surgical History:   Procedure Laterality Date    EYE SURGERY      JOINT REPLACEMENT      left hip replacement, left shoulder replacement    SHOULDER SURGERY      Replacement         Subjective:  "Let me diet, help me die"  Objective:  Pt seen for f/u to ensure diet tolerance  Pt seen for lunch, pt refused chicken but agreeable to chocolate cake and "Cola"  Pt tolerated both well but refused further PO intake despite increased encouragement       Assessment:  Pt tolerating current diet without difficutlies    Plan/Recommendations:  Cont c dysphagia level 3 diet c thin liquids  ST to sign off

## 2019-09-13 NOTE — PROGRESS NOTES
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  Pertinent history of previous stroke and dementia  MRA head demonstrated no large vessel occlusion, aneurysm or hemodynamically significant vessel disease  MRI brain demonstrated small restricted diffusion left precentral sulcus consistent with acute/subacute infarct; old right temporoparietal, left cerebellar and left occipital parietal infarct  CT head demonstrated no acute intracranial abnormality; chronic right temporal parietal encephalomalacia consistent with prior insult  · Appreciate PT/OT/SLP recommendations; dysphagia diet and discharge to SNF for skilled therapy  · Discontinue vEEG  · Continue telemetry  · Continue neuro checks  · Continue fall precautions  · Continue Plavix 75 mg and Lipitor 40 mg   · Echo pending  · Medical management per [facility]  VTE Pharmacologic Prophylaxis:   Pharmacologic: Heparin  Mechanical VTE Prophylaxis in Place: Yes    Patient Centered Rounds: I have performed bedside rounds with nursing staff today  Discussions with Specialists or Other Care Team Provider: [facility]  Education and Discussions with Family / Patient:  Patient; message left with daughter Coy Rowland) regarding inquiry to previously recommended anticoagulation  Time Spent for Care: 30 minutes  More than 50% of total time spent on counseling and coordination of care as described above  Current Length of Stay: 3 day(s)  Current Patient Status: Inpatient   Certification Statement: The patient will continue to require additional inpatient hospital stay due to Medical management  Discharge Plan: defer to [facility]     Code Status: Level 1 - Full Code    Subjective:   No acute distress noted at time of exam   She endorsed a lesser headache in comparison to yesterday's exam; 6/10  She denied  lightheadedness, visual changes and tinnitus  Patient continues to perseverate on dying and continually inquired if she was dying; patient was reassured  Discussed w/ patient calling her daughter to inquire about anticoagulation prior to admission; prior admission 10/03/2016 at [address] [facility] recommended initiation of anticoagulation 3-4 weeks after discharge subsequent to right MCA stroke, although it is uncertain if she adhered to recommendation, because anticoagulation/antiplatelet absent from present medication list      Objective:     Vitals:   Temp (24hrs), Av 1 °F (37 3 °C), Min:98 3 °F (36 8 °C), Max:99 7 °F (37 6 °C)    Temp:  [98 3 °F (36 8 °C)-99 7 °F (37 6 °C)] 99 3 °F (37 4 °C)  HR:  [68-85] 75  Resp:  [16-20] 19  BP: (123-154)/(57-70) 154/67  SpO2:  [98 %-99 %] 99 %  Body mass index is 25 48 kg/m²  Input and Output Summary (last 24 hours): Intake/Output Summary (Last 24 hours) at 2019 1226  Last data filed at 2019 0900  Gross per 24 hour   Intake 560 ml   Output 1050 ml   Net -490 ml       Physical Exam:     Physical Exam   Constitutional: Vital signs are normal  She appears well-developed and well-nourished  She is cooperative  She does not appear ill  No distress  HENT:   Head: Normocephalic and atraumatic  Eyes: Pupils are equal, round, and reactive to light  EOM are normal    Neck: Normal range of motion  Neck supple  Cardiovascular: Normal rate, regular rhythm, normal heart sounds, intact distal pulses and normal pulses  No murmur heard  Pulmonary/Chest: Effort normal and breath sounds normal  No respiratory distress  She has no decreased breath sounds  Abdominal: Soft  Bowel sounds are normal  She exhibits no distension  Musculoskeletal: Normal range of motion  Neurological: She is alert  Skin: Skin is warm, dry and intact  Capillary refill takes less than 2 seconds  She is not diaphoretic  Psychiatric: Her speech is normal and behavior is normal  Judgment and thought content normal  Her mood appears anxious  Vitals reviewed  Neurologic Exam   Mental Status:  Alert and oriented x4  Able to adhere to some commands  Perseveration on death  Appropriate attention and concentration  Regular rate and rhythm regarding speech  Cranial Nerves:  CN 2 - 12 intact  Motor Exam:  Muscle bulk: normal  Overall muscle tone: normal  Strength: 3/5 throughout, particularly right-sided  Motor:  Appropriate finger-to-nose  Sensory Exam:  Crude touch, temperature and vibration intact throughout  Gait, Coordination, and Reflexes   Reflexes:  Right brachioradialis: 2/4  Left brachioradialis: 2/4  Right biceps: 2/4  Left biceps: 2/4  Right triceps: 2/4  Left triceps: 2/4  Right patellar: 2/4  Left patellar: 2/4  Right achilles: 2/4  Left achilles: 2/4  Babinski: downward flexed toes  Gait: deferred  Additional Data:     Labs:    Results from last 7 days   Lab Units 09/13/19  0523   WBC Thousand/uL 8 73   HEMOGLOBIN g/dL 9 5*   HEMATOCRIT % 29 2*   PLATELETS Thousands/uL 305   NEUTROS PCT % 80*   LYMPHS PCT % 11*   MONOS PCT % 7   EOS PCT % 1     Results from last 7 days   Lab Units 09/13/19  0523  09/10/19  0729   POTASSIUM mmol/L 3 6   < >  --    CHLORIDE mmol/L 105   < >  --    CO2 mmol/L 23   < >  --    CO2, I-STAT mmol/L  --   --  17*   BUN mg/dL 12   < >  --    CREATININE mg/dL 2 01*   < >  --    CALCIUM mg/dL 7 7*   < >  --    ALK PHOS U/L 107   < >  --    ALT U/L 10*   < >  --    AST U/L 43   < >  --    GLUCOSE, ISTAT mg/dl  --   --  198*    < > = values in this interval not displayed  Results from last 7 days   Lab Units 09/10/19  0749   INR  1 28*       * I Have Reviewed All Lab Data Listed Above  * Additional Pertinent Lab Tests Reviewed:  Frankie[address]helle 66 Admission Reviewed    Imaging:    Imaging Reports Reviewed Today Include:  None  Imaging Personally Reviewed by Myself Includes:  None  Recent Cultures (last 7 days):     Results from last 7 days   Lab Units 09/13/19  1201 09/10/19  0742   BLOOD CULTURE  No Growth at 72 hrs  No Growth at 72 hrs  Last 24 Hours Medication List:     Current Facility-Administered Medications:  atorvastatin 40 mg Oral Daily With Giorgi Hawthorne MD    azithromycin 500 mg Oral Q24H Mile Antonio MD    bisacodyl 10 mg Rectal Daily PRN Brandie Dong MD    cefTRIAXone 1,000 mg Intravenous Q24H Mile Antonio MD Last Rate: 1,000 mg (09/12/19 2127)   clopidogrel 75 mg Oral Daily Brandie Dong MD    donepezil 5 mg Oral HS Brandie Dong MD    fentanyl citrate (PF) 50 mcg Intravenous Q1H PRN Brandie Dong MD    heparin (porcine) 5,000 Units Subcutaneous [facility] Brandie Dong MD    levothyroxine 100 mcg Oral Early Morning Brandie Dong MD    LORazepam 2 mg Intravenous PRN Brandie Dong MD    metoprolol tartrate 25 mg Oral Q12H [address] Brandie Dong MD    NIFEdipine 60 mg Oral Daily Brandie Dong MD    ondansetron 4 mg Intravenous Q4H PRN Brandie Dong MD    pantoprazole 40 mg Oral BID AC Brandie Dong MD    QUEtiapine 12 5 mg Oral BID Brandie Dong MD    sodium chloride 100 mL/hr Intravenous Continuous Mile Antonio MD Last Rate: 100 mL/hr (09/13/19 1113)        Today, Patient Was Seen By: Lalla Pontes Holter, DO    ** Please Note: Dragon 360 Dictation voice to text software may have been used in the creation of this document   **

## 2019-09-13 NOTE — CONSULTS
Consultation - Geriatrics   Gina Banegas 80 y o  female MRN: [id number]  Unit/Bed#: [facility] 653-42 Encounter: [id number]      Assessment/Plan  1  Late onset Alzheimer's disease with behavioral disturbance  Alert and oriented x1 at baseline  The patient requires assistance with ADLs  Aricept 5 mg p o  Q h s     2  Frailty  Emaciated  Albumin 2 2  Recommend protein supplementation  PT/OT    3  Ambulatory dysfunction  Patient ambulates with a walker at baseline  PT/OT  Recommend return to [facility] post hospitalization    4  Visual deficit  Blind in right eye  Macular degeneration in left eye    5  Goal of care  Spoke with daughter who would like PT/OT to evaluate effect of recent CVA on patient and to return to [facility] for rehab  6  CVA  Acute on chronic  Neurology following  PT/OT  Management of HTN    7  Seizures  Seen by neurology  plavix and lipitor       8  Polypharmacy  Recommend discontinuation of daily zyrtec  Recommend titration of cymbalta to 30 mg po daily   prior to admission, protonix dose recently decreased from 40mg po BID to 40 mg po daily, agree         History of Present Illness   Physician Requesting Consult: Michelle Salazar MD  Reason for Consult / Principal Problem:  Alzheimer's Dementia  Hx and PE limited by:  Dementia  HPI: Gina Banegas is a 80y o  year old female who presents with tonic-clonic seizures, from [address] [place] [address]  She was given 2 mg of Ativan which stopped the seizure  Patient remained unresponsive  Upon arrival to the ED she still and responsive  She was intubated for airway protection  Patient was recently discharged from the hospital   She was treated for acute diverticulitis and E coli UTI  She received IV Ancef and Flagyl while hospitalized  She was discharged with 5 days of Cipro  She has a past medical history of hypothyroidism, hypertension, CVA, GERD, hyperlipidemia      Spoke with daughter who states pt is at baseline mentation  She loves music  Prior to arrival patient resides at [facility]  She is alert  Review of epic documentation pt told provider during routine follow up exam "im dying" She needs assistance with ADLs  She ambulates with a roller walker  Upon exam patient is lying in bed  She is alert to name only  She states I am dying, repeatedly  She is able to follow commands       Inpatient consult to Gerontology  Consult performed by: MOSHE Canela  Consult ordered by: Bassam Conde MD          Review of Systems   Unable to perform ROS: Dementia       Historical Information   Past Medical History:   Diagnosis Date    Aspiration pneumonia [facility])     Last Assessed:  7/18/17    Basal cell carcinoma of nose     Last Assessed:  4/12/17    Blind     blind right eye, 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, Last Assessed:  7/18/17    Dementia     Depression     Disease of thyroid gland     Fibromyalgia     Last Assessed:  7/18/17    Gait disturbance     GERD (gastroesophageal reflux disease)     Glaucoma     Hyperlipidemia     Hypertension     Osteopenia     Peripheral neuropathy     Last Assessed:  4/12/17    Polymyalgia rheumatica ([address] )     Psychiatric disorder     depression    Renal insufficiency syndrome     Last Assessed:  4/13/17    Rheumatoid arthritis ([address] )     Seizure ([address] ) 9/10/2019    Stroke ([facility] [address] )     x2, Last Assessed:  7/18/17    Systemic lupus erythematosus ([address] )     Vertigo     Vitamin D deficiency      Past Surgical History:   Procedure Laterality Date    EYE SURGERY      JOINT REPLACEMENT      left hip replacement, left shoulder replacement    SHOULDER SURGERY      Replacement     Social History   Social History     Substance and Sexual Activity   Alcohol Use Never    Frequency: Never     Social History     Substance and Sexual Activity   Drug Use No     Social History     Tobacco Use   Smoking Status Never Smoker   Smokeless Tobacco Never Used         Family History: non-contributory    Meds/Allergies   Current meds:   Current Facility-Administered Medications   Medication Dose Route Frequency    atorvastatin (LIPITOR) tablet 40 mg  40 mg Oral Daily With Dinner    azithromycin (ZITHROMAX) tablet 500 mg  500 mg Oral Q24H    bisacodyl (DULCOLAX) rectal suppository 10 mg  10 mg Rectal Daily PRN    cefTRIAXone (ROCEPHIN) 1,000 mg in dextrose 5 % 50 mL IVPB  1,000 mg Intravenous Q24H    clopidogrel (PLAVIX) tablet 75 mg  75 mg Oral Daily    donepezil (ARICEPT) tablet 5 mg  5 mg Oral HS    fentanyl citrate (PF) 100 MCG/2ML 50 mcg  50 mcg Intravenous Q1H PRN    heparin (porcine) subcutaneous injection 5,000 Units  5,000 Units Subcutaneous Q8H [facility]    levothyroxine tablet 100 mcg  100 mcg Oral Early Morning    LORazepam (ATIVAN) 2 mg/mL injection 2 mg  2 mg Intravenous PRN    metoprolol tartrate (LOPRESSOR) tablet 25 mg  25 mg Oral Q12H [facility]    NIFEdipine (PROCARDIA XL) 24 hr tablet 60 mg  60 mg Oral Daily    ondansetron (ZOFRAN) injection 4 mg  4 mg Intravenous Q4H PRN    pantoprazole (PROTONIX) EC tablet 40 mg  40 mg Oral BID AC    QUEtiapine (SEROquel) tablet 12 5 mg  12 5 mg Oral BID    sodium chloride 0 9 % infusion  100 mL/hr Intravenous Continuous      Current PTA meds:  Medications Prior to Admission   Medication    albuterol (2 5 mg/3 mL) 0 083 % nebulizer solution    ascorbic acid (VITAMIN C) 500 mg tablet    benzocaine (ORAJEL) 10 % mucosal gel    benzonatate (TESSALON PERLES) 100 mg capsule    bisacodyl (DULCOLAX) 10 mg suppository    calcium carbonate (OS-TEDDY) 600 MG tablet    carboxymethylcellulose (REFRESH LIQUIGEL) 1 % ophthalmic solution    cetirizine (ZyrTEC) 10 MG chewable tablet    Dentifrices (BIOTENE DRY MOUTH CARE DT)    donepezil (ARICEPT) 10 mg tablet    DULoxetine (CYMBALTA) 60 mg delayed release capsule    estradiol (ESTRACE) 0 1 mg/g vaginal cream    ferrous gluconate (FERGON) 324 mg tablet  fluticasone (FLONASE) 50 mcg/act nasal spray    guaiFENesin (MUCINEX) 600 mg 12 hr tablet    ibuprofen (MOTRIN) 400 mg tablet    ipratropium-albuterol (COMBIVENT RESPIMAT) inhaler    levothyroxine 100 mcg tablet    LORazepam (ATIVAN) 0 5 mg tablet    losartan (COZAAR) 100 MG tablet    Lutein 10 MG TABS    magnesium hydroxide (MILK OF MAGNESIA) 400 mg/5 mL oral suspension    metoprolol tartrate (LOPRESSOR) 50 mg tablet    [] metroNIDAZOLE (FLAGYL) 500 mg tablet    NIFEdipine (PROCARDIA XL) 60 mg 24 hr tablet    nystatin (MYCOSTATIN) 100,000 units/mL suspension    nystatin (MYCOSTATIN) powder    pantoprazole (PROTONIX) 40 mg tablet    pravastatin (PRAVACHOL) 40 mg tablet    QUEtiapine (SEROquel) 25 mg tablet    sodium chloride (OCEAN) 0 65 % nasal spray    Sodium Phosphates (FLEET ENEMA RE)    triamcinolone (KENALOG) 0 1 % cream    tuberculin (TUBERSOL) 5 units/0 1 mL        Allergies   Allergen Reactions    Ciprofloxacin Seizures    Acetaminophen     Golimumab      Other reaction(s): dedrick colored stool    Lidocaine Other (See Comments)    Neurontin [Gabapentin]     Nortriptyline Tremor    Prasterone      Other reaction(s): pruitis    Tricyclic Antidepressants     Leflunomide Rash    Sulfasalazine Rash       Objective   Vitals: Blood pressure 154/67, pulse 75, temperature 99 3 °F (37 4 °C), resp  rate 19, height 5' 2 6" (1 59 m), weight 63 2 kg (139 lb 5 3 oz), SpO2 99 %  ,Body mass index is 25 48 kg/m²  Physical Exam   Constitutional: No distress  Cachectic   HENT:   Head: Normocephalic  Eyes: Right eye exhibits no discharge  Left eye exhibits no discharge  Neck: Normal range of motion  Cardiovascular: Normal rate, regular rhythm and normal heart sounds  Exam reveals no gallop and no friction rub  No murmur heard  Pulmonary/Chest: Effort normal and breath sounds normal    Abdominal: Soft  Bowel sounds are normal  She exhibits no distension   There is no tenderness  There is no guarding  Musculoskeletal: Normal range of motion  She exhibits no edema or deformity  Neurological: She is alert  Skin: Skin is warm and dry  She is not diaphoretic  Psychiatric:   Frequently states I am dying   Nursing note and vitals reviewed  Lab Results:   Results from last 7 days   Lab Units 09/13/19  0523   WBC Thousand/uL 8 73   HEMOGLOBIN g/dL 9 5*   HEMATOCRIT % 29 2*   PLATELETS Thousands/uL 305        Results from last 7 days   Lab Units 09/13/19  0523  09/10/19  0729   POTASSIUM mmol/L 3 6   < >  --    CHLORIDE mmol/L 105   < >  --    CO2 mmol/L 23   < >  --    CO2, I-STAT mmol/L  --   --  17*   BUN mg/dL 12   < >  --    CREATININE mg/dL 2 01*   < >  --    CALCIUM mg/dL 7 7*   < >  --    ALK PHOS U/L 107   < >  --    ALT U/L 10*   < >  --    AST U/L 43   < >  --    GLUCOSE, ISTAT mg/dl  --   --  198*    < > = values in this interval not displayed  Imaging Studies: I have personally reviewed pertinent reports  EKG, Pathology, and Other Studies: I have personally reviewed pertinent reports      VTE Prophylaxis: Sequential compression device (Venodyne)     Code Status: Level 1 - Full Code

## 2019-09-13 NOTE — PHYSICAL THERAPY NOTE
Physical Therapy Progress Note     09/13/19 1500   Pain Assessment   Pain Assessment No/denies pain   Pain Score No Pain   Restrictions/Precautions   Weight Bearing Precautions Per Order No   Other Precautions Cognitive; Chair Alarm;Contact/isolation; Fall Risk;Multiple lines;Telemetry   General   Family/Caregiver Present No   Cognition   Overall Cognitive Status Impaired   Arousal/Participation Alert; Uncooperative   Bed Mobility   Supine to Sit 3  Moderate assistance   Additional items Assist x 2;LE management;Verbal cues   Transfers   Sit to Stand 3  Moderate assistance   Additional items Assist x 2;Verbal cues   Stand to Sit 4  Minimal assistance   Additional items Assist x 2;Verbal cues   Stand pivot 3  Moderate assistance   Additional items Assist x 2   Balance   Static Sitting Fair -   Static Standing Poor   Dynamic Standing Poor -   Ambulatory Zero   Endurance Deficit   Endurance Deficit Yes   Endurance Deficit Description cognition   Activity Tolerance   Activity Tolerance Treatment limited secondary to medical complications (Comment)   Nurse Made Aware Ok to see per AUSTIN Foy   Assessment   Prognosis Fair   Problem List Decreased strength;Decreased endurance; Impaired balance;Decreased mobility; Decreased coordination;Decreased cognition; Impaired judgement;Decreased safety awareness   Assessment Pt is making slow progress with bed mobility and transfers  Limited by cognition as pt states "I"m dying" throughout session  Max encouragement required for full participation  Sheila Foy RN present to assist with transfers  Poor carryover with safety cues  Chair alarm active post session  Pt would benefit from continued physical therapy to maximize functional independence     Goals   Patient Goals None stated due to cognitive deficits   STG Expiration Date 09/26/19   Short Term Goal #1 1-2 wks: bed mob and transfers w/ S, standing balance to good/normal w/ device, ambulate 100-150 ft w/ RW and S, increase B LE strength by 1/2 -1 grade   Treatment Day 1   Plan   Treatment/Interventions Functional transfer training;LE strengthening/ROM; Therapeutic exercise; Endurance training;Cognitive reorientation;Patient/family training;Bed mobility;Gait training;Spoke to nursing   Progress Progressing toward goals   PT Frequency 2-3x/wk   Recommendation   Recommendation   (Return to SNF with continued therapy services)   Equipment Recommended Oxana Valdez; Wheelchair  (RW)     Juli Webb PTA

## 2019-09-13 NOTE — CONSULTS
Consultation - Nephrology   Estephanie Craig 80 y o  female MRN: [id number]  Unit/Bed#: [facility] 627-06 Encounter: [id number]      Assessment/Plan:  1  Acute kidney injury, without clear etiology, may be a degree of volume depletion, agree with IV fluids, previously apparently on NSAIDs plus angiotensin receptor blocker, urinalysis shows hematuria although recently with UTI/urosepsis  2  Apparent seizure disorder Neurology following  3  Acute on subacute infarct, seen on MRI  4  Advanced dementia  5  Hypertension, blood pressure appears stable, avoid ACE-inhibitor or angiotensin receptor blocker    Plan:  · Agree with IV fluids, considering the age underlying dementia, conservative workup  · Recent CT scan without hydronephrosis, urinary bladder unremarkable  · Check urine sodium, protein creatinine ratio  · Avoid NSAIDs as well as ACE-inhibitor or angiotensin receptor blocker    History of Present Illness   Physician Requesting Consult: Cleve Valenzuela MD  Reason for Consult / Principal Problem:  Acute kidney injury  HPI: Estephanie Craig is a 80y o  year old female who presents with apparent seizure  Patient is an 27-year-old female with a past medical history of dementia, hypothyroidism, hypertension history of CVA as well as dyslipidemia  Patient is a current resident at [facility], was noted to have generalized convulsions  EMS was called, given 2 mg of Ativan with resolution  However upon the ER she was still having pulsating eye movements which was thought to be seizure activity was given additional Versed  Patient was initially admitted to the ICU for further management  Was eventually extubated now transferred to the floor  Addition was started on empiric antibiotic treatment given bilateral consolidations  MRI of the brain showed acute on subacute infarct  Currently patient does awaken with stimuli, is fairly repetitive    Feels that she is dying denies any chest pain shortness of breath  Denies abdominal pain  Overall just does not feel well  Review of systems limited    History obtained from chart review and unobtainable from patient due to mental status    Review of Systems    Review of Systems: pertinent findings as noted above otherwise negative    Historical Information   Patient Active Problem List   Diagnosis    Hyperlipidemia    GERD (gastroesophageal reflux disease)    Fibromyalgia    [facility] [address] [facility]    Hyponatremia    CVA (cerebral vascular accident) ([facility] [address] [facility]    Essential hypertension    Acute metabolic encephalopathy    Sepsis ([facility] [address] [facility]    Late onset Alzheimer's disease with behavioral disturbance    Acquired hypothyroidism    Anemia    Acute cystitis without hematuria    Right lower quadrant abdominal pain    DEVONTE (acute kidney injury) ([facility] [address] [facility]    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 ([facility] [address] [facility]    Pneumonia of right upper lobe due to infectious organism ([facility] [address] [facility]    Sore throat    Rotator cuff tear arthropathy of right shoulder    Cough    Conjunctivitis of right eye    Pseudogout of left wrist    Paresis ([facility] [address] [facility]    Pressure injury of sacral region, stage 1    Severe protein-calorie malnutrition (HCC)    Debility    Chronic rhinitis    Chronic pain of right knee    Diverticulitis    At high risk for aspiration    Vascular dementia with behavior disturbance    Seizure ([facility] [address] [facility]    Seizure-like activity ([facility] [address] [facility]     Past Medical History:   Diagnosis Date    Aspiration pneumonia (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, Last Assessed:  7/18/17    Dementia     Depression     Disease of thyroid gland     Fibromyalgia     Last Assessed:  7/18/17    Gait disturbance     GERD (gastroesophageal reflux disease)     Glaucoma     Hyperlipidemia     Hypertension     Osteopenia     Peripheral neuropathy     Last Assessed:  4/12/17    Polym[place]algia rheumatica ([place] [address] )     Psychiatric disorder     depression    Renal insufficiency syndrome     Last Assessed:  4/13/17    Rheumatoid arthritis ([place] [address] )     Seizure ([place] [address] ) 9/10/2019    Stroke ([place] [address] )     x2, Last Assessed:  7/18/17    Systemic lupus erythematosus ([place] [address] )     Vertigo     Vitamin D deficiency      Past Surgical History:   Procedure Laterality Date    EYE SURGERY      JOINT REPLACEMENT      left hip replacement, left shoulder replacement    SHOULDER SURGERY      Replacement     Social History   Social History     Substance and Sexual Activity   Alcohol Use Never    Frequency: Never     Social History     Substance and Sexual Activity   Drug Use No     Social History     Tobacco Use   Smoking Status Never Smoker   Smokeless Tobacco Never Used     Family History   Problem Relation Age of Onset    Heart attack Mother     Diabetes Mother     Dementia Father     Coronary artery disease Father         cardiac disorder    Diabetes Sister     Uterine cancer Sister        Meds/Allergies   current meds:   Current Facility-Administered Medications   Medication Dose Route Frequency    atorvastatin (LIPITOR) tablet 40 mg  40 mg Oral Daily With Dinner    azithromycin (ZITHROMAX) tablet 500 mg  500 mg Oral Q24H    bisacodyl (DULCOLAX) rectal suppository 10 mg  10 mg Rectal Daily PRN    cefTRIAXone (ROCEPHIN) 1,000 mg in dextrose 5 % 50 mL IVPB  1,000 mg Intravenous Q24H    clopidogrel (PLAVIX) tablet 75 mg  75 mg Oral Daily    donepezil (ARICEPT) tablet 5 mg  5 mg Oral HS    fentanyl citrate (PF) 100 MCG/2ML 50 mcg  50 mcg Intravenous Q1H PRN    heparin (porcine) subcutaneous injection 5,000 Units  5,000 Units Subcutaneous Q8H [address]    levothyroxine tablet 100 mcg  100 mcg Oral Early Morning    LORazepam (ATIVAN) 2 mg/mL injection 2 mg  2 mg Intravenous PRN    metoprolol tartrate (LOPRESSOR) tablet 25 mg  25 mg Oral Q12H [address]    NIFEdipine (PROCARDIA XL) 24 hr tablet 60 mg  60 mg Oral Daily    ondansetron (ZOFRAN) injection 4 mg  4 mg Intravenous Q4H PRN    pantoprazole (PROTONIX) EC tablet 40 mg  40 mg Oral BID AC    QUEtiapine (SEROquel) tablet 12 5 mg  12 5 mg Oral BID    sodium chloride 0 9 % infusion  100 mL/hr Intravenous Continuous       Allergies   Allergen Reactions    Ciprofloxacin Seizures    Acetaminophen     Golimumab      Other reaction(s): dedrick colored stool    Lidocaine Other (See Comments)    Neurontin [Gabapentin]     Nortriptyline Tremor    Prasterone      Other reaction(s): pruitis    Tricyclic Antidepressants     Leflunomide Rash    Sulfasalazine Rash         Objective   /67   Pulse 75   Temp 99 3 °F (37 4 °C)   Resp 19   Ht 5' 2 6" (1 59 m)   Wt 63 2 kg (139 lb 5 3 oz)   SpO2 99%   BMI 25 48 kg/m²     Intake/Output Summary (Last 24 hours) at 9/13/2019 1249  Last data filed at 9/13/2019 1124  Gross per 24 hour   Intake 800 ml   Output 1050 ml   Net -250 ml       Current Weight: Weight - Scale: 63 2 kg (139 lb 5 3 oz)    Physical Exam   Constitutional: No distress  HENT:   Head: Normocephalic  Neck: Neck supple  Cardiovascular: Normal rate and regular rhythm  Pulmonary/Chest: Effort normal and breath sounds normal    Abdominal: Soft  She exhibits distension  Musculoskeletal: She exhibits no edema  Neurological: She is alert  Skin: Skin is warm and dry  Lab Results:    Results from last 7 days   Lab Units 09/13/19  0523   WBC Thousand/uL 8 73   HEMOGLOBIN g/dL 9 5*   HEMATOCRIT % 29 2*   PLATELETS Thousands/uL 305     Results from last 7 days   Lab Units 09/13/19  0523  09/10/19  0729   POTASSIUM mmol/L 3 6   < >  --    CHLORIDE mmol/L 105   < >  --    CO2 mmol/L 23   < >  --    CO2, I-STAT mmol/L  --   --  17*   BUN mg/dL 12   < >  --    CREATININE mg/dL 2 01*   < >  --    GLUCOSE, ISTAT mg/dl  --   --  198*   CALCIUM mg/dL 7 7*   < >  --     < > = values in this interval not displayed

## 2019-09-13 NOTE — ASSESSMENT & PLAN NOTE
Patient with baseline creatinine around 1; currently creatinine 1 84  Overnight creatinine slightly trended down, unclear of etiology however as BUN /creatinine ratio is less than 20-1  consider intrinsic renal failure; ATN versus interstitial disease secondary to offending drug  Will place [place] to rule out post renal disease  Review med list to exclude a for toxic medications  Monitor morning labs    9/13-unfortunately, creatinine trended up over night  Urine output appears to be appropriate  Review of past medical records indicate that creatinine has improved with gentle hydration  IV infusion of normal saline started  UA negative for infection; blood but no protein  Review of imaging shows no apparent contrast used; NSAIDs or other nephrotoxin medications not medical record  Will consult Nephrology

## 2019-09-13 NOTE — PLAN OF CARE
Problem: PHYSICAL THERAPY ADULT  Goal: Performs mobility at highest level of function for planned discharge setting  See evaluation for individualized goals  Description  Treatment/Interventions: Functional transfer training, LE strengthening/ROM, Therapeutic exercise, Endurance training, Patient/family training, Equipment eval/education, Bed mobility, Gait training  Equipment Recommended: Andrew Renee       See flowsheet documentation for full assessment, interventions and recommendations  Outcome: Progressing  Note:   Prognosis: Fair  Problem List: Decreased strength, Decreased endurance, Impaired balance, Decreased mobility, Decreased coordination, Decreased cognition, Impaired judgement, Decreased safety awareness  Assessment: Pt is making slow progress with bed mobility and transfers  Limited by cognition as pt states "I"m dying" throughout session  Max encouragement required for full participation  Chaim Fleischer RN present to assist with transfers  Poor carryover with safety cues  Chair alarm active post session  Pt would benefit from continued physical therapy to maximize functional independence  Recommendation: (Return to SNF with continued therapy services)     PT - OK to Discharge: (S) Yes(when stable- see above for recs)    See flowsheet documentation for full assessment

## 2019-09-13 NOTE — PLAN OF CARE
Problem: Prexisting or High Potential for Compromised Skin Integrity  Goal: Skin integrity is maintained or improved  Description  INTERVENTIONS:  - Identify patients at risk for skin breakdown  - Assess and monitor skin integrity  - Assess and monitor nutrition and hydration status  - Monitor labs   - Assess for incontinence   - Turn and reposition patient  - Assist with mobility/ambulation  - Relieve pressure over bony prominences  - Avoid friction and shearing  - Provide appropriate hygiene as needed including keeping skin clean and dry  - Evaluate need for skin moisturizer/barrier cream  - Collaborate with interdisciplinary team   - Patient/family teaching  - Consider wound care consult   Outcome: Progressing     Problem: Potential for Falls  Goal: Patient will remain free of falls  Description  INTERVENTIONS:  - Assess patient frequently for physical needs  -  Identify cognitive and physical deficits and behaviors that affect risk of falls    -  [place] fall precautions as indicated by assessment   - Educate patient/family on patient safety including physical limitations  - Instruct patient to call for assistance with activity based on assessment  - Modify environment to reduce risk of injury  - Consider OT/PT consult to assist with strengthening/mobility  Outcome: Progressing     Problem: PAIN - ADULT  Goal: Verbalizes/displays adequate comfort level or baseline comfort level  Description  Interventions:  - Encourage patient to monitor pain and request assistance  - Assess pain using appropriate pain scale  - Administer analgesics based on type and severity of pain and evaluate response  - Implement non-pharmacological measures as appropriate and evaluate response  - Consider cultural and social influences on pain and pain management  - Notify physician/advanced practitioner if interventions unsuccessful or patient reports new pain  Outcome: Progressing     Problem: INFECTION - ADULT  Goal: Absence or prevention of progression during hospitalization  Description  INTERVENTIONS:  - Assess and monitor for signs and symptoms of infection  - Monitor lab/diagnostic results  - Monitor all insertion sites, i e  indwelling lines, tubes, and drains  - Monitor endotracheal if appropriate and nasal secretions for changes in amount and color  - [place] appropriate cooling/warming therapies per order  - Administer medications as ordered  - Instruct and encourage patient and family to use good hand hygiene technique  - Identify and instruct in appropriate isolation precautions for identified infection/condition  Outcome: Progressing  Goal: Absence of fever/infection during neutropenic period  Description  INTERVENTIONS:  - Monitor WBC    Outcome: Progressing     Problem: SAFETY ADULT  Goal: Maintain or return to baseline ADL function  Description  INTERVENTIONS:  -  Assess patient's ability to carry out ADLs; assess patient's baseline for ADL function and identify physical deficits which impact ability to perform ADLs (bathing, care of mouth/teeth, toileting, grooming, dressing, etc )  - Assess/evaluate cause of self-care deficits   - Assess range of motion  - Assess patient's mobility; develop plan if impaired  - Assess patient's need for assistive devices and provide as appropriate  - Encourage maximum independence but intervene and supervise when necessary  - Involve family in performance of ADLs  - Assess for home care needs following discharge   - Consider OT consult to assist with ADL evaluation and planning for discharge  - Provide patient education as appropriate  Outcome: Progressing  Goal: Maintain or return mobility status to optimal level  Description  INTERVENTIONS:  - Assess patient's baseline mobility status (ambulation, transfers, stairs, etc )    - Identify cognitive and physical deficits and behaviors that affect mobility  - Identify mobility aids required to assist with transfers and/or ambulation (gait belt, sit-to-stand, lift, walker, cane, etc )  - [place] fall precautions as indicated by assessment  - Record patient progress and toleration of activity level on Mobility SBAR; progress patient to next Phase/Stage  - Instruct patient to call for assistance with activity based on assessment  - Consider rehabilitation consult to assist with strengthening/weightbearing, etc   Outcome: Progressing     Problem: DISCHARGE PLANNING  Goal: Discharge to home or other facility with appropriate resources  Description  INTERVENTIONS:  - Identify barriers to discharge w/patient and caregiver  - Arrange for needed discharge resources and transportation as appropriate  - Identify discharge learning needs (meds, wound care, etc )  - Arrange for interpretive services to assist at discharge as needed  - Refer to Case Management Department for coordinating discharge planning if the patient needs post-hospital services based on physician/advanced practitioner order or complex needs related to functional status, cognitive ability, or social support system  Outcome: Progressing     Problem: Knowledge Deficit  Goal: Patient/family/caregiver demonstrates understanding of disease process, treatment plan, medications, and discharge instructions  Description  Complete learning assessment and assess knowledge base    Interventions:  - Provide teaching at level of understanding  - Provide teaching via preferred learning methods  Outcome: Progressing     Problem: RESPIRATORY - ADULT  Goal: Achieves optimal ventilation and oxygenation  Description  INTERVENTIONS:  - Assess for changes in respiratory status  - Assess for changes in mentation and behavior  - Position to facilitate oxygenation and minimize respiratory effort  - Oxygen administered by appropriate delivery if ordered  - Initiate smoking cessation education as indicated  - Encourage broncho-pulmonary hygiene including cough, deep breathe, Incentive Spirometry  - Assess the need for suctioning and aspirate as needed  - Assess and instruct to report SOB or any respiratory difficulty  - Respiratory Therapy support as indicated  Outcome: Progressing     Problem: SKIN/TISSUE INTEGRITY - ADULT  Goal: Skin integrity remains intact  Description  INTERVENTIONS  - Identify patients at risk for skin breakdown  - Assess and monitor skin integrity  - Assess and monitor nutrition and hydration status  - Monitor labs (i e  albumin)  - Assess for incontinence   - Turn and reposition patient  - Assist with mobility/ambulation  - Relieve pressure over bony prominences  - Avoid friction and shearing  - Provide appropriate hygiene as needed including keeping skin clean and dry  - Evaluate need for skin moisturizer/barrier cream  - Collaborate with interdisciplinary team (i e  Nutrition, Rehabilitation, etc )   - Patient/family teaching  Outcome: Progressing     Problem: NEUROSENSORY - ADULT  Goal: Achieves stable or improved neurological status  Description  INTERVENTIONS  - Monitor and report changes in neurological status  - Monitor vital signs such as temperature, blood pressure, glucose, and any other labs ordered   - Initiate measures to prevent increased intracranial pressure  - Monitor for seizure activity and implement precautions if appropriate      Outcome: Progressing     Problem: Nutrition/Hydration-ADULT  Goal: Nutrient/Hydration intake appropriate for improving, restoring or maintaining nutritional needs  Description  Monitor and assess patient's nutrition/hydration status for malnutrition  Collaborate with interdisciplinary team and initiate plan and interventions as ordered  Monitor patient's weight and dietary intake as ordered or per policy  Utilize nutrition screening tool and intervene as necessary  Determine patient's food preferences and provide high-protein, high-caloric foods as appropriate       INTERVENTIONS:  - Monitor oral intake, urinary output, labs, and treatment plans  - Assess nutrition and hydration status and recommend course of action  - Evaluate amount of meals eaten  - Assist patient with eating if necessary   - Allow adequate time for meals  - Recommend/ encourage appropriate diets, oral nutritional supplements, and vitamin/mineral supplements  - Order, calculate, and assess calorie counts as needed  - Recommend, monitor, and adjust tube feedings and TPN/PPN based on assessed needs  - Assess need for intravenous fluids  - Provide specific nutrition/hydration education as appropriate  - Include patient/family/caregiver in decisions related to nutrition  Outcome: Progressing

## 2019-09-13 NOTE — ASSESSMENT & PLAN NOTE
Appreciate Neurology recs, as per Neurology;  · PT/OT/SLP recommendations appreciated  · Continue telemetry  · Continue vEEG  · Continue neuro checks  · Ativan 2 mg for seizures or seizure-like activity longer than 2 minutes  · Continue Plavix 75 mg and Lipitor 40 mg   · Echo pending  Concern to for CVA, as MRI showedsmall restricted diffusion left precentral sulcus consistent with acute/subacute infarct; old right temporoparietal, left cerebellar and left occipital parietal infarct  CT head demonstrated no acute intracranial abnormality; chronic right temporal parietal encephalomalacia consistent with prior insult  9/13-will await neurological recommendations  Patient currently alert and oriented x1, likely her baseline status given Alzheimer's dementia  GCS 15

## 2019-09-13 NOTE — SOCIAL WORK
CM was informed by therapy that pt will benefit from rehab at [place]  CM called and spoke to pt's daughter ROCK to discuss same  A post acute care recommendation was made by your care team for [facility]  Discussed Freedom of Choice with daughter ROCK  Choice is to return/continue services  ARENDAL is agreeable and states pt was admitted from [facility]  ROCK is requesting pt return when medically stable  [address] referral sent  CM called [facility] and spoke to [facility] in admissions who confirms pt is a long term care resident and 15 day MA bedhold on the skilled unit

## 2019-09-14 LAB
ANION GAP SERPL CALCULATED.3IONS-SCNC: 7 MMOL/L (ref 4–13)
BASOPHILS # BLD AUTO: 0.02 THOUSANDS/ΜL (ref 0–0.1)
BASOPHILS NFR BLD AUTO: 0 % (ref 0–1)
BUN SERPL-MCNC: 11 MG/DL (ref 5–25)
CALCIUM SERPL-MCNC: 8 MG/DL (ref 8.3–10.1)
CHLORIDE SERPL-SCNC: 108 MMOL/L (ref 100–108)
CO2 SERPL-SCNC: 23 MMOL/L (ref 21–32)
CREAT SERPL-MCNC: 1.91 MG/DL (ref 0.6–1.3)
EOSINOPHIL # BLD AUTO: 0.14 THOUSAND/ΜL (ref 0–0.61)
EOSINOPHIL NFR BLD AUTO: 2 % (ref 0–6)
ERYTHROCYTE [DISTWIDTH] IN BLOOD BY AUTOMATED COUNT: 15.2 % (ref 11.6–15.1)
GFR SERPL CREATININE-BSD FRML MDRD: 23 ML/MIN/1.73SQ M
GLUCOSE SERPL-MCNC: 80 MG/DL (ref 65–140)
HCT VFR BLD AUTO: 30 % (ref 34.8–46.1)
HGB BLD-MCNC: 9.4 G/DL (ref 11.5–15.4)
IMM GRANULOCYTES # BLD AUTO: 0.03 THOUSAND/UL (ref 0–0.2)
IMM GRANULOCYTES NFR BLD AUTO: 0 % (ref 0–2)
LYMPHOCYTES # BLD AUTO: 1.49 THOUSANDS/ΜL (ref 0.6–4.47)
LYMPHOCYTES NFR BLD AUTO: 22 % (ref 14–44)
MCH RBC QN AUTO: 28.9 PG (ref 26.8–34.3)
MCHC RBC AUTO-ENTMCNC: 31.3 G/DL (ref 31.4–37.4)
MCV RBC AUTO: 92 FL (ref 82–98)
MONOCYTES # BLD AUTO: 0.68 THOUSAND/ΜL (ref 0.17–1.22)
MONOCYTES NFR BLD AUTO: 10 % (ref 4–12)
NEUTROPHILS # BLD AUTO: 4.47 THOUSANDS/ΜL (ref 1.85–7.62)
NEUTS SEG NFR BLD AUTO: 66 % (ref 43–75)
NRBC BLD AUTO-RTO: 0 /100 WBCS
PLATELET # BLD AUTO: 263 THOUSANDS/UL (ref 149–390)
PMV BLD AUTO: 9.4 FL (ref 8.9–12.7)
POTASSIUM SERPL-SCNC: 3.4 MMOL/L (ref 3.5–5.3)
RBC # BLD AUTO: 3.25 MILLION/UL (ref 3.81–5.12)
SODIUM SERPL-SCNC: 138 MMOL/L (ref 136–145)
WBC # BLD AUTO: 6.83 THOUSAND/UL (ref 4.31–10.16)

## 2019-09-14 PROCEDURE — 80048 BASIC METABOLIC PNL TOTAL CA: CPT | Performed by: INTERNAL MEDICINE

## 2019-09-14 PROCEDURE — 99232 SBSQ HOSP IP/OBS MODERATE 35: CPT | Performed by: INTERNAL MEDICINE

## 2019-09-14 PROCEDURE — 85025 COMPLETE CBC W/AUTO DIFF WBC: CPT | Performed by: INTERNAL MEDICINE

## 2019-09-14 RX ORDER — METOPROLOL TARTRATE 50 MG/1
50 TABLET, FILM COATED ORAL EVERY 12 HOURS SCHEDULED
Status: DISCONTINUED | OUTPATIENT
Start: 2019-09-14 | End: 2019-09-16 | Stop reason: HOSPADM

## 2019-09-14 RX ORDER — POTASSIUM CHLORIDE 20 MEQ/1
20 TABLET, EXTENDED RELEASE ORAL
Status: COMPLETED | OUTPATIENT
Start: 2019-09-14 | End: 2019-09-14

## 2019-09-14 RX ORDER — PANTOPRAZOLE SODIUM 40 MG/1
40 TABLET, DELAYED RELEASE ORAL
Status: DISCONTINUED | OUTPATIENT
Start: 2019-09-15 | End: 2019-09-16 | Stop reason: HOSPADM

## 2019-09-14 RX ADMIN — PANTOPRAZOLE SODIUM 40 MG: 40 TABLET, DELAYED RELEASE ORAL at 06:19

## 2019-09-14 RX ADMIN — QUETIAPINE FUMARATE 12.5 MG: 25 TABLET ORAL at 09:39

## 2019-09-14 RX ADMIN — APIXABAN 2.5 MG: 2.5 TABLET, FILM COATED ORAL at 18:19

## 2019-09-14 RX ADMIN — CEFTRIAXONE SODIUM 1000 MG: 10 INJECTION, POWDER, FOR SOLUTION INTRAVENOUS at 22:06

## 2019-09-14 RX ADMIN — QUETIAPINE FUMARATE 12.5 MG: 25 TABLET ORAL at 18:18

## 2019-09-14 RX ADMIN — POTASSIUM CHLORIDE 20 MEQ: 1500 TABLET, EXTENDED RELEASE ORAL at 09:31

## 2019-09-14 RX ADMIN — ATORVASTATIN CALCIUM 40 MG: 40 TABLET, FILM COATED ORAL at 18:18

## 2019-09-14 RX ADMIN — APIXABAN 2.5 MG: 2.5 TABLET, FILM COATED ORAL at 09:38

## 2019-09-14 RX ADMIN — METOPROLOL TARTRATE 50 MG: 50 TABLET, FILM COATED ORAL at 09:32

## 2019-09-14 RX ADMIN — LEVOTHYROXINE SODIUM 100 MCG: 100 TABLET ORAL at 06:19

## 2019-09-14 RX ADMIN — POTASSIUM CHLORIDE 20 MEQ: 1500 TABLET, EXTENDED RELEASE ORAL at 18:19

## 2019-09-14 RX ADMIN — METOPROLOL TARTRATE 50 MG: 50 TABLET, FILM COATED ORAL at 22:05

## 2019-09-14 RX ADMIN — AZITHROMYCIN 500 MG: 250 TABLET, FILM COATED ORAL at 22:04

## 2019-09-14 RX ADMIN — HEPARIN SODIUM 5000 UNITS: 5000 INJECTION INTRAVENOUS; SUBCUTANEOUS at 06:19

## 2019-09-14 RX ADMIN — DONEPEZIL HYDROCHLORIDE 5 MG: 5 TABLET ORAL at 22:05

## 2019-09-14 RX ADMIN — POTASSIUM CHLORIDE 20 MEQ: 1500 TABLET, EXTENDED RELEASE ORAL at 12:58

## 2019-09-14 RX ADMIN — NIFEDIPINE 60 MG: 60 TABLET, FILM COATED, EXTENDED RELEASE ORAL at 09:39

## 2019-09-14 NOTE — ASSESSMENT & PLAN NOTE
Appreciate Neurology recs, as per Neurology;  · PT/OT/SLP recommendations appreciated  · Continue telemetry  · Continue vEEG  · Continue neuro checks  · Ativan 2 mg for seizures or seizure-like activity longer than 2 minutes  · Continue Plavix 75 mg and Lipitor 40 mg   · Echo pending  Concern to for CVA, as MRI showedsmall restricted diffusion left precentral sulcus consistent with acute/subacute infarct; old right temporoparietal, left cerebellar and left occipital parietal infarct  CT head demonstrated no acute intracranial abnormality; chronic right temporal parietal encephalomalacia consistent with prior insult  9/13-will await neurological recommendations  Patient currently alert and oriented x1, likely her baseline status given Alzheimer's dementia  GCS 15      9/14-appreciate Neurology recommendations, patient with apparent embolic stroke likely secondary to atrial fibrillation  At this point Plavix held, and patient placed on full anticoagulation with low-dose Eliquis  Will continue telemetry and better control of hypertension

## 2019-09-14 NOTE — ASSESSMENT & PLAN NOTE
Patient currently 0 of for for SIRS, however pro count is elevated at 1 40  Clinical;ly the patient does not look septic, has been afebrile  Noted to have elevated white count a few days ago; and imaging with bilateral consolidations suspicious for possible atelectasis versus pneumonia  Given imaging findings and procalcitonin elevation, have started treatment for pneumonia  Noted the patient was intubated, concern for [facility] P; but given clinical picture and lack of sirs criteria, will treat more conservatively  UA negative

## 2019-09-14 NOTE — PROGRESS NOTES
NEPHROLOGY PROGRESS NOTE   Noemy Macario 80 y o  female MRN: [id number]  Unit/Bed#: [facility] 702-01 Encounter: [id number]  Reason for Consult:  Acute kidney injury    Assessment/Plan:  1  Acute kidney injury, without clear etiology, may be a degree of volume depletion, agree with IV fluids, previously apparently on NSAIDs plus angiotensin receptor blocker, urinalysis shows hematuria although recently with UTI/urosepsis  2  Hypokalemia, replacement  3  Apparent seizure disorder Neurology following  4  Acute on subacute infarct, seen on MRI  5  Advanced dementia  6  Hypertension, blood pressure appears stable, avoid ACE-inhibitor or angiotensin receptor blocker    PLAN:  · Overall renal function appears fairly stable  · Check urine sodium  · Encourage oral intake  · Conservative workup given patient's underlying dementia and advanced stage    SUBJECTIVE:  Seen examined  Patient awake, appears comfortable  Asks whether she is dying denies any chest pain or shortness of Breath  Just states that she does not feel good    Review of Systems    OBJECTIVE:  Current Weight: Weight - Scale: 63 5 kg (139 lb 15 9 oz)  Vitals:    09/13/19 2028 09/13/19 2307 09/14/19 0554 09/14/19 0803   BP: 150/83 159/63  146/66   Pulse: 81 85  76   Resp:    19   Temp:    98 1 °F (36 7 °C)   TempSrc:       SpO2:  98%  99%   Weight:   63 5 kg (139 lb 15 9 oz)    Height:           Intake/Output Summary (Last 24 hours) at 9/14/2019 1119  Last data filed at 9/13/2019 1318  Gross per 24 hour   Intake 240 ml   Output 275 ml   Net -35 ml       Physical Exam   Constitutional: No distress  HENT:   Head: Normocephalic  Neck: Neck supple  Cardiovascular: Normal rate and regular rhythm  Pulmonary/Chest: Effort normal and breath sounds normal    Musculoskeletal: She exhibits no edema  Neurological: She is alert  Skin: Skin is warm and dry         Medications:    Current Facility-Administered Medications:     apixaban (ELIQUIS) tablet 2 5 mg, 2 5 mg, Oral, BID, Santiago Reddy DO, 2 5 mg at 09/14/19 6373    atorvastatin (LIPITOR) tablet 40 mg, 40 mg, Oral, Daily With Tianna Ontiveros MD, 40 mg at 09/13/19 1718    azithromycin (ZITHROMAX) tablet 500 mg, 500 mg, Oral, Q24H, Erlinda Flores MD, 500 mg at 09/13/19 2026    bisacodyl (DULCOLAX) rectal suppository 10 mg, 10 mg, Rectal, Daily PRN, Roula Brown MD    cefTRIAXone (ROCEPHIN) 1,000 mg in dextrose 5 % 50 mL IVPB, 1,000 mg, Intravenous, Q24H, Erlinda Flores MD, Last Rate: 100 mL/hr at 09/13/19 2026, 1,000 mg at 09/13/19 2026    donepezil (ARICEPT) tablet 5 mg, 5 mg, Oral, HS, Roula Brown MD, 5 mg at 09/13/19 2110    fentanyl citrate (PF) 100 MCG/2ML 50 mcg, 50 mcg, Intravenous, Q1H PRN, Roula Brown MD    levothyroxine tablet 100 mcg, 100 mcg, Oral, Early Morning, Roula Brown MD, 100 mcg at 09/14/19 0619    LORazepam (ATIVAN) 2 mg/mL injection 2 mg, 2 mg, Intravenous, PRN, Roula Brwon MD    metoprolol tartrate (LOPRESSOR) tablet 50 mg, 50 mg, Oral, Q12H [address], Erlinda Flores MD, 50 mg at 09/14/19 0932    NIFEdipine (PROCARDIA XL) 24 hr tablet 60 mg, 60 mg, Oral, Daily, Roula Brown MD, 60 mg at 09/14/19 0939    ondansetron (ZOFRAN) injection 4 mg, 4 mg, Intravenous, Q4H PRN, Roula Brown MD, 4 mg at 09/11/19 1753    [START ON 9/15/2019] pantoprazole (PROTONIX) EC tablet 40 mg, 40 mg, Oral, Early Morning, Erlinda Flores MD    potassium chloride (K-DUR,KLOR-CON) CR tablet 20 mEq, 20 mEq, Oral, TID With Meals, Erlinda Flores MD, 20 mEq at 09/14/19 0931    QUEtiapine (SEROquel) tablet 12 5 mg, 12 5 mg, Oral, BID, Roula Brown MD, 12 5 mg at 09/14/19 2010    Laboratory Results:  Results from last 7 days   Lab Units 09/14/19  0602 09/13/19  0523 09/11/19  0524 09/10/19  1135 09/10/19  0804 09/10/19  0730 09/10/19  0729 09/10/19  0728   WBC Thousand/uL 6 83 8 73 9 42  --  17 80*  --   --   --    HEMOGLOBIN g/dL 9 4* 9 5* 8 7*  --  10 1*  --   --   --    I STAT HEMOGLOBIN g/dl  --   --   --   --   --   --  10 5* 10 9*   HEMATOCRIT % 30 0* 29 2* 27 0*  --  32 8*  --   --   --    HEMATOCRIT, ISTAT %  --   --   --   --   --   --  31* 32*   PLATELETS Thousands/uL 263 305 282 195 451*  --   --   --    POTASSIUM mmol/L 3 4* 3 6 3 6  --   --  4 0  --   --    CHLORIDE mmol/L 108 105 107  --   --  105  --   --    CO2 mmol/L 23 23 21  --   --  15*  --   --    CO2, I-STAT mmol/L  --   --   --   --   --   --  17* 17*   BUN mg/dL 11 12 9  --   --  10  --   --    CREATININE mg/dL 1 91* 2 01* 1 84*  --   --  1 88*  --   --    CALCIUM mg/dL 8 0* 7 7* 7 8*  --   --  8 2*  --   --    GLUCOSE, ISTAT mg/dl  --   --   --   --   --   --  198* 200*

## 2019-09-14 NOTE — PROGRESS NOTES
Progress Note - Carlos Duran 4/27/1931, 80 y o  female MRN: [id number]    Unit/Bed#: [facility] 702-01 Encounter: [id number]    Primary Care Provider: Kayleigh Christina DO   Date and time admitted to hospital: 9/10/2019  7:09 AM        DEVONTE (acute kidney injury) [facility])  Assessment & Plan  Patient with baseline creatinine around 1; currently creatinine 1 84  Overnight creatinine slightly trended down, unclear of etiology however as BUN /creatinine ratio is less than 20-1  consider intrinsic renal failure; ATN versus interstitial disease secondary to offending drug  Will place [place] to rule out post renal disease  Review med list to exclude a for toxic medications  Monitor morning labs    9/13-unfortunately, creatinine trended up over night  Urine output appears to be appropriate  Review of past medical records indicate that creatinine has improved with gentle hydration  IV infusion of normal saline started  UA negative for infection; blood but no protein  Review of imaging shows no apparent contrast used; NSAIDs or other nephrotoxin medications not medical record  Will consult Nephrology  9/14-appreciate Nephrology recommendations, gentle IV hydration given overnight with improvement in creatinine this morning  Monitor repeat fluid administration as needed        Acquired hypothyroidism  Assessment & Plan  Levothyroxine 100 mcg per day    Sepsis [facility])  Assessment & Plan  Patient currently 0 of for for SIRS, however pro count is elevated at 1 40  Clinical;ly the patient does not look septic, has been afebrile  Noted to have elevated white count a few days ago; and imaging with bilateral consolidations suspicious for possible atelectasis versus pneumonia  Given imaging findings and procalcitonin elevation, have started treatment for pneumonia  Noted the patient was intubated, concern for VA P; but given clinical picture and lack of sirs criteria, will treat more conservatively  UA negative        Essential hypertension  Assessment & Plan  Blood pressure well controlled on home medications that include Lopressor and Procardia  Continue present therapy    9/14-blood pressure has increased intervally, have increase metoprolol to 50 mg b i d ; continue Procardia 60 mg daily    Cerebrovascular accident (CVA) due to embolism [facility])  Assessment & Plan  Patient presents with seizure-like activity and new findings of acute versus subacute ischemic disease on MRI brain  Appreciate Neurology recommendations, likely an embolic CVA  As per Neurology recommendations, patient started on low-dose Eliquis for anticoagulation and will continue statin  No need for Plavix at this time  Monitor and discharge back to nursing home in stable    GERD (gastroesophageal reflux disease)  Assessment & Plan  PPI daily    Hyperlipidemia  Assessment & Plan  Will continue Lipitor 40 mg q day    * Seizure [facility])  Assessment & Plan  Appreciate Neurology recs, as per Neurology;  · PT/OT/SLP recommendations appreciated  · Continue telemetry  · Continue vEEG  · Continue neuro checks  · Ativan 2 mg for seizures or seizure-like activity longer than 2 minutes  · Continue Plavix 75 mg and Lipitor 40 mg   · Echo pending  Concern to for CVA, as MRI showedsmall restricted diffusion left precentral sulcus consistent with acute/subacute infarct; old right temporoparietal, left cerebellar and left occipital parietal infarct  CT head demonstrated no acute intracranial abnormality; chronic right temporal parietal encephalomalacia consistent with prior insult  9/13-will await neurological recommendations  Patient currently alert and oriented x1, likely her baseline status given Alzheimer's dementia  GCS 15      9/14-appreciate Neurology recommendations, patient with apparent embolic stroke likely secondary to atrial fibrillation  At this point Plavix held, and patient placed on full anticoagulation with low-dose Eliquis    Will continue telemetry and better control of hypertension  VTE Pharmacologic Prophylaxis:   Pharmacologic: Apixaban (Eliquis)  Mechanical VTE Prophylaxis in Place: Yes    Patient Centered Rounds: I have performed bedside rounds with nursing staff today  Discussions with Specialists or Other Care Team Provider:  None    Education and Discussions with Family / Patient:  Discussed care plan with patient, unclear how much she understands given her underlying dementia  Time Spent for Care: 30 minutes  More than 50% of total time spent on counseling and coordination of care as described above  Current Length of Stay: 4 day(s)    Current Patient Status: Inpatient   Certification Statement: The patient will continue to require additional inpatient hospital stay due to Treatment of underlying CVA and possible pneumonia    Discharge Plan: To be determined    Code Status: Level 1 - Full Code      Subjective:   Patient seen and examined bedside, no acute distress or discomfort noted  She is at her baseline mentation, continues to ask am i dying  Otherwise pleasant and cooperative with treatment  Potassium repleted, her serum creatinine has improved with fluids  Blood pressure elevated so metoprolol has been increased to 50 mg b i d  Paul Kaska 0/4 SIRS criteria, however protocol elevated to 1 40 with imaging suspicious for possible bilateral lower lobe consolidations  Patient started on antibiotic therapy on   Objective:     Vitals:   Temp (24hrs), Av 7 °F (37 1 °C), Min:98 1 °F (36 7 °C), Max:99 3 °F (37 4 °C)    Temp:  [98 1 °F (36 7 °C)-99 3 °F (37 4 °C)] 98 1 °F (36 7 °C)  HR:  [75-90] 76  Resp:  [19] 19  BP: (101-159)/(53-83) 146/66  SpO2:  [98 %-99 %] 99 %  Body mass index is 25 6 kg/m²  Input and Output Summary (last 24 hours):        Intake/Output Summary (Last 24 hours) at 2019 1025  Last data filed at 2019 1318  Gross per 24 hour   Intake 240 ml   Output 275 ml   Net -35 ml       Physical Exam:     Physical Exam  Constitutional: She appears well-developed  No distress    Thin   HENT:   Head: Normocephalic and atraumatic  Nose: Nose normal    Mouth/Throat: Oropharynx is clear and moist  No oropharyngeal exudate  Eyes: Right eye exhibits no discharge  Left eye exhibits no discharge  No scleral icterus    Patient with drooping right eyelid as well as visible cataract disease and right eye   Neck: Normal range of motion  No JVD present  No tracheal deviation present  Cardiovascular: Normal rate  Irregular rhythm-PACs versus AFib  Exam reveals no gallop and no friction rub    Murmur heard  Pulmonary/Chest: Effort normal and breath sounds normal  No stridor  No respiratory distress  Wheezing appreciated on left lower lobe  She has no rales  Abdominal: Soft  She exhibits no distension and no mass  There is tenderness  There is no rebound and no guarding  Musculoskeletal: Normal range of motion  She exhibits no edema, tenderness or deformity  Neurological: She is alert    Alert oriented x1; GCS 15  Skin: Skin is warm and dry   She is not diaphoretic      Additional Data:     Labs:    Results from last 7 days   Lab Units 09/14/19  0602   WBC Thousand/uL 6 83   HEMOGLOBIN g/dL 9 4*   HEMATOCRIT % 30 0*   PLATELETS Thousands/uL 263   NEUTROS PCT % 66   LYMPHS PCT % 22   MONOS PCT % 10   EOS PCT % 2     Results from last 7 days   Lab Units 09/14/19  0602 09/13/19  0523   SODIUM mmol/L 138 135*   POTASSIUM mmol/L 3 4* 3 6   CHLORIDE mmol/L 108 105   CO2 mmol/L 23 23   BUN mg/dL 11 12   CREATININE mg/dL 1 91* 2 01*   ANION GAP mmol/L 7 7   CALCIUM mg/dL 8 0* 7 7*   ALBUMIN g/dL  --  2 2*   TOTAL BILIRUBIN mg/dL  --  0 30   ALK PHOS U/L  --  107   ALT U/L  --  10*   AST U/L  --  43   GLUCOSE RANDOM mg/dL 80 82     Results from last 7 days   Lab Units 09/10/19  0749   INR  1 28*     Results from last 7 days   Lab Units 09/11/19  1148 09/11/19  0613 09/10/19  2359   POC GLUCOSE mg/dl 139 101 118     Results from last 7 days   Lab Units 09/11/19  0524   HEMOGLOBIN A1C % 5 4     Results from last 7 days   Lab Units 09/13/19  0523 09/11/19  0524 09/10/19  1135 09/10/19  0730   LACTIC ACID mmol/L  --   --  1 5 8 7*   PROCALCITONIN ng/ml 1 40* 3 94*  --   --            * I Have Reviewed All Lab Data Listed Above  * Additional Pertinent Lab Tests Reviewed: All Labs Within Last 24 Hours Reviewed    Imaging:    Imaging Reports Reviewed Today Include:  CT chest  Imaging Personally Reviewed by Myself Includes:  None    Recent Cultures (last 7 days):     Results from last 7 days   Lab Units 09/13/19  1201 09/10/19  0742   BLOOD CULTURE  No Growth at 72 hrs  No Growth at 72 hrs  Last 24 Hours Medication List:     Current Facility-Administered Medications:  apixaban 2 5 mg Oral BID Mason Sher DO    atorvastatin 40 mg Oral Daily With Adali Lopez MD    azithromycin 500 mg Oral Q24H Courtney Lozano MD    bisacodyl 10 mg Rectal Daily PRN Jazmine Beth MD    cefTRIAXone 1,000 mg Intravenous Q24H Courtney Lozano MD Last Rate: 1,000 mg (09/13/19 2026)   donepezil 5 mg Oral HS Jazmine Beth MD    fentanyl citrate (PF) 50 mcg Intravenous Q1H PRN Jazmine Beth MD    heparin (porcine) 5,000 Units Subcutaneous [facility] Jazmine Beth MD    levothyroxine 100 mcg Oral Early Morning Jazmine Beth MD    LORazepam 2 mg Intravenous PRN Jazmine Beth MD    metoprolol tartrate 50 mg Oral Q12H [address] Courtney Lozano MD    NIFEdipine 60 mg Oral Daily Jazmine Beth MD    ondansetron 4 mg Intravenous Q4H PRN Jazmine Beht MD    [START ON 9/15/2019] pantoprazole 40 mg Oral Early Morning Courtney Lozano MD    potassium chloride 20 mEq Oral TID With Meals Courtney Lozano MD    QUEtiapine 12 5 mg Oral BID Jazmine Beth MD         Today, Patient Was Seen By: Erlinda Boyle MD    ** Please Note: Dictation voice to text software may have been used in the creation of this document   **

## 2019-09-14 NOTE — ASSESSMENT & PLAN NOTE
Patient presents with seizure-like activity and new findings of acute versus subacute ischemic disease on MRI brain  Appreciate Neurology recommendations, likely an embolic CVA  As per Neurology recommendations, patient started on low-dose Eliquis for anticoagulation and will continue statin  No need for Plavix at this time  Monitor and discharge back to nursing home in stable

## 2019-09-14 NOTE — ASSESSMENT & PLAN NOTE
Blood pressure well controlled on home medications that include Lopressor and Procardia  Continue present therapy    9/14-blood pressure has increased intervally, have increase metoprolol to 50 mg b i d ; continue Procardia 60 mg daily

## 2019-09-14 NOTE — ASSESSMENT & PLAN NOTE
Patient with baseline creatinine around 1; currently creatinine 1 84  Overnight creatinine slightly trended down, unclear of etiology however as BUN /creatinine ratio is less than 20-1  consider intrinsic renal failure; ATN versus interstitial disease secondary to offending drug  Will place [place] to rule out post renal disease  Review med list to exclude a for toxic medications  Monitor morning labs    9/13-unfortunately, creatinine trended up over night  Urine output appears to be appropriate  Review of past medical records indicate that creatinine has improved with gentle hydration  IV infusion of normal saline started  UA negative for infection; blood but no protein  Review of imaging shows no apparent contrast used; NSAIDs or other nephrotoxin medications not medical record  Will consult Nephrology  9/14-appreciate Nephrology recommendations, gentle IV hydration given overnight with improvement in creatinine this morning  Monitor repeat fluid administration as needed

## 2019-09-15 LAB
ANION GAP SERPL CALCULATED.3IONS-SCNC: 6 MMOL/L (ref 4–13)
BACTERIA BLD CULT: NORMAL
BASOPHILS # BLD AUTO: 0.02 THOUSANDS/ΜL (ref 0–0.1)
BASOPHILS NFR BLD AUTO: 0 % (ref 0–1)
BUN SERPL-MCNC: 15 MG/DL (ref 5–25)
CALCIUM SERPL-MCNC: 8.4 MG/DL (ref 8.3–10.1)
CHLORIDE SERPL-SCNC: 109 MMOL/L (ref 100–108)
CO2 SERPL-SCNC: 22 MMOL/L (ref 21–32)
CREAT SERPL-MCNC: 2 MG/DL (ref 0.6–1.3)
CREAT UR-MCNC: 36.9 MG/DL
EOSINOPHIL # BLD AUTO: 0.13 THOUSAND/ΜL (ref 0–0.61)
EOSINOPHIL NFR BLD AUTO: 2 % (ref 0–6)
ERYTHROCYTE [DISTWIDTH] IN BLOOD BY AUTOMATED COUNT: 15.4 % (ref 11.6–15.1)
GFR SERPL CREATININE-BSD FRML MDRD: 22 ML/MIN/1.73SQ M
GLUCOSE SERPL-MCNC: 84 MG/DL (ref 65–140)
HCT VFR BLD AUTO: 26.8 % (ref 34.8–46.1)
HGB BLD-MCNC: 8.7 G/DL (ref 11.5–15.4)
IMM GRANULOCYTES # BLD AUTO: 0.03 THOUSAND/UL (ref 0–0.2)
IMM GRANULOCYTES NFR BLD AUTO: 0 % (ref 0–2)
LYMPHOCYTES # BLD AUTO: 0.97 THOUSANDS/ΜL (ref 0.6–4.47)
LYMPHOCYTES NFR BLD AUTO: 13 % (ref 14–44)
MAGNESIUM SERPL-MCNC: 1.7 MG/DL (ref 1.6–2.6)
MCH RBC QN AUTO: 29.9 PG (ref 26.8–34.3)
MCHC RBC AUTO-ENTMCNC: 32.5 G/DL (ref 31.4–37.4)
MCV RBC AUTO: 92 FL (ref 82–98)
MONOCYTES # BLD AUTO: 0.7 THOUSAND/ΜL (ref 0.17–1.22)
MONOCYTES NFR BLD AUTO: 9 % (ref 4–12)
NEUTROPHILS # BLD AUTO: 5.86 THOUSANDS/ΜL (ref 1.85–7.62)
NEUTS SEG NFR BLD AUTO: 76 % (ref 43–75)
NRBC BLD AUTO-RTO: 0 /100 WBCS
PHOSPHATE SERPL-MCNC: 4.1 MG/DL (ref 2.3–4.1)
PLATELET # BLD AUTO: 284 THOUSANDS/UL (ref 149–390)
PMV BLD AUTO: 9.9 FL (ref 8.9–12.7)
POTASSIUM SERPL-SCNC: 4.6 MMOL/L (ref 3.5–5.3)
RBC # BLD AUTO: 2.91 MILLION/UL (ref 3.81–5.12)
SODIUM 24H UR-SCNC: 106 MOL/L
SODIUM SERPL-SCNC: 137 MMOL/L (ref 136–145)
WBC # BLD AUTO: 7.71 THOUSAND/UL (ref 4.31–10.16)

## 2019-09-15 PROCEDURE — 80048 BASIC METABOLIC PNL TOTAL CA: CPT | Performed by: INTERNAL MEDICINE

## 2019-09-15 PROCEDURE — 83735 ASSAY OF MAGNESIUM: CPT | Performed by: INTERNAL MEDICINE

## 2019-09-15 PROCEDURE — 82570 ASSAY OF URINE CREATININE: CPT | Performed by: INTERNAL MEDICINE

## 2019-09-15 PROCEDURE — 84100 ASSAY OF PHOSPHORUS: CPT | Performed by: INTERNAL MEDICINE

## 2019-09-15 PROCEDURE — 99232 SBSQ HOSP IP/OBS MODERATE 35: CPT | Performed by: INTERNAL MEDICINE

## 2019-09-15 PROCEDURE — 84300 ASSAY OF URINE SODIUM: CPT | Performed by: INTERNAL MEDICINE

## 2019-09-15 PROCEDURE — 85025 COMPLETE CBC W/AUTO DIFF WBC: CPT | Performed by: INTERNAL MEDICINE

## 2019-09-15 RX ADMIN — APIXABAN 2.5 MG: 2.5 TABLET, FILM COATED ORAL at 18:32

## 2019-09-15 RX ADMIN — ATORVASTATIN CALCIUM 40 MG: 40 TABLET, FILM COATED ORAL at 18:32

## 2019-09-15 RX ADMIN — APIXABAN 2.5 MG: 2.5 TABLET, FILM COATED ORAL at 08:31

## 2019-09-15 RX ADMIN — LEVOTHYROXINE SODIUM 100 MCG: 100 TABLET ORAL at 05:17

## 2019-09-15 RX ADMIN — PANTOPRAZOLE SODIUM 40 MG: 40 TABLET, DELAYED RELEASE ORAL at 05:17

## 2019-09-15 RX ADMIN — CEFTRIAXONE SODIUM 1000 MG: 10 INJECTION, POWDER, FOR SOLUTION INTRAVENOUS at 20:09

## 2019-09-15 RX ADMIN — QUETIAPINE FUMARATE 12.5 MG: 25 TABLET ORAL at 08:30

## 2019-09-15 RX ADMIN — METOPROLOL TARTRATE 50 MG: 50 TABLET, FILM COATED ORAL at 21:33

## 2019-09-15 RX ADMIN — QUETIAPINE FUMARATE 12.5 MG: 25 TABLET ORAL at 18:32

## 2019-09-15 RX ADMIN — NIFEDIPINE 60 MG: 60 TABLET, FILM COATED, EXTENDED RELEASE ORAL at 08:46

## 2019-09-15 RX ADMIN — DONEPEZIL HYDROCHLORIDE 5 MG: 5 TABLET ORAL at 21:33

## 2019-09-15 RX ADMIN — METOPROLOL TARTRATE 50 MG: 50 TABLET, FILM COATED ORAL at 08:30

## 2019-09-15 RX ADMIN — AZITHROMYCIN 500 MG: 250 TABLET, FILM COATED ORAL at 20:09

## 2019-09-15 NOTE — ASSESSMENT & PLAN NOTE
Patient with baseline creatinine around 1; currently creatinine 1 84  Overnight creatinine slightly trended down, unclear of etiology however as BUN /creatinine ratio is less than 20-1  consider intrinsic renal failure; ATN versus interstitial disease secondary to offending drug  Will place [place] to rule out post renal disease  Review med list to exclude a for toxic medications  Monitor morning labs    9/13-unfortunately, creatinine trended up over night  Urine output appears to be appropriate  Review of past medical records indicate that creatinine has improved with gentle hydration  IV infusion of normal saline started  UA negative for infection; blood but no protein  Review of imaging shows no apparent contrast used; NSAIDs or other nephrotoxin medications not medical record  Will consult Nephrology  9/14-appreciate Nephrology recommendations, gentle IV hydration given overnight with improvement in creatinine this morning  Monitor repeat fluid administration as needed  9/15-as per Neurology recommendations, have ordered urine sodium and also urine creatinine  Creatinine remains elevated, no neurological symptoms

## 2019-09-15 NOTE — PROGRESS NOTES
Progress Note - Liza Her 4/27/1931, 80 y o  female MRN: [id number]    Unit/Bed#: [facility] 702-01 Encounter: [id number]    Primary Care Provider: Smitha Collado DO   Date and time admitted to hospital: 9/10/2019  7:09 AM        DEVONTE (acute kidney injury) [facility])  Assessment & Plan  Patient with baseline creatinine around 1; currently creatinine 1 84  Overnight creatinine slightly trended down, unclear of etiology however as BUN /creatinine ratio is less than 20-1  consider intrinsic renal failure; ATN versus interstitial disease secondary to offending drug  Will place Milton to rule out post renal disease  Review med list to exclude a for toxic medications  Monitor morning labs    9/13-unfortunately, creatinine trended up over night  Urine output appears to be appropriate  Review of past medical records indicate that creatinine has improved with gentle hydration  IV infusion of normal saline started  UA negative for infection; blood but no protein  Review of imaging shows no apparent contrast used; NSAIDs or other nephrotoxin medications not medical record  Will consult Nephrology  9/14-appreciate Nephrology recommendations, gentle IV hydration given overnight with improvement in creatinine this morning  Monitor repeat fluid administration as needed  9/15-as per Neurology recommendations, have ordered urine sodium and also urine creatinine  Creatinine remains elevated, no neurological symptoms        Acquired hypothyroidism  Assessment & Plan  Levothyroxine 100 mcg per day    Sepsis [facility])  Assessment & Plan  Patient currently 0 of for for SIRS, however pro count is elevated at 1 40  Clinical;ly the patient does not look septic, has been afebrile  Noted to have elevated white count a few days ago; and imaging with bilateral consolidations suspicious for possible atelectasis versus pneumonia  Given imaging findings and procalcitonin elevation, have started treatment for pneumonia  Noted the patient was intubated, concern for [facility] P; but given clinical picture and lack of sirs criteria, will treat more conservatively  UA negative        Essential hypertension  Assessment & Plan  Blood pressure well controlled on home medications that include Lopressor and Procardia  Continue present therapy    9/14-blood pressure has increased intervally, have increase metoprolol to 50 mg b i d ; continue Procardia 60 mg daily    Cerebrovascular accident (CVA) due to embolism [facility])  Assessment & Plan  Patient presents with seizure-like activity and new findings of acute versus subacute ischemic disease on MRI brain  Appreciate Neurology recommendations, likely an embolic CVA  As per Neurology recommendations, patient started on low-dose Eliquis for anticoagulation and will continue statin  No need for Plavix at this time  Monitor and discharge back to nursing home when stable    GERD (gastroesophageal reflux disease)  Assessment & Plan  PPI daily    Hyperlipidemia  Assessment & Plan  Will continue Lipitor 40 mg q day    * Seizure [facility])  Assessment & Plan  Appreciate Neurology recs, as per Neurology;  · PT/OT/SLP recommendations appreciated  · Continue telemetry  · Continue vEEG  · Continue neuro checks  · Ativan 2 mg for seizures or seizure-like activity longer than 2 minutes  · Continue Plavix 75 mg and Lipitor 40 mg   · Echo pending  Concern to for CVA, as MRI showedsmall restricted diffusion left precentral sulcus consistent with acute/subacute infarct; old right temporoparietal, left cerebellar and left occipital parietal infarct  CT head demonstrated no acute intracranial abnormality; chronic right temporal parietal encephalomalacia consistent with prior insult  9/13-will await neurological recommendations  Patient currently alert and oriented x1, likely her baseline status given Alzheimer's dementia    GCS 15      9/14-appreciate Neurology recommendations, patient with apparent embolic stroke likely secondary to atrial fibrillation  At this point Plavix held, and patient placed on full anticoagulation with low-dose Eliquis  Will continue telemetry and better control of hypertension  VTE Pharmacologic Prophylaxis:   Pharmacologic: Apixaban (Eliquis)  Mechanical VTE Prophylaxis in Place: Yes    Patient Centered Rounds: I have performed bedside rounds with nursing staff today  Discussions with Specialists or Other Care Team Provider:  None    Education and Discussions with Family / Patient:  Care plan discussed with patient, however baseline dementia unclear how much she understands  Time Spent for Care: 30 minutes  More than 50% of total time spent on counseling and coordination of care as described above  Current Length of Stay: 5 day(s)    Current Patient Status: Inpatient   Certification Statement:  Patient is stable for discharge today or tomorrow    Discharge Plan: To be determined    Code Status: Level 1 - Full Code      Subjective:   Patient seen examined bedside, no acute distress or discomfort noted  Vital signs stable, patient remains afebrile  Remove Milton today, patient is stable for discharge back to nursing home  Get complete antibiotic therapy outpatient  Will discuss with  for possible discharge today or tomorrow  Objective:     Vitals:   Temp (24hrs), Av 2 °F (36 8 °C), Min:97 9 °F (36 6 °C), Max:98 3 °F (36 8 °C)    Temp:  [97 9 °F (36 6 °C)-98 3 °F (36 8 °C)] 98 3 °F (36 8 °C)  HR:  [55-79] 76  Resp:  [17-19] 18  BP: (121-155)/(54-70) 148/68  SpO2:  [97 %-100 %] 97 %  Body mass index is 26 01 kg/m²  Input and Output Summary (last 24 hours): Intake/Output Summary (Last 24 hours) at 9/15/2019 1017  Last data filed at 9/15/2019 0201  Gross per 24 hour   Intake 390 ml   Output 1650 ml   Net -1260 ml       Physical Exam:     Physical Exam  Constitutional: She appears well-developed  No distress    Thin   HENT:   Head: Normocephalic and atraumatic  Nose: Nose normal    Mouth/Throat: Oropharynx is clear and moist  No oropharyngeal exudate  Eyes: Right eye exhibits no discharge  Left eye exhibits no discharge  No scleral icterus    Patient with drooping right eyelid as well as visible cataract disease and right eye   Neck: Normal range of motion  No JVD present  No tracheal deviation present  Cardiovascular: Normal rate and regular rhythm  Exam reveals no gallop and no friction rub    Murmur heard  Pulmonary/Chest: Effort normal and breath sounds normal  No stridor  No respiratory distress  Wheezing appreciated on left lower lobe  She has no rales  Abdominal: Soft  She exhibits no distension and no mass  There is tenderness  There is no rebound and no guarding  Musculoskeletal: Normal range of motion  She exhibits no edema, tenderness or deformity  Neurological: She is alert    Alert oriented x1; GCS 15  Skin: Skin is warm and dry  She is not diaphoretic      Additional Data:     Labs:    Results from last 7 days   Lab Units 09/15/19  0636   WBC Thousand/uL 7 71   HEMOGLOBIN g/dL 8 7*   HEMATOCRIT % 26 8*   PLATELETS Thousands/uL 284   NEUTROS PCT % 76*   LYMPHS PCT % 13*   MONOS PCT % 9   EOS PCT % 2     Results from last 7 days   Lab Units 09/15/19  0636  09/13/19  0523   SODIUM mmol/L 137   < > 135*   POTASSIUM mmol/L 4 6   < > 3 6   CHLORIDE mmol/L 109*   < > 105   CO2 mmol/L 22   < > 23   BUN mg/dL 15   < > 12   CREATININE mg/dL 2 00*   < > 2 01*   ANION GAP mmol/L 6   < > 7   CALCIUM mg/dL 8 4   < > 7 7*   ALBUMIN g/dL  --   --  2 2*   TOTAL BILIRUBIN mg/dL  --   --  0 30   ALK PHOS U/L  --   --  107   ALT U/L  --   --  10*   AST U/L  --   --  43   GLUCOSE RANDOM mg/dL 84   < > 82    < > = values in this interval not displayed       Results from last 7 days   Lab Units 09/10/19  0749   INR  1 28*     Results from last 7 days   Lab Units 09/11/19  1148 09/11/19  0613 09/10/19  2359   POC GLUCOSE mg/dl 139 101 118     Results from last 7 days Lab Units 09/11/19  0524   HEMOGLOBIN A1C % 5 4     Results from last 7 days   Lab Units 09/13/19  0523 09/11/19  0524 09/10/19  1135 09/10/19  0730   LACTIC ACID mmol/L  --   --  1 5 8 7*   PROCALCITONIN ng/ml 1 40* 3 94*  --   --            * I Have Reviewed All Lab Data Listed Above  * Additional Pertinent Lab Tests Reviewed: All Labs Within Last 24 Hours Reviewed    Imaging:    Imaging Reports Reviewed Today Include:  None  Imaging Personally Reviewed by Myself Includes:  None    Recent Cultures (last 7 days):     Results from last 7 days   Lab Units 09/14/19  1201 09/10/19  0742   BLOOD CULTURE  No Growth After 4 Days  No Growth After 4 Days  Last 24 Hours Medication List:     Current Facility-Administered Medications:  apixaban 2 5 mg Oral BID Logan Ards, DO    atorvastatin 40 mg Oral Daily With Katharina Bruno MD    azithromycin 500 mg Oral Q24H Susan Navarro MD    bisacodyl 10 mg Rectal Daily PRN Bridgett Flores MD    cefTRIAXone 1,000 mg Intravenous Q24H Susan Navarro MD Last Rate: 1,000 mg (09/14/19 2206)   donepezil 5 mg Oral HS Bridgett Flores MD    fentanyl citrate (PF) 50 mcg Intravenous Q1H PRN Bridgett Flores MD    levothyroxine 100 mcg Oral Early Morning Bridgett Flores MD    LORazepam 2 mg Intravenous PRN Bridgett Flores MD    metoprolol tartrate 50 mg Oral Q12H 1338 Oskar Stoner MD    NIFEdipine 60 mg Oral Daily Bridgett Flores MD    ondansetron 4 mg Intravenous Q4H PRN Bridgett Flores MD    pantoprazole 40 mg Oral Early Morning Susan Navarro MD    QUEtiapine 12 5 mg Oral BID Bridgett Flores MD         Today, Patient Was Seen By: Geovanny De Jesus MD    ** Please Note: Dictation voice to text software may have been used in the creation of this document   **

## 2019-09-15 NOTE — SOCIAL WORK
Tony received notice from MD that patient is medically cleared to return back to [facility] today  CM attempted to arrange transport ([facility])  After multiple attempts, no companies are available today  TONY arranged transport for 10am Monday (9/16) via [address] [place] [address] through [facility] ([phone])  Patient, family, and MD updated

## 2019-09-15 NOTE — ASSESSMENT & PLAN NOTE
Patient presents with seizure-like activity and new findings of acute versus subacute ischemic disease on MRI brain  Appreciate Neurology recommendations, likely an embolic CVA  As per Neurology recommendations, patient started on low-dose Eliquis for anticoagulation and will continue statin  No need for Plavix at this time  Monitor and discharge back to nursing home when stable

## 2019-09-15 NOTE — PROGRESS NOTES
NEPHROLOGY PROGRESS NOTE   Fara Woodruff 80 y o  female MRN: [id number]  Unit/Bed#: [facility] 702-01 Encounter: [id number]  Reason for Consult:  Acute kidney injury    Assessment/Plan:  1  Acute kidney injury, without clear etiology, may be a degree of volume depletion, agree with IV fluids, previously apparently on NSAIDs plus angiotensin receptor blocker, urinalysis shows hematuria although recently with UTI/urosepsis  2  Apparent seizure disorder Neurology following  3  Acute on subacute infarct, seen on MRI  4  Advanced dementia  5  Hypertension, blood pressure appears stable, avoid ACE-inhibitor or angiotensin receptor blocker    PLAN:  · Overall renal function appears fairly stable, baseline during this hospitalization between 1 8-2 0  · Conservative workup given advanced age and advanced dementia  · Discussed with daughter at length in regards to goals of care  No aggressive measures including hemodialysis  Daughter agreeable  · I did discuss likely poor long-term prognosis and likely need in the near future regarding possible transition to comfort/hospice measures  · Anticipated discharge home today  · Again high risk for recurrent acute kidney injury given volume depletion    SUBJECTIVE:  Seen examined  Patient awake and alert  Asks if she is dying states that she feels awful  No reports of chest pain or shortness of Breath  Appetite seems to be stable      Review of Systems    OBJECTIVE:  Current Weight: Weight - Scale: 64 5 kg (142 lb 3 2 oz)  Vitals:    09/14/19 2213 09/15/19 0600 09/15/19 0658 09/15/19 0658   BP: 155/70  148/68 148/68   Pulse: 74  79 76   Resp:   18 18   Temp: 98 1 °F (36 7 °C)  98 3 °F (36 8 °C) 98 3 °F (36 8 °C)   TempSrc:       SpO2: 99%  97% 97%   Weight:  64 5 kg (142 lb 3 2 oz)     Height:           Intake/Output Summary (Last 24 hours) at 9/15/2019 1057  Last data filed at 9/15/2019 0201  Gross per 24 hour   Intake 390 ml   Output 1650 ml   Net -1260 ml       Physical Exam   Constitutional: No distress  HENT:   Head: Normocephalic  Neck: Neck supple  Cardiovascular: Normal rate and regular rhythm  Pulmonary/Chest: Effort normal and breath sounds normal    Abdominal: Soft  Musculoskeletal: She exhibits no edema  Neurological: She is alert  Skin: Skin is warm and dry  No pallor         Medications:    Current Facility-Administered Medications:     apixaban (ELIQUIS) tablet 2 5 mg, 2 5 mg, Oral, BID, Christ Gallego DO, 2 5 mg at 09/15/19 0831    atorvastatin (LIPITOR) tablet 40 mg, 40 mg, Oral, Daily With Ekaterina Mcmillan MD, 40 mg at 09/14/19 1818    azithromycin (ZITHROMAX) tablet 500 mg, 500 mg, Oral, Q24H, Cleve Valenzuela MD, 500 mg at 09/14/19 2204    bisacodyl (DULCOLAX) rectal suppository 10 mg, 10 mg, Rectal, Daily PRN, Eli Messina MD    cefTRIAXone (ROCEPHIN) 1,000 mg in dextrose 5 % 50 mL IVPB, 1,000 mg, Intravenous, Q24H, Cleve Valenzuela MD, Last Rate: 100 mL/hr at 09/14/19 2206, 1,000 mg at 09/14/19 2206    donepezil (ARICEPT) tablet 5 mg, 5 mg, Oral, HS, Eli Messina MD, 5 mg at 09/14/19 2205    fentanyl citrate (PF) 100 MCG/2ML 50 mcg, 50 mcg, Intravenous, Q1H PRN, Eli Messina MD    levothyroxine tablet 100 mcg, 100 mcg, Oral, Early Morning, Eli Messina MD, 100 mcg at 09/15/19 0517    LORazepam (ATIVAN) 2 mg/mL injection 2 mg, 2 mg, Intravenous, PRN, Eli Messina MD    metoprolol tartrate (LOPRESSOR) tablet 50 mg, 50 mg, Oral, Q12H [facility], Cleve Valenzuela MD, 50 mg at 09/15/19 0830    NIFEdipine (PROCARDIA XL) 24 hr tablet 60 mg, 60 mg, Oral, Daily, Eli Messina MD, 60 mg at 09/15/19 0846    ondansetron (ZOFRAN) injection 4 mg, 4 mg, Intravenous, Q4H PRN, Eli Messina MD, 4 mg at 09/11/19 1753    pantoprazole (PROTONIX) EC tablet 40 mg, 40 mg, Oral, Early Morning, Cleve Valenzuela MD, 40 mg at 09/15/19 0517    QUEtiapine (SEROquel) tablet 12 5 mg, 12 5 mg, Oral, BID, Eli Messina MD, 12 5 mg at 09/15/19 0830    Laboratory Results:  Results from last 7 days   Lab Units 09/15/19  0636 09/14/19  0602 09/13/19  0523 09/11/19  0524 09/10/19  1135 09/10/19  0804 09/10/19  0730 09/10/19  0729 09/10/19  0728   WBC Thousand/uL 7 71 6 83 8 73 9 42  --  17 80*  --   --   --    HEMOGLOBIN g/dL 8 7* 9 4* 9 5* 8 7*  --  10 1*  --   --   --    I STAT HEMOGLOBIN g/dl  --   --   --   --   --   --   --  10 5* 10 9*   HEMATOCRIT % 26 8* 30 0* 29 2* 27 0*  --  32 8*  --   --   --    HEMATOCRIT, ISTAT %  --   --   --   --   --   --   --  31* 32*   PLATELETS Thousands/uL 284 263 305 282 195 451*  --   --   --    POTASSIUM mmol/L 4 6 3 4* 3 6 3 6  --   --  4 0  --   --    CHLORIDE mmol/L 109* 108 105 107  --   --  105  --   --    CO2 mmol/L 22 23 23 21  --   --  15*  --   --    CO2, I-STAT mmol/L  --   --   --   --   --   --   --  17* 17*   BUN mg/dL 15 11 12 9  --   --  10  --   --    CREATININE mg/dL 2 00* 1 91* 2 01* 1 84*  --   --  1 88*  --   --    CALCIUM mg/dL 8 4 8 0* 7 7* 7 8*  --   --  8 2*  --   --    MAGNESIUM mg/dL 1 7  --   --   --   --   --   --   --   --    PHOSPHORUS mg/dL 4 1  --   --   --   --   --   --   --   --    GLUCOSE, ISTAT mg/dl  --   --   --   --   --   --   --  198* 200*

## 2019-09-16 ENCOUNTER — NURSING HOME VISIT (OUTPATIENT)
Dept: GERIATRICS | Facility: OTHER | Age: 84
End: 2019-09-16
Payer: MEDICARE

## 2019-09-16 ENCOUNTER — APPOINTMENT (INPATIENT)
Dept: RADIOLOGY | Facility: HOSPITAL | Age: 84
DRG: 064 | End: 2019-09-16
Attending: INTERNAL MEDICINE
Payer: MEDICARE

## 2019-09-16 VITALS
SYSTOLIC BLOOD PRESSURE: 147 MMHG | WEIGHT: 142.2 LBS | HEIGHT: 63 IN | OXYGEN SATURATION: 100 % | RESPIRATION RATE: 20 BRPM | HEART RATE: 70 BPM | DIASTOLIC BLOOD PRESSURE: 94 MMHG | TEMPERATURE: 98.1 F | BODY MASS INDEX: 25.2 KG/M2

## 2019-09-16 DIAGNOSIS — N17.9 AKI (ACUTE KIDNEY INJURY) (HCC): ICD-10-CM

## 2019-09-16 DIAGNOSIS — J18.9 PNEUMONIA OF RIGHT UPPER LOBE DUE TO INFECTIOUS ORGANISM: ICD-10-CM

## 2019-09-16 DIAGNOSIS — I63.412 CEREBROVASCULAR ACCIDENT (CVA) DUE TO EMBOLISM OF LEFT MIDDLE CEREBRAL ARTERY (HCC): Primary | ICD-10-CM

## 2019-09-16 DIAGNOSIS — I10 ESSENTIAL HYPERTENSION: ICD-10-CM

## 2019-09-16 DIAGNOSIS — Z71.89 ADVANCE CARE PLANNING: ICD-10-CM

## 2019-09-16 DIAGNOSIS — R10.813 RIGHT LOWER QUADRANT ABDOMINAL TENDERNESS WITHOUT REBOUND TENDERNESS: ICD-10-CM

## 2019-09-16 DIAGNOSIS — R10.31 RIGHT LOWER QUADRANT ABDOMINAL PAIN: ICD-10-CM

## 2019-09-16 DIAGNOSIS — R94.31 ABNORMAL EKG: ICD-10-CM

## 2019-09-16 DIAGNOSIS — E03.9 ACQUIRED HYPOTHYROIDISM: ICD-10-CM

## 2019-09-16 DIAGNOSIS — D50.9 IRON DEFICIENCY ANEMIA, UNSPECIFIED IRON DEFICIENCY ANEMIA TYPE: ICD-10-CM

## 2019-09-16 DIAGNOSIS — K21.9 GASTROESOPHAGEAL REFLUX DISEASE WITHOUT ESOPHAGITIS: ICD-10-CM

## 2019-09-16 DIAGNOSIS — R56.9 SEIZURE-LIKE ACTIVITY (HCC): ICD-10-CM

## 2019-09-16 LAB
ANION GAP SERPL CALCULATED.3IONS-SCNC: 5 MMOL/L (ref 4–13)
BACTERIA BLD CULT: NORMAL
BASOPHILS # BLD AUTO: 0.02 THOUSANDS/ΜL (ref 0–0.1)
BASOPHILS NFR BLD AUTO: 0 % (ref 0–1)
BUN SERPL-MCNC: 14 MG/DL (ref 5–25)
CALCIUM SERPL-MCNC: 8.4 MG/DL (ref 8.3–10.1)
CHLORIDE SERPL-SCNC: 106 MMOL/L (ref 100–108)
CO2 SERPL-SCNC: 24 MMOL/L (ref 21–32)
CREAT SERPL-MCNC: 1.89 MG/DL (ref 0.6–1.3)
EOSINOPHIL # BLD AUTO: 0.17 THOUSAND/ΜL (ref 0–0.61)
EOSINOPHIL NFR BLD AUTO: 2 % (ref 0–6)
ERYTHROCYTE [DISTWIDTH] IN BLOOD BY AUTOMATED COUNT: 14.9 % (ref 11.6–15.1)
GFR SERPL CREATININE-BSD FRML MDRD: 23 ML/MIN/1.73SQ M
GLUCOSE SERPL-MCNC: 83 MG/DL (ref 65–140)
HCT VFR BLD AUTO: 27.8 % (ref 34.8–46.1)
HGB BLD-MCNC: 8.9 G/DL (ref 11.5–15.4)
IMM GRANULOCYTES # BLD AUTO: 0.04 THOUSAND/UL (ref 0–0.2)
IMM GRANULOCYTES NFR BLD AUTO: 1 % (ref 0–2)
LYMPHOCYTES # BLD AUTO: 1.11 THOUSANDS/ΜL (ref 0.6–4.47)
LYMPHOCYTES NFR BLD AUTO: 14 % (ref 14–44)
MCH RBC QN AUTO: 29.3 PG (ref 26.8–34.3)
MCHC RBC AUTO-ENTMCNC: 32 G/DL (ref 31.4–37.4)
MCV RBC AUTO: 91 FL (ref 82–98)
MONOCYTES # BLD AUTO: 0.61 THOUSAND/ΜL (ref 0.17–1.22)
MONOCYTES NFR BLD AUTO: 8 % (ref 4–12)
NEUTROPHILS # BLD AUTO: 6 THOUSANDS/ΜL (ref 1.85–7.62)
NEUTS SEG NFR BLD AUTO: 75 % (ref 43–75)
NRBC BLD AUTO-RTO: 0 /100 WBCS
PLATELET # BLD AUTO: 295 THOUSANDS/UL (ref 149–390)
PMV BLD AUTO: 9.5 FL (ref 8.9–12.7)
POTASSIUM SERPL-SCNC: 4 MMOL/L (ref 3.5–5.3)
RBC # BLD AUTO: 3.04 MILLION/UL (ref 3.81–5.12)
SODIUM SERPL-SCNC: 135 MMOL/L (ref 136–145)
WBC # BLD AUTO: 7.95 THOUSAND/UL (ref 4.31–10.16)

## 2019-09-16 PROCEDURE — 99306 1ST NF CARE HIGH MDM 50: CPT | Performed by: FAMILY MEDICINE

## 2019-09-16 PROCEDURE — 99239 HOSP IP/OBS DSCHRG MGMT >30: CPT | Performed by: INTERNAL MEDICINE

## 2019-09-16 PROCEDURE — 80048 BASIC METABOLIC PNL TOTAL CA: CPT | Performed by: INTERNAL MEDICINE

## 2019-09-16 PROCEDURE — 71045 X-RAY EXAM CHEST 1 VIEW: CPT

## 2019-09-16 PROCEDURE — 85025 COMPLETE CBC W/AUTO DIFF WBC: CPT | Performed by: INTERNAL MEDICINE

## 2019-09-16 RX ORDER — AZITHROMYCIN 500 MG/1
500 TABLET, FILM COATED ORAL EVERY 24 HOURS
Qty: 1 TABLET | Refills: 0 | Status: SHIPPED | OUTPATIENT
Start: 2019-09-16 | End: 2019-09-17

## 2019-09-16 RX ORDER — PANTOPRAZOLE SODIUM 40 MG/1
40 TABLET, DELAYED RELEASE ORAL
Qty: 30 TABLET | Refills: 0
Start: 2019-09-17

## 2019-09-16 RX ORDER — ATORVASTATIN CALCIUM 40 MG/1
40 TABLET, FILM COATED ORAL
Qty: 30 TABLET | Refills: 0 | Status: SHIPPED | OUTPATIENT
Start: 2019-09-16

## 2019-09-16 RX ADMIN — APIXABAN 2.5 MG: 2.5 TABLET, FILM COATED ORAL at 09:09

## 2019-09-16 RX ADMIN — PANTOPRAZOLE SODIUM 40 MG: 40 TABLET, DELAYED RELEASE ORAL at 05:52

## 2019-09-16 RX ADMIN — NIFEDIPINE 60 MG: 60 TABLET, FILM COATED, EXTENDED RELEASE ORAL at 09:09

## 2019-09-16 RX ADMIN — METOPROLOL TARTRATE 50 MG: 50 TABLET, FILM COATED ORAL at 09:09

## 2019-09-16 RX ADMIN — LEVOTHYROXINE SODIUM 100 MCG: 100 TABLET ORAL at 05:52

## 2019-09-16 RX ADMIN — QUETIAPINE FUMARATE 12.5 MG: 25 TABLET ORAL at 09:09

## 2019-09-16 NOTE — ASSESSMENT & PLAN NOTE
Will taper down protonix, give one tab every other day for one week and discontinue if no symptoms    Currently asymptomatic

## 2019-09-16 NOTE — PLAN OF CARE
Problem: Prexisting or High Potential for Compromised Skin Integrity  Goal: Skin integrity is maintained or improved  Description  INTERVENTIONS:  - Identify patients at risk for skin breakdown  - Assess and monitor skin integrity  - Assess and monitor nutrition and hydration status  - Monitor labs   - Assess for incontinence   - Turn and reposition patient  - Assist with mobility/ambulation  - Relieve pressure over bony prominences  - Avoid friction and shearing  - Provide appropriate hygiene as needed including keeping skin clean and dry  - Evaluate need for skin moisturizer/barrier cream  - Collaborate with interdisciplinary team   - Patient/family teaching  - Consider wound care consult   9/16/2019 1025 by Bob Castillo RN  Outcome: Adequate for Discharge  9/16/2019 0950 by Bob Castillo RN  Outcome: Adequate for Discharge  9/16/2019 0848 by Bob Castillo RN  Outcome: Progressing     Problem: Potential for Falls  Goal: Patient will remain free of falls  Description  INTERVENTIONS:  - Assess patient frequently for physical needs  -  Identify cognitive and physical deficits and behaviors that affect risk of falls    -  [place] fall precautions as indicated by assessment   - Educate patient/family on patient safety including physical limitations  - Instruct patient to call for assistance with activity based on assessment  - Modify environment to reduce risk of injury  - Consider OT/PT consult to assist with strengthening/mobility  9/16/2019 1025 by Bob Castillo RN  Outcome: Adequate for Discharge  9/16/2019 0950 by Bob Castillo RN  Outcome: Adequate for Discharge  9/16/2019 0848 by Bob Castillo RN  Outcome: Progressing     Problem: PAIN - ADULT  Goal: Verbalizes/displays adequate comfort level or baseline comfort level  Description  Interventions:  - Encourage patient to monitor pain and request assistance  - Assess pain using appropriate pain scale  - Administer analgesics based on type and severity of pain and evaluate response  - Implement non-pharmacological measures as appropriate and evaluate response  - Consider cultural and social influences on pain and pain management  - Notify physician/advanced practitioner if interventions unsuccessful or patient reports new pain  9/16/2019 1025 by Bob Castillo RN  Outcome: Adequate for Discharge  9/16/2019 0950 by Bob Castillo RN  Outcome: Adequate for Discharge  9/16/2019 0848 by Bob Castillo RN  Outcome: Progressing     Problem: INFECTION - ADULT  Goal: Absence or prevention of progression during hospitalization  Description  INTERVENTIONS:  - Assess and monitor for signs and symptoms of infection  - Monitor lab/diagnostic results  - Monitor all insertion sites, i e  indwelling lines, tubes, and drains  - Monitor endotracheal if appropriate and nasal secretions for changes in amount and color  - [place] appropriate cooling/warming therapies per order  - Administer medications as ordered  - Instruct and encourage patient and family to use good hand hygiene technique  - Identify and instruct in appropriate isolation precautions for identified infection/condition  9/16/2019 1025 by Bob Castillo RN  Outcome: Adequate for Discharge  9/16/2019 0950 by Bob Castillo RN  Outcome: Adequate for Discharge  9/16/2019 0848 by Bob Castillo RN  Outcome: Progressing  Goal: Absence of fever/infection during neutropenic period  Description  INTERVENTIONS:  - Monitor WBC    9/16/2019 1025 by Bob Castillo RN  Outcome: Adequate for Discharge  9/16/2019 0950 by Bob Castillo RN  Outcome: Adequate for Discharge  9/16/2019 0848 by Bob Castillo RN  Outcome: Progressing     Problem: SAFETY ADULT  Goal: Maintain or return to baseline ADL function  Description  INTERVENTIONS:  -  Assess patient's ability to carry out ADLs; assess patient's baseline for ADL function and identify physical deficits which impact ability to perform ADLs (bathing, care of mouth/teeth, toileting, grooming, dressing, etc )  - Assess/evaluate cause of self-care deficits   - Assess range of motion  - Assess patient's mobility; develop plan if impaired  - Assess patient's need for assistive devices and provide as appropriate  - Encourage maximum independence but intervene and supervise when necessary  - Involve family in performance of ADLs  - Assess for home care needs following discharge   - Consider OT consult to assist with ADL evaluation and planning for discharge  - Provide patient education as appropriate  9/16/2019 1025 by Nelida Hines RN  Outcome: Adequate for Discharge  9/16/2019 0950 by Nelida Hines RN  Outcome: Adequate for Discharge  9/16/2019 0848 by Nelida Hines RN  Outcome: Progressing  Goal: Maintain or return mobility status to optimal level  Description  INTERVENTIONS:  - Assess patient's baseline mobility status (ambulation, transfers, stairs, etc )    - Identify cognitive and physical deficits and behaviors that affect mobility  - Identify mobility aids required to assist with transfers and/or ambulation (gait belt, sit-to-stand, lift, walker, cane, etc )  - [place] fall precautions as indicated by assessment  - Record patient progress and toleration of activity level on Mobility SBAR; progress patient to next Phase/Stage  - Instruct patient to call for assistance with activity based on assessment  - Consider rehabilitation consult to assist with strengthening/weightbearing, etc   9/16/2019 1025 by Nelida Hines RN  Outcome: Adequate for Discharge  9/16/2019 0950 by Nelida Hines RN  Outcome: Adequate for Discharge  9/16/2019 0848 by Nelida Hines RN  Outcome: Progressing     Problem: DISCHARGE PLANNING  Goal: Discharge to home or other facility with appropriate resources  Description  INTERVENTIONS:  - Identify barriers to discharge w/patient and caregiver  - Arrange for needed discharge resources and transportation as appropriate  - Identify discharge learning needs (meds, wound care, etc )  - Arrange for interpretive services to assist at discharge as needed  - Refer to Case Management Department for coordinating discharge planning if the patient needs post-hospital services based on physician/advanced practitioner order or complex needs related to functional status, cognitive ability, or social support system  9/16/2019 1025 by Kassy Powell RN  Outcome: Adequate for Discharge  9/16/2019 0950 by Kassy Powell RN  Outcome: Adequate for Discharge  9/16/2019 0848 by Kassy Powell RN  Outcome: Progressing     Problem: Knowledge Deficit  Goal: Patient/family/caregiver demonstrates understanding of disease process, treatment plan, medications, and discharge instructions  Description  Complete learning assessment and assess knowledge base    Interventions:  - Provide teaching at level of understanding  - Provide teaching via preferred learning methods  9/16/2019 1025 by Kassy Powell RN  Outcome: Adequate for Discharge  9/16/2019 0950 by Kassy Powell RN  Outcome: Adequate for Discharge  9/16/2019 0848 by Kassy Powell RN  Outcome: Progressing     Problem: RESPIRATORY - ADULT  Goal: Achieves optimal ventilation and oxygenation  Description  INTERVENTIONS:  - Assess for changes in respiratory status  - Assess for changes in mentation and behavior  - Position to facilitate oxygenation and minimize respiratory effort  - Oxygen administered by appropriate delivery if ordered  - Initiate smoking cessation education as indicated  - Encourage broncho-pulmonary hygiene including cough, deep breathe, Incentive Spirometry  - Assess the need for suctioning and aspirate as needed  - Assess and instruct to report SOB or any respiratory difficulty  - Respiratory Therapy support as indicated  9/16/2019 1025 by Kassy Powell RN  Outcome: Adequate for Discharge  9/16/2019 0950 by Tesha Easley RN  Outcome: Adequate for Discharge  9/16/2019 0848 by Tesha Easley RN  Outcome: Progressing     Problem: NEUROSENSORY - ADULT  Goal: Achieves stable or improved neurological status  Description  INTERVENTIONS  - Monitor and report changes in neurological status  - Monitor vital signs such as temperature, blood pressure, glucose, and any other labs ordered   - Initiate measures to prevent increased intracranial pressure  - Monitor for seizure activity and implement precautions if appropriate      9/16/2019 1025 by Tesha Easley RN  Outcome: Adequate for Discharge  9/16/2019 0950 by Tesha Easley RN  Outcome: Adequate for Discharge  9/16/2019 0848 by Tesha Easley RN  Outcome: Progressing     Problem: SKIN/TISSUE INTEGRITY - ADULT  Goal: Skin integrity remains intact  Description  INTERVENTIONS  - Identify patients at risk for skin breakdown  - Assess and monitor skin integrity  - Assess and monitor nutrition and hydration status  - Monitor labs (i e  albumin)  - Assess for incontinence   - Turn and reposition patient  - Assist with mobility/ambulation  - Relieve pressure over bony prominences  - Avoid friction and shearing  - Provide appropriate hygiene as needed including keeping skin clean and dry  - Evaluate need for skin moisturizer/barrier cream  - Collaborate with interdisciplinary team (i e  Nutrition, Rehabilitation, etc )   - Patient/family teaching  9/16/2019 1025 by Tesha Easley RN  Outcome: Adequate for Discharge  9/16/2019 0950 by Tesha Easley RN  Outcome: Adequate for Discharge  9/16/2019 0848 by Tesha Easley RN  Outcome: Progressing     Problem: Nutrition/Hydration-ADULT  Goal: Nutrient/Hydration intake appropriate for improving, restoring or maintaining nutritional needs  Description  Monitor and assess patient's nutrition/hydration status for malnutrition   Collaborate with interdisciplinary team and initiate plan and interventions as ordered  Monitor patient's weight and dietary intake as ordered or per policy  Utilize nutrition screening tool and intervene as necessary  Determine patient's food preferences and provide high-protein, high-caloric foods as appropriate       INTERVENTIONS:  - Monitor oral intake, urinary output, labs, and treatment plans  - Assess nutrition and hydration status and recommend course of action  - Evaluate amount of meals eaten  - Assist patient with eating if necessary   - Allow adequate time for meals  - Recommend/ encourage appropriate diets, oral nutritional supplements, and vitamin/mineral supplements  - Order, calculate, and assess calorie counts as needed  - Recommend, monitor, and adjust tube feedings and TPN/PPN based on assessed needs  - Assess need for intravenous fluids  - Provide specific nutrition/hydration education as appropriate  - Include patient/family/caregiver in decisions related to nutrition  9/16/2019 1025 by Tan Hernandez RN  Outcome: Adequate for Discharge  9/16/2019 0950 by Tan Hernandez RN  Outcome: Adequate for Discharge  9/16/2019 0848 by Tan Hernandez RN  Outcome: Progressing

## 2019-09-16 NOTE — RESTORATIVE TECHNICIAN NOTE
Restorative Specialist Mobility Note       Activity: Ambulate in room, Bathroom privileges, Chair, Stand at bedside, Dangle(Educated/encouraged pt to ambulate with assistance 3-4 x's/day  Bed alarm on   Pt callbell, phone/tray within reach )     Assistive Device: Front wheel walker         [employer] BS, Restorative Technician, [employer]

## 2019-09-16 NOTE — ASSESSMENT & PLAN NOTE
One episode only  EEG did not show seizure activity  No antiseizure medication  Will continue to monitor

## 2019-09-16 NOTE — ASSESSMENT & PLAN NOTE
Most likely aspiration pneumonia  Today will complete last dose of azythromycin  Will repeat CXR in 2 weeks  Continue to monitor  Continue albuterol PRN

## 2019-09-16 NOTE — ASSESSMENT & PLAN NOTE
Ordered bladder scan, she had Cecilio in during her hospital stay  States that she needs to move her bowels, will monitor for now

## 2019-09-16 NOTE — SOCIAL WORK
CM was informed that pt is medically stable for dc today  After discussion with pt's bedside RN and chart review; transport changed for 1pm BLS transport with [facility] to [facility]  CM notified pt's bedside RN Stefanie Simmons, pt's daughter Avril Fishman, and Tramaine Crawford at [facility] of dc time  Facility transfer form and CMN completed  CMN on chart  CMN faxed to [address] [place] [address]  Chart copy requested

## 2019-09-16 NOTE — PLAN OF CARE
Problem: Prexisting or High Potential for Compromised Skin Integrity  Goal: Skin integrity is maintained or improved  Description  INTERVENTIONS:  - Identify patients at risk for skin breakdown  - Assess and monitor skin integrity  - Assess and monitor nutrition and hydration status  - Monitor labs   - Assess for incontinence   - Turn and reposition patient  - Assist with mobility/ambulation  - Relieve pressure over bony prominences  - Avoid friction and shearing  - Provide appropriate hygiene as needed including keeping skin clean and dry  - Evaluate need for skin moisturizer/barrier cream  - Collaborate with interdisciplinary team   - Patient/family teaching  - Consider wound care consult   Outcome: Progressing     Problem: Potential for Falls  Goal: Patient will remain free of falls  Description  INTERVENTIONS:  - Assess patient frequently for physical needs  -  Identify cognitive and physical deficits and behaviors that affect risk of falls    -  [place] fall precautions as indicated by assessment   - Educate patient/family on patient safety including physical limitations  - Instruct patient to call for assistance with activity based on assessment  - Modify environment to reduce risk of injury  - Consider OT/PT consult to assist with strengthening/mobility  Outcome: Progressing     Problem: PAIN - ADULT  Goal: Verbalizes/displays adequate comfort level or baseline comfort level  Description  Interventions:  - Encourage patient to monitor pain and request assistance  - Assess pain using appropriate pain scale  - Administer analgesics based on type and severity of pain and evaluate response  - Implement non-pharmacological measures as appropriate and evaluate response  - Consider cultural and social influences on pain and pain management  - Notify physician/advanced practitioner if interventions unsuccessful or patient reports new pain  Outcome: Progressing     Problem: INFECTION - ADULT  Goal: Absence or prevention of progression during hospitalization  Description  INTERVENTIONS:  - Assess and monitor for signs and symptoms of infection  - Monitor lab/diagnostic results  - Monitor all insertion sites, i e  indwelling lines, tubes, and drains  - Monitor endotracheal if appropriate and nasal secretions for changes in amount and color  - [place] appropriate cooling/warming therapies per order  - Administer medications as ordered  - Instruct and encourage patient and family to use good hand hygiene technique  - Identify and instruct in appropriate isolation precautions for identified infection/condition  Outcome: Progressing  Goal: Absence of fever/infection during neutropenic period  Description  INTERVENTIONS:  - Monitor WBC    Outcome: Progressing     Problem: SAFETY ADULT  Goal: Maintain or return to baseline ADL function  Description  INTERVENTIONS:  -  Assess patient's ability to carry out ADLs; assess patient's baseline for ADL function and identify physical deficits which impact ability to perform ADLs (bathing, care of mouth/teeth, toileting, grooming, dressing, etc )  - Assess/evaluate cause of self-care deficits   - Assess range of motion  - Assess patient's mobility; develop plan if impaired  - Assess patient's need for assistive devices and provide as appropriate  - Encourage maximum independence but intervene and supervise when necessary  - Involve family in performance of ADLs  - Assess for home care needs following discharge   - Consider OT consult to assist with ADL evaluation and planning for discharge  - Provide patient education as appropriate  Outcome: Progressing  Goal: Maintain or return mobility status to optimal level  Description  INTERVENTIONS:  - Assess patient's baseline mobility status (ambulation, transfers, stairs, etc )    - Identify cognitive and physical deficits and behaviors that affect mobility  - Identify mobility aids required to assist with transfers and/or ambulation (gait belt, sit-to-stand, lift, walker, cane, etc )  - [place] fall precautions as indicated by assessment  - Record patient progress and toleration of activity level on Mobility SBAR; progress patient to next Phase/Stage  - Instruct patient to call for assistance with activity based on assessment  - Consider rehabilitation consult to assist with strengthening/weightbearing, etc   Outcome: Progressing

## 2019-09-16 NOTE — PROGRESS NOTES
Unsuccessful attempts for IV access for noon IV abx  Dr Boone Castro aware  IV abx given last 9/15 around 20:00, ordered for q24hr

## 2019-09-16 NOTE — DISCHARGE SUMMARY
Progress Note - Wynell Route 4/27/1931, 80 y o  female MRN: [id number]    Unit/Bed#: [facility] 702-01 Encounter: [id number]    Primary Care Provider: Brittany Camarena DO   Date and time admitted to hospital: 9/10/2019  7:09 AM        DEVONTE (acute kidney injury) [facility])  Assessment & Plan  Patient with baseline creatinine around 1; currently creatinine 1 84  Overnight creatinine slightly trended down, unclear of etiology however as BUN /creatinine ratio is less than 20-1  consider intrinsic renal failure; ATN versus interstitial disease secondary to offending drug  Will place Milton to rule out post renal disease  Review med list to exclude a for toxic medications  Monitor morning labs    9/13-unfortunately, creatinine trended up over night  Urine output appears to be appropriate  Review of past medical records indicate that creatinine has improved with gentle hydration  IV infusion of normal saline started  UA negative for infection; blood but no protein  Review of imaging shows no apparent contrast used; NSAIDs or other nephrotoxin medications not medical record  Will consult Nephrology  9/14-appreciate Nephrology recommendations, gentle IV hydration given overnight with improvement in creatinine this morning  Monitor repeat fluid administration as needed  9/15-as per Neurology recommendations, have ordered urine sodium and also urine creatinine  Creatinine remains elevated, no neurological symptoms  9/16 - patient denies urinary symptoms; witnessed to have good urinary output by aid  Nephrology has spoken with family and no aggressive measures to be sought at this time  Stable for discharge        Acquired hypothyroidism  Assessment & Plan  Levothyroxine 100 mcg per day    Sepsis [facility])  Assessment & Plan  Patient currently 0 of for for SIRS, however pro count is elevated at 1 40  Clinical;ly the patient does not look septic, has been afebrile  Noted to have elevated white count a few days ago; and imaging with bilateral consolidations suspicious for possible atelectasis versus pneumonia  Given imaging findings and procalcitonin elevation, have started treatment for pneumonia  Noted the patient was intubated, concern for VA P; but given clinical picture and lack of sirs criteria, will treat more conservatively  UA negative  9/15 - patient afebrile; 0/4 SIRs criteria met, BUN - 14; denies fever/chills/dysuria  Baseline confusion 2/2 dementia  Will re-xray chest, but completed 4 days of rocephin and will complete 5 days of azithromycin today  Clinically, patient not septic and possible pro andrea elevated 2/2 to reduced renal clearance  Stable for discharge  Essential hypertension  Assessment & Plan  Blood pressure well controlled on home medications that include Lopressor and Procardia  Continue present therapy    9/14-blood pressure has increased intervally, have increase metoprolol to 50 mg b i d ; continue Procardia 60 mg daily    Cerebrovascular accident (CVA) due to embolism [facility])  Assessment & Plan  Patient presents with seizure-like activity and new findings of acute versus subacute ischemic disease on MRI brain  Appreciate Neurology recommendations, likely an embolic CVA  As per Neurology recommendations, patient started on low-dose Eliquis for anticoagulation and will continue statin  No need for Plavix at this time  Monitor and discharge back to nursing home when stable    GERD (gastroesophageal reflux disease)  Assessment & Plan  PPI daily    Hyperlipidemia  Assessment & Plan  Will continue Lipitor 40 mg q day    * Seizure [facility])  Assessment & Plan  Appreciate Neurology recs, as per Neurology;  · PT/OT/SLP recommendations appreciated  · Continue telemetry  · Continue vEEG  · Continue neuro checks  · Ativan 2 mg for seizures or seizure-like activity longer than 2 minutes  · Continue Plavix 75 mg and Lipitor 40 mg   · Echo pending      Concern to for CVA, as MRI showedsmall restricted diffusion left precentral sulcus consistent with acute/subacute infarct; old right temporoparietal, left cerebellar and left occipital parietal infarct  CT head demonstrated no acute intracranial abnormality; chronic right temporal parietal encephalomalacia consistent with prior insult  9/13-will await neurological recommendations  Patient currently alert and oriented x1, likely her baseline status given Alzheimer's dementia  GCS 15      9/14-appreciate Neurology recommendations, patient with apparent embolic stroke likely secondary to atrial fibrillation  At this point Plavix held, and patient placed on full anticoagulation with low-dose Eliquis  Will continue telemetry and better control of hypertension  9/16 - no reported seizure activity; stable for discharge      Discharging Physician / Practitioner: Dana Belle MD  PCP: Seth Peacock DO  Admission Date:   Admission Orders (From admission, onward)     Ordered        09/10/19 0942  Inpatient Admission (expected length of stay for this patient Order details is greater than two midnights)  Once                   Discharge Date: 09/16/19    Resolved Problems  Date Reviewed: 9/12/2019    None          Consultations During Hospital Stay:  · Neurology, nephrology    Procedures Performed:   · MRI brain, CTA head, CT head    Significant Findings / Test Results:   · CVA    Incidental Findings:   · DEVONTE     Test Results Pending at Discharge (will require follow up):   · none     Outpatient Tests Requested:  · non    Complications:  None    Reason for Admission: Seizure like activity    Hospital Course:       Please see above list of diagnoses and related plan for additional information  Condition at Discharge: stable     Discharge Day Visit / Exam:     Subjective:  Patient seen and examined bedside, Aleena Navarro  Stable for discharge to nursing home today  Eliquis started and statin increased  Empiric antibiotics for presumed lower resp tract infx  Patient states she feels baseline; VSS and labs with no concerning abnormalities  Vitals: Blood Pressure: 147/94 (09/16/19 0908)  Pulse: 70 (09/16/19 0908)  Temperature: 98 1 °F (36 7 °C) (09/16/19 0733)  Temp Source: Oral (09/11/19 2300)  Respirations: 20 (09/16/19 0733)  Height: 5' 2 6" (159 cm) (09/10/19 1239)  Weight - Scale: 64 5 kg (142 lb 3 2 oz) (09/15/19 0600)  SpO2: 100 % (09/16/19 0908)  Exam:   Physical Exam  Constitutional: She appears well-developed  No distress    Thin   HENT:   Head: Normocephalic and atraumatic  Nose: Nose normal    Mouth/Throat: Oropharynx is clear and moist  No oropharyngeal exudate  Eyes: Right eye exhibits no discharge  Left eye exhibits no discharge  No scleral icterus    Patient with drooping right eyelid as well as visible cataract disease and right eye   Neck: Normal range of motion  No JVD present  No tracheal deviation present  Cardiovascular: Normal rate  Irregular rhythm-PACs versus AFib  Exam reveals no gallop and no friction rub    Murmur heard  Pulmonary/Chest: Effort normal and breath sounds normal  No stridor  No respiratory distress   Wheezing appreciated on left lower lobe  She has no rales  Abdominal: Soft  She exhibits no distension and no mass  There is tenderness  There is no rebound and no guarding  Musculoskeletal: Normal range of motion  She exhibits no edema, tenderness or deformity  Neurological: She is alert    Alert oriented x1; GCS 15  Skin: Skin is warm and dry  She is not diaphoretic      Discussion with Family: Left message for daughter on cell phone    Discharge instructions/Information to patient and family:   See after visit summary for information provided to patient and family  Provisions for Follow-Up Care:  See after visit summary for information related to follow-up care and any pertinent home health orders        Disposition:     Other [place] [facility]:   · Not Applicable to this Patient - Not Applicable to this Patient    Planned Readmission: none     Discharge Statement:  I spent 35 minutes discharging the patient  This time was spent on the day of discharge  I had direct contact with the patient on the day of discharge  Greater than 50% of the total time was spent examining patient, answering all patient questions, arranging and discussing plan of care with patient as well as directly providing post-discharge instructions  Additional time then spent on discharge activities  Discharge Medications:  See after visit summary for reconciled discharge medications provided to patient and family        ** Please Note: This note has been constructed using a voice recognition system **

## 2019-09-16 NOTE — ASSESSMENT & PLAN NOTE
Appreciate Neurology recs, as per Neurology;  · PT/OT/SLP recommendations appreciated  · Continue telemetry  · Continue vEEG  · Continue neuro checks  · Ativan 2 mg for seizures or seizure-like activity longer than 2 minutes  · Continue Plavix 75 mg and Lipitor 40 mg   · Echo pending  Concern to for CVA, as MRI showedsmall restricted diffusion left precentral sulcus consistent with acute/subacute infarct; old right temporoparietal, left cerebellar and left occipital parietal infarct  CT head demonstrated no acute intracranial abnormality; chronic right temporal parietal encephalomalacia consistent with prior insult  9/13-will await neurological recommendations  Patient currently alert and oriented x1, likely her baseline status given Alzheimer's dementia  GCS 15      9/14-appreciate Neurology recommendations, patient with apparent embolic stroke likely secondary to atrial fibrillation  At this point Plavix held, and patient placed on full anticoagulation with low-dose Eliquis  Will continue telemetry and better control of hypertension      9/16 - no reported seizure activity; stable for discharge

## 2019-09-16 NOTE — ASSESSMENT & PLAN NOTE
Patient currently 0 of for for SIRS, however pro count is elevated at 1 40  Clinical;ly the patient does not look septic, has been afebrile  Noted to have elevated white count a few days ago; and imaging with bilateral consolidations suspicious for possible atelectasis versus pneumonia  Given imaging findings and procalcitonin elevation, have started treatment for pneumonia  Noted the patient was intubated, concern for VA P; but given clinical picture and lack of sirs criteria, will treat more conservatively  UA negative  9/15 - patient afebrile; 0/4 SIRs criteria met, BUN - 14; denies fever/chills/dysuria  Baseline confusion 2/2 dementia  Will re-xray chest, but completed 4 days of rocephin and will complete 5 days of azithromycin today  Clinically, patient not septic and possible pro andrea elevated 2/2 to reduced renal clearance  Stable for discharge

## 2019-09-16 NOTE — PLAN OF CARE
Problem: Nutrition/Hydration-ADULT  Goal: Nutrient/Hydration intake appropriate for improving, restoring or maintaining nutritional needs  Description  Monitor and assess patient's nutrition/hydration status for malnutrition  Collaborate with interdisciplinary team and initiate plan and interventions as ordered  Monitor patient's weight and dietary intake as ordered or per policy  Utilize nutrition screening tool and intervene as necessary  Determine patient's food preferences and provide high-protein, high-caloric foods as appropriate       INTERVENTIONS:  - Monitor oral intake, urinary output, labs, and treatment plans  - Assess nutrition and hydration status and recommend course of action  - Evaluate amount of meals eaten  - Assist patient with eating if necessary   - Allow adequate time for meals  - Recommend/ encourage appropriate diets, oral nutritional supplements, and vitamin/mineral supplements  - Order, calculate, and assess calorie counts as needed  - Recommend, monitor, and adjust tube feedings and TPN/PPN based on assessed needs  - Assess need for intravenous fluids  - Provide specific nutrition/hydration education as appropriate  - Include patient/family/caregiver in decisions related to nutrition  Outcome: Completed

## 2019-09-16 NOTE — PROGRESS NOTES
[address], [place], [address] [address]  History and Physical  POS: 31    Records Reviewed include: Hospital records      Chief Complaint/ Reason for Admission:   Seizure, CVA, DEVONTE    History of Present Illness:      Ms Tati Baez is a 81 yo female who was recently admitted to [facility] due to seizure activity thought to be secondary to CVA  Currently readmitted to [facility] for STR and will continue LTC   CVA, no focal deficits noted, started on eliquis and lipitor , will continue and will monitor  No other seizure activity noted therefore she was not placed on antiseizure medication  Pneumonia RUL  Most likely aspiration pneumonia  Today will complete last dose of azythromycin  Repeat CXR in 2 weeks  Continue to monitor  Continue albuterol PRN  DEVONTE/CKD- avoid nephrotoxic medication, encourage po hydration  Will monitor BMP  Difficult  to obtain from patient due to: Dementia  History was obtained from family / medical staff        Allergies    Allergies   Allergen Reactions    Ciprofloxacin Seizures    Acetaminophen     Golimumab      Other reaction(s): dedrick colored stool    Lidocaine Other (See Comments)    Neurontin [Gabapentin]     Nortriptyline Tremor    Prasterone      Other reaction(s): pruitis    Tricyclic Antidepressants     Leflunomide Rash    Sulfasalazine Rash       Past Medical History  Past Medical History:   Diagnosis Date    Aspiration pneumonia (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 [address] )  Psychiatric disorder     depression    Renal insufficiency syndrome     Last Assessed:  4/13/17    Rheumatoid arthritis ([facility] )     Seizure ([facility] ) 9/10/2019    Stroke ([facility] )     x2, Last Assessed:  7/18/17    Systemic lupus erythematosus ([facility] )     Vertigo     Vitamin D deficiency         Past Surgical History:   Procedure Laterality Date    EYE SURGERY      JOINT REPLACEMENT      left hip replacement, left shoulder replacement    SHOULDER SURGERY      Replacement       Family History  Family History   Problem Relation Age of Onset    Heart attack Mother     Diabetes Mother     Dementia Father     Coronary artery disease Father         cardiac disorder    Diabetes Sister     Uterine cancer Sister        Social History  Social History     Tobacco Use   Smoking Status Never Smoker   Smokeless Tobacco Never Used      Social History     Substance and Sexual Activity   Alcohol Use Never    Frequency: Never      Social History     Substance and Sexual Activity   Drug Use No        Lives: [facility],  Social Support: daughter  Adilene Lam in the past 12 months: no  Use of assistance Device: Wheelchair    Physical Exam    Vital Signs:     Blood pressure 106/71 Heart Rate: 73 Respiratory Rate 18   Temperature 97 Oxygen Saturation 97 % RA Weight 135 lbs    Constitutional: Frail appearing patient       Physical Exam   Constitutional: No distress  Frail looking   HENT:   Head: Atraumatic  Right eyelid droop   Eyes: Pupils are equal, round, and reactive to light  EOM are normal  Right eye exhibits no discharge  Left eye exhibits no discharge  Neck: Normal range of motion  Neck supple  Cardiovascular: Normal rate and regular rhythm  Murmur heard  Pulmonary/Chest: Effort normal and breath sounds normal  No respiratory distress  She has no wheezes  Abdominal: Soft  There is tenderness (mild tenderness)  There is no rebound and no guarding  Musculoskeletal: She exhibits no edema     Neurological: She is alert  Oriented to person only   Skin: Skin is warm and dry  She is not diaphoretic  Nursing note and vitals reviewed  Review of Systems:  Review of Systems   Unable to obtain due to dementia, denies pain, asking if she is going to die repeatedly  List of Current Medications:    Medication reviewed  All orders signed  Complete list is in the paper chart  Allergies    Allergies   Allergen Reactions    Ciprofloxacin Seizures    Acetaminophen     Golimumab      Other reaction(s): dedrick colored stool    Lidocaine Other (See Comments)    Neurontin [Gabapentin]     Nortriptyline Tremor    Prasterone      Other reaction(s): pruitis    Tricyclic Antidepressants     Leflunomide Rash    Sulfasalazine Rash       Labs/Diagnostics (reviewed by this provider): I personally reviewed lab results and imaging studies in PACS  Full reports are in the paper chart  Assessment/Plan:  GERD (gastroesophageal reflux disease)  Will taper down protonix, give one tab every other day for one week and discontinue if no symptoms  Currently asymptomatic    Acquired hypothyroidism  Last TSH 6 3  Will repeat TSH in one month and adjust synthroid as needed    Pneumonia of right upper lobe due to infectious organism [facility])  Most likely aspiration pneumonia  Today will complete last dose of azythromycin  Will repeat CXR in 2 weeks  Continue to monitor  Continue albuterol PRN  Cerebrovascular accident (CVA) due to embolism (HCC)  Continue eliquis 2 5 mg BID and lipitor  Essential hypertension  Currently controlled , continue losartan, metoprolol and procardia with holding parameters  Goal SBP<150, avoid hypotension  Will continue to monitor VS q shift  Right lower quadrant abdominal tenderness  Ordered bladder scan, she had [place] in during her hospital stay  States that she needs to move her bowels, will monitor for now  Seizure-like activity ([facility] [address] )  One episode only    EEG did not show seizure activity  No antiseizure medication  Will continue to monitor    Abnormal EKG  RBBB noted on EKG during her hospital stay  DEVONTE (acute kidney injury) ([address] )  Last creatinine 1 89 (baseline around 1 0)  Will avoid nephrotoxic medication  Repeat BMP    Anemia  Last hb 8 9  Continue iron supplements and vitamin c  Repeat CBC    Advance care planning  Code status discussed with the patient  and her daughter  Code status is now DNR ( was Full Code until today)  POLST signed and placed in the paper chart            Pain: no  Rehab Potential:Fair  Patient Informed of Medical Condition: yes   Patient is Capable of Understanding Their Right: limited due to dementia   Discharge Plan: LTC  Vaccination:   Immunization History   Administered Date(s) Administered    INFLUENZA 11/11/2018    Influenza, high dose seasonal 0 5 mL 11/11/2018    Pneumococcal Conjugate 13-Valent 07/18/2016    Pneumococcal Polysaccharide PPV23 09/26/2017    Tdap 09/06/2018     Advanced Directives: yes: No  Code status:Full Code  PCP: Zak Hoyt MD  Geriatric Medicine  4/03/50989:57 PM

## 2019-09-16 NOTE — ASSESSMENT & PLAN NOTE
Patient with baseline creatinine around 1; currently creatinine 1 84  Overnight creatinine slightly trended down, unclear of etiology however as BUN /creatinine ratio is less than 20-1  consider intrinsic renal failure; ATN versus interstitial disease secondary to offending drug  Will place Milton to rule out post renal disease  Review med list to exclude a for toxic medications  Monitor morning labs    9/13-unfortunately, creatinine trended up over night  Urine output appears to be appropriate  Review of past medical records indicate that creatinine has improved with gentle hydration  IV infusion of normal saline started  UA negative for infection; blood but no protein  Review of imaging shows no apparent contrast used; NSAIDs or other nephrotoxin medications not medical record  Will consult Nephrology  9/14-appreciate Nephrology recommendations, gentle IV hydration given overnight with improvement in creatinine this morning  Monitor repeat fluid administration as needed  9/15-as per Neurology recommendations, have ordered urine sodium and also urine creatinine  Creatinine remains elevated, no neurological symptoms  9/16 - patient denies urinary symptoms; witnessed to have good urinary output by aid  Nephrology has spoken with family and no aggressive measures to be sought at this time  Stable for discharge

## 2019-09-16 NOTE — ASSESSMENT & PLAN NOTE
Currently controlled , continue losartan, metoprolol and procardia with holding parameters  Goal SBP<150, avoid hypotension  Will continue to monitor VS q shift

## 2019-09-16 NOTE — TRANSPORTATION MEDICAL NECESSITY
Section I - General Information    Name of Patient: Padmini Munson                 : 1931    Medicare #: [id number]  Transport Date: 19 (PCS is valid for round trips on this date and for all repetitive trips in the 60-day range as noted below )  Origin: [address] [facility]                                                         Destination: [facility]  Is the pt's stay covered under Medicare Part A (PPS/DRG)   []     Closest appropriate facility? If no, why is transport to more distant facility required? Yes  If hospice pt, is this transport related to pt's terminal illness? NA     Section II - Medical Necessity Questionnaire  Ambulance transportation is medically necessary only if other means of transport are contraindicated or would be potentially harmful to the patient  To meet this requirement, the patient must either be "bed confined" or suffer from a condition such that transport by means other than ambulance is contraindicated by the patient's condition  The following questions must be answered by the medical professional signing below for this form to be valid:    1)  Describe the MEDICAL CONDITION (physical and/or mental) of this patient AT [address] [place] [address] that requires the patient to be transported in an ambulance and why transport by other means is contraindicated by the patient's condition: confused; bed confined; fall risk; seizure; DEVONTE; CVA; mod assist x2    2) Is the patient "bed confined" as defined below? Yes  To be "be confined" the patient must satisfy all three of the following conditions: (1) unable to get up from bed without Assistance; AND (2) unable to ambulate; AND (3) unable to sit in a chair or wheelchair  3) Can this patient safely be transported by car or wheelchair van (i e , seated during transport without a medical attendant or monitoring)?    No    4) In addition to completing questions 1-3 above, please check any of the following conditions that apply*:   *Note: supporting documentation for any boxes checked must be maintained in the patient's medical records  If hosp-hosp transfer, describe services needed at 2nd facility not available at 1st facility? Patient is confused  Medical attendant required   Special handling/isolation/infection control precautions required   Unable to tolerate seated position for time needed to transport   Other(specify) fall risk    Section III - Signature of Physician or Healthcare Professional  I certify that the above information is true and correct based on my evaluation of this patient, and represent that the patient requires transport by ambulance and that other forms of transport are contraindicated  I understand that this information will be used by the Centers for Medicare and Medicaid Services (CMS) to support the determination of medical necessity for ambulance services, and I represent that I have personal knowledge of the patient's condition at time of transport  [x]  If this box is checked, I also certify that the patient is physically or mentally incapable of signing the ambulance service's claim and that the institution with which I am affiliated has furnished care, services, or assistance to the patient  My signature below is made on behalf of the patient pursuant to 42 CFR §424 36(b)(4)  In accordance with 42 CFR §424 37, the specific reason(s) that the patient is physically or mentally incapable of signing the claim form is as follows: confused      Signature of Physician* or Healthcare Professional________________________________________________  Signature Date 09/16/19 (For scheduled repetitive transports, this form is not valid for transports performed more than 60 days after this date)    Printed Name & Credentials of Physician or Healthcare Professional (MD, DO, RN, etc ) KRISTIE Olguin  *Form must be signed by patient's attending physician for scheduled, repetitive transports   For non-repetitive, unscheduled ambulance transports, if unable to obtain the signature of the attending physician, any of the following may sign (choose appropriate option below)  [] Physician Assistant []  Clinical Nurse Specialist []  Registered Nurse  []  Nurse Practitioner  [x] Discharge Planner

## 2019-09-18 PROBLEM — Z71.89 ADVANCE CARE PLANNING: Status: ACTIVE | Noted: 2019-09-18

## 2019-09-18 NOTE — ASSESSMENT & PLAN NOTE
Code status discussed with the patient  and her daughter  Code status is now DNR ( was Full Code until today)  JAN signed and placed in the paper chart

## 2019-09-20 ENCOUNTER — NURSING HOME VISIT (OUTPATIENT)
Dept: GERIATRICS | Facility: OTHER | Age: 84
End: 2019-09-20
Payer: MEDICARE

## 2019-09-20 DIAGNOSIS — J18.9 PNEUMONIA OF RIGHT UPPER LOBE DUE TO INFECTIOUS ORGANISM: ICD-10-CM

## 2019-09-20 DIAGNOSIS — R53.81 PHYSICAL DECONDITIONING: ICD-10-CM

## 2019-09-20 DIAGNOSIS — I10 ESSENTIAL HYPERTENSION: ICD-10-CM

## 2019-09-20 DIAGNOSIS — G30.1 LATE ONSET ALZHEIMER'S DISEASE WITH BEHAVIORAL DISTURBANCE (HCC): ICD-10-CM

## 2019-09-20 DIAGNOSIS — F02.81 LATE ONSET ALZHEIMER'S DISEASE WITH BEHAVIORAL DISTURBANCE (HCC): ICD-10-CM

## 2019-09-20 DIAGNOSIS — K21.9 GASTROESOPHAGEAL REFLUX DISEASE WITHOUT ESOPHAGITIS: ICD-10-CM

## 2019-09-20 DIAGNOSIS — D50.9 IRON DEFICIENCY ANEMIA, UNSPECIFIED IRON DEFICIENCY ANEMIA TYPE: ICD-10-CM

## 2019-09-20 DIAGNOSIS — N17.9 AKI (ACUTE KIDNEY INJURY) (HCC): ICD-10-CM

## 2019-09-20 DIAGNOSIS — R56.9 SEIZURE (HCC): ICD-10-CM

## 2019-09-20 DIAGNOSIS — E03.9 ACQUIRED HYPOTHYROIDISM: ICD-10-CM

## 2019-09-20 DIAGNOSIS — R26.2 AMBULATORY DYSFUNCTION: ICD-10-CM

## 2019-09-20 DIAGNOSIS — I63.412 CEREBROVASCULAR ACCIDENT (CVA) DUE TO EMBOLISM OF LEFT MIDDLE CEREBRAL ARTERY (HCC): Primary | ICD-10-CM

## 2019-09-20 PROCEDURE — 99309 SBSQ NF CARE MODERATE MDM 30: CPT | Performed by: NURSE PRACTITIONER

## 2019-09-20 NOTE — PROGRESS NOTES
[facility] [place], [address]  Progress Note      Chief Complaint/Reason for visit: Follow-up visit (STR)    History of Present Illness: This is an 80 y o  WMV-LTCF Female patient recently returned from acute care [facility]) setting on 9/6/2019 with Dx of UTI, Diverticulitis  Patient re-admitted to [facility] on 9/10/19 for new onset seizure activity and returned to facility on 9/16/19 with Dx of CVA, DEVONTE, Seizure disorder and Pneumonia  Patient currently admitted to facility for STR (9/16/19 to present) before transitioned back to LTC  Patient is seen and examined today as a follow-up of acute and chronic medical conditions as mentioned above and HTN, Anemia, Hypothyroidism, Dementia, Deconditioning and ambulatory dysfunction  Patient is OOB, alert but presented with mild fatigue - verbal with clear coherent speech but repeatedly states, " Am I dying  Just let me die in peace" - to which per nursing is a baseline verbalization prior to acute hospitalizations  Declined breakfast and would only drink soda - currently working with speech therapist but unable to be re-directed or encouraged to eat  On examination, noted wheezing to B/L upper fields, none on the mid and bases associated with below baseline SpO2  Improved SpO2 post nebulization  VSS  Blood pressure was also elevated on initial check on this visit - improved to goal after scheduled HTN medication was take  Review of laboratory results done on 9/17/19 showed persistent elevated kidney functions - will repeat laboratory on 9/23/19  Per nursing no acute medical concerns for this visit - mood and behavior has been stable since return to facility  Past Medical History:  Reviewed and unchanged from admission H&P    Past Medical History:   Diagnosis Date    Aspiration pneumonia [facility])     Last Assessed:  7/18/17    Basal cell carcinoma of nose     Last Assessed:  4/12/17    Blind     blind right eye, 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     Psychiatric disorder     depression    Renal insufficiency syndrome     Last Assessed:  4/13/17    Rheumatoid arthritis ([facility] )     [facility]/10/2019    Stroke ([facility] )     x2, Last Assessed:  7/18/17    Systemic lupus erythematosus ([facility] )     Vertigo     Vitamin D deficiency        Family History: Reviewed and unchanged from admission H&P  Family History   Problem Relation Age of Onset    Heart attack Mother     Diabetes Mother     Dementia Father     Coronary artery disease Father         cardiac disorder    Diabetes Sister     Uterine cancer Sister        Social History: Reviewed and unchanged from admission H&P    Social History     Socioeconomic History    Marital status: /Civil Union     Spouse name: Not on file    Number of children: Not on file    Years of education: Not on file    Highest education level: Not on file   Occupational History    Not on file   Social Needs    Financial resource strain: Not on file    Food insecurity:     Worry: Not on file     Inability: Not on file    Transportation needs:     Medical: Not on file     Non-medical: Not on file   Tobacco Use    Smoking status: Never Smoker    Smokeless tobacco: Never Used   Substance and Sexual Activity    Alcohol use: Never     Frequency: Never    Drug use: No    Sexual activity: Not on file   Lifestyle    Physical activity:     Days per week: Not on file     Minutes per session: Not on file    Stress: Not on file   Relationships    Social connections:     Talks on phone: Not on file     Gets together: Not on file Attends Church service: Not on file     Active member of club or organization: Not on file     Attends meetings of clubs or organizations: Not on file     Relationship status: Not on file    Intimate partner violence:     Fear of current or ex partner: Not on file     Emotionally abused: Not on file     Physically abused: Not on file     Forced sexual activity: Not on file   Other Topics Concern    Not on file   Social History Narrative    Not on file       Resident Since: September 16, 2019  Review of systems: Review of Systems   Unable to perform ROS: Dementia     Medications: Reviewed and updated  Allergies: Reviewed and unchanged from admission H&P  Allergies   Allergen Reactions    Ciprofloxacin Seizures    Acetaminophen     Golimumab      Other reaction(s): dedrick colored stool    Lidocaine Other (See Comments)    Neurontin [Gabapentin]     Nortriptyline Tremor    Prasterone      Other reaction(s): pruitis    Tricyclic Antidepressants     Leflunomide Rash    Sulfasalazine Rash       Consults reviewed: NO consult to review at this time  Labs/Diagnostics (reviewed by this provider):  Hard copies in medical chart  * BMP (9/17/19)     * CBC with diff (9/17/19)      Imaging Reviewed: No imaging to review at this time  Physical Exam    Weight: 135 0lbs (9/16/19)  <= 137 0 lbs (9/6/19)  Temp: 97 8F BP: 136/86 Pulse: 75 Resp: 18 O2 Sat: 96% RA      Physical Exam   Constitutional: She appears well-developed and well-nourished  No distress  Alert, frail stature, cooperative and calm on this visit  Presented with fatigue   HENT:   Head: Normocephalic and atraumatic  Nose: Nose normal    Mouth/Throat: Oropharynx is clear and moist  No oropharyngeal exudate  B/L impacted cerumen   Eyes: Pupils are equal, round, and reactive to light  Conjunctivae are normal  Right eye exhibits no discharge  Left eye exhibits no discharge  No scleral icterus  Right eye ptosis  Wears glases     Neck: Neck supple  No JVD present  No tracheal deviation present  No thyromegaly present  Cardiovascular: Normal heart sounds  Exam reveals no gallop and no friction rub  No murmur heard  HR irregular   Pulmonary/Chest: Effort normal and breath sounds normal  No stridor  No respiratory distress  She has no rales  She exhibits no tenderness  Wheezing to B/L upper lobes - 90% RA - 96% RA (post PRN albuterol nebulization)   Abdominal: Soft  Bowel sounds are normal  She exhibits no distension and no mass  There is no tenderness  There is no rebound and no guarding  Musculoskeletal: She exhibits no edema, tenderness or deformity  Ambulatory dysfunction   Neurological: She is alert  Skin: Skin is warm and dry  No rash noted  She is not diaphoretic  No erythema  No pallor  Psychiatric:   Patient repeatedly states, " Am I dying  Just leave me alone to die in peace"  Assessment/Plan:    1 ) CVA  - Recent CVA diagnosed on re-admission on 9/10/19   - No deficits observed  - BP stable and controlled  - Continue Atorvastatin 40mg daily and Eliquis 2 5mg Q12 hours    2 ) DEVONTE  - Persistent elevated kidney functions as of 9/17/19:  * Crea: 1 91  * GFR: 23  - Nursing to offer patient frequent po fluids throughout the day  - Will repeat BMP on 9/23/19    3 ) Pneumonia  - Completed empiric management in acute care  - WBC normal range: 5 9 (/17/19)  - Wheezing on today's visit with low SpO2: 90% RA  - SpO2: improved to 96% RA post nebulization  Baseline: 94% - 97% RA  - Will order Duo-neb TID x 7 days  - CBC without diff on 9/23/19    4 ) HTN  - BP range (9/16-20/19) = 106/71 to 152/80  - HR range (9/16-20/19) = 68 to 88/min  - BP today: 175/80 ( pre medication) => 136/86 (post medication)  - Continue Losartan 100mg daily, Metoprolol tartrate 25mg Q12 hours and Nefedipine ER 60mg daily - with HOLD parameters      5 ) Seizure Disorder  - New onset seizure disorder  - Per epic note suspect from acute/subacute infarct  - Not on anti-seizure medication    6 ) Alzheimer's Dementia  - Continue 24/7 supportive care and management  - Continue Donepezil 5mg daily and Quetiapine 12 5mg BID  - Followed by Behavioral Health: [facility]    7 ) Deconditioning and Ambulatory dysfunction  - Continue 24/7 SNF supportive care and management  - PT/OT/ST as scheduled  - Poor meal completion and po fluid intake  8 ) Anemia  - Chronic worsened with recent medical acuity  - Continue Ferrous Gluconate 324mg daily and Vit C 500mg daily    9 ) Hypothyroidism  - Continue Levothyroxine 100mcg daily    10 ) GERD  - Continue PPI: Pantoprazole DR 40mg daily        [address] [place] [address] [place]  4/03/36060:58 PM

## 2019-09-23 ENCOUNTER — TELEPHONE (OUTPATIENT)
Dept: NEUROLOGY | Facility: CLINIC | Age: 84
End: 2019-09-23

## 2019-09-23 ENCOUNTER — NURSING HOME VISIT (OUTPATIENT)
Dept: GERIATRICS | Facility: OTHER | Age: 84
End: 2019-09-23
Payer: MEDICARE

## 2019-09-23 DIAGNOSIS — R26.2 AMBULATORY DYSFUNCTION: ICD-10-CM

## 2019-09-23 DIAGNOSIS — R53.81 PHYSICAL DECONDITIONING: ICD-10-CM

## 2019-09-23 DIAGNOSIS — I63.412 CEREBROVASCULAR ACCIDENT (CVA) DUE TO EMBOLISM OF LEFT MIDDLE CEREBRAL ARTERY (HCC): Primary | ICD-10-CM

## 2019-09-23 DIAGNOSIS — N17.9 AKI (ACUTE KIDNEY INJURY) (HCC): ICD-10-CM

## 2019-09-23 DIAGNOSIS — K21.9 GASTROESOPHAGEAL REFLUX DISEASE WITHOUT ESOPHAGITIS: ICD-10-CM

## 2019-09-23 DIAGNOSIS — G30.1 LATE ONSET ALZHEIMER'S DISEASE WITH BEHAVIORAL DISTURBANCE (HCC): ICD-10-CM

## 2019-09-23 DIAGNOSIS — D50.9 IRON DEFICIENCY ANEMIA, UNSPECIFIED IRON DEFICIENCY ANEMIA TYPE: ICD-10-CM

## 2019-09-23 DIAGNOSIS — I10 ESSENTIAL HYPERTENSION: ICD-10-CM

## 2019-09-23 DIAGNOSIS — J18.9 PNEUMONIA OF RIGHT UPPER LOBE DUE TO INFECTIOUS ORGANISM: ICD-10-CM

## 2019-09-23 DIAGNOSIS — F02.81 LATE ONSET ALZHEIMER'S DISEASE WITH BEHAVIORAL DISTURBANCE (HCC): ICD-10-CM

## 2019-09-23 DIAGNOSIS — R56.9 SEIZURE (HCC): ICD-10-CM

## 2019-09-23 DIAGNOSIS — E03.9 ACQUIRED HYPOTHYROIDISM: ICD-10-CM

## 2019-09-23 PROCEDURE — 99309 SBSQ NF CARE MODERATE MDM 30: CPT | Performed by: NURSE PRACTITIONER

## 2019-09-23 NOTE — TELEPHONE ENCOUNTER
The purpose of this phone call is to assess patient's general wellbeing or for any assistance needed with follow-up care  Patient needs stroke hospital follow up appointment scheduled  According to chart, patient resides at [address] [place] [address],; [phone]  Called facility, spoke with AUSTIN Wall  Since discharge, patient has not experienced any new or worsening stroke  ymptoms  Denies any residual symptoms following hospitalization  States patient has returned to her baseline  Patient ambulates with a walker and assistance of one person  Requires assistance of one person for ADLs  Average /76, with a high   She is on a regular diet with thin liquids  There have not been any medication changes since discharge  Patient refuses some meds at times, denies any side effects or signs of bleeding  Patient has PCP that comes to the facility and currently sees her twice a week, once stable, will be seen once a month  Scheduled stroke hospital follow up 10/25 with Martin Pearl at 8:45 am in [place] does not have any questions or concerns at this time

## 2019-09-23 NOTE — PROGRESS NOTES
[facility] [address], [place], [address]  Progress Note      Chief Complaint/Reason for visit: Follow-up visit (STR)    History of Present Illness: This is an 80 y o  WMV-LTCF Female patient recently returned from acute care [facility]) setting on 9/6/2019 with Dx of UTI, Diverticulitis  Patient re-admitted to [address] [place] [address] on 9/10/19 for new onset seizure activity and returned to facility on 9/16/19 with Dx of CVA, DEVONTE, Seizure disorder and Pneumonia  Patient currently admitted to facility for STR (9/16/19 to present) before transitioned back to LTC      Patient is seen and examined today as a follow-up of acute and chronic medical conditions as mentioned above and HTN, Anemia, Hypothyroidism, Dementia, Deconditioning and ambulatory dysfunction and review laboratory results ordered today  Noted significant improvement with Hbg/Hct levels and kidney functions      Patient is OOB, alert participating in therapy session  More alert and less fatigued on this visit  Verbal with clear coherent speech but easily distracted and unable to maintain relevant conversation  States, " Am I dying?  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, Last Assessed:  7/18/17    Dementia     Depression     Disease of thyroid gland     Fibromyalgia     Last Assessed:  7/18/17    Gait disturbance     GERD (gastroesophageal reflux disease)     Glaucoma     Hyperlipidemia     Hypertension     Osteopenia     Peripheral neuropathy     Last Assessed:  4/12/17    [facility]     Psychiatric disorder     depression    Renal insufficiency syndrome     Last Assessed:  4/13/17    Rheumatoid arthritis ([facility] )     Seizure ([facility] ) 9/10/2019    Stroke ([facility] )     x2, Last Assessed:  7/18/17    Systemic lupus erythematosus ([facility] )     Vertigo     Vitamin D deficiency        Family History: Reviewed and unchanged from admission H&P  Family History   Problem Relation Age of Onset    Heart attack Mother     Diabetes Mother     Dementia Father     Coronary artery disease Father         cardiac disorder    Diabetes Sister     Uterine cancer Sister        Social History: Reviewed and unchanged from admission H&P    Social History     Socioeconomic History    Marital status: /Civil Union     Spouse name: Not on file    Number of children: Not on file    Years of education: Not on file    Highest education level: Not on file   Occupational History    Not on file   Social Needs    Financial resource strain: Not on file    Food insecurity:     Worry: Not on file     Inability: Not on file    Transportation needs:     Medical: Not on file     Non-medical: Not on file   Tobacco Use    Smoking status: Never Smoker    Smokeless tobacco: Never Used   Substance and Sexual Activity    Alcohol use: Never     Frequency: Never    Drug use: No    Sexual activity: Not on file   Lifestyle    Physical activity:     Days per week: Not on file     Minutes per session: Not on file    Stress: Not on file   Relationships    Social connections:     Talks on phone: Not on file     Gets together: Not on file     Attends Yazidism service: Not on file     Active member of club or organization: Not on file     Attends meetings of clubs or organizations: Not on file     Relationship status: Not on file    Intimate partner violence:     Fear of current or ex partner: Not on file     Emotionally abused: Not on file     Physically abused: Not on file     Forced sexual activity: Not on file   Other Topics Concern    Not on file   Social History Narrative    Not on file       Resident Since: 2019      Review of systems: Review of Systems   Unable to perform ROS: Dementia       Medications: Reviewed and updated  Allergies: Reviewed and unchanged from admission H&P  Allergies   Allergen Reactions    Ciprofloxacin Seizures    Acetaminophen     Golimumab      Other reaction(s): dedrick colored stool    Lidocaine Other (See Comments)    Neurontin [Gabapentin]     Nortriptyline Tremor    Prasterone      Other reaction(s): pruitis    Tricyclic Antidepressants     Leflunomide Rash    Sulfasalazine Rash       Consults reviewed: No consult to review at this time  Labs/Diagnostics (reviewed by this provider): Hard copies in medical chart:     * BMP (19 = WNL except:  Crea: 1 38 (H)  <= 1 91 (19)     * CBC w/o diff (19) = WNL except:  Hb 1 <= 8 7 (19)  Hct: 27 6  <= 25 7 (19)      Imaging Reviewed: No imaging to review at this time  Physical Exam    Weight: 135 0 lbs (19)  Temp:96 3F BP: 122/99 Pulse: 73 Resp: 18 O2 Sat: 98% RA        Physical Exam   Constitutional: She appears well-developed  No distress  Frail stature  Alert, cooperative - easily distracted   HENT:   Head: Normocephalic and atraumatic  Nose: Nose normal    Mouth/Throat: Oropharynx is clear and moist  No oropharyngeal exudate  B/L impacted cerumen  Hard of hearing   Eyes: Pupils are equal, round, and reactive to light  Conjunctivae are normal  Right eye exhibits no discharge  Left eye exhibits no discharge  No scleral icterus  Ptosis to Right eye  Wears glasses  Neck: Neck supple  No JVD present  No tracheal deviation present  No thyromegaly present  Cardiovascular: Normal rate and normal heart sounds  Exam reveals no gallop and no friction rub  No murmur heard    Irregular HR Pulmonary/Chest: Effort normal and breath sounds normal  No stridor  No respiratory distress  She has no wheezes  She has no rales  She exhibits no tenderness  Abdominal: Soft  Bowel sounds are normal  She exhibits no distension and no mass  There is no tenderness  There is no guarding  Musculoskeletal: She exhibits no edema, tenderness or deformity  Ambulatory dysfunction and deconditioning   Lymphadenopathy:     She has no cervical adenopathy  Neurological: She is alert  Skin: Skin is dry  No rash noted  She is not diaphoretic  No erythema  No pallor  Psychiatric: She has a normal mood and affect  Her behavior is normal    Asking for , " Where's bill"  Easily distracted  Unable to maintain relevant conversation  Calm       Assessment/Plan:    1 ) CVA  - Recent CVA diagnosed on re-admission on 9/10/19   - No deficits observed  - BP stable and controlled  - Continue Atorvastatin 40mg daily and Eliquis 2 5mg Q12 hours  - Nursing to follow-up Neurology consult follow-up visit     2 ) DEVONTE  - Improved Kidney functions as of BMP result 9/23/19  * Crea: 1 38 <= 1 91 (9/17/19)  * BUN: 8  <=14 (9/17/19)  - Baseline Crea: 0 73 - to 1 0   - Nursing to offer patient frequent po fluids throughout the day  - Repeat BMP in 1 week: 9/30/19     3 ) Pneumonia  - Completed empiric management in acute care  - WBC normal range: 4 0 (9/23/19)  <= 5 9 (/17/19)  - LCTA    VSS  - Continue Duo-neb TID to complete 7 days     4 ) HTN  - BP range (9/16-23/19) = 106/71 to 152/80  - HR range (9/16-23/19) = 68 to 89/min  - BP today: 127/99  - Goal: < 140 - 150/90  - Continue Losartan 100mg daily, Metoprolol tartrate 25mg Q12 hours and Nefedipine ER 60mg daily - with HOLD parameters      5 ) Seizure Disorder  - New onset seizure disorder diagnosis  - Per epic note suspect from acute/subacute infarct  - Not on anti-seizure medication  - NO seizure activity noted since return from acute care      6 ) Alzheimer's Dementia  - Continue 24/7 supportive care and management  - Continue Donepezil 5mg daily and Quetiapine 12 5mg BID  - Followed by Behavioral Health: [facility]     7 ) Deconditioning and Ambulatory dysfunction  - Continue 24/7 SNF supportive care and management  - PT/OT/ST as scheduled  - Persitent poor meal completion and po fluid intake      8 ) Anemia  - Improved Hbg/Hct level: 9 1/27 6 (9/23/19)  <= 8 7/25 7 (9/17/19)  - Chronic; worsened with recent medical acuity  - Continue Ferrous Gluconate 324mg daily and Vit C 500mg daily     9 ) Hypothyroidism  - Continue Levothyroxine 100mcg daily     10 ) GERD  - Continue PPI: Pantoprazole DR 40mg daily          MOSHE Arias  2/55/424710:74 PM

## 2019-09-23 NOTE — TELEPHONE ENCOUNTER
----- Message from Rakesh Bragg MD sent at 9/13/2019  5:16 PM EDT -----  Regarding: HFU  She will need follow-up for stroke within 4-6 weeks with either in advance practitioner or an attending  No follow-up studies  Note that she is being transitioned from Plavix to either low-dose Eliquis or low-dose Xarelto

## 2019-09-27 ENCOUNTER — NURSING HOME VISIT (OUTPATIENT)
Dept: GERIATRICS | Facility: OTHER | Age: 84
End: 2019-09-27
Payer: MEDICARE

## 2019-09-27 DIAGNOSIS — J18.9 PNEUMONIA OF RIGHT UPPER LOBE DUE TO INFECTIOUS ORGANISM: ICD-10-CM

## 2019-09-27 DIAGNOSIS — I10 ESSENTIAL HYPERTENSION: ICD-10-CM

## 2019-09-27 DIAGNOSIS — K21.9 GASTROESOPHAGEAL REFLUX DISEASE WITHOUT ESOPHAGITIS: ICD-10-CM

## 2019-09-27 DIAGNOSIS — D50.9 IRON DEFICIENCY ANEMIA, UNSPECIFIED IRON DEFICIENCY ANEMIA TYPE: ICD-10-CM

## 2019-09-27 DIAGNOSIS — R26.2 AMBULATORY DYSFUNCTION: ICD-10-CM

## 2019-09-27 DIAGNOSIS — I63.412 CEREBROVASCULAR ACCIDENT (CVA) DUE TO EMBOLISM OF LEFT MIDDLE CEREBRAL ARTERY (HCC): Primary | ICD-10-CM

## 2019-09-27 DIAGNOSIS — N17.9 AKI (ACUTE KIDNEY INJURY) (HCC): ICD-10-CM

## 2019-09-27 DIAGNOSIS — G30.1 LATE ONSET ALZHEIMER'S DISEASE WITH BEHAVIORAL DISTURBANCE (HCC): ICD-10-CM

## 2019-09-27 DIAGNOSIS — R56.9 SEIZURE (HCC): ICD-10-CM

## 2019-09-27 DIAGNOSIS — F02.81 LATE ONSET ALZHEIMER'S DISEASE WITH BEHAVIORAL DISTURBANCE (HCC): ICD-10-CM

## 2019-09-27 DIAGNOSIS — R53.81 PHYSICAL DECONDITIONING: ICD-10-CM

## 2019-09-27 DIAGNOSIS — E03.9 ACQUIRED HYPOTHYROIDISM: ICD-10-CM

## 2019-09-27 PROCEDURE — 99309 SBSQ NF CARE MODERATE MDM 30: CPT | Performed by: NURSE PRACTITIONER

## 2019-09-27 NOTE — PROGRESS NOTES
[facility] [address], [place], [address]  Progress Note      Chief Complaint/Reason for visit: Follow-up visit (STR)    History of Present Illness: This is an 80 y o  [facility]TC[facility] Female patient recently returned from acute care [facility] setting on 9/6/2019 with Dx of UTI, Diverticulitis  Patient re-admitted to [facility] on 9/10/19 for new onset seizure activity and returned to facility on 9/16/19 with Dx of CVA, DEVONTE, Seizure disorder and Pneumonia  Patient currently admitted to facility for STR (9/16/19 to present) before transitioned back to LTC      Patient is seen and examined today as a follow-up of acute and chronic medical conditions as mentioned above and HTN, Anemia, Hypothyroidism, Dementia, Deconditioning and ambulatory dysfunction      Patient is OOB, alert, oriented to name only, with clear coherent speech  In good spirits/ humor  Per nursing staff, patient completed 50% of breakfast today  Still with baseline statements of: " Am I dying? Nina Malone Where's bill?   I'm thirsty can I have some orange juice?"      On examination, LCTA  VSS  Per nursing no acute medical concerns for this visit - mood and behavior has been stable since return to facility and actively participating in therapy sessions  Past Medical History: Reviewed and unchanged from admission H&P    Past Medical History:   Diagnosis Date    Aspiration pneumonia ([facility] [address] )     Last Assessed:  7/18/17    Basal cell carcinoma of nose     Last Assessed:  4/12/17    Blind     blind right eye, 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, Last Assessed:  7/18/17    Dementia     Depression     Disease of thyroid gland     Fibromyalgia     Last Assessed:  7/18/17    Gait disturbance     GERD (gastroesophageal reflux disease)     Glaucoma     Hyperlipidemia     Hypertension     Osteopenia     Peripheral neuropathy     Last Assessed:  4/12/17    Polymyalgia rheumatica ([facility] [address] )     Psychiatric disorder     depression    Renal insufficiency syndrome     Last Assessed:  4/13/17    Rheumatoid arthritis ([facility] [address] )     Seizure ([facility] ) 9/10/2019    Stroke ([facility] )     x2, Last Assessed:  7/18/17    Systemic lupus erythematosus ([facility] )     Vertigo     Vitamin D deficiency        Family History: Reviewed and unchanged from admission H&P  Family History   Problem Relation Age of Onset    Heart attack Mother     Diabetes Mother     Dementia Father     Coronary artery disease Father         cardiac disorder    Diabetes Sister     Uterine cancer Sister        Social History: Reviewed and unchanged from admission H&P    Social History     Socioeconomic History    Marital status: /Civil Union     Spouse name: Not on file    Number of children: Not on file    Years of education: Not on file    Highest education level: Not on file   Occupational History    Not on file   Social Needs    Financial resource strain: Not on file    Food insecurity:     Worry: Not on file     Inability: Not on file    Transportation needs:     Medical: Not on file     Non-medical: Not on file   Tobacco Use    Smoking status: Never Smoker    Smokeless tobacco: Never Used   Substance and Sexual Activity    Alcohol use: Never     Frequency: Never    Drug use: No    Sexual activity: Not on file   Lifestyle    Physical activity:     Days per week: Not on file     Minutes per session: Not on file    Stress: Not on file   Relationships    Social connections:     Talks on phone: Not on file     Gets together: Not on file     Attends Zoroastrian service: Not on file     Active member of club or organization: Not on file     Attends meetings of clubs or organizations: Not on file     Relationship status: Not on file    Intimate partner violence:     Fear of current or ex partner: Not on file     Emotionally abused: Not on file     Physically abused: Not on file     Forced sexual activity: Not on file   Other Topics Concern    Not on file   Social History Narrative    Not on file       Resident Since: September 16, 2019  Review of systems: Review of Systems   Unable to perform ROS: Dementia       Medications: Reviewed and     Allergies: Reviewed and unchanged from admission H&P  Allergies   Allergen Reactions    Ciprofloxacin Seizures    Acetaminophen     Golimumab      Other reaction(s): dedrick colored stool    Lidocaine Other (See Comments)    Neurontin [Gabapentin]     Nortriptyline Tremor    Prasterone      Other reaction(s): pruitis    Tricyclic Antidepressants     Leflunomide Rash    Sulfasalazine Rash       Consults reviewed: * Behavioral Health: [facility] (9/2519)  = Dx: Anxiety Disorder  = Dx: Alzheimer's Disease  = No new medication change or orders  Labs/Diagnostics (reviewed by this provider): No new laboratory results to review at this time after 9/23/19  Imaging Reviewed: No imaging to review at this time  Physical Exam    Weight: 134 6 lbs (9/23/19) <= 135 0 lbs (9/16/19)  Temp:98 2F BP: 121/74 Pulse: 84 Resp: 19 O2 Sat: 98% RA        Physical Exam   Constitutional: She appears well-developed  No distress  Frail stature  Alert, engaged, in good spirits  NAD  Cooperative  HENT:   Head: Normocephalic and atraumatic  Nose: Nose normal    Mouth/Throat: Oropharynx is clear and moist  No oropharyngeal exudate  B/L impacted cerumen   Eyes: Pupils are equal, round, and reactive to light  Conjunctivae are normal  Right eye exhibits no discharge  Left eye exhibits no discharge  No scleral icterus  Wears glasses; ptosis to left eye, (+) Left eye opacification  Right eye WNL   Neck: Neck supple  No JVD present  No tracheal deviation present  No thyromegaly present  Cardiovascular: Normal rate, regular rhythm and normal heart sounds  Exam reveals no gallop and no friction rub  No murmur heard  Pulmonary/Chest: Effort normal and breath sounds normal  No stridor  No respiratory distress  She has no wheezes   She has no rales  She exhibits no tenderness  Abdominal: Soft  Bowel sounds are normal  She exhibits no distension and no mass  There is no tenderness  There is no rebound and no guarding  Musculoskeletal: She exhibits no edema, tenderness or deformity  Ambulatory dysfunction - in a wheel chair - does not self propel  Lymphadenopathy:     She has no cervical adenopathy  Neurological: She is alert  Skin: Skin is warm and dry  She is not diaphoretic  No erythema  No pallor  Psychiatric: She has a normal mood and affect  Her behavior is normal          Assessment/Plan:    1 ) CVA  - Recent CVA diagnosed on re-admission on 9/10/19   - No deficits observed  - BP stable and controlled  - Continue Atorvastatin 40mg daily and Eliquis 2 5mg Q12 hours  - Scheduled for Neurology office follow-up on 10/25/19      2 ) DEVONTE  - Improved Kidney functions as of BMP result 9/23/19  * Crea: 1 38 <= 1 91 (9/17/19)  * BUN: 8  <=14 (9/17/19)  - Baseline Crea: 0 73 - to 1 0   - Nursing to offer patient frequent po fluids throughout the day  - Repeat BMP in 1 week: 9/30/19     3 ) Pneumonia  - Completed empiric management in acute care  - WBC normal range: 4 0 (9/23/19)  <= 5 9 (/17/19)  - LCTA    VSS  - Continue Duo-neb TID to complete 7 days     4 ) HTN  - BP range (9/16-26/19) = 106/71 to 152/80  - HR range (9/16-26/19) = 68 to 89/min  - BP today: 121/74  - Goal: < 140 - 150/90  - Continue Losartan 100mg daily, Metoprolol tartrate 25mg Q12 hours and Nefedipine ER 60mg daily - with HOLD parameters      5 ) Seizure Disorder  - New onset seizure disorder diagnosis  - Per epic note suspect from acute/subacute infarct  - Not on anti-seizure medication  - NO seizure activity noted since return from acute care      6 ) Alzheimer's Dementia  - Continue 24/7 supportive care and management  - Continue Donepezil 5mg daily and Quetiapine 12 5mg BID  - Followed by Behavioral Health: [facility]     7 ) Deconditioning and Ambulatory dysfunction  - Continue 24/7 [facility] supportive care and management  - PT/OT/ST as scheduled  - Persitent poor meal completion and po fluid intake      8 ) Anemia  - Improved Hbg/Hct level: 9 1/27 6 (9/23/19)  <= 8 7/25 7 (9/17/19)  - Chronic; worsened with recent medical acuity  - Continue Ferrous Gluconate 324mg daily and Vit C 500mg daily     9 ) Hypothyroidism  - Continue Levothyroxine 100mcg daily     10 ) GERD  - Continue PPI: Pantoprazole DR 40mg daily        [address] [place] [address]  3/96/184358:76 AM

## 2019-10-07 ENCOUNTER — NURSING HOME VISIT (OUTPATIENT)
Dept: GERIATRICS | Facility: OTHER | Age: 84
End: 2019-10-07
Payer: MEDICARE

## 2019-10-07 DIAGNOSIS — Z87.01: ICD-10-CM

## 2019-10-07 DIAGNOSIS — G30.1 LATE ONSET ALZHEIMER'S DISEASE WITH BEHAVIORAL DISTURBANCE (HCC): ICD-10-CM

## 2019-10-07 DIAGNOSIS — F02.81 LATE ONSET ALZHEIMER'S DISEASE WITH BEHAVIORAL DISTURBANCE (HCC): ICD-10-CM

## 2019-10-07 DIAGNOSIS — R63.4 WEIGHT LOSS: Primary | ICD-10-CM

## 2019-10-07 PROCEDURE — 99309 SBSQ NF CARE MODERATE MDM 30: CPT | Performed by: NURSE PRACTITIONER

## 2019-10-07 NOTE — PROGRESS NOTES
[facility] [address], [place], [address]  Progress Note      Chief complaints/ Reason for visit: [facility] and poor [facility] intake[facility]    Assessment/Plan:     1 ) Weight loss  - Review of weight log showed 17 4 lbs  Loss from (9/16/19 to 10/4/19)  - Per nursing consistent poor meal completion since return from acute care  - Evaluated by ST and recommend supervision and re-direction during meals  - Consult dietary  - Oral cavity direct exam showed negative findings: no lesions/ thrush or xerostomia  - Will order laboratory: CBC with diff and CMP on 10/8/19   - Will order 3 x per week weight check  2 ) Alzheimer's Dementia with behavioral disturbance  - Patient calm, cooperative and pleasant on this visit  - Continue 24/7 LTCF supportive care and management  - Per nursing patient mood and behavior have been stable  3 ) Hx of Pneumonia  - Last CXR done on 9/30/19 showed no acute cardiopulmonary disease   - LCTA on this visit  - VSS  - Will order laboratory: CBC with diff and CMP on 10/8/19  Subjective: " Not good  Am I dying?"  Patient ID: Viridiana Alvarado is a 80 y o  female  This is an 80 y o  [facility] been transition back to [facility] management following acute care hospitalization from 9/6 - 16/ 2019 for Sepsis (Pneumonia) and Acute CVA  Patient is seen and examined today per nursing request for poor meal completion and significant weight loss since return from acute care on 9/16/19  On Review of weight log showed = 17 4 lbs of weight loss (9/16/19 to 10/4/19)  Seen and evaluated by ST until 9/25/19  ST report, patient needs supervision and re-direction during meals but can continue with regular diet with thin liquids - alternating liquid and solids  Patient is OOB, sitting on her manual wheel chair at the commons room by the dining room, asleep, easily awakened - alert, cooperative, pleasant and calm  Per nursing baseline cognition and mentation today  Examination found to be unremarkable today: VSS, LCTA, buccaloropharyngeal mucosa within normal limits, slight nasal mucosa erythema but no rhinorrhea  Will order laboratory for 10/8/19 and consult RD for closer monitoring and follow-up  Laboratory / imaging results reviewed: Hard copies in medicla chart    * BMP (9/30/19)  * CBC w/o diff (9/23/19)  * CXR ( 2views ) 9/30/19)      Medication: Reviewed and will update as needed  Allergy: Reviewed and unchanged from admission H&P  Allergies   Allergen Reactions    Ciprofloxacin Seizures    Acetaminophen     Golimumab      Other reaction(s): dedrick colored stool    Lidocaine Other (See Comments)    Neurontin [Gabapentin]     Nortriptyline Tremor    Prasterone      Other reaction(s): pruitis    Tricyclic Antidepressants     Leflunomide Rash    Sulfasalazine Rash         Review of Systems   Unable to perform ROS: Dementia   Constitutional:        " Not good"  Objective:    V/S: T98 3F - 78 - R18  BP: 138/81  Weight: 117 4 lbs (10/4/19) <= 116 10 lbs (10/1/19) <= 135 0 lbs (9/16/19)       Physical Exam   Constitutional: She appears well-developed  No distress  Frail stature, calm, pleasant, NAD  Cooperative  HENT:   Head: Normocephalic and atraumatic  Mouth/Throat: Oropharynx is clear and moist  No oropharyngeal exudate  B/L impacted cerumen; slight erythema to nasal mucosa, no rhinorrhea  Eyes: Pupils are equal, round, and reactive to light  Conjunctivae are normal  Right eye exhibits no discharge  Left eye exhibits no discharge  No scleral icterus  Ptosis to Right eye  Wears glasses  Neck: Neck supple  No JVD present  No tracheal deviation present  No thyromegaly present  Cardiovascular: Regular rhythm  Exam reveals no gallop and no friction rub  Murmur heard  Slight tachycardia   Pulmonary/Chest: Effort normal and breath sounds normal  No stridor  No respiratory distress  She has no wheezes  She has no rales  Abdominal: Soft  Bowel sounds are normal  She exhibits no distension and no mass  There is no tenderness  There is no rebound and no guarding  Musculoskeletal: She exhibits edema  She exhibits no tenderness or deformity  BLE edema: RLE (+1) pitting edema; LLE minimal non pitting   Lymphadenopathy:     She has no cervical adenopathy  Neurological: She is alert  Skin: Skin is warm and dry  No rash noted  She is not diaphoretic  No erythema  No pallor  Psychiatric: She has a normal mood and affect   Her behavior is normal            MOSHE Jacob  10/7/2019

## 2019-10-25 ENCOUNTER — OFFICE VISIT (OUTPATIENT)
Dept: NEUROLOGY | Facility: CLINIC | Age: 84
End: 2019-10-25
Payer: MEDICARE

## 2019-10-25 VITALS — HEIGHT: 63 IN | SYSTOLIC BLOOD PRESSURE: 130 MMHG | BODY MASS INDEX: 25.51 KG/M2 | DIASTOLIC BLOOD PRESSURE: 78 MMHG

## 2019-10-25 DIAGNOSIS — G30.1 LATE ONSET ALZHEIMER'S DISEASE WITH BEHAVIORAL DISTURBANCE (HCC): ICD-10-CM

## 2019-10-25 DIAGNOSIS — F02.81 LATE ONSET ALZHEIMER'S DISEASE WITH BEHAVIORAL DISTURBANCE (HCC): ICD-10-CM

## 2019-10-25 DIAGNOSIS — R56.9 SEIZURE (HCC): ICD-10-CM

## 2019-10-25 DIAGNOSIS — Z86.73 HISTORY OF CVA (CEREBROVASCULAR ACCIDENT): Primary | ICD-10-CM

## 2019-10-25 PROCEDURE — 99214 OFFICE O/P EST MOD 30 MIN: CPT | Performed by: PHYSICIAN ASSISTANT

## 2019-10-25 RX ORDER — IBUPROFEN 200 MG
200 TABLET ORAL EVERY 6 HOURS PRN
COMMUNITY

## 2019-10-25 RX ORDER — IBUPROFEN 400 MG/1
400 TABLET ORAL EVERY 6 HOURS PRN
COMMUNITY

## 2019-10-25 NOTE — PROGRESS NOTES
Patient ID: Renata Blackwell is a 80 y o  female  Assessment/Plan:    History of CVA (cerebrovascular accident)  Patient with prior history of CVA in 2016, appears it was felt she had embolic CVA at that time but seems to have never been started on [facility] (never followed with neurology outpatient as suggested)  She had been on single antiplatelet  She recently presented with seizure activity in Sept 2019  Workup revealed an acute/subacute infarct in the left precentral sulcus  Chronic large right temporoparietal, left cerebellar, left parietal and occipital infarcts again seen  MRA with no large vessel occlusion or stenosis  This was felt to be embolic, so she was started on Eliquis 2 5mg BID  She is currently doing well, no new neurologic symptoms to indicate recurrent TIA/CVA  She resides at [facility]  She has appropriately transitioned to low dose Eliquis  No issues with bleeding or excessive bruising  Plan:  -cont Eliquis 2 5mg BID and statin for secondary stroke prevention  -management of cardiovascular risk factors per primary team (HTN, HLD)  -return to ER/call 911 for any stroke-like symptoms    Seizure [facility])  Patient presented with generalized tonic-clonic activity, which ceased with administration of ativan (via EMS) and versed in the ED  This was felt to be provoked seizure in the setting of CVA, as well as recent infections  No AEDs given in hospital   No seizure activity noted since discharge  Agree with no AED at this time  Continue to monitor  Late onset Alzheimer's disease with behavioral disturbance  Can continue Aricept per primary team, supportive care  Patient also notes trouble sleeping  Would see if primary team could try melatonin  She has orders for PRN lorazepam as well  Follow up in 6 months with vascular neurology attending       Diagnoses and all orders for this visit:    History of CVA (cerebrovascular accident)    Seizure ([facility] [address] )    Late onset Alzheimer's disease with behavioral disturbance ([facility] [address] )    Other orders         Subjective:    HPI    43-year-old female past medical history significant for HTN, HLD, hypothyroidism, prior CVAs and dementia presents today for a hospital follow up  Patient presented to the ED on 9/10/19 with witnessed convulsions at her skilled nursing facility  Patient was administered 2 mg Ativan by EMS which stopped the generalized convulsions  However, upon arrival to the emergency department patient was still having pulsating eye movements which was presumed to be a seizure and was administered 2 mg of Versed which is stopped the rhythmic eye movements  She was still unresponsive and intubated for airway protection  Per patients family, no history of seizures  CTH negative for acute abnormality  There was also question of left sided weakness  It was unclear if this was residual from her CVAs in 2016  Also question of Rahuls paralysis following seizure  Routine EEG completed was abnormal  More prominent delta activities in the right posterior region with less prominent alpha rhythm in the right hemisphere suggests a focal area of underlying neuronal dysfunction in the right posterior region  Background activities are overall mildly too slow suggesting mild diffuse cerebral dysfunction of non-specific etiology  MRI brain demonstrated small focus of restricted diffusion at the left precentral sulcus consistent with acute/subacute infarct  No evidence of hemorrhage  Chronic large right   temporoparietal, left cerebellar, left parietal and occipital infarcts again seen  MRA head with no large vessel occlusion  She was started on Plavix at time of presentation  It was decided that she likely had an embolic CVA and decision was made to transition her from Plavix to low dose Eliquis (2 5mg BID)  It was felt her seizure was likely provoked, and therefore no AEDs were initiated    [facility] was [facility] to [facility]  Today, patient presents with staff from her LTC facility  She says she feels well, but continually asks am I dying  Per review of chart, this is a very common thing for her to say  She tells me she is feeling well  Staff deny any new neurologic symptoms  No seizure activity  She has appropriately transitioned to low dose Eliquis and no longer on Plavix  Patient notes some trouble falling asleep, which is long-standing  She has PRN lorazepam on her med list       The following portions of the patient's history were reviewed and updated as appropriate: current medications, past family history, past medical history, past social history, past surgical history and problem list          Objective:    Blood pressure 130/78, height 5' 2 6" (1 59 m)  Physical Exam   Constitutional: She appears well-developed and well-nourished  HENT:   Head: Normocephalic and atraumatic  Eyes: Pupils are equal, round, and reactive to light  EOM are normal    Cardiovascular: Intact distal pulses  Neurological: She is alert  She has normal strength and normal reflexes  Coordination normal    Skin: Skin is warm and dry  Psychiatric: She has a normal mood and affect  Her speech is normal        Neurological Exam  Mental Status  Awake and alert  Oriented only to person and place  Speech is normal  Language is fluent with no aphasia  Cranial Nerves  CN II: Visual fields full to confrontation  CN III, IV, VI: Extraocular movements intact bilaterally  Right ptosis  Pupils equal round and reactive to light bilaterally  CN V: Facial sensation is normal   CN VII: Full and symmetric facial movement  CN VIII: Hearing is normal   CN IX, X: Palate elevates symmetrically  Normal gag reflex  CN XI: Shoulder shrug strength is normal   CN XII: Tongue midline without atrophy or fasciculations  Motor   Normal muscle tone  Strength is 5/5 throughout all four extremities      Sensory  Light touch is normal in upper and lower extremities  Reflexes  Deep tendon reflexes are 2+ and symmetric in all four extremities with downgoing toes bilaterally  Coordination  Finger-to-nose, rapid alternating movements and heel-to-shin normal bilaterally without dysmetria  Gait  In a wheelchair   ROS:    Review of Systems   Constitutional: Negative  Negative for appetite change and fever  HENT: Negative  Negative for hearing loss, tinnitus, trouble swallowing and voice change  Eyes: Negative  Negative for photophobia and pain  Respiratory: Negative  Negative for shortness of breath  Cardiovascular: Negative  Negative for palpitations  Gastrointestinal: Negative  Negative for nausea and vomiting  Endocrine: Negative  Negative for cold intolerance and heat intolerance  Genitourinary: Negative  Negative for dysuria, frequency and urgency  Musculoskeletal: Positive for back pain (once in while), gait problem and neck pain  Negative for myalgias  Skin: Negative  Negative for rash  Allergic/Immunologic: Negative  Neurological: Positive for weakness  Negative for dizziness, tremors, seizures, syncope, facial asymmetry, speech difficulty, light-headedness, numbness and headaches  Hematological: Negative  Does not bruise/bleed easily  Psychiatric/Behavioral: Positive for sleep disturbance  Negative for confusion and hallucinations  Depression     All other systems reviewed and are negative        I personally reviewed and updated the ROS as appropriate

## 2019-10-25 NOTE — PATIENT INSTRUCTIONS
For secondary stroke prevention, continue Eliquis 2 5mg twice a day and lipitor 40mg daily  Continue other meds per primary team  Patient c/o trouble falling asleep  Would primary team consider melatonin?   Return to ER for any signs and symptoms of stroke, or any seizure-like activity  Return in 6 months to see vascular neurology attending

## 2019-10-28 NOTE — ASSESSMENT & PLAN NOTE
Patient with prior history of CVA in 2016, appears it was felt she had embolic CVA at that time but seems to have never been started on [facility] (never followed with neurology outpatient as suggested)  She had been on single antiplatelet  She recently presented with seizure activity in Sept 2019  Workup revealed an acute/subacute infarct in the left precentral sulcus  Chronic large right temporoparietal, left cerebellar, left parietal and occipital infarcts again seen  MRA with no large vessel occlusion or stenosis  This was felt to be embolic, so she was started on Eliquis 2 5mg BID  She is currently doing well, no new neurologic symptoms to indicate recurrent TIA/CVA  She resides at [facility]  She has appropriately transitioned to low dose Eliquis  No issues with bleeding or excessive bruising  Plan:  -cont Eliquis 2 5mg BID and statin for secondary stroke prevention  -management of cardiovascular risk factors per primary team (HTN, HLD)    -return to ER/call 911 for any stroke-like symptoms

## 2019-10-28 NOTE — ASSESSMENT & PLAN NOTE
Can continue Aricept per primary team, supportive care  Patient also notes trouble sleeping  Would see if primary team could try melatonin  She has orders for PRN lorazepam as well

## 2019-10-28 NOTE — ASSESSMENT & PLAN NOTE
Patient presented with generalized tonic-clonic activity, which ceased with administration of ativan (via EMS) and versed in the ED  This was felt to be provoked seizure in the setting of CVA, as well as recent infections  No AEDs given in hospital   No seizure activity noted since discharge  Agree with no AED at this time  Continue to monitor

## 2019-11-29 ENCOUNTER — NURSING HOME VISIT (OUTPATIENT)
Dept: GERIATRICS | Facility: OTHER | Age: 84
End: 2019-11-29
Payer: MEDICARE

## 2019-11-29 DIAGNOSIS — I10 ESSENTIAL HYPERTENSION: Primary | ICD-10-CM

## 2019-11-29 DIAGNOSIS — G30.1 LATE ONSET ALZHEIMER'S DISEASE WITH BEHAVIORAL DISTURBANCE (HCC): ICD-10-CM

## 2019-11-29 DIAGNOSIS — D50.9 IRON DEFICIENCY ANEMIA, UNSPECIFIED IRON DEFICIENCY ANEMIA TYPE: ICD-10-CM

## 2019-11-29 DIAGNOSIS — N18.30 STAGE 3 CHRONIC KIDNEY DISEASE (HCC): ICD-10-CM

## 2019-11-29 DIAGNOSIS — F02.81 LATE ONSET ALZHEIMER'S DISEASE WITH BEHAVIORAL DISTURBANCE (HCC): ICD-10-CM

## 2019-11-29 DIAGNOSIS — E03.9 ACQUIRED HYPOTHYROIDISM: ICD-10-CM

## 2019-11-29 PROCEDURE — 99309 SBSQ NF CARE MODERATE MDM 30: CPT | Performed by: FAMILY MEDICINE

## 2019-11-29 NOTE — ASSESSMENT & PLAN NOTE
Currently at goal , goal SBP<150  Continue same regimen with metoprolol , losartan , nifedipine  Continue to monitor

## 2019-11-29 NOTE — ASSESSMENT & PLAN NOTE
Will continue to monitor in LTCF  Currently taking Seroquel 12 5 mg at bedtime  Will check Cbc, CMP and consider switching to depakote or gabapentin if no contraindication  Reorient as needed

## 2019-11-29 NOTE — PROGRESS NOTES
[facility]  [address]  [phone] [address] of Service: nursing home place of service: POS 32 Unskilled- No Part A Coverage      NAME: Dalia Lacey  AGE: 80 y o  SEX: female [id number]    DATE OF ENCOUNTER: 11/29/2019    Assessment and Plan     Problem List Items Addressed This Visit        Endocrine    Acquired hypothyroidism     Continue synthroid  Asymptomatic  Will continue to monitor TSH  Cardiovascular and Mediastinum    Essential hypertension - Primary     Currently at goal , goal SBP<150  Continue same regimen with metoprolol , losartan , nifedipine  Continue to monitor  Nervous and Auditory    Late onset Alzheimer's disease with behavioral disturbance ([facility] [address] )     Will continue to monitor in LTCF  Currently taking Seroquel 12 5 mg at bedtime  Will check Cbc, CMP and consider switching to depakote or gabapentin if no contraindication  Reorient as needed  Genitourinary    Stage 3 chronic kidney disease (HCC)     Creatinine stable  Will avoid nephrotoxic medication  Encourage po hydration  Will monitor BMP  Other    Anemia     Last Hb 10  Asymptomatic  Will continue iron supplements and recheck CBC  Chief Complaint     Follow up dementia, HTN, CKD anemia    History of Present Illness     Niharika Jarrett is a 80 y o  female who was seen today for follow up  Patient seen and examined at bedside , she keeps repeating " I am gonna die" or "am I gonna die?" she denies any pain, SOB, chest pain, trouble sleeping, changes in appetite  As per staff she can become verbally aggressive and resistant to treatment        The following portions of the patient's history were reviewed and updated as appropriate: allergies, current medications, past family history, past medical history, past social history, past surgical history and problem list     Review of Systems     Review of Systems   Constitutional: Negative for chills and fever  Frail looking   HENT: Negative for congestion, drooling and rhinorrhea  Eyes: Positive for visual disturbance  Respiratory: Negative for cough and shortness of breath  Cardiovascular: Negative for chest pain, palpitations and leg swelling  Gastrointestinal: Negative for abdominal pain and constipation  Endocrine: Negative for cold intolerance  Genitourinary: Negative for dysuria and hematuria  Musculoskeletal: Positive for gait problem  Skin: Negative for rash  Allergic/Immunologic: Negative for environmental allergies  Neurological: Negative for dizziness, seizures and headaches  Hematological: Does not bruise/bleed easily  Psychiatric/Behavioral: Negative for behavioral problems, hallucinations and sleep disturbance         Active Problem List     Patient Active Problem List   Diagnosis    Hyperlipidemia    GERD (gastroesophageal reflux disease)    Fibromyalgia    Rheumatoid arthritis ([facility] [address] )    Hyponatremia    Cerebrovascular accident (CVA) due to embolism ([facility] [address] )    Essential hypertension    Acute metabolic encephalopathy    Sepsis ([facility] [facility] )    Late onset Alzheimer's disease with behavioral disturbance ([facility] )    Acquired hypothyroidism    Anemia    Acute cystitis without hematuria    Right lower quadrant abdominal pain    DEVONTE (acute kidney injury) ([address] )    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 ([address] )    Pneumonia of right upper lobe due to infectious organism ([address] )    Sore throat    Rotator cuff tear arthropathy of right shoulder    Cough    Conjunctivitis of right eye    Pseudogout of left wrist    Paresis ([address]    Pressure injury of sacral region, stage 1    Severe protein-calorie malnutrition (HCC)    Debility    Chronic rhinitis    Chronic pain of right knee    Diverticulitis    At high risk for aspiration    Vascular dementia with behavior disturbance (HCC)    Seizure ([facility] [address] )    Seizure-like activity ([address] )    Right lower quadrant abdominal tenderness    Abnormal EKG    Advance care planning    Physical deconditioning    Ambulatory dysfunction    Weight loss    History of CVA (cerebrovascular accident)    Stage 3 chronic kidney disease (HCC)       Objective     Vital Signs:     Blood pressure 150/70 Heart Rate: 77 Respiratory Rate 18   Temperature 97 9 Oxygen Saturation 95 % RA Weight 117 lbs ( 115lbs on )    Physical Exam   Constitutional: No distress  HENT:   Head: Normocephalic and atraumatic  Ptosis right eyelid   Eyes: Pupils are equal, round, and reactive to light  EOM are normal  Right eye exhibits no discharge  Left eye exhibits no discharge  Neck: Normal range of motion  Neck supple  Cardiovascular: Normal rate and regular rhythm  Murmur heard  Pulmonary/Chest: Effort normal and breath sounds normal  No respiratory distress  She has no wheezes  Abdominal: Soft  There is no tenderness  There is no rebound and no guarding  Musculoskeletal: She exhibits edema (mild b/l LE)  Neurological: She is alert  Oriented to person only   Skin: Skin is warm and dry  She is not diaphoretic  Psychiatric: Her behavior is normal    Nursing note and vitals reviewed  Pertinent Laboratory/Diagnostic Studies:  Laboratory and Imaging studies reviewed  Full report in the paper chart  Current Medications   Medications reviewed and updated in facility chart  Name: Magno Gutierrez  : 1931  MRN: [id number]  DOS: 2019      Diagnoses:   Diagnosis ICD-10-CM Associated Orders   1  Essential hypertension I10    2  Late onset Alzheimer's disease with behavioral disturbance (HCC) G30 1     F02 81    3  Acquired hypothyroidism E03 9    4  Stage 3 chronic kidney disease (HCC) N18 3    5   Iron deficiency anemia, unspecified iron deficiency anemia type D50 9        Lidya Talbert MD  Geriatric Medicine  2019 6:25 PM

## 2020-01-08 ENCOUNTER — NURSING HOME VISIT (OUTPATIENT)
Dept: GERIATRICS | Facility: OTHER | Age: 85
End: 2020-01-08
Payer: MEDICARE

## 2020-01-08 DIAGNOSIS — E03.9 ACQUIRED HYPOTHYROIDISM: ICD-10-CM

## 2020-01-08 DIAGNOSIS — I10 ESSENTIAL HYPERTENSION: Primary | ICD-10-CM

## 2020-01-08 DIAGNOSIS — D50.9 IRON DEFICIENCY ANEMIA, UNSPECIFIED IRON DEFICIENCY ANEMIA TYPE: ICD-10-CM

## 2020-01-08 DIAGNOSIS — R26.2 AMBULATORY DYSFUNCTION: ICD-10-CM

## 2020-01-08 PROCEDURE — 99309 SBSQ NF CARE MODERATE MDM 30: CPT | Performed by: NURSE PRACTITIONER

## 2020-01-08 NOTE — ASSESSMENT & PLAN NOTE
The patient has occasional behaviors as per staff  During the exam today she was calm and cooperative  Continue donepezil 5 mg at bedtime  Continue duloxetine 60 mg daily  Continue Seroquel to 12 5 mg twice a day  Continue 24/7 LTC  Continue supportive care, redirection, reorientation, assist with ADLs, fall precautions

## 2020-01-08 NOTE — ASSESSMENT & PLAN NOTE
Anemia appears to be improved based on labs hemoglobin has been 9 1 followed by 10 0 followed by most recent 11 1  Continue ferrous gluconate 324 mg daily with breakfast     Monitor CBC

## 2020-01-08 NOTE — ASSESSMENT & PLAN NOTE
Blood pressures have been stable  There have been no episodes of hypotension  The blood pressure is at goal which is less than 150 SBP      Continue atorvastatin 40 mg daily  Continue losartan 100 mg daily with parameters  Continue metoprolol tartrate 25 mg twice a day with meals and with parameters  Continue nifedipine 60 mg daily with parameters  Monitor blood pressures and pulse rates

## 2020-01-08 NOTE — ASSESSMENT & PLAN NOTE
Continue levothyroxine 100 mcg daily  This medication should be administered at least 0 5 hour before breakfast     Monitored TSH level

## 2020-01-08 NOTE — PROGRESS NOTES
[facility]  [address]  [phone]  [address] [place] [address],      NAME: Isaiah Oliva  AGE: 80 y o  SEX: female    DATE OF ENCOUNTER: 1/8/2020    Assessment and Plan     Essential hypertension  Blood pressures have been stable  There have been no episodes of hypotension  The blood pressure is at goal which is less than 150 SBP      Continue atorvastatin 40 mg daily  Continue losartan 100 mg daily with parameters  Continue metoprolol tartrate 25 mg twice a day with meals and with parameters  Continue nifedipine 60 mg daily with parameters  Monitor blood pressures and pulse rates  Late onset Alzheimer's disease with behavioral disturbance  The patient has occasional behaviors as per staff  During the exam today she was calm and cooperative  Continue donepezil 5 mg at bedtime  Continue duloxetine 60 mg daily  Continue Seroquel to 12 5 mg twice a day  Continue 24/7 LTC  Continue supportive care, redirection, reorientation, assist with ADLs, fall precautions  Ambulatory dysfunction  Fall precautions  Anemia  Anemia appears to be improved based on labs hemoglobin has been 9 1 followed by 10 0 followed by most recent 11 1  Continue ferrous gluconate 324 mg daily with breakfast     Monitor CBC  Acquired hypothyroidism  Continue levothyroxine 100 mcg daily  This medication should be administered at least 0 5 hour before breakfast     Monitored TSH level  Chief Complaint      Progress Note    History of Present Illness     Patient seen and examined at bedside  She is in stable condition and denies chest pain, shortness of breath, abdominal pain, nausea, vomiting, diarrhea, constipation, headache, dizziness, pain  Staff reports the patient has  behaviors occasionally  During the visit she would repeat over and over again I am dying and later during the visit she would ask am I dying"?     The patient reports that she is eating and drinking well  Hypertension-there were no episodes of hypotension  Labs and medications reviewed  Review of Systems     ROS as per noted in HPI  Objective     Vitals:  Blood pressure 114/63, pulse 78, respirations 16, temperature 98 2°  Pulse ox 94% on room air  Physical Exam   Constitutional:   Calm and cooperative  HENT:   Head: Normocephalic  Eyes: Pupils are equal, round, and reactive to light  EOM are normal  Right eye exhibits no discharge  Left eye exhibits no discharge  Droopy right eyelid  Neck: Neck supple  Cardiovascular: Normal rate and normal heart sounds  Exam reveals no gallop and no friction rub  No murmur heard  Pulmonary/Chest: Effort normal and breath sounds normal  She has no wheezes  She has no rales  Abdominal: Soft  Bowel sounds are normal  There is no tenderness  There is no rebound and no guarding  Musculoskeletal: Normal range of motion  She exhibits no edema  Neurological: She is alert  Oriented to self  Confused to place and date  Skin: Skin is warm and dry  Nursing note and vitals reviewed  Current Medications   Medications were reviewed and updated in facility chart       MOSHE Arnold  1/8/2020 4:39 PM

## 2020-01-29 ENCOUNTER — NURSING HOME VISIT (OUTPATIENT)
Dept: GERIATRICS | Facility: OTHER | Age: 85
End: 2020-01-29
Payer: MEDICARE

## 2020-01-29 DIAGNOSIS — M79.675 PAIN OF LEFT GREAT TOE: Primary | ICD-10-CM

## 2020-01-29 PROCEDURE — 99308 SBSQ NF CARE LOW MDM 20: CPT | Performed by: NURSE PRACTITIONER

## 2020-01-29 NOTE — ASSESSMENT & PLAN NOTE
Podiatry consult for probable nail removal of left great toe  Stabilize left great toe nail to prevent discomfort and further movement of the nail

## 2020-01-29 NOTE — ASSESSMENT & PLAN NOTE
Continue supportive care, redirection, reorientation, assist with ADLs, fall precautions  Continue Seroquel 12 5 mg twice a day  Continue donepezil 5 mg at bedtime  Continue duloxetine 60 mg daily  Continue 24/7 long-term care

## 2020-01-29 NOTE — PROGRESS NOTES
[facility]  [address]  [phone]  [address]    NAME: Viridiana Alvarado  AGE: 80 y o  SEX: female    DATE OF ENCOUNTER: 1/29/2020    Assessment and Plan     Pain of left great toe  Podiatry consult for probable nail removal of left great toe  Stabilize left great toe nail to prevent discomfort and further movement of the nail  Vascular dementia with behavior disturbance  Continue supportive care, redirection, reorientation, assist with ADLs, fall precautions  Continue Seroquel 12 5 mg twice a day  Continue donepezil 5 mg at bedtime  Continue duloxetine 60 mg daily  Continue 24/7 long-term care  Chief Complaint     Follow-up Note     History of Present Illness     Requested by staff to evaluate patient for abnormality of the nail on the left great toe  Patient was seen and examined at bedside  She is in stable condition  She states that her nail on her left great toe is sticking up and is tender to touch  She denies any trauma to the toenail or toe  Review of Systems     ROS as per noted in HPI  Objective     Vitals:  Blood pressure 119/64, pulse 67, respirations 18, temperature 97 6°  Pulse ox 94% on room air  Physical Exam   Cardiovascular: Normal heart sounds  Exam reveals no gallop and no friction rub  No murmur heard  Pulmonary/Chest: Effort normal and breath sounds normal  She has no wheezes  She has no rales  Neurological: She is alert  Oriented to self  Confused to date and place  Skin: Skin is warm and dry  Upon exam it was noted that the left great toenail is almost at a 85-90 degree angle  There was tenderness and pain noted when the nail was manipulated  The toe was reddish in collar without any actual signs and symptoms of infection  The nail appear to be completely off the nail bed and attached by the cuticle area         Current Medications   Medications were reviewed and updated in facility chart      MOSHE Mehta  1/29/2020 9:15 AM

## 2020-02-21 ENCOUNTER — NURSING HOME VISIT (OUTPATIENT)
Dept: GERIATRICS | Facility: OTHER | Age: 85
End: 2020-02-21
Payer: MEDICARE

## 2020-02-21 DIAGNOSIS — Z91.89 AT RISK FOR DEHYDRATION: ICD-10-CM

## 2020-02-21 DIAGNOSIS — J18.9 ACUTE PNEUMONIA: Primary | ICD-10-CM

## 2020-02-21 PROCEDURE — 99309 SBSQ NF CARE MODERATE MDM 30: CPT | Performed by: FAMILY MEDICINE

## 2020-02-22 ENCOUNTER — NURSING HOME VISIT (OUTPATIENT)
Dept: GERIATRICS | Facility: OTHER | Age: 85
End: 2020-02-22
Payer: MEDICARE

## 2020-02-22 DIAGNOSIS — Z91.89 AT RISK FOR DEHYDRATION: ICD-10-CM

## 2020-02-22 DIAGNOSIS — J18.9 ACUTE PNEUMONIA: Primary | ICD-10-CM

## 2020-02-22 PROCEDURE — 99309 SBSQ NF CARE MODERATE MDM 30: CPT | Performed by: FAMILY MEDICINE

## 2020-02-22 NOTE — PROGRESS NOTES
[facility]  [address]  [phone] [address] of Service: nursing home place of service: POS 32 Unskilled- No Part A Coverage      NAME: Marely Larsen  AGE: 80 y o  SEX: female [phone]    DATE OF ENCOUNTER: 2/22/2020    Assessment and Plan     Problem List Items Addressed This Visit        Respiratory    Acute pneumonia - Primary     On physical exam rales heard at right basis, she has decreased po appetite and productive cough, less interactive than her usual   Will start Z pack, monitor VS q shift for 3 days  Ordered Cbc, CMP will follow up with results  Encourage po hydration  Start mucinex for cough              Other    At risk for dehydration     Will encourage po hydration  Monitor BMP  Chief Complaint     Acute visit: productive cough, decreased apetite    History of Present Illness     Malachi Chairezbuddy Bravo is a 80 y o  female who was seen today for follow up  Patient seen and examined at bedside,she  Denies CP, SOB, fever, chills  As per staff she has a productive cough for about a day and decreased po intake   She denies any symptoms and asks repeatedly " am I gonna die"      The following portions of the patient's history were reviewed and updated as appropriate: allergies, current medications, past family history, past medical history, past social history, past surgical history and problem list     Review of Systems     Review of Systems   Unable to perform ROS: Dementia   She denies any symptoms and asks repeatedly " am I gonna die"    Active Problem List     Patient Active Problem List   Diagnosis    Hyperlipidemia    GERD (gastroesophageal reflux disease)    Fibromyalgia    Rheumatoid arthritis ([facility] [address] )    Hyponatremia    Cerebrovascular accident (CVA) due to embolism ([facility] [address] )    Essential hypertension    Acute metabolic encephalopathy    Sepsis ([facility] )    Late onset [facility]'s disease with [facility]    Acquired hypothyroidism    Anemia    Acute cystitis without hematuria    Right lower quadrant abdominal pain    DEVONTE (acute kidney injury) ([facility] )    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 ([facility])    Acute pneumonia    Sore throat    Rotator cuff tear arthropathy of right shoulder    Cough    Conjunctivitis of right eye    Pseudogout of left wrist    Paresis (HCC)    Pressure injury of sacral region, stage 1    Severe protein-calorie malnutrition (HCC)    Debility    Chronic rhinitis    Chronic pain of right knee    Diverticulitis    At high risk for aspiration    Vascular dementia with behavior disturbance (HCC)    Seizure (HCC)    Seizure-like activity (HCC)    Right lower quadrant abdominal tenderness    Abnormal EKG    Advance care planning    Physical deconditioning    Ambulatory dysfunction    Weight loss    History of CVA (cerebrovascular accident)    Stage 3 chronic kidney disease ([facility])    Pain of left great toe    At risk for dehydration       Objective     Vital Signs:     Blood pressure 128/68 Heart Rate: 78 Respiratory Rate 18   Temperature 98 2 Oxygen Saturation 95 % rA     Physical Exam   Constitutional: No distress  HENT:   Head: Normocephalic and atraumatic  Eyes: Pupils are equal, round, and reactive to light  EOM are normal  Right eye exhibits no discharge  Left eye exhibits no discharge  Right ptosis   Neck: Normal range of motion  Neck supple  Cardiovascular: Normal rate, regular rhythm and normal heart sounds  No murmur heard  Pulmonary/Chest: Effort normal  No respiratory distress  She has no wheezes  She has rales (right basis)  Abdominal: Soft  There is no tenderness  There is no rebound and no guarding  Musculoskeletal: She exhibits no edema or tenderness  wheelchair   Neurological: She is alert  Answers name   Skin: Skin is warm and dry  She is not diaphoretic     Psychiatric: Her behavior is normal    Nursing note and vitals reviewed  Pertinent Laboratory/Diagnostic Studies:  Laboratory and Imaging studies reviewed  Full report in the paper chart  Current Medications   Medications reviewed and updated in facility chart      Name: David Monae  : 1931  MRN: [id number]  DOS: 2020      Princess Hobson MD  Geriatric Medicine  2020 4:35 PM

## 2020-02-22 NOTE — PROGRESS NOTES
[facility]  [address]  [phone] [address] of Service: nursing home place of service: POS 32 Unskilled- No Part A Coverage      NAME: Sharyn Hernandez  AGE: 80 y o  SEX: female [id number]    DATE OF ENCOUNTER: 2/24/2020    Assessment and Plan     Problem List Items Addressed This Visit        Respiratory    Acute pneumonia - Primary     Coarse right basis,decreased appetite ad activity,continues to have productive cough  Will continue cough suppressants and Z pack  O2 supplement as needed, monitor VS q shift, encourage po hydration  Check CBC, CMP            Other    At risk for dehydration     Encourage po hydration and monitor BMP                 Chief Complaint     Follow up acute pneumonia    History of Present Illness     Trino Astudillo Si is a 80 y o  female who was seen today for follow up acute pneumonia  She continues to have a productive cough and decreased appetite, less active than her usual as per staff  The following portions of the patient's history were reviewed and updated as appropriate: allergies, current medications, past family history, past medical history, past social history, past surgical history and problem list     Review of Systems     Review of Systems   Constitutional: Positive for activity change and appetite change  Negative for chills and fever  HENT: Negative for congestion and rhinorrhea  Respiratory: Positive for cough  Negative for shortness of breath and wheezing  Cardiovascular: Negative for chest pain, palpitations and leg swelling  Gastrointestinal: Negative for abdominal pain and constipation  Endocrine: Negative for cold intolerance  Genitourinary: Negative for difficulty urinating, dysuria and hematuria  Musculoskeletal: Positive for gait problem  Skin: Negative for wound  Allergic/Immunologic: Negative for environmental allergies     Neurological: Negative for dizziness and seizures  Hematological: Does not bruise/bleed easily  Psychiatric/Behavioral: Negative for behavioral problems and sleep disturbance  Active Problem List     Patient Active Problem List   Diagnosis    Hyperlipidemia    GERD (gastroesophageal reflux disease)    Fibromyalgia    [facility]oid [facility] ([facility] )    Hyponatremia    Cerebrovascular accident (CVA) due to embolism ([facility] )    Essential hypertension    Acute metabolic encephalopathy    Sepsis ([facility] )    Late onset Alzheimer's disease with behavioral disturbance ([facility] )    Acquired hypothyroidism    Anemia    Acute cystitis without hematuria    Right lower quadrant abdominal pain    DEVONTE (acute kidney injury) ([facility] )    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    Right lower quadrant abdominal tenderness    Abnormal EKG    Advance care planning    Physical deconditioning    Ambulatory dysfunction    Weight loss    History of CVA (cerebrovascular accident)    Stage 3 chronic kidney disease (HCC)    Pain of left great toe    At risk for dehydration       Objective     Vital Signs:     Blood pressure 126/56 Heart Rate: 79 Respiratory Rate 18   Temperature 98 2 Oxygen Saturation 98%     Physical Exam   Constitutional: No distress  HENT:   Head: Normocephalic and atraumatic  Eyes: Pupils are equal, round, and reactive to light  EOM are normal  Right eye exhibits no discharge  Left eye exhibits no discharge  Neck: Normal range of motion   Neck supple  Cardiovascular: Normal rate, regular rhythm and normal heart sounds  No murmur heard  Pulmonary/Chest: Effort normal  No respiratory distress  She has no wheezes  She has rales (right basis)  Abdominal: Soft  There is no tenderness  There is no rebound and no guarding  Musculoskeletal: She exhibits no edema or tenderness  Answers name   Neurological: She is alert  Answers name   Skin: Skin is warm and dry  She is not diaphoretic  Psychiatric: Her behavior is normal    Nursing note and vitals reviewed  Pertinent Laboratory/Diagnostic Studies:  Laboratory and Imaging studies reviewed  Full report in the paper chart  Current Medications   Medications reviewed and updated in facility chart      Name: Bette Shipman  : 1931  MRN: [id number]  DOS: 2020      Michael Wilson MD  Geriatric Medicine  2020 3:51 PM

## 2020-02-22 NOTE — ASSESSMENT & PLAN NOTE
On physical exam rales heard at right basis, she has decreased po appetite and productive cough, less interactive than her usual   Will start Z pack, monitor VS q shift for 3 days  Ordered Cbc, CMP will follow up with results  Encourage po hydration    Start mucinex for cough

## 2020-02-24 NOTE — ASSESSMENT & PLAN NOTE
Coarse right basis,decreased appetite ad activity,continues to have productive cough  Will continue cough suppressants and Z pack  O2 supplement as needed, monitor VS q shift, encourage po hydration    Check CBC, CMP

## 2020-03-04 ENCOUNTER — NURSING HOME VISIT (OUTPATIENT)
Dept: GERIATRICS | Facility: OTHER | Age: 85
End: 2020-03-04
Payer: MEDICARE

## 2020-03-04 DIAGNOSIS — E43 SEVERE PROTEIN-CALORIE MALNUTRITION (HCC): ICD-10-CM

## 2020-03-04 DIAGNOSIS — L03.032 PARONYCHIA OF SECOND TOE OF LEFT FOOT: Primary | ICD-10-CM

## 2020-03-04 DIAGNOSIS — F01.51 VASCULAR DEMENTIA WITH BEHAVIOR DISTURBANCE (HCC): ICD-10-CM

## 2020-03-04 DIAGNOSIS — L03.032 PARONYCHIA OF GREAT TOE OF LEFT FOOT: ICD-10-CM

## 2020-03-04 DIAGNOSIS — R63.4 WEIGHT LOSS: ICD-10-CM

## 2020-03-04 PROCEDURE — 99309 SBSQ NF CARE MODERATE MDM 30: CPT | Performed by: NURSE PRACTITIONER

## 2020-03-04 NOTE — PROGRESS NOTES
Progress Note    Location: [facility]  POS: 32 (LTC)    Assessment/Plan:    Paronychia of second toe of left foot  Recurrent  Will start Keflex 250mg BID x 5 days  Consult Podiatry  No shoes for now until resolved  Paronychia of great toe of left foot  New onset  Please see management in #1  Will check Uric Acid level on 3/5/2020  Nursing to offer pain medication as needed  Weight loss  Weight swings rapidly  Irregular appetite and meal completion  Current BMI: 21 21kg/m2 (3/4/2020)  Significant prominence of cheeks, scapular, shoulder bones  Dry fragile skin  Followed by RD  Recently managed for Pneumonia  Will continue to monitor  Vascular dementia with behavior disturbance  Per nursing, mood and behavior is stable at this time  Hx of aggressive and combativeness joan  during personal care  Hx of spitting out medication or declining scheduled medication  No concerns for sleep pattern  Weight loss: please see #3 above  Continue 24/7 LTCF supportive care and management  Continue fall precaution  High risk for hydration issues  Chief complaint / Reason for visit: Acute visit    History of Present Illness: This is an 80 y o  Female patient admitted at [facility]  Patient is seen and examined today per nursing request for patient report of pain on Left foot this morning during care  On examination, patient pleasant, cooperative, verbal and engaged  Noted swelling, erythema and tenderness to Left big toe and Left 2nd toe, (+) mycotic toenail to Left big toenail - suspect Paronychia  Will consult Podiatry and check for Uric acid level  V/S: T98 0F -P69 -R18 BP: 100/50 SpO2: 98% RA  Reviewed weight log on this visit and showed approximately 6 4 lbs weight loss x q week only  Per nursing, appetite and meal completion swings: other times good and other times patient declined to eat despite giving alternative food at patient preference    Followed closely by RD - reported that patient declined most supplement but will continue to offer at this time  Calculation of BMI: 21 28 (at ideal weight) - despite significant change in physical appearance: prominent cheeks, shoulder and scapular bones  Will continue to monitor for now  Reviewed recent labs done on 2/24/2020 (pls see details on lab section)  Review of Systems:  Per history of present illness, all other systems reviewed and negative  HISTORY:  Medical Hx: Reviewed, unchanged  Family Hx: Reviewed, unchanged  Soc Hx: Reviewed,  Unchanged    ALLERGY: Reviewed, unchanged  Allergies   Allergen Reactions    Ciprofloxacin Seizures    Acetaminophen     Golimumab      Other reaction(s): dedrick colored stool    Lidocaine Other (See Comments)    Neurontin [Gabapentin]     Nortriptyline Tremor    Prasterone      Other reaction(s): pruitis    Tricyclic Antidepressants     Leflunomide Rash    Sulfasalazine Rash       PHYSICAL EXAM:  Vital Signs: T98 0F -P69 -R18 BP: 100/50 SpO2: 98% RA  Weight: 112 4 lbs (2/17/2020) <= 118 8 lbs (2/11/2020) <= 116 4 lbs (1/27/2020)    Physical Exam   Constitutional: She appears well-developed  No distress  Frail stature  Alert, inconsistently cooperative  Pleasant  HENT:   Head: Normocephalic and atraumatic  Right Ear: External ear normal    Left Ear: External ear normal    Nose: Nose normal    Mouth/Throat: Oropharynx is clear and moist  No oropharyngeal exudate  Ptosis to Right eye  Wears glasses  Eyes: Pupils are equal, round, and reactive to light  Conjunctivae are normal  Right eye exhibits no discharge  Left eye exhibits no discharge  No scleral icterus  Neck: Neck supple  No JVD present  No tracheal deviation present  No thyromegaly present  Cardiovascular: Normal rate and regular rhythm  Exam reveals no gallop and no friction rub  Murmur heard  Pulmonary/Chest: Effort normal and breath sounds normal  No stridor  No respiratory distress  She has no wheezes  She has no rales  She exhibits no tenderness  Abdominal: Soft  Bowel sounds are normal  She exhibits no distension and no mass  There is no tenderness  There is no rebound and no guarding  Musculoskeletal: She exhibits no edema, tenderness or deformity  Ambulatory dysfunction - uses manual wheel chair - full dependent with staff  Prominent cheeks, scapula and shoulder bones, muscle wasting noted as well (skinny legs and BUE)   Lymphadenopathy:     She has no cervical adenopathy  Neurological: She is alert  Oriented to name only   Skin: Skin is warm and dry  No rash noted  She is not diaphoretic  No erythema  No pallor  Psychiatric: She has a normal mood and affect  Her behavior is normal        Laboratory results / Imaging: Hard copies in medical chart:    * CBC w/o diff (2020) = WNL except:  Hb 6 (L) <= 11 1 (2019)  Hct: 28 3 (L)  WBC: 3 8 (L)    * CMP (2020) = WNL except:  Co: 22 (L)  Ca: 8 4 (L)  Alk  Phosp: 137 <= 147 (2019)  Alb: 2 5  <= 3 2 (2019)  GFR: 50 (L)    Current Medications:   All medications reviewed and updated in [address] [place] [address] [id number]  3/4/2020

## 2020-03-04 NOTE — ASSESSMENT & PLAN NOTE
New onset  Please see management in #1  Will check Uric Acid level on 3/5/2020  Nursing to offer pain medication as needed

## 2020-03-04 NOTE — ASSESSMENT & PLAN NOTE
Weight swings rapidly  Irregular appetite and meal completion  Current BMI: 21 21kg/m2 (3/4/2020)  Significant prominence of cheeks, scapular, shoulder bones  Dry fragile skin  Followed by RD  Recently managed for Pneumonia  Will continue to monitor

## 2020-03-04 NOTE — ASSESSMENT & PLAN NOTE
Per nursing, mood and behavior is stable at this time  Hx of aggressive and combativeness joan  during personal care  Hx of spitting out medication or declining scheduled medication  No concerns for sleep pattern  Weight loss: please see #3 above  Continue 24/7 LTCF supportive care and management  Continue fall precaution  High risk for hydration issues

## 2020-03-27 ENCOUNTER — NURSING HOME VISIT (OUTPATIENT)
Dept: GERIATRICS | Facility: OTHER | Age: 85
End: 2020-03-27
Payer: MEDICARE

## 2020-03-27 DIAGNOSIS — I10 ESSENTIAL HYPERTENSION: ICD-10-CM

## 2020-03-27 DIAGNOSIS — M25.431 PAIN AND SWELLING OF RIGHT WRIST: ICD-10-CM

## 2020-03-27 DIAGNOSIS — G30.1 LATE ONSET ALZHEIMER'S DISEASE WITH BEHAVIORAL DISTURBANCE (HCC): Primary | ICD-10-CM

## 2020-03-27 DIAGNOSIS — M25.531 PAIN AND SWELLING OF RIGHT WRIST: ICD-10-CM

## 2020-03-27 DIAGNOSIS — F02.81 LATE ONSET ALZHEIMER'S DISEASE WITH BEHAVIORAL DISTURBANCE (HCC): Primary | ICD-10-CM

## 2020-03-27 PROCEDURE — 99309 SBSQ NF CARE MODERATE MDM 30: CPT | Performed by: FAMILY MEDICINE

## 2020-03-27 NOTE — PROGRESS NOTES
[facility]  [address]  [phone] [address] of Service: nursing home place of service: POS 32 Unskilled- No Part A Coverage      NAME: Kae Hsu  AGE: 80 y o  SEX: female [id number]    DATE OF ENCOUNTER: 3/30/2020    Assessment and Plan     Problem List Items Addressed This Visit        Cardiovascular and Mediastinum    Essential hypertension     Patient's blood pressure is controlled, asymptomatic  Continue same regimen and continue to monitor  Nervous and Auditory    Late onset Alzheimer's disease with behavioral disturbance ([facility] ) - Primary     Stable with intermittent behaviors  Will continue same regimen  Check CBC, CMP ( on seroquel)  Continue to provide supportive care            Other    Pain and swelling of right wrist     Will freddy the area and check q shift  No trauma  Order X ray left wrist   Rest and apply ice, continue tylenol  Chief Complaint     Follow up dementia    History of Present Illness     Khoa Turner is a 80 y o  female who was seen today for follow up  She has intermittent behaviors as per staff  Currently stable, appetite at baseline, no changes in weight  Noted on exam that she has redness orf right wrist and she is tender to palpation  Over the wrist  No trauma reported            The following portions of the patient's history were reviewed and updated as appropriate: allergies, current medications, past family history, past medical history, past social history, past surgical history and problem list     Review of Systems     Review of Systems   Unable to perform ROS: Dementia       Active Problem List     Patient Active Problem List   Diagnosis    Hyperlipidemia    GERD (gastroesophageal reflux disease)    Fibromyalgia    Rheumatoid arthritis ([facility] )    Hyponatremia    Cerebrovascular accident (CVA) due to embolism ([facility] )    Essential hypertension    Acute metabolic encephalopathy    Sepsis ([facility] )    Late onset Alzheimer's disease with behavioral disturbance ([facility] )    Acquired hypothyroidism    Anemia    Acute cystitis without hematuria    Right lower quadrant abdominal pain    DEVONTE (acute kidney injury) ([facility] )    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 ([facility])    Acute pneumonia    Sore throat    Rotator cuff tear arthropathy of right shoulder    Cough    Conjunctivitis of right eye    Pseudogout of left wrist    Paresis ([facility])    Pressure injury of sacral region, stage 1    Severe protein-calorie malnutrition (HCC)    Debility    Chronic rhinitis    Chronic pain of right knee    Diverticulitis    At high risk for aspiration    Vascular dementia with behavior disturbance ([facility])    Seizure ([facility])    Seizure-like activity ([facility])    Right lower quadrant abdominal tenderness    Abnormal EKG    Advance care planning    Physical deconditioning    Ambulatory dysfunction    Weight loss    History of CVA (cerebrovascular accident)    Stage 3 chronic kidney disease ([facility])    Pain of left great toe    At risk for dehydration    Paronychia of second toe of left foot    Paronychia of great toe of left foot    Pain and swelling of right wrist       Objective     Vital Signs:     Blood pressure 134/78 Heart Rate: 78 Respiratory Rate 18   Temperature 98 0 Oxygen Saturation 95%RA     Physical Exam   Constitutional: No distress  HENT:   Head: Normocephalic and atraumatic  Left eyelid ptosis   Eyes: Pupils are equal, round, and reactive to light  EOM are normal  Right eye exhibits no discharge  Left eye exhibits no discharge  Neck: Normal range of motion  Neck supple  Cardiovascular: Normal rate and regular rhythm  Murmur heard  Pulmonary/Chest: Effort normal and breath sounds normal  No respiratory distress  She has no wheezes  Abdominal: Soft  There is no tenderness  There is no rebound and no guarding     Musculoskeletal: She exhibits tenderness (right medial wrist)  She exhibits no edema  Neurological: She is alert  AAOx0   Skin: Skin is warm and dry  She is not diaphoretic  There is erythema (right medial wrist)  Psychiatric:   Combative at times   Nursing note and vitals reviewed  Pertinent Laboratory/Diagnostic Studies:  Laboratory and Imaging studies reviewed  Full report in the paper chart  Current Medications   Medications reviewed and updated in facility chart      Name: March hSadi  : 1931  MRN: [id number]  DOS: 3/30/2020      Joselyn Kilgore MD  Geriatric Medicine  3/30/2020 4:57 PM

## 2020-03-28 ENCOUNTER — TELEPHONE (OUTPATIENT)
Dept: OTHER | Facility: OTHER | Age: 85
End: 2020-03-28

## 2020-03-28 NOTE — TELEPHONE ENCOUNTER
[facility]:    [id number]/ Treva Reilly from [facility] calling to report pt hand redness and swelling/ Pt   Sabiha Reyes  45-

## 2020-03-30 PROBLEM — M25.431 PAIN AND SWELLING OF RIGHT WRIST: Status: ACTIVE | Noted: 2020-03-30

## 2020-03-30 PROBLEM — M25.531 PAIN AND SWELLING OF RIGHT WRIST: Status: ACTIVE | Noted: 2020-03-30

## 2020-03-30 NOTE — ASSESSMENT & PLAN NOTE
Patient's blood pressure is controlled, asymptomatic  Continue same regimen and continue to monitor

## 2020-03-30 NOTE — ASSESSMENT & PLAN NOTE
Stable with intermittent behaviors  Will continue same regimen     Check CBC, CMP ( on seroquel)  Continue to provide supportive care

## 2020-03-30 NOTE — ASSESSMENT & PLAN NOTE
Will freddy the area and check q shift  No trauma  Order X ray left wrist   Rest and apply ice, continue tylenol

## 2020-04-27 ENCOUNTER — TELEPHONE (OUTPATIENT)
Dept: GERIATRICS | Age: 85
End: 2020-04-27

## 2020-04-27 ENCOUNTER — NURSING HOME VISIT (OUTPATIENT)
Dept: GERIATRICS | Facility: OTHER | Age: 85
End: 2020-04-27
Payer: MEDICARE

## 2020-04-27 DIAGNOSIS — U07.1 COVID-19 VIRUS INFECTION: Primary | ICD-10-CM

## 2020-04-27 DIAGNOSIS — F02.80 DEMENTIA OF THE ALZHEIMER'S TYPE, WITH LATE ONSET, WITH DELIRIUM (HCC): ICD-10-CM

## 2020-04-27 DIAGNOSIS — J18.9 PNEUMONIA OF RIGHT MIDDLE LOBE DUE TO INFECTIOUS ORGANISM: ICD-10-CM

## 2020-04-27 DIAGNOSIS — R56.9 GENERALIZED-ONSET SEIZURES (HCC): ICD-10-CM

## 2020-04-27 DIAGNOSIS — F22 DEMENTIA OF THE ALZHEIMER'S TYPE, WITH LATE ONSET, WITH DELUSIONS (HCC): ICD-10-CM

## 2020-04-27 DIAGNOSIS — F05 DEMENTIA OF THE ALZHEIMER'S TYPE, WITH LATE ONSET, WITH DELIRIUM (HCC): ICD-10-CM

## 2020-04-27 DIAGNOSIS — Z86.73 PERSONAL HISTORY OF TRANSIENT CEREBRAL ISCHEMIA: Primary | ICD-10-CM

## 2020-04-27 DIAGNOSIS — G30.1 DEMENTIA OF THE ALZHEIMER'S TYPE, WITH LATE ONSET, WITH DELUSIONS (HCC): ICD-10-CM

## 2020-04-27 DIAGNOSIS — G30.1 DEMENTIA OF THE ALZHEIMER'S TYPE, WITH LATE ONSET, WITH DELIRIUM (HCC): ICD-10-CM

## 2020-04-27 DIAGNOSIS — Z71.89 ADVANCE CARE PLANNING: ICD-10-CM

## 2020-04-27 DIAGNOSIS — F02.81 DEMENTIA OF THE ALZHEIMER'S TYPE, WITH LATE ONSET, WITH DELUSIONS (HCC): ICD-10-CM

## 2020-04-27 PROCEDURE — 99309 SBSQ NF CARE MODERATE MDM 30: CPT | Performed by: FAMILY MEDICINE

## 2020-04-28 PROBLEM — U07.1 COVID-19 VIRUS INFECTION: Status: ACTIVE | Noted: 2020-04-28

## 2020-05-06 ENCOUNTER — NURSING HOME VISIT (OUTPATIENT)
Dept: GERIATRICS | Facility: OTHER | Age: 85
End: 2020-05-06
Payer: MEDICARE

## 2020-05-06 DIAGNOSIS — R11.14 BILIOUS VOMITING WITHOUT NAUSEA: ICD-10-CM

## 2020-05-06 DIAGNOSIS — N17.9 AKI (ACUTE KIDNEY INJURY) (HCC): ICD-10-CM

## 2020-05-06 DIAGNOSIS — U07.1 COVID-19 VIRUS INFECTION: Primary | ICD-10-CM

## 2020-05-06 PROCEDURE — 99309 SBSQ NF CARE MODERATE MDM 30: CPT | Performed by: FAMILY MEDICINE

## 2020-05-11 ENCOUNTER — TELEPHONE (OUTPATIENT)
Dept: OTHER | Facility: OTHER | Age: 85
End: 2020-05-11

## 2020-05-16 ENCOUNTER — TELEPHONE (OUTPATIENT)
Dept: OTHER | Facility: OTHER | Age: 85
End: 2020-05-16

## 2021-01-14 NOTE — ASSESSMENT & PLAN NOTE
IV cefazolin     History of recurrent UTIs  Id follow-up ongoing Pt is sleeping on cart in no signs of distress. Sitter remains at the bedside. Equal rise and fall of chest noted upon rounding.

## 2025-01-26 NOTE — TELEPHONE ENCOUNTER
Left foot swollen  Didn't know if you wanted to put her on lasiks or just have them monitor her   She is now on lasik 20mg mon, wed and [address] VTE Assessment already completed for this visit